# Patient Record
Sex: FEMALE | Race: WHITE | NOT HISPANIC OR LATINO | Employment: PART TIME | URBAN - METROPOLITAN AREA
[De-identification: names, ages, dates, MRNs, and addresses within clinical notes are randomized per-mention and may not be internally consistent; named-entity substitution may affect disease eponyms.]

---

## 2017-01-17 ENCOUNTER — OFFICE VISIT (OUTPATIENT)
Dept: URGENT CARE | Facility: CLINIC | Age: 57
End: 2017-01-17
Payer: COMMERCIAL

## 2017-01-17 LAB — GLUCOSE SERPL-MCNC: 302 MG/DL (ref 65–140)

## 2017-01-17 PROCEDURE — 99284 EMERGENCY DEPT VISIT MOD MDM: CPT

## 2017-01-17 PROCEDURE — 82948 REAGENT STRIP/BLOOD GLUCOSE: CPT

## 2017-01-17 PROCEDURE — 93005 ELECTROCARDIOGRAM TRACING: CPT

## 2017-01-17 PROCEDURE — G0383 LEV 4 HOSP TYPE B ED VISIT: HCPCS

## 2018-03-15 ENCOUNTER — OFFICE VISIT (OUTPATIENT)
Dept: FAMILY MEDICINE CLINIC | Facility: CLINIC | Age: 58
End: 2018-03-15
Payer: COMMERCIAL

## 2018-03-15 VITALS
WEIGHT: 254 LBS | TEMPERATURE: 97.1 F | SYSTOLIC BLOOD PRESSURE: 118 MMHG | RESPIRATION RATE: 18 BRPM | DIASTOLIC BLOOD PRESSURE: 78 MMHG | BODY MASS INDEX: 45 KG/M2 | HEART RATE: 82 BPM | HEIGHT: 63 IN | OXYGEN SATURATION: 98 %

## 2018-03-15 DIAGNOSIS — E11.9 DIABETES MELLITUS TYPE 2, INSULIN DEPENDENT (HCC): ICD-10-CM

## 2018-03-15 DIAGNOSIS — G89.29 OTHER CHRONIC PAIN: ICD-10-CM

## 2018-03-15 DIAGNOSIS — Z79.4 DIABETES MELLITUS TYPE 2, INSULIN DEPENDENT (HCC): ICD-10-CM

## 2018-03-15 DIAGNOSIS — K40.91 UNILATERAL RECURRENT INGUINAL HERNIA WITHOUT OBSTRUCTION OR GANGRENE: Primary | ICD-10-CM

## 2018-03-15 PROCEDURE — 99213 OFFICE O/P EST LOW 20 MIN: CPT | Performed by: FAMILY MEDICINE

## 2018-03-15 RX ORDER — LISINOPRIL 20 MG/1
20 TABLET ORAL DAILY
Refills: 0 | COMMUNITY
Start: 2018-01-23 | End: 2018-03-16 | Stop reason: SDUPTHER

## 2018-03-15 RX ORDER — ASPIRIN 81 MG/1
TABLET, CHEWABLE ORAL
Status: ON HOLD | COMMUNITY
End: 2018-09-04

## 2018-03-15 RX ORDER — ERGOCALCIFEROL 1.25 MG/1
CAPSULE ORAL
Refills: 0 | COMMUNITY
Start: 2017-12-30 | End: 2018-03-16 | Stop reason: SDUPTHER

## 2018-03-15 RX ORDER — ZOLPIDEM TARTRATE 10 MG/1
5 TABLET ORAL DAILY
COMMUNITY
End: 2018-09-04 | Stop reason: HOSPADM

## 2018-03-15 RX ORDER — PEN NEEDLE, DIABETIC 31 GX5/16"
NEEDLE, DISPOSABLE MISCELLANEOUS
Refills: 0 | COMMUNITY
Start: 2017-12-30 | End: 2018-10-08 | Stop reason: ALTCHOICE

## 2018-03-15 RX ORDER — SUCRALFATE 1 G/1
1 TABLET ORAL 4 TIMES DAILY
Refills: 0 | COMMUNITY
Start: 2018-01-23 | End: 2018-09-04 | Stop reason: HOSPADM

## 2018-03-15 RX ORDER — GABAPENTIN 300 MG/1
300 CAPSULE ORAL
COMMUNITY
Start: 2015-07-05 | End: 2018-03-16 | Stop reason: SDUPTHER

## 2018-03-15 RX ORDER — LORAZEPAM 0.5 MG/1
0.5 TABLET ORAL 3 TIMES DAILY PRN
Refills: 0 | COMMUNITY
Start: 2018-01-21 | End: 2018-03-16 | Stop reason: ALTCHOICE

## 2018-03-15 RX ORDER — FAMOTIDINE 20 MG/1
TABLET, FILM COATED ORAL
Refills: 0 | COMMUNITY
Start: 2017-12-30 | End: 2018-03-16 | Stop reason: SDUPTHER

## 2018-03-15 RX ORDER — INSULIN DETEMIR 100 [IU]/ML
INJECTION, SOLUTION SUBCUTANEOUS
Refills: 0 | COMMUNITY
Start: 2018-01-17 | End: 2018-03-16 | Stop reason: SDUPTHER

## 2018-03-15 RX ORDER — PANTOPRAZOLE SODIUM 40 MG/1
40 TABLET, DELAYED RELEASE ORAL DAILY
Refills: 0 | COMMUNITY
Start: 2018-01-23 | End: 2018-09-04 | Stop reason: HOSPADM

## 2018-03-15 RX ORDER — SIMVASTATIN 20 MG
TABLET ORAL
Refills: 0 | COMMUNITY
Start: 2017-12-30 | End: 2018-03-16 | Stop reason: SDUPTHER

## 2018-03-15 RX ORDER — MULTIVIT-MIN/FA/LYCOPEN/LUTEIN .4-300-25
TABLET ORAL
Refills: 0 | COMMUNITY
Start: 2017-12-30 | End: 2018-09-04 | Stop reason: HOSPADM

## 2018-03-15 RX ORDER — OXYCODONE HYDROCHLORIDE AND ACETAMINOPHEN 5; 325 MG/1; MG/1
TABLET ORAL
Refills: 0 | COMMUNITY
Start: 2018-03-02 | End: 2018-09-04 | Stop reason: HOSPADM

## 2018-03-15 RX ORDER — POTASSIUM CHLORIDE 600 MG/1
8 TABLET, FILM COATED, EXTENDED RELEASE ORAL
COMMUNITY
End: 2018-09-04 | Stop reason: HOSPADM

## 2018-03-15 RX ORDER — DULAGLUTIDE 1.5 MG/.5ML
INJECTION, SOLUTION SUBCUTANEOUS
Refills: 0 | COMMUNITY
Start: 2018-01-18 | End: 2018-03-16 | Stop reason: SDUPTHER

## 2018-03-15 RX ORDER — EZETIMIBE 10 MG/1
TABLET ORAL
Refills: 0 | COMMUNITY
Start: 2017-12-31 | End: 2018-03-16 | Stop reason: SDUPTHER

## 2018-03-15 NOTE — PROGRESS NOTES
OFFICE VISIT  Merlyn Winchester 59 y o  female MRN: [id number]      Assessment / Plan:  Diagnoses and all orders for this visit:    Unilateral recurrent inguinal hernia without obstruction or gangrene  -     Ambulatory referral to General Surgery; Future    Other chronic pain  -     Ambulatory referral to Pain Management; Future    Diabetes mellitus type 2, insulin dependent ([facility] )  -     Cancel: Microalbumin / creatinine urine ratio  -     TSH, 3rd generation with T4 reflex; Future  -     Lipid Panel with Direct LDL reflex; Future  -     Hemoglobin A1c; Future  -     Comprehensive metabolic panel; Future  -     Microalbumin / creatinine urine ratio      Refuses mammogram, refuses pap smear  Had colonoscopy by Dr Curtis Hopkins, 2017, obtain records  Pt made aware unable to prescribed continuous use of opiods and benzo  Pt to establish with pain management  Reason For Visit / Chief Complaint  Chief Complaint   Patient presents with   [place] Establish Care    Hernia     She states she has a hernia and it is stinging and uncomfortable  HPI:  Merlyn Winchester is a 59 y o  female presents today as a new patient  Pt was previously seen by [facility], recent insurance change, last appt   She reports her hernia is causing her pain, swelling  Surgery, 19 years by Dr Chanell Mcmillan  Historical Information   No past medical history on file    Past Surgical History:   Procedure Laterality Date    CERVICAL CONIZATION   W/ LASER      CHOLECYSTECTOMY      DILATION AND CURETTAGE OF UTERUS      ENDOMETRIAL ABLATION      ENDOMETRIAL BIOPSY      INDUCED       TOTAL THYROIDECTOMY       Social History   History   Alcohol Use No     History   Drug Use No     History   Smoking Status    Never Smoker   Smokeless Tobacco    Never Used     Family History   Problem Relation Age of Onset    Arthritis Mother     Osteoporosis Mother        Meds/Allergies   Allergies   Allergen Reactions    Albumen, Egg Other reaction(s): Nausea and/or vomiting    Bioflavonoids      Other reaction(s): Other (See Comments)  Skin "ayon"    Br[place] Number      Other reaction(s): GI upset  Abdominal pain    Metronidazole Itching    Morphine Hives and Itching    Prochlorperazine Wheezing     Other reaction(s):  Other (See Comments)  SOB    Sulfa Antibiotics      Other reaction(s): Unknown Reaction       Meds:    Current Outpatient Prescriptions:     aspirin 81 mg chewable tablet, Chew, Disp: , Rfl:     B-D ULTRAFINE III SHORT PEN 31G X 8 MM MISC, , Disp: , Rfl: 0    ergocalciferol (VITAMIN D2) 50,000 units, , Disp: , Rfl: 0    ezetimibe (ZETIA) 10 mg tablet, , Disp: , Rfl: 0    famotidine (PEPCID) 20 mg tablet, , Disp: , Rfl: 0    gabapentin (NEURONTIN) 300 mg capsule, Take 300 mg by mouth, Disp: , Rfl:     LEVEMIR FLEXTOUCH subcutaneous injection pen 100 units/mL, , Disp: , Rfl: 0    Liraglutide (VICTOZA) 18 MG/3ML SOPN, Inject under the skin, Disp: , Rfl:     lisinopril (ZESTRIL) 20 mg tablet, Take 20 mg by mouth daily, Disp: , Rfl: 0    LORazepam (ATIVAN) 0 5 mg tablet, Take 0 5 mg by mouth 3 (three) times a day as needed, Disp: , Rfl: 0    metFORMIN (GLUCOPHAGE) 1000 MG tablet, Take by mouth, Disp: , Rfl:     Multiple Vitamins-Minerals ([facility]) TABS, , Disp: , Rfl: 0    oxyCODONE-acetaminophen (PERCOCET) 5-325 mg per tablet, take 1 to 2 tablet by mouth every 6 hours if needed for pain, Disp: , Rfl: 0    pantoprazole (PROTONIX) 40 mg tablet, Take 40 mg by mouth daily, Disp: , Rfl: 0    potassium chloride (KLOR-CON) 8 MEQ tablet, Take 8 mEq by mouth, Disp: , Rfl:     simvastatin (ZOCOR) 20 mg tablet, , Disp: , Rfl: 0    sucralfate (CARAFATE) 1 g tablet, Take 1 g by mouth 4 (four) times a day, Disp: , Rfl: 0    TRULICITY 1 5 BU/7 8JK SOPN, , Disp: , Rfl: 0    VENTOLIN  (90 Base) MCG/ACT inhaler, Inhale 2 puffs every 4 (four) hours as needed, Disp: , Rfl: 0    zolpidem (AMBIEN) 10 mg tablet, Take 5 mg by mouth daily, Disp: , Rfl:       REVIEW OF SYSTEMS  A comprehensive review of systems was negative except for: Constitutional: positive for tired  Musculoskeletal: positive for  bone pain, joint stiffness and muscle pain  Behavioral/Psych: positive for Symptoms; Pyschiatric: anxiety and depression      Current Vitals:   Blood Pressure: 118/78 (03/15/18 1131)  Pulse: 82 (03/15/18 1131)  Temperature: (!) 97 1 °F (36 2 °C) (03/15/18 1131)  Respirations: 18 (03/15/18 1131)  Height: 5' 2 5" (158 8 cm) (03/15/18 1131)  Weight - Scale: 115 kg (254 lb) (03/15/18 1131)  SpO2: 98 % (03/15/18 1131)  [unfilled]    PHYSICAL EXAMS:  General appearance: alert and oriented, in no acute distress and moderately obese  Lungs: clear to auscultation bilaterally  Heart: regular rate and rhythm, S1, S2 normal, no murmur, click, rub or gallop  Abdomen: abnormal findings:  obese  Skin: Skin color, texture, turgor normal  No rashes or lesions  Neurologic: Grossly normal{YES/NO:20        Follow up at this office in 3 months    Counseling / Coordination of Care  Total floor / unit time spent today 20 minutes  Greater than 50% of total time was spent with the patient and / or family counseling and / or coordination of care

## 2018-03-16 DIAGNOSIS — F33.41 RECURRENT MAJOR DEPRESSIVE DISORDER, IN PARTIAL REMISSION (HCC): ICD-10-CM

## 2018-03-16 DIAGNOSIS — Z79.4 UNCONTROLLED TYPE 2 DIABETES MELLITUS WITH HYPERGLYCEMIA, WITH LONG-TERM CURRENT USE OF INSULIN (HCC): Primary | ICD-10-CM

## 2018-03-16 DIAGNOSIS — E11.65 UNCONTROLLED TYPE 2 DIABETES MELLITUS WITH HYPERGLYCEMIA, WITH LONG-TERM CURRENT USE OF INSULIN (HCC): Primary | ICD-10-CM

## 2018-03-16 DIAGNOSIS — J45.909 MODERATE ASTHMA WITHOUT COMPLICATION, UNSPECIFIED WHETHER PERSISTENT: ICD-10-CM

## 2018-03-16 RX ORDER — EZETIMIBE 10 MG/1
10 TABLET ORAL DAILY
Qty: 30 TABLET | Refills: 0 | Status: SHIPPED | OUTPATIENT
Start: 2018-03-16 | End: 2018-09-04 | Stop reason: HOSPADM

## 2018-03-16 RX ORDER — SIMVASTATIN 20 MG
20 TABLET ORAL
Qty: 30 TABLET | Refills: 0 | Status: ON HOLD | OUTPATIENT
Start: 2018-03-16 | End: 2018-09-04

## 2018-03-16 RX ORDER — LISINOPRIL 20 MG/1
20 TABLET ORAL DAILY
Qty: 30 TABLET | Refills: 0 | Status: SHIPPED | OUTPATIENT
Start: 2018-03-16 | End: 2018-04-20 | Stop reason: SDUPTHER

## 2018-03-16 RX ORDER — DULAGLUTIDE 1.5 MG/.5ML
1.5 INJECTION, SOLUTION SUBCUTANEOUS WEEKLY
Qty: 1 PEN | Refills: 0 | Status: SHIPPED | OUTPATIENT
Start: 2018-03-16 | End: 2018-09-04 | Stop reason: HOSPADM

## 2018-03-16 RX ORDER — FAMOTIDINE 20 MG/1
20 TABLET, FILM COATED ORAL DAILY
Qty: 30 TABLET | Refills: 0 | Status: SHIPPED | OUTPATIENT
Start: 2018-03-16 | End: 2018-09-04 | Stop reason: HOSPADM

## 2018-03-16 RX ORDER — INSULIN DETEMIR 100 [IU]/ML
50 INJECTION, SOLUTION SUBCUTANEOUS 2 TIMES DAILY
Qty: 5 PEN | Refills: 0 | Status: SHIPPED | OUTPATIENT
Start: 2018-03-16 | End: 2018-09-04 | Stop reason: HOSPADM

## 2018-03-16 RX ORDER — GABAPENTIN 300 MG/1
300 CAPSULE ORAL 4 TIMES DAILY
Qty: 120 CAPSULE | Refills: 0 | Status: ON HOLD | OUTPATIENT
Start: 2018-03-16 | End: 2018-09-04

## 2018-03-16 RX ORDER — CITALOPRAM 10 MG/1
10 TABLET ORAL DAILY
Qty: 30 TABLET | Refills: 0 | Status: ON HOLD | OUTPATIENT
Start: 2018-03-16 | End: 2018-09-04

## 2018-03-16 RX ORDER — ERGOCALCIFEROL 1.25 MG/1
50000 CAPSULE ORAL WEEKLY
Qty: 12 CAPSULE | Refills: 0 | Status: SHIPPED | OUTPATIENT
Start: 2018-03-16 | End: 2018-09-04 | Stop reason: HOSPADM

## 2018-04-06 RX ORDER — OXYCODONE AND ACETAMINOPHEN 10; 325 MG/1; MG/1
1 TABLET ORAL EVERY 6 HOURS PRN
Refills: 0 | COMMUNITY
Start: 2018-03-23 | End: 2019-04-08 | Stop reason: HOSPADM

## 2018-04-06 RX ORDER — TIZANIDINE 4 MG/1
4 TABLET ORAL 3 TIMES DAILY
Refills: 0 | Status: ON HOLD | COMMUNITY
Start: 2018-03-23 | End: 2018-09-04

## 2018-04-20 ENCOUNTER — OFFICE VISIT (OUTPATIENT)
Dept: FAMILY MEDICINE CLINIC | Facility: CLINIC | Age: 58
End: 2018-04-20
Payer: COMMERCIAL

## 2018-04-20 VITALS
HEART RATE: 85 BPM | OXYGEN SATURATION: 97 % | BODY MASS INDEX: 44.83 KG/M2 | WEIGHT: 253 LBS | DIASTOLIC BLOOD PRESSURE: 74 MMHG | HEIGHT: 63 IN | SYSTOLIC BLOOD PRESSURE: 141 MMHG | TEMPERATURE: 97.3 F

## 2018-04-20 DIAGNOSIS — J02.9 ACUTE PHARYNGITIS, UNSPECIFIED ETIOLOGY: Primary | ICD-10-CM

## 2018-04-20 DIAGNOSIS — J45.909 MODERATE ASTHMA WITHOUT COMPLICATION, UNSPECIFIED WHETHER PERSISTENT: ICD-10-CM

## 2018-04-20 DIAGNOSIS — R05.9 COUGH: ICD-10-CM

## 2018-04-20 DIAGNOSIS — B37.9 ANTIBIOTIC-INDUCED YEAST INFECTION: ICD-10-CM

## 2018-04-20 DIAGNOSIS — Z79.4 UNCONTROLLED TYPE 2 DIABETES MELLITUS WITH HYPERGLYCEMIA, WITH LONG-TERM CURRENT USE OF INSULIN (HCC): ICD-10-CM

## 2018-04-20 DIAGNOSIS — T36.95XA ANTIBIOTIC-INDUCED YEAST INFECTION: ICD-10-CM

## 2018-04-20 DIAGNOSIS — E11.65 UNCONTROLLED TYPE 2 DIABETES MELLITUS WITH HYPERGLYCEMIA, WITH LONG-TERM CURRENT USE OF INSULIN (HCC): ICD-10-CM

## 2018-04-20 PROBLEM — E55.9 VITAMIN D DEFICIENCY: Status: ACTIVE | Noted: 2017-01-17

## 2018-04-20 PROBLEM — D22.9 BENIGN MOLE: Status: ACTIVE | Noted: 2017-01-17

## 2018-04-20 PROBLEM — M19.90 ARTHRITIS: Status: ACTIVE | Noted: 2017-01-17

## 2018-04-20 PROBLEM — G47.00 INSOMNIA: Status: ACTIVE | Noted: 2017-01-17

## 2018-04-20 PROBLEM — E11.40 DIABETIC NEUROPATHY (HCC): Status: ACTIVE | Noted: 2017-01-23

## 2018-04-20 PROBLEM — E11.9 TYPE 2 DIABETES MELLITUS (HCC): Status: ACTIVE | Noted: 2017-01-17

## 2018-04-20 PROBLEM — E11.621 DIABETIC ULCER OF HEEL (HCC): Status: ACTIVE | Noted: 2017-01-17

## 2018-04-20 PROBLEM — K21.9 GERD (GASTROESOPHAGEAL REFLUX DISEASE): Status: ACTIVE | Noted: 2017-01-17

## 2018-04-20 PROBLEM — K21.9 ACID REFLUX: Status: ACTIVE | Noted: 2017-01-23

## 2018-04-20 PROBLEM — R26.9 GAIT DISTURBANCE: Status: ACTIVE | Noted: 2017-01-17

## 2018-04-20 PROBLEM — R55 NEAR SYNCOPE: Status: ACTIVE | Noted: 2017-01-17

## 2018-04-20 PROBLEM — I10 HYPERTENSION: Status: ACTIVE | Noted: 2017-01-17

## 2018-04-20 PROBLEM — L97.409 DIABETIC ULCER OF HEEL (HCC): Status: ACTIVE | Noted: 2017-01-17

## 2018-04-20 PROBLEM — F41.9 ANXIETY: Status: ACTIVE | Noted: 2017-01-17

## 2018-04-20 PROBLEM — N18.30 STAGE 3 CHRONIC KIDNEY DISEASE (HCC): Status: ACTIVE | Noted: 2017-01-23

## 2018-04-20 PROCEDURE — 99213 OFFICE O/P EST LOW 20 MIN: CPT | Performed by: FAMILY MEDICINE

## 2018-04-20 RX ORDER — LISINOPRIL 20 MG/1
20 TABLET ORAL DAILY
Qty: 30 TABLET | Refills: 0 | Status: SHIPPED | OUTPATIENT
Start: 2018-04-20 | End: 2018-09-04 | Stop reason: HOSPADM

## 2018-04-20 RX ORDER — AMOXICILLIN 875 MG/1
875 TABLET, COATED ORAL 2 TIMES DAILY
Qty: 20 TABLET | Refills: 0 | Status: SHIPPED | OUTPATIENT
Start: 2018-04-20 | End: 2018-04-20 | Stop reason: SDUPTHER

## 2018-04-20 RX ORDER — BENZONATATE 100 MG/1
100 CAPSULE ORAL 3 TIMES DAILY PRN
Qty: 20 CAPSULE | Refills: 0 | Status: SHIPPED | OUTPATIENT
Start: 2018-04-20 | End: 2018-09-04 | Stop reason: HOSPADM

## 2018-04-20 RX ORDER — BENZONATATE 100 MG/1
100 CAPSULE ORAL 3 TIMES DAILY PRN
Qty: 20 CAPSULE | Refills: 0 | Status: SHIPPED | OUTPATIENT
Start: 2018-04-20 | End: 2018-04-20 | Stop reason: SDUPTHER

## 2018-04-20 RX ORDER — LISINOPRIL 20 MG/1
20 TABLET ORAL DAILY
Qty: 30 TABLET | Refills: 0 | Status: SHIPPED | OUTPATIENT
Start: 2018-04-20 | End: 2018-04-20 | Stop reason: SDUPTHER

## 2018-04-20 RX ORDER — FLUCONAZOLE 150 MG/1
150 TABLET ORAL ONCE
Qty: 1 TABLET | Refills: 0 | Status: SHIPPED | OUTPATIENT
Start: 2018-04-20 | End: 2018-04-20 | Stop reason: SDUPTHER

## 2018-04-20 RX ORDER — AMOXICILLIN 875 MG/1
875 TABLET, COATED ORAL 2 TIMES DAILY
Qty: 20 TABLET | Refills: 0 | Status: SHIPPED | OUTPATIENT
Start: 2018-04-20 | End: 2018-04-30

## 2018-04-20 RX ORDER — FLUCONAZOLE 150 MG/1
150 TABLET ORAL ONCE
Qty: 1 TABLET | Refills: 0 | Status: SHIPPED | OUTPATIENT
Start: 2018-04-20 | End: 2018-04-20

## 2018-04-20 NOTE — PROGRESS NOTES
Assessment/Plan:    No problem-specific Assessment & Plan notes found for this encounter  Diagnoses and all orders for this visit:    Acute pharyngitis, unspecified etiology  -     amoxicillin (AMOXIL) 875 mg tablet; Take 1 tablet (875 mg total) by mouth 2 (two) times a day for 10 days    Uncontrolled type 2 diabetes mellitus with hyperglycemia, with long-term current use of insulin (HCC)  -     metFORMIN (GLUCOPHAGE) 1000 MG tablet; Take 1 tablet (1,000 mg total) by mouth 2 (two) times a day with meals  -     lisinopril (ZESTRIL) 20 mg tablet; Take 1 tablet (20 mg total) by mouth daily    Moderate asthma without complication, unspecified whether persistent  -     VENTOLIN  (90 Base) MCG/ACT inhaler; Inhale 2 puffs every 4 (four) hours as needed for shortness of breath    Antibiotic-induced yeast infection  -     fluconazole (DIFLUCAN) 150 mg tablet; Take 1 tablet (150 mg total) by mouth once for 1 dose    Cough  -     benzonatate (TESSALON PERLES) 100 mg capsule; Take 1 capsule (100 mg total) by mouth 3 (three) times a day as needed for cough      Discussion/Plan:  Acute pharyngitis/Cough - Amoxicillin, Tessalon perles, and Ventolin sent to pharmacy  Encouraged rest, fluids, supportive measures  Abx induced yeast infection - Diflucan sent to pharmacy  Diabetes - Merformin and Lisinopril renewed  F/U as scheduled or sooner if not better  Subjective:      Patient ID: Krishna Hoskins is a 59 y o  female  Chief Complaint   Patient presents with    Sore Throat    Cough    Earache    Headache    Conjunctivitis       Patient presents to the office today for acute sick visit  Patient has been sick for over 1 and 1/2 weeks  She states sx have been worsening over time  She reports sore throat, cough, earache, headache  She states this usually turns into pneumonia and is afraid of this happening  She has been using Nyquil and OTC cold/flu products without relief           The following portions of the patient's history were reviewed and updated as appropriate: allergies, current medications, past family history, past medical history, past social history, past surgical history and problem list   Patient Active Problem List   Diagnosis    Vitamin D deficiency    Type 2 diabetes mellitus ([place] [address] )    STD (female)    Encounter for routine gynecological examination    History of Salmonella infection    Obesity, morbid ([place] [address] )    Melanocytic nevus of trunk    Menopausal and perimenopausal disorder    Near syncope    Insomnia    Hypertension    Hyperlipidemia    History of pulmonary embolism    GERD (gastroesophageal reflux disease)    Gait disturbance    Diabetic ulcer of left heel ([place] [address] )    Diabetic ulcer of heel ([place] [address] )    Diabetic neuropathy ([place] [address] )    Diabetes mellitus type 2, uncontrolled ([place] [address] )    Stage 3 chronic kidney disease    Benign mole    Benign essential HTN    Arthritis    Anxiety    Anemia    JAYLEN (acute kidney injury) ([place] [address] )    Acid reflux    Achilles tendinitis of left lower extremity    Ulcer of toe of left foot ([place] [address] )       Review of Systems   Constitutional: Positive for fatigue  HENT: Positive for congestion, ear pain, rhinorrhea, sinus pain, sinus pressure, sneezing, sore throat, trouble swallowing and voice change  Respiratory: Positive for cough  Cardiovascular: Negative  Musculoskeletal: Positive for myalgias  Neurological: Positive for headaches  Objective:      /74 (BP Location: Left arm, Patient Position: Sitting, Cuff Size: Large)   Pulse 85   Temp (!) 97 3 °F (36 3 °C) (Tympanic)   Ht 5' 2 5" (1 588 m)   Wt 115 kg (253 lb)   SpO2 97%   BMI 45 54 kg/m²          Physical Exam   Constitutional: She is oriented to person, place, and time  She appears well-developed and well-nourished  obese   HENT:   Head: Normocephalic and atraumatic     Right Ear: Tympanic membrane, external ear and ear canal normal    Left Ear: External ear and ear canal normal  A middle ear effusion is present  Nose: Mucosal edema and rhinorrhea present  Mouth/Throat: Uvula is midline and mucous membranes are normal  Posterior oropharyngeal edema and posterior oropharyngeal erythema present  No oropharyngeal exudate or tonsillar abscesses  Eyes: Conjunctivae are normal  Pupils are equal, round, and reactive to light  Neck: Neck supple  Cardiovascular: Normal rate  Pulmonary/Chest: Effort normal  She has wheezes  Neurological: She is oriented to person, place, and time  Skin: Skin is warm and dry

## 2018-05-21 ENCOUNTER — TELEPHONE (OUTPATIENT)
Dept: FAMILY MEDICINE CLINIC | Facility: CLINIC | Age: 58
End: 2018-05-21

## 2018-06-11 ENCOUNTER — TELEPHONE (OUTPATIENT)
Dept: FAMILY MEDICINE CLINIC | Facility: CLINIC | Age: 58
End: 2018-06-11

## 2018-06-11 NOTE — TELEPHONE ENCOUNTER
Per office staff patient was here on Friday, 6/8 demanding for a letter written to the assistance office for free medications  She does have insurance  She was offered coupons/discounts in which she denied  She called office again today, requesting letter in which she does not meet income guidelines  She did not complete last labs ordered to evaluate diabetes

## 2018-09-02 ENCOUNTER — HOSPITAL ENCOUNTER (INPATIENT)
Facility: HOSPITAL | Age: 58
LOS: 2 days | Discharge: HOME | DRG: 624 | End: 2018-09-04
Attending: INTERNAL MEDICINE | Admitting: INTERNAL MEDICINE
Payer: COMMERCIAL

## 2018-09-02 ENCOUNTER — HOSPITAL ENCOUNTER (EMERGENCY)
Facility: HOSPITAL | Age: 58
End: 2018-09-02
Attending: EMERGENCY MEDICINE
Payer: COMMERCIAL

## 2018-09-02 ENCOUNTER — APPOINTMENT (EMERGENCY)
Dept: RADIOLOGY | Facility: HOSPITAL | Age: 58
End: 2018-09-02
Payer: COMMERCIAL

## 2018-09-02 VITALS
RESPIRATION RATE: 16 BRPM | OXYGEN SATURATION: 98 % | BODY MASS INDEX: 42.97 KG/M2 | TEMPERATURE: 98.6 F | HEART RATE: 89 BPM | SYSTOLIC BLOOD PRESSURE: 127 MMHG | WEIGHT: 242.51 LBS | HEIGHT: 63 IN | DIASTOLIC BLOOD PRESSURE: 75 MMHG

## 2018-09-02 DIAGNOSIS — L97.529 ULCER OF TOE OF LEFT FOOT, UNSPECIFIED ULCER STAGE (HCC): ICD-10-CM

## 2018-09-02 DIAGNOSIS — Z79.4 UNCONTROLLED TYPE 2 DIABETES MELLITUS WITH HYPERGLYCEMIA, WITH LONG-TERM CURRENT USE OF INSULIN (HCC): ICD-10-CM

## 2018-09-02 DIAGNOSIS — E11.65 TYPE 2 DIABETES MELLITUS WITH HYPERGLYCEMIA, WITH LONG-TERM CURRENT USE OF INSULIN (HCC): Primary | ICD-10-CM

## 2018-09-02 DIAGNOSIS — Z79.4 TYPE 2 DIABETES MELLITUS WITH HYPERGLYCEMIA, WITH LONG-TERM CURRENT USE OF INSULIN (HCC): Primary | ICD-10-CM

## 2018-09-02 DIAGNOSIS — E11.65 UNCONTROLLED TYPE 2 DIABETES MELLITUS WITH HYPERGLYCEMIA, WITH LONG-TERM CURRENT USE OF INSULIN (HCC): ICD-10-CM

## 2018-09-02 DIAGNOSIS — E11.9 DIABETES MELLITUS (HCC): Primary | ICD-10-CM

## 2018-09-02 DIAGNOSIS — E11.40 DIABETIC NEUROPATHY (HCC): ICD-10-CM

## 2018-09-02 DIAGNOSIS — R73.9 HYPERGLYCEMIA: ICD-10-CM

## 2018-09-02 DIAGNOSIS — I10 ESSENTIAL HYPERTENSION: ICD-10-CM

## 2018-09-02 DIAGNOSIS — F33.41 RECURRENT MAJOR DEPRESSIVE DISORDER, IN PARTIAL REMISSION (HCC): ICD-10-CM

## 2018-09-02 DIAGNOSIS — E11.65 TYPE 2 DIABETES MELLITUS WITH HYPERGLYCEMIA (HCC): ICD-10-CM

## 2018-09-02 PROBLEM — M62.838 MUSCLE SPASM: Status: ACTIVE | Noted: 2018-09-02

## 2018-09-02 LAB
ALBUMIN SERPL BCP-MCNC: 4 G/DL (ref 3.5–5.7)
ALP SERPL-CCNC: 103 U/L (ref 40–150)
ALT SERPL W P-5'-P-CCNC: 21 U/L (ref 7–52)
ANION GAP SERPL CALCULATED.3IONS-SCNC: 15 MMOL/L (ref 4–13)
AST SERPL W P-5'-P-CCNC: 23 U/L (ref 13–39)
BASOPHILS # BLD AUTO: 0 THOUSANDS/ΜL (ref 0–0.1)
BASOPHILS NFR BLD AUTO: 1 % (ref 0–1)
BILIRUB SERPL-MCNC: 0.6 MG/DL (ref 0.2–1)
BILIRUB UR QL STRIP: NEGATIVE
BUN SERPL-MCNC: 16 MG/DL (ref 7–25)
CALCIUM SERPL-MCNC: 9.5 MG/DL (ref 8.6–10.5)
CHLORIDE SERPL-SCNC: 97 MMOL/L (ref 98–107)
CLARITY UR: CLEAR
CO2 SERPL-SCNC: 23 MMOL/L (ref 21–31)
COLOR UR: YELLOW
CREAT SERPL-MCNC: 1.2 MG/DL (ref 0.6–1.2)
DEPRECATED D DIMER PPP: 270 NG/ML (FEU)
EOSINOPHIL # BLD AUTO: 0.3 THOUSAND/ΜL (ref 0–0.61)
EOSINOPHIL NFR BLD AUTO: 4 % (ref 0–6)
ERYTHROCYTE [DISTWIDTH] IN BLOOD BY AUTOMATED COUNT: 13.7 % (ref 11.6–15.1)
GFR SERPL CREATININE-BSD FRML MDRD: 50 ML/MIN/1.73SQ M
GLUCOSE SERPL-MCNC: 175 MG/DL (ref 65–140)
GLUCOSE SERPL-MCNC: 214 MG/DL (ref 65–140)
GLUCOSE SERPL-MCNC: 248 MG/DL (ref 65–140)
GLUCOSE SERPL-MCNC: 288 MG/DL (ref 65–140)
GLUCOSE SERPL-MCNC: 439 MG/DL (ref 65–140)
GLUCOSE SERPL-MCNC: 479 MG/DL (ref 65–140)
GLUCOSE SERPL-MCNC: 504 MG/DL (ref 65–140)
GLUCOSE UR STRIP-MCNC: ABNORMAL MG/DL
HCT VFR BLD AUTO: 43.4 % (ref 37–47)
HGB BLD-MCNC: 14.7 G/DL (ref 11.5–15.4)
HGB UR QL STRIP.AUTO: NEGATIVE
HOLD SPECIMEN: NORMAL
KETONES UR STRIP-MCNC: ABNORMAL MG/DL
LEUKOCYTE ESTERASE UR QL STRIP: NEGATIVE
LYMPHOCYTES # BLD AUTO: 2.2 THOUSANDS/ΜL (ref 0.6–4.47)
LYMPHOCYTES NFR BLD AUTO: 35 % (ref 14–44)
MCH RBC QN AUTO: 29 PG (ref 26.8–34.3)
MCHC RBC AUTO-ENTMCNC: 33.8 G/DL (ref 31.4–37.4)
MCV RBC AUTO: 86 FL (ref 82–98)
MONOCYTES # BLD AUTO: 0.4 THOUSAND/ΜL (ref 0.17–1.22)
MONOCYTES NFR BLD AUTO: 7 % (ref 4–12)
NEUTROPHILS # BLD AUTO: 3.3 THOUSANDS/ΜL (ref 1.85–7.62)
NEUTS SEG NFR BLD AUTO: 53 % (ref 43–75)
NITRITE UR QL STRIP: NEGATIVE
NRBC BLD AUTO-RTO: 0 /100 WBCS
PH UR STRIP.AUTO: 5.5 [PH] (ref 5–8)
PLATELET # BLD AUTO: 217 THOUSANDS/UL (ref 149–390)
PMV BLD AUTO: 8.4 FL (ref 8.9–12.7)
POTASSIUM SERPL-SCNC: 3.9 MMOL/L (ref 3.5–5.5)
PROT SERPL-MCNC: 6.2 G/DL (ref 6.4–8.9)
PROT UR STRIP-MCNC: NEGATIVE MG/DL
RBC # BLD AUTO: 5.05 MILLION/UL (ref 3.81–5.12)
SODIUM SERPL-SCNC: 135 MMOL/L (ref 134–143)
SP GR UR STRIP.AUTO: 1.01 (ref 1–1.03)
UROBILINOGEN UR QL STRIP.AUTO: 0.2 E.U./DL
WBC # BLD AUTO: 6.3 THOUSAND/UL (ref 4.31–10.16)

## 2018-09-02 PROCEDURE — 99285 EMERGENCY DEPT VISIT HI MDM: CPT

## 2018-09-02 PROCEDURE — 71046 X-RAY EXAM CHEST 2 VIEWS: CPT

## 2018-09-02 PROCEDURE — 96374 THER/PROPH/DIAG INJ IV PUSH: CPT

## 2018-09-02 PROCEDURE — 85025 COMPLETE CBC W/AUTO DIFF WBC: CPT | Performed by: EMERGENCY MEDICINE

## 2018-09-02 PROCEDURE — 82948 REAGENT STRIP/BLOOD GLUCOSE: CPT

## 2018-09-02 PROCEDURE — 80053 COMPREHEN METABOLIC PANEL: CPT | Performed by: EMERGENCY MEDICINE

## 2018-09-02 PROCEDURE — 93005 ELECTROCARDIOGRAM TRACING: CPT

## 2018-09-02 PROCEDURE — 99222 1ST HOSP IP/OBS MODERATE 55: CPT | Performed by: PHYSICIAN ASSISTANT

## 2018-09-02 PROCEDURE — 73630 X-RAY EXAM OF FOOT: CPT

## 2018-09-02 PROCEDURE — 36415 COLL VENOUS BLD VENIPUNCTURE: CPT | Performed by: EMERGENCY MEDICINE

## 2018-09-02 PROCEDURE — 85379 FIBRIN DEGRADATION QUANT: CPT | Performed by: EMERGENCY MEDICINE

## 2018-09-02 PROCEDURE — 81003 URINALYSIS AUTO W/O SCOPE: CPT | Performed by: EMERGENCY MEDICINE

## 2018-09-02 PROCEDURE — 96361 HYDRATE IV INFUSION ADD-ON: CPT

## 2018-09-02 RX ORDER — GABAPENTIN 300 MG/1
300 CAPSULE ORAL 4 TIMES DAILY
Status: DISCONTINUED | OUTPATIENT
Start: 2018-09-02 | End: 2018-09-04 | Stop reason: HOSPADM

## 2018-09-02 RX ORDER — TIZANIDINE 2 MG/1
4 TABLET ORAL EVERY 8 HOURS PRN
Status: DISCONTINUED | OUTPATIENT
Start: 2018-09-02 | End: 2018-09-04 | Stop reason: HOSPADM

## 2018-09-02 RX ORDER — ASPIRIN 81 MG/1
81 TABLET, CHEWABLE ORAL DAILY
Status: DISCONTINUED | OUTPATIENT
Start: 2018-09-03 | End: 2018-09-04 | Stop reason: HOSPADM

## 2018-09-02 RX ORDER — CITALOPRAM 20 MG/1
10 TABLET ORAL DAILY
Status: DISCONTINUED | OUTPATIENT
Start: 2018-09-03 | End: 2018-09-04 | Stop reason: HOSPADM

## 2018-09-02 RX ORDER — FAMOTIDINE 20 MG/1
20 TABLET, FILM COATED ORAL DAILY
Status: DISCONTINUED | OUTPATIENT
Start: 2018-09-03 | End: 2018-09-04 | Stop reason: HOSPADM

## 2018-09-02 RX ORDER — POTASSIUM CHLORIDE 600 MG/1
8 TABLET, FILM COATED, EXTENDED RELEASE ORAL DAILY
Status: DISCONTINUED | OUTPATIENT
Start: 2018-09-03 | End: 2018-09-03 | Stop reason: RX

## 2018-09-02 RX ORDER — ACETAMINOPHEN 325 MG/1
650 TABLET ORAL EVERY 6 HOURS PRN
Status: DISCONTINUED | OUTPATIENT
Start: 2018-09-02 | End: 2018-09-04 | Stop reason: HOSPADM

## 2018-09-02 RX ORDER — DIAZEPAM 5 MG/ML
5 INJECTION, SOLUTION INTRAMUSCULAR; INTRAVENOUS ONCE
Status: COMPLETED | OUTPATIENT
Start: 2018-09-02 | End: 2018-09-02

## 2018-09-02 RX ORDER — EZETIMIBE 10 MG/1
10 TABLET ORAL DAILY
Status: DISCONTINUED | OUTPATIENT
Start: 2018-09-03 | End: 2018-09-04 | Stop reason: HOSPADM

## 2018-09-02 RX ORDER — PANTOPRAZOLE SODIUM 40 MG/1
40 TABLET, DELAYED RELEASE ORAL
Status: DISCONTINUED | OUTPATIENT
Start: 2018-09-03 | End: 2018-09-04 | Stop reason: HOSPADM

## 2018-09-02 RX ORDER — ERGOCALCIFEROL 1.25 MG/1
50000 CAPSULE ORAL WEEKLY
Status: DISCONTINUED | OUTPATIENT
Start: 2018-09-03 | End: 2018-09-04 | Stop reason: HOSPADM

## 2018-09-02 RX ORDER — SODIUM CHLORIDE 9 MG/ML
100 INJECTION, SOLUTION INTRAVENOUS CONTINUOUS
Status: DISCONTINUED | OUTPATIENT
Start: 2018-09-02 | End: 2018-09-03

## 2018-09-02 RX ORDER — SODIUM CHLORIDE 9 MG/ML
125 INJECTION, SOLUTION INTRAVENOUS CONTINUOUS
Status: DISCONTINUED | OUTPATIENT
Start: 2018-09-02 | End: 2018-09-02 | Stop reason: HOSPADM

## 2018-09-02 RX ORDER — ONDANSETRON 2 MG/ML
4 INJECTION INTRAMUSCULAR; INTRAVENOUS EVERY 6 HOURS PRN
Status: DISCONTINUED | OUTPATIENT
Start: 2018-09-02 | End: 2018-09-04 | Stop reason: HOSPADM

## 2018-09-02 RX ORDER — PRAVASTATIN SODIUM 40 MG
40 TABLET ORAL
Status: DISCONTINUED | OUTPATIENT
Start: 2018-09-03 | End: 2018-09-04 | Stop reason: HOSPADM

## 2018-09-02 RX ORDER — LISINOPRIL 20 MG/1
20 TABLET ORAL DAILY
Status: DISCONTINUED | OUTPATIENT
Start: 2018-09-03 | End: 2018-09-03

## 2018-09-02 RX ADMIN — SODIUM CHLORIDE 125 ML/HR: 0.9 INJECTION, SOLUTION INTRAVENOUS at 15:28

## 2018-09-02 RX ADMIN — TIZANIDINE 4 MG: 2 TABLET ORAL at 23:33

## 2018-09-02 RX ADMIN — Medication 5 MG: at 17:58

## 2018-09-02 RX ADMIN — GABAPENTIN 300 MG: 300 CAPSULE ORAL at 23:33

## 2018-09-02 NOTE — ED PROVIDER NOTES
History  Chief Complaint   Patient presents with    Foot Pain     bilateral pain    Foot Swelling     worsening since this AM     Patient is a 49-year-old female with a history of insert requiring diabetes mellitus who says she has no insurance and therefore cannot afford her insulin medication  In addition to not taking her insulin she has noted increased swelling and pain in her left great toe  She does not have any fevers or chills or nausea vomiting  She does not check her fingersticks at home  She is here for further evaluation            Prior to Admission Medications   Prescriptions Last Dose Informant Patient Reported? Taking?    B-D ULTRAFINE III SHORT PEN 31G X 8 MM MISC More than a month at Unknown time  Yes No   LEVEMIR FLEXTOUCH subcutaneous injection pen 100 units/mL Past Month at Unknown time  No Yes   Sig: Inject 50 Units under the skin 2 (two) times a day   Liraglutide (VICTOZA) 18 MG/3ML SOPN More than a month at Unknown time  Yes No   Sig: Inject under the skin   Multiple Vitamins-Minerals (RA CENTRAL-ANGELA SENIOR) TABS More than a month at Unknown time  Yes No   TRULICITY 1 5 QJ/9 9SC SOPN More than a month at Unknown time  No No   Sig: Inject 0 5 mL (1 5 mg total) under the skin once a week   VENTOLIN  (90 Base) MCG/ACT inhaler More than a month at Unknown time  No No   Sig: Inhale 2 puffs every 4 (four) hours as needed for shortness of breath   aspirin 81 mg chewable tablet 9/2/2018 at Unknown time  Yes Yes   Sig: Chew   benzonatate (TESSALON PERLES) 100 mg capsule More than a month at Unknown time  No No   Sig: Take 1 capsule (100 mg total) by mouth 3 (three) times a day as needed for cough   citalopram (CeleXA) 10 mg tablet More than a month at Unknown time  No No   Sig: Take 1 tablet (10 mg total) by mouth daily   ergocalciferol (VITAMIN D2) 50,000 units More than a month at Unknown time  No No   Sig: Take 1 capsule (50,000 Units total) by mouth once a week   ezetimibe (ZETIA) 10 mg tablet More than a month at Unknown time  No No   Sig: Take 1 tablet (10 mg total) by mouth daily   famotidine (PEPCID) 20 mg tablet More than a month at Unknown time  No No   Sig: Take 1 tablet (20 mg total) by mouth daily   gabapentin (NEURONTIN) 300 mg capsule Past Month at Unknown time  No Yes   Sig: Take 1 capsule (300 mg total) by mouth 4 (four) times a day   lisinopril (ZESTRIL) 20 mg tablet More than a month at Unknown time  No No   Sig: Take 1 tablet (20 mg total) by mouth daily   metFORMIN (GLUCOPHAGE) 1000 MG tablet 9/2/2018 at Unknown time  No Yes   Sig: Take 1 tablet (1,000 mg total) by mouth 2 (two) times a day with meals   oxyCODONE-acetaminophen (PERCOCET)  mg per tablet More than a month at Unknown time  Yes No   Sig: Take 1 tablet by mouth every 6 (six) hours as needed   oxyCODONE-acetaminophen (PERCOCET) 5-325 mg per tablet More than a month at Unknown time  Yes No   Sig: take 1 to 2 tablet by mouth every 6 hours if needed for pain   pantoprazole (PROTONIX) 40 mg tablet More than a month at Unknown time  Yes No   Sig: Take 40 mg by mouth daily   potassium chloride (KLOR-CON) 8 MEQ tablet More than a month at Unknown time  Yes No   Sig: Take 8 mEq by mouth   simvastatin (ZOCOR) 20 mg tablet More than a month at Unknown time  No No   Sig: Take 1 tablet (20 mg total) by mouth daily at bedtime   sucralfate (CARAFATE) 1 g tablet More than a month at Unknown time  Yes No   Sig: Take 1 g by mouth 4 (four) times a day   tiZANidine (ZANAFLEX) 4 mg tablet More than a month at Unknown time  Yes No   Sig: Take 4 mg by mouth 3 (three) times a day   zolpidem (AMBIEN) 10 mg tablet More than a month at Unknown time  Yes No   Sig: Take 5 mg by mouth daily      Facility-Administered Medications: None       Past Medical History:   Diagnosis Date    Asthma     Chronic pain     Diabetes mellitus (HCC)     GERD (gastroesophageal reflux disease)     Hypertension     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  Neuropathy     PCOS (polycystic ovarian syndrome)        Past Surgical History:   Procedure Laterality Date    CERVICAL CONIZATION   W/ LASER      DILATION AND CURETTAGE OF UTERUS      ENDOMETRIAL ABLATION      ENDOMETRIAL BIOPSY      INDUCED       TOTAL THYROIDECTOMY         Family History   Problem Relation Age of Onset    Arthritis Mother     Osteoporosis Mother      I have reviewed and agree with the history as documented  Social History   Substance Use Topics    Smoking status: Never Smoker    Smokeless tobacco: Never Used    Alcohol use No        Review of Systems   Constitutional: Negative for chills, fatigue, fever and unexpected weight change  HENT: Negative for congestion and nosebleeds  Eyes: Negative for visual disturbance  Respiratory: Negative for chest tightness and shortness of breath  Cardiovascular: Negative for chest pain, palpitations and leg swelling  Gastrointestinal: Negative for abdominal pain, blood in stool, diarrhea, nausea and vomiting  Endocrine: Negative for cold intolerance and heat intolerance  Genitourinary: Negative for difficulty urinating  Musculoskeletal: Negative for arthralgias, back pain, gait problem, joint swelling and myalgias  Skin: Negative for rash  Neurological: Negative for dizziness, speech difficulty, weakness and headaches  Psychiatric/Behavioral: Negative for behavioral problems, confusion, self-injury and suicidal ideas  All other systems reviewed and are negative  Physical Exam  Physical Exam   Constitutional: She is oriented to person, place, and time  She appears well-developed and well-nourished  HENT:   Head: Normocephalic and atraumatic  Nose: Nose normal    Eyes: EOM are normal  Pupils are equal, round, and reactive to light  Neck: Normal range of motion  Neck supple  Cardiovascular: Normal rate, regular rhythm and normal heart sounds  Exam reveals no gallop and no friction rub      No murmur heard  Pulmonary/Chest: Effort normal and breath sounds normal  No respiratory distress  She has no wheezes  She has no rales  Abdominal: Soft  She exhibits no distension  There is no tenderness  There is no rebound and no guarding  Musculoskeletal: Normal range of motion  She exhibits tenderness (Patient's left great toe is tender warm through the MTP joint there is no skin opening )  She exhibits no edema  Neurological: She is alert and oriented to person, place, and time  Skin: Skin is warm and dry  Psychiatric: She has a normal mood and affect  Her behavior is normal  Judgment and thought content normal    Nursing note and vitals reviewed        Vital Signs  ED Triage Vitals [09/02/18 1345]   Temperature Pulse Respirations Blood Pressure SpO2   99 5 °F (37 5 °C) 84 16 133/75 97 %      Temp Source Heart Rate Source Patient Position - Orthostatic VS BP Location FiO2 (%)   Tympanic Monitor Sitting Left arm --      Pain Score       4           Vitals:    09/02/18 1345 09/02/18 1617 09/02/18 1737 09/02/18 1839   BP: 133/75 117/51 128/57 105/75   Pulse: 84 77 70 90   Patient Position - Orthostatic VS: Sitting Lying Lying        Visual Acuity      ED Medications  Medications   sodium chloride 0 9 % infusion (125 mL/hr Intravenous New Bag 9/2/18 1528)   diazepam (VALIUM) injection 5 mg (5 mg Intravenous Given 9/2/18 1758)       Diagnostic Studies  Results Reviewed     Procedure Component Value Units Date/Time    Fingerstick Glucose (POCT) [[id number]]  (Abnormal) Collected:  09/02/18 1829    Lab Status:  Final result Updated:  09/02/18 1833     POC Glucose 175 (H) mg/dl     Fingerstick Glucose (POCT) [[id number]]  (Abnormal) Collected:  09/02/18 1751    Lab Status:  Final result Updated:  09/02/18 1754     POC Glucose 248 (H) mg/dl     D-dimer, quantitative [[id number]] Collected:  09/02/18 1715    Lab Status:  No result Specimen:  Blood from Arm, Left     Fingerstick Glucose (POCT) [[id number]]  (Abnormal) Collected:  09/02/18 1549    Lab Status:  Final result Updated:  09/02/18 1622     POC Glucose 439 (H) mg/dl     Fingerstick Glucose (POCT) [[id number]]  (Abnormal) Collected:  09/02/18 1353    Lab Status:  Final result Updated:  09/02/18 1622     POC Glucose 479 (H) mg/dl     Comprehensive metabolic panel [[id number]]  (Abnormal) Collected:  09/02/18 1450    Lab Status:  Final result Specimen:  Blood from Line Updated:  09/02/18 1546     Sodium 135 mmol/L      Potassium 3 9 mmol/L      Chloride 97 (L) mmol/L      CO2 23 mmol/L      ANION GAP 15 (H) mmol/L      BUN 16 mg/dL      Creatinine 1 20 mg/dL      Glucose 504 (HH) mg/dL      Calcium 9 5 mg/dL      AST 23 U/L      ALT 21 U/L      Alkaline Phosphatase 103 U/L      Total Protein 6 2 (L) g/dL      Albumin 4 0 g/dL      Total Bilirubin 0 60 mg/dL      eGFR 50 ml/min/1 73sq m     Narrative:         National Kidney Disease Education Program recommendations are as follows:  GFR calculation is accurate only with a steady state creatinine  Chronic Kidney disease less than 60 ml/min/1 73 sq  meters  Kidney failure less than 15 ml/min/1 73 sq  meters      CBC and differential [[id number]]  (Abnormal) Collected:  09/02/18 1450    Lab Status:  Final result Specimen:  Blood from Line Updated:  09/02/18 1502     WBC 6 30 Thousand/uL      RBC 5 05 Million/uL      Hemoglobin 14 7 g/dL      Hematocrit 43 4 %      MCV 86 fL      MCH 29 0 pg      MCHC 33 8 g/dL      RDW 13 7 %      MPV 8 4 (L) fL      Platelets 653 Thousands/uL      nRBC 0 /100 WBCs      Neutrophils Relative 53 %      Lymphocytes Relative 35 %      Monocytes Relative 7 %      Eosinophils Relative 4 %      Basophils Relative 1 %      Neutrophils Absolute 3 30 Thousands/µL      Lymphocytes Absolute 2 20 Thousands/µL      Monocytes Absolute 0 40 Thousand/µL      Eosinophils Absolute 0 30 Thousand/µL      Basophils Absolute 0 00 Thousands/µL     UA w Reflex to Microscopic [[id number]]  (Abnormal) Collected:  09/02/18 1450 Lab Status:  Final result Specimen:  Urine from Urine, Clean Catch Updated:  09/02/18 1500     Color, UA Yellow     Clarity, UA Clear     Specific Gravity, UA 1 010     pH, UA 5 5     Leukocytes, UA Negative     Nitrite, UA Negative     Protein, UA Negative mg/dl      Glucose, UA 3+ (A) mg/dl      Ketones, UA Trace (A) mg/dl      Urobilinogen, UA 0 2 E U /dl      Bilirubin, UA Negative     Blood, UA Negative    [place] draw [[id number]] Collected:  09/02/18 1450    Lab Status:  Final result Specimen:  Blood Updated:  09/02/18 1456    Narrative: The following orders were created for panel order [place] draw  Procedure                               Abnormality         Status                     ---------                               -----------         ------                     Joel Avi Top on CMSS[[id number]]                            Final result               [id number]                       Final result                 Please view results for these tests on the individual orders  XR foot 3+ views LEFT   Final Result by Enrique Ochoa (09/02 1619)   Degenerative changes of the forefoot without fracture  Signed by Enrique Ochoa MD      XR chest 2 views   Final Result by Interface, Radiology Results In (09/02 1518)   Normal single image of the chest  The lungs are clear  The   chest is stable in the interval since November 2015        Signed by Kee Barrett MD                 Procedures  Procedures       Phone Contacts  ED Phone Contact    ED Course  ED Course as of Sep 02 1854   Sun Sep 02, 2018   1551 Does not appear to be actively infected WBC: 6 30   1749 Constipation for muscle spasm in her right thigh  Patient says that she has severe sciatica and is not uncommon                                  [facility]  CritCare Time    Disposition  Final diagnoses:   Diabetes mellitus ([facility] )   Hyperglycemia     Time reflects when diagnosis was documented in both MDM as applicable and the Disposition within this note     Time User Action Codes Description Comment    9/2/2018  3:52 PM Lambert Ranks Add [E11 9] Diabetes mellitus ([facility] [address] )     9/2/2018  3:52 PM Lambert Ranks Add [R73 9] Hyperglycemia       ED Disposition     ED Disposition Condition Comment    Transfer to Another [facility] should be transferred out to Flaquito Perry MD Documentation      Most Recent Value   Patient Condition  The patient has been stabilized such that within reasonable medical probability, no material deterioration of the patient condition or the condition of the unborn child(marli) is likely to result from the transfer   Reason for Transfer  Level of Care needed not available at this facility   Benefits of Transfer  Specialized equipment and/or services available at the receiving facility (Include comment)________________________   Accepting Physician  [facility] Name, [address] ([address])   Transported by (Company and Unit #)  Cameron Nora Stoddard MD  [facility]   Provider Certification  General risk, such as traffic hazards, adverse weather conditions, rough terrain or turbulence, possible failure of equipment (including vehicle or aircraft), or consequences of actions of persons outside the control of the transport personnel      RN Documentation      [facility] Name, [address] (CORRECTION)   Bed Assignment  416   Medications Reviewed with Next Provider of Service  Yes   Transport Mode  Ambulance   Transported by (Company and Unit #)  [facility]   Level of Care  Advanced life support   Transfer Date  09/02/18      Follow-up Information    None         Patient's Medications   Discharge Prescriptions    No medications on file     No discharge procedures on file      ED Provider  Electronically Signed by           Katie Jensen MD  09/02/18 72 Zofia Raza MD  09/02/18 1108

## 2018-09-02 NOTE — EMTALA/ACUTE CARE TRANSFER
12 [facility]   [address] [place] [id number] [phone]  Dept: [phone]      [facility] TRANSFER CONSENT    NAME Daisy LEON 1960                              MRN [id number]    I have been informed of my rights regarding examination, treatment, and transfer   by Dr Manisha Duarte MD    Benefits: Specialized equipment and/or services available at the receiving facility (Include comment)________________________    Risks:        Transfer Request   I acknowledge that my medical condition has been evaluated and explained to me by the emergency department physician or other qualified medical person and/or my attending physician who has recommended and offered to me further medical examination and treatment  I understand the Hospital's obligation with respect to the treatment and stabilization of my emergency medical condition  I nevertheless request to be transferred  I release the Hospital, the doctor, and any other persons caring for me from all responsibility or liability for any injury or ill effects that may result from my transfer and agree to accept all responsibility for the consequences of my choice to transfer, rather than receive stabilizing treatment at the Hospital  I understand that because the transfer is my request, my insurance may not provide reimbursement for the services  The Hospital will assist and direct me and my family in how to make arrangements for transfer, but the hospital is not liable for any fees charged by the transport service  In spite of this understanding, I refuse to consent to further medical examination and treatment which has been offered to me, and request transfer to    I authorize the performance of emergency medical procedures and treatments upon me in both transit and upon arrival at the receiving facility    Additionally, I authorize the release of any and all medical records to the receiving facility and request they be transported with me, if possible  I authorize the performance of emergency medical procedures and treatments upon me in both transit and upon arrival at the receiving facility  Additionally, I authorize the release of any and all medical records to the receiving facility and request they be transported with me, if possible  I understand that the safest mode of transportation during a medical emergency is an ambulance and that the Hospital advocates the use of this mode of transport  Risks of traveling to the receiving facility by car, including absence of medical control, life sustaining equipment, such as oxygen, and medical personnel has been explained to me and I fully understand them  (ALBERTO CORRECT BOX BELOW)  [ x ]  I consent to the stated transfer and to be transported by ambulance/helicopter  [  ]  I consent to the stated transfer, but refuse transportation by ambulance and accept full responsibility for my transportation by car  I understand the risks of non-ambulance transfers and I exonerate the Hospital and its staff from any deterioration in my condition that results from this refusal     X___________________________________________    DATE  18  TIME________  Signature of patient or legally responsible individual signing on patient behalf           RELATIONSHIP TO PATIENT_________________________          Provider Certification    NAME Shirin [facility]                                         1960                              MRN [id number]    A medical screening exam was performed on the above named patient  Based on the examination:    Condition Necessitating Transfer The primary encounter diagnosis was Diabetes mellitus ([facility] [address] )  A diagnosis of Hyperglycemia was also pertinent to this visit      Patient Condition: The patient has been stabilized such that within reasonable medical probability, no material deterioration of the patient condition or the condition of the unborn child(marli) is likely to result from the transfer    Reason for Transfer: Level of Care needed not available at this facility    Transfer Requirements: Facility     · Space available and qualified personnel available for treatment as acknowledged by    · Agreed to accept transfer and to provide appropriate medical treatment as acknowledged by          · Appropriate medical records of the examination and treatment of the patient are provided at the time of transfer   [facility], [address] _______  · Transfer will be performed by qualified personnel from    and appropriate transfer equipment as required, including the use of necessary and appropriate life support measures  Provider Certification: I have examined the patient and explained the following risks and benefits of being transferred/refusing transfer to the patient/family:  General risk, such as traffic hazards, adverse weather conditions, rough terrain or turbulence, possible failure of equipment (including vehicle or aircraft), or consequences of actions of persons outside the control of the transport personnel      Based on these reasonable risks and benefits to the patient and/or the unborn child(marli), and based upon the information available at the time of the patients examination, I certify that the medical benefits reasonably to be expected from the provision of appropriate medical treatments at another medical facility outweigh the increasing risks, if any, to the individuals medical condition, and in the case of labor to the unborn child, from effecting the transfer      X____________________________________________ DATE 09/02/18        TIME_______      ORIGINAL - SEND TO MEDICAL RECORDS   COPY - SEND WITH PATIENT DURING TRANSFER

## 2018-09-02 NOTE — ED NOTES
PATIENT YELLING IN EXCRUCIATING PAIN   STATING MUSCLES SPASMS STARTING AT HER ANKLES RADIATING UP TO HER MIDTHIGHS      Marylou Fernandes RN  09/02/18 [id number]

## 2018-09-03 PROBLEM — E11.65 TYPE 2 DIABETES MELLITUS WITH HYPERGLYCEMIA (HCC): Status: ACTIVE | Noted: 2017-01-17

## 2018-09-03 LAB
ANION GAP SERPL CALCULATED.3IONS-SCNC: 8 MMOL/L (ref 4–13)
ATRIAL RATE: 82 BPM
BASOPHILS # BLD AUTO: 0.04 THOUSANDS/ΜL (ref 0–0.1)
BASOPHILS NFR BLD AUTO: 1 % (ref 0–1)
BUN SERPL-MCNC: 18 MG/DL (ref 5–25)
CALCIUM SERPL-MCNC: 8.6 MG/DL (ref 8.3–10.1)
CHLORIDE SERPL-SCNC: 102 MMOL/L (ref 100–108)
CK MB SERPL-MCNC: 2.1 % (ref 0–2.5)
CK MB SERPL-MCNC: 2.8 NG/ML (ref 0–5)
CK SERPL-CCNC: 132 U/L (ref 26–192)
CO2 SERPL-SCNC: 26 MMOL/L (ref 21–32)
CREAT SERPL-MCNC: 0.87 MG/DL (ref 0.6–1.3)
EOSINOPHIL # BLD AUTO: 0.39 THOUSAND/ΜL (ref 0–0.61)
EOSINOPHIL NFR BLD AUTO: 6 % (ref 0–6)
ERYTHROCYTE [DISTWIDTH] IN BLOOD BY AUTOMATED COUNT: 13.4 % (ref 11.6–15.1)
EST. AVERAGE GLUCOSE BLD GHB EST-MCNC: 269 MG/DL
GFR SERPL CREATININE-BSD FRML MDRD: 73 ML/MIN/1.73SQ M
GLUCOSE SERPL-MCNC: 155 MG/DL (ref 65–140)
GLUCOSE SERPL-MCNC: 199 MG/DL (ref 65–140)
GLUCOSE SERPL-MCNC: 219 MG/DL (ref 65–140)
GLUCOSE SERPL-MCNC: 265 MG/DL (ref 65–140)
GLUCOSE SERPL-MCNC: 308 MG/DL (ref 65–140)
GLUCOSE SERPL-MCNC: 323 MG/DL (ref 65–140)
GLUCOSE SERPL-MCNC: 372 MG/DL (ref 65–140)
GLUCOSE SERPL-MCNC: 407 MG/DL (ref 65–140)
HBA1C MFR BLD: 11 % (ref 4.2–6.3)
HCT VFR BLD AUTO: 41.3 % (ref 34.8–46.1)
HGB BLD-MCNC: 13.7 G/DL (ref 11.5–15.4)
IMM GRANULOCYTES # BLD AUTO: 0.02 THOUSAND/UL (ref 0–0.2)
IMM GRANULOCYTES NFR BLD AUTO: 0 % (ref 0–2)
LYMPHOCYTES # BLD AUTO: 2.71 THOUSANDS/ΜL (ref 0.6–4.47)
LYMPHOCYTES NFR BLD AUTO: 43 % (ref 14–44)
MCH RBC QN AUTO: 28.5 PG (ref 26.8–34.3)
MCHC RBC AUTO-ENTMCNC: 33.2 G/DL (ref 31.4–37.4)
MCV RBC AUTO: 86 FL (ref 82–98)
MONOCYTES # BLD AUTO: 0.34 THOUSAND/ΜL (ref 0.17–1.22)
MONOCYTES NFR BLD AUTO: 5 % (ref 4–12)
NEUTROPHILS # BLD AUTO: 2.76 THOUSANDS/ΜL (ref 1.85–7.62)
NEUTS SEG NFR BLD AUTO: 45 % (ref 43–75)
NRBC BLD AUTO-RTO: 0 /100 WBCS
P AXIS: 47 DEGREES
PLATELET # BLD AUTO: 208 THOUSANDS/UL (ref 149–390)
PMV BLD AUTO: 9.9 FL (ref 8.9–12.7)
POTASSIUM SERPL-SCNC: 3.8 MMOL/L (ref 3.5–5.3)
PR INTERVAL: 154 MS
QRS AXIS: -50 DEGREES
QRSD INTERVAL: 108 MS
QT INTERVAL: 394 MS
QTC INTERVAL: 460 MS
RBC # BLD AUTO: 4.8 MILLION/UL (ref 3.81–5.12)
SODIUM SERPL-SCNC: 136 MMOL/L (ref 136–145)
T WAVE AXIS: 11 DEGREES
VENTRICULAR RATE: 82 BPM
WBC # BLD AUTO: 6.26 THOUSAND/UL (ref 4.31–10.16)

## 2018-09-03 PROCEDURE — 85025 COMPLETE CBC W/AUTO DIFF WBC: CPT | Performed by: PHYSICIAN ASSISTANT

## 2018-09-03 PROCEDURE — 99254 IP/OBS CNSLTJ NEW/EST MOD 60: CPT | Performed by: INTERNAL MEDICINE

## 2018-09-03 PROCEDURE — 82553 CREATINE MB FRACTION: CPT | Performed by: PHYSICIAN ASSISTANT

## 2018-09-03 PROCEDURE — 80048 BASIC METABOLIC PNL TOTAL CA: CPT | Performed by: PHYSICIAN ASSISTANT

## 2018-09-03 PROCEDURE — 82948 REAGENT STRIP/BLOOD GLUCOSE: CPT

## 2018-09-03 PROCEDURE — 0JBR0ZZ EXCISION OF LEFT FOOT SUBCUTANEOUS TISSUE AND FASCIA, OPEN APPROACH: ICD-10-PCS | Performed by: PODIATRIST

## 2018-09-03 PROCEDURE — 83036 HEMOGLOBIN GLYCOSYLATED A1C: CPT | Performed by: PHYSICIAN ASSISTANT

## 2018-09-03 PROCEDURE — 82550 ASSAY OF CK (CPK): CPT | Performed by: PHYSICIAN ASSISTANT

## 2018-09-03 PROCEDURE — 99232 SBSQ HOSP IP/OBS MODERATE 35: CPT | Performed by: INTERNAL MEDICINE

## 2018-09-03 RX ORDER — SODIUM CHLORIDE 9 MG/ML
125 INJECTION, SOLUTION INTRAVENOUS CONTINUOUS
Status: DISCONTINUED | OUTPATIENT
Start: 2018-09-03 | End: 2018-09-04

## 2018-09-03 RX ORDER — POTASSIUM CHLORIDE 750 MG/1
10 TABLET, EXTENDED RELEASE ORAL DAILY
Status: DISCONTINUED | OUTPATIENT
Start: 2018-09-03 | End: 2018-09-04 | Stop reason: HOSPADM

## 2018-09-03 RX ORDER — LISINOPRIL 5 MG/1
5 TABLET ORAL DAILY
Status: DISCONTINUED | OUTPATIENT
Start: 2018-09-04 | End: 2018-09-03

## 2018-09-03 RX ADMIN — INSULIN LISPRO 10 UNITS: 100 INJECTION, SOLUTION INTRAVENOUS; SUBCUTANEOUS at 12:24

## 2018-09-03 RX ADMIN — INSULIN LISPRO 2 UNITS: 100 INJECTION, SOLUTION INTRAVENOUS; SUBCUTANEOUS at 08:55

## 2018-09-03 RX ADMIN — INSULIN LISPRO 2 UNITS: 100 INJECTION, SOLUTION INTRAVENOUS; SUBCUTANEOUS at 16:03

## 2018-09-03 RX ADMIN — GABAPENTIN 300 MG: 300 CAPSULE ORAL at 08:54

## 2018-09-03 RX ADMIN — INSULIN LISPRO 10 UNITS: 100 INJECTION, SOLUTION INTRAVENOUS; SUBCUTANEOUS at 16:03

## 2018-09-03 RX ADMIN — SODIUM CHLORIDE 125 ML/HR: 0.9 INJECTION, SOLUTION INTRAVENOUS at 15:24

## 2018-09-03 RX ADMIN — POTASSIUM CHLORIDE 10 MEQ: 750 TABLET, EXTENDED RELEASE ORAL at 08:55

## 2018-09-03 RX ADMIN — PRAVASTATIN SODIUM 40 MG: 40 TABLET ORAL at 17:27

## 2018-09-03 RX ADMIN — EZETIMIBE 10 MG: 10 TABLET ORAL at 08:54

## 2018-09-03 RX ADMIN — CEFAZOLIN SODIUM 2000 MG: 2 SOLUTION INTRAVENOUS at 05:06

## 2018-09-03 RX ADMIN — CEFAZOLIN SODIUM 2000 MG: 2 SOLUTION INTRAVENOUS at 00:25

## 2018-09-03 RX ADMIN — INSULIN DETEMIR 50 UNITS: 100 INJECTION, SOLUTION SUBCUTANEOUS at 01:10

## 2018-09-03 RX ADMIN — FAMOTIDINE 20 MG: 20 TABLET ORAL at 08:55

## 2018-09-03 RX ADMIN — INSULIN LISPRO 6 UNITS: 100 INJECTION, SOLUTION INTRAVENOUS; SUBCUTANEOUS at 12:23

## 2018-09-03 RX ADMIN — PANTOPRAZOLE SODIUM 40 MG: 40 TABLET, DELAYED RELEASE ORAL at 06:30

## 2018-09-03 RX ADMIN — CEFAZOLIN SODIUM 2000 MG: 2 SOLUTION INTRAVENOUS at 13:40

## 2018-09-03 RX ADMIN — TIZANIDINE 4 MG: 2 TABLET ORAL at 23:56

## 2018-09-03 RX ADMIN — ERGOCALCIFEROL 50000 UNITS: 1.25 CAPSULE ORAL at 08:55

## 2018-09-03 RX ADMIN — GABAPENTIN 300 MG: 300 CAPSULE ORAL at 12:23

## 2018-09-03 RX ADMIN — INSULIN LISPRO 4 UNITS: 100 INJECTION, SOLUTION INTRAVENOUS; SUBCUTANEOUS at 21:53

## 2018-09-03 RX ADMIN — ACETAMINOPHEN 650 MG: 325 TABLET, FILM COATED ORAL at 08:52

## 2018-09-03 RX ADMIN — INSULIN LISPRO 12 UNITS: 100 INJECTION, SOLUTION INTRAVENOUS; SUBCUTANEOUS at 01:11

## 2018-09-03 RX ADMIN — LISINOPRIL 20 MG: 20 TABLET ORAL at 08:53

## 2018-09-03 RX ADMIN — CEFAZOLIN SODIUM 2000 MG: 2 SOLUTION INTRAVENOUS at 21:44

## 2018-09-03 RX ADMIN — SODIUM CHLORIDE 500 ML: 0.9 INJECTION, SOLUTION INTRAVENOUS at 12:24

## 2018-09-03 RX ADMIN — ENOXAPARIN SODIUM 40 MG: 40 INJECTION SUBCUTANEOUS at 08:52

## 2018-09-03 RX ADMIN — GABAPENTIN 300 MG: 300 CAPSULE ORAL at 17:27

## 2018-09-03 RX ADMIN — TIZANIDINE 4 MG: 2 TABLET ORAL at 08:52

## 2018-09-03 RX ADMIN — INSULIN DETEMIR 50 UNITS: 100 INJECTION, SOLUTION SUBCUTANEOUS at 08:52

## 2018-09-03 RX ADMIN — INSULIN LISPRO 10 UNITS: 100 INJECTION, SOLUTION INTRAVENOUS; SUBCUTANEOUS at 01:11

## 2018-09-03 RX ADMIN — SODIUM CHLORIDE 100 ML/HR: 0.9 INJECTION, SOLUTION INTRAVENOUS at 00:44

## 2018-09-03 RX ADMIN — INSULIN LISPRO 10 UNITS: 100 INJECTION, SOLUTION INTRAVENOUS; SUBCUTANEOUS at 08:52

## 2018-09-03 RX ADMIN — GABAPENTIN 300 MG: 300 CAPSULE ORAL at 21:44

## 2018-09-03 RX ADMIN — ASPIRIN 81 MG 81 MG: 81 TABLET ORAL at 08:54

## 2018-09-03 RX ADMIN — INSULIN HUMAN 50 UNITS: 100 INJECTION, SUSPENSION SUBCUTANEOUS at 16:04

## 2018-09-03 NOTE — H&P
H&P- Bearl Colla 1960, 59 y o  female MRN: [id number]    Unit/Bed#: -01 Encounter: [id number]    Primary Care Provider: ISABELLE Marti   Date and time admitted to hospital: 9/2/2018  9:06 PM    * Type 2 diabetes mellitus ([place] [id number] )   Assessment & Plan    · Seen at [facility] with serum BS of 504  · AG of 15, no VGB or serum acetone obtained at [facility]  · Started on insulin gtt at [facility], dc'ed before transfer  · BS in 200's at time of arrival to [facility]  · Suspect toe infection is contributing to resistant hyperglycemia in the setting of noncompliance with insulin   · Will check A1c  · Will re-start Levemir 50 BID as pt was previously maintained on   · SSI ac and hs   · Monitor electrolytes  · Consult endocrine  · Pt has discontinued multiple medications d/t lack of insurance coverage (insulin included)  · Consult placed to case management        Muscle spasm   Assessment & Plan    · Will check CK, pt is already receiving IVF        Ulcer of toe of left foot ([facility] )   Assessment & Plan    · XR left foot: "Dorsal forefoot soft tissue swelling noted"  · Otherwise remarkable for degenerative changes  · CBC without leukocytosis, afebrile  · Will start IV Ancef   · Podiatry consult        Hypertension   Assessment & Plan    · Controlled, continue/restart lisinopril        Hyperlipidemia   Assessment & Plan    · Continue/restart ezetimibe and simvastatin         Diabetic neuropathy ([place] [address] )   Assessment & Plan    · Continue gabapentin 4x daily  Stage 3 chronic kidney disease   Assessment & Plan    · GFR 50 and Cr 1 2 today, appears to be monitoring with PCP          VTE Prophylaxis: Enoxaparin (Lovenox)  / reason for no mechanical VTE prophylaxis : none   Code Status: FULL  POLST: POLST form is not discussed and not completed at this time  Discussion with family: none    Anticipated Length of Stay:  Patient will be admitted on an Inpatient basis with an anticipated length of stay of  > 2 midnights  Justification for Hospital Stay: per plan above    Total Time for Visit, including Counseling / Coordination of Care: 30 minutes  Greater than 50% of this total time spent on direct patient counseling and coordination of care  Chief Complaint:   Muscle spasm     History of Present Illness:    Gianfranco Lepe is a 59 y o  female with PMHx of HTN, HLD, DM who presents with muscle spasm  Patient states she ultimately presented to [facility] today due to intense LE muscle cramping  She says she was not surprised that her sugars were high although she says her only symptom indicating this was polyuria  She denies n/v, abdominal pain, denies visual disturbance, lightheaded or HA, denies polydipsia or polyphagia  She denies CP or SOB  She does c/o of a left big toe ulcer that she says is painful although her feet both have a degree of numbness from her neuropathy  She denies any fever or chills, denies cough or congestion  Pt states she changed insurances in January and lost coverage for almost all of her medications  She says she was able to get by for a while on samples given to her by her PCP; however three months ago he could not give her any more samples  She says since then she has borrowed insulin from her  and has received some "old pens" from friends sporadically  She does report episodes in the past three months of blurred vision, dehydration, nausea and even reports an instance of visual hallucination  She has not been able to check her BS's at all in the past three months  Pt states before January she had been doing very well on Levemir 50 U BID with Metformin 8,097 BID and Trulicity on Thursdays  She says this resulted in BS's in AM BS's in the 80's most days  She does not recall when she first noticed her left toe ulcer but says she has a history of "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"    She started seeing a new doctor who has tried several "different avenues" for getting her better coverage all of which have failed  Pt seeks help with insurance and says "she does not want to die"  Review of Systems:    Review of Systems   Constitutional: Negative  HENT: Negative  Eyes: Negative  Respiratory: Negative  Cardiovascular: Negative  Gastrointestinal: Negative  Endocrine: Positive for polyuria  Genitourinary: Negative  Musculoskeletal: Positive for myalgias  Skin: Positive for wound  Allergic/Immunologic: Negative  Neurological: Negative  Hematological: Negative  Psychiatric/Behavioral: Negative  Past Medical and Surgical History:     Past Medical History:   Diagnosis Date    Asthma     Chronic pain     Diabetes mellitus ([address] )     GERD (gastroesophageal reflux disease)     Hypertension     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  Authorizing Provider   aspirin 81 mg chewable tablet Chew    Historical Provider, MD PEÑA ULTRAFINE III SHORT PEN 31G X 8 MM MISC  17   Historical Provider, MD   benzonatate (TESSALON PERLES) 100 mg capsule Take 1 capsule (100 mg total) by mouth 3 (three) times a day as needed for cough 18   Regina Qiu PA-C   citalopram (CeleXA) 10 mg tablet Take 1 tablet (10 mg total) by mouth daily 3/16/18   ISABELLE Nunn   ergocalciferol (VITAMIN D2) 50,000 units Take 1 capsule (50,000 Units total) by mouth once a week 3/16/18   ISABELLE Nunn   ezetimibe (ZETIA) 10 mg tablet Take 1 tablet (10 mg total) by mouth daily 3/16/18   ISABELLE Nunn   famotidine (PEPCID) 20 mg tablet Take 1 tablet (20 mg total) by mouth daily 3/16/18   ISABELLE Nunn   gabapentin (NEURONTIN) 300 mg capsule Take 1 capsule (300 mg total) by mouth 4 (four) times a day 3/16/18   ISABELLE Nunn   LEVEMIR FLEXTOUCH subcutaneous injection pen 100 units/mL Inject 50 Units under the skin 2 (two) times a day 3/16/18   ISABELLE Nunn   Liraglutide (VICTOZA) 18 MG/3ML SOPN Inject under the skin    Historical Provider, MD   lisinopril (ZESTRIL) 20 mg tablet Take 1 tablet (20 mg total) by mouth daily 4/20/18   Laquita Butler PA-C   metFORMIN (GLUCOPHAGE) 1000 MG tablet Take 1 tablet (1,000 mg total) by mouth 2 (two) times a day with meals 4/20/18   Laquita Butler PA-C   Multiple Vitamins-Minerals (RA CENTRAL-ANGELA SENIOR) TABS  12/30/17   Historical Provider, MD   oxyCODONE-acetaminophen (PERCOCET)  mg per tablet Take 1 tablet by mouth every 6 (six) hours as needed 3/23/18   Historical Provider, MD   oxyCODONE-acetaminophen (PERCOCET) 5-325 mg per tablet take 1 to 2 tablet by mouth every 6 hours if needed for pain 3/2/18   Historical Provider, MD   pantoprazole (PROTONIX) 40 mg tablet Take 40 mg by mouth daily 1/23/18   Historical Provider, MD   potassium chloride (KLOR-CON) 8 MEQ tablet Take 8 mEq by mouth    Historical Provider, MD   simvastatin (ZOCOR) 20 mg tablet Take 1 tablet (20 mg total) by mouth daily at bedtime 3/16/18   ISABELLE Nunn   sucralfate (CARAFATE) 1 g tablet Take 1 g by mouth 4 (four) times a day 1/23/18   Historical Provider, MD   tiZANidine (ZANAFLEX) 4 mg tablet Take 4 mg by mouth 3 (three) times a day 3/23/18   Historical Provider, MD   TRULICITY 1 5 KN/2 7LN SOPN Inject 0 5 mL (1 5 mg total) under the skin once a week 3/16/18   ISABELLE Nunn   VENTOLIN  (90 Base) MCG/ACT inhaler Inhale 2 puffs every 4 (four) hours as needed for shortness of breath 4/20/18   Laquita Butler PA-C   zolpidem (AMBIEN) 10 mg tablet Take 5 mg by mouth daily    Historical Provider, MD     I have reviewed home medications with patient personally  Allergies: Allergies   Allergen Reactions    Albumen, Egg      Other reaction(s): Nausea and/or vomiting    Bioflavonoids      Other reaction(s): Other (See Comments)  Skin "burns"    Brassica Leopoldo Hymen      Other reaction(s): GI upset  Abdominal pain    Metronidazole Itching    Morphine Hives and Itching    Prochlorperazine Wheezing     Other reaction(s): Other (See Comments)  SOB    Sulfa Antibiotics      Other reaction(s): Unknown Reaction       Social History:     Marital Status: /Civil Union   Occupation: not discussed  Patient Pre-hospital Living Situation: home with family  Patient Pre-hospital Level of Mobility: not discussed  Patient Pre-hospital Diet Restrictions: none  Substance Use History:   History   Alcohol Use No     History   Smoking Status    Never Smoker   Smokeless Tobacco    Never Used     History   Drug Use No       Family History:    non-contributory    Physical Exam:     Vitals:   Blood Pressure: 107/51 (09/02/18 2209)  Pulse: 78 (09/02/18 2209)  Temperature: 97 6 °F (36 4 °C) (09/02/18 2209)  Temp Source: Oral (09/02/18 2209)  Respirations: 16 (09/02/18 2209)  SpO2: 95 % (09/02/18 2209)    Physical Exam   Constitutional: She appears well-developed and well-nourished  No distress  HENT:   Head: Normocephalic  Mouth/Throat: Oropharynx is clear and moist    Cardiovascular: Normal rate, regular rhythm, normal heart sounds and intact distal pulses  Exam reveals no gallop and no friction rub  No murmur heard  Pulmonary/Chest: Effort normal and breath sounds normal  No respiratory distress  She has no wheezes  She has no rales  She exhibits no tenderness  Abdominal: Soft  Bowel sounds are normal  She exhibits no distension and no mass  There is no tenderness  There is no rebound and no guarding  Musculoskeletal: She exhibits tenderness (diffuse muscle tenderness)  She exhibits no edema     Neurological: She is alert  Skin: Skin is warm  She is not diaphoretic  Left toe distal to nail with closed wound, unsure how deep this extends, surrounding erythema to the level of mid great toe, no extension past the toe/interdigitally  Poor effort with ROM of left toes 2/2 "muscle pain"  Decreased sensation of left plantar and dorsal surfaces of feet bilaterally  Psychiatric: She has a normal mood and affect  Her behavior is normal    Nursing note and vitals reviewed  Additional Data:     Lab Results: I have personally reviewed pertinent reports  Results from last 7 days  Lab Units 09/02/18  1450   WBC Thousand/uL 6 30   HEMOGLOBIN g/dL 14 7   HEMATOCRIT % 43 4   PLATELETS Thousands/uL 217   NEUTROS PCT % 53   LYMPHS PCT % 35   MONOS PCT % 7   EOS PCT % 4       Results from last 7 days  Lab Units 09/02/18  1450   SODIUM mmol/L 135   POTASSIUM mmol/L 3 9   CHLORIDE mmol/L 97*   CO2 mmol/L 23   BUN mg/dL 16   CREATININE mg/dL 1 20   CALCIUM mg/dL 9 5   ALK PHOS U/L 103   ALT U/L 21   AST U/L 23           Results from last 7 days  Lab Units 09/03/18  0052 09/02/18  2208 09/02/18  2010 09/02/18  1829 09/02/18  1751 09/02/18  1549 09/02/18  1353   POC GLUCOSE mg/dl 407* 288* 214* 175* 248* 439* 479*           Imaging: I have personally reviewed pertinent reports  No orders to display       Allscripts / Epic Records Reviewed: Yes     ** Please Note: This note has been constructed using a voice recognition system   **

## 2018-09-03 NOTE — ASSESSMENT & PLAN NOTE
· XR left foot: "Dorsal forefoot soft tissue swelling noted"  · Otherwise remarkable for degenerative changes  · CBC without leukocytosis, afebrile  · Will start IV Ancef   · Podiatry consult

## 2018-09-03 NOTE — CONSULTS
Consult - Podiatry   Daisy Pham 59 y o  female MRN: [id number]  Unit/Bed#: -01 Encounter: [id number]        ASSESSMENT:  1  Diabetic foot ulcer left great toe - Green 1  2  Type II diabetes with neuropathy      PLAN:  Excisional debridement was performed to excise non-viable skin and subQ tissues of left great toe wound using #15 scalpel (less than 20 sq cm)  Wound bed is granular after the debridement  No clinical signs of infection on left great toe  Dressing applied and dispensed surgical shoe  Continue to monitor the progress  History of Present Illness     Daisy Pham is a 59 y o  female who consulted for left foot wound  She noticed increased redess on left great toe for about a few days  No significant pain due to neuropathy  She usually sees a podiatrist, but has not followed for  several months  Her BS has been up because she lost her health insurance for several months  Consults  Review of Systems   Constitutional: Negative  HENT: Negative  Eyes: Negative  Respiratory: Negative  Cardiovascular: Negative  Gastrointestinal: Negative  Musculoskeletal: See HPI   Skin: See HPI   Neurological: Neuropathy  Psych: negative         Historical Information   Past Medical History:   Diagnosis Date    Asthma     Chronic pain     Diabetes mellitus (HCC)     GERD (gastroesophageal reflux disease)     Hypertension     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     Neuropathy     PCOS (polycystic ovarian syndrome)      Past Surgical History:   Procedure Laterality Date    CERVICAL CONIZATION   W/ LASER      DILATION AND CURETTAGE OF UTERUS      ENDOMETRIAL ABLATION      ENDOMETRIAL BIOPSY      INDUCED       TOTAL THYROIDECTOMY       Social History   History   Alcohol Use No     History   Drug Use No     History   Smoking Status    Never Smoker   Smokeless Tobacco    Never Used     Family History:   Family History   Problem Relation Age of Onset    Arthritis Mother     Osteoporosis Mother        Meds/Allergies   Prescriptions Prior to Admission   Medication    aspirin 81 mg chewable tablet    B-D ULTRAFINE III SHORT PEN 31G X 8 MM MISC    benzonatate (TESSALON PERLES) 100 mg capsule    citalopram (CeleXA) 10 mg tablet    ergocalciferol (VITAMIN D2) 50,000 units    ezetimibe (ZETIA) 10 mg tablet    famotidine (PEPCID) 20 mg tablet    gabapentin (NEURONTIN) 300 mg capsule    LEVEMIR FLEXTOUCH subcutaneous injection pen 100 units/mL    Liraglutide (VICTOZA) 18 MG/3ML SOPN    lisinopril (ZESTRIL) 20 mg tablet    metFORMIN (GLUCOPHAGE) 1000 MG tablet    Multiple Vitamins-Minerals (RA CENTRAL-ANGELA SENIOR) TABS    oxyCODONE-acetaminophen (PERCOCET)  mg per tablet    oxyCODONE-acetaminophen (PERCOCET) 5-325 mg per tablet    pantoprazole (PROTONIX) 40 mg tablet    potassium chloride (KLOR-CON) 8 MEQ tablet    simvastatin (ZOCOR) 20 mg tablet    sucralfate (CARAFATE) 1 g tablet    tiZANidine (ZANAFLEX) 4 mg tablet    TRULICITY 1 5 MB/3 7AG SOPN    VENTOLIN  (90 Base) MCG/ACT inhaler    zolpidem (AMBIEN) 10 mg tablet     Allergies   Allergen Reactions    Albumen, Egg      Other reaction(s): Nausea and/or vomiting    Bioflavonoids      Other reaction(s): Other (See Comments)  Skin "burns"    Brassica Emma Ochoa      Other reaction(s): GI upset  Abdominal pain    Metronidazole Itching    Morphine Hives and Itching    Prochlorperazine Wheezing     Other reaction(s):  Other (See Comments)  SOB    Sulfa Antibiotics      Other reaction(s): Unknown Reaction       Objective   First Vitals:   Blood Pressure: 140/67 (09/02/18 2005)  Pulse: 67 (09/02/18 2005)  Temperature: 98 4 °F (36 9 °C) (09/02/18 2005)  Temp Source: Oral (09/02/18 2005)  Respirations: 16 (09/02/18 2005)  SpO2: 96 % (09/02/18 2005)    Current Vitals:   Blood Pressure: 115/65 (09/03/18 0742)  Pulse: 57 (09/03/18 0742)  Temperature: 97 6 °F (36 4 °C) (09/03/18 1621)  Temp Source: Oral (09/03/18 0742)  Respirations: 18 (09/03/18 0742)  SpO2: 96 % (09/03/18 0742)        /65 (BP Location: Left arm)   Pulse 57   Temp 97 6 °F (36 4 °C) (Oral)   Resp 18   SpO2 96%      General Appearance:    Alert, cooperative, no distress   Head:    Normocephalic, without obvious abnormality, atraumatic           Neck:   Supple, symmetrical, trachea midline   Back:     Symmetric   Lungs:     Respirations unlabored   Heart:    Regular rate and rhythm   Abdomen:     Soft, non-tender   Extremities:   No cinosis or clubbing  No cellulitis presents BLE  Pulses:   Palpable pedal pulses  CRT is WNL   Skin:   There is a callus with soft tissue defect on the tip of left great toe  No pain on palpation  No redness or edema  Neurologic:   Gross sensation is intact  Protective sensation is decreased             Lab Results:   Admission on 09/02/2018   Component Date Value    Total CK 09/03/2018 132     POC Glucose 09/03/2018 372*    Sodium 09/03/2018 136     Potassium 09/03/2018 3 8     Chloride 09/03/2018 102     CO2 09/03/2018 26     ANION GAP 09/03/2018 8     BUN 09/03/2018 18     Creatinine 09/03/2018 0 87     Glucose 09/03/2018 308*    Calcium 09/03/2018 8 6     eGFR 09/03/2018 73     WBC 09/03/2018 6 26     RBC 09/03/2018 4 80     Hemoglobin 09/03/2018 13 7     Hematocrit 09/03/2018 41 3     MCV 09/03/2018 86     MCH 09/03/2018 28 5     MCHC 09/03/2018 33 2     RDW 09/03/2018 13 4     MPV 09/03/2018 9 9     Platelets 55/30/0942 208     nRBC 09/03/2018 0     Neutrophils Relative 09/03/2018 45     Immat GRANS % 09/03/2018 0     Lymphocytes Relative 09/03/2018 43     Monocytes Relative 09/03/2018 5     Eosinophils Relative 09/03/2018 6     Basophils Relative 09/03/2018 1     Neutrophils Absolute 09/03/2018 2 76     Immature Grans Absolute 09/03/2018 0 02     Lymphocytes Absolute 09/03/2018 2 71     Monocytes Absolute 09/03/2018 0 34     Eosinophils Absolute 09/03/2018 0 39     Basophils Absolute 09/03/2018 0 04     POC Glucose 09/03/2018 323*    CK-MB Index 09/03/2018 2 1     CK-MB FRACTION 09/03/2018 2 8     POC Glucose 09/03/2018 199*               Imaging: I have personally reviewed pertinent films in PACS  EKG, Pathology, labs, and Other Studies: I have personally reviewed pertinent reports        Code Status: Level 1 - Full Code

## 2018-09-03 NOTE — PROGRESS NOTES
Progress Note - Payal Booth 1960, 59 y o  female MRN: [id number]    Unit/Bed#: -01 Encounter: [id number]    Primary Care Provider: ISABELLE Encarnacion   Date and time admitted to hospital: 9/2/2018  9:06 PM    * Type 2 diabetes mellitus with hyperglycemia ([facility] [address] )   Assessment & Plan    · Secondary to noncompliance with medications which patient reports was due to being unable to afford her insulin regimen  · Overall BG control improved with re initiation of insulin regimen  The patient is unable to afford Levemir  I discussed with the endocrinologist Dr Adriana Pearson and decision was made to transition the patient to Novolin N or mixed insulin 70/30 at 50 units twice daily which she will be able to obtain at a reasonable price at [facility]  · Continue to monitor blood glucose, continue Humalog with meals and correctional insulin  · Diabetic education      Ulcer of toe of left foot ([facility] [address] )   Assessment & Plan    · XR left foot: "Dorsal forefoot soft tissue swelling noted"  · Was started on IV cephazolin at admission  She is status post podiatry evaluation today and left great toe ulcer cleaned and dressed  · Continue IV antibiotics as ordered with plans to transition to oral cephalexin at discharge      Stage 3 chronic kidney disease   Assessment & Plan    · Creatinine 0 87 today down from 1 2 at presentation with IV hydration  · Baseline creatinine is unknown  Continue to monitor  · Continue IV fluids secondary to hypotension earlier today      Essential hypertension   Assessment & Plan    · Hypotensive episode earlier today with systolic blood pressure in the 80s to 90s  · IV fluid bolus with normal saline given and continue at 125 cc/hour  · Lisinopril had just been resumed today at 20 mg    Will hold for now  · When blood pressure stabilizes, recommend resuming at lower dose, probably 5 mg and can titrate up if needed      Diabetic neuropathy (HCC)   Assessment & Plan    · Continue gabapentin Muscle spasm   Assessment & Plan    · CK levels within normal limits, potassium low normal, will check Mag levels      Hyperlipidemia   Assessment & Plan    · Continue Zetia, Statin        VTE Pharmacologic Prophylaxis:   Pharmacologic: Enoxaparin (Lovenox)  Mechanical VTE Prophylaxis in Place: Yes    Patient Centered Rounds: I have performed bedside rounds with nursing staff today  Discussions with Specialists or Other Care Team Provider:   Nursing/endocrinology    Education and Discussions with Family / Patient:   Patient    Current Length of Stay: 1 day(s)    Current Patient Status: Inpatient   Certification Statement: The patient will continue to require additional inpatient hospital stay due to Above diagnosis and care plan    Discharge Plan:   Anticipate discharge home tomorrow if stable on current insulin regimen    Code Status: Level 1 - Full Code      Subjective:   Seen and evaluated  No new complaints or acute overnight events  Episode of hypotension this morning during which time patient complained of not feeling right  She denies nausea vomiting, no fever or chills  Objective:     Vitals:   Temp (24hrs), Av 1 °F (36 7 °C), Min:97 6 °F (36 4 °C), Max:98 6 °F (37 °C)    HR:  [57-90] 60  Resp:  [16-18] 18  BP: ()/(46-75) 90/48  SpO2:  [95 %-98 %] 95 %  There is no height or weight on file to calculate BMI  Input and Output Summary (last 24 hours):        Intake/Output Summary (Last 24 hours) at 18 1433  Last data filed at 18 0900   Gross per 24 hour   Intake              240 ml   Output                0 ml   Net              240 ml       Physical Exam:  General Appearance:    Alert, cooperative, no distress, appropriately responsive    Head:    Normocephalic, without obvious abnormality, atraumatic, mucous membranes moist    Eyes:    Conjunctiva/corneas clear   Neck:   Supple   Lungs:     Clear to auscultation bilaterally, respirations unlabored, no crackles or wheeze Heart:    Regular rate and rhythm, S1 and S2    Abdomen:     Soft, obese, non-tender, nondistended   Extremities:   Left great toe dressing intact, no edema   Neurologic:  nonfocal         Additional Data:     Labs:      Results from last 7 days  Lab Units 09/03/18  0444   WBC Thousand/uL 6 26   HEMOGLOBIN g/dL 13 7   HEMATOCRIT % 41 3   PLATELETS Thousands/uL 208   NEUTROS PCT % 45   LYMPHS PCT % 43   MONOS PCT % 5   EOS PCT % 6       Results from last 7 days  Lab Units 09/03/18  0436 09/02/18  1450   SODIUM mmol/L 136 135   POTASSIUM mmol/L 3 8 3 9   CHLORIDE mmol/L 102 97*   CO2 mmol/L 26 23   BUN mg/dL 18 16   CREATININE mg/dL 0 87 1 20   CALCIUM mg/dL 8 6 9 5   ALK PHOS U/L  --  103   ALT U/L  --  21   AST U/L  --  23           * I Have Reviewed All Lab Data Listed Above  * Additional Pertinent Lab Tests Reviewed:  Marsha  Admission Reviewed    Cultures:   Blood Culture: No results found for: BLOODCX  Urine Culture: No results found for: URINECX  Sputum Culture: No components found for: SPUTUMCX  Wound Culture: No results found for: WOUNDCULT    Last 24 Hours Medication List:     Current Facility-Administered Medications:  acetaminophen 650 mg Oral Q6H PRN Eboni Savage PA-C    aspirin 81 mg Oral Daily Eboni Savage PA-C    cefazolin 2,000 mg Intravenous Q8H [address] Last Rate: 2,000 mg (09/03/18 1340)   citalopram 10 mg Oral Daily Gail Lopez PA-C    enoxaparin 40 mg Subcutaneous Daily Eboni Savage PA-C    ergocalciferol 50,000 Units Oral Weekly Gail Singh PA-C    ezetimibe 10 mg Oral Daily Gail Singh PA-C    famotidine 20 mg Oral Daily Gail Singh PA-C    gabapentin 300 mg Oral 4x Daily Gail Lopez PA-C    insulin lispro 10 Units Subcutaneous TID With Meals Cristino DO Jose    insulin lispro 2-12 Units Subcutaneous TID With Meals Eboni Savage PA-C    insulin lispro 2-12 Units Subcutaneous  Gail HEREDIA Vasquez Block, TIMUR    insulin NPH 50 Units Subcutaneous BID AC Nick Urrutia MD    ondansetron 4 mg Intravenous Q6H PRN [employer], PA-C    pantoprazole 40 mg Oral Early Morning [employer], PA-C    potassium chloride 10 mEq Oral Daily [employer], PA-C    pravastatin 40 mg Oral Daily With [employer], PA-C    sodium chloride 125 mL/hr Intravenous Continuous Nick Urrutia MD Last Rate: 125 mL/hr (09/03/18 1340)   tiZANidine 4 mg Oral Q8H PRN [employer], PA-C         Today, Patient Was Seen By: Nick Urrutia MD    ** Please Note: Dragon 360 Dictation voice to text software may have been used in the creation of this document   **

## 2018-09-03 NOTE — ASSESSMENT & PLAN NOTE
· Secondary to noncompliance with medications which patient reports was due to being unable to afford her insulin regimen  · Overall BG control improved with re initiation of insulin regimen  The patient is unable to afford Levemir   I discussed with the endocrinologist Dr Sudha Saunders and decision was made to transition the patient to Novolin N or mixed insulin 70/30 at 50 units twice daily which she will be able to obtain at a reasonable price at [facility]  · Continue to monitor blood glucose, continue Humalog with meals and correctional insulin  · Diabetic education

## 2018-09-03 NOTE — ASSESSMENT & PLAN NOTE
· Seen at [facility] with serum BS of 504  · AG of 15, no VGB or serum acetone obtained at [facility]  · Started on insulin gtt at [facility]  · BS in 200's at time of arrival to [facility]  · Suspect toe infection is contributing to resistant hyperglycemia in the setting of noncompliance with insulin   · Will check A1c  · Continue Accuchecks Q2, IVF  · Monitor electrolytes  · Consult endocrine  · Pt has discontinued multiple medications d/t lack of insurance coverage (insulin included)  · Consult placed to case management

## 2018-09-03 NOTE — ASSESSMENT & PLAN NOTE
· BP has been stable  Patient with PO fluid intake; IVF was d/c as patient euvolemic and no longer with symptomatic hypotension  · Hold Lisinopril for now as BP has been trending on the lower side  Upon restarting, recommend resuming at lower dose, probably 5 mg and can titrate up if needed

## 2018-09-03 NOTE — ASSESSMENT & PLAN NOTE
· Creatinine 0 87 today down from 1 2 at presentation with IV hydration  · Baseline creatinine is unknown    Continue to monitor  · Continue IV fluids secondary to hypotension earlier today

## 2018-09-03 NOTE — CASE MANAGEMENT
Initial Clinical Review    Admission: Date/Time/Statement: 9/2/18 @ 2106     Orders Placed This Encounter   Procedures    Inpatient Admission     Standing Status:   Standing     Number of Occurrences:   1     Order Specific Question:   Admitting Physician     Answer:   Adryan Judge     Order Specific Question:   Level of Care     Answer:   Med Surg [16]     Order Specific Question:   Estimated length of stay     Answer:   More than 2 Midnights     Order Specific Question:   Certification     Answer:   I certify that inpatient services are medically necessary for this patient for a duration of greater than two midnights  See H&P and MD Progress Notes for additional information about the patient's course of treatment  Chief Complaint: muscle spasm  Hyperglycemia  History of Illness: 59 y o  female with PMHx of HTN, HLD, DM who presents with muscle spasm  Patient states she ultimately presented to [facility] today due to intense LE muscle cramping  She says she was not surprised that her sugars were high although she says her only symptom indicating this was polyuria     She does c/o of a left big toe ulcer that she says is painful although her feet both have a degree of numbness from her neuropathy       Pt states she changed insurances in January and lost coverage for almost all of her medications  She says she was able to get by for a while on samples given to her by her PCP; however three months ago he could not give her any more samples  She says since then she has borrowed insulin from her  and has received some "old pens" from friends sporadically  She does report episodes in the past three months of blurred vision, dehydration, nausea and even reports an instance of visual hallucination  She has not been able to check her BS's at all in the past three months    Pt states before January she had been doing very well on Levemir 50 U BID with Metformin 4,985 BID and Trulicity on Thursdays  She says this resulted in BS's in AM BS's in the 80's most days  She does not recall when she first noticed her left toe ulcer but says she has a history of "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"  She started seeing a new doctor who has tried several "different avenues" for getting her better coverage all of which have failed    ED Vital Signs:   ED Triage Vitals [09/02/18 2005]   Temperature Pulse Respirations Blood Pressure SpO2   98 4 °F (36 9 °C) 67 16 140/67 96 %      Temp Source Heart Rate Source Patient Position - Orthostatic VS BP Location FiO2 (%)   Oral -- Lying Right arm --      Pain Score       8        Wt Readings from Last 1 Encounters:   09/02/18 110 kg (242 lb 8 1 oz)       Vital Signs (abnormal): none  Exam - left toe distal to nail with closed wound, surrounding erythema to the level of mid great toe  Diffuse muscle tenderness    Abnormal Labs/Diagnostic Test Results:   9/2/2018  1323 - glucose 479  UA 3 + glucose  Trace ketones  1450-  Cl 97  Anion gap 15  Glucose 504    1549- glucose 439  1751 glucose 248  1829 glucose 175  2010 glucose 214  2208 glucose 288    D dimer 270    Xray left foot - Degenerative changes of the forefoot without fracture    9/3/2018-  Glucose 323    Past Medical/Surgical History:   Past Medical History:   Diagnosis Date    Asthma     Chronic pain     Diabetes mellitus (HCC)     GERD (gastroesophageal reflux disease)     Hypertension     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 AG of 15, no VGB or serum acetone obtained at [facility]  ? Started on insulin gtt at [place], dc'ed before transfer  ?  BS in 200's at time of arrival to [facility]  · Suspect toe infection is contributing to resistant hyperglycemia in the setting of noncompliance with insulin   · Will check A1c  · Will re-start Levemir 50 BID as pt was previously maintained on   ? SSI ac and hs   · Monitor electrolytes  · Consult endocrine  · Pt has discontinued multiple medications d/t lack of insurance coverage (insulin included)  ? Consult placed to case management          Muscle spasm   Assessment & Plan     · Will check CK, pt is already receiving IVF          Ulcer of toe of left foot (HCC)   Assessment & Plan     · XR left foot: "Dorsal forefoot soft tissue swelling noted"  ?  Otherwise remarkable for degenerative changes  · CBC without leukocytosis, afebrile  · Will start IV Ancef   · Podiatry consult          Hypertension   Assessment & Plan     · Controlled, continue/restart lisinopril          Hyperlipidemia   Assessment & Plan     · Continue/restart ezetimibe and simvastatin           Diabetic neuropathy (HCC)   Assessment & Plan     · Continue gabapentin 4x daily           Stage 3 chronic kidney disease   Assessment & Plan     · GFR 50 and Cr 1 2 today, appears to be monitoring with PCP         Admission Orders:  9/2/2018  2319 INPATIENT   Scheduled Meds:   Current Facility-Administered Medications:  acetaminophen 650 mg Oral Q6H PRN    aspirin 81 mg Oral Daily    cefazolin 2,000 mg Intravenous Q8H [facility] Last Rate: 2,000 mg (09/03/18 0506)   citalopram 10 mg Oral Daily    enoxaparin 40 mg Subcutaneous Daily    ergocalciferol 50,000 Units Oral Weekly    ezetimibe 10 mg Oral Daily    famotidine 20 mg Oral Daily    gabapentin 300 mg Oral 4x Daily    insulin detemir 50 Units Subcutaneous Q12H [facility]    insulin lispro 10 Units Subcutaneous TID With Meals    insulin lispro 2-12 Units Subcutaneous TID With Meals    insulin lispro 2-12 Units Subcutaneous HS    lisinopril 20 mg Oral Daily    ondansetron 4 mg Intravenous Q6H PRN    pantoprazole 40 mg Oral Early Morning    potassium chloride 10 mEq Oral Daily    pravastatin 40 mg Oral Daily With Dinner    sodium chloride 100 mL/hr Intravenous Continuous Last Rate: 100 mL/hr (09/03/18 0044)   tiZANidine 4 mg Oral Q8H PRN      Continuous Infusions:   sodium chloride 100 mL/hr Last Rate: 100 mL/hr (09/03/18 0044)     PRN Meds:   Acetaminophen - used x 1      tiZANidine - used x 2  OTHER ORDERS: fingerstick glucose qid  Consult podiatry; endocrine    9/3 per endocrine - Type 2 diabetes with hyperglycemia:  Blood sugars have been trending down  Received 1st dose of Levemir 50 units at a m  this morning  Will at prandial Humalog 10 units a c   Continue scale for correction  Continue Levemir 50 b i d   Continue to hold metformin and Trulicity while inpatient  Continue to monitor make changes as necessary  Monitor for hypoglycemia and treat per protocol  Upon discharge, given financial concerns patient, I would suggest discontinuing Trulicity  Would continue metformin as long as kidney function looks appropriate  Would discharge on twice a day basal insulin  Upon discharge, if Lantus is not the preferred basal insulin for the patient's insurance company, we can use Tresiba, Basaglar, Levemir, Toujeo at the same dose instead  If the above-mentioned basal insulins are not affordable for the patient, [facility] brand Novolin N via vial and syringe would be the most affordable option      Thank you,  [address]  Utilization Review Department  Phone: [phone]; Fax [phone]  ATTENTION: Please call with any questions or concerns to [phone]  and carefully follow the prompts so that you are directed to the right person  Send all requests for admission clinical reviews, approved or denied determinations and any other requests to fax [phone]   All voicemails are confidential

## 2018-09-03 NOTE — ASSESSMENT & PLAN NOTE
· XR left foot: "Dorsal forefoot soft tissue swelling noted"  · Was started on IV cephazolin at admission    She is status post podiatry evaluation today and left great toe ulcer cleaned and dressed  · Continue IV antibiotics as ordered with plans to transition to oral cephalexin at discharge

## 2018-09-03 NOTE — ASSESSMENT & PLAN NOTE
· Hypotensive episode earlier today with systolic blood pressure in the 80s to 90s  · Will as IV fluids with normal saline given and continued at 125 cc/hour  · Lisinopril had just been resumed today at 20 mg    Will hold for now  · When blood pressure stabilizes, recommend resuming at lower dose, probably 5 mg and can titrate up if needed

## 2018-09-03 NOTE — PLAN OF CARE
DISCHARGE PLANNING     Discharge to home or other facility with appropriate resources Progressing        INFECTION - ADULT     Absence or prevention of progression during hospitalization Progressing        Knowledge Deficit     Patient/family/caregiver demonstrates understanding of disease process, treatment plan, medications, and discharge instructions Progressing        METABOLIC, FLUID AND ELECTROLYTES - ADULT     Glucose maintained within target range Progressing        PAIN - ADULT     Verbalizes/displays adequate comfort level or baseline comfort level Progressing        Prexisting or High Potential for Compromised Skin Integrity     Skin integrity is maintained or improved Progressing        SKIN/TISSUE INTEGRITY - ADULT     Incision(s), wounds(s) or drain site(s) healing without S/S of infection Progressing

## 2018-09-03 NOTE — CONSULTS
Consultation - Mandeep Carter 59 y o  female MRN: [id number]    Unit/Bed#: -01 Encounter: [id number]      Assessment/Plan     Assessment/Plan:  1  Type 2 diabetes with hyperglycemia:  Blood sugars have been trending down  Received 1st dose of Levemir 50 units at a m  this morning  Will at prandial Humalog 10 units a c   Continue scale for correction  Continue Levemir 50 b i d   Continue to hold metformin and Trulicity while inpatient  Continue to monitor make changes as necessary  Monitor for hypoglycemia and treat per protocol  Upon discharge, given financial concerns patient, I would suggest discontinuing Trulicity  Would continue metformin as long as kidney function looks appropriate  Would discharge on twice a day basal insulin  Upon discharge, if Lantus is not the preferred basal insulin for the patient's insurance company, we can use Tresiba, Basaglar, Levemir, Toujeo at the same dose instead  If the above-mentioned basal insulins are not affordable for the patient, [employer] brand Novolin N via vial and syringe would be the most affordable option  CC: Diabetes Consult    History of Present Illness     HPI: Mandeep Carter is a 59y o  year old female with type 2 diabetes for 13 years  She is on oral agents and insulin at home  She does note polyuria and polydipsia at home as well as leg pain and muscle cramping  She denies retinopathy but does admit to neuropathy and occasional kidney dysfunction  She denies any hypoglycemia  She reports a lot of stress in her life lately with regards to what sounds like custody over foster children and legal/financial issues related to this  She has been having to use friends old insulin at times due to money concerns  Consults    Review of Systems   Constitutional: Negative for appetite change  HENT: Negative for congestion and trouble swallowing  Eyes: Negative for visual disturbance     Respiratory: Negative for shortness of breath  Cardiovascular: Positive for leg swelling  Gastrointestinal: Negative for abdominal pain, nausea and vomiting  Endocrine: Negative for polydipsia and polyuria  Genitourinary: Negative for difficulty urinating and frequency  Musculoskeletal: Positive for myalgias (muscle cramping)  Negative for arthralgias  Skin: Negative for rash  Neurological: Negative for dizziness and weakness  Psychiatric/Behavioral: Negative for agitation and confusion  The patient is nervous/anxious (stress in life lately)          Historical Information   Past Medical History:   Diagnosis Date    Asthma     Chronic pain     Diabetes mellitus (HCC)     GERD (gastroesophageal reflux disease)     Hypertension     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     Neuropathy     PCOS (polycystic ovarian syndrome)      Past Surgical History:   Procedure Laterality Date    CERVICAL CONIZATION   W/ LASER      DILATION AND CURETTAGE OF UTERUS      ENDOMETRIAL ABLATION      ENDOMETRIAL BIOPSY      INDUCED       TOTAL THYROIDECTOMY       Social History   History   Alcohol Use No     History   Drug Use No     History   Smoking Status    Never Smoker   Smokeless Tobacco    Never Used     Family History:   Family History   Problem Relation Age of Onset    Arthritis Mother     Osteoporosis Mother        Meds/Allergies   Current Facility-Administered Medications   Medication Dose Route Frequency Provider Last Rate Last Dose    acetaminophen (TYLENOL) tablet 650 mg  650 mg Oral Q6H PRN Jazz Dobson PA-C        aspirin chewable tablet 81 mg  81 mg Oral Daily Gail Lopez PA-C        ceFAZolin (ANCEF) IVPB (premix) 2,000 mg  2,000 mg Intravenous Q8H [facility] Gail Lopez PA-C 100 mL/hr at 18 0506 2,000 mg at 18 0506    citalopram (CeleXA) tablet 10 mg  10 mg Oral Daily Gail Lopez PA-C        enoxaparin (LOVENOX) subcutaneous injection 40 mg  40 mg Subcutaneous Daily Gail HEREDIA Ramandeep Bravo PA-C        ergocalciferol (VITAMIN D2) capsule 50,000 Units  50,000 Units Oral Weekly Ricky Esaclona PA-C        ezetimibe (ZETIA) tablet 10 mg  10 mg Oral Daily Ricky Escalona PA-C        famotidine (PEPCID) tablet 20 mg  20 mg Oral Daily Ricky Escalona PA-C        gabapentin (NEURONTIN) capsule 300 mg  300 mg Oral 4x Daily Ricky Escalona PA-C   300 mg at 09/02/18 2333    insulin detemir (LEVEMIR) subcutaneous injection 50 Units  50 Units Subcutaneous Q12H [facility] Ricky Escalona PA-C   50 Units at 09/03/18 0110    insulin lispro (HumaLOG) 100 units/mL subcutaneous injection 2-12 Units  2-12 Units Subcutaneous TID With Meals Ricky Escalona PA-C        insulin lispro (HumaLOG) 100 units/mL subcutaneous injection 2-12 Units  2-12 Units Subcutaneous HS Ricky Escalona PA-C   12 Units at 09/03/18 0111    lisinopril (ZESTRIL) tablet 20 mg  20 mg Oral Daily Ricky Escalona PA-C        ondansetron (ZOFRAN) injection 4 mg  4 mg Intravenous Q6H PRN Ricky Escalona PA-C        pantoprazole (PROTONIX) EC tablet 40 mg  40 mg Oral Early Morning Ricky Escalona PA-C   40 mg at 09/03/18 0630    potassium chloride (K-DUR,KLOR-CON) CR tablet 10 mEq  10 mEq Oral Daily Ricky Escalona PA-C        pravastatin (PRAVACHOL) tablet 40 mg  40 mg Oral Daily [facility]        sodium chloride 0 9 % infusion  100 mL/hr Intravenous Continuous Ricky Escalona PA-C 100 mL/hr at 09/03/18 0044 100 mL/hr at 09/03/18 0044    tiZANidine (ZANAFLEX) tablet 4 mg  4 mg Oral Q8H PRN Ricky Escalona PA-C   4 mg at 09/02/18 2333     Allergies   Allergen Reactions    Albumen, Egg      Other reaction(s): Nausea and/or vomiting    Bioflavonoids      Other reaction(s):  Other (See Comments)  Skin "burns"    Brassica Azalee Loth      Other reaction(s): GI upset  Abdominal pain    Metronidazole Itching    Morphine Hives and Itching    Prochlorperazine Wheezing Other reaction(s): Other (See Comments)  SOB    Sulfa Antibiotics      Other reaction(s): Unknown Reaction       Objective   Vitals: Blood pressure 115/65, pulse 57, temperature 97 6 °F (36 4 °C), temperature source Oral, resp  rate 18, SpO2 96 %  No intake or output data in the 24 hours ending 09/03/18 0835  Invasive Devices          No matching active lines, drains, or airways          Physical Exam   Constitutional: She is oriented to person, place, and time  She appears well-developed and well-nourished  No distress  HENT:   Head: Normocephalic and atraumatic  Eyes: Conjunctivae are normal  Pupils are equal, round, and reactive to light  Neck: Normal range of motion  Neck supple  Cardiovascular: Normal rate and regular rhythm  Pulmonary/Chest: Effort normal and breath sounds normal  No respiratory distress  Abdominal: Soft  Bowel sounds are normal  She exhibits no distension  Musculoskeletal: She exhibits edema (LE b/l )  Neurological: She is alert and oriented to person, place, and time  Skin: Skin is warm and dry  No rash noted  She is not diaphoretic  Psychiatric: She has a normal mood and affect  Her behavior is normal    Vitals reviewed  The history was obtained from the review of the chart, patient  Lab Results:       Lab Results   Component Value Date    WBC 6 26 09/03/2018    HGB 13 7 09/03/2018    HCT 41 3 09/03/2018    MCV 86 09/03/2018     09/03/2018     Lab Results   Component Value Date/Time    BUN 18 09/03/2018 04:36 AM     09/03/2018 04:36 AM    K 3 8 09/03/2018 04:36 AM     09/03/2018 04:36 AM    CO2 26 09/03/2018 04:36 AM    CREATININE 0 87 09/03/2018 04:36 AM    AST 23 09/02/2018 02:50 PM    ALT 21 09/02/2018 02:50 PM    ALB 4 0 09/02/2018 02:50 PM     No results for input(s): CHOL, HDL, LDL, TRIG, VLDL in the last 72 hours    No results found for: Kali Red  POC Glucose (mg/dl)   Date Value   09/03/2018 199 (H)   09/03/2018 323 (H) 09/03/2018 372 (H)   09/03/2018 407 (H)   09/02/2018 288 (H)   09/02/2018 214 (H)   09/02/2018 175 (H)   09/02/2018 248 (H)   09/02/2018 439 (H)   09/02/2018 479 (H)       Imaging Studies: I have personally reviewed pertinent reports  XR foot 3+ views LEFT [[id number]] Collected: 09/02/18 1503   Order Status: Completed Updated: 09/02/18 1624   Narrative:     INDICATION:  Diabetic left foot ulcer  Generalized left foot pain and  swelling  ORDERING PROVIDER: Klarissa Patches  TECHNIQUE:  Three views of the left foot  COMPARISON:  None Available  FINDINGS:    There is no displaced fracture   The alignment is anatomic   There is mild  tarsometatarsal degenerative change present   Mild first MTP degenerative  change seen   Plantar calcaneal spur and Achilles enthesophytes seen  Type 2 accessory navicular noted    Dorsal forefoot soft tissue swelling  noted     Impression:     Degenerative changes of the forefoot without fracture

## 2018-09-04 VITALS
TEMPERATURE: 98.9 F | DIASTOLIC BLOOD PRESSURE: 58 MMHG | OXYGEN SATURATION: 95 % | HEART RATE: 65 BPM | RESPIRATION RATE: 18 BRPM | SYSTOLIC BLOOD PRESSURE: 116 MMHG

## 2018-09-04 PROBLEM — M62.838 MUSCLE SPASM: Status: RESOLVED | Noted: 2018-09-02 | Resolved: 2018-09-04

## 2018-09-04 LAB
GLUCOSE SERPL-MCNC: 124 MG/DL (ref 65–140)
GLUCOSE SERPL-MCNC: 198 MG/DL (ref 65–140)
GLUCOSE SERPL-MCNC: 207 MG/DL (ref 65–140)
MAGNESIUM SERPL-MCNC: 1.7 MG/DL (ref 1.6–2.6)

## 2018-09-04 PROCEDURE — 82948 REAGENT STRIP/BLOOD GLUCOSE: CPT

## 2018-09-04 PROCEDURE — 83735 ASSAY OF MAGNESIUM: CPT | Performed by: INTERNAL MEDICINE

## 2018-09-04 PROCEDURE — 99239 HOSP IP/OBS DSCHRG MGMT >30: CPT | Performed by: PHYSICIAN ASSISTANT

## 2018-09-04 RX ORDER — GABAPENTIN 300 MG/1
300 CAPSULE ORAL 4 TIMES DAILY
Qty: 120 CAPSULE | Refills: 0 | Status: SHIPPED | OUTPATIENT
Start: 2018-09-04 | End: 2018-10-03 | Stop reason: SDUPTHER

## 2018-09-04 RX ORDER — SIMVASTATIN 20 MG
20 TABLET ORAL
Qty: 30 TABLET | Refills: 0 | Status: SHIPPED | OUTPATIENT
Start: 2018-09-04 | End: 2018-10-03 | Stop reason: SDUPTHER

## 2018-09-04 RX ORDER — CITALOPRAM 10 MG/1
10 TABLET ORAL DAILY
Qty: 30 TABLET | Refills: 0 | Status: ON HOLD | OUTPATIENT
Start: 2018-09-04 | End: 2018-10-02

## 2018-09-04 RX ORDER — TIZANIDINE 4 MG/1
4 TABLET ORAL 3 TIMES DAILY
Qty: 30 TABLET | Refills: 0 | Status: SHIPPED | OUTPATIENT
Start: 2018-09-04 | End: 2018-10-03 | Stop reason: SDUPTHER

## 2018-09-04 RX ORDER — ASPIRIN 81 MG/1
81 TABLET, CHEWABLE ORAL DAILY
Qty: 30 TABLET | Refills: 0 | Status: SHIPPED | OUTPATIENT
Start: 2018-09-04 | End: 2021-03-19

## 2018-09-04 RX ADMIN — INSULIN LISPRO 6 UNITS: 100 INJECTION, SOLUTION INTRAVENOUS; SUBCUTANEOUS at 12:04

## 2018-09-04 RX ADMIN — PANTOPRAZOLE SODIUM 40 MG: 40 TABLET, DELAYED RELEASE ORAL at 06:09

## 2018-09-04 RX ADMIN — PRAVASTATIN SODIUM 40 MG: 40 TABLET ORAL at 16:24

## 2018-09-04 RX ADMIN — CEFAZOLIN SODIUM 2000 MG: 2 SOLUTION INTRAVENOUS at 06:00

## 2018-09-04 RX ADMIN — TIZANIDINE 4 MG: 2 TABLET ORAL at 08:39

## 2018-09-04 RX ADMIN — INSULIN LISPRO 10 UNITS: 100 INJECTION, SOLUTION INTRAVENOUS; SUBCUTANEOUS at 12:05

## 2018-09-04 RX ADMIN — INSULIN HUMAN 50 UNITS: 100 INJECTION, SUSPENSION SUBCUTANEOUS at 08:38

## 2018-09-04 RX ADMIN — ASPIRIN 81 MG 81 MG: 81 TABLET ORAL at 08:39

## 2018-09-04 RX ADMIN — INSULIN LISPRO 4 UNITS: 100 INJECTION, SOLUTION INTRAVENOUS; SUBCUTANEOUS at 08:40

## 2018-09-04 RX ADMIN — INSULIN LISPRO 10 UNITS: 100 INJECTION, SOLUTION INTRAVENOUS; SUBCUTANEOUS at 16:24

## 2018-09-04 RX ADMIN — EZETIMIBE 10 MG: 10 TABLET ORAL at 08:39

## 2018-09-04 RX ADMIN — POTASSIUM CHLORIDE 10 MEQ: 750 TABLET, EXTENDED RELEASE ORAL at 08:39

## 2018-09-04 RX ADMIN — TIZANIDINE 4 MG: 2 TABLET ORAL at 16:43

## 2018-09-04 RX ADMIN — FAMOTIDINE 20 MG: 20 TABLET ORAL at 08:39

## 2018-09-04 RX ADMIN — ACETAMINOPHEN 650 MG: 325 TABLET, FILM COATED ORAL at 08:39

## 2018-09-04 RX ADMIN — ENOXAPARIN SODIUM 40 MG: 40 INJECTION SUBCUTANEOUS at 08:39

## 2018-09-04 RX ADMIN — GABAPENTIN 300 MG: 300 CAPSULE ORAL at 11:10

## 2018-09-04 RX ADMIN — INSULIN LISPRO 10 UNITS: 100 INJECTION, SOLUTION INTRAVENOUS; SUBCUTANEOUS at 08:39

## 2018-09-04 RX ADMIN — INSULIN HUMAN 50 UNITS: 100 INJECTION, SUSPENSION SUBCUTANEOUS at 16:28

## 2018-09-04 RX ADMIN — GABAPENTIN 300 MG: 300 CAPSULE ORAL at 08:39

## 2018-09-04 NOTE — SOCIAL WORK
JEAN-PAUL spoke to Augusto Luther re: patient's insurance  [facility] will contact JEAN-PAUL with updated insurance information

## 2018-09-04 NOTE — PROGRESS NOTES
Reviewed blood sugars from past 24 hours  Patient to be discharged  Would discharge on Novolin 70/30 60 units twice a day (breakfast and dinner) from [place] via vial and syringe  Discontinue Trulicity upon discharge  Continue metformin      Recent Results (from the past 24 hour(s))   Fingerstick Glucose (POCT)    Collection Time: 09/03/18  3:58 PM   Result Value Ref Range    POC Glucose 155 (H) 65 - 140 mg/dl   Fingerstick Glucose (POCT)    Collection Time: 09/03/18  9:48 PM   Result Value Ref Range    POC Glucose 219 (H) 65 - 140 mg/dl   Fingerstick Glucose (POCT)    Collection Time: 09/04/18  2:28 AM   Result Value Ref Range    POC Glucose 198 (H) 65 - 140 mg/dl   Magnesium    Collection Time: 09/04/18  6:19 AM   Result Value Ref Range    Magnesium 1 7 1 6 - 2 6 mg/dL   Fingerstick Glucose (POCT)    Collection Time: 09/04/18  7:34 AM   Result Value Ref Range    POC Glucose 207 (H) 65 - 140 mg/dl

## 2018-09-04 NOTE — SOCIAL WORK
CM updated patient at bedside  [place] information provided to patient  Patient informed to contact her once she receives a hospital bill  Patient informed that CM contacted the [facility] and they are unable to provide assistance at this time due to patient application not completed  CM stressed the importance of patient following up with the [facility]  CM discussed with patient that prescriptions can be filled at [address] [place] [address] at a lower cost  CM contact information and infolink card provided at bedside  All questions and concerns answered at bedside

## 2018-09-04 NOTE — SOCIAL WORK
CM spoke with patient at bedside  Patient lives at home with her 76year old   They have 17 children, some still living at home  Patient had insurance but lost it due to a family tragedy  Patient currently has [employer] and states that she cannot afford her medications on this plan  Patient used to receive samples from her PCP in the past  Patient has been borrowing insulin from friends and has been giving herself half the dose prescribed  Patient would like assistance with medications and PCP  She stated she has 2 job prospects and needs some assistance to get her over the hum  Patient lives in [facility]  Patient uses [address]  [facility] number is   CM spoke with Justus Penta Beryle Dolores ran patients federal poverty  Patient qualifies for 100%  Patient is to call Beryle Dolores when she receives hospital bills  CM will f/u with patient and update with plan of care

## 2018-09-04 NOTE — SOCIAL WORK
CM spoke with a representative from the [employer]  Patient currently has an application in process  Patient needs to provide documents to the [facility] office  Patient is not eligible due to income at this time for medication assistance with the [facility]

## 2018-09-04 NOTE — PLAN OF CARE
DISCHARGE PLANNING     Discharge to home or other facility with appropriate resources Progressing        INFECTION - ADULT     Absence or prevention of progression during hospitalization Progressing        Knowledge Deficit     Patient/family/caregiver demonstrates understanding of disease process, treatment plan, medications, and discharge instructions Progressing        METABOLIC, FLUID AND ELECTROLYTES - ADULT     Glucose maintained within target range Progressing        PAIN - ADULT     Verbalizes/displays adequate comfort level or baseline comfort level Progressing        Potential for Falls     Patient will remain free of falls Progressing        Prexisting or High Potential for Compromised Skin Integrity     Skin integrity is maintained or improved Progressing        SKIN/TISSUE INTEGRITY - ADULT     Incision(s), wounds(s) or drain site(s) healing without S/S of infection Progressing

## 2018-09-04 NOTE — DISCHARGE SUMMARY
Discharge- Zoë Gilliam 1960, 59 y o  female MRN: [id number]    Unit/Bed#: -01 Encounter: [id number]    Primary Care Provider: ISABELLE Trejo   Date and time admitted to hospital: 9/2/2018  9:06 PM    * Type 2 diabetes mellitus with hyperglycemia ([address] )   Assessment & Plan    · Secondary to noncompliance with medications which patient reports was due to being unable to afford her insulin regimen  · Patient will need close outpatient follow-up with PCP, ophthalmology, and podiatry  Due to patient's underlying kidney disease, will also eventually need to follow up with nephrology  · BG control improved since restarting insulin regimen  · The patient is unable to afford Levemir  It was discussed with the endocrinologist Dr Douglas Azevedo and decision was made to transition the patient to 70/30 at 60 units twice daily which would be the most affordable option  This would also require the patient to eat regularly while taking this insulin to reduce risk of hypoglycemia  · Case management has worked extensively regarding options for patient, as she has [employer] and unable to afford the deductibles on this plan  · Completed med rec and discharged home with medications that were considered medically necessary at this time, particularly for diabetes control; however, patient may still be unable to afford the recommended medications  Educated patient that it is imperative that she takes these medications as prescribed  · Diabetic education  Stage 3 chronic kidney disease   Assessment & Plan    · Creatinine 0 87 yesterday, down from 1 2 at presentation with IV hydration  · IV fluids were discontinued as BP has been stable and patient is asymptomatic along with improvement of creatinine  · Baseline creatinine is unknown  Patient will eventually need outpatient nephrology follow-up            Ulcer of toe of left foot (HCC)   Assessment & Plan    · XR left foot: "Dorsal forefoot soft tissue swelling noted"  · Was started on IV cephazolin at admission  She is status post podiatry evaluation yesterday, and left great toe ulcer cleaned and dressed - does not appear to be infected as per podiatry  D/c antibiotics  · Will need regular outpatient podiatry follow-up  Diabetic neuropathy (HCC)   Assessment & Plan    · Continue gabapentin        Hyperlipidemia   Assessment & Plan    · Discontinue Zetia  · Continue Statin        Essential hypertension   Assessment & Plan    · BP has been stable  Patient with PO fluid intake; IVF was d/c as patient euvolemic and no longer with symptomatic hypotension  · Hold Lisinopril for now as BP has been trending on the lower side  Upon restarting, recommend resuming at lower dose, probably 5 mg and can titrate up if needed  Muscle spasm-resolved as of 9/4/2018   Assessment & Plan    · CK levels within normal limits, potassium normal, magnesium normal  · Continue zanaflex          Discharging Physician / Practitioner: Blanca Bain PA-C  PCP: Andi Quintana [address] [place] [address]  Admission Date:   Admission Orders     Ordered        09/02/18 2322  Inpatient Admission  Once             Discharge Date: 09/04/18    Resolved Problems  Date Reviewed: 9/4/2018          Resolved    Muscle spasm 9/4/2018     Resolved by  Cristi Pérez, [address] [place] [address] Stay:  · Endocrinology  · Podiatry  · Case management    Procedures Performed:     · Excisional debridement of left great toe per podiatry with dressing applied and surgical shoe  Significant Findings / Test Results:     · HbA1C: 11 0  · X-ray left foot: no fracture; degenerative changes  Dorsal forefoot soft tissue swelling  Incidental Findings:   · None     Test Results Pending at Discharge (will require follow up): · None     Outpatient Tests Requested:  · No laboratory testing ordered at this time    Patient will need close outpatient follow-up with PCP, ophthalmology, podiatry, and eventually nephrology  Complications:  No complications during hospital course  Reason for Admission: Type II Diabetes Mellitus with hyperglycemia    Hospital Course:     Sandi Nieto is a 59 y o  female patient who originally presented to the hospital on 9/2/2018 due to hyperglycemia and blood sugar of >500 transferred from [place]  Please see above list of diagnoses and related plan for additional information  Condition at Discharge: fair     Discharge Day Visit / Exam:     Subjective:  Patient is lying supine in bed during encounter  Patient becomes tearful when explaining her home life stressors, describing multiple barriers to care  Patient's main concern is the neuropathy in her legs, which she believes to be due to history of double knee replacements  Patient states she is worried about getting the healthcare she knows she needs in regards to following up with her diabetes, because she cannot afford the deductibles on her current insurance  Patient currently denies chest pain, shortness of breath, dizziness, abdominal pain, changes in vision, or diarrhea  Vitals: Blood Pressure: 116/58 (09/04/18 1419)  Pulse: 65 (09/04/18 1419)  Temperature: 98 9 °F (37 2 °C) (09/04/18 0736)  Temp Source: Oral (09/04/18 1419)  Respirations: 18 (09/04/18 1419)  SpO2: 95 % (09/04/18 1419)  Exam:   Physical Exam   Constitutional: She is oriented to person, place, and time  Vital signs are normal  She appears well-developed  HENT:   Head: Normocephalic  Eyes: Pupils are equal, round, and reactive to light  No scleral icterus  Neck: Normal range of motion  Cardiovascular: Normal rate, regular rhythm and normal heart sounds  No murmur heard  Pulmonary/Chest: Effort normal and breath sounds normal  No respiratory distress  Abdominal: Soft  Bowel sounds are normal  There is no tenderness  Musculoskeletal: She exhibits no edema     Neurological: She is alert and oriented to person, place, and time  Chronic neuropathy bilateral feet   Skin: Skin is warm and dry  Psychiatric: She has a normal mood and affect  Her speech is normal and behavior is normal  Cognition and memory are normal  She expresses no suicidal ideation  Becomes tearful during encounter when describing home life stressors  Discussion with Family: Discussed extensively at bedside with patient  Patient states that she had insurance up to January, as her and her  had adopted their 5 grandchildren  However, as per patient children have been awarded back to their mother, and therefore no longer qualified for the insurance plan as the children no longer live with them (however patient later states her and her  have 16 children, some still at home)  Patient reports her glucose had been well-controlled leading up to January before she lost insurance and was unable to afford doctors visits and insulin  Patient states she has not seen an eye doctor in "quite some time", and has not followed with a podiatrist the last couple months  Patient believes she has a glucometer at home but has not been using it, as she does not know where it is  She also states she has been using leftover insulin from friends and family in the last couple of months, but about at half the dose she had been prescribed  Patient verbalized understanding that it is imperative to get her blood sugars under control, however, "I don't know how I can afford them and keep a roof over my head"  There are several social aspects that are barriers to care; patient recently found employment part time for a [employer], however now has been hospitalized and is concerned they won't take her back  Patient states she is unable to work full 8-hour shifts secondary to multiple orthopedic surgeries  Answered all questions to the best of my ability      Discharge instructions/Information to patient and family:   See after visit summary for information provided to patient and family  Provisions for Follow-Up Care:  See after visit summary for information related to follow-up care and any pertinent home health orders  Disposition:     Home    For Discharges to [facility] SNF:   · Not Applicable to this Patient - Not Applicable to this Patient    Planned Readmission: None     Discharge Statement:  I spent 60 minutes discharging the patient  This time was spent on the day of discharge  I had direct contact with the patient on the day of discharge  Greater than 50% of the total time was spent examining patient, answering all patient questions, arranging and discussing plan of care with patient as well as directly providing post-discharge instructions  Additional time then spent on discharge activities  Discharge Medications:  See after visit summary for reconciled discharge medications provided to patient and family        ** Please Note: This note has been constructed using a voice recognition system **

## 2018-09-04 NOTE — ASSESSMENT & PLAN NOTE
· Creatinine 0 87 yesterday, down from 1 2 at presentation with IV hydration  · IV fluids were discontinued as BP has been stable and patient is asymptomatic along with improvement of creatinine  · Baseline creatinine is unknown  Patient will eventually need outpatient nephrology follow-up

## 2018-09-04 NOTE — ASSESSMENT & PLAN NOTE
· XR left foot: "Dorsal forefoot soft tissue swelling noted"  · Was started on IV cephazolin at admission  She is status post podiatry evaluation yesterday, and left great toe ulcer cleaned and dressed - does not appear to be infected as per podiatry  D/c antibiotics  · Will need regular outpatient podiatry follow-up

## 2018-09-04 NOTE — ASSESSMENT & PLAN NOTE
· Secondary to noncompliance with medications which patient reports was due to being unable to afford her insulin regimen  · Patient will need close outpatient follow-up with PCP, ophthalmology, and podiatry  Due to patient's underlying kidney disease, will also eventually need to follow up with nephrology  · BG control improved since restarting insulin regimen  · The patient is unable to afford Levemir  It was discussed with the endocrinologist Dr Parul Glover and decision was made to transition the patient to 70/30 at 60 units twice daily which would be the most affordable option  This would also require the patient to eat regularly while taking this insulin to reduce risk of hypoglycemia  · Case management has worked extensively regarding options for patient, as she has [employer] and unable to afford the deductibles on this plan  · Completed med rec and discharged home with medications that were considered medically necessary at this time, particularly for diabetes control; however, patient may still be unable to afford the recommended medications  Educated patient that it is imperative that she takes these medications as prescribed  · Diabetic education

## 2018-09-05 LAB — GLUCOSE SERPL-MCNC: 254 MG/DL (ref 65–140)

## 2018-09-07 ENCOUNTER — TELEPHONE (OUTPATIENT)
Dept: INTERNAL MEDICINE CLINIC | Facility: CLINIC | Age: 58
End: 2018-09-07

## 2018-09-07 NOTE — TELEPHONE ENCOUNTER
When I called to reschedule patient to bring her in sooner I told her that she must be compliant with Nessa Ebbing as far as taking medications and following instructions  Patient stated that she will do her best to be compliant because of money problems  She also stated that she has applied for [employer]

## 2018-09-19 ENCOUNTER — PATIENT OUTREACH (OUTPATIENT)
Dept: INTERNAL MEDICINE CLINIC | Facility: CLINIC | Age: 58
End: 2018-09-19

## 2018-09-19 ENCOUNTER — OFFICE VISIT (OUTPATIENT)
Dept: INTERNAL MEDICINE CLINIC | Facility: CLINIC | Age: 58
End: 2018-09-19
Payer: COMMERCIAL

## 2018-09-19 ENCOUNTER — TELEPHONE (OUTPATIENT)
Dept: INTERNAL MEDICINE CLINIC | Facility: CLINIC | Age: 58
End: 2018-09-19

## 2018-09-19 VITALS
HEIGHT: 63 IN | WEIGHT: 257.2 LBS | TEMPERATURE: 99.5 F | OXYGEN SATURATION: 96 % | BODY MASS INDEX: 45.57 KG/M2 | DIASTOLIC BLOOD PRESSURE: 78 MMHG | HEART RATE: 78 BPM | SYSTOLIC BLOOD PRESSURE: 142 MMHG

## 2018-09-19 DIAGNOSIS — M54.9 CHRONIC BACK PAIN, UNSPECIFIED BACK LOCATION, UNSPECIFIED BACK PAIN LATERALITY: Primary | ICD-10-CM

## 2018-09-19 DIAGNOSIS — Z12.4 SCREENING FOR CERVICAL CANCER: ICD-10-CM

## 2018-09-19 DIAGNOSIS — G89.29 CHRONIC BACK PAIN, UNSPECIFIED BACK LOCATION, UNSPECIFIED BACK PAIN LATERALITY: Primary | ICD-10-CM

## 2018-09-19 DIAGNOSIS — I10 ESSENTIAL HYPERTENSION: ICD-10-CM

## 2018-09-19 DIAGNOSIS — E55.9 VITAMIN D DEFICIENCY: ICD-10-CM

## 2018-09-19 DIAGNOSIS — Z79.4 TYPE 2 DIABETES MELLITUS WITH HYPERGLYCEMIA, WITH LONG-TERM CURRENT USE OF INSULIN (HCC): ICD-10-CM

## 2018-09-19 DIAGNOSIS — B37.9 YEAST INFECTION: Primary | ICD-10-CM

## 2018-09-19 DIAGNOSIS — E78.5 HYPERLIPIDEMIA, UNSPECIFIED HYPERLIPIDEMIA TYPE: ICD-10-CM

## 2018-09-19 DIAGNOSIS — N18.30 STAGE 3 CHRONIC KIDNEY DISEASE (HCC): ICD-10-CM

## 2018-09-19 DIAGNOSIS — Z01.00 EYE EXAM, ROUTINE: ICD-10-CM

## 2018-09-19 DIAGNOSIS — Z86.14 HISTORY OF MRSA INFECTION: ICD-10-CM

## 2018-09-19 DIAGNOSIS — E11.65 TYPE 2 DIABETES MELLITUS WITH HYPERGLYCEMIA, WITH LONG-TERM CURRENT USE OF INSULIN (HCC): ICD-10-CM

## 2018-09-19 DIAGNOSIS — Z12.11 SCREENING FOR COLON CANCER: Primary | ICD-10-CM

## 2018-09-19 DIAGNOSIS — L97.529 ULCER OF TOE OF LEFT FOOT, UNSPECIFIED ULCER STAGE (HCC): ICD-10-CM

## 2018-09-19 DIAGNOSIS — Z12.39 SCREENING FOR BREAST CANCER: ICD-10-CM

## 2018-09-19 PROCEDURE — 99215 OFFICE O/P EST HI 40 MIN: CPT | Performed by: NURSE PRACTITIONER

## 2018-09-19 PROCEDURE — 3066F NEPHROPATHY DOC TX: CPT | Performed by: NURSE PRACTITIONER

## 2018-09-19 PROCEDURE — 3008F BODY MASS INDEX DOCD: CPT | Performed by: NURSE PRACTITIONER

## 2018-09-19 RX ORDER — NYSTATIN 100000 U/G
CREAM TOPICAL 2 TIMES DAILY
Qty: 30 G | Refills: 3 | Status: ON HOLD | OUTPATIENT
Start: 2018-09-19 | End: 2018-10-02

## 2018-09-19 RX ORDER — SIMVASTATIN 20 MG
20 TABLET ORAL
Status: ON HOLD | COMMUNITY
End: 2018-10-02

## 2018-09-19 NOTE — PROGRESS NOTES
Assessment/Plan: Type 2 Diabetes with Hyperglycemia- Patient is taking currently 70/30 60 units BID  She also is taking Metformin 1000 mg by mouth BID  Her last A1C was 11 1  She has seen Podiatry in the hospital and was to follow up with them as an outpatient but states she can not afford to go right now  She was is applying for new insurance and I did contact [address] [place] [address]  for further input  Awaiting a call back  She was given script for labs to have done again in 3 months  She is deferring Endocrine or anyone else at this time  She is taking Gabapentin 300 mg QID for her neuropathy  Stage 3 CKD- Last Creatinine was 0 87 and was also advised for evaluation for CKD as outpatient but is refusing at this time  She is not longer taking Zestril as well for her HTN  Ulcer of toe of Left foot- XR left foot dorsal forefoot soft tissues swelling noted  She was discharged on antibiotics but has since finished this course  She is deferring podiatry at this time as well  Diabetic Neuropathy- taking Gabapentin and was recommended routine foot exams which she is refusing  Hyperlipidemia- currently taking Zocor 20 mg daily has not this checked in quite some time for baseline levels  HTN- BP initially 142/78 after recheck 136/72 she is deferring BP medications at this time  Patient is taking Motrin for chronic pain and is currently trying for disability and is working part time jobs at current  She is deferring all screenings at this time and will try to come up with a plan to help her out financially with [address] [place] [address] about Specialist visits and to afford her medications  Due to her chronic pain and CKD will try to evaluate what she can take for her pain and was also advised about Pain Management  Will bring her back in one month for re-evaluation  Patient would not go into detail about what is going on at home and states she does not want to talk any further about her issues    She is stating she is getting her Grandchildren back and there was a horrible mistake made  She states she right now did get two of the children back  No problem-specific Assessment & Plan notes found for this encounter  Problem List Items Addressed This Visit     Vitamin D deficiency    Type 2 diabetes mellitus with hyperglycemia ([facility] )    Essential hypertension    Hyperlipidemia    Stage 3 chronic kidney disease    Ulcer of toe of left foot ([facility] )      Other Visit Diagnoses     Screening for colon cancer    -  Primary    Screening for breast cancer                Subjective:      Patient ID: Sam Peterson is a 59 y o  female  Shaista Patience is here to establish care as a new patient  She was a previous patient of [address] [place] [address]  She does have a history of Type 2 Diabetes which is uncontrolled and she was recently hospitalized on the 2nd after having issues with finances and not being able to afford her insulin  She was discharged on the 10th  She was sent home on 70/30 insulin due to this being the most affordable 60 units BID  She was also found to have an ulcer to her left foot and toe  She does CKD stage 3, hyperlipidemia, HTN, and diabetic neuropathy  She did have an XR of the left foot showing dorsal forefoot tissue swelling noted  She was advised to follow up with Podiatry on a regular basis for foot exams  She right now is trying to apply for insurance and states she did adopt her 5 grandchildren and they were taken off her and she did loose her insurance in January  She is currently getting her Grandchildren back but is having a hard time making ends meet and is not having any money to pay for Specialists and is having a hard time paying for her medications  She states she has had two hip replacement and does have issue with her back with stenosis    She is refusing a mammogram and GYN exam right now and has not had her vision checked in several years and is having issues with seeing  She is still currently driving  She is taking Motrin for pain but states this is ripping her stomach up and nothing else seems to help her pain  She did take Tramadol in the past which did not help either  She is taking her sugars currently at home and states they are running in the 110s  She was using her friends insulin over the past several months just to get by  She denies any chest pain, SOB, or palpitations  She does have depression and anxiety due to everything going on over the past several months and is taking Celexa daily  She states she was taking blood pressure medications and states she stopped taking this due to her blood pressures dropping  She was taking Zestril and then was on Norvasc  She does have a history of a PE after having her hip done in 2007  She is currently not on any blood thinners  She states she does not want any other measures done right now until she gets her insurance figured out  She offers no other issues  The following portions of the patient's history were reviewed and updated as appropriate:   She  has a past medical history of Asthma; Chronic pain; Diabetes mellitus ([facility] ); GERD (gastroesophageal reflux disease); Hypertension; 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; Neuropathy; and PCOS (polycystic ovarian syndrome)    She   Patient Active Problem List    Diagnosis Date Noted    Elevated blood sugar 09/02/2018    Diabetic neuropathy ([facility] ) 01/23/2017    Stage 3 chronic kidney disease 01/23/2017    Acid reflux 01/23/2017    Vitamin D deficiency 01/17/2017    Type 2 diabetes mellitus with hyperglycemia ([facility] ) 01/17/2017    Near syncope 01/17/2017    Insomnia 01/17/2017    Essential hypertension 01/17/2017    GERD (gastroesophageal reflux disease) 01/17/2017    Gait disturbance 01/17/2017    Diabetic ulcer of heel ([facility] ) 01/17/2017    Benign mole 01/17/2017    Arthritis 01/17/2017    Anxiety 01/17/2017    History of Salmonella infection 2016    Hyperlipidemia 2016    History of pulmonary embolism 2016    Benign essential HTN 2016    Achilles tendinitis of left lower extremity 2016    Diabetic ulcer of left heel ([place] [address] ) 2016    Diabetes mellitus type 2, uncontrolled ([place] [address] ) 2016    Anemia 2016    JAYLEN (acute kidney injury) ([place] [address] ) 2016    Ulcer of toe of left foot ([place] [address] ) 2016    Melanocytic nevus of trunk 2015    STD (female) 07/15/2015    Encounter for routine gynecological examination 07/15/2015    Obesity, morbid ([place] [address] ) 07/15/2015    Menopausal and perimenopausal disorder 07/15/2015     She  has a past surgical history that includes Total thyroidectomy; Induced ; Endometrial ablation; Endometrial biopsy; Dilation and curettage of uterus; and Cervical conization w/ laser  Her family history includes Arthritis in her mother; Osteoporosis in her mother  She  reports that she has never smoked  She has never used smokeless tobacco  She reports that she does not drink alcohol or use drugs    Current Outpatient Prescriptions   Medication Sig Dispense Refill    aspirin 81 mg chewable tablet Chew 1 tablet (81 mg total) daily 30 tablet 0    B-D ULTRAFINE III SHORT PEN 31G X 8 MM MISC   0    citalopram (CeleXA) 10 mg tablet Take 1 tablet (10 mg total) by mouth daily 30 tablet 0    gabapentin (NEURONTIN) 300 mg capsule Take 1 capsule (300 mg total) by mouth 4 (four) times a day 120 capsule 0    insulin NPH-insulin regular (NovoLIN 70/30) 100 units/mL subcutaneous injection Inject 60 Units under the skin 2 (two) times a day before meals 10 mL 0    metFORMIN (GLUCOPHAGE) 1000 MG tablet Take 1 tablet (1,000 mg total) by mouth 2 (two) times a day with meals 60 tablet 0    simvastatin (ZOCOR) 20 mg tablet Take 1 tablet (20 mg total) by mouth daily at bedtime 30 tablet 0    tiZANidine (ZANAFLEX) 4 mg tablet Take 1 tablet (4 mg total) by mouth 3 (three) times a day 30 tablet 0 No current facility-administered medications for this visit  Current Outpatient Prescriptions on File Prior to Visit   Medication Sig    aspirin 81 mg chewable tablet Chew 1 tablet (81 mg total) daily    B-D ULTRAFINE III SHORT PEN 31G X 8 MM MISC     citalopram (CeleXA) 10 mg tablet Take 1 tablet (10 mg total) by mouth daily    gabapentin (NEURONTIN) 300 mg capsule Take 1 capsule (300 mg total) by mouth 4 (four) times a day    insulin NPH-insulin regular (NovoLIN 70/30) 100 units/mL subcutaneous injection Inject 60 Units under the skin 2 (two) times a day before meals    metFORMIN (GLUCOPHAGE) 1000 MG tablet Take 1 tablet (1,000 mg total) by mouth 2 (two) times a day with meals    simvastatin (ZOCOR) 20 mg tablet Take 1 tablet (20 mg total) by mouth daily at bedtime    tiZANidine (ZANAFLEX) 4 mg tablet Take 1 tablet (4 mg total) by mouth 3 (three) times a day     No current facility-administered medications on file prior to visit  She is allergic to albumen, egg; bioflavonoids; brassica oleracea italica; metronidazole; morphine; prochlorperazine; and sulfa antibiotics       Review of Systems   Constitutional: Negative  HENT: Negative  Eyes: Positive for visual disturbance  Respiratory: Negative  Cardiovascular: Negative  Gastrointestinal: Negative  Endocrine: Negative  Genitourinary: Negative  Musculoskeletal: Positive for arthralgias and myalgias  Skin: Positive for wound  Allergic/Immunologic: Negative  Neurological: Negative  Hematological: Negative  Psychiatric/Behavioral: Negative  Below is the patient's most recent value for Albumin, ALT, AST, BUN, Calcium, Chloride, Cholesterol, CO2, Creatinine, GFR, Glucose, HDL, Hematocrit, Hemoglobin, Hemoglobin A1C, LDL, Magnesium, Phosphorus, Platelets, Potassium, PSA, Sodium, Triglycerides, and WBC     Lab Results   Component Value Date    ALT 21 09/02/2018    AST 23 09/02/2018    BUN 18 09/03/2018 CALCIUM 8 6 09/03/2018     09/03/2018    CO2 26 09/03/2018    CREATININE 0 87 09/03/2018    HCT 41 3 09/03/2018    HGB 13 7 09/03/2018    HGBA1C 11 0 (H) 09/03/2018    MG 1 7 09/04/2018     09/03/2018    K 3 8 09/03/2018     09/03/2018    WBC 6 26 09/03/2018     Note: for a comprehensive list of the patient's lab results, access the Results Review activity  Objective: There were no vitals taken for this visit  Physical Exam   Constitutional: She is oriented to person, place, and time  She appears well-developed and well-nourished  HENT:   Head: Normocephalic and atraumatic  Right Ear: External ear normal    Left Ear: External ear normal    Nose: Nose normal    Eyes: Conjunctivae and EOM are normal  Pupils are equal, round, and reactive to light  Neck: Normal range of motion  Neck supple  Cardiovascular: Normal rate, regular rhythm and normal heart sounds  Pulmonary/Chest: Effort normal and breath sounds normal    Abdominal: Soft  Bowel sounds are normal    Musculoskeletal:   Patient ambulating with cane   Neurological: She is alert and oriented to person, place, and time  She has normal reflexes  Skin: Skin is warm and dry  Scabbed noted to left great toe no redness or drainage noted   Psychiatric: She has a normal mood and affect  Her behavior is normal  Judgment and thought content normal    Vitals reviewed

## 2018-09-19 NOTE — TELEPHONE ENCOUNTER
Patient called back and would like to know if you can send over a script for Diflucan, she forgot to ask  She feels like she is getting a yeast infection  She would like it to go to [facility] in [facility]      Thank you

## 2018-09-19 NOTE — PROGRESS NOTES
Pt called back  Assessed services needed  Pcp office provided pt with financial assistance from for [facility] and [facility] ( possibilty of diabetic supplies thru [facility] via [address]  [facility] assistance program )  Pt will complete both these applications and complete process started with [place] assistance office  Pt verbalizes this is tedious process and over whelming at times  Allowed pt to vent and redirected need to complete all applications  Pt agrees to meet with this CM for diabetic education at pcp  10/2/18 at 11:00 and set goal to have all applications completed by this date  Pt expresses need for free eye exam suggested possible [employer]   She reports she is taking her 70/30 insulin as directed    Agrees to followup call and has CM contact info

## 2018-09-19 NOTE — TELEPHONE ENCOUNTER
I can call into pharmacy can you let her know due to her CKD I can not give her anything more for her pain and she really should not be taking Motrin    I can refer her to Pain Management for further treatment

## 2018-09-24 ENCOUNTER — PATIENT OUTREACH (OUTPATIENT)
Dept: INTERNAL MEDICINE CLINIC | Facility: CLINIC | Age: 58
End: 2018-09-24

## 2018-09-24 NOTE — PROGRESS NOTES
Searching for resource for assistance  with eye exam  CM emailed president of [place] c[employer] via [employer] on [facility] site-no reply  called number listed for [employer]  spoke with female that answered  she said this number [phone]  for [employer] only   CM found another contact number via google search  No answer left message to return call( [phone]  [place]  June 2018 article stating finacial assitance applications available)

## 2018-09-26 ENCOUNTER — PATIENT OUTREACH (OUTPATIENT)
Dept: INTERNAL MEDICINE CLINIC | Facility: CLINIC | Age: 58
End: 2018-09-26

## 2018-09-26 NOTE — PROGRESS NOTES
called pt to let her know about [facility] pt assistance program for 70/30 insulin   pt would like application mailed to her home  updated pt on lack of progress in attempt to find free eye exam   will continue to research for resources

## 2018-10-01 ENCOUNTER — PATIENT OUTREACH (OUTPATIENT)
Dept: INTERNAL MEDICINE CLINIC | Facility: CLINIC | Age: 58
End: 2018-10-01

## 2018-10-02 ENCOUNTER — APPOINTMENT (EMERGENCY)
Dept: RADIOLOGY | Facility: HOSPITAL | Age: 58
End: 2018-10-02
Payer: COMMERCIAL

## 2018-10-02 ENCOUNTER — APPOINTMENT (EMERGENCY)
Dept: CT IMAGING | Facility: HOSPITAL | Age: 58
End: 2018-10-02
Payer: COMMERCIAL

## 2018-10-02 ENCOUNTER — HOSPITAL ENCOUNTER (OUTPATIENT)
Facility: HOSPITAL | Age: 58
Setting detail: OBSERVATION
LOS: 1 days | Discharge: HOME | End: 2018-10-03
Attending: HOSPITALIST | Admitting: INTERNAL MEDICINE
Payer: COMMERCIAL

## 2018-10-02 ENCOUNTER — TELEPHONE (OUTPATIENT)
Dept: INTERNAL MEDICINE CLINIC | Facility: CLINIC | Age: 58
End: 2018-10-02

## 2018-10-02 ENCOUNTER — PATIENT OUTREACH (OUTPATIENT)
Dept: INTERNAL MEDICINE CLINIC | Facility: CLINIC | Age: 58
End: 2018-10-02

## 2018-10-02 ENCOUNTER — HOSPITAL ENCOUNTER (EMERGENCY)
Facility: HOSPITAL | Age: 58
End: 2018-10-02
Attending: EMERGENCY MEDICINE
Payer: COMMERCIAL

## 2018-10-02 VITALS
HEART RATE: 79 BPM | OXYGEN SATURATION: 97 % | WEIGHT: 260 LBS | TEMPERATURE: 98.6 F | BODY MASS INDEX: 46.07 KG/M2 | HEIGHT: 63 IN | SYSTOLIC BLOOD PRESSURE: 116 MMHG | DIASTOLIC BLOOD PRESSURE: 53 MMHG | RESPIRATION RATE: 16 BRPM

## 2018-10-02 DIAGNOSIS — G89.4 CHRONIC PAIN SYNDROME: Primary | Chronic | ICD-10-CM

## 2018-10-02 DIAGNOSIS — R07.9 CHEST PAIN: Primary | ICD-10-CM

## 2018-10-02 PROBLEM — E11.40 TYPE 2 DIABETES MELLITUS WITH DIABETIC NEUROPATHY, WITH LONG-TERM CURRENT USE OF INSULIN (HCC): Status: ACTIVE | Noted: 2017-01-17

## 2018-10-02 PROBLEM — Z79.4 TYPE 2 DIABETES MELLITUS WITH DIABETIC NEUROPATHY, WITH LONG-TERM CURRENT USE OF INSULIN (HCC): Status: ACTIVE | Noted: 2017-01-17

## 2018-10-02 PROBLEM — E11.49 TYPE 2 DIABETES MELLITUS WITH NEUROLOGIC COMPLICATION (HCC): Status: ACTIVE | Noted: 2017-01-17

## 2018-10-02 PROBLEM — R55 NEAR SYNCOPE: Status: RESOLVED | Noted: 2017-01-17 | Resolved: 2018-10-02

## 2018-10-02 LAB
ALBUMIN SERPL BCP-MCNC: 3.7 G/DL (ref 3.5–5.7)
ALP SERPL-CCNC: 70 U/L (ref 40–150)
ALT SERPL W P-5'-P-CCNC: 27 U/L (ref 7–52)
ANION GAP SERPL CALCULATED.3IONS-SCNC: 12 MMOL/L (ref 4–13)
APTT PPP: 24 SECONDS (ref 24–36)
AST SERPL W P-5'-P-CCNC: 37 U/L (ref 13–39)
ATRIAL RATE: 85 BPM
BASOPHILS # BLD AUTO: 0.1 THOUSANDS/ΜL (ref 0–0.1)
BASOPHILS NFR BLD AUTO: 1 % (ref 0–1)
BILIRUB SERPL-MCNC: 0.5 MG/DL (ref 0.2–1)
BNP SERPL-MCNC: 30 PG/ML (ref 1–100)
BUN SERPL-MCNC: 18 MG/DL (ref 7–25)
CALCIUM SERPL-MCNC: 8.8 MG/DL (ref 8.6–10.5)
CHLORIDE SERPL-SCNC: 101 MMOL/L (ref 98–107)
CO2 SERPL-SCNC: 23 MMOL/L (ref 21–31)
CREAT SERPL-MCNC: 0.75 MG/DL (ref 0.6–1.2)
DEPRECATED D DIMER PPP: 246 NG/ML (FEU)
EOSINOPHIL # BLD AUTO: 0.3 THOUSAND/ΜL (ref 0–0.61)
EOSINOPHIL NFR BLD AUTO: 3 % (ref 0–6)
ERYTHROCYTE [DISTWIDTH] IN BLOOD BY AUTOMATED COUNT: 13.9 % (ref 11.6–15.1)
GFR SERPL CREATININE-BSD FRML MDRD: 88 ML/MIN/1.73SQ M
GLUCOSE SERPL-MCNC: 102 MG/DL (ref 65–140)
GLUCOSE SERPL-MCNC: 213 MG/DL (ref 65–140)
HCT VFR BLD AUTO: 40.6 % (ref 37–47)
HGB BLD-MCNC: 13.5 G/DL (ref 11.5–15.4)
INR PPP: 0.96 (ref 0.9–1.5)
LYMPHOCYTES # BLD AUTO: 2.5 THOUSANDS/ΜL (ref 0.6–4.47)
LYMPHOCYTES NFR BLD AUTO: 28 % (ref 14–44)
MCH RBC QN AUTO: 28.9 PG (ref 26.8–34.3)
MCHC RBC AUTO-ENTMCNC: 33.4 G/DL (ref 31.4–37.4)
MCV RBC AUTO: 87 FL (ref 82–98)
MONOCYTES # BLD AUTO: 0.5 THOUSAND/ΜL (ref 0.17–1.22)
MONOCYTES NFR BLD AUTO: 6 % (ref 4–12)
NEUTROPHILS # BLD AUTO: 5.7 THOUSANDS/ΜL (ref 1.85–7.62)
NEUTS SEG NFR BLD AUTO: 63 % (ref 43–75)
NRBC BLD AUTO-RTO: 0 /100 WBCS
P AXIS: 2 DEGREES
PLATELET # BLD AUTO: 215 THOUSANDS/UL (ref 149–390)
PMV BLD AUTO: 8.4 FL (ref 8.9–12.7)
POTASSIUM SERPL-SCNC: 4.3 MMOL/L (ref 3.5–5.5)
PR INTERVAL: 160 MS
PROT SERPL-MCNC: 6.2 G/DL (ref 6.4–8.9)
PROTHROMBIN TIME: 11 SECONDS (ref 10.1–12.9)
QRS AXIS: 103 DEGREES
QRSD INTERVAL: 92 MS
QT INTERVAL: 384 MS
QTC INTERVAL: 456 MS
RBC # BLD AUTO: 4.69 MILLION/UL (ref 3.81–5.12)
SODIUM SERPL-SCNC: 136 MMOL/L (ref 134–143)
T WAVE AXIS: 52 DEGREES
TROPONIN I SERPL-MCNC: <0.03 NG/ML
TROPONIN I SERPL-MCNC: <0.03 NG/ML
VENTRICULAR RATE: 85 BPM
WBC # BLD AUTO: 9.1 THOUSAND/UL (ref 4.31–10.16)

## 2018-10-02 PROCEDURE — 71046 X-RAY EXAM CHEST 2 VIEWS: CPT

## 2018-10-02 PROCEDURE — 85730 THROMBOPLASTIN TIME PARTIAL: CPT

## 2018-10-02 PROCEDURE — 96375 TX/PRO/DX INJ NEW DRUG ADDON: CPT

## 2018-10-02 PROCEDURE — 84484 ASSAY OF TROPONIN QUANT: CPT | Performed by: EMERGENCY MEDICINE

## 2018-10-02 PROCEDURE — 85610 PROTHROMBIN TIME: CPT

## 2018-10-02 PROCEDURE — 85379 FIBRIN DEGRADATION QUANT: CPT | Performed by: EMERGENCY MEDICINE

## 2018-10-02 PROCEDURE — 85025 COMPLETE CBC W/AUTO DIFF WBC: CPT

## 2018-10-02 PROCEDURE — 99220 PR INITIAL OBSERVATION CARE/DAY 70 MINUTES: CPT | Performed by: PHYSICIAN ASSISTANT

## 2018-10-02 PROCEDURE — 84484 ASSAY OF TROPONIN QUANT: CPT

## 2018-10-02 PROCEDURE — 80053 COMPREHEN METABOLIC PANEL: CPT

## 2018-10-02 PROCEDURE — 93005 ELECTROCARDIOGRAM TRACING: CPT

## 2018-10-02 PROCEDURE — 96374 THER/PROPH/DIAG INJ IV PUSH: CPT

## 2018-10-02 PROCEDURE — 83880 ASSAY OF NATRIURETIC PEPTIDE: CPT

## 2018-10-02 PROCEDURE — 99285 EMERGENCY DEPT VISIT HI MDM: CPT

## 2018-10-02 PROCEDURE — 82948 REAGENT STRIP/BLOOD GLUCOSE: CPT

## 2018-10-02 PROCEDURE — 36415 COLL VENOUS BLD VENIPUNCTURE: CPT

## 2018-10-02 PROCEDURE — 93010 ELECTROCARDIOGRAM REPORT: CPT | Performed by: INTERNAL MEDICINE

## 2018-10-02 RX ORDER — OXYCODONE HYDROCHLORIDE 5 MG/1
5 TABLET ORAL EVERY 4 HOURS PRN
Status: DISCONTINUED | OUTPATIENT
Start: 2018-10-02 | End: 2018-10-03 | Stop reason: HOSPADM

## 2018-10-02 RX ORDER — INSULIN ASPART 100 [IU]/ML
60 INJECTION, SUSPENSION SUBCUTANEOUS
Status: DISCONTINUED | OUTPATIENT
Start: 2018-10-03 | End: 2018-10-03 | Stop reason: HOSPADM

## 2018-10-02 RX ORDER — NITROGLYCERIN 0.4 MG/1
0.4 TABLET SUBLINGUAL ONCE
Status: COMPLETED | OUTPATIENT
Start: 2018-10-02 | End: 2018-10-02

## 2018-10-02 RX ORDER — ASPIRIN 81 MG/1
81 TABLET, CHEWABLE ORAL DAILY
Status: DISCONTINUED | OUTPATIENT
Start: 2018-10-03 | End: 2018-10-03 | Stop reason: HOSPADM

## 2018-10-02 RX ORDER — HYDROMORPHONE HCL/PF 1 MG/ML
1 SYRINGE (ML) INJECTION EVERY 4 HOURS PRN
Status: DISCONTINUED | OUTPATIENT
Start: 2018-10-02 | End: 2018-10-03 | Stop reason: HOSPADM

## 2018-10-02 RX ORDER — GABAPENTIN 300 MG/1
300 CAPSULE ORAL 4 TIMES DAILY
Status: DISCONTINUED | OUTPATIENT
Start: 2018-10-02 | End: 2018-10-03 | Stop reason: HOSPADM

## 2018-10-02 RX ORDER — ATORVASTATIN CALCIUM 20 MG/1
20 TABLET, FILM COATED ORAL
Status: DISCONTINUED | OUTPATIENT
Start: 2018-10-03 | End: 2018-10-03 | Stop reason: HOSPADM

## 2018-10-02 RX ORDER — INSULIN ASPART 100 [IU]/ML
60 INJECTION, SUSPENSION SUBCUTANEOUS
Status: DISCONTINUED | OUTPATIENT
Start: 2018-10-02 | End: 2018-10-02

## 2018-10-02 RX ORDER — ONDANSETRON 2 MG/ML
4 INJECTION INTRAMUSCULAR; INTRAVENOUS EVERY 6 HOURS PRN
Status: DISCONTINUED | OUTPATIENT
Start: 2018-10-02 | End: 2018-10-03 | Stop reason: HOSPADM

## 2018-10-02 RX ORDER — KETOROLAC TROMETHAMINE 30 MG/ML
30 INJECTION, SOLUTION INTRAMUSCULAR; INTRAVENOUS ONCE
Status: COMPLETED | OUTPATIENT
Start: 2018-10-02 | End: 2018-10-02

## 2018-10-02 RX ORDER — HYDROMORPHONE HCL/PF 1 MG/ML
1 SYRINGE (ML) INJECTION ONCE
Status: COMPLETED | OUTPATIENT
Start: 2018-10-02 | End: 2018-10-02

## 2018-10-02 RX ORDER — TIZANIDINE 4 MG/1
4 TABLET ORAL 3 TIMES DAILY
Status: DISCONTINUED | OUTPATIENT
Start: 2018-10-02 | End: 2018-10-03 | Stop reason: HOSPADM

## 2018-10-02 RX ORDER — ACETAMINOPHEN 325 MG/1
650 TABLET ORAL EVERY 4 HOURS PRN
Status: DISCONTINUED | OUTPATIENT
Start: 2018-10-02 | End: 2018-10-03 | Stop reason: HOSPADM

## 2018-10-02 RX ADMIN — HYDROMORPHONE HYDROCHLORIDE 1 MG: 1 INJECTION, SOLUTION INTRAMUSCULAR; INTRAVENOUS; SUBCUTANEOUS at 20:31

## 2018-10-02 RX ADMIN — KETOROLAC TROMETHAMINE 30 MG: 30 INJECTION, SOLUTION INTRAMUSCULAR at 17:23

## 2018-10-02 RX ADMIN — GABAPENTIN 300 MG: 300 CAPSULE ORAL at 22:42

## 2018-10-02 RX ADMIN — NITROGLYCERIN 0.4 MG: 0.4 TABLET SUBLINGUAL at 16:29

## 2018-10-02 RX ADMIN — TIZANIDINE 4 MG: 4 TABLET ORAL at 22:43

## 2018-10-02 NOTE — EMTALA/ACUTE CARE TRANSFER
[address]  [address] [place] [id number] [phone]  Dept: [phone]      UVK[facility] TRANSFER CONSENT    NAME Messi LEON 1960                              MRN [id number]    I have been informed of my rights regarding examination, treatment, and transfer   by Dr Gabe Marin MD    Benefits: Specialized equipment and/or services available at the receiving facility (Include comment)________________________    Risks: Potential for delay in receiving treatment      Transfer Request   I acknowledge that my medical condition has been evaluated and explained to me by the emergency department physician or other qualified medical person and/or my attending physician who has recommended and offered to me further medical examination and treatment  I understand the Hospital's obligation with respect to the treatment and stabilization of my emergency medical condition  I nevertheless request to be transferred  I release the Hospital, the doctor, and any other persons caring for me from all responsibility or liability for any injury or ill effects that may result from my transfer and agree to accept all responsibility for the consequences of my choice to transfer, rather than receive stabilizing treatment at the Hospital  I understand that because the transfer is my request, my insurance may not provide reimbursement for the services  The Hospital will assist and direct me and my family in how to make arrangements for transfer, but the hospital is not liable for any fees charged by the transport service  In spite of this understanding, I refuse to consent to further medical examination and treatment which has been offered to me, and request transfer to  [address] : [facility]   I authorize the performance of emergency medical procedures and treatments upon me in both transit and upon arrival at the receiving facility  Additionally, I authorize the release of any and all medical records to the receiving facility and request they be transported with me, if possible  I authorize the performance of emergency medical procedures and treatments upon me in both transit and upon arrival at the receiving facility  Additionally, I authorize the release of any and all medical records to the receiving facility and request they be transported with me, if possible  I understand that the safest mode of transportation during a medical emergency is an ambulance and that the Hospital advocates the use of this mode of transport  Risks of traveling to the receiving facility by car, including absence of medical control, life sustaining equipment, such as oxygen, and medical personnel has been explained to me and I fully understand them  (ALBERTO CORRECT BOX BELOW)  [  ]  I consent to the stated transfer and to be transported by ambulance/helicopter  [  ]  I consent to the stated transfer, but refuse transportation by ambulance and accept full responsibility for my transportation by car  I understand the risks of non-ambulance transfers and I exonerate the Hospital and its staff from any deterioration in my condition that results from this refusal     X___________________________________________    DATE  10/02/18  TIME________  Signature of patient or legally responsible individual signing on patient behalf           RELATIONSHIP TO PATIENT_________________________          Provider Certification    NAME Shirin Lerma                                         1960                              MRN [id number]    A medical screening exam was performed on the above named patient  Based on the examination:    Condition Necessitating Transfer The encounter diagnosis was Chest pain      Patient Condition: The patient has been stabilized such that within reasonable medical probability, no material deterioration of the patient condition or the condition of the unborn child(marli) is likely to result from the transfer    Reason for Transfer: Level of Care needed not available at this facility    Transfer Requirements: Facility [facility]   · Space available and qualified personnel available for treatment as acknowledged by    · Agreed to accept transfer and to provide appropriate medical treatment as acknowledged by       Axel Caldwell  · Appropriate medical records of the examination and treatment of the patient are provided at the time of transfer   [address], [address] _______  · Transfer will be performed by qualified personnel from    and appropriate transfer equipment as required, including the use of necessary and appropriate life support measures  Provider Certification: I have examined the patient and explained the following risks and benefits of being transferred/refusing transfer to the patient/family:  General risk, such as traffic hazards, adverse weather conditions, rough terrain or turbulence, possible failure of equipment (including vehicle or aircraft), or consequences of actions of persons outside the control of the transport personnel      Based on these reasonable risks and benefits to the patient and/or the unborn child(marli), and based upon the information available at the time of the patients examination, I certify that the medical benefits reasonably to be expected from the provision of appropriate medical treatments at another medical facility outweigh the increasing risks, if any, to the individuals medical condition, and in the case of labor to the unborn child, from effecting the transfer      X____________________________________________ DATE 10/02/18        TIME_______      ORIGINAL - SEND TO MEDICAL RECORDS   COPY - SEND WITH PATIENT DURING TRANSFER

## 2018-10-02 NOTE — ED PROVIDER NOTES
History  Chief Complaint   Patient presents with    Chest Pain     Patient is a 61-year-old female who has extensive medical history including uncontrolled diabetes hypertension and pulmonary embolism in the past who said that she developed substernal chest pain last night  She says it is sharp and it radiates to her left scapula  It is worse when she walks around  She says she was diaphoretic but not dyspneic  She has chronic swelling in her legs but there is no been no change in addition patient says that she feels like her toes may becoming infected  She has not have fever chills  She has had no nausea or vomiting  Her only new anticoagulant is aspirin  Patient says she has never had chest pain like this in the past            Prior to Admission Medications   Prescriptions Last Dose Informant Patient Reported? Taking?    B-D ULTRAFINE III SHORT PEN 31G X 8 MM MISC   Yes No   aspirin 81 mg chewable tablet   No No   Sig: Chew 1 tablet (81 mg total) daily   gabapentin (NEURONTIN) 300 mg capsule   No No   Sig: Take 1 capsule (300 mg total) by mouth 4 (four) times a day   insulin NPH-insulin regular (NovoLIN 70/30) 100 units/mL subcutaneous injection   No No   Sig: Inject 60 Units under the skin 2 (two) times a day before meals   Patient taking differently: Inject 60 Units under the skin 2 (two) times a day before meals     metFORMIN (GLUCOPHAGE) 1000 MG tablet   No No   Sig: Take 1 tablet (1,000 mg total) by mouth 2 (two) times a day with meals   simvastatin (ZOCOR) 20 mg tablet   No No   Sig: Take 1 tablet (20 mg total) by mouth daily at bedtime   tiZANidine (ZANAFLEX) 4 mg tablet   No No   Sig: Take 1 tablet (4 mg total) by mouth 3 (three) times a day      Facility-Administered Medications: None       Past Medical History:   Diagnosis Date    Asthma     Chronic pain     Diabetes mellitus (HCC)     GERD (gastroesophageal reflux disease)     Hypertension     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     Neuropathy     PCOS (polycystic ovarian syndrome)        Past Surgical History:   Procedure Laterality Date    CERVICAL CONIZATION   W/ LASER      DILATION AND CURETTAGE OF UTERUS      ENDOMETRIAL ABLATION      ENDOMETRIAL BIOPSY      INDUCED       JOINT REPLACEMENT      TONSILLECTOMY         Family History   Problem Relation Age of Onset    Polycystic ovary syndrome Mother     Mental illness Father     Diabetes Father      I have reviewed and agree with the history as documented  Social History   Substance Use Topics    Smoking status: Never Smoker    Smokeless tobacco: Never Used    Alcohol use No        Review of Systems   Constitutional: Negative for chills, fatigue, fever and unexpected weight change  HENT: Negative for congestion and nosebleeds  Eyes: Negative for visual disturbance  Respiratory: Negative for chest tightness and shortness of breath  Cardiovascular: Negative for chest pain, palpitations and leg swelling  Gastrointestinal: Negative for abdominal pain, blood in stool, diarrhea, nausea and vomiting  Endocrine: Negative for cold intolerance and heat intolerance  Genitourinary: Negative for difficulty urinating  Musculoskeletal: Negative for arthralgias, back pain, gait problem, joint swelling and myalgias  Skin: Negative for rash  Neurological: Negative for dizziness, speech difficulty, weakness and headaches  Psychiatric/Behavioral: Negative for behavioral problems, confusion, self-injury and suicidal ideas  All other systems reviewed and are negative  Physical Exam  Physical Exam   Constitutional: She is oriented to person, place, and time  She appears well-developed and well-nourished  HENT:   Head: Normocephalic and atraumatic  Nose: Nose normal    Eyes: Pupils are equal, round, and reactive to light  EOM are normal    Neck: Normal range of motion  Neck supple  Cardiovascular: Normal rate, regular rhythm and normal heart sounds  Exam reveals no gallop and no friction rub  No murmur heard  Pulmonary/Chest: Effort normal and breath sounds normal  No respiratory distress  She has no wheezes  She has no rales  Abdominal: Soft  She exhibits no distension  There is no tenderness  There is no rebound and no guarding  Musculoskeletal: Normal range of motion  She exhibits edema (2+)  Discolored toes without any open skin   Neurological: She is alert and oriented to person, place, and time  Skin: Skin is warm and dry  Psychiatric: She has a normal mood and affect  Her behavior is normal  Judgment and thought content normal    Nursing note and vitals reviewed        Vital Signs  ED Triage Vitals [10/02/18 1447]   Temperature Pulse Respirations Blood Pressure SpO2   98 6 °F (37 °C) 81 18 136/87 98 %      Temp Source Heart Rate Source Patient Position - Orthostatic VS BP Location FiO2 (%)   Tympanic Monitor Lying Left arm --      Pain Score       8           Vitals:    10/02/18 1637 10/02/18 1640 10/02/18 1856 10/02/18 2047   BP: 96/54 (!) 100/44 114/89 116/53   Pulse: 78 78 88 79   Patient Position - Orthostatic VS: Lying Lying  Lying       Visual Acuity      ED Medications  Medications   nitroglycerin (NITROSTAT) SL tablet 0 4 mg (0 4 mg Sublingual Given 10/2/18 1629)   ketorolac (TORADOL) injection 30 mg (30 mg Intravenous Given 10/2/18 1723)   HYDROmorphone (DILAUDID) injection 1 mg (1 mg Intravenous Given 10/2/18 2031)       Diagnostic Studies  Results Reviewed     Procedure Component Value Units Date/Time    Troponin I [[id number]]  (Normal) Collected:  10/02/18 1802    Lab Status:  Final result Specimen:  Blood from Arm, Right Updated:  10/02/18 1850     Troponin I <0 03 ng/mL     Fingerstick Glucose (POCT) [[id number]]  (Normal) Collected:  10/02/18 1845    Lab Status:  Final result Updated:  10/02/18 1848     POC Glucose 102 mg/dl     D-dimer, quantitative [[id number]]  (Abnormal) Collected:  10/02/18 1530    Lab Status:  Final result Specimen:  Blood from Arm, Left Updated:  10/02/18 1614     D-Dimer, Quant 246 (H) ng/ml (FEU)     B-Type Natriuretic Peptide [facility] ONLY) [[id number]]  (Normal) Collected:  10/02/18 1509    Lab Status:  Final result Specimen:  Blood from Arm, Right Updated:  10/02/18 1606     BNP 30 pg/mL     Protime-INR [[id number]]  (Normal) Collected:  10/02/18 1509    Lab Status:  Final result Specimen:  Blood from Arm, Right Updated:  10/02/18 1558     Protime 11 0 seconds      INR 0 96    APTT [[id number]]  (Normal) Collected:  10/02/18 1509    Lab Status:  Final result Specimen:  Blood from Arm, Right Updated:  10/02/18 1558     PTT 24 seconds     Troponin I [[id number]]  (Normal) Collected:  10/02/18 1509    Lab Status:  Final result Specimen:  Blood from Arm, Right Updated:  10/02/18 1547     Troponin I <0 03 ng/mL     Comprehensive metabolic panel [[id number]]  (Abnormal) Collected:  10/02/18 1509    Lab Status:  Final result Specimen:  Blood from Arm, Right Updated:  10/02/18 1546     Sodium 136 mmol/L      Potassium 4 3 mmol/L      Chloride 101 mmol/L      CO2 23 mmol/L      ANION GAP 12 mmol/L      BUN 18 mg/dL      Creatinine 0 75 mg/dL      Glucose 213 (H) mg/dL      Calcium 8 8 mg/dL      AST 37 U/L      ALT 27 U/L      Alkaline Phosphatase 70 U/L      Total Protein 6 2 (L) g/dL      Albumin 3 7 g/dL      Total Bilirubin 0 50 mg/dL      eGFR 88 ml/min/1 73sq m     Narrative:         National Kidney Disease Education Program recommendations are as follows:  GFR calculation is accurate only with a steady state creatinine  Chronic Kidney disease less than 60 ml/min/1 73 sq  meters  Kidney failure less than 15 ml/min/1 73 sq  meters      CBC and differential [[id number]]  (Abnormal) Collected:  10/02/18 1509    Lab Status:  Final result Specimen:  Blood from Arm, Right Updated:  10/02/18 1522     WBC 9 10 Thousand/uL      RBC 4 69 Million/uL      Hemoglobin 13 5 g/dL      Hematocrit 40 6 %      MCV 87 fL      MCH 28 9 pg      MCHC 33 4 g/dL      RDW 13 9 %      MPV 8 4 (L) fL      Platelets 547 Thousands/uL      nRBC 0 /100 WBCs      Neutrophils Relative 63 %      Lymphocytes Relative 28 %      Monocytes Relative 6 %      Eosinophils Relative 3 %      Basophils Relative 1 %      Neutrophils Absolute 5 70 Thousands/µL      Lymphocytes Absolute 2 50 Thousands/µL      Monocytes Absolute 0 50 Thousand/µL      Eosinophils Absolute 0 30 Thousand/µL      Basophils Absolute 0 10 Thousands/µL                  X-ray chest 2 views   Final Result by Emmanuel Ortiz MD (10/02 1600)   Mild hypoventilatory change  Otherwise no acute findings             Signed by Emmanuel Ortiz MD                 Procedures  Procedures       Phone Contacts  ED Phone Contact    ED Course  ED Course as of Oct 03 0155   Tue Oct 02, 2018   1522 Patient is unable to get the contrast study because of her IV dye allergy          HEART Risk Score      Most Recent Value   History  1 Filed at: 10/02/2018 1631   ECG  1 Filed at: 10/02/2018 1631   Age  1 Filed at: 10/02/2018 1631   Risk Factors  2 Filed at: 10/02/2018 1631   Troponin  0 Filed at: 10/02/2018 1631   Heart Score Risk Calculator   History  1 Filed at: 10/02/2018 1631   ECG  1 Filed at: 10/02/2018 1631   Age  1 Filed at: 10/02/2018 1631   Risk Factors  2 Filed at: 10/02/2018 1631   Troponin  0 Filed at: 10/02/2018 1631   HEART Score  5 Filed at: 10/02/2018 1631   HEART Score  5 Filed at: 10/02/2018 1631                            MDM  CritCare Time    Disposition  Final diagnoses:   Chest pain     Time reflects when diagnosis was documented in both MDM as applicable and the Disposition within this note     Time User Action Codes Description Comment    10/2/2018  4:56 PM German Masters [R07 9] Chest pain       ED Disposition     ED Disposition Condition Comment    Transfer to Another [place] [id number] should be transferred out to [facility]      MD Documentation      Most Recent Value   Patient Condition  The patient has been stabilized such that within reasonable medical probability, no material deterioration of the patient condition or the condition of the unborn child(marli) is likely to result from the transfer   Reason for Transfer  Level of Care needed not available at this facility   Benefits of Transfer  Specialized equipment and/or services available at the receiving facility (Include comment)________________________   Risks of Transfer  Potential for delay in receiving treatment   Accepting Physician  Casandra [address] Name, [address]   Sending MD  [facility]   Provider Certification  General risk, such as traffic hazards, adverse weather conditions, rough terrain or turbulence, possible failure of equipment (including vehicle or aircraft), or consequences of actions of persons outside the control of the transport personnel      RN Documentation      [address] Name, [address]   Level of Care  Advanced life support      Follow-up Information    None         Discharge Medication List as of 10/2/2018  8:50 PM      CONTINUE these medications which have NOT CHANGED    Details   aspirin 81 mg chewable tablet Chew 1 tablet (81 mg total) daily, Starting Tue 9/4/2018, Print      gabapentin (NEURONTIN) 300 mg capsule Take 1 capsule (300 mg total) by mouth 4 (four) times a day, Starting Tue 9/4/2018, Print      insulin NPH-insulin regular (NovoLIN 70/30) 100 units/mL subcutaneous injection Inject 60 Units under the skin 2 (two) times a day before meals, Starting Tue 9/4/2018, Print      metFORMIN (GLUCOPHAGE) 1000 MG tablet Take 1 tablet (1,000 mg total) by mouth 2 (two) times a day with meals, Starting Tue 9/4/2018, Print      !! simvastatin (ZOCOR) 20 mg tablet Take 1 tablet (20 mg total) by mouth daily at bedtime, Starting Tue 9/4/2018, Print      tiZANidine (ZANAFLEX) 4 mg tablet Take 1 tablet (4 mg total) by mouth 3 (three) times a day, Starting Tue 9/4/2018, Print      B-D ULTRAFINE III SHORT PEN 31G X 8 MM MISC Starting Sat 12/30/2017, Historical Med      citalopram (CeleXA) 10 mg tablet Take 1 tablet (10 mg total) by mouth daily, Starting Tue 9/4/2018, Print      nystatin (MYCOSTATIN) cream Apply topically 2 (two) times a day, Starting Wed 9/19/2018, Normal      !! simvastatin (ZOCOR) 20 mg tablet Take 20 mg by mouth daily at bedtime, Historical Med       !! - Potential duplicate medications found  Please discuss with provider  No discharge procedures on file      ED Provider  Electronically Signed by           Jack Duncan MD  10/02/18 Claudette Ayon MD  10/03/18 3598

## 2018-10-02 NOTE — PROGRESS NOTES
Called pt to reschedule   diabetic ed appt today 10/2/18 phone not in service   CM heard back from [employer]  They have application for financial assistance to pay for part of glasses if qualify  They do dont pay for exam

## 2018-10-02 NOTE — TELEPHONE ENCOUNTER
Dexter Mello came into the office today to and she was here to discuss diabetic supplies  Let her know that sofía was at a meeting today that she couldn't reschedule and that she would be able to meet when she comes into the office for the next visit  She then was asking about her foot, and I let her know that she was given a referral to podiatry at the visit and that she should be talking to them about the financial assistance information  Dexter Mello also stated that sometimes since this weekend, she is getting chest pain that will radiate into her neck/shoulder area  Denied pain at the present time when she was in the office  Did let her know that if she is getting that pain, she needs to go to the ER for them to take a look at her, that in the office, we would have them go  Dexter Mello verbalized understanding  Dexter Mello then stated that she is having pain after working, and wondering if she can have a script for a muscle relaxer  Pt has gone to the ER since being in the office  Called and spoke with daughter Jil Correa and she stated that after they left Dexter Mello was having chest pains, and feeling light headed so they went to [facility] ED  She did state that [facility] called about the financial assistance information  Provided her the number to podiatry that she was referred to

## 2018-10-02 NOTE — ED NOTES
Introduced myself to pt, pt eating a salad brought in by her daughter       Jason Ivan, CARL  10/02/18 1946

## 2018-10-02 NOTE — ED NOTES
ED physician aware of blood sugar and does not want insulin administered        Deni Chan RN  10/02/18 1138

## 2018-10-03 ENCOUNTER — APPOINTMENT (OUTPATIENT)
Dept: NUCLEAR MEDICINE | Facility: HOSPITAL | Age: 58
End: 2018-10-03
Payer: COMMERCIAL

## 2018-10-03 VITALS
DIASTOLIC BLOOD PRESSURE: 70 MMHG | RESPIRATION RATE: 16 BRPM | TEMPERATURE: 98.3 F | SYSTOLIC BLOOD PRESSURE: 122 MMHG | HEIGHT: 63 IN | OXYGEN SATURATION: 93 % | HEART RATE: 68 BPM | BODY MASS INDEX: 46.07 KG/M2 | WEIGHT: 260 LBS

## 2018-10-03 DIAGNOSIS — F33.41 RECURRENT MAJOR DEPRESSIVE DISORDER, IN PARTIAL REMISSION (HCC): ICD-10-CM

## 2018-10-03 DIAGNOSIS — E11.65 TYPE 2 DIABETES MELLITUS WITH HYPERGLYCEMIA, UNSPECIFIED WHETHER LONG TERM INSULIN USE (HCC): ICD-10-CM

## 2018-10-03 DIAGNOSIS — M62.838 MUSCLE SPASM: Primary | ICD-10-CM

## 2018-10-03 DIAGNOSIS — Z79.4 UNCONTROLLED TYPE 2 DIABETES MELLITUS WITH HYPERGLYCEMIA, WITH LONG-TERM CURRENT USE OF INSULIN (HCC): ICD-10-CM

## 2018-10-03 DIAGNOSIS — E11.65 UNCONTROLLED TYPE 2 DIABETES MELLITUS WITH HYPERGLYCEMIA, WITH LONG-TERM CURRENT USE OF INSULIN (HCC): ICD-10-CM

## 2018-10-03 LAB
25(OH)D3 SERPL-MCNC: 20.1 NG/ML (ref 30–100)
ANION GAP SERPL CALCULATED.3IONS-SCNC: 5 MMOL/L (ref 4–13)
BUN SERPL-MCNC: 22 MG/DL (ref 7–25)
CALCIUM SERPL-MCNC: 8.5 MG/DL (ref 8.6–10.5)
CHLORIDE SERPL-SCNC: 105 MMOL/L (ref 98–107)
CO2 SERPL-SCNC: 28 MMOL/L (ref 21–31)
CREAT SERPL-MCNC: 0.87 MG/DL (ref 0.6–1.2)
ERYTHROCYTE [DISTWIDTH] IN BLOOD BY AUTOMATED COUNT: 13.8 % (ref 11.5–14.5)
GFR SERPL CREATININE-BSD FRML MDRD: 73 ML/MIN/1.73SQ M
GLUCOSE SERPL-MCNC: 174 MG/DL (ref 65–140)
GLUCOSE SERPL-MCNC: 210 MG/DL (ref 65–140)
GLUCOSE SERPL-MCNC: 214 MG/DL (ref 65–99)
GLUCOSE SERPL-MCNC: 259 MG/DL (ref 65–140)
HCT VFR BLD AUTO: 37.3 % (ref 34.8–46.1)
HGB BLD-MCNC: 12.5 G/DL (ref 12–16)
MCH RBC QN AUTO: 29.3 PG (ref 26–34)
MCHC RBC AUTO-ENTMCNC: 33.4 G/DL (ref 31–37)
MCV RBC AUTO: 88 FL (ref 81–99)
PLATELET # BLD AUTO: 211 THOUSANDS/UL (ref 149–390)
PMV BLD AUTO: 8.4 FL (ref 8.6–11.7)
POTASSIUM SERPL-SCNC: 4 MMOL/L (ref 3.5–5.5)
RBC # BLD AUTO: 4.26 MILLION/UL (ref 3.9–5.2)
SODIUM SERPL-SCNC: 138 MMOL/L (ref 134–143)
TROPONIN I SERPL-MCNC: <0.03 NG/ML
TSH SERPL DL<=0.05 MIU/L-ACNC: 6.93 UIU/ML (ref 0.45–5.33)
WBC # BLD AUTO: 5.9 THOUSAND/UL (ref 4.8–10.8)

## 2018-10-03 PROCEDURE — 80048 BASIC METABOLIC PNL TOTAL CA: CPT | Performed by: PHYSICIAN ASSISTANT

## 2018-10-03 PROCEDURE — 99217 PR OBSERVATION CARE DISCHARGE MANAGEMENT: CPT | Performed by: HOSPITALIST

## 2018-10-03 PROCEDURE — 78582 LUNG VENTILAT&PERFUS IMAGING: CPT

## 2018-10-03 PROCEDURE — 82306 VITAMIN D 25 HYDROXY: CPT | Performed by: PHYSICIAN ASSISTANT

## 2018-10-03 PROCEDURE — 84443 ASSAY THYROID STIM HORMONE: CPT | Performed by: PHYSICIAN ASSISTANT

## 2018-10-03 PROCEDURE — 85027 COMPLETE CBC AUTOMATED: CPT | Performed by: PHYSICIAN ASSISTANT

## 2018-10-03 PROCEDURE — 82948 REAGENT STRIP/BLOOD GLUCOSE: CPT

## 2018-10-03 PROCEDURE — 84484 ASSAY OF TROPONIN QUANT: CPT | Performed by: PHYSICIAN ASSISTANT

## 2018-10-03 PROCEDURE — A9539 TC99M PENTETATE: HCPCS

## 2018-10-03 PROCEDURE — A9540 TC99M MAA: HCPCS

## 2018-10-03 PROCEDURE — 87081 CULTURE SCREEN ONLY: CPT | Performed by: HOSPITALIST

## 2018-10-03 RX ORDER — OXYCODONE HYDROCHLORIDE 5 MG/1
5 TABLET ORAL EVERY 4 HOURS PRN
Qty: 30 TABLET | Refills: 0 | Status: SHIPPED | OUTPATIENT
Start: 2018-10-03 | End: 2018-10-13

## 2018-10-03 RX ADMIN — OXYCODONE HYDROCHLORIDE 5 MG: 5 TABLET ORAL at 15:53

## 2018-10-03 RX ADMIN — GABAPENTIN 300 MG: 300 CAPSULE ORAL at 11:56

## 2018-10-03 RX ADMIN — ENOXAPARIN SODIUM 40 MG: 100 INJECTION SUBCUTANEOUS at 08:32

## 2018-10-03 RX ADMIN — INSULIN ASPART 60 UNITS: 100 INJECTION, SUSPENSION SUBCUTANEOUS at 16:54

## 2018-10-03 RX ADMIN — GABAPENTIN 300 MG: 300 CAPSULE ORAL at 08:32

## 2018-10-03 RX ADMIN — ATORVASTATIN CALCIUM 20 MG: 20 TABLET, FILM COATED ORAL at 16:53

## 2018-10-03 RX ADMIN — INSULIN LISPRO 8 UNITS: 100 INJECTION, SOLUTION INTRAVENOUS; SUBCUTANEOUS at 11:56

## 2018-10-03 RX ADMIN — ASPIRIN 81 MG 81 MG: 81 TABLET ORAL at 08:32

## 2018-10-03 RX ADMIN — INSULIN ASPART 60 UNITS: 100 INJECTION, SUSPENSION SUBCUTANEOUS at 08:32

## 2018-10-03 RX ADMIN — HYDROMORPHONE HYDROCHLORIDE 1 MG: 1 INJECTION, SOLUTION INTRAMUSCULAR; INTRAVENOUS; SUBCUTANEOUS at 13:04

## 2018-10-03 RX ADMIN — TIZANIDINE 4 MG: 4 TABLET ORAL at 08:32

## 2018-10-03 RX ADMIN — HYDROMORPHONE HYDROCHLORIDE 1 MG: 1 INJECTION, SOLUTION INTRAMUSCULAR; INTRAVENOUS; SUBCUTANEOUS at 00:47

## 2018-10-03 RX ADMIN — TIZANIDINE 4 MG: 4 TABLET ORAL at 16:53

## 2018-10-03 RX ADMIN — OXYCODONE HYDROCHLORIDE 5 MG: 5 TABLET ORAL at 08:49

## 2018-10-03 NOTE — SOCIAL WORK
Evaluated the pt at the bedside  Pt states she lives with her spouse and children in a 2 story home  Pt states she  one step in the back of the home  Pt bedroom is on the first floor  Pt indicated she is independent and works part time  Pt states she had switched her insurance and now her medications at [facility] are more affordable  Pt indicated she will have no needs and her family will transport her home upon discharge  CM reviewed d/c planning process including the following: identifying help at home, patient preference for d/c planning needs, availability of treatment team to discuss questions or concerns patient and/or family may have regarding understanding medications and recognizing signs and symptoms once discharged  CM also encouraged patient to follow up with all recommended appointments after discharge  Patient advised of importance for patient and family to participate in managing patients medical well being

## 2018-10-03 NOTE — CASE MANAGEMENT
Initial Clinical Review    Admission: Date/Time/Statement: 10/2/18 @ 2130  Patient presented at [facility] ED on 10/2/18 with chest pain    Transferred by EMS to [facility] for [facility]ation admission  Orders Placed This Encounter   Procedures    Place in Observation     Standing Status:   Standing     Number of Occurrences:   1     Order Specific Question:   Admitting Physician     Answer:   Ziggy Azul [[id number]]     Order Specific Question:   Level of Care     Answer:   Med Surg [[id number]]       Chief Complaint: chest pain    History of Illness:       Jamar Cleveland is a 59 y o  female who presents with left side chest pain  Developed pain last night that radiates left shoulder, chest to shoulder blade  Described as sharp, 8/10, had some relief with dilaudid in ER  Felt very weak, light headed, daughter had to help her lift her legs yesterday, family was concerned and stayed with her  She reports that she could not climb steps because she was so weak  She spoke with per pcp and they told her to come to ER if pain persisted  Patient reports she just does not feel right and is concerned about the pain on left side that is different that all her chronic pain  Patient has history of PE and is only on aspirin  She has report of IV contrast dye allergy, will check VQ scan and Echo  Vital Signs:   Temperature Pulse Respirations Blood Pressure SpO2   10/02/18 2351 10/02/18 2351 10/02/18 2351 10/02/18 2351 10/02/18 2351   98 1 °F (36 7 °C) 70 16 129/68 92 %      Temp Source Heart Rate Source Patient Position - Orthostatic VS BP Location FiO2 (%)   10/02/18 2351 10/02/18 2351 10/02/18 2351 10/02/18 2351 --   Tympanic Monitor Lying Left arm       Pain Score       10/02/18 2246       2        Wt Readings from Last 1 Encounters:   10/03/18 118 kg (260 lb)       Vital Signs (abnormal): O2 sat  91-92% on room air       Abnormal Labs/Diagnostic Test Results:     Troponin = <0 03, <0 03, <0 03    EKG: Normal sinus rhythm, rate 85  Possible Left atrial enlargement  Rightward axis  Septal infarct (cited on or before 02-SEP-2018)  Abnormal ECG  When compared with ECG of 02-SEP-2018 14:04,  Left anterior fascicular block is no longer Present    D-Dimer = 246, Glucose = 213, TSH = 6 930    Chest x-ray: mild hypoventilatory change  Past Medical/Surgical History: Active Ambulatory Problems     Diagnosis Date Noted    Vitamin D deficiency 01/17/2017    Type 2 diabetes mellitus with diabetic neuropathy, with long-term current use of insulin ([facility] ) 01/17/2017    History of Salmonella infection 03/20/2016    Obesity, morbid ([facility] ) 07/15/2015    Melanocytic nevus of trunk 11/19/2015    Menopausal and perimenopausal disorder 07/15/2015    Insomnia 01/17/2017    Essential hypertension 01/17/2017    Hyperlipidemia 03/20/2016    History of pulmonary embolism 03/20/2016    GERD (gastroesophageal reflux disease) 01/17/2017    Gait disturbance 01/17/2017    Diabetic ulcer of left heel ([facility] ) 03/18/2016    Stage 3 chronic kidney disease ([facility] ) 01/23/2017    Benign mole 01/17/2017    Arthritis 01/17/2017    Anxiety 01/17/2017    Anemia 03/18/2016    Achilles tendinitis of left lower extremity 03/20/2016    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     Neuropathy     PCOS (polycystic ovarian syndrome)        Admitting Diagnosis: Chest pain [R07 9]    Age/Sex: 59 y o  female    Assessment/Plan:     Chest pain   Assessment & Plan     · Left sided pain, radiates to shoulder, different from chronic pain    Felt very weak, dizzy     · Monitor troponin and telemetry  · Will check echo, report of swelling in legs  · Hx PE, mildly abnormal D-dimer, will check VQ with report of contrast allergy       Chronic pain syndrome   Assessment & Plan     · chronic pain, joint pain  · Hx of human growth hormone as child      Ulcer of toe of left foot ([facility] )   Assessment & Plan     · Healing ulcer, follow up podiatry as OP for nail care      Arthritis   Assessment & Plan     · Chronic pain      Stage 3 chronic kidney disease ([facility] )   Assessment & Plan     · Creat stable      Hyperlipidemia   Assessment & Plan     · simvastin autosub      Obesity, morbid (HCC)   Assessment & Plan     · BMI>40      Type 2 diabetes mellitus with diabetic neuropathy, with long-term current use of insulin (HCC)   Assessment & Plan             Lab Results   Component Value Date     HGBA1C 11 0 (H) 09/03/2018         Recent Labs      10/02/18   1845   POCGLU  102         Blood Sugar Average: Last 72 hrs:  · metformin 1000 TID  · Novulin 70/30 60 units bid, reports using regularly      Vitamin D deficiency   Assessment & Plan     · Check level         VTE Prophylaxis: Enoxaparin (Lovenox)  Code Status: full code  POLST: POLST is not applicable to this patient     Anticipated Length of Stay:  Patient will be admitted on an Observation basis with an anticipated length of stay of  < 2 midnights  Justification for Hospital Stay: chest pain, monitor telemetry      Admission Orders:  VQ lung scan, continuous cardiac and pulse oximetry monitoring, OOB       Scheduled Meds:   Current Facility-Administered Medications:  acetaminophen 650 mg Oral Q4H PRN   aspirin 81 mg Oral Daily   atorvastatin 20 mg Oral Daily With Dinner   enoxaparin 40 mg Subcutaneous Daily   gabapentin 300 mg Oral 4x Daily   HYDROmorphone 1 mg Intravenous Q4H PRN   insulin aspart protamine-insulin aspart 60 Units Subcutaneous BID AC   insulin lispro 1-6 Units Subcutaneous HS   insulin lispro 4-20 Units Subcutaneous TID AC   ondansetron 4 mg Intravenous Q6H PRN   oxyCODONE 5 mg Oral Q4H PRN   tiZANidine 4 mg Oral TID     ===========================================================  Continued Stay Review    Date: 10/3/18 @ 1617    Vital Signs: /70 (BP Location: Left arm)   Pulse 68   Temp 98 3 °F (36 8 °C) (Tympanic)   Resp 16   Ht 5' 2 5" (1 588 m)   Wt 118 kg (260 lb)   SpO2 93%   BMI 46 80 kg/m²     Medications:   Scheduled Meds:   Current Facility-Administered Medications:  acetaminophen 650 mg Oral Q4H PRN   aspirin 81 mg Oral Daily   atorvastatin 20 mg Oral Daily With Dinner   enoxaparin 40 mg Subcutaneous Daily   gabapentin 300 mg Oral 4x Daily   HYDROmorphone 1 mg Intravenous Q4H PRN   insulin aspart protamine-insulin aspart 60 Units Subcutaneous BID AC   insulin lispro 1-6 Units Subcutaneous HS   insulin lispro 4-20 Units Subcutaneous TID AC   ondansetron 4 mg Intravenous Q6H PRN   oxyCODONE 5 mg Oral Q4H PRN   tiZANidine 4 mg Oral TID     Abnormal Labs/Diagnostic Results:     VQ lung scan: normal study    Age/Sex: 59 y o  female     Assessment/Plan:     * Chest pain   Assessment & Plan     · Completely resolved  · ACS has been ruled out  · EKG is nondiagnostic  · Most likely musculoskeletal etiology since the patient has a significant reproducibility component to chest pain  · Her underlying chronic pain syndrome is also a contributing factor  · Okay for discharge  · Outpatient follow-up with her PCP      Chronic pain syndrome   Assessment & Plan     · chronic pain, joint pain  · Hx of human growth hormone as child  · Recommended that the patient establish care with a pain management physician upon release from the hospital      Type 2 diabetes mellitus with diabetic neuropathy, with long-term current use of insulin ([facility])   Assessment & Plan     · Blood sugar stable  · DC home on pre admit meds at the preadmission dosages      Vitamin D deficiency   Assessment & Plan     · Continue supplementation      Obesity, morbid ([facility] )   Assessment & Plan     · BMI>40  · Weight loss counseling provided      Hyperlipidemia   Assessment & Plan     · DC home on pre admit meds at preadmission dosages      Stage 3 chronic kidney disease ([facility] )   Assessment & Plan     · Creat stable  · Follow up outpatient with her primary care physician      Arthritis   Assessment & Plan     · Stable         Discharge Plan: Home    Ordered      10/03/18 1233  Discharge patient Once     Discharge Disposition: Home/Self Care    Expected Discharge Time: Afternoon    Expected Discharge Date: 10/03/18        10/03/18 1234               Thank you,  [address]  Utilization Review Department  Phone: [phone]; Fax [phone]  ATTENTION: Please call with any questions or concerns to [phone]  and carefully follow the prompts so that you are directed to the right person  Send all requests for admission clinical reviews, approved or denied determinations and any other requests to fax [phone]   All voicemails are confidential

## 2018-10-03 NOTE — ASSESSMENT & PLAN NOTE
Lab Results   Component Value Date    HGBA1C 11 0 (H) 09/03/2018       Recent Labs      10/02/18   1845   POCGLU  102       Blood Sugar Average: Last 72 hrs:  (P) 102   · Uncontrolled DM with A1c 11  · Hx of noncompliance  · Continue neurontin for neuropathy

## 2018-10-03 NOTE — H&P
H&P- Pepe Farr 1960, 59 y o  female MRN: [id number]    Unit/Bed#: -01 Encounter: [id number]    Primary Care Provider: ISABELLE Perdomo   Date and time admitted to hospital: 10/2/2018  9:30 PM        * Chest pain   Assessment & Plan    · Left sided pain, radiates to shoulder, different from chronic pain  Felt very weak, dizzy  · Monitor troponin and telemetry  · Will check echo, report of swelling in legs  · Hx PE, mildly abnormal D-dimer, will check VQ with report of contrast allergy  Chronic pain syndrome   Assessment & Plan    · chronic pain, joint pain  · Hx of human growth hormone as child     Ulcer of toe of left foot ([place] [address] )   Assessment & Plan    · Healing ulcer, follow up podiatry as OP for nail care     Arthritis   Assessment & Plan    · Chronic pain     Stage 3 chronic kidney disease ([place] [address] )   Assessment & Plan    · Creat stable     Hyperlipidemia   Assessment & Plan    · simvastin autosub     Obesity, morbid ([place] [address] )   Assessment & Plan    · BMI>40     Type 2 diabetes mellitus with diabetic neuropathy, with long-term current use of insulin ([facility])   Assessment & Plan    Lab Results   Component Value Date    HGBA1C 11 0 (H) 09/03/2018       Recent Labs      10/02/18   1845   POCGLU  102       Blood Sugar Average: Last 72 hrs:  · metformin 1000 TID  · Novulin 70/30 60 units bid, reports using regularly     Vitamin D deficiency   Assessment & Plan    · Check level       VTE Prophylaxis: Enoxaparin (Lovenox)  Code Status: full code  POLST: POLST is not applicable to this patient    Anticipated Length of Stay:  Patient will be admitted on an Observation basis with an anticipated length of stay of  < 2 midnights  Justification for Hospital Stay: chest pain, monitor telemetry    Chief Complaint:   Left side chest pain    History of Present Illness:    Pepe Farr is a 59 y o  female who presents with left side chest pain   Developed pain last night that radiates left shoulder, chest to shoulder blade  Described as sharp, /10, had some relief with dilaudid in ER  Felt very weak, light headed, daughter had to help her lift her legs yesterday, family was concerned and stayed with her  She reports that she could not climb steps because she was so weak  She spoke with per pcp and they told her to come to ER if pain persisted  Patient reports she just does not feel right and is concerned about the pain on left side that is different that all her chronic pain  Patient has history of PE and is only on aspirin  She has report of IV contrast dye allergy, will check VQ scan and Echo  Review of Systems:  Review of Systems   Respiratory: Negative for shortness of breath  Cardiovascular: Positive for chest pain  Gastrointestinal: Negative for diarrhea and vomiting  Musculoskeletal: Positive for back pain and neck pain  Neurological: Positive for weakness  All other systems reviewed and are negative  Past Medical and Surgical History:   Past Medical History:   Diagnosis Date    Asthma     Chronic pain     Diabetes mellitus [facility]     GERD (gastroesophageal reflux disease)     Hypertension     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  Authorizing Provider   aspirin 81 mg chewable tablet Chew 1 tablet (81 mg total) daily 18   Afshan Otero PA-C   B-D ULTRAFINE III SHORT PEN 31G X 8 MM MISC  17   Historical Provider, MD   citalopram (CeleXA) 10 mg tablet Take 1 tablet (10 mg total) by mouth daily 18   Afshan Otero PA-C   gabapentin (NEURONTIN) 300 mg capsule Take 1 capsule (300 mg total) by mouth 4 (four) times a day 9/4/18   Cole Gannon PA-C   insulin NPH-insulin regular (NovoLIN 70/30) 100 units/mL subcutaneous injection Inject 60 Units under the skin 2 (two) times a day before meals  Patient taking differently: Inject 60 Units under the skin 2 (two) times a day before meals   9/4/18   Cole Gannon PA-C   metFORMIN (GLUCOPHAGE) 1000 MG tablet Take 1 tablet (1,000 mg total) by mouth 2 (two) times a day with meals 9/4/18   Cole Gannon PA-C   nystatin (MYCOSTATIN) cream Apply topically 2 (two) times a day 9/19/18   ISABELLE Myrick   simvastatin (ZOCOR) 20 mg tablet Take 1 tablet (20 mg total) by mouth daily at bedtime 9/4/18   Cole Gannon PA-C   simvastatin (ZOCOR) 20 mg tablet Take 20 mg by mouth daily at bedtime    Historical Provider, MD   tiZANidine (ZANAFLEX) 4 mg tablet Take 1 tablet (4 mg total) by mouth 3 (three) times a day 9/4/18   Cole Gannon PA-C     I have reviewed home medications with patient personally  Allergies: Allergies   Allergen Reactions    Albumen, Egg      Other reaction(s): Nausea and/or vomiting    Bioflavonoids      Other reaction(s): Other (See Comments)  Skin "burns"    Brassica Halle Jaydon      Other reaction(s): GI upset  Abdominal pain    Metronidazole Itching    Morphine Hives and Itching    Prochlorperazine Wheezing     Other reaction(s):  Other (See Comments)  SOB    Sulfa Antibiotics      Other reaction(s): Unknown Reaction       Social History:  Marital Status: /Civil Union   Occupation: security at events   Patient Pre-hospital Living Situation: home  Patient Pre-hospital Level of Mobility: mobile  Patient Pre-hospital Diet Restrictions: diabetic  Substance Use History:   History   Alcohol Use No     History   Smoking Status    Never Smoker   Smokeless Tobacco    Never Used     History   Drug Use No       Family History:  I have reviewed the patients family history    Physical Exam:   Vitals:     temp 98 6  HR 81  RR 18  /87  98% SpO2    Physical Exam   Constitutional: She is oriented to person, place, and time  She appears well-developed and well-nourished  HENT:   Head: Normocephalic and atraumatic  Eyes: Conjunctivae and EOM are normal  Right eye exhibits no discharge  Left eye exhibits no discharge  Glasses in place   Neck: Normal range of motion  No tracheal deviation present  Cardiovascular: Normal rate, regular rhythm and normal heart sounds  Exam reveals no gallop and no friction rub  No murmur heard  Pulmonary/Chest: Effort normal and breath sounds normal  No respiratory distress  She has no wheezes  She has no rales  She exhibits tenderness (mild with palpation)  Abdominal: Soft  Bowel sounds are normal  She exhibits no distension  There is no tenderness  There is no rebound  obese   Musculoskeletal: Normal range of motion  She exhibits edema (trace)  She exhibits no tenderness or deformity  Neurological: She is alert and oriented to person, place, and time  Skin: Skin is warm and dry  No rash noted  No erythema  No pallor  Healing ulcer left great toe, right toe nail missing, right 3rd toe with black nail   Psychiatric: Her behavior is normal  Judgment normal    Nursing note and vitals reviewed  Additional Data:   Lab Results: I have personally reviewed pertinent reports        Results from last 7 days  Lab Units 10/02/18  1509   WBC Thousand/uL 9 10   HEMOGLOBIN g/dL 13 5   HEMATOCRIT % 40 6   PLATELETS Thousands/uL 215   NEUTROS PCT % 63   LYMPHS PCT % 28   MONOS PCT % 6   EOS PCT % 3       Results from last 7 days  Lab Units 10/02/18  1509   SODIUM mmol/L 136   POTASSIUM mmol/L 4 3   CHLORIDE mmol/L 101   CO2 mmol/L 23   BUN mg/dL 18   CREATININE mg/dL 0 75   CALCIUM mg/dL 8 8   ALK PHOS U/L 70   ALT U/L 27   AST U/L 37       Results from last 7 days  Lab Units 10/02/18  1509   INR  0 96       Results from last 7 days  Lab Units 10/02/18  1845   POC GLUCOSE mg/dl 102           Imaging: I have personally reviewed pertinent reports  NM inpatient order    (Results Pending)   cxr: no acute findings    EKG, Pathology, and Other Studies Reviewed on Admission:   · EKG: not available to review    [facility] Records Reviewed: Yes     ** Please Note: This note has been constructed using a voice recognition system   **

## 2018-10-03 NOTE — ASSESSMENT & PLAN NOTE
· chronic pain, joint pain  · Hx of human growth hormone as child  · Recommended that the patient establish care with a pain management physician upon release from the hospital

## 2018-10-03 NOTE — ASSESSMENT & PLAN NOTE
· Completely resolved  · ACS has been ruled out  · EKG is nondiagnostic  · Most likely musculoskeletal etiology since the patient has a significant reproducibility component to chest pain  · Her underlying chronic pain syndrome is also a contributing factor  · Okay for discharge  · Outpatient follow-up with her PCP

## 2018-10-03 NOTE — PLAN OF CARE
Problem: DISCHARGE PLANNING - CARE MANAGEMENT  Goal: Discharge to post-acute care or home with appropriate resources  INTERVENTIONS:  - Conduct assessment to determine patient/family and health care team treatment goals, and need for post-acute services based on payer coverage, community resources, and patient preferences, and barriers to discharge  - Address psychosocial, clinical, and financial barriers to discharge as identified in assessment in conjunction with the patient/family and health care team  - Arrange appropriate level of post-acute services according to patient's   needs and preference and payer coverage in collaboration with the physician and health care team  - Communicate with and update the patient/family, physician, and health care team regarding progress on the discharge plan  - Arrange appropriate transportation to post-acute venues  Discharge home with family without any services    Outcome: Progressing

## 2018-10-03 NOTE — DISCHARGE SUMMARY
Discharge- Nancy Hernandez 1960, 59 y o  female MRN: [id number]    Unit/Bed#: -01 Encounter: [id number]    Primary Care Provider: ISABELLE De Leon   Date and time admitted to hospital: 10/2/2018  9:30 PM        * Chest pain   Assessment & Plan    · Completely resolved  · ACS has been ruled out  · EKG is nondiagnostic  · Most likely musculoskeletal etiology since the patient has a significant reproducibility component to chest pain  · Her underlying chronic pain syndrome is also a contributing factor  · Okay for discharge  · Outpatient follow-up with her PCP     Chronic pain syndrome   Assessment & Plan    · chronic pain, joint pain  · Hx of human growth hormone as child  · Recommended that the patient establish care with a pain management physician upon release from the hospital     Type 2 diabetes mellitus with diabetic neuropathy, with long-term current use of insulin ([facility] [address] )   Assessment & Plan    · Blood sugar stable  · DC home on pre admit meds at the preadmission dosages     Vitamin D deficiency   Assessment & Plan    · Continue supplementation     Obesity, morbid ([facility] [address] )   Assessment & Plan    · BMI>40  · Weight loss counseling provided     Hyperlipidemia   Assessment & Plan    · DC home on pre admit meds at preadmission dosages     Stage 3 chronic kidney disease ([facility] [address] )   Assessment & Plan    · Creat stable  · Follow up outpatient with her primary care physician     Arthritis   Assessment & Plan    · Stable           Discharging Physician / Practitioner: Ronak Stock MD  PCP: Chang De Leon  Admission Date:   Admission Orders     Ordered        10/02/18 2217  Place in Observation  Once             Discharge Date: 10/03/18    Resolved Problems  Date Reviewed: 10/2/2018    None          Consultations During Hospital Stay:  · None    Procedures Performed:   · None    Significant Findings / Test Results:   · X-ray chest - mild hypoventilatory changes, otherwise no acute pathology  · Ventilation perfusion scan- normal study    Incidental Findings:   · None     Test Results Pending at Discharge (will require follow up): · None     Outpatient Tests Requested:  · None    Complications:  None    Reason for Admission:   Chest pain    Hospital Course:     Messi Gayle is a 59 y o  female patient who originally presented to the hospital on 10/2/2018 due to chest pain  [facility]  She ruled out for the possibility of an acute coronary event with 3 troponins that were normal    She has a history of chronic pain syndrome  She had a significant reproducible component to her chest pain  She also had minimally elevated D-dimers  V/Q scan was performed in view of her having an allergy to CT dye  That was normal    Patient was deemed stable for discharge on the afternoon of 10/03/2018  She will follow up with her primary care physician within 7 days  Recommendations were additionally made for her to establish care with pain management as an outpatient in view of her chronic pain syndrome and also with Gastroenterology for evaluation with an EGD  No changes were made to any of her pre-admission medications at the preadmission dosages  Please see above list of diagnoses and related plan for additional information  Condition at Discharge: good     Discharge Day Visit / Exam:     Subjective:  No complaints, no distress  Vitals: Blood Pressure: 122/70 (10/03/18 1141)  Pulse: 68 (10/03/18 1141)  Temperature: 98 3 °F (36 8 °C) (10/03/18 1141)  Temp Source: Tympanic (10/03/18 1141)  Respirations: 16 (10/03/18 1141)  Height: 5' 2 5" (158 8 cm) (10/03/18 0700)  Weight - Scale: 118 kg (260 lb) (10/03/18 0700)  SpO2: 93 % (10/03/18 1141)  Exam:   Physical Exam   Constitutional: She is oriented to person, place, and time  She appears well-developed and well-nourished  HENT:   Head: Normocephalic and atraumatic     Nose: Nose normal    Mouth/Throat: Oropharynx is clear and moist    Eyes: Pupils are equal, round, and reactive to light  Conjunctivae and EOM are normal    Neck: Normal range of motion  Neck supple  No JVD present  No thyromegaly present  Cardiovascular: Normal rate, regular rhythm and intact distal pulses  Exam reveals no gallop and no friction rub  No murmur heard  Pulmonary/Chest: Effort normal and breath sounds normal  No respiratory distress  Abdominal: Soft  Bowel sounds are normal  She exhibits no distension and no mass  There is no tenderness  There is no guarding  Musculoskeletal: Normal range of motion  She exhibits no edema  Lymphadenopathy:     She has no cervical adenopathy  Neurological: She is alert and oriented to person, place, and time  No cranial nerve deficit  Skin: Skin is warm  No rash noted  No erythema  Psychiatric: She has a normal mood and affect  Her behavior is normal    Vitals reviewed  Discussion with Family:   No family was present at the time of my discharge evaluation    Discharge instructions/Information to patient and family:   See after visit summary for information provided to patient and family  Provisions for Follow-Up Care:  See after visit summary for information related to follow-up care and any pertinent home health orders  Disposition:     Home    For Discharges to [facility] SNF:   · Not Applicable to this Patient - Not Applicable to this Patient    Planned Readmission:   None     Discharge Statement:  I spent 35 minutes discharging the patient  This time was spent on the day of discharge  I had direct contact with the patient on the day of discharge  Greater than 50% of the total time was spent examining patient, answering all patient questions, arranging and discussing plan of care with patient as well as directly providing post-discharge instructions  Additional time then spent on discharge activities      Discharge Medications:  See after visit summary for reconciled discharge medications provided to patient and family        ** Please Note: This note has been constructed using a voice recognition system **

## 2018-10-03 NOTE — ASSESSMENT & PLAN NOTE
· Left sided pain, radiates to shoulder, different from chronic pain  Felt very weak, dizzy  · Monitor troponin and telemetry  · Will check echo, report of swelling in legs  · Hx PE, mildly abnormal D-dimer, will check VQ with report of contrast allergy

## 2018-10-03 NOTE — NURSING NOTE
Patient discharged home, all belongings sent with the patient including her prescription for oxycodone  Patient escorted to the lobby  Will continue to monitor

## 2018-10-03 NOTE — PLAN OF CARE
DISCHARGE PLANNING     Discharge to home or other facility with appropriate resources Progressing        INFECTION - ADULT     Absence or prevention of progression during hospitalization Progressing     Absence of fever/infection during neutropenic period Progressing        Knowledge Deficit     Patient/family/caregiver demonstrates understanding of disease process, treatment plan, medications, and discharge instructions Progressing        MUSCULOSKELETAL - ADULT     Maintain or return mobility to safest level of function Progressing     Maintain proper alignment of affected body part Progressing        PAIN - ADULT     Verbalizes/displays adequate comfort level or baseline comfort level Progressing        Potential for Falls     Patient will remain free of falls Progressing        Prexisting or High Potential for Compromised Skin Integrity     Skin integrity is maintained or improved Progressing        SAFETY ADULT     Maintain or return to baseline ADL function Progressing     Maintain or return mobility status to optimal level Progressing     Patient will remain free of falls Progressing

## 2018-10-04 LAB — MRSA NOSE QL CULT: NORMAL

## 2018-10-04 RX ORDER — SIMVASTATIN 20 MG
20 TABLET ORAL
Qty: 30 TABLET | Refills: 0 | Status: SHIPPED | OUTPATIENT
Start: 2018-10-04 | End: 2019-02-27 | Stop reason: ALTCHOICE

## 2018-10-04 RX ORDER — TIZANIDINE 4 MG/1
4 TABLET ORAL EVERY 8 HOURS PRN
Qty: 30 TABLET | Refills: 0 | Status: SHIPPED | OUTPATIENT
Start: 2018-10-04 | End: 2018-10-19 | Stop reason: SDUPTHER

## 2018-10-04 RX ORDER — GABAPENTIN 300 MG/1
300 CAPSULE ORAL 4 TIMES DAILY
Qty: 120 CAPSULE | Refills: 0 | Status: SHIPPED | OUTPATIENT
Start: 2018-10-04 | End: 2018-12-03

## 2018-10-05 ENCOUNTER — TRANSITIONAL CARE MANAGEMENT (OUTPATIENT)
Dept: INTERNAL MEDICINE CLINIC | Facility: CLINIC | Age: 58
End: 2018-10-05

## 2018-10-08 ENCOUNTER — TELEPHONE (OUTPATIENT)
Dept: INTERNAL MEDICINE CLINIC | Facility: CLINIC | Age: 58
End: 2018-10-08

## 2018-10-08 ENCOUNTER — TRANSITIONAL CARE MANAGEMENT (OUTPATIENT)
Dept: INTERNAL MEDICINE CLINIC | Facility: CLINIC | Age: 58
End: 2018-10-08

## 2018-10-08 ENCOUNTER — OFFICE VISIT (OUTPATIENT)
Dept: INTERNAL MEDICINE CLINIC | Facility: CLINIC | Age: 58
End: 2018-10-08
Payer: COMMERCIAL

## 2018-10-08 VITALS
BODY MASS INDEX: 45.89 KG/M2 | HEART RATE: 80 BPM | WEIGHT: 259 LBS | DIASTOLIC BLOOD PRESSURE: 70 MMHG | HEIGHT: 63 IN | TEMPERATURE: 98.4 F | OXYGEN SATURATION: 96 % | SYSTOLIC BLOOD PRESSURE: 120 MMHG

## 2018-10-08 DIAGNOSIS — R07.89 OTHER CHEST PAIN: Primary | ICD-10-CM

## 2018-10-08 DIAGNOSIS — Z79.4 TYPE 2 DIABETES MELLITUS WITH DIABETIC NEUROPATHY, WITH LONG-TERM CURRENT USE OF INSULIN (HCC): ICD-10-CM

## 2018-10-08 DIAGNOSIS — E11.40 TYPE 2 DIABETES MELLITUS WITH DIABETIC NEUROPATHY, WITH LONG-TERM CURRENT USE OF INSULIN (HCC): ICD-10-CM

## 2018-10-08 DIAGNOSIS — N18.30 STAGE 3 CHRONIC KIDNEY DISEASE (HCC): ICD-10-CM

## 2018-10-08 DIAGNOSIS — I10 ESSENTIAL HYPERTENSION: ICD-10-CM

## 2018-10-08 DIAGNOSIS — M47.9 OSTEOARTHRITIS OF SPINE, UNSPECIFIED SPINAL OSTEOARTHRITIS COMPLICATION STATUS, UNSPECIFIED SPINAL REGION: ICD-10-CM

## 2018-10-08 PROCEDURE — 1111F DSCHRG MED/CURRENT MED MERGE: CPT | Performed by: NURSE PRACTITIONER

## 2018-10-08 PROCEDURE — 99496 TRANSJ CARE MGMT HIGH F2F 7D: CPT | Performed by: NURSE PRACTITIONER

## 2018-10-08 NOTE — TELEPHONE ENCOUNTER
Pharmacy called asking if okay to substitute Humlin 70/30 for Novolin 70/30 because this is what insurance covers? Ok to substitute per Berny Kenny  Pharmacy informed

## 2018-10-08 NOTE — PROGRESS NOTES
Assessment/Plan:  Patient is following up with Podiatry on Thursday and will make an appointment for Pain Management regarding her chronic pain  She was instructed if any continued chest pain, SOB, or palpitations she was advised to go to ER  She is continuing to check her sugars and was advised the importance of following up with Nephrology as well for her CKD stage 3  She states she wants to do one thing at at time right now  Will follow up with her again in 3 months  No problem-specific Assessment & Plan notes found for this encounter  Problem List Items Addressed This Visit     Type 2 diabetes mellitus with diabetic neuropathy, with long-term current use of insulin (HCC)    Essential hypertension    Stage 3 chronic kidney disease (HCC)    Chest pain - Primary    Osteoarthritis of spine           Subjective:     Patient ID: Ival Krabbe is a 59 y o  female  Mari Oliva is here today for a JOY follow up visit from 10/2-10/3 after presenting to the ER with chest pain  She did have a cardiac work up done which was negative  She was recommended for follow up with Nephrology for her CKD stage 3  She states she is feeling better but is still having some back and chest pain but feels it is muscle related  She is taking Zanaflex and Oxycodone as needed  She states she did get insurance right now and she would like to see Pain Management for her chronic pain  She is seeing Podiatry on Thursday  She states her sugars are also much better and she is checking them several times a day  She denies any chest pain, SOB, or palpitations today  She offers no other complaints today  Review of Systems   Constitutional: Negative  HENT: Negative  Eyes: Negative  Respiratory: Negative  Cardiovascular: Negative  Gastrointestinal: Negative  Endocrine: Negative  Genitourinary: Negative  Musculoskeletal: Positive for arthralgias and myalgias  Skin: Negative  Allergic/Immunologic: Negative  Neurological: Negative  Hematological: Negative  Psychiatric/Behavioral: Negative  Below is the patient's most recent value for Albumin, ALT, AST, BUN, Calcium, Chloride, Cholesterol, CO2, Creatinine, GFR, Glucose, HDL, Hematocrit, Hemoglobin, Hemoglobin A1C, LDL, Magnesium, Phosphorus, Platelets, Potassium, PSA, Sodium, Triglycerides, and WBC  Lab Results   Component Value Date    ALT 27 10/02/2018    AST 37 10/02/2018    BUN 22 10/03/2018    CALCIUM 8 5 (L) 10/03/2018     10/03/2018    CO2 28 10/03/2018    CREATININE 0 87 10/03/2018    HCT 37 3 10/03/2018    HGB 12 5 10/03/2018    HGBA1C 11 0 (H) 09/03/2018    MG 1 7 09/04/2018     10/03/2018    K 4 0 10/03/2018     10/03/2018    WBC 5 90 10/03/2018     Note: for a comprehensive list of the patient's lab results, access the Results Review activity  Objective:     Physical Exam   Constitutional: She is oriented to person, place, and time  She appears well-developed and well-nourished  HENT:   Head: Normocephalic and atraumatic  Right Ear: External ear normal    Left Ear: External ear normal    Nose: Nose normal    Mouth/Throat: Oropharynx is clear and moist    Eyes: Pupils are equal, round, and reactive to light  Conjunctivae and EOM are normal    Neck: Normal range of motion  Neck supple  Cardiovascular: Normal rate, regular rhythm, normal heart sounds and intact distal pulses  Pulmonary/Chest: Effort normal and breath sounds normal    Abdominal: Soft  Bowel sounds are normal    Musculoskeletal: Normal range of motion  Neurological: She is alert and oriented to person, place, and time  She has normal reflexes  Skin: Skin is warm and dry  Psychiatric: She has a normal mood and affect  Her behavior is normal  Judgment and thought content normal    Vitals reviewed          Vitals:    10/08/18 1502   BP: 120/70   BP Location: Left arm   Patient Position: Sitting   Cuff Size: Standard Pulse: 80   Temp: 98 4 °F (36 9 °C)   TempSrc: Temporal   SpO2: 96%   Weight: 117 kg (259 lb)   Height: 5' 2 5" (1 588 m)       Transitional Care Management Review:  Breonna Staton is a 59 y o  female here for TCM follow up       During the TCM phone call patient stated:    Date and time hospital follow up call was made:  10/5/2018 10:38 AM  Hospital care reviewed:  Discussed with Inpatient Physician  Patient was hopsitalized at:  [address] [place] [address]  Date of admission:  10/2/18  Date of discharge:  10/3/18  Disposition:  Home  Were the patients medicaitons reviewed and updated:  Yes  Current symptoms:  Back pain - left side  Back pain, left side, severity:  Moderate  Back pain, left side, onset:  Ongoing  Post hospital issues:  None  Should patient be enrolled in anticoag monitoring?:  Yes  Scheduled for follow up?:  Yes  Did you obtain your prescribed medications:  Yes  Do you need help managing your perscriptions or medications:  No  Is transportation to your appointments needed:  No  Living Arrangements:  Spouse or Significiant other, Family members  Support System:  Family  The type of support provided:  Emotional, Financial  Do you have social support:  Yes, some  Are you recieving outpatient services:  No  Are you recieving home care services:  No  Are you using any community resources:  No  Current waiver service:  No  Have you fallen in the last 12 months:  No  Interperter language line required?:  No  Counseling:  Patient  Counseling topics:  Diagnostic results, Prognosis  Comments:  [address] [facility]

## 2018-10-11 ENCOUNTER — TELEPHONE (OUTPATIENT)
Dept: INTERNAL MEDICINE CLINIC | Facility: CLINIC | Age: 58
End: 2018-10-11

## 2018-10-11 NOTE — TELEPHONE ENCOUNTER
Attempted to call Mari Oliva and the phone went to , but the mail box isn't set up  Called emergency contact to see if she could contact Mari Oliva  Received a call back from Mari Oliva and she stated that she has an appt today with Dr Ki Duque in [place] at 2:30  Let her know that she needs to discuss diabetic shoes with them, and that they would need to get things started with a form  Also asked that Mari Oliva provide them our fax to send us their notes from the visit  Pt then did state that she doesn't want us to have her daughter on as a contact for her chart  Removed the number from the chart

## 2018-10-12 ENCOUNTER — TELEPHONE (OUTPATIENT)
Dept: INTERNAL MEDICINE CLINIC | Facility: CLINIC | Age: 58
End: 2018-10-12

## 2018-10-12 NOTE — TELEPHONE ENCOUNTER
Patient called about insulin prescription saying that it still wasn't filled from pharmacy because it needed prior authorization  I told her that we had a call on Monday from pharmacy asking if they could substitute with another insulin that is covered by her insurance and that we told them, "yes, that would be fine"  Pharmacy never filled the script  Called [facility] in [place] to find out what was happening with the script  He said he didn't know but put the substitute insulin in and it went through just fine, so they will now fill it

## 2018-10-18 ENCOUNTER — OFFICE VISIT (OUTPATIENT)
Dept: INTERNAL MEDICINE CLINIC | Facility: CLINIC | Age: 58
End: 2018-10-18
Payer: COMMERCIAL

## 2018-10-18 ENCOUNTER — TELEPHONE (OUTPATIENT)
Dept: INTERNAL MEDICINE CLINIC | Facility: CLINIC | Age: 58
End: 2018-10-18

## 2018-10-18 VITALS
DIASTOLIC BLOOD PRESSURE: 78 MMHG | WEIGHT: 275.9 LBS | HEIGHT: 63 IN | HEART RATE: 79 BPM | TEMPERATURE: 99.1 F | BODY MASS INDEX: 48.89 KG/M2 | OXYGEN SATURATION: 96 % | SYSTOLIC BLOOD PRESSURE: 140 MMHG

## 2018-10-18 DIAGNOSIS — M79.89 LEG SWELLING: Primary | ICD-10-CM

## 2018-10-18 DIAGNOSIS — N18.30 STAGE 3 CHRONIC KIDNEY DISEASE (HCC): ICD-10-CM

## 2018-10-18 PROCEDURE — 99214 OFFICE O/P EST MOD 30 MIN: CPT | Performed by: NURSE PRACTITIONER

## 2018-10-18 RX ORDER — MELOXICAM 7.5 MG/1
7.5 TABLET ORAL DAILY
COMMUNITY
End: 2018-11-26 | Stop reason: ALTCHOICE

## 2018-10-18 RX ORDER — INFLUENZA A VIRUS A/SINGAPORE/GP1908/2015 IVR-180A (H1N1) ANTIGEN (PROPIOLACTONE INACTIVATED), INFLUENZA A VIRUS A/HONG KONG/4801/2014 X-263B (H3N2) ANTIGEN (PROPIOLACTONE INACTIVATED), INFLUENZA B VIRUS B/BRISBANE/46/2015 ANTIGEN (PROPIOLACTONE INACTIVATED), AND INFLUENZA B VIRUS B/PHUKET/3073/2013 BVR-1B ANTIGEN (PROPIOLACTONE INACTIVATED) 15; 15; 15; 15 UG/.5ML; UG/.5ML; UG/.5ML; UG/.5ML
INJECTION, SUSPENSION INTRAMUSCULAR
Refills: 0 | Status: ON HOLD | COMMUNITY
Start: 2018-08-27 | End: 2019-05-16

## 2018-10-18 RX ORDER — FUROSEMIDE 20 MG/1
TABLET ORAL
Qty: 3 TABLET | Refills: 0 | Status: SHIPPED | OUTPATIENT
Start: 2018-10-18 | End: 2019-07-09 | Stop reason: HOSPADM

## 2018-10-18 NOTE — TELEPHONE ENCOUNTER
Called [facility]  She was started on mobic and has chronic kidney disease  Pt was made aware in office this may cause the swelling due to the kidney issues  The doctors are out of the office for the rest of the day  Left message with answering service  They were made aware that pt has chronic kidney disease and was placed on mobic and was told to stop taking it  Rebekah let us know that she will send the message to the dr on call and they may call the office back

## 2018-10-18 NOTE — TELEPHONE ENCOUNTER
Spoke with Dr John Paul Richardson  He stated to stop taking mobic  She can take tylenol for her pain  Also watch amount of tylenol if taking Percocet  Can't have more than 3g/daily

## 2018-10-18 NOTE — TELEPHONE ENCOUNTER
I attempted to call patient to have her d/c her mobic, however it goes to voicemail and her voicemail is not set up yet

## 2018-10-18 NOTE — PROGRESS NOTES
Assessment/Plan: Will give her a very short course of a diuretic Lasix 20 mg x 3 days  I did refer her to Nephrology for her history of CKD stage 3  She recently was started on Mobic by her Podiatrist and did inform her about her CKD she should not be taking this  She was advised to have repeat lab work done on Monday  I did tell her to stop the Mobic at this time with her CKD and will let Podiatry know  No problem-specific Assessment & Plan notes found for this encounter  Problem List Items Addressed This Visit     Stage 3 chronic kidney disease (HCC)    Relevant Medications    furosemide (LASIX) 20 mg tablet    Other Relevant Orders    Comprehensive metabolic panel    Ambulatory referral to Nephrology    Leg swelling - Primary    Relevant Medications    furosemide (LASIX) 20 mg tablet    Other Relevant Orders    Comprehensive metabolic panel            Subjective:      Patient ID: Shirin Lerma is a 59 y o  female  Alicia Chip is here today for an acute visit  She has been having swelling of hands and feet for the past several days  She states this does happen on and off and she did gain weight from her last visit here  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 and would like something to help get rid of the excess swelling  She is voiding with no issues and denies any SOB  She states she did see Podiatry and they did start her on Mobic for her pain  She offers no other complaints today  The following portions of the patient's history were reviewed and updated as appropriate:   She  has a past medical history of Asthma; Chronic pain; Diabetes mellitus ([address] ); GERD (gastroesophageal reflux disease); Hypertension; 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 ) 2017    Vitamin D deficiency 2017    Type 2 diabetes mellitus with diabetic neuropathy, with long-term current use of insulin ([place] [address] ) 2017    Insomnia 2017    Essential hypertension 2017    GERD (gastroesophageal reflux disease) 2017    Gait disturbance 2017    Benign mole 2017    Arthritis 2017    Anxiety 2017    History of Salmonella infection 2016    Hyperlipidemia 2016    History of pulmonary embolism 2016    Achilles tendinitis of left lower extremity 2016    Diabetic ulcer of left heel ([place] [address] ) 2016    Anemia 2016    Ulcer of toe of left foot ([place] [address] ) 2016    Melanocytic nevus of trunk 2015    Obesity, morbid ([place] [address] ) 07/15/2015    Menopausal and perimenopausal disorder 07/15/2015     She  has a past surgical history that includes Induced ; Endometrial ablation; Endometrial biopsy; Dilation and curettage of uterus; Cervical conization w/ laser; Tonsillectomy; and Joint replacement  Her family history includes Diabetes in her father; Mental illness in her father; Polycystic ovary syndrome in her mother  She  reports that she has never smoked  She has never used smokeless tobacco  She reports that she does not drink alcohol or use drugs    Current Outpatient Prescriptions   Medication Sig Dispense Refill    AFLURIA QUADRIVALENT 0 5 ML MANDY inject 0 5 milliliter intramuscularly  0    aspirin 81 mg chewable tablet Chew 1 tablet (81 mg total) daily 30 tablet 0    gabapentin (NEURONTIN) 300 mg capsule Take 1 capsule (300 mg total) by mouth 4 (four) times a day 120 capsule 0    insulin NPH-insulin regular (NovoLIN 70/30) 100 units/mL subcutaneous injection Inject 60 Units under the skin 2 (two) times a day before meals 10 mL 0    meloxicam (MOBIC) 7 5 mg tablet Take 7 5 mg by mouth daily      metFORMIN (GLUCOPHAGE) 1000 MG tablet Take 1 tablet (1,000 mg total) by mouth 2 (two) times a day with meals 60 tablet 0    simvastatin (ZOCOR) 20 mg tablet Take 1 tablet (20 mg total) by mouth daily at bedtime 30 tablet 0    tiZANidine (ZANAFLEX) 4 mg tablet Take 1 tablet (4 mg total) by mouth every 8 (eight) hours as needed for muscle spasms 30 tablet 0    furosemide (LASIX) 20 mg tablet Take one tablet by mouth daily x 3 days 3 tablet 0     No current facility-administered medications for this visit  Current Outpatient Prescriptions on File Prior to Visit   Medication Sig    aspirin 81 mg chewable tablet Chew 1 tablet (81 mg total) daily    gabapentin (NEURONTIN) 300 mg capsule Take 1 capsule (300 mg total) by mouth 4 (four) times a day    insulin NPH-insulin regular (NovoLIN 70/30) 100 units/mL subcutaneous injection Inject 60 Units under the skin 2 (two) times a day before meals    metFORMIN (GLUCOPHAGE) 1000 MG tablet Take 1 tablet (1,000 mg total) by mouth 2 (two) times a day with meals    simvastatin (ZOCOR) 20 mg tablet Take 1 tablet (20 mg total) by mouth daily at bedtime    tiZANidine (ZANAFLEX) 4 mg tablet Take 1 tablet (4 mg total) by mouth every 8 (eight) hours as needed for muscle spasms     No current facility-administered medications on file prior to visit  She is allergic to albumen, egg; bioflavonoids; brassica oleracea italica; metronidazole; morphine; prochlorperazine; and sulfa antibiotics       Review of Systems   Constitutional: Negative  HENT: Negative  Eyes: Negative  Respiratory: Negative  Cardiovascular: Positive for leg swelling  Gastrointestinal: Negative  Endocrine: Negative  Genitourinary: Negative  Musculoskeletal: Positive for arthralgias and myalgias     Skin: Negative  Allergic/Immunologic: Negative  Neurological: Negative  Hematological: Negative  Psychiatric/Behavioral: Negative  Objective:      /78 (BP Location: Right arm, Patient Position: Sitting, Cuff Size: Large)   Pulse 79   Temp 99 1 °F (37 3 °C) (Temporal)   Ht 5' 2 5" (1 588 m)   Wt 125 kg (275 lb 14 4 oz)   SpO2 96%   BMI 49 66 kg/m²          Physical Exam   Constitutional: She is oriented to person, place, and time  She appears well-developed and well-nourished  HENT:   Head: Normocephalic and atraumatic  Right Ear: External ear normal    Left Ear: External ear normal    Nose: Nose normal    Mouth/Throat: Oropharynx is clear and moist    Eyes: Pupils are equal, round, and reactive to light  Conjunctivae and EOM are normal    Neck: Normal range of motion  Neck supple  Cardiovascular: Normal rate, regular rhythm, normal heart sounds and intact distal pulses  Pulmonary/Chest: Effort normal and breath sounds normal    Abdominal: Soft  Bowel sounds are normal    Musculoskeletal: Normal range of motion  + 1 edema to B/L hands and lower legs   Neurological: She is alert and oriented to person, place, and time  She has normal reflexes  Skin: Skin is warm and dry  Psychiatric: She has a normal mood and affect  Her behavior is normal  Judgment and thought content normal    Vitals reviewed

## 2018-10-19 DIAGNOSIS — M62.838 MUSCLE SPASM: ICD-10-CM

## 2018-10-19 NOTE — TELEPHONE ENCOUNTER
Patient left a message on our machine requesting a refill on a medication  She left the same phone number that we have been trying all along  It is also the only number we have for her  She recently requested that we take her daughter out of our system as an emergency contact

## 2018-10-20 RX ORDER — TIZANIDINE 4 MG/1
4 TABLET ORAL EVERY 8 HOURS PRN
Qty: 90 TABLET | Refills: 0 | Status: SHIPPED | OUTPATIENT
Start: 2018-10-20 | End: 2019-05-06

## 2018-10-20 NOTE — TELEPHONE ENCOUNTER
Called Maryan's cell phone and went to a vm box that has not yet been set up  Attempted to call to advise Cynthia to stop taking the medication mobic, that she was prescribed by [facility]

## 2018-10-22 ENCOUNTER — TELEPHONE (OUTPATIENT)
Dept: OBGYN CLINIC | Facility: HOSPITAL | Age: 58
End: 2018-10-22

## 2018-10-22 ENCOUNTER — TELEPHONE (OUTPATIENT)
Dept: INTERNAL MEDICINE CLINIC | Facility: CLINIC | Age: 58
End: 2018-10-22

## 2018-10-22 NOTE — TELEPHONE ENCOUNTER
Patient stopped in office to fill out health release form   Faxed form to [facility]

## 2018-10-22 NOTE — TELEPHONE ENCOUNTER
Cynthia called because she hasn't heard anything from the referral that was sent to us  I did the intake  Patient has medicade through the state & eff 11/1/18 will have [facility]  Patient has seen:  Who :Dr Shad Thurman [facility]  When: 2015 through the beginning of this year    Patient is calling them to have her records faxed to [phone]  I am mailing the new patient packet to the patient today

## 2018-10-22 NOTE — TELEPHONE ENCOUNTER
Patient called asking for Dr Jayy Omer phone number  I let her know that you have been calling her to tell her that the podiatrist said to stop the Mobic because that may be causing the swelling and to take Tylenol instead  Patient stated that the podiatrist must give her something else then because she's "past taking Tylenol"    Patient stated that she is going to call the podiatrist

## 2018-10-23 ENCOUNTER — LAB (OUTPATIENT)
Dept: LAB | Facility: CLINIC | Age: 58
End: 2018-10-23
Payer: COMMERCIAL

## 2018-10-23 DIAGNOSIS — N18.30 STAGE 3 CHRONIC KIDNEY DISEASE (HCC): ICD-10-CM

## 2018-10-23 DIAGNOSIS — M79.89 LEG SWELLING: ICD-10-CM

## 2018-10-23 LAB
ALBUMIN SERPL BCP-MCNC: 3.6 G/DL (ref 3.5–5)
ALP SERPL-CCNC: 83 U/L (ref 46–116)
ALT SERPL W P-5'-P-CCNC: 43 U/L (ref 12–78)
ANION GAP SERPL CALCULATED.3IONS-SCNC: 7 MMOL/L (ref 4–13)
AST SERPL W P-5'-P-CCNC: 39 U/L (ref 5–45)
BILIRUB SERPL-MCNC: 0.48 MG/DL (ref 0.2–1)
BUN SERPL-MCNC: 20 MG/DL (ref 5–25)
CALCIUM SERPL-MCNC: 9.3 MG/DL (ref 8.3–10.1)
CHLORIDE SERPL-SCNC: 105 MMOL/L (ref 100–108)
CO2 SERPL-SCNC: 25 MMOL/L (ref 21–32)
CREAT SERPL-MCNC: 0.92 MG/DL (ref 0.6–1.3)
GFR SERPL CREATININE-BSD FRML MDRD: 68 ML/MIN/1.73SQ M
GLUCOSE P FAST SERPL-MCNC: 237 MG/DL (ref 65–99)
POTASSIUM SERPL-SCNC: 4.6 MMOL/L (ref 3.5–5.3)
PROT SERPL-MCNC: 7.5 G/DL (ref 6.4–8.2)
SODIUM SERPL-SCNC: 137 MMOL/L (ref 136–145)

## 2018-10-23 PROCEDURE — 36415 COLL VENOUS BLD VENIPUNCTURE: CPT

## 2018-10-23 PROCEDURE — 80053 COMPREHEN METABOLIC PANEL: CPT

## 2018-10-29 DIAGNOSIS — E11.65 TYPE 2 DIABETES MELLITUS WITH HYPERGLYCEMIA, UNSPECIFIED WHETHER LONG TERM INSULIN USE (HCC): ICD-10-CM

## 2018-10-29 NOTE — TELEPHONE ENCOUNTER
Received a call from the pharmacy inquiring if this is a vial or pens  Let him know that we would attempt to contact Alicia Saunders for this  That we are sure they are vials, but wanted to make sure 100% with her  Called Alicia Saunders and the phone rang, but went to  which is not set up

## 2018-10-29 NOTE — TELEPHONE ENCOUNTER
Received a call from [facility] requesting a refill on novolin 70/30  Injects 60 units SQ bid  Verified sig and pharmacy  Aware of 48 hr fill policy  Please advise

## 2018-11-12 ENCOUNTER — PROCEDURE VISIT (OUTPATIENT)
Dept: SURGERY | Facility: CLINIC | Age: 58
End: 2018-11-12
Payer: COMMERCIAL

## 2018-11-12 DIAGNOSIS — L84 SKIN CALLUS: Primary | ICD-10-CM

## 2018-11-12 DIAGNOSIS — D17.1 LIPOMA OF BACK: ICD-10-CM

## 2018-11-12 DIAGNOSIS — T88.3XXA MALIGNANT HYPERTHERMIA DUE TO ANESTHESIA, INITIAL ENCOUNTER: ICD-10-CM

## 2018-11-12 PROCEDURE — 99243 OFF/OP CNSLTJ NEW/EST LOW 30: CPT | Performed by: SURGERY

## 2018-11-12 RX ORDER — SODIUM CHLORIDE, SODIUM LACTATE, POTASSIUM CHLORIDE, CALCIUM CHLORIDE 600; 310; 30; 20 MG/100ML; MG/100ML; MG/100ML; MG/100ML
125 INJECTION, SOLUTION INTRAVENOUS CONTINUOUS
Status: CANCELLED | OUTPATIENT
Start: 2018-11-12

## 2018-11-12 RX ORDER — OXYCODONE HYDROCHLORIDE 5 MG/1
5 TABLET ORAL 2 TIMES DAILY PRN
Refills: 0 | COMMUNITY
Start: 2018-11-01 | End: 2019-01-14 | Stop reason: SDUPTHER

## 2018-11-12 RX ORDER — CEFAZOLIN SODIUM 1 G/50ML
1000 SOLUTION INTRAVENOUS ONCE
Status: CANCELLED | OUTPATIENT
Start: 2018-12-04 | End: 2018-11-12

## 2018-11-12 RX ORDER — DIPHENOXYLATE HYDROCHLORIDE AND ATROPINE SULFATE 2.5; .025 MG/1; MG/1
1 TABLET ORAL DAILY
COMMUNITY
End: 2019-02-27 | Stop reason: SDUPTHER

## 2018-11-12 RX ORDER — CELECOXIB 200 MG/1
200 CAPSULE ORAL DAILY PRN
Refills: 0 | COMMUNITY
Start: 2018-11-01 | End: 2018-12-03 | Stop reason: SDUPTHER

## 2018-11-12 RX ORDER — OMEGA-3S/DHA/EPA/FISH OIL/D3 300MG-1000
400 CAPSULE ORAL DAILY
COMMUNITY
End: 2022-07-06

## 2018-11-12 NOTE — ASSESSMENT & PLAN NOTE
The patient has a history for malignant hyperthermia in the early 2000s  I have instructed her to address this with the anesthesiologist prior to the day of operation

## 2018-11-12 NOTE — LETTER
November 12, 2018     Madolyn Landau, DO  [address] [address]    Patient: Jamar Cleveland   YOB: 1960   Date of Visit: 11/12/2018       Dear Dr Hilario Espinosa: Thank you for referring Chandra Haque to me for evaluation  Below are my notes for this consultation  If you have questions, please do not hesitate to call me  I look forward to following your patient along with you  Sincerely,        Yan King MD        CC: No Recipients  Yan King MD  11/12/2018  3:05 PM  Sign at close encounter  Assessment/Plan:    Skin callus  The patient has a skin callus of the distal phalanx of the right middle finger which is symptomatic to the patient and for which she desires definitive treatment  The technical details of the procedure as well as the options benefits risks and alternatives were thoroughly discussed with the patient including the option for 2nd surgical opinion by hand specialist   All questions answered to her satisfaction and informed consent obtained to proceed with excisional biopsy at the time of her lipoma excision under anesthesia in the operating room  Lipoma of back  The patient has a recurrent low mid back lipoma which is symptomatic to her for which she desires definitive treatment  The options benefits risks and alternatives to excision were explained  She understands the benign nature of the condition  All questions answered to her satisfaction and informed consent obtained to proceed  Malignant hyperthermia due to anesthesia  The patient has a history for malignant hyperthermia in the early 2000s  I have instructed her to address this with the anesthesiologist prior to the day of operation  Diagnoses and all orders for this visit:    Skin callus    Lipoma of back    Malignant hyperthermia due to anesthesia, initial encounter    Other orders  -     celecoxib (CeleBREX) 200 mg capsule;  Take 200 mg by mouth daily as needed  -     oxyCODONE (ROXICODONE) 5 mg immediate release tablet; Take 5 mg by mouth 2 (two) times a day as needed  -     Omega-3 Fatty Acids (FISH OIL ADULT GUMMIES PO); Take by mouth  -     multivitamin (THERAGRAN) TABS; Take 1 tablet by mouth daily  -     cholecalciferol (VITAMIN D3) 400 units tablet; Take 400 Units by mouth daily  -     Calcium Carb-Cholecalciferol (CALCIUM 600+D3 PO); Take by mouth          Subjective:      Patient ID: Berenice Sam is a 59 y o  female  HPI    The following portions of the patient's history were reviewed and updated as appropriate: allergies, current medications, past family history, past medical history, past social history, past surgical history and problem list     Review of Systems   Constitutional: Negative  HENT: Negative  Eyes: Negative  Respiratory: Negative  Cardiovascular: Negative  Gastrointestinal: Negative  Endocrine: Negative  Genitourinary: Negative  Musculoskeletal: Negative  Skin: Negative  Allergic/Immunologic: Negative  Neurological: Negative  Hematological: Negative  Psychiatric/Behavioral: Negative  Objective: There were no vitals taken for this visit  Physical Exam   Constitutional: She is oriented to person, place, and time  She appears well-developed and well-nourished  HENT:   Head: Normocephalic and atraumatic  Eyes: Pupils are equal, round, and reactive to light  Conjunctivae are normal    Neck: Normal range of motion  Neck supple  Cardiovascular: Normal rate and regular rhythm  Pulmonary/Chest: Effort normal and breath sounds normal    Abdominal: Soft  Bowel sounds are normal    Musculoskeletal: Normal range of motion  Neurological: She is alert and oriented to person, place, and time  Skin: Skin is warm and dry  The patient has a skin callus involving the distal phalanx of the right middle finger adjacent to the nail bed    Maximum diameter approximately 7 mm     The patient has a low mid back recurrent lipoma adjacent to the scar from her previous excisional biopsy approximately 17 years ago       Psychiatric: Her behavior is normal  Judgment and thought content normal

## 2018-11-12 NOTE — PROGRESS NOTES
Assessment/Plan:    Skin callus  The patient has a skin callus of the distal phalanx of the right middle finger which is symptomatic to the patient and for which she desires definitive treatment  The technical details of the procedure as well as the options benefits risks and alternatives were thoroughly discussed with the patient including the option for 2nd surgical opinion by hand specialist   All questions answered to her satisfaction and informed consent obtained to proceed with excisional biopsy at the time of her lipoma excision under anesthesia in the operating room  Lipoma of back  The patient has a recurrent low mid back lipoma which is symptomatic to her for which she desires definitive treatment  The options benefits risks and alternatives to excision were explained  She understands the benign nature of the condition  All questions answered to her satisfaction and informed consent obtained to proceed  Malignant hyperthermia due to anesthesia  The patient has a history for malignant hyperthermia in the early 2000s  I have instructed her to address this with the anesthesiologist prior to the day of operation  Diagnoses and all orders for this visit:    Skin callus    Lipoma of back    Malignant hyperthermia due to anesthesia, initial encounter    Other orders  -     celecoxib (CeleBREX) 200 mg capsule; Take 200 mg by mouth daily as needed  -     oxyCODONE (ROXICODONE) 5 mg immediate release tablet; Take 5 mg by mouth 2 (two) times a day as needed  -     Omega-3 Fatty Acids (FISH OIL ADULT GUMMIES PO); Take by mouth  -     multivitamin (THERAGRAN) TABS; Take 1 tablet by mouth daily  -     cholecalciferol (VITAMIN D3) 400 units tablet; Take 400 Units by mouth daily  -     Calcium Carb-Cholecalciferol (CALCIUM 600+D3 PO); Take by mouth          Subjective:      Patient ID: Harvey Stern is a 59 y o  female      HPI    The following portions of the patient's history were reviewed and updated as appropriate: allergies, current medications, past family history, past medical history, past social history, past surgical history and problem list     Review of Systems   Constitutional: Negative  HENT: Negative  Eyes: Negative  Respiratory: Negative  Cardiovascular: Negative  Gastrointestinal: Negative  Endocrine: Negative  Genitourinary: Negative  Musculoskeletal: Negative  Skin: Negative  Allergic/Immunologic: Negative  Neurological: Negative  Hematological: Negative  Psychiatric/Behavioral: Negative  Objective: There were no vitals taken for this visit  Physical Exam   Constitutional: She is oriented to person, place, and time  She appears well-developed and well-nourished  HENT:   Head: Normocephalic and atraumatic  Eyes: Pupils are equal, round, and reactive to light  Conjunctivae are normal    Neck: Normal range of motion  Neck supple  Cardiovascular: Normal rate and regular rhythm  Pulmonary/Chest: Effort normal and breath sounds normal    Abdominal: Soft  Bowel sounds are normal    Musculoskeletal: Normal range of motion  Neurological: She is alert and oriented to person, place, and time  Skin: Skin is warm and dry  The patient has a skin callus involving the distal phalanx of the right middle finger adjacent to the nail bed  Maximum diameter approximately 7 mm  The patient has a low mid back recurrent lipoma adjacent to the scar from her previous excisional biopsy approximately 17 years ago       Psychiatric: Her behavior is normal  Judgment and thought content normal

## 2018-11-12 NOTE — ASSESSMENT & PLAN NOTE
The patient has a recurrent low mid back lipoma which is symptomatic to her for which she desires definitive treatment  The options benefits risks and alternatives to excision were explained  She understands the benign nature of the condition  All questions answered to her satisfaction and informed consent obtained to proceed

## 2018-11-12 NOTE — ASSESSMENT & PLAN NOTE
The patient has a skin callus of the distal phalanx of the right middle finger which is symptomatic to the patient and for which she desires definitive treatment  The technical details of the procedure as well as the options benefits risks and alternatives were thoroughly discussed with the patient including the option for 2nd surgical opinion by hand specialist   All questions answered to her satisfaction and informed consent obtained to proceed with excisional biopsy at the time of her lipoma excision under anesthesia in the operating room

## 2018-11-12 NOTE — H&P
Assessment/Plan:    Skin callus  The patient has a skin callus of the distal phalanx of the right middle finger which is symptomatic to the patient and for which she desires definitive treatment  The technical details of the procedure as well as the options benefits risks and alternatives were thoroughly discussed with the patient including the option for 2nd surgical opinion by hand specialist   All questions answered to her satisfaction and informed consent obtained to proceed with excisional biopsy at the time of her lipoma excision under anesthesia in the operating room  Lipoma of back  The patient has a recurrent low mid back lipoma which is symptomatic to her for which she desires definitive treatment  The options benefits risks and alternatives to excision were explained  She understands the benign nature of the condition  All questions answered to her satisfaction and informed consent obtained to proceed  Malignant hyperthermia due to anesthesia  The patient has a history for malignant hyperthermia in the early 2000s  I have instructed her to address this with the anesthesiologist prior to the day of operation  Diagnoses and all orders for this visit:    Skin callus    Lipoma of back    Malignant hyperthermia due to anesthesia, initial encounter    Other orders  -     celecoxib (CeleBREX) 200 mg capsule; Take 200 mg by mouth daily as needed  -     oxyCODONE (ROXICODONE) 5 mg immediate release tablet; Take 5 mg by mouth 2 (two) times a day as needed  -     Omega-3 Fatty Acids (FISH OIL ADULT GUMMIES PO); Take by mouth  -     multivitamin (THERAGRAN) TABS; Take 1 tablet by mouth daily  -     cholecalciferol (VITAMIN D3) 400 units tablet; Take 400 Units by mouth daily  -     Calcium Carb-Cholecalciferol (CALCIUM 600+D3 PO); Take by mouth          Subjective:      Patient ID: Darshana Monsalve is a 59 y o  female      HPI    The following portions of the patient's history were reviewed and updated as appropriate: allergies, current medications, past family history, past medical history, past social history, past surgical history and problem list     Review of Systems   Constitutional: Negative  HENT: Negative  Eyes: Negative  Respiratory: Negative  Cardiovascular: Negative  Gastrointestinal: Negative  Endocrine: Negative  Genitourinary: Negative  Musculoskeletal: Negative  Skin: Negative  Allergic/Immunologic: Negative  Neurological: Negative  Hematological: Negative  Psychiatric/Behavioral: Negative  Objective: There were no vitals taken for this visit  Physical Exam   Constitutional: She is oriented to person, place, and time  She appears well-developed and well-nourished  HENT:   Head: Normocephalic and atraumatic  Eyes: Pupils are equal, round, and reactive to light  Conjunctivae are normal    Neck: Normal range of motion  Neck supple  Cardiovascular: Normal rate and regular rhythm  Pulmonary/Chest: Effort normal and breath sounds normal    Abdominal: Soft  Bowel sounds are normal    Musculoskeletal: Normal range of motion  Neurological: She is alert and oriented to person, place, and time  Skin: Skin is warm and dry  The patient has a skin callus involving the distal phalanx of the right middle finger adjacent to the nail bed  Maximum diameter approximately 7 mm  The patient has a low mid back recurrent lipoma adjacent to the scar from her previous excisional biopsy approximately 17 years ago       Psychiatric: Her behavior is normal  Judgment and thought content normal

## 2018-11-13 ENCOUNTER — TELEPHONE (OUTPATIENT)
Dept: PAIN MEDICINE | Facility: CLINIC | Age: 58
End: 2018-11-13

## 2018-11-15 ENCOUNTER — TRANSCRIBE ORDERS (OUTPATIENT)
Dept: SURGERY | Facility: CLINIC | Age: 58
End: 2018-11-15

## 2018-11-15 DIAGNOSIS — L84 CORNS AND CALLOSITIES: Primary | ICD-10-CM

## 2018-11-16 NOTE — TELEPHONE ENCOUNTER
Tried to call pt to advised per RM okay to schedule for NON OPIOID THERAPY ONLY     Unable to leave message as voicemail is not set up

## 2018-11-16 NOTE — TELEPHONE ENCOUNTER
Patient is calling back wondering how the treatment of non opoid therapy would help her  Patient cannot take NSAIDs and they do not help

## 2018-11-19 ENCOUNTER — APPOINTMENT (OUTPATIENT)
Dept: LAB | Facility: HOSPITAL | Age: 58
End: 2018-11-19
Attending: INTERNAL MEDICINE
Payer: COMMERCIAL

## 2018-11-19 ENCOUNTER — TRANSCRIBE ORDERS (OUTPATIENT)
Dept: ADMINISTRATIVE | Facility: HOSPITAL | Age: 58
End: 2018-11-19

## 2018-11-19 ENCOUNTER — HOSPITAL ENCOUNTER (OUTPATIENT)
Dept: RADIOLOGY | Facility: HOSPITAL | Age: 58
Discharge: HOME | End: 2018-11-19
Attending: SURGERY
Payer: COMMERCIAL

## 2018-11-19 ENCOUNTER — HOSPITAL ENCOUNTER (OUTPATIENT)
Dept: NON INVASIVE DIAGNOSTICS | Facility: HOSPITAL | Age: 58
Discharge: HOME | End: 2018-11-19
Attending: SURGERY
Payer: COMMERCIAL

## 2018-11-19 DIAGNOSIS — E11.9 TYPE 2 DIABETES MELLITUS WITHOUT COMPLICATION, UNSPECIFIED WHETHER LONG TERM INSULIN USE (HCC): Primary | ICD-10-CM

## 2018-11-19 DIAGNOSIS — D17.1 LIPOMA OF BACK: ICD-10-CM

## 2018-11-19 DIAGNOSIS — E11.9 TYPE 2 DIABETES MELLITUS WITHOUT COMPLICATION, UNSPECIFIED WHETHER LONG TERM INSULIN USE (HCC): ICD-10-CM

## 2018-11-19 PROBLEM — T88.3XXA: Status: ACTIVE | Noted: 2018-11-19

## 2018-11-19 LAB
ANION GAP SERPL CALCULATED.3IONS-SCNC: 8 MMOL/L (ref 4–13)
ATRIAL RATE: 57 BPM
BUN SERPL-MCNC: 17 MG/DL (ref 7–25)
CALCIUM SERPL-MCNC: 9.6 MG/DL (ref 8.6–10.5)
CHLORIDE SERPL-SCNC: 104 MMOL/L (ref 98–107)
CO2 SERPL-SCNC: 28 MMOL/L (ref 21–31)
CREAT SERPL-MCNC: 0.86 MG/DL (ref 0.6–1.2)
CREAT UR-MCNC: 87.1 MG/DL
GFR SERPL CREATININE-BSD FRML MDRD: 74 ML/MIN/1.73SQ M
GLUCOSE P FAST SERPL-MCNC: 152 MG/DL (ref 65–99)
MICROALBUMIN UR-MCNC: 22.2 MG/L (ref 0–20)
MICROALBUMIN/CREAT 24H UR: 25 MG/G CREATININE (ref 0–30)
P AXIS: 55 DEGREES
POTASSIUM SERPL-SCNC: 3.7 MMOL/L (ref 3.5–5.5)
PR INTERVAL: 160 MS
QRS AXIS: -35 DEGREES
QRSD INTERVAL: 94 MS
QT INTERVAL: 426 MS
QTC INTERVAL: 414 MS
SODIUM SERPL-SCNC: 140 MMOL/L (ref 134–143)
T WAVE AXIS: 3 DEGREES
VENTRICULAR RATE: 57 BPM

## 2018-11-19 PROCEDURE — 93005 ELECTROCARDIOGRAM TRACING: CPT

## 2018-11-19 PROCEDURE — 93010 ELECTROCARDIOGRAM REPORT: CPT | Performed by: INTERNAL MEDICINE

## 2018-11-19 PROCEDURE — 36415 COLL VENOUS BLD VENIPUNCTURE: CPT

## 2018-11-19 PROCEDURE — 80048 BASIC METABOLIC PNL TOTAL CA: CPT

## 2018-11-19 PROCEDURE — 82043 UR ALBUMIN QUANTITATIVE: CPT

## 2018-11-19 PROCEDURE — 71046 X-RAY EXAM CHEST 2 VIEWS: CPT

## 2018-11-19 PROCEDURE — 82570 ASSAY OF URINE CREATININE: CPT

## 2018-11-19 PROCEDURE — 3061F NEG MICROALBUMINURIA REV: CPT | Performed by: NURSE PRACTITIONER

## 2018-11-20 ENCOUNTER — APPOINTMENT (OUTPATIENT)
Dept: LAB | Facility: HOSPITAL | Age: 58
End: 2018-11-20
Attending: SURGERY
Payer: COMMERCIAL

## 2018-11-20 ENCOUNTER — HOSPITAL ENCOUNTER (OUTPATIENT)
Dept: NON INVASIVE DIAGNOSTICS | Facility: HOSPITAL | Age: 58
Discharge: HOME | End: 2018-11-20
Attending: SURGERY
Payer: COMMERCIAL

## 2018-11-20 DIAGNOSIS — D17.1 LIPOMA OF BACK: ICD-10-CM

## 2018-11-20 DIAGNOSIS — E11.9 TYPE 2 DIABETES MELLITUS WITHOUT COMPLICATION, UNSPECIFIED WHETHER LONG TERM INSULIN USE (HCC): ICD-10-CM

## 2018-11-26 ENCOUNTER — OFFICE VISIT (OUTPATIENT)
Dept: URGENT CARE | Facility: CLINIC | Age: 58
End: 2018-11-26
Payer: COMMERCIAL

## 2018-11-26 VITALS
OXYGEN SATURATION: 98 % | BODY MASS INDEX: 50.61 KG/M2 | HEART RATE: 70 BPM | SYSTOLIC BLOOD PRESSURE: 132 MMHG | RESPIRATION RATE: 20 BRPM | TEMPERATURE: 97.8 F | WEIGHT: 275 LBS | DIASTOLIC BLOOD PRESSURE: 70 MMHG | HEIGHT: 62 IN

## 2018-11-26 DIAGNOSIS — J01.90 ACUTE SINUSITIS, RECURRENCE NOT SPECIFIED, UNSPECIFIED LOCATION: Primary | ICD-10-CM

## 2018-11-26 PROCEDURE — 99283 EMERGENCY DEPT VISIT LOW MDM: CPT | Performed by: PHYSICIAN ASSISTANT

## 2018-11-26 PROCEDURE — G0382 LEV 3 HOSP TYPE B ED VISIT: HCPCS | Performed by: PHYSICIAN ASSISTANT

## 2018-11-26 RX ORDER — AMOXICILLIN 500 MG/1
500 CAPSULE ORAL EVERY 12 HOURS SCHEDULED
Qty: 20 CAPSULE | Refills: 0 | Status: SHIPPED | OUTPATIENT
Start: 2018-11-26 | End: 2018-11-27 | Stop reason: CLARIF

## 2018-11-26 NOTE — PROGRESS NOTES
[facility]        NAME: Ival Krabbe is a 59 y o  female  : 1960    MRN: [id number]  DATE: 2018  TIME: 9:22 AM    Assessment and Plan   Acute sinusitis, recurrence not specified, unspecified location [J01 90]  1  Acute sinusitis, recurrence not specified, unspecified location  amoxicillin (AMOXIL) 500 mg capsule    DISCONTINUED: amoxicillin (AMOXIL) 500 mg capsule         Patient Instructions       Follow up with PCP in 3-5 days  Proceed to  ER if symptoms worsen  Chief Complaint     Chief Complaint   Patient presents with    Earache     ear and sinus pain for 3 days         History of Present Illness       59 y o  Female presents with post nasal drip and b/l ear pain x 3 days  She states the ear pain is sharp in both ears  C/o sinus congestion  Denies fever, chills, SOB  Review of Systems   Review of Systems   Constitutional: Negative for chills, fatigue and fever  HENT: Positive for congestion, ear pain and sinus pain  Negative for ear discharge, sore throat and trouble swallowing  Eyes: Negative for pain, discharge and redness  Respiratory: Negative for cough, chest tightness, shortness of breath and wheezing  Cardiovascular: Negative for chest pain, palpitations and leg swelling  Gastrointestinal: Negative for abdominal pain, diarrhea, nausea and vomiting  Musculoskeletal: Negative for arthralgias, joint swelling and myalgias  Skin: Negative for rash  Neurological: Negative for dizziness, weakness, numbness and headaches           Current Medications       Current Outpatient Prescriptions:     aspirin 81 mg chewable tablet, Chew 1 tablet (81 mg total) daily, Disp: 30 tablet, Rfl: 0    Calcium Carb-Cholecalciferol (CALCIUM 600+D3 PO), Take by mouth, Disp: , Rfl:     celecoxib (CeleBREX) 200 mg capsule, Take 200 mg by mouth daily as needed, Disp: , Rfl: 0    cholecalciferol (VITAMIN D3) 400 units tablet, Take 400 Units by mouth daily, Disp: , Rfl:     furosemide (LASIX) 20 mg tablet, Take one tablet by mouth daily x 3 days, Disp: 3 tablet, Rfl: 0    gabapentin (NEURONTIN) 300 mg capsule, Take 1 capsule (300 mg total) by mouth 4 (four) times a day, Disp: 120 capsule, Rfl: 0    insulin NPH-insulin regular (NovoLIN 70/30) 100 units/mL subcutaneous injection, Inject 60 Units under the skin 2 (two) times a day before meals for 30 days, Disp: 36 mL, Rfl: 5    metFORMIN (GLUCOPHAGE) 1000 MG tablet, Take 1 tablet (1,000 mg total) by mouth 2 (two) times a day with meals, Disp: 60 tablet, Rfl: 0    multivitamin (THERAGRAN) TABS, Take 1 tablet by mouth daily, Disp: , Rfl:     Omega-3 Fatty Acids (FISH OIL ADULT GUMMIES PO), Take by mouth, Disp: , Rfl:     oxyCODONE (ROXICODONE) 5 mg immediate release tablet, Take 5 mg by mouth 2 (two) times a day as needed, Disp: , Rfl: 0    simvastatin (ZOCOR) 20 mg tablet, Take 1 tablet (20 mg total) by mouth daily at bedtime, Disp: 30 tablet, Rfl: 0    tiZANidine (ZANAFLEX) 4 mg tablet, Take 1 tablet (4 mg total) by mouth every 8 (eight) hours as needed for muscle spasms, Disp: 90 tablet, Rfl: 0    AFLURIA QUADRIVALENT 0 5 ML MANDY, inject 0 5 milliliter intramuscularly, Disp: , Rfl: 0    amoxicillin (AMOXIL) 500 mg capsule, Take 1 capsule (500 mg total) by mouth every 12 (twelve) hours for 10 days, Disp: 20 capsule, Rfl: 0    Current Allergies     Allergies as of 11/26/2018 - Reviewed 11/26/2018   Allergen Reaction Noted    Albumen, egg  07/15/2015    Bioflavonoids  03/18/2016    Brassica oleracea italica  42/89/9584    Latex  11/19/2018    Metronidazole Itching 03/17/2016    Mobic [meloxicam]  11/12/2018    Morphine Hives and Itching 01/17/2017    Prochlorperazine Wheezing 07/15/2015    Sulfa antibiotics  06/18/2015            The following portions of the patient's history were reviewed and updated as appropriate: allergies, current medications, past family history, past medical history, past social history, past surgical history and problem list      Past Medical History:   Diagnosis Date    Asthma     Chronic pain     Diabetes mellitus ([address] )     GERD (gastroesophageal reflux disease)     Hypertension     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     Neuropathy     PCOS (polycystic ovarian syndrome)        Past Surgical History:   Procedure Laterality Date    CERVICAL CONIZATION   W/ LASER      DILATION AND CURETTAGE OF UTERUS      ENDOMETRIAL ABLATION      ENDOMETRIAL BIOPSY      INDUCED       JOINT REPLACEMENT      TONSILLECTOMY         Family History   Problem Relation Age of Onset    Polycystic ovary syndrome Mother     Mental illness Father     Diabetes Father          Medications have been verified  Objective   /70   Pulse 70   Temp 97 8 °F (36 6 °C) (Tympanic)   Resp 20   Ht 5' 2" (1 575 m)   Wt 125 kg (275 lb)   SpO2 98%   BMI 50 30 kg/m²        Physical Exam     Physical Exam   Constitutional: She is oriented to person, place, and time  She appears well-developed and well-nourished  No distress  HENT:   Head: Normocephalic  Right Ear: External ear normal  Tympanic membrane is not injected and not bulging  No middle ear effusion  Left Ear: External ear normal  Tympanic membrane is injected  Tympanic membrane is not bulging  No middle ear effusion  Nose: Nose normal    Mouth/Throat: Uvula is midline and mucous membranes are normal  Posterior oropharyngeal erythema (post nasal drip in posterior pharynx ) present  Eyes: Pupils are equal, round, and reactive to light  Conjunctivae and EOM are normal    Neck: Normal range of motion  Neck supple  Cardiovascular: Normal rate, regular rhythm and normal heart sounds  No murmur heard  Pulmonary/Chest: Effort normal and breath sounds normal  No respiratory distress  She has no wheezes  Abdominal: Soft  Bowel sounds are normal  There is no tenderness  Musculoskeletal: Normal range of motion  Lymphadenopathy:     She has no cervical adenopathy  Neurological: She is alert and oriented to person, place, and time  She has normal reflexes  Skin: Skin is warm and dry  Psychiatric: She has a normal mood and affect  Nursing note and vitals reviewed

## 2018-11-27 RX ORDER — AMOXICILLIN 500 MG/1
500 CAPSULE ORAL EVERY 12 HOURS SCHEDULED
Qty: 20 CAPSULE | Refills: 0 | Status: SHIPPED | OUTPATIENT
Start: 2018-11-27 | End: 2018-12-03

## 2018-11-30 ENCOUNTER — ANESTHESIA EVENT (OUTPATIENT)
Dept: PERIOP | Facility: HOSPITAL | Age: 58
End: 2018-11-30
Payer: COMMERCIAL

## 2018-12-03 ENCOUNTER — OFFICE VISIT (OUTPATIENT)
Dept: PAIN MEDICINE | Facility: CLINIC | Age: 58
End: 2018-12-03
Payer: COMMERCIAL

## 2018-12-03 VITALS — HEIGHT: 62 IN | TEMPERATURE: 99.5 F | WEIGHT: 260 LBS | BODY MASS INDEX: 47.84 KG/M2

## 2018-12-03 DIAGNOSIS — E13.42 DIABETIC POLYNEUROPATHY ASSOCIATED WITH OTHER SPECIFIED DIABETES MELLITUS (HCC): ICD-10-CM

## 2018-12-03 DIAGNOSIS — G89.29 CHRONIC BILATERAL LOW BACK PAIN WITH BILATERAL SCIATICA: Primary | ICD-10-CM

## 2018-12-03 DIAGNOSIS — M62.838 MUSCLE SPASM: ICD-10-CM

## 2018-12-03 DIAGNOSIS — M54.41 CHRONIC BILATERAL LOW BACK PAIN WITH BILATERAL SCIATICA: Primary | ICD-10-CM

## 2018-12-03 DIAGNOSIS — M54.16 LUMBAR RADICULOPATHY: ICD-10-CM

## 2018-12-03 DIAGNOSIS — M54.9 CHRONIC BACK PAIN, UNSPECIFIED BACK LOCATION, UNSPECIFIED BACK PAIN LATERALITY: ICD-10-CM

## 2018-12-03 DIAGNOSIS — G89.29 CHRONIC BACK PAIN, UNSPECIFIED BACK LOCATION, UNSPECIFIED BACK PAIN LATERALITY: ICD-10-CM

## 2018-12-03 DIAGNOSIS — M54.42 CHRONIC BILATERAL LOW BACK PAIN WITH BILATERAL SCIATICA: Primary | ICD-10-CM

## 2018-12-03 PROBLEM — E11.40 DIABETIC NEUROPATHY (HCC): Status: ACTIVE | Noted: 2018-12-03

## 2018-12-03 PROCEDURE — 99204 OFFICE O/P NEW MOD 45 MIN: CPT | Performed by: ANESTHESIOLOGY

## 2018-12-03 RX ORDER — CELECOXIB 200 MG/1
200 CAPSULE ORAL DAILY PRN
Qty: 30 CAPSULE | Refills: 1 | Status: SHIPPED | OUTPATIENT
Start: 2018-12-03 | End: 2019-01-14 | Stop reason: SDUPTHER

## 2018-12-03 RX ORDER — GABAPENTIN 600 MG/1
600 TABLET ORAL 3 TIMES DAILY
Qty: 90 TABLET | Refills: 1 | Status: SHIPPED | OUTPATIENT
Start: 2018-12-03 | End: 2019-01-14 | Stop reason: SDUPTHER

## 2018-12-03 RX ORDER — GABAPENTIN 600 MG/1
600 TABLET ORAL 3 TIMES DAILY
Refills: 0 | COMMUNITY
Start: 2018-11-29 | End: 2018-12-03 | Stop reason: SDUPTHER

## 2018-12-03 NOTE — PROGRESS NOTES
Pt c/o back, leg,foot and hand pain    Assessment:  1  Chronic bilateral low back pain with bilateral sciatica    2  Chronic back pain, unspecified back location, unspecified back pain laterality    3  Lumbar radiculopathy    4  Diabetic polyneuropathy associated with other specified diabetes mellitus ([facility] [address] )        Plan:  Patient is a 43-year-old female with complaints of low back pain and bilateral leg pain complicated by history of diabetic neuropathy presents to office for initial consultation  At this time there is no diagnostic imaging of patient's low back  X-ray lumbar spine from 11/19/2017 shows extensive multilevel degenerative changes throughout the thoracolumbar spine with noted disc space narrowing and osteophyte formation  1   We will obtain MRI of the lumbar spine once patient has completed 4-6 weeks of physical therapy  2   We will start physical therapy for lumbar core strengthening and Sergio based therapy for radicular symptoms  3  We will continue gabapentin 600 milligrams p o  t i d  for lumbar radicular symptoms  4  We will continue Celecoxib 200mg po QD for arthritic pain  5  We will obtain a urine drug screen at this office visit based on those results we will start patient on low-dose opioid pain regimen of oxycodone 5 mg p o  B i d  [place] Prescription Drug Monitoring Program report was reviewed and was appropriate         My impressions and treatment recommendations were discussed in detail with the patient who verbalized understanding and had no further questions  Discharge instructions were provided  I personally saw and examined the patient and I agree with the above discussed plan of care  No orders of the defined types were placed in this encounter  No orders of the defined types were placed in this encounter        History of Present Illness:  Darshana Monsalve is a 59 y o  female with complaints of low back pain and bilateral leg pain complicated by history of diabetic neuropathy presents to office for initial consultation  The patient notes his problems been existence for several years  He denies any traumatic event which led to his pain  His pain as 8/10, nearly constant, without any particular pain pattern  Patient describes his pain as burning, cramping, shooting, numbness, dull/aching bilateral hands bilateral feet low back and bilateral lower extremities with notable weakness and left leg  Patient reports pain is increased by standing, bending, sitting, walking, exercise  Pain is decreased by lying down, relaxation  Pain does not change with kneeling, coughing/sneezing, bowel movements  Patient requires a cane for gait assistance  Patient reported having surgery which consist of both bilateral hip replacement with excellent relief  Patient notes moderately from heat/ice therapy  Patient denies any relief from physical therapy exercise or TENS unit  Patient is tried Lyrica in the past with no alleviation of symptoms  Patient is currently taking gabapentin 600 milligrams p o  t i d , Celexoxib 200 milligrams p o  daily, tizanidine 4 milligrams p o  b i d , oxycodone 5 milligrams p o  b i d  which provided with relieve some of the time  I have personally reviewed and/or updated the patient's past medical history, past surgical history, family history, social history, current medications, allergies, and vital signs today  Review of Systems   Constitutional: Positive for unexpected weight change  Negative for fever  HENT: Negative for trouble swallowing  Eyes: Negative for visual disturbance  Respiratory: Negative for shortness of breath and wheezing  Cardiovascular: Negative for chest pain and palpitations  Gastrointestinal: Negative for constipation, diarrhea, nausea and vomiting  Endocrine: Negative for cold intolerance, heat intolerance and polydipsia  Genitourinary: Negative for difficulty urinating and frequency  Musculoskeletal: Positive for joint swelling  Negative for arthralgias, gait problem and myalgias  Joint stiffness  Decreased ROM   Skin: Negative for rash  Neurological: Negative for dizziness, seizures, syncope, weakness and headaches  Hematological: Does not bruise/bleed easily  Psychiatric/Behavioral: Negative for dysphoric mood  All other systems reviewed and are negative        Patient Active Problem List   Diagnosis    Vitamin D deficiency    Type 2 diabetes mellitus with diabetic neuropathy, with long-term current use of insulin ([address] )    History of Salmonella infection    Obesity, morbid ([address] )    Melanocytic nevus of trunk    Menopausal and perimenopausal disorder    Insomnia    Essential hypertension    Hyperlipidemia    History of pulmonary embolism    GERD (gastroesophageal reflux disease)    Gait disturbance    Diabetic ulcer of left heel (HCC)    Stage 3 chronic kidney disease (HCC)    Benign mole    Arthritis    Anxiety    Anemia    Achilles tendinitis of left lower extremity    Ulcer of toe of left foot ([address] )    History of MRSA infection    Screening for colon cancer    Screening for breast cancer    Screening for cervical cancer    Eye exam, routine    Chronic pain syndrome    Chest pain    Osteoarthritis of spine    Leg swelling    Malignant hyperthermia due to anesthesia    Skin callus    Lipoma of back    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     Neuropathy     PCOS (polycystic ovarian syndrome)        Past Surgical History:   Procedure Laterality Date    CERVICAL CONIZATION   W/ LASER      DILATION AND CURETTAGE OF UTERUS      ENDOMETRIAL ABLATION      ENDOMETRIAL BIOPSY      INDUCED       JOINT REPLACEMENT      KNEE ARTHROPLASTY Right     ROTATOR CUFF REPAIR Right     TONSILLECTOMY         Family History   Problem Relation Age of Onset    Polycystic ovary syndrome Mother     Mental illness Father     Diabetes Father        Social History     Occupational History    Not on file       Social History Main Topics    Smoking status: Never Smoker    Smokeless tobacco: Never Used    Alcohol use No    Drug use: No    Sexual activity: No       Current Outpatient Prescriptions on File Prior to Visit   Medication Sig    AFLURIA QUADRIVALENT 0 5 ML MANDY inject 0 5 milliliter intramuscularly    amoxicillin (AMOXIL) 500 mg capsule Take 1 capsule (500 mg total) by mouth every 12 (twelve) hours for 10 days    aspirin 81 mg chewable tablet Chew 1 tablet (81 mg total) daily    Calcium Carb-Cholecalciferol (CALCIUM 600+D3 PO) Take by mouth daily      celecoxib (CeleBREX) 200 mg capsule Take 200 mg by mouth daily as needed    cholecalciferol (VITAMIN D3) 400 units tablet Take 400 Units by mouth daily    furosemide (LASIX) 20 mg tablet Take one tablet by mouth daily x 3 days (Patient taking differently: Take 20 mg by mouth as needed Take one tablet by mouth daily x 3 days )    gabapentin (NEURONTIN) 300 mg capsule Take 1 capsule (300 mg total) by mouth 4 (four) times a day    insulin NPH-insulin regular (NovoLIN 70/30) 100 units/mL subcutaneous injection Inject 60 Units under the skin 2 (two) times a day before meals for 30 days    metFORMIN (GLUCOPHAGE) 1000 MG tablet Take 1 tablet (1,000 mg total) by mouth 2 (two) times a day with meals    multivitamin (THERAGRAN) TABS Take 1 tablet by mouth daily    Omega-3 Fatty Acids (FISH OIL ADULT GUMMIES PO) Take by mouth daily      oxyCODONE (ROXICODONE) 5 mg immediate release tablet Take 5 mg by mouth 2 (two) times a day as needed    simvastatin (ZOCOR) 20 mg tablet Take 1 tablet (20 mg total) by mouth daily at bedtime    tiZANidine (ZANAFLEX) 4 mg tablet Take 1 tablet (4 mg total) by mouth every 8 (eight) hours as needed for muscle spasms (Patient taking differently: Take 4 mg by mouth 2 (two) times a day  )     No current facility-administered medications on file prior to visit  Allergies   Allergen Reactions    Cauliflower [Brassica Oleracea Italica] Other (See Comments)     Other reaction(s): GI upset  Abdominal pain    Citrus Bioflavonoid Other (See Comments)     Skin burns    Latex Other (See Comments)     burning    Mobic [Meloxicam] Swelling    Morphine Hives and Itching    Prochlorperazine Wheezing     Other reaction(s): Other (See Comments)  SOB    Albumen, Egg Vomiting     Other reaction(s): Nausea and/or vomiting    Metronidazole Itching    Sulfa Antibiotics Other (See Comments)     Other reaction(s): Unknown Reaction       Physical Exam:    There were no vitals taken for this visit  Constitutional:normal, well developed, well nourished, alert, in no distress and non-toxic and no overt pain behavior  and obese  Eyes:anicteric  HEENT:grossly intact  Neck:supple, symmetric, trachea midline and no masses   Pulmonary:even and unlabored  Cardiovascular:No edema or pitting edema present  Skin:Normal without rashes or lesions and well hydrated  Psychiatric:Mood and affect appropriate  Neurologic:Cranial Nerves II-XII grossly intact  Musculoskeletal:antalgic and ambulates with cane     Lumbar/Sacral Spine examination demonstrates  Decreased range of motion lumbar spine with pain upon: flexion, lateral rotation to the left/right, and bending to the left/right  Bilateral lumbar paraspinals tender to palpation  Muscle spasms noted in the lumbar area bilaterally  3/5 lower extremity strength in all muscle groups left leg   Positive seated straight leg raise for left lower extremity  Sensitivity to light touch decreased in bilateral feet  2+ reflexes in the patella and Achilles    No ankle clonus     Imaging

## 2018-12-04 ENCOUNTER — ANESTHESIA (OUTPATIENT)
Dept: PERIOP | Facility: HOSPITAL | Age: 58
End: 2018-12-04
Payer: COMMERCIAL

## 2018-12-04 ENCOUNTER — HOSPITAL ENCOUNTER (OUTPATIENT)
Facility: HOSPITAL | Age: 58
Setting detail: OUTPATIENT SURGERY
Discharge: HOME | End: 2018-12-04
Attending: SURGERY | Admitting: SURGERY
Payer: COMMERCIAL

## 2018-12-04 VITALS
RESPIRATION RATE: 16 BRPM | WEIGHT: 260 LBS | SYSTOLIC BLOOD PRESSURE: 127 MMHG | DIASTOLIC BLOOD PRESSURE: 60 MMHG | TEMPERATURE: 96.5 F | HEART RATE: 71 BPM | BODY MASS INDEX: 47.84 KG/M2 | HEIGHT: 62 IN | OXYGEN SATURATION: 98 %

## 2018-12-04 DIAGNOSIS — D17.1 LIPOMA OF BACK: ICD-10-CM

## 2018-12-04 DIAGNOSIS — T88.3XXA MALIGNANT HYPERTHERMIA DUE TO ANESTHESIA, INITIAL ENCOUNTER: ICD-10-CM

## 2018-12-04 DIAGNOSIS — L84 SKIN CALLUS: ICD-10-CM

## 2018-12-04 LAB
GLUCOSE SERPL-MCNC: 137 MG/DL (ref 65–140)
GLUCOSE SERPL-MCNC: 154 MG/DL (ref 65–140)

## 2018-12-04 PROCEDURE — 26160 REMOVE TENDON SHEATH LESION: CPT | Performed by: PHYSICIAN ASSISTANT

## 2018-12-04 PROCEDURE — 88304 TISSUE EXAM BY PATHOLOGIST: CPT | Performed by: PATHOLOGY

## 2018-12-04 PROCEDURE — 26160 REMOVE TENDON SHEATH LESION: CPT | Performed by: SURGERY

## 2018-12-04 PROCEDURE — 21931 EXC BACK LES SC 3 CM/>: CPT | Performed by: PHYSICIAN ASSISTANT

## 2018-12-04 PROCEDURE — 88307 TISSUE EXAM BY PATHOLOGIST: CPT | Performed by: PATHOLOGY

## 2018-12-04 PROCEDURE — 82948 REAGENT STRIP/BLOOD GLUCOSE: CPT

## 2018-12-04 PROCEDURE — 21931 EXC BACK LES SC 3 CM/>: CPT | Performed by: SURGERY

## 2018-12-04 RX ORDER — ONDANSETRON 2 MG/ML
4 INJECTION INTRAMUSCULAR; INTRAVENOUS ONCE AS NEEDED
Status: DISCONTINUED | OUTPATIENT
Start: 2018-12-04 | End: 2018-12-04 | Stop reason: HOSPADM

## 2018-12-04 RX ORDER — FENTANYL CITRATE/PF 50 MCG/ML
25 SYRINGE (ML) INJECTION
Status: DISCONTINUED | OUTPATIENT
Start: 2018-12-04 | End: 2018-12-04 | Stop reason: HOSPADM

## 2018-12-04 RX ORDER — SODIUM CHLORIDE, SODIUM LACTATE, POTASSIUM CHLORIDE, CALCIUM CHLORIDE 600; 310; 30; 20 MG/100ML; MG/100ML; MG/100ML; MG/100ML
125 INJECTION, SOLUTION INTRAVENOUS CONTINUOUS
Status: DISCONTINUED | OUTPATIENT
Start: 2018-12-04 | End: 2018-12-04

## 2018-12-04 RX ORDER — ONDANSETRON 2 MG/ML
4 INJECTION INTRAMUSCULAR; INTRAVENOUS EVERY 6 HOURS PRN
Status: DISCONTINUED | OUTPATIENT
Start: 2018-12-04 | End: 2018-12-04 | Stop reason: HOSPADM

## 2018-12-04 RX ORDER — MIDAZOLAM HYDROCHLORIDE 1 MG/ML
INJECTION INTRAMUSCULAR; INTRAVENOUS AS NEEDED
Status: DISCONTINUED | OUTPATIENT
Start: 2018-12-04 | End: 2018-12-04 | Stop reason: SURG

## 2018-12-04 RX ORDER — BACITRACIN, NEOMYCIN, POLYMYXIN B 400; 3.5; 5 [USP'U]/G; MG/G; [USP'U]/G
OINTMENT TOPICAL AS NEEDED
Status: DISCONTINUED | OUTPATIENT
Start: 2018-12-04 | End: 2018-12-04 | Stop reason: HOSPADM

## 2018-12-04 RX ORDER — MAGNESIUM HYDROXIDE 1200 MG/15ML
LIQUID ORAL AS NEEDED
Status: DISCONTINUED | OUTPATIENT
Start: 2018-12-04 | End: 2018-12-04 | Stop reason: HOSPADM

## 2018-12-04 RX ORDER — ONDANSETRON 2 MG/ML
INJECTION INTRAMUSCULAR; INTRAVENOUS AS NEEDED
Status: DISCONTINUED | OUTPATIENT
Start: 2018-12-04 | End: 2018-12-04 | Stop reason: SURG

## 2018-12-04 RX ORDER — CEFAZOLIN SODIUM 1 G/50ML
1000 SOLUTION INTRAVENOUS ONCE
Status: COMPLETED | OUTPATIENT
Start: 2018-12-04 | End: 2018-12-04

## 2018-12-04 RX ORDER — ALBUTEROL SULFATE 2.5 MG/3ML
2.5 SOLUTION RESPIRATORY (INHALATION) EVERY 6 HOURS PRN
Status: DISCONTINUED | OUTPATIENT
Start: 2018-12-04 | End: 2018-12-04 | Stop reason: HOSPADM

## 2018-12-04 RX ORDER — KETAMINE HYDROCHLORIDE 50 MG/ML
INJECTION, SOLUTION, CONCENTRATE INTRAMUSCULAR; INTRAVENOUS AS NEEDED
Status: DISCONTINUED | OUTPATIENT
Start: 2018-12-04 | End: 2018-12-04 | Stop reason: SURG

## 2018-12-04 RX ORDER — LIDOCAINE HYDROCHLORIDE 10 MG/ML
INJECTION, SOLUTION INFILTRATION; PERINEURAL AS NEEDED
Status: DISCONTINUED | OUTPATIENT
Start: 2018-12-04 | End: 2018-12-04 | Stop reason: HOSPADM

## 2018-12-04 RX ORDER — SODIUM CHLORIDE, SODIUM LACTATE, POTASSIUM CHLORIDE, CALCIUM CHLORIDE 600; 310; 30; 20 MG/100ML; MG/100ML; MG/100ML; MG/100ML
125 INJECTION, SOLUTION INTRAVENOUS CONTINUOUS
Status: ACTIVE | OUTPATIENT
Start: 2018-12-04 | End: 2018-12-04

## 2018-12-04 RX ORDER — LIDOCAINE HYDROCHLORIDE AND EPINEPHRINE 10; 10 MG/ML; UG/ML
INJECTION, SOLUTION INFILTRATION; PERINEURAL AS NEEDED
Status: DISCONTINUED | OUTPATIENT
Start: 2018-12-04 | End: 2018-12-04 | Stop reason: HOSPADM

## 2018-12-04 RX ORDER — ACETAMINOPHEN 325 MG/1
650 TABLET ORAL EVERY 6 HOURS PRN
Status: DISCONTINUED | OUTPATIENT
Start: 2018-12-04 | End: 2018-12-04 | Stop reason: HOSPADM

## 2018-12-04 RX ORDER — FENTANYL CITRATE 50 UG/ML
INJECTION, SOLUTION INTRAMUSCULAR; INTRAVENOUS AS NEEDED
Status: DISCONTINUED | OUTPATIENT
Start: 2018-12-04 | End: 2018-12-04 | Stop reason: SURG

## 2018-12-04 RX ADMIN — SODIUM CHLORIDE, POTASSIUM CHLORIDE, SODIUM LACTATE AND CALCIUM CHLORIDE 125 ML/HR: 600; 310; 30; 20 INJECTION, SOLUTION INTRAVENOUS at 07:09

## 2018-12-04 RX ADMIN — KETAMINE HYDROCHLORIDE 20 MG: 50 INJECTION, SOLUTION INTRAMUSCULAR; INTRAVENOUS at 08:14

## 2018-12-04 RX ADMIN — KETAMINE HYDROCHLORIDE 30 MG: 50 INJECTION, SOLUTION INTRAMUSCULAR; INTRAVENOUS at 08:32

## 2018-12-04 RX ADMIN — ONDANSETRON HYDROCHLORIDE 4 MG: 2 INJECTION, SOLUTION INTRAVENOUS at 09:09

## 2018-12-04 RX ADMIN — MIDAZOLAM HYDROCHLORIDE 2 MG: 1 INJECTION, SOLUTION INTRAMUSCULAR; INTRAVENOUS at 08:21

## 2018-12-04 RX ADMIN — FENTANYL CITRATE 50 MCG: 50 INJECTION INTRAMUSCULAR; INTRAVENOUS at 08:45

## 2018-12-04 RX ADMIN — FENTANYL CITRATE 50 MCG: 50 INJECTION INTRAMUSCULAR; INTRAVENOUS at 08:14

## 2018-12-04 RX ADMIN — CEFAZOLIN SODIUM 2000 MG: 1 SOLUTION INTRAVENOUS at 08:00

## 2018-12-04 RX ADMIN — KETAMINE HYDROCHLORIDE 20 MG: 50 INJECTION, SOLUTION INTRAMUSCULAR; INTRAVENOUS at 08:25

## 2018-12-04 RX ADMIN — MIDAZOLAM HYDROCHLORIDE 2 MG: 1 INJECTION, SOLUTION INTRAMUSCULAR; INTRAVENOUS at 08:45

## 2018-12-04 RX ADMIN — MIDAZOLAM HYDROCHLORIDE 2 MG: 1 INJECTION, SOLUTION INTRAMUSCULAR; INTRAVENOUS at 08:07

## 2018-12-04 RX ADMIN — FENTANYL CITRATE 50 MCG: 50 INJECTION INTRAMUSCULAR; INTRAVENOUS at 08:10

## 2018-12-04 RX ADMIN — KETAMINE HYDROCHLORIDE 30 MG: 50 INJECTION, SOLUTION INTRAMUSCULAR; INTRAVENOUS at 08:19

## 2018-12-04 RX ADMIN — FENTANYL CITRATE 50 MCG: 50 INJECTION INTRAMUSCULAR; INTRAVENOUS at 08:51

## 2018-12-04 NOTE — H&P (VIEW-ONLY)
Assessment/Plan:    Skin callus  The patient has a skin callus of the distal phalanx of the right middle finger which is symptomatic to the patient and for which she desires definitive treatment  The technical details of the procedure as well as the options benefits risks and alternatives were thoroughly discussed with the patient including the option for 2nd surgical opinion by hand specialist   All questions answered to her satisfaction and informed consent obtained to proceed with excisional biopsy at the time of her lipoma excision under anesthesia in the operating room  Lipoma of back  The patient has a recurrent low mid back lipoma which is symptomatic to her for which she desires definitive treatment  The options benefits risks and alternatives to excision were explained  She understands the benign nature of the condition  All questions answered to her satisfaction and informed consent obtained to proceed  Malignant hyperthermia due to anesthesia  The patient has a history for malignant hyperthermia in the early 2000s  I have instructed her to address this with the anesthesiologist prior to the day of operation  Diagnoses and all orders for this visit:    Skin callus    Lipoma of back    Malignant hyperthermia due to anesthesia, initial encounter    Other orders  -     celecoxib (CeleBREX) 200 mg capsule; Take 200 mg by mouth daily as needed  -     oxyCODONE (ROXICODONE) 5 mg immediate release tablet; Take 5 mg by mouth 2 (two) times a day as needed  -     Omega-3 Fatty Acids (FISH OIL ADULT GUMMIES PO); Take by mouth  -     multivitamin (THERAGRAN) TABS; Take 1 tablet by mouth daily  -     cholecalciferol (VITAMIN D3) 400 units tablet; Take 400 Units by mouth daily  -     Calcium Carb-Cholecalciferol (CALCIUM 600+D3 PO); Take by mouth          Subjective:      Patient ID: Jeanmarie Yoder is a 59 y o  female      HPI    The following portions of the patient's history were reviewed and updated as appropriate: allergies, current medications, past family history, past medical history, past social history, past surgical history and problem list     Review of Systems   Constitutional: Negative  HENT: Negative  Eyes: Negative  Respiratory: Negative  Cardiovascular: Negative  Gastrointestinal: Negative  Endocrine: Negative  Genitourinary: Negative  Musculoskeletal: Negative  Skin: Negative  Allergic/Immunologic: Negative  Neurological: Negative  Hematological: Negative  Psychiatric/Behavioral: Negative  Objective: There were no vitals taken for this visit  Physical Exam   Constitutional: She is oriented to person, place, and time  She appears well-developed and well-nourished  HENT:   Head: Normocephalic and atraumatic  Eyes: Pupils are equal, round, and reactive to light  Conjunctivae are normal    Neck: Normal range of motion  Neck supple  Cardiovascular: Normal rate and regular rhythm  Pulmonary/Chest: Effort normal and breath sounds normal    Abdominal: Soft  Bowel sounds are normal    Musculoskeletal: Normal range of motion  Neurological: She is alert and oriented to person, place, and time  Skin: Skin is warm and dry  The patient has a skin callus involving the distal phalanx of the right middle finger adjacent to the nail bed  Maximum diameter approximately 7 mm  The patient has a low mid back recurrent lipoma adjacent to the scar from her previous excisional biopsy approximately 17 years ago       Psychiatric: Her behavior is normal  Judgment and thought content normal

## 2018-12-04 NOTE — DISCHARGE INSTRUCTIONS
Lipoma   WHAT YOU SHOULD KNOW:   A lipoma is a lump made up of fat cells  It is most often found just under your skin on your shoulders, back, or neck, but can be found in other areas of your body  Multiple lipomas found in different areas of your body is called lipomatosis  Lipomas normally grow very slowly and rarely turn into cancer  INSTRUCTIONS:   Follow up with your primary healthcare provider as directed: If you had your lipoma removed, you may need to return to have your wound checked or stitches removed  Write down your questions so you remember to ask them during your visits  Contact your primary healthcare provider if:   · You have blood in your bowel movement  · Your lipoma increases in size  · Your lipoma is painful or you have pain in the area of your lipoma  · You have questions or concerns about your condition or care  Return to the emergency department if:   · You feel a lump in your throat or have trouble swallowing  · You suddenly have trouble breathing  © 2014 [address] is for End User's use only and may not be sold, redistributed or otherwise used for commercial purposes  All illustrations and images included in CareNotes® are the copyrighted property of [employer]  or Ryan Garvey  The above information is an  only  It is not intended as medical advice for individual conditions or treatments  Talk to your doctor, nurse or pharmacist before following any medical regimen to see if it is safe and effective for you

## 2018-12-04 NOTE — ANESTHESIA PREPROCEDURE EVALUATION
Review of Systems/Medical History  Patient summary reviewed  Chart reviewed  History of anesthetic complications malignant hyperthermia    Cardiovascular  Hyperlipidemia, Hypertension ,    Pulmonary  Asthma ,        GI/Hepatic    GERD ,             Endo/Other  Diabetes ,      GYN       Hematology  Anemia ,     Musculoskeletal    Arthritis     Neurology   Psychology   Anxiety,              Physical Exam    Airway    Mallampati score: II  TM Distance: >3 FB  Neck ROM: full     Dental       Cardiovascular      Pulmonary  Breath sounds clear to auscultation,     Other Findings        Anesthesia Plan  ASA Score- 3     Anesthesia Type- IV sedation with anesthesia with ASA Monitors  Additional Monitors:   Airway Plan:         Plan Factors-  Patient did not smoke on day of surgery  Induction- intravenous  Postoperative Plan- Plan for postoperative opioid use  Informed Consent- Anesthetic plan and risks discussed with patient  I personally reviewed this patient with the CRNA  Discussed and agreed on the Anesthesia Plan with the CRNA  Trell Pineda

## 2018-12-04 NOTE — DISCHARGE INSTR - AVS FIRST PAGE
Discharge Instructions  Light activity for 2 weeks  Remove dressing in 3-5 days  Surgical glue will fall off with time  You may shower over dressing, replace if soiled  Take discharge medications as prescribed  Notify our office for nausea, vomiting, fever, diarrhea, chest pain, trouble breathing  Follow up in our office in 2 weeks or sooner if needed  Call with additional questions or concerns [phone]

## 2018-12-04 NOTE — OP NOTE
OPERATIVE REPORT  PATIENT NAME: Luis Burton    :  1960  MRN: [id number]  Pt Location: [facility] OR ROOM 02    SURGERY DATE: 2018    Surgeon(s) and Role:     Waldemar Sandoval MD - Primary     * Ryan Arreola PA-C - Assisting  The PA was necessary to provide expert assistance; i e  in the form of providing optimal exposure with retraction, suturing, and assistance with dissection in order to perform the most efficient operation and in order to optimize patient safety in the abscence of a qualified surgical resident  Preop Diagnosis:  Lipoma of back [D17 1]  Skin callus [L84]  Malignant hyperthermia due to anesthesia, initial encounter [T88  3XXA]    Post-Op Diagnosis Codes:     * Lipoma of back [D17 1]     * Malignant hyperthermia due to anesthesia, initial encounter [T88  3XXA]     * Disorder of tendon of finger [M67 949]    Procedure(s) (LRB):  BACK LIPOMA EXCISION (N/A)  DEBRIDEMENT HAND/FINGER ([address] OUT) (Right)    Specimen(s):  ID Type Source Tests Collected by Time Destination   1 : back lipoma Tissue Back TISSUE EXAM Josiane Salomon MD 2018 [id number]    2 : middle finger connective tissue right hand Tissue Finger, Right TISSUE Syed Hargrove MD 2018 [id number]        Estimated Blood Loss:   Minimal    Drains:       Anesthesia Type:   IV Sedation with Anesthesia    Operative Indications:  Lipoma of back [D17 1]  Skin callus [L84]  Malignant hyperthermia due to anesthesia, initial encounter [T88  3XXA]  The patient is a pleasant 68-year-old female with a past medical history significant for morbid obesity presenting with a recurrent low by a lipoma which is symptomatic to the patient for which she desires definitive treatment  Additionally the patient has a bothersome lesion on the ulnar are aspect on the edge of the nail bed of the right middle finger of uncertain etiology  It appears most likely to be a callus    The patient finds the lesion bothersome and desires definitive treatment at the time of the same anesthetic  Operative Findings:  The patient had a 2 cm mid low back lipoma  This is a recurrent lipoma centered over the site of a previous excision  It was superficial in location and fixed to the overlying dermis  The use of a 15  Scalpel the lipoma was carved off of the overlying dermis excised sharply  Dissection carried down to deep fascia  There is no violation of the fascia or intramuscular component identified  The lesion excised in its entirety and sent for permanent pathologic evaluation  The wound irrigated and closed in layers  The patient tolerated that procedure well  The patient then rolled back supine  And attention and turned to the right middle finger callus  The 1st step of the dissection involve resection of the 3 mm piece of the nail bed on the ulnar side of the right middle finger  This allowed us to the exposed and unroof the callus  Directly beneath it was exposed extensor tendon  This was the nidus and etiology of the bothersome lump  The exposed extensor tendon was shaved back  Good soft tissue coverage was able to be achieved with reapproximation of dermis to the medial aspect of the right middle finger nail bed and this procedure completed uneventfully  The patient tolerated both procedures well  Complications:   None    Procedure and Technique:  The patient was taken to the operating room where she was properly identified as Jennifer Wynne  She was properly identified monitored and anesthetized  She received antibiotics perioperatively  Denies used for DVT prophylaxis  The patient properly identified monitored rolled into the left lateral decubitus position and sedated  Skin of the back prepped and draped under sterile conditions using aseptic technique time-out performed  Skin infiltrated with 1% lidocaine local anesthesia with epinephrine  Skin incised    The subcutaneous lipoma readily identified dissected out circumferentially with the use of the 15  Scalpel and dissected out of the subcutaneous space  Specimen sent for permanent pathologic evaluation  Hemostasis ensured with electrocautery  The wound irrigated and closed in layers with interrupted 3 0 Vicryl on the deep subcu tissue and running subcuticular 4 Monocryl suture on skin  The wound dressed  The patient rolled back supine  Our attention then turned to the right middle finger lesion where again the skin prepped draped  The right middle finger callus and nail bed inspected  The decision made to resect the medial 3 mm of the right middle finger nail bed  After this was done we unroof the callus exposing the unroofed extensor tendon  This extensor tendon was shaved back and specimen sent for permanent pathologic evaluation  Hemostasis ensured with electrocautery after which soft tissue reapproximation satisfactorily achieved with multiple interrupted sutures of 4 0 nylon  The wound dressed  The patient taken to recovery in stable condition  She tolerated the procedure well     I was present for the entire procedure    Patient Disposition:  PACU     SIGNATURE: Cameron Braun MD  DATE: December 4, 2018  TIME: 9:18 AM

## 2018-12-06 ENCOUNTER — TELEPHONE (OUTPATIENT)
Dept: PAIN MEDICINE | Facility: CLINIC | Age: 58
End: 2018-12-06

## 2018-12-06 NOTE — TELEPHONE ENCOUNTER
Patient is calling because she was in the office on Monday  She is letting the doctor know that she did have a script filled for Hydrocodone-acetaminaphin 5-325mg  She just had surgery and this was what she was prescribed

## 2019-01-04 ENCOUNTER — TELEPHONE (OUTPATIENT)
Dept: PAIN MEDICINE | Facility: MEDICAL CENTER | Age: 59
End: 2019-01-04

## 2019-01-04 NOTE — TELEPHONE ENCOUNTER
S/w pt called to confirm her appt 11/14/19, would like office to be aware that she did not start PT, she was hospitalized for the flu and is still not feeling well

## 2019-01-14 ENCOUNTER — OFFICE VISIT (OUTPATIENT)
Dept: PAIN MEDICINE | Facility: CLINIC | Age: 59
End: 2019-01-14
Payer: COMMERCIAL

## 2019-01-14 VITALS — SYSTOLIC BLOOD PRESSURE: 116 MMHG | DIASTOLIC BLOOD PRESSURE: 70 MMHG | WEIGHT: 261.8 LBS | BODY MASS INDEX: 47.88 KG/M2

## 2019-01-14 DIAGNOSIS — G89.4 CHRONIC PAIN SYNDROME: ICD-10-CM

## 2019-01-14 DIAGNOSIS — F11.20 UNCOMPLICATED OPIOID DEPENDENCE (HCC): ICD-10-CM

## 2019-01-14 DIAGNOSIS — E66.01 OBESITY, MORBID (HCC): ICD-10-CM

## 2019-01-14 DIAGNOSIS — M54.41 CHRONIC BILATERAL LOW BACK PAIN WITH BILATERAL SCIATICA: Primary | ICD-10-CM

## 2019-01-14 DIAGNOSIS — M54.9 CHRONIC BACK PAIN, UNSPECIFIED BACK LOCATION, UNSPECIFIED BACK PAIN LATERALITY: ICD-10-CM

## 2019-01-14 DIAGNOSIS — M54.42 CHRONIC BILATERAL LOW BACK PAIN WITH BILATERAL SCIATICA: Primary | ICD-10-CM

## 2019-01-14 DIAGNOSIS — G89.29 CHRONIC BACK PAIN, UNSPECIFIED BACK LOCATION, UNSPECIFIED BACK PAIN LATERALITY: ICD-10-CM

## 2019-01-14 DIAGNOSIS — E13.42 DIABETIC POLYNEUROPATHY ASSOCIATED WITH OTHER SPECIFIED DIABETES MELLITUS (HCC): ICD-10-CM

## 2019-01-14 DIAGNOSIS — G89.29 CHRONIC BILATERAL LOW BACK PAIN WITH BILATERAL SCIATICA: Primary | ICD-10-CM

## 2019-01-14 DIAGNOSIS — M54.16 LUMBAR RADICULOPATHY: ICD-10-CM

## 2019-01-14 PROCEDURE — 99214 OFFICE O/P EST MOD 30 MIN: CPT | Performed by: ANESTHESIOLOGY

## 2019-01-14 RX ORDER — GABAPENTIN 600 MG/1
600 TABLET ORAL 3 TIMES DAILY
Qty: 90 TABLET | Refills: 0 | Status: SHIPPED | OUTPATIENT
Start: 2019-01-14 | End: 2019-02-11 | Stop reason: SDUPTHER

## 2019-01-14 RX ORDER — HYDROCODONE BITARTRATE AND ACETAMINOPHEN 5; 325 MG/1; MG/1
1 TABLET ORAL 2 TIMES DAILY
COMMUNITY
End: 2019-01-14

## 2019-01-14 RX ORDER — CELECOXIB 200 MG/1
200 CAPSULE ORAL DAILY PRN
Qty: 30 CAPSULE | Refills: 0 | Status: SHIPPED | OUTPATIENT
Start: 2019-01-14 | End: 2019-02-11 | Stop reason: SDUPTHER

## 2019-01-14 RX ORDER — OXYCODONE HYDROCHLORIDE 5 MG/1
5 TABLET ORAL 2 TIMES DAILY PRN
Qty: 60 TABLET | Refills: 0 | Status: SHIPPED | OUTPATIENT
Start: 2019-01-14 | End: 2019-04-08

## 2019-01-14 NOTE — PROGRESS NOTES
Assessment:  1  Chronic bilateral low back pain with bilateral sciatica    2  Lumbar radiculopathy    3  Diabetic polyneuropathy associated with other specified diabetes mellitus ([address] )    4  Chronic pain syndrome    5  Obesity, morbid ([address] )    6  Uncomplicated opioid dependence ([facility] )        Plan:  Patient is a 76-year-old female with complaints of low back pain and bilateral leg pain complicated by history of diabetic neuropathy presents to office for f/u visit  At this time there is still no diagnostic imaging of patient's low back even though it was ordered at [address] [place] [address]  1   We will obtain MRI of the lumbar spine once patient has completed 4-6 weeks of physical therapy  2   We will start physical therapy for lumbar core strengthening and Sergio based therapy for radicular symptoms  3  We will continue gabapentin 600 milligrams p o  t i d  for lumbar radicular symptoms  4  We will continue Celecoxib 200mg po QD for arthritic pain  5  UDS was reviewed and found to be within normal limits  We will start patient on a low-dose opioid pain regimen of oxycodone 5 mg p o  B i d         Complete risks and benefits including bleeding, infection, tissue reaction, nerve injury and allergic reaction were discussed  The approach was demonstrated using models and literature was provided  Verbal and written consent was obtained  There are risks associated with opioid medications, including dependence, addiction and tolerance  The patient understands and agrees to use these medications only as prescribed  Potential side effects of the medications include, but are not limited to, constipation, drowsiness, addiction, impaired judgment and risk of fatal overdose if not taken as prescribed  The patient was warned against driving while taking sedation medications  Sharing medications is a felony  At this point in time, the patient is showing no signs of addiction, abuse, diversion or suicidal ideation          [place] Prescription Drug Monitoring Program report was reviewed and was appropriate       History of Present Illness: The patient is a 59 y o  female who presents for a follow up office visit in regards to Back Pain; Hand Pain; Leg Pain; and Foot Pain  The patients current symptoms include 8/10 constant dull/aching, cramping, pins and needles in low back and bilateral feet which worse in the evening at nighttime  Current pain medications includes:  None    The patient reports that this regimen is providing 0% pain relief  The patient is reporting no side effects from this pain medication regimen  I have personally reviewed and/or updated the patient's past medical history, past surgical history, family history, social history, current medications, allergies, and vital signs today  Review of Systems  Review of Systems   Musculoskeletal: Positive for arthralgias, gait problem and joint swelling  Decreased Range of Motion  Joint stiffness  Pain in Extremity - legs, feet, hands   All other systems reviewed and are negative  Past Medical History:   Diagnosis Date    Anesthesia related hyperthermia     pt states she had high fevers after her lipoma surgery in  at our hospital and was shipped to [facility] where they said t was from an anesthesia med    Asthma     Chronic pain     Diabetes mellitus ([address] )     GERD (gastroesophageal reflux disease)     Hypertension     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     Neuropathy     PCOS (polycystic ovarian syndrome)        Past Surgical History:   Procedure Laterality Date    CERVICAL CONIZATION   W/ LASER      DILATION AND CURETTAGE OF UTERUS      ENDOMETRIAL ABLATION      ENDOMETRIAL BIOPSY      INDUCED       JOINT REPLACEMENT      KNEE ARTHROPLASTY Right     IA DEBRIDEMENT OPEN WOUND 20 SQ CM< Right 2018    Procedure: DEBRIDEMENT HAND/FINGER [facility] OUT);   Surgeon: Rachel Castellon MD;  Location: [address] [place] [address] OR;  Service: General    MO EXCISION TUMOR SOFT TISSUE BACK/FLANK SUBQ 3+CM N/A 12/4/2018    Procedure: BACK LIPOMA EXCISION;  Surgeon: Shelley Cobos MD;  Location: [address] [place] [address] MAIN OR;  Service: General    ROTATOR CUFF REPAIR Right     TONSILLECTOMY         Family History   Problem Relation Age of Onset    Polycystic ovary syndrome Mother     Mental illness Father     Diabetes Father        Social History     Occupational History    Not on file       Social History Main Topics    Smoking status: Never Smoker    Smokeless tobacco: Never Used    Alcohol use No    Drug use: No    Sexual activity: No         Current Outpatient Prescriptions:     AFLURIA QUADRIVALENT 0 5 ML MANDY, inject 0 5 milliliter intramuscularly, Disp: , Rfl: 0    aspirin 81 mg chewable tablet, Chew 1 tablet (81 mg total) daily, Disp: 30 tablet, Rfl: 0    Calcium Carb-Cholecalciferol (CALCIUM 600+D3 PO), Take by mouth daily  , Disp: , Rfl:     celecoxib (CeleBREX) 200 mg capsule, Take 1 capsule (200 mg total) by mouth daily as needed for mild pain for up to 30 days, Disp: 30 capsule, Rfl: 1    cholecalciferol (VITAMIN D3) 400 units tablet, Take 400 Units by mouth daily, Disp: , Rfl:     furosemide (LASIX) 20 mg tablet, Take one tablet by mouth daily x 3 days (Patient taking differently: Take 20 mg by mouth as needed Take one tablet by mouth daily x 3 days ), Disp: 3 tablet, Rfl: 0    gabapentin (NEURONTIN) 600 MG tablet, Take 1 tablet (600 mg total) by mouth 3 (three) times a day for 30 days, Disp: 90 tablet, Rfl: 1    HYDROcodone-acetaminophen (NORCO) 5-325 mg per tablet, Take 1 tablet by mouth 2 (two) times a day, Disp: , Rfl:     insulin NPH-insulin regular (NovoLIN 70/30) 100 units/mL subcutaneous injection, Inject 60 Units under the skin 2 (two) times a day before meals for 30 days, Disp: 36 mL, Rfl: 5    metFORMIN (GLUCOPHAGE) 1000 MG tablet, Take 1 tablet (1,000 mg total) by mouth 2 (two) times a day with meals, Disp: 60 tablet, Rfl: 0    multivitamin (THERAGRAN) TABS, Take 1 tablet by mouth daily, Disp: , Rfl:     Omega-3 Fatty Acids (FISH OIL ADULT GUMMIES PO), Take by mouth daily  , Disp: , Rfl:     tiZANidine (ZANAFLEX) 4 mg tablet, Take 1 tablet (4 mg total) by mouth every 8 (eight) hours as needed for muscle spasms (Patient taking differently: Take 4 mg by mouth 2 (two) times a day  ), Disp: 90 tablet, Rfl: 0    oxyCODONE (ROXICODONE) 5 mg immediate release tablet, Take 5 mg by mouth 2 (two) times a day as needed, Disp: , Rfl: 0    simvastatin (ZOCOR) 20 mg tablet, Take 1 tablet (20 mg total) by mouth daily at bedtime (Patient not taking: Reported on 1/14/2019 ), Disp: 30 tablet, Rfl: 0    Allergies   Allergen Reactions    Cauliflower [Brassica Oleracea Italica] Other (See Comments)     Other reaction(s): GI upset  Abdominal pain    Citrus Bioflavonoid Other (See Comments)     Skin burns    Latex Other (See Comments)     burning    Mobic [Meloxicam] Swelling    Morphine Hives and Itching    Prochlorperazine Wheezing     Other reaction(s): Other (See Comments)  SOB    Albumen, Egg Vomiting     Other reaction(s): Nausea and/or vomiting    Metronidazole Itching    Sulfa Antibiotics Other (See Comments)     Other reaction(s): Unknown Reaction       Physical Exam:    /70   Wt 119 kg (261 lb 12 8 oz)   BMI 47 88 kg/m²     Constitutional:normal, well developed, well nourished, alert, in no distress and non-toxic and no overt pain behavior  and obese  Eyes:anicteric  HEENT:grossly intact  Neck:supple, symmetric, trachea midline and no masses   Pulmonary:even and unlabored  Cardiovascular:No edema or pitting edema present  Skin:Normal without rashes or lesions and well hydrated  Psychiatric:Mood and affect appropriate  Neurologic:Cranial Nerves II-XII grossly intact  Musculoskeletal:antalgic ambulates with cane      Lumbar/Sacral Spine examination demonstrates    Decreased range of motion lumbar spine with pain upon: flexion, lateral rotation to the left/right, and bending to the left/right  Bilateral lumbar paraspinals tender to palpation  Muscle spasms noted in the lumbar area bilaterally  3/5 lower extremity strength in all muscle groups left leg  Positive seated straight leg raise for left lower extremity  Sensitivity to light touch decreased in bilateral feet  2+ reflexes in the patella and Achilles  No ankle clonus        Imaging  No orders to display       No orders of the defined types were placed in this encounter

## 2019-01-16 ENCOUNTER — DOCUMENTATION (OUTPATIENT)
Dept: PAIN MEDICINE | Facility: CLINIC | Age: 59
End: 2019-01-16

## 2019-01-16 NOTE — PROGRESS NOTES
Patient brought in imaging reports from hospital admission on 12/16/2018    CT cervical spine 12/16/2018:  C2-C3 facet degeneration with disc space narrowing  C3-C4 grade 1 anterior listhesis with facet arthropathy, left neural foraminal narrowing  C4-C5 partial vertebral body fusion, specific fusion on the right with advanced degenerative narrowing on the left  C5-C6 disc space narrowing with facet arthropathy with bilateral neural foraminal narrowing  C7-T1 disc space narrowing with facet arthropathy, right neural foraminal narrowing  T1-T2 facet arthropathy    CT thoracic spine 12/16/2018:  T1-T2 facet arthropathy, multilevel endplate degenerative changes, T2-T3 severe facet arthropathy with canal stenosis  T3-T4 canal and bilateral foraminal stenosis  T4-T5 moderate canal stenosis  T8-T9 moderate canal stenosis  T9-T10 moderate canal stenosis  T10-T11 mild canal stenosis  T11-T12 moderate canal stenosis  T12-L1 moderate canal stenosis, facet arthropathy, advanced bilateral foraminal narrowing    CT lumbar spine 12/16/2018:  L1-L2 broad-based disc bulging with advanced canal stenosis and advanced bilateral foraminal narrowing  L3-L4 severe canal stenosis with bilateral foraminal narrowing  L4-L5 severe canal stenosis with facet arthropathy and severe bilateral foraminal narrowing  L5-S1 broad-based disc bulging with facet arthropathy  Degenerative narrowing of bilateral sacroiliac joints

## 2019-02-03 ENCOUNTER — OFFICE VISIT (OUTPATIENT)
Dept: URGENT CARE | Facility: CLINIC | Age: 59
End: 2019-02-03
Payer: COMMERCIAL

## 2019-02-03 VITALS
TEMPERATURE: 97.9 F | DIASTOLIC BLOOD PRESSURE: 56 MMHG | HEART RATE: 69 BPM | SYSTOLIC BLOOD PRESSURE: 133 MMHG | RESPIRATION RATE: 16 BRPM | OXYGEN SATURATION: 96 %

## 2019-02-03 DIAGNOSIS — M54.50 ACUTE RIGHT-SIDED LOW BACK PAIN WITHOUT SCIATICA: Primary | ICD-10-CM

## 2019-02-03 LAB
GLUCOSE SERPL-MCNC: 324 MG/DL (ref 65–140)
SL AMB  POCT GLUCOSE, UA: NORMAL
SL AMB LEUKOCYTE ESTERASE,UA: NORMAL
SL AMB POCT BILIRUBIN,UA: NORMAL
SL AMB POCT BLOOD,UA: NORMAL
SL AMB POCT CLARITY,UA: CLEAR
SL AMB POCT COLOR,UA: YELLOW
SL AMB POCT GLUCOSE BLD: 324
SL AMB POCT KETONES,UA: NORMAL
SL AMB POCT NITRITE,UA: NORMAL
SL AMB POCT PH,UA: 6
SL AMB POCT SPECIFIC GRAVITY,UA: 1.01
SL AMB POCT URINE PROTEIN: NORMAL
SL AMB POCT UROBILINOGEN: 0.2

## 2019-02-03 PROCEDURE — 99203 OFFICE O/P NEW LOW 30 MIN: CPT | Performed by: FAMILY MEDICINE

## 2019-02-03 PROCEDURE — 99283 EMERGENCY DEPT VISIT LOW MDM: CPT | Performed by: FAMILY MEDICINE

## 2019-02-03 PROCEDURE — G0382 LEV 3 HOSP TYPE B ED VISIT: HCPCS | Performed by: FAMILY MEDICINE

## 2019-02-03 PROCEDURE — 82948 REAGENT STRIP/BLOOD GLUCOSE: CPT | Performed by: FAMILY MEDICINE

## 2019-02-03 PROCEDURE — 81002 URINALYSIS NONAUTO W/O SCOPE: CPT | Performed by: FAMILY MEDICINE

## 2019-02-03 PROCEDURE — 87086 URINE CULTURE/COLONY COUNT: CPT | Performed by: FAMILY MEDICINE

## 2019-02-03 RX ORDER — BACLOFEN 10 MG/1
10 TABLET ORAL 3 TIMES DAILY
Qty: 30 TABLET | Refills: 0 | Status: SHIPPED | OUTPATIENT
Start: 2019-02-03 | End: 2019-04-08 | Stop reason: SDUPTHER

## 2019-02-03 NOTE — PROGRESS NOTES
[facility]        NAME: Katelin Lopez is a 59 y o  female  : 1960    MRN: [id number]  DATE: February 3, 2019  TIME: 12:09 PM    Assessment and Plan   Acute right-sided low back pain without sciatica [M54 5]  1  Acute right-sided low back pain without sciatica  baclofen 10 mg tablet         Patient Instructions     Heating pad for 20-30 minutes, 3 to 4 times a day  Follow up with PCP in 3-5 days  Proceed to  ER if symptoms worsen  Chief Complaint     Chief Complaint   Patient presents with    Possible UTI     Pt c/o right side back pain since   History of Present Illness       Right-sided low back pain without radiation for the past week  Patient thought it might be a pulled muscle, but has not gotten any better  Patient does take oxycodone on a regular basis for arthritis, she said it has not helped with her pain  She has had a kidney infection in the past that presented in a similar way, so she want to make sure it was in her kidney  Review of Systems   Review of Systems   Constitutional: Negative  Respiratory: Negative  Cardiovascular: Negative  Musculoskeletal: Positive for back pain           Current Medications       Current Outpatient Prescriptions:     AFLURIA QUADRIVALENT 0 5 ML MANDY, inject 0 5 milliliter intramuscularly, Disp: , Rfl: 0    aspirin 81 mg chewable tablet, Chew 1 tablet (81 mg total) daily, Disp: 30 tablet, Rfl: 0    baclofen 10 mg tablet, Take 1 tablet (10 mg total) by mouth 3 (three) times a day for 10 days, Disp: 30 tablet, Rfl: 0    Calcium Carb-Cholecalciferol (CALCIUM 600+D3 PO), Take by mouth daily  , Disp: , Rfl:     celecoxib (CeleBREX) 200 mg capsule, Take 1 capsule (200 mg total) by mouth daily as needed for mild pain for up to 30 days, Disp: 30 capsule, Rfl: 0    cholecalciferol (VITAMIN D3) 400 units tablet, Take 400 Units by mouth daily, Disp: , Rfl:     furosemide (LASIX) 20 mg tablet, Take one tablet by mouth daily x 3 days (Patient taking differently: Take 20 mg by mouth as needed Take one tablet by mouth daily x 3 days ), Disp: 3 tablet, Rfl: 0    gabapentin (NEURONTIN) 600 MG tablet, Take 1 tablet (600 mg total) by mouth 3 (three) times a day for 30 days, Disp: 90 tablet, Rfl: 0    insulin NPH-insulin regular (NovoLIN 70/30) 100 units/mL subcutaneous injection, Inject 60 Units under the skin 2 (two) times a day before meals for 30 days, Disp: 36 mL, Rfl: 5    metFORMIN (GLUCOPHAGE) 1000 MG tablet, Take 1 tablet (1,000 mg total) by mouth 2 (two) times a day with meals, Disp: 60 tablet, Rfl: 0    multivitamin (THERAGRAN) TABS, Take 1 tablet by mouth daily, Disp: , Rfl:     Omega-3 Fatty Acids (FISH OIL ADULT GUMMIES PO), Take by mouth daily  , Disp: , Rfl:     oxyCODONE (ROXICODONE) 5 mg immediate release tablet, Take 1 tablet (5 mg total) by mouth 2 (two) times a day as needed for severe pain for up to 30 days Max Daily Amount: 10 mg, Disp: 60 tablet, Rfl: 0    simvastatin (ZOCOR) 20 mg tablet, Take 1 tablet (20 mg total) by mouth daily at bedtime (Patient not taking: Reported on 1/14/2019 ), Disp: 30 tablet, Rfl: 0    tiZANidine (ZANAFLEX) 4 mg tablet, Take 1 tablet (4 mg total) by mouth every 8 (eight) hours as needed for muscle spasms (Patient taking differently: Take 4 mg by mouth 2 (two) times a day  ), Disp: 90 tablet, Rfl: 0    Current Allergies     Allergies as of 02/03/2019 - Reviewed 02/03/2019   Allergen Reaction Noted    Cauliflower [place] oleracea italica] Other (See Comments) 03/18/2016    Citrus bioflavonoid Other (See Comments) 11/29/2018    Latex Other (See Comments) 11/19/2018    Mobic [meloxicam] Swelling 11/12/2018    Morphine Hives and Itching 01/17/2017    Prochlorperazine Wheezing 07/15/2015    Albumen, egg Vomiting 07/15/2015    Metronidazole Itching 03/17/2016    Sulfa antibiotics Other (See Comments) 06/18/2015            The following portions of the patient's history were reviewed and updated as appropriate: allergies, current medications, past family history, past medical history, past social history, past surgical history and problem list      Past Medical History:   Diagnosis Date    Anesthesia related hyperthermia     pt states she had high fevers after her lipoma surgery in  at our hospital and was shipped to [facility] where they said t was from an anesthesia med    Asthma     Chronic pain     Diabetes mellitus ([address] )     GERD (gastroesophageal reflux disease)     Hypertension     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     Neuropathy     PCOS (polycystic ovarian syndrome)        Past Surgical History:   Procedure Laterality Date    CERVICAL CONIZATION   W/ LASER      DILATION AND CURETTAGE OF UTERUS      ENDOMETRIAL ABLATION      ENDOMETRIAL BIOPSY      INDUCED       JOINT REPLACEMENT      KNEE ARTHROPLASTY Right     MD DEBRIDEMENT OPEN WOUND 20 SQ CM< Right 2018    Procedure: DEBRIDEMENT HAND/FINGER [facility] OUT); Surgeon: Sushma Murcia MD;  Location: [facility] MAIN OR;  Service: General    MD EXCISION TUMOR SOFT TISSUE BACK/FLANK SUBQ 3+CM N/A 2018    Procedure: BACK LIPOMA EXCISION;  Surgeon: Sushma Murcia MD;  Location: [facility] MAIN OR;  Service: General    ROTATOR CUFF REPAIR Right     TONSILLECTOMY         Family History   Problem Relation Age of Onset    Polycystic ovary syndrome Mother     Mental illness Father     Diabetes Father          Medications have been verified  Objective   /56   Pulse 69   Temp 97 9 °F (36 6 °C)   Resp 16   SpO2 96%        Physical Exam     Physical Exam   Constitutional: She appears well-developed and well-nourished  Cardiovascular: Normal rate and regular rhythm  Pulmonary/Chest: Effort normal and breath sounds normal    Musculoskeletal:        Lumbar back: She exhibits decreased range of motion, tenderness and spasm          Back:

## 2019-02-03 NOTE — PATIENT INSTRUCTIONS
Heating pad for 20-30 minutes, 3 to 4 times a day  Follow up with PCP in 3-5 days  Proceed to  ER if symptoms worsen  Acute Low Back Pain   WHAT YOU NEED TO KNOW:   Acute low back pain is sudden discomfort in your lower back area that lasts for up to 6 weeks  The discomfort makes it difficult to tolerate activity  DISCHARGE INSTRUCTIONS:   Return to the emergency department if:   · You have severe pain  · You have sudden stiffness and heaviness on both buttocks down to both legs  · You have numbness or weakness in one leg, or pain in both legs  · You have numbness in your genital area or across your lower back  · You cannot control your urine or bowel movements  Contact your healthcare provider if:   · You have a fever  · You have pain at night or when you rest     · Your pain does not get better with treatment  · You have pain that worsens when you cough or sneeze  · You suddenly feel something pop or snap in your back  · You have questions or concerns about your condition or care  Medicines: The following medicines may be ordered by your healthcare provider:  · Acetaminophen  decreases pain  It is available without a doctor's order  Ask how much to take and how often to take it  Follow directions  Acetaminophen can cause liver damage if not taken correctly  · NSAIDs  help decrease swelling and pain  This medicine is available with or without a doctor's order  NSAIDs can cause stomach bleeding or kidney problems in certain people  If you take blood thinner medicine, always ask your healthcare provider if NSAIDs are safe for you  Always read the medicine label and follow directions  · Prescription pain medicine  may be given  Ask your healthcare provider how to take this medicine safely  · Muscle relaxers  decrease pain by relaxing the muscles in your lower spine  · Take your medicine as directed    Contact your healthcare provider if you think your medicine is not helping or if you have side effects  Tell him of her if you are allergic to any medicine  Keep a list of the medicines, vitamins, and herbs you take  Include the amounts, and when and why you take them  Bring the list or the pill bottles to follow-up visits  Carry your medicine list with you in case of an emergency  Self-care:   · Stay active  as much as you can without causing more pain  Bed rest could make your back pain worse  Start with some light exercises such as walking  Avoid heavy lifting until your pain is gone  Ask for more information about the activities or exercises that are right for you  · Ice  helps decrease swelling, pain, and muscle spams  Put crushed ice in a plastic bag  Cover it with a towel  Place it on your lower back for 20 to 30 minutes every 2 hours  Do this for about 2 to 3 days after your pain starts, or as directed  · Heat  helps decrease pain and muscle spasms  Start to use heat after treatment with ice has stopped  Use a small towel dampened with warm water or a heating pad, or sit in a warm bath  Apply heat on the area for 20 to 30 minutes every 2 hours for as many days as directed  Alternate heat and ice  Prevent acute low back pain:   · Use proper body mechanics  ¨ Bend at the hips and knees when you  objects  Do not bend from the waist  Use your leg muscles as you lift the load  Do not use your back  Keep the object close to your chest as you lift it  Try not to twist or lift anything above your waist     ¨ Change your position often when you stand for long periods of time  Rest one foot on a small box or footrest, and then switch to the other foot often  ¨ Try not to sit for long periods of time  When you do, sit in a straight-backed chair with your feet flat on the floor  Never reach, pull, or push while you are sitting  · Do exercises that strengthen your back muscles  Warm up before you exercise   Ask your healthcare provider the best exercises for you     · Maintain a healthy weight  Ask your healthcare provider how much you should weigh  Ask him to help you create a weight loss plan if you are overweight  Follow up with your healthcare provider as directed:  Return for a follow-up visit if you still have pain after 1 to 3 weeks of treatment  You may need to visit an orthopedist if your back pain lasts more than 12 weeks  Write down your questions so you remember to ask them during your visits  © 2017 [employer] Information is for End User's use only and may not be sold, redistributed or otherwise used for commercial purposes  All illustrations and images included in CareNotes® are the copyrighted property of [employer]  or [employer]  The above information is an  only  It is not intended as medical advice for individual conditions or treatments  Talk to your doctor, nurse or pharmacist before following any medical regimen to see if it is safe and effective for you

## 2019-02-04 LAB — BACTERIA UR CULT: NORMAL

## 2019-02-08 ENCOUNTER — APPOINTMENT (OUTPATIENT)
Dept: LAB | Facility: CLINIC | Age: 59
End: 2019-02-08
Payer: COMMERCIAL

## 2019-02-08 ENCOUNTER — APPOINTMENT (OUTPATIENT)
Dept: RADIOLOGY | Facility: CLINIC | Age: 59
End: 2019-02-08
Payer: COMMERCIAL

## 2019-02-08 ENCOUNTER — TRANSCRIBE ORDERS (OUTPATIENT)
Dept: LAB | Facility: CLINIC | Age: 59
End: 2019-02-08

## 2019-02-08 DIAGNOSIS — Z09 HOSPITAL DISCHARGE FOLLOW-UP: ICD-10-CM

## 2019-02-08 DIAGNOSIS — F11.20 OPIOID TYPE DEPENDENCE, CONTINUOUS (HCC): ICD-10-CM

## 2019-02-08 DIAGNOSIS — G89.4 CHRONIC PAIN DISORDER: ICD-10-CM

## 2019-02-08 DIAGNOSIS — M25.50 MULTIPLE JOINT PAIN: ICD-10-CM

## 2019-02-08 DIAGNOSIS — Z48.02 ENCOUNTER FOR REMOVAL OF SUTURES: ICD-10-CM

## 2019-02-08 DIAGNOSIS — E11.9 DIABETES MELLITUS WITHOUT COMPLICATION (HCC): ICD-10-CM

## 2019-02-08 DIAGNOSIS — M25.50 MULTIPLE JOINT PAIN: Primary | ICD-10-CM

## 2019-02-08 DIAGNOSIS — M54.16 LUMBAR RADICULOPATHY: ICD-10-CM

## 2019-02-08 DIAGNOSIS — E11.40 TYPE 2 DIABETES MELLITUS WITH DIABETIC NEUROPATHY, UNSPECIFIED WHETHER LONG TERM INSULIN USE (HCC): ICD-10-CM

## 2019-02-08 LAB
ALBUMIN SERPL BCP-MCNC: 3.7 G/DL (ref 3.5–5)
ALP SERPL-CCNC: 80 U/L (ref 46–116)
ALT SERPL W P-5'-P-CCNC: 31 U/L (ref 12–78)
ANION GAP SERPL CALCULATED.3IONS-SCNC: 5 MMOL/L (ref 4–13)
AST SERPL W P-5'-P-CCNC: 24 U/L (ref 5–45)
BASOPHILS # BLD AUTO: 0.03 THOUSANDS/ΜL (ref 0–0.1)
BASOPHILS NFR BLD AUTO: 0 % (ref 0–1)
BILIRUB SERPL-MCNC: 0.55 MG/DL (ref 0.2–1)
BUN SERPL-MCNC: 14 MG/DL (ref 5–25)
CALCIUM SERPL-MCNC: 8.9 MG/DL (ref 8.3–10.1)
CHLORIDE SERPL-SCNC: 109 MMOL/L (ref 100–108)
CHOLEST SERPL-MCNC: 236 MG/DL (ref 50–200)
CO2 SERPL-SCNC: 28 MMOL/L (ref 21–32)
CREAT SERPL-MCNC: 0.93 MG/DL (ref 0.6–1.3)
EOSINOPHIL # BLD AUTO: 0.24 THOUSAND/ΜL (ref 0–0.61)
EOSINOPHIL NFR BLD AUTO: 3 % (ref 0–6)
ERYTHROCYTE [DISTWIDTH] IN BLOOD BY AUTOMATED COUNT: 13.9 % (ref 11.6–15.1)
EST. AVERAGE GLUCOSE BLD GHB EST-MCNC: 189 MG/DL
GFR SERPL CREATININE-BSD FRML MDRD: 67 ML/MIN/1.73SQ M
GLUCOSE P FAST SERPL-MCNC: 120 MG/DL (ref 65–99)
HBA1C MFR BLD: 8.2 % (ref 4.2–6.3)
HCT VFR BLD AUTO: 44.3 % (ref 34.8–46.1)
HDLC SERPL-MCNC: 55 MG/DL (ref 40–60)
HGB BLD-MCNC: 14.3 G/DL (ref 11.5–15.4)
IMM GRANULOCYTES # BLD AUTO: 0.02 THOUSAND/UL (ref 0–0.2)
IMM GRANULOCYTES NFR BLD AUTO: 0 % (ref 0–2)
LDLC SERPL CALC-MCNC: 117 MG/DL (ref 0–100)
LYMPHOCYTES # BLD AUTO: 2.53 THOUSANDS/ΜL (ref 0.6–4.47)
LYMPHOCYTES NFR BLD AUTO: 35 % (ref 14–44)
MCH RBC QN AUTO: 28.7 PG (ref 26.8–34.3)
MCHC RBC AUTO-ENTMCNC: 32.3 G/DL (ref 31.4–37.4)
MCV RBC AUTO: 89 FL (ref 82–98)
MONOCYTES # BLD AUTO: 0.32 THOUSAND/ΜL (ref 0.17–1.22)
MONOCYTES NFR BLD AUTO: 4 % (ref 4–12)
NEUTROPHILS # BLD AUTO: 4.17 THOUSANDS/ΜL (ref 1.85–7.62)
NEUTS SEG NFR BLD AUTO: 58 % (ref 43–75)
NONHDLC SERPL-MCNC: 181 MG/DL
NRBC BLD AUTO-RTO: 0 /100 WBCS
PLATELET # BLD AUTO: 252 THOUSANDS/UL (ref 149–390)
PMV BLD AUTO: 10.7 FL (ref 8.9–12.7)
POTASSIUM SERPL-SCNC: 4.3 MMOL/L (ref 3.5–5.3)
PROT SERPL-MCNC: 7.4 G/DL (ref 6.4–8.2)
RBC # BLD AUTO: 4.99 MILLION/UL (ref 3.81–5.12)
SODIUM SERPL-SCNC: 142 MMOL/L (ref 136–145)
TRIGL SERPL-MCNC: 319 MG/DL
WBC # BLD AUTO: 7.31 THOUSAND/UL (ref 4.31–10.16)

## 2019-02-08 PROCEDURE — 83036 HEMOGLOBIN GLYCOSYLATED A1C: CPT

## 2019-02-08 PROCEDURE — 80061 LIPID PANEL: CPT

## 2019-02-08 PROCEDURE — 85025 COMPLETE CBC W/AUTO DIFF WBC: CPT

## 2019-02-08 PROCEDURE — 72110 X-RAY EXAM L-2 SPINE 4/>VWS: CPT

## 2019-02-08 PROCEDURE — 80053 COMPREHEN METABOLIC PANEL: CPT

## 2019-02-08 PROCEDURE — 36415 COLL VENOUS BLD VENIPUNCTURE: CPT

## 2019-02-11 ENCOUNTER — OFFICE VISIT (OUTPATIENT)
Dept: PAIN MEDICINE | Facility: CLINIC | Age: 59
End: 2019-02-11
Payer: COMMERCIAL

## 2019-02-11 VITALS — WEIGHT: 265.6 LBS | SYSTOLIC BLOOD PRESSURE: 121 MMHG | DIASTOLIC BLOOD PRESSURE: 70 MMHG | BODY MASS INDEX: 48.58 KG/M2

## 2019-02-11 DIAGNOSIS — G89.4 CHRONIC PAIN SYNDROME: ICD-10-CM

## 2019-02-11 DIAGNOSIS — G89.29 CHRONIC BACK PAIN, UNSPECIFIED BACK LOCATION, UNSPECIFIED BACK PAIN LATERALITY: Primary | ICD-10-CM

## 2019-02-11 DIAGNOSIS — M54.16 LUMBAR RADICULOPATHY: ICD-10-CM

## 2019-02-11 DIAGNOSIS — M48.062 SPINAL STENOSIS OF LUMBAR REGION WITH NEUROGENIC CLAUDICATION: ICD-10-CM

## 2019-02-11 DIAGNOSIS — M47.816 LUMBAR SPONDYLOSIS: ICD-10-CM

## 2019-02-11 DIAGNOSIS — M47.812 SPONDYLOSIS OF CERVICAL REGION WITHOUT MYELOPATHY OR RADICULOPATHY: ICD-10-CM

## 2019-02-11 DIAGNOSIS — M54.9 CHRONIC BACK PAIN, UNSPECIFIED BACK LOCATION, UNSPECIFIED BACK PAIN LATERALITY: Primary | ICD-10-CM

## 2019-02-11 DIAGNOSIS — M79.18 MYOFASCIAL PAIN SYNDROME: ICD-10-CM

## 2019-02-11 DIAGNOSIS — M48.04 THORACIC SPINAL STENOSIS: ICD-10-CM

## 2019-02-11 PROCEDURE — 99214 OFFICE O/P EST MOD 30 MIN: CPT | Performed by: ANESTHESIOLOGY

## 2019-02-11 RX ORDER — GABAPENTIN 600 MG/1
600 TABLET ORAL 3 TIMES DAILY
Qty: 90 TABLET | Refills: 0 | Status: SHIPPED | OUTPATIENT
Start: 2019-02-11 | End: 2019-04-08 | Stop reason: SDUPTHER

## 2019-02-11 RX ORDER — CELECOXIB 200 MG/1
200 CAPSULE ORAL DAILY PRN
Qty: 30 CAPSULE | Refills: 0 | Status: SHIPPED | OUTPATIENT
Start: 2019-02-11 | End: 2019-04-08 | Stop reason: SDUPTHER

## 2019-02-11 RX ORDER — LORAZEPAM 0.5 MG/1
TABLET ORAL EVERY 8 HOURS PRN
Status: ON HOLD | COMMUNITY
End: 2019-11-09 | Stop reason: CLARIF

## 2019-02-11 RX ORDER — OXYCODONE AND ACETAMINOPHEN 7.5; 325 MG/1; MG/1
1 TABLET ORAL EVERY 6 HOURS PRN
Qty: 120 TABLET | Refills: 0 | Status: SHIPPED | OUTPATIENT
Start: 2019-02-11 | End: 2019-04-08 | Stop reason: SDUPTHER

## 2019-02-11 NOTE — H&P (VIEW-ONLY)
Assessment:  1  Chronic back pain, unspecified back location, unspecified back pain laterality    2  Spondylosis of cervical region without myelopathy or radiculopathy    3  Thoracic spinal stenosis    4  Spinal stenosis of lumbar region with neurogenic claudication        Plan:  Patient is a 55-year-old female with complaints of low back pain and bilateral pain complicated by history of diabetic neuropathy presents for follow-up visit  At this point patient has so many levels of significant stenosis patient will require surgical evaluation and maybe possible spinal cord stimulator trial for her multiple level layers of radiculopathy  At this point patient has not had any surgical intervention for her neck, midback, low back  Patient expresses of all the radicular pains that she has in her upper and lower extremities the most significant is in the L5 nerve root distribution bilaterally  1  We will titrate up patient's Percocet 7 5/325 mg p o  Q i d  2  We will schedule patient for a bilateral L5-S1 TFESI  3  We will provide patient with a surgical referral for lumbar spinal cord stenosis  4  We will continue gabapentin 600 mg p o  T i d  for lumbar radiculopathy  5  We will continue celebrex 200mg po BID  6  Will provide patient referral for surgical spine evaluation  7  Follow-up in 1 month        Complete risks and benefits including bleeding, infection, tissue reaction, nerve injury and allergic reaction were discussed  The approach was demonstrated using models and literature was provided  Verbal and written consent was obtained  There are risks associated with opioid medications, including dependence, addiction and tolerance  The patient understands and agrees to use these medications only as prescribed  Potential side effects of the medications include, but are not limited to, constipation, drowsiness, addiction, impaired judgment and risk of fatal overdose if not taken as prescribed   The patient was warned against driving while taking sedation medications  Sharing medications is a felony  At this point in time, the patient is showing no signs of addiction, abuse, diversion or suicidal ideation  [place] Prescription Drug Monitoring Program report was reviewed and was appropriate       History of Present Illness: The patient is a 59 y o  female who presents for a follow up office visit in regards to Back Pain; Hand Pain; Leg Pain; and Foot Pain  The patients current symptoms include 8/10 constant burning, dull/aching, shooting, numbness, pins and needles in bilateral hands, bilateral lower extremities the knee is worse at night time  Current pain medications includes:    The patient reports that this regimen is providing 10% pain relief  The patient is reporting no side effects from this pain medication regimen  I have personally reviewed and/or updated the patient's past medical history, past surgical history, family history, social history, current medications, allergies, and vital signs today  Review of Systems  Review of Systems   Musculoskeletal: Positive for gait problem  Decreased range of motion  Joint stiffness  Pain in Extremity - feet, hip   All other systems reviewed and are negative          Past Medical History:   Diagnosis Date    Anesthesia related hyperthermia     pt states she had high fevers after her lipoma surgery in  at our hospital and was shipped to [facility] where they said t was from an anesthesia med    Asthma     Chronic pain     Diabetes mellitus ([facility] [address] )     GERD (gastroesophageal reflux disease)     Hypertension     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     Neuropathy     PCOS (polycystic ovarian syndrome)        Past Surgical History:   Procedure Laterality Date    CERVICAL CONIZATION   W/ LASER      DILATION AND CURETTAGE OF UTERUS      ENDOMETRIAL ABLATION      ENDOMETRIAL BIOPSY      INDUCED       JOINT REPLACEMENT      KNEE ARTHROPLASTY Right     [place] DEBRIDEMENT OPEN WOUND 20 SQ CM< Right 12/4/2018    Procedure: DEBRIDEMENT HAND/FINGER [facility] OUT);   Surgeon: Berenice Lee MD;  Location: [facility] MAIN OR;  Service: General    [place] EXCISION TUMOR SOFT TISSUE BACK/FLANK SUBQ 3+CM N/A 12/4/2018    Procedure: BACK LIPOMA EXCISION;  Surgeon: Berenice Lee MD;  Location: [facility] MAIN OR;  Service: General    ROTATOR CUFF REPAIR Right     TONSILLECTOMY         Family History   Problem Relation Age of Onset    Polycystic ovary syndrome Mother     Mental illness Father     Diabetes Father        Social History     Occupational History    Not on file   Tobacco Use    Smoking status: Never Smoker    Smokeless tobacco: Never Used   Substance and Sexual Activity    Alcohol use: No    Drug use: No    Sexual activity: Never         Current Outpatient Medications:     AFLURIA QUADRIVALENT 0 5 ML MANDY, inject 0 5 milliliter intramuscularly, Disp: , Rfl: 0    aspirin 81 mg chewable tablet, Chew 1 tablet (81 mg total) daily, Disp: 30 tablet, Rfl: 0    baclofen 10 mg tablet, Take 1 tablet (10 mg total) by mouth 3 (three) times a day for 10 days, Disp: 30 tablet, Rfl: 0    celecoxib (CeleBREX) 200 mg capsule, Take 1 capsule (200 mg total) by mouth daily as needed for mild pain for up to 30 days, Disp: 30 capsule, Rfl: 0    furosemide (LASIX) 20 mg tablet, Take one tablet by mouth daily x 3 days (Patient taking differently: Take 20 mg by mouth as needed Take one tablet by mouth daily x 3 days ), Disp: 3 tablet, Rfl: 0    gabapentin (NEURONTIN) 600 MG tablet, Take 1 tablet (600 mg total) by mouth 3 (three) times a day for 30 days, Disp: 90 tablet, Rfl: 0    insulin NPH-insulin regular (NovoLIN 70/30) 100 units/mL subcutaneous injection, Inject 60 Units under the skin 2 (two) times a day before meals for 30 days, Disp: 36 mL, Rfl: 5    LORazepam (ATIVAN) 0 5 mg tablet, Take by mouth every 8 (eight) hours as needed for anxiety, Disp: , Rfl:     metFORMIN (GLUCOPHAGE) 1000 MG tablet, Take 1 tablet (1,000 mg total) by mouth 2 (two) times a day with meals, Disp: 60 tablet, Rfl: 0    oxyCODONE (ROXICODONE) 5 mg immediate release tablet, Take 1 tablet (5 mg total) by mouth 2 (two) times a day as needed for severe pain for up to 30 days Max Daily Amount: 10 mg, Disp: 60 tablet, Rfl: 0    tiZANidine (ZANAFLEX) 4 mg tablet, Take 1 tablet (4 mg total) by mouth every 8 (eight) hours as needed for muscle spasms (Patient taking differently: Take 4 mg by mouth 2 (two) times a day  ), Disp: 90 tablet, Rfl: 0    Calcium Carb-Cholecalciferol (CALCIUM 600+D3 PO), Take by mouth daily  , Disp: , Rfl:     cholecalciferol (VITAMIN D3) 400 units tablet, Take 400 Units by mouth daily, Disp: , Rfl:     multivitamin (THERAGRAN) TABS, Take 1 tablet by mouth daily, Disp: , Rfl:     Omega-3 Fatty Acids (FISH OIL ADULT GUMMIES PO), Take by mouth daily  , Disp: , Rfl:     simvastatin (ZOCOR) 20 mg tablet, Take 1 tablet (20 mg total) by mouth daily at bedtime (Patient not taking: Reported on 1/14/2019 ), Disp: 30 tablet, Rfl: 0    Allergies   Allergen Reactions    Cauliflower [Brassica Oleracea Italica] Other (See Comments)     Other reaction(s): GI upset  Abdominal pain    Citrus Bioflavonoid Other (See Comments)     Skin burns    Latex Other (See Comments)     burning    Mobic [Meloxicam] Swelling    Morphine Hives and Itching    Prochlorperazine Wheezing     Other reaction(s): Other (See Comments)  SOB    Albumen, Egg Vomiting     Other reaction(s): Nausea and/or vomiting    Metronidazole Itching    Sulfa Antibiotics Other (See Comments)     Other reaction(s): Unknown Reaction       Physical Exam:    /70   Wt 120 kg (265 lb 9 6 oz)   BMI 48 58 kg/m²     Constitutional:normal, well developed, well nourished, alert, in no distress and non-toxic and no overt pain behavior   and obese  Eyes:anicteric  HEENT:grossly intact  Neck:supple, symmetric, trachea midline and no masses   Pulmonary:even and unlabored  Cardiovascular:No edema or pitting edema present  Skin:Normal without rashes or lesions and well hydrated  Psychiatric:Mood and affect appropriate  Neurologic:Cranial Nerves II-XII grossly intact  Musculoskeletal:antalgic and ambulates with cane    Imaging  CT cervical spine 12/16/2018:  C2-C3 facet degeneration with disc space narrowing  C3-C4 grade 1 anterior listhesis with facet arthropathy, left neural foraminal narrowing  C4-C5 partial vertebral body fusion, specific fusion on the right with advanced degenerative narrowing on the left  C5-C6 disc space narrowing with facet arthropathy with bilateral neural foraminal narrowing  C7-T1 disc space narrowing with facet arthropathy, right neural foraminal narrowing  T1-T2 facet arthropathy     CT thoracic spine 12/16/2018:  T1-T2 facet arthropathy, multilevel endplate degenerative changes, T2-T3 severe facet arthropathy with canal stenosis  T3-T4 canal and bilateral foraminal stenosis  T4-T5 moderate canal stenosis  T8-T9 moderate canal stenosis  T9-T10 moderate canal stenosis  T10-T11 mild canal stenosis  T11-T12 moderate canal stenosis  T12-L1 moderate canal stenosis, facet arthropathy, advanced bilateral foraminal narrowing     CT lumbar spine 12/16/2018:  L1-L2 broad-based disc bulging with advanced canal stenosis and advanced bilateral foraminal narrowing  L3-L4 severe canal stenosis with bilateral foraminal narrowing  L4-L5 severe canal stenosis with facet arthropathy and severe bilateral foraminal narrowing  L5-S1 broad-based disc bulging with facet arthropathy  Degenerative narrowing of bilateral sacroiliac joints        No orders of the defined types were placed in this encounter

## 2019-02-11 NOTE — PROGRESS NOTES
Assessment:  1  Chronic back pain, unspecified back location, unspecified back pain laterality    2  Spondylosis of cervical region without myelopathy or radiculopathy    3  Thoracic spinal stenosis    4  Spinal stenosis of lumbar region with neurogenic claudication        Plan:  Patient is a 78-year-old female with complaints of low back pain and bilateral pain complicated by history of diabetic neuropathy presents for follow-up visit  At this point patient has so many levels of significant stenosis patient will require surgical evaluation and maybe possible spinal cord stimulator trial for her multiple level layers of radiculopathy  At this point patient has not had any surgical intervention for her neck, midback, low back  Patient expresses of all the radicular pains that she has in her upper and lower extremities the most significant is in the L5 nerve root distribution bilaterally  1  We will titrate up patient's Percocet 7 5/325 mg p o  Q i d  2  We will schedule patient for a bilateral L5-S1 TFESI  3  We will provide patient with a surgical referral for lumbar spinal cord stenosis  4  We will continue gabapentin 600 mg p o  T i d  for lumbar radiculopathy  5  We will continue celebrex 200mg po BID  6  Will provide patient referral for surgical spine evaluation  7  Follow-up in 1 month        Complete risks and benefits including bleeding, infection, tissue reaction, nerve injury and allergic reaction were discussed  The approach was demonstrated using models and literature was provided  Verbal and written consent was obtained  There are risks associated with opioid medications, including dependence, addiction and tolerance  The patient understands and agrees to use these medications only as prescribed  Potential side effects of the medications include, but are not limited to, constipation, drowsiness, addiction, impaired judgment and risk of fatal overdose if not taken as prescribed   The patient was warned against driving while taking sedation medications  Sharing medications is a felony  At this point in time, the patient is showing no signs of addiction, abuse, diversion or suicidal ideation  [place] Prescription Drug Monitoring Program report was reviewed and was appropriate       History of Present Illness: The patient is a 59 y o  female who presents for a follow up office visit in regards to Back Pain; Hand Pain; Leg Pain; and Foot Pain  The patients current symptoms include 8/10 constant burning, dull/aching, shooting, numbness, pins and needles in bilateral hands, bilateral lower extremities the knee is worse at night time  Current pain medications includes:    The patient reports that this regimen is providing 10% pain relief  The patient is reporting no side effects from this pain medication regimen  I have personally reviewed and/or updated the patient's past medical history, past surgical history, family history, social history, current medications, allergies, and vital signs today  Review of Systems  Review of Systems   Musculoskeletal: Positive for gait problem  Decreased range of motion  Joint stiffness  Pain in Extremity - feet, hip   All other systems reviewed and are negative          Past Medical History:   Diagnosis Date    Anesthesia related hyperthermia     pt states she had high fevers after her lipoma surgery in  at our hospital and was shipped to [facility] where they said t was from an anesthesia med    Asthma     Chronic pain     Diabetes mellitus ([facility] [address] )     GERD (gastroesophageal reflux disease)     Hypertension     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     Neuropathy     PCOS (polycystic ovarian syndrome)        Past Surgical History:   Procedure Laterality Date    CERVICAL CONIZATION   W/ LASER      DILATION AND CURETTAGE OF UTERUS      ENDOMETRIAL ABLATION      ENDOMETRIAL BIOPSY      INDUCED       JOINT REPLACEMENT      KNEE ARTHROPLASTY Right     [place] DEBRIDEMENT OPEN WOUND 20 SQ CM< Right 12/4/2018    Procedure: DEBRIDEMENT HAND/FINGER [facility] OUT);   Surgeon: Shad Rogers MD;  Location: [facility] MAIN OR;  Service: General    [place] EXCISION TUMOR SOFT TISSUE BACK/FLANK SUBQ 3+CM N/A 12/4/2018    Procedure: BACK LIPOMA EXCISION;  Surgeon: Shad Rogers MD;  Location: [facility] MAIN OR;  Service: General    ROTATOR CUFF REPAIR Right     TONSILLECTOMY         Family History   Problem Relation Age of Onset    Polycystic ovary syndrome Mother     Mental illness Father     Diabetes Father        Social History     Occupational History    Not on file   Tobacco Use    Smoking status: Never Smoker    Smokeless tobacco: Never Used   Substance and Sexual Activity    Alcohol use: No    Drug use: No    Sexual activity: Never         Current Outpatient Medications:     AFLURIA QUADRIVALENT 0 5 ML MANDY, inject 0 5 milliliter intramuscularly, Disp: , Rfl: 0    aspirin 81 mg chewable tablet, Chew 1 tablet (81 mg total) daily, Disp: 30 tablet, Rfl: 0    baclofen 10 mg tablet, Take 1 tablet (10 mg total) by mouth 3 (three) times a day for 10 days, Disp: 30 tablet, Rfl: 0    celecoxib (CeleBREX) 200 mg capsule, Take 1 capsule (200 mg total) by mouth daily as needed for mild pain for up to 30 days, Disp: 30 capsule, Rfl: 0    furosemide (LASIX) 20 mg tablet, Take one tablet by mouth daily x 3 days (Patient taking differently: Take 20 mg by mouth as needed Take one tablet by mouth daily x 3 days ), Disp: 3 tablet, Rfl: 0    gabapentin (NEURONTIN) 600 MG tablet, Take 1 tablet (600 mg total) by mouth 3 (three) times a day for 30 days, Disp: 90 tablet, Rfl: 0    insulin NPH-insulin regular (NovoLIN 70/30) 100 units/mL subcutaneous injection, Inject 60 Units under the skin 2 (two) times a day before meals for 30 days, Disp: 36 mL, Rfl: 5    LORazepam (ATIVAN) 0 5 mg tablet, Take by mouth every 8 (eight) hours as needed for anxiety, Disp: , Rfl:     metFORMIN (GLUCOPHAGE) 1000 MG tablet, Take 1 tablet (1,000 mg total) by mouth 2 (two) times a day with meals, Disp: 60 tablet, Rfl: 0    oxyCODONE (ROXICODONE) 5 mg immediate release tablet, Take 1 tablet (5 mg total) by mouth 2 (two) times a day as needed for severe pain for up to 30 days Max Daily Amount: 10 mg, Disp: 60 tablet, Rfl: 0    tiZANidine (ZANAFLEX) 4 mg tablet, Take 1 tablet (4 mg total) by mouth every 8 (eight) hours as needed for muscle spasms (Patient taking differently: Take 4 mg by mouth 2 (two) times a day  ), Disp: 90 tablet, Rfl: 0    Calcium Carb-Cholecalciferol (CALCIUM 600+D3 PO), Take by mouth daily  , Disp: , Rfl:     cholecalciferol (VITAMIN D3) 400 units tablet, Take 400 Units by mouth daily, Disp: , Rfl:     multivitamin (THERAGRAN) TABS, Take 1 tablet by mouth daily, Disp: , Rfl:     Omega-3 Fatty Acids (FISH OIL ADULT GUMMIES PO), Take by mouth daily  , Disp: , Rfl:     simvastatin (ZOCOR) 20 mg tablet, Take 1 tablet (20 mg total) by mouth daily at bedtime (Patient not taking: Reported on 1/14/2019 ), Disp: 30 tablet, Rfl: 0    Allergies   Allergen Reactions    Cauliflower [Brassica Oleracea Italica] Other (See Comments)     Other reaction(s): GI upset  Abdominal pain    Citrus Bioflavonoid Other (See Comments)     Skin burns    Latex Other (See Comments)     burning    Mobic [Meloxicam] Swelling    Morphine Hives and Itching    Prochlorperazine Wheezing     Other reaction(s): Other (See Comments)  SOB    Albumen, Egg Vomiting     Other reaction(s): Nausea and/or vomiting    Metronidazole Itching    Sulfa Antibiotics Other (See Comments)     Other reaction(s): Unknown Reaction       Physical Exam:    /70   Wt 120 kg (265 lb 9 6 oz)   BMI 48 58 kg/m²     Constitutional:normal, well developed, well nourished, alert, in no distress and non-toxic and no overt pain behavior   and obese  Eyes:anicteric  HEENT:grossly intact  Neck:supple, symmetric, trachea midline and no masses   Pulmonary:even and unlabored  Cardiovascular:No edema or pitting edema present  Skin:Normal without rashes or lesions and well hydrated  Psychiatric:Mood and affect appropriate  Neurologic:Cranial Nerves II-XII grossly intact  Musculoskeletal:antalgic and ambulates with cane    Imaging  CT cervical spine 12/16/2018:  C2-C3 facet degeneration with disc space narrowing  C3-C4 grade 1 anterior listhesis with facet arthropathy, left neural foraminal narrowing  C4-C5 partial vertebral body fusion, specific fusion on the right with advanced degenerative narrowing on the left  C5-C6 disc space narrowing with facet arthropathy with bilateral neural foraminal narrowing  C7-T1 disc space narrowing with facet arthropathy, right neural foraminal narrowing  T1-T2 facet arthropathy     CT thoracic spine 12/16/2018:  T1-T2 facet arthropathy, multilevel endplate degenerative changes, T2-T3 severe facet arthropathy with canal stenosis  T3-T4 canal and bilateral foraminal stenosis  T4-T5 moderate canal stenosis  T8-T9 moderate canal stenosis  T9-T10 moderate canal stenosis  T10-T11 mild canal stenosis  T11-T12 moderate canal stenosis  T12-L1 moderate canal stenosis, facet arthropathy, advanced bilateral foraminal narrowing     CT lumbar spine 12/16/2018:  L1-L2 broad-based disc bulging with advanced canal stenosis and advanced bilateral foraminal narrowing  L3-L4 severe canal stenosis with bilateral foraminal narrowing  L4-L5 severe canal stenosis with facet arthropathy and severe bilateral foraminal narrowing  L5-S1 broad-based disc bulging with facet arthropathy  Degenerative narrowing of bilateral sacroiliac joints        No orders of the defined types were placed in this encounter

## 2019-02-14 ENCOUNTER — TELEPHONE (OUTPATIENT)
Dept: PAIN MEDICINE | Facility: CLINIC | Age: 59
End: 2019-02-14

## 2019-02-18 NOTE — TELEPHONE ENCOUNTER
Attempted to reach pt  Phone rings and states call can not be completed as dialed  If pt calls back, please advise records were mailed, as stated below

## 2019-02-18 NOTE — TELEPHONE ENCOUNTER
Patient called call center  She stated she is going to pass by to get her medical records at the office  Please advise, thanks

## 2019-02-19 ENCOUNTER — TELEPHONE (OUTPATIENT)
Dept: PAIN MEDICINE | Facility: CLINIC | Age: 59
End: 2019-02-19

## 2019-02-19 NOTE — TELEPHONE ENCOUNTER
S/w patient, states oxyCODONE-acetaminophen (PERCOCET) 7 5-325 MG per tablet requires a prior auth  This should have been done on 2/11/19 at her last ov   Is requesting to  this medication today at    [address], [address] [place] [address]

## 2019-02-19 NOTE — TELEPHONE ENCOUNTER
Attempted to call patient, phone rings, then voice prompt comes on saying call cannot be completed as dialed   Records were mailed to patient 5 days ago, she should have received them by now via mail

## 2019-02-20 NOTE — TELEPHONE ENCOUNTER
Received PA from [facility]  for Oxycodon-acetaminophen 7 5-325  [facility] and advised of PA  Called pt and advised of PA  Pt was appreciative

## 2019-02-22 ENCOUNTER — HOSPITAL ENCOUNTER (OUTPATIENT)
Facility: HOSPITAL | Age: 59
Setting detail: OUTPATIENT SURGERY
Discharge: HOME | End: 2019-02-22
Attending: ANESTHESIOLOGY | Admitting: ANESTHESIOLOGY
Payer: COMMERCIAL

## 2019-02-22 ENCOUNTER — APPOINTMENT (OUTPATIENT)
Dept: RADIOLOGY | Facility: HOSPITAL | Age: 59
End: 2019-02-22
Payer: COMMERCIAL

## 2019-02-22 VITALS
OXYGEN SATURATION: 93 % | DIASTOLIC BLOOD PRESSURE: 66 MMHG | RESPIRATION RATE: 18 BRPM | HEART RATE: 86 BPM | TEMPERATURE: 97.7 F | SYSTOLIC BLOOD PRESSURE: 128 MMHG

## 2019-02-22 DIAGNOSIS — M54.50 LOW BACK PAIN: ICD-10-CM

## 2019-02-22 PROCEDURE — 64483 NJX AA&/STRD TFRM EPI L/S 1: CPT | Performed by: ANESTHESIOLOGY

## 2019-02-22 PROCEDURE — 77002 NEEDLE LOCALIZATION BY XRAY: CPT

## 2019-02-22 RX ORDER — DEXAMETHASONE SODIUM PHOSPHATE 10 MG/ML
INJECTION, SOLUTION INTRAMUSCULAR; INTRAVENOUS AS NEEDED
Status: DISCONTINUED | OUTPATIENT
Start: 2019-02-22 | End: 2019-02-22 | Stop reason: HOSPADM

## 2019-02-22 RX ORDER — LIDOCAINE HYDROCHLORIDE 10 MG/ML
INJECTION, SOLUTION INFILTRATION; PERINEURAL AS NEEDED
Status: DISCONTINUED | OUTPATIENT
Start: 2019-02-22 | End: 2019-02-22 | Stop reason: HOSPADM

## 2019-02-22 NOTE — OP NOTE
OPERATIVE REPORT  PATIENT NAME: Harvey Stern    :  1960  MRN: [id number]  Pt Location: [facility] OR ROOM 01    SURGERY DATE: 2019    Surgeon(s) and Role:     * Larry Chicas MD - Primary    Preop Diagnosis:  Lumbar radiculopathy [M54 16]    Post-Op Diagnosis Codes:     * Lumbar radiculopathy [M54 16]    Procedure(s) (LRB):  L5-S1 transforaminal epidural steroid injection (Bilateral)    Specimen(s):  * No specimens in log *    Estimated Blood Loss:   Minimal    Drains:  * No LDAs found *    Anesthesia Type:   Local    Operative Indications:  Lumbar radiculopathy [M54 16]      Operative Findings:  same    Complications:   None    Procedure and Technique:    Indication:  Low back and leg pain  Preoperative diagnosis:  Lumbar radiculitis  Postoperative diagnosis:  Lumbar radiculitis    Procedure: Fluoroscopically-guided bilateral L5-S1 transforaminal epidural steroid injection under fluoroscopy      After discussing the risks, benefits, and alternatives to the procedure, the patient expressed understanding and wished to proceed  The patient was brought to the fluoroscopy suite and placed in the prone position  A procedural pause was conducted to verify:  correct patient identity, procedure to be performed and as applicable, correct side and site, correct patient position, and availability of implants, special equipment and special requirements  After identifying the right L5 pedicle fluoroscopically with an oblique view, the skin was sterilely prepped and draped in the usual fashion using Chloraprep skin prep  The skin and subcutaneous tissue were anesthetized with 0 5% lidocaine  A 3 5 inch 22 gauge spinal needle was then advanced under fluoroscopic guidance to the posterior aspect of the right L5-S1 neural foramen    Appropriate foraminal depth was determined with a lateral fluoroscopic view, and AP visualization confirmed needle positioning at approximately the 6 oclock position relative to the pedicle  After negative aspiration, 1 mL Omnipaque 300 contrast was injected using live fluoroscopy/digital subtraction angiography, confirming appropriate transforaminal spread without evidence of intravascular or intrathecal uptake  Next, a local anesthetic test dose consisting of 1 mL of 2% lidocaine was injected through the needle  After an appropriate period of observation, a directed neurological exam was performed which revealed no new neurologic deficits  Next, a 1 5 ml solution consisting of 7 5 mg of dexamethasone in sterile saline was injected slowly and incrementally into the epidural space  Following the injection the needle was withdrawn slightly and flushed with lidocaine as it was fully extracted  The same procedure was performed on the opposite side using the same technique and achieving the same results  The patient tolerated the procedure well and there were no apparent complications  The patient did not develop any new neurologic deficits  After appropriate observation, the patient was dismissed from the clinic in good condition under their own power  COMMENTS  The patient received a total steroid dose of 15 mg of dexamethasone     I was present for the entire procedure    Patient Disposition:  hemodynamically stable    SIGNATURE: Shandra Lozano MD  DATE: February 22, 2019  TIME: 8:30 AM

## 2019-02-22 NOTE — DISCHARGE INSTRUCTIONS
Epidural Steroid Injection   WHAT YOU NEED TO KNOW:   An epidural steroid injection (LOLA) is a procedure to inject steroid medicine into the epidural space  The epidural space is between your spinal cord and vertebrae  Steroids reduce inflammation and fluid buildup in your spine that may be causing pain  You may be given pain medicine along with the steroids  ACTIVITY  · Do not drive or operate machinery today  · No strenuous activity today - bending, lifting, etc   · You may resume normal activites starting tomorrow - start slowly and as tolerated  · You may shower today, but no tub baths or hot tubs  · You may have numbness for several hours from the local anesthetic  Please use caution and common sense, especially with weight-bearing activities  CARE OF THE INJECTION SITE  · If you have soreness or pain, apply ice to the area today (20 minutes on/20 minutes off)  · Starting tomorrow, you may use warm, moist heat or ice if needed  · You may have an increase or change in your discomfort for 36-48 hours after your treatment  · Apply ice and continue with any pain medication you have been prescribed  · Notify [facility] if you have any of the following: redness, drainage, swelling, headache, stiff neck or fever above 100°F     SPECIAL INSTRUCTIONS  · Our office will contact you in approximately 7 days for a progress report  MEDICATIONS  · Continue to take all routine medications  · Our office may have instructed you to hold some medications  If you have a problem specifically related to your procedure, please call our office at [phone]  Problems not related to your procedure should be directed to your primary care physician

## 2019-02-27 ENCOUNTER — OFFICE VISIT (OUTPATIENT)
Dept: URGENT CARE | Facility: CLINIC | Age: 59
End: 2019-02-27
Payer: COMMERCIAL

## 2019-02-27 VITALS
OXYGEN SATURATION: 97 % | RESPIRATION RATE: 16 BRPM | HEART RATE: 87 BPM | TEMPERATURE: 97.9 F | DIASTOLIC BLOOD PRESSURE: 67 MMHG | BODY MASS INDEX: 48.47 KG/M2 | SYSTOLIC BLOOD PRESSURE: 155 MMHG | WEIGHT: 265 LBS

## 2019-02-27 DIAGNOSIS — K59.00 CONSTIPATION, UNSPECIFIED CONSTIPATION TYPE: ICD-10-CM

## 2019-02-27 DIAGNOSIS — J06.9 ACUTE URI: ICD-10-CM

## 2019-02-27 DIAGNOSIS — B37.3 VULVOVAGINITIS DUE TO YEAST: Primary | ICD-10-CM

## 2019-02-27 PROCEDURE — 99214 OFFICE O/P EST MOD 30 MIN: CPT | Performed by: PHYSICIAN ASSISTANT

## 2019-02-27 PROCEDURE — 99284 EMERGENCY DEPT VISIT MOD MDM: CPT | Performed by: PHYSICIAN ASSISTANT

## 2019-02-27 PROCEDURE — G0383 LEV 4 HOSP TYPE B ED VISIT: HCPCS | Performed by: PHYSICIAN ASSISTANT

## 2019-02-27 RX ORDER — DIPHENOXYLATE HYDROCHLORIDE AND ATROPINE SULFATE 2.5; .025 MG/1; MG/1
1 TABLET ORAL
Status: ON HOLD | COMMUNITY
End: 2019-07-09 | Stop reason: SDUPTHER

## 2019-02-27 RX ORDER — OXYCODONE HYDROCHLORIDE AND ACETAMINOPHEN 5; 325 MG/1; MG/1
2 TABLET ORAL EVERY 8 HOURS PRN
COMMUNITY
End: 2019-02-27 | Stop reason: SDUPTHER

## 2019-02-27 RX ORDER — MULTIVIT,TX WITH IRON,MINERALS
500 TABLET, EXTENDED RELEASE ORAL
Status: ON HOLD | COMMUNITY
End: 2019-05-16

## 2019-02-27 RX ORDER — GABAPENTIN 300 MG/1
300 CAPSULE ORAL
COMMUNITY
Start: 2015-07-05 | End: 2019-02-27 | Stop reason: SDUPTHER

## 2019-02-27 RX ORDER — FAMOTIDINE 20 MG/1
20 TABLET, FILM COATED ORAL
Status: ON HOLD | COMMUNITY
End: 2019-05-16

## 2019-02-27 RX ORDER — POTASSIUM CHLORIDE 600 MG/1
8 TABLET, FILM COATED, EXTENDED RELEASE ORAL
Status: ON HOLD | COMMUNITY
End: 2019-05-16

## 2019-02-27 RX ORDER — SUCRALFATE 1 G/1
1 TABLET ORAL
Status: ON HOLD | COMMUNITY
End: 2019-07-09 | Stop reason: SDUPTHER

## 2019-02-27 RX ORDER — MECLIZINE HYDROCHLORIDE 25 MG/1
25 TABLET ORAL 3 TIMES DAILY PRN
COMMUNITY

## 2019-02-27 RX ORDER — PREDNISONE 20 MG/1
20 TABLET ORAL
Status: ON HOLD | COMMUNITY
Start: 2017-01-18 | End: 2019-05-16

## 2019-02-27 RX ORDER — FLUCONAZOLE 150 MG/1
TABLET ORAL
Qty: 2 TABLET | Refills: 0 | Status: SHIPPED | OUTPATIENT
Start: 2019-02-27 | End: 2019-03-06

## 2019-02-27 RX ORDER — ASPIRIN 81 MG/1
81 TABLET, CHEWABLE ORAL
COMMUNITY
End: 2019-02-27 | Stop reason: SDUPTHER

## 2019-02-27 RX ORDER — ZOLPIDEM TARTRATE 10 MG/1
5 TABLET ORAL DAILY PRN
COMMUNITY
End: 2019-07-01

## 2019-02-27 RX ORDER — CITALOPRAM 20 MG/1
20 TABLET ORAL
Status: ON HOLD | COMMUNITY
End: 2019-07-09 | Stop reason: SDUPTHER

## 2019-02-27 RX ORDER — LISINOPRIL 10 MG/1
10 TABLET ORAL
Status: ON HOLD | COMMUNITY
Start: 2016-03-25 | End: 2019-05-16

## 2019-02-27 NOTE — PROGRESS NOTES
[facility]    NAME: Katelin Lopez is a 59 y o  female  : 1960    MRN: [id number]  DATE: 2019  TIME: 5:29 PM    Assessment and Plan   Vulvovaginitis due to yeast [B37 3]  1  Vulvovaginitis due to yeast  fluconazole (DIFLUCAN) 150 mg tablet   2  Acute URI     3  Constipation, unspecified constipation type         Patient Instructions     Patient Instructions   Take difucan as prescribed  If not improving, follow up with GYN  Infection appears viral   Recommend symptomatic treatment  Can take ibuprofen or tylenol as needed for pain or fever  Over the counter cough and cold medications to help with symptoms  Such as symptoms  Use salt water gargles for sore throat and throat lozenges  Cough drops as needed  Wash hands frequently to prevent the spread of infection  If not improving over the next 7-10 days, follow up with PCP  Symptoms may persist for 10-14 days  Recommend over the counter stool softener  Increase fiber in diet and drink plenty of water  Chief Complaint     Chief Complaint   Patient presents with    Constipation    Sinusitis     x 1 day    Vaginitis       History of Present Illness   59-year-old female here with complaint of vaginal yeast infection  States that it started just after having her back procedure done  States that her vagina is has white cottage cheese discharge and is very itchy and irritated  Complaining about constipation  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 and does not always eat well  Also takes Percocet for pain  Complaining about inability to move her bowels  Has not tried any over-the-counter medications for this  No nausea or vomiting  No abdominal pain  Has had nasal congestion since this morning  No fever or chills  No cough  Has a slight headache  No sore throat        Review of Systems   Review of Systems   Constitutional: Negative for activity change, appetite change, chills, diaphoresis, fatigue, fever and unexpected weight change  HENT: Positive for congestion  Negative for dental problem, hearing loss, sinus pressure, sneezing, sore throat, tinnitus, trouble swallowing and voice change  Eyes: Negative for photophobia, redness and visual disturbance  Respiratory: Negative for apnea, cough, chest tightness, shortness of breath, wheezing and stridor  Cardiovascular: Negative for chest pain, palpitations and leg swelling  Gastrointestinal: Positive for constipation  Negative for abdominal distention, abdominal pain, blood in stool, diarrhea, nausea and vomiting  Endocrine: Negative for cold intolerance, heat intolerance, polydipsia, polyphagia and polyuria  Genitourinary: Positive for vaginal discharge and vaginal pain  Negative for difficulty urinating, dysuria, flank pain, frequency, hematuria and urgency  Musculoskeletal: Negative for arthralgias, back pain, gait problem, joint swelling, myalgias, neck pain and neck stiffness  Skin: Negative for pallor, rash and wound  Neurological: Negative for dizziness, tremors, seizures, speech difficulty, weakness and headaches  Hematological: Negative for adenopathy  Does not bruise/bleed easily  Psychiatric/Behavioral: Negative for agitation, confusion, dysphoric mood and sleep disturbance  The patient is not nervous/anxious  All other systems reviewed and are negative        Current Medications     Current Outpatient Medications:     aspirin 81 mg chewable tablet, Chew 1 tablet (81 mg total) daily, Disp: 30 tablet, Rfl: 0    Calcium Carb-Cholecalciferol (CALCIUM 600+D3 PO), Take by mouth daily  , Disp: , Rfl:     celecoxib (CeleBREX) 200 mg capsule, Take 1 capsule (200 mg total) by mouth daily as needed for mild pain for up to 30 days, Disp: 30 capsule, Rfl: 0    cholecalciferol (VITAMIN D3) 400 units tablet, Take 400 Units by mouth daily, Disp: , Rfl:     citalopram (CeleXA) 20 mg tablet, Take 20 mg by mouth, Disp: , Rfl:   Ergocalciferol 2000 units CAPS, Take 50,000 Units by mouth, Disp: , Rfl:     famotidine (PEPCID) 20 mg tablet, Take 20 mg by mouth, Disp: , Rfl:     furosemide (LASIX) 20 mg tablet, Take one tablet by mouth daily x 3 days (Patient taking differently: Take 20 mg by mouth as needed Take one tablet by mouth daily x 3 days ), Disp: 3 tablet, Rfl: 0    gabapentin (NEURONTIN) 600 MG tablet, Take 1 tablet (600 mg total) by mouth 3 (three) times a day for 30 days, Disp: 90 tablet, Rfl: 0    Icosapent Ethyl 0 5 g CAPS, Take 2 capsules by mouth, Disp: , Rfl:     insulin detemir (LEVEMIR FLEXTOUCH) 100 Units/mL injection pen, Inject 40 Units under the skin, Disp: , Rfl:     liraglutide (VICTOZA) injection, Inject 1 8 mg under the skin, Disp: , Rfl:     lisinopril (ZESTRIL) 10 mg tablet, Take 10 mg by mouth, Disp: , Rfl:     LORazepam (ATIVAN) 0 5 mg tablet, Take by mouth every 8 (eight) hours as needed for anxiety, Disp: , Rfl:     Magnesium Gluconate 250 MG TABS, Take 500 mg by mouth, Disp: , Rfl:     meclizine (ANTIVERT) 25 mg tablet, Take 25 mg by mouth Three times daily as needed, Disp: , Rfl:     metFORMIN (GLUCOPHAGE) 1000 MG tablet, Take 1 tablet (1,000 mg total) by mouth 2 (two) times a day with meals, Disp: 60 tablet, Rfl: 0    multivitamin (THERAGRAN) TABS, Take 1 tablet by mouth, Disp: , Rfl:     Omega-3 Fatty Acids (FISH OIL ADULT GUMMIES PO), Take by mouth daily  , Disp: , Rfl:     oxyCODONE-acetaminophen (PERCOCET) 7 5-325 MG per tablet, Take 1 tablet by mouth every 6 (six) hours as needed for moderate pain for up to 30 daysMax Daily Amount: 4 tablets, Disp: 120 tablet, Rfl: 0    potassium chloride (KLOR-CON) 8 MEQ tablet, Take 8 mEq by mouth, Disp: , Rfl:     predniSONE 20 mg tablet, Take 20 mg by mouth, Disp: , Rfl:     sucralfate (CARAFATE) 1 g tablet, Take 1 g by mouth, Disp: , Rfl:     tiZANidine (ZANAFLEX) 4 mg tablet, Take 1 tablet (4 mg total) by mouth every 8 (eight) hours as needed for muscle spasms (Patient taking differently: Take 4 mg by mouth 2 (two) times a day  ), Disp: 90 tablet, Rfl: 0    zolpidem (AMBIEN) 10 mg tablet, Take 5 mg by mouth daily as needed, Disp: , Rfl:     AFLURIA QUADRIVALENT 0 5 ML MANDY, inject 0 5 milliliter intramuscularly, Disp: , Rfl: 0    baclofen 10 mg tablet, Take 1 tablet (10 mg total) by mouth 3 (three) times a day for 10 days, Disp: 30 tablet, Rfl: 0    fluconazole (DIFLUCAN) 150 mg tablet, Take 1 tablet today then 1 tablet in 1 week, Disp: 2 tablet, Rfl: 0    insulin NPH-insulin regular (NovoLIN 70/30) 100 units/mL subcutaneous injection, Inject 60 Units under the skin 2 (two) times a day before meals for 30 days, Disp: 36 mL, Rfl: 5    Current Allergies     Allergies as of 02/27/2019 - Reviewed 02/27/2019   Allergen Reaction Noted    Cauliflower Pasha Goodness oleracea italica] Other (See Comments) 03/18/2016    Citrus bioflavonoid Other (See Comments) 11/29/2018    Latex Other (See Comments) 11/19/2018    Mobic [meloxicam] Swelling 11/12/2018    Morphine Hives and Itching 01/17/2017    Prochlorperazine Wheezing 07/15/2015    Albumen, egg Vomiting 07/15/2015    Metronidazole Itching 03/17/2016    Sulfa antibiotics Other (See Comments) 06/18/2015          The following portions of the patient's history were reviewed and updated as appropriate: allergies, current medications, past family history, past medical history, past social history, past surgical history and problem list    Past Medical History:   Diagnosis Date    Anesthesia related hyperthermia     pt states she had high fevers after her lipoma surgery in 2001 at our hospital and was shipped to [facility] where they said t was from an anesthesia med    Anxiety     Asthma     Chronic pain     Depression     Diabetes mellitus ([address] )     GERD (gastroesophageal reflux disease)     Hyperlipidemia     Hypertension     Malignant hyperthermia due to anesthesia     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     Neuropathy     Obesity     PCOS (polycystic ovarian syndrome)      Past Surgical History:   Procedure Laterality Date    CERVICAL CONIZATION   W/ LASER       SECTION      DILATION AND CURETTAGE OF UTERUS      ENDOMETRIAL ABLATION      ENDOMETRIAL BIOPSY      EPIDURAL BLOCK INJECTION Bilateral 2019    Procedure: L5-S1 transforaminal epidural steroid injection;  Surgeon: Malick Ramires MD;  Location: [facility] MAIN OR;  Service: Pain Management     FL GUIDED NEEDLE PLAC BX/ASP/INJ  2019    FOOT SURGERY Left     HERNIA REPAIR      INDUCED       JOINT REPLACEMENT      KNEE ARTHROPLASTY Right     [place] DEBRIDEMENT OPEN WOUND 20 SQ CM< Right 2018    Procedure: DEBRIDEMENT HAND/FINGER [facility] OUT);   Surgeon: Shelley Cobos MD;  [facility] MAIN OR;  Service: General    [place] EXCISION TUMOR SOFT TISSUE BACK/FLANK SUBQ 3+CM N/A 2018    Procedure: BACK LIPOMA EXCISION;  Surgeon: Shelley Cobos MD;  Location: [address] [place] [address] MAIN OR;  Service: General    ROTATOR CUFF REPAIR Right     TONSILLECTOMY       Family History   Problem Relation Age of Onset    Polycystic ovary syndrome Mother     Mental illness Father     Diabetes Father      Social History     Socioeconomic History    Marital status: /Civil Union     Spouse name: Not on file    Number of children: Not on file    Years of education: Not on file    Highest education level: Not on file   Occupational History    Not on file   Social Needs    Financial resource strain: Not on file    Food insecurity:     Worry: Not on file     Inability: Not on file    Transportation needs:     Medical: Not on file     Non-medical: Not on file   Tobacco Use    Smoking status: Never Smoker    Smokeless tobacco: Never Used   Substance and Sexual Activity    Alcohol use: Yes     Comment: OCCASIONALLY    Drug use: No    Sexual activity: Never   Lifestyle    Physical activity:     Days per week: Not on file     Minutes per session: Not on file    Stress: Not on file   Relationships    Social connections:     Talks on phone: Not on file     Gets together: Not on file     Attends Sikh service: Not on file     Active member of club or organization: Not on file     Attends meetings of clubs or organizations: Not on file     Relationship status: Not on file    Intimate partner violence:     Fear of current or ex partner: Not on file     Emotionally abused: Not on file     Physically abused: Not on file     Forced sexual activity: Not on file   Other Topics Concern    Not on file   Social History Narrative    Not on file     Medications have been verified  Objective   /67   Pulse 87   Temp 97 9 °F (36 6 °C)   Resp 16   Wt 120 kg (265 lb)   SpO2 97%   BMI 48 47 kg/m²      Physical Exam   Physical Exam   Constitutional: She appears well-developed and well-nourished  No distress  HENT:   Head: Normocephalic  Right Ear: External ear normal    Left Ear: External ear normal    Nose: Mucosal edema present  Mouth/Throat: Oropharynx is clear and moist  No oropharyngeal exudate  Neck: Normal range of motion  Neck supple  Cardiovascular: Normal rate, regular rhythm and normal heart sounds  No murmur heard  Pulmonary/Chest: Effort normal and breath sounds normal  No respiratory distress  She has no wheezes  She has no rales  Abdominal: Soft  Bowel sounds are normal  There is no tenderness  Genitourinary:   Genitourinary Comments: Not examined   Musculoskeletal: Normal range of motion  Lymphadenopathy:     She has no cervical adenopathy  Skin: Skin is warm  No rash noted  Nursing note and vitals reviewed

## 2019-02-27 NOTE — PATIENT INSTRUCTIONS
Take difucan as prescribed  If not improving, follow up with GYN  Infection appears viral   Recommend symptomatic treatment  Can take ibuprofen or tylenol as needed for pain or fever  Over the counter cough and cold medications to help with symptoms  Such as symptoms  Use salt water gargles for sore throat and throat lozenges  Cough drops as needed  Wash hands frequently to prevent the spread of infection  If not improving over the next 7-10 days, follow up with PCP  Symptoms may persist for 10-14 days  Recommend over the counter stool softener  Increase fiber in diet and drink plenty of water

## 2019-03-01 ENCOUNTER — TELEPHONE (OUTPATIENT)
Dept: PAIN MEDICINE | Facility: CLINIC | Age: 59
End: 2019-03-01

## 2019-04-08 ENCOUNTER — OFFICE VISIT (OUTPATIENT)
Dept: PAIN MEDICINE | Facility: CLINIC | Age: 59
End: 2019-04-08
Payer: COMMERCIAL

## 2019-04-08 VITALS — BODY MASS INDEX: 49.27 KG/M2 | WEIGHT: 269.4 LBS | SYSTOLIC BLOOD PRESSURE: 139 MMHG | DIASTOLIC BLOOD PRESSURE: 69 MMHG

## 2019-04-08 DIAGNOSIS — M54.9 CHRONIC BACK PAIN, UNSPECIFIED BACK LOCATION, UNSPECIFIED BACK PAIN LATERALITY: ICD-10-CM

## 2019-04-08 DIAGNOSIS — M48.062 SPINAL STENOSIS OF LUMBAR REGION WITH NEUROGENIC CLAUDICATION: ICD-10-CM

## 2019-04-08 DIAGNOSIS — M54.42 CHRONIC BILATERAL LOW BACK PAIN WITH BILATERAL SCIATICA: Primary | ICD-10-CM

## 2019-04-08 DIAGNOSIS — G89.29 CHRONIC BACK PAIN, UNSPECIFIED BACK LOCATION, UNSPECIFIED BACK PAIN LATERALITY: ICD-10-CM

## 2019-04-08 DIAGNOSIS — M48.04 THORACIC SPINAL STENOSIS: ICD-10-CM

## 2019-04-08 DIAGNOSIS — M54.50 ACUTE RIGHT-SIDED LOW BACK PAIN WITHOUT SCIATICA: ICD-10-CM

## 2019-04-08 DIAGNOSIS — K21.9 GASTROESOPHAGEAL REFLUX DISEASE WITHOUT ESOPHAGITIS: ICD-10-CM

## 2019-04-08 DIAGNOSIS — M54.41 CHRONIC BILATERAL LOW BACK PAIN WITH BILATERAL SCIATICA: Primary | ICD-10-CM

## 2019-04-08 DIAGNOSIS — G89.29 CHRONIC BILATERAL LOW BACK PAIN WITH BILATERAL SCIATICA: Primary | ICD-10-CM

## 2019-04-08 DIAGNOSIS — M79.18 MYOFASCIAL PAIN SYNDROME: ICD-10-CM

## 2019-04-08 DIAGNOSIS — M54.16 LUMBAR RADICULOPATHY: ICD-10-CM

## 2019-04-08 DIAGNOSIS — M47.816 LUMBAR SPONDYLOSIS: ICD-10-CM

## 2019-04-08 DIAGNOSIS — M47.812 SPONDYLOSIS OF CERVICAL REGION WITHOUT MYELOPATHY OR RADICULOPATHY: ICD-10-CM

## 2019-04-08 DIAGNOSIS — F11.20 UNCOMPLICATED OPIOID DEPENDENCE (HCC): ICD-10-CM

## 2019-04-08 PROCEDURE — 99214 OFFICE O/P EST MOD 30 MIN: CPT | Performed by: ANESTHESIOLOGY

## 2019-04-08 RX ORDER — GABAPENTIN 600 MG/1
600 TABLET ORAL 3 TIMES DAILY
Qty: 90 TABLET | Refills: 1 | Status: SHIPPED | OUTPATIENT
Start: 2019-04-08 | End: 2019-05-06 | Stop reason: SDUPTHER

## 2019-04-08 RX ORDER — BACLOFEN 10 MG/1
10 TABLET ORAL 3 TIMES DAILY
Qty: 30 TABLET | Refills: 1 | Status: SHIPPED | OUTPATIENT
Start: 2019-04-08 | End: 2019-05-06

## 2019-04-08 RX ORDER — OXYCODONE AND ACETAMINOPHEN 7.5; 325 MG/1; MG/1
1 TABLET ORAL EVERY 6 HOURS PRN
Qty: 120 TABLET | Refills: 0 | Status: SHIPPED | OUTPATIENT
Start: 2019-04-08 | End: 2019-04-08 | Stop reason: SDUPTHER

## 2019-04-08 RX ORDER — RANITIDINE 150 MG/1
150 CAPSULE ORAL 2 TIMES DAILY
Qty: 60 CAPSULE | Refills: 1 | Status: SHIPPED | OUTPATIENT
Start: 2019-04-08 | End: 2019-10-18

## 2019-04-08 RX ORDER — CELECOXIB 200 MG/1
200 CAPSULE ORAL DAILY PRN
Qty: 30 CAPSULE | Refills: 1 | Status: SHIPPED | OUTPATIENT
Start: 2019-04-08 | End: 2019-05-06 | Stop reason: SDUPTHER

## 2019-04-08 RX ORDER — OXYCODONE AND ACETAMINOPHEN 7.5; 325 MG/1; MG/1
1 TABLET ORAL EVERY 6 HOURS PRN
Qty: 120 TABLET | Refills: 0 | Status: SHIPPED | OUTPATIENT
Start: 2019-05-08 | End: 2019-05-06 | Stop reason: SDUPTHER

## 2019-04-09 ENCOUNTER — TELEPHONE (OUTPATIENT)
Dept: INTERNAL MEDICINE CLINIC | Facility: CLINIC | Age: 59
End: 2019-04-09

## 2019-05-06 ENCOUNTER — OFFICE VISIT (OUTPATIENT)
Dept: PAIN MEDICINE | Facility: CLINIC | Age: 59
End: 2019-05-06
Payer: COMMERCIAL

## 2019-05-06 VITALS — BODY MASS INDEX: 49.75 KG/M2 | DIASTOLIC BLOOD PRESSURE: 70 MMHG | WEIGHT: 272 LBS | SYSTOLIC BLOOD PRESSURE: 138 MMHG

## 2019-05-06 DIAGNOSIS — M48.062 SPINAL STENOSIS OF LUMBAR REGION WITH NEUROGENIC CLAUDICATION: ICD-10-CM

## 2019-05-06 DIAGNOSIS — M47.816 LUMBAR SPONDYLOSIS: ICD-10-CM

## 2019-05-06 DIAGNOSIS — M79.18 MYOFASCIAL PAIN SYNDROME: Primary | ICD-10-CM

## 2019-05-06 DIAGNOSIS — M54.9 CHRONIC BACK PAIN, UNSPECIFIED BACK LOCATION, UNSPECIFIED BACK PAIN LATERALITY: ICD-10-CM

## 2019-05-06 DIAGNOSIS — G89.29 CHRONIC BACK PAIN, UNSPECIFIED BACK LOCATION, UNSPECIFIED BACK PAIN LATERALITY: ICD-10-CM

## 2019-05-06 DIAGNOSIS — M47.812 SPONDYLOSIS OF CERVICAL REGION WITHOUT MYELOPATHY OR RADICULOPATHY: ICD-10-CM

## 2019-05-06 DIAGNOSIS — M54.16 LUMBAR RADICULOPATHY: ICD-10-CM

## 2019-05-06 PROCEDURE — 99214 OFFICE O/P EST MOD 30 MIN: CPT | Performed by: ANESTHESIOLOGY

## 2019-05-06 RX ORDER — CYCLOBENZAPRINE HCL 10 MG
10 TABLET ORAL 3 TIMES DAILY PRN
Qty: 90 TABLET | Refills: 1 | Status: ON HOLD | OUTPATIENT
Start: 2019-05-06 | End: 2019-05-16

## 2019-05-06 RX ORDER — CELECOXIB 200 MG/1
200 CAPSULE ORAL DAILY PRN
Qty: 30 CAPSULE | Refills: 1 | Status: SHIPPED | OUTPATIENT
Start: 2019-05-06 | End: 2019-07-01

## 2019-05-06 RX ORDER — OXYCODONE AND ACETAMINOPHEN 7.5; 325 MG/1; MG/1
1 TABLET ORAL EVERY 6 HOURS PRN
Qty: 120 TABLET | Refills: 0 | Status: SHIPPED | OUTPATIENT
Start: 2019-06-07 | End: 2019-07-01 | Stop reason: SDUPTHER

## 2019-05-06 RX ORDER — OXYCODONE AND ACETAMINOPHEN 7.5; 325 MG/1; MG/1
1 TABLET ORAL EVERY 6 HOURS PRN
Qty: 120 TABLET | Refills: 0 | Status: SHIPPED | OUTPATIENT
Start: 2019-05-08 | End: 2019-05-06

## 2019-05-06 RX ORDER — GABAPENTIN 600 MG/1
600 TABLET ORAL 3 TIMES DAILY
Qty: 90 TABLET | Refills: 1 | Status: SHIPPED | OUTPATIENT
Start: 2019-05-06 | End: 2019-07-01 | Stop reason: SDUPTHER

## 2019-05-16 ENCOUNTER — HOSPITAL ENCOUNTER (OUTPATIENT)
Facility: HOSPITAL | Age: 59
Setting detail: OUTPATIENT SURGERY
Discharge: HOME | End: 2019-05-16
Attending: ANESTHESIOLOGY | Admitting: ANESTHESIOLOGY
Payer: COMMERCIAL

## 2019-05-16 ENCOUNTER — APPOINTMENT (OUTPATIENT)
Dept: RADIOLOGY | Facility: HOSPITAL | Age: 59
End: 2019-05-16
Payer: COMMERCIAL

## 2019-05-16 VITALS
DIASTOLIC BLOOD PRESSURE: 71 MMHG | TEMPERATURE: 98 F | RESPIRATION RATE: 18 BRPM | OXYGEN SATURATION: 96 % | HEART RATE: 77 BPM | SYSTOLIC BLOOD PRESSURE: 153 MMHG

## 2019-05-16 PROCEDURE — 64483 NJX AA&/STRD TFRM EPI L/S 1: CPT | Performed by: ANESTHESIOLOGY

## 2019-05-16 RX ORDER — LIDOCAINE HYDROCHLORIDE 10 MG/ML
INJECTION, SOLUTION EPIDURAL; INFILTRATION; INTRACAUDAL; PERINEURAL AS NEEDED
Status: DISCONTINUED | OUTPATIENT
Start: 2019-05-16 | End: 2019-05-16 | Stop reason: HOSPADM

## 2019-05-16 RX ORDER — DEXAMETHASONE SODIUM PHOSPHATE 10 MG/ML
INJECTION, SOLUTION INTRAMUSCULAR; INTRAVENOUS AS NEEDED
Status: DISCONTINUED | OUTPATIENT
Start: 2019-05-16 | End: 2019-05-16 | Stop reason: HOSPADM

## 2019-05-23 ENCOUNTER — TELEPHONE (OUTPATIENT)
Dept: PAIN MEDICINE | Facility: CLINIC | Age: 59
End: 2019-05-23

## 2019-06-06 ENCOUNTER — TELEPHONE (OUTPATIENT)
Dept: PAIN MEDICINE | Facility: CLINIC | Age: 59
End: 2019-06-06

## 2019-06-07 ENCOUNTER — OFFICE VISIT (OUTPATIENT)
Dept: URGENT CARE | Facility: CLINIC | Age: 59
End: 2019-06-07
Payer: COMMERCIAL

## 2019-06-07 VITALS
BODY MASS INDEX: 51.92 KG/M2 | WEIGHT: 275 LBS | HEIGHT: 61 IN | TEMPERATURE: 97.4 F | SYSTOLIC BLOOD PRESSURE: 158 MMHG | RESPIRATION RATE: 16 BRPM | HEART RATE: 94 BPM | DIASTOLIC BLOOD PRESSURE: 70 MMHG | OXYGEN SATURATION: 98 %

## 2019-06-07 DIAGNOSIS — M79.18 MYOFASCIAL PAIN SYNDROME: Primary | ICD-10-CM

## 2019-06-07 DIAGNOSIS — B37.2 YEAST DERMATITIS: Primary | ICD-10-CM

## 2019-06-07 PROCEDURE — 99213 OFFICE O/P EST LOW 20 MIN: CPT | Performed by: PHYSICIAN ASSISTANT

## 2019-06-07 PROCEDURE — G0382 LEV 3 HOSP TYPE B ED VISIT: HCPCS | Performed by: PHYSICIAN ASSISTANT

## 2019-06-07 PROCEDURE — 99283 EMERGENCY DEPT VISIT LOW MDM: CPT | Performed by: PHYSICIAN ASSISTANT

## 2019-06-07 RX ORDER — CLOTRIMAZOLE AND BETAMETHASONE DIPROPIONATE 10; .64 MG/G; MG/G
CREAM TOPICAL 2 TIMES DAILY
Qty: 90 G | Refills: 1 | Status: SHIPPED | OUTPATIENT
Start: 2019-06-07 | End: 2021-03-19

## 2019-06-07 RX ORDER — AMOXICILLIN 500 MG/1
CAPSULE ORAL
Refills: 0 | COMMUNITY
Start: 2019-05-28 | End: 2019-06-07 | Stop reason: ALTCHOICE

## 2019-06-07 RX ORDER — AMMONIUM LACTATE 12 G/100G
LOTION TOPICAL
Refills: 0 | COMMUNITY
Start: 2019-05-28

## 2019-06-07 RX ORDER — PEN NEEDLE, DIABETIC 29 G X1/2"
NEEDLE, DISPOSABLE MISCELLANEOUS
Refills: 0 | COMMUNITY
Start: 2019-05-27

## 2019-06-07 RX ORDER — BACLOFEN 20 MG/1
10 TABLET ORAL 2 TIMES DAILY PRN
Qty: 60 TABLET | Refills: 2 | Status: SHIPPED | OUTPATIENT
Start: 2019-06-07 | End: 2019-07-09 | Stop reason: HOSPADM

## 2019-06-07 RX ORDER — SIMVASTATIN 10 MG
TABLET ORAL
COMMUNITY
Start: 2015-05-22 | End: 2019-07-09 | Stop reason: HOSPADM

## 2019-07-01 ENCOUNTER — OFFICE VISIT (OUTPATIENT)
Dept: PAIN MEDICINE | Facility: CLINIC | Age: 59
End: 2019-07-01
Payer: COMMERCIAL

## 2019-07-01 VITALS
HEIGHT: 61 IN | BODY MASS INDEX: 50.41 KG/M2 | HEART RATE: 71 BPM | RESPIRATION RATE: 18 BRPM | DIASTOLIC BLOOD PRESSURE: 78 MMHG | WEIGHT: 267 LBS | SYSTOLIC BLOOD PRESSURE: 128 MMHG

## 2019-07-01 DIAGNOSIS — M25.561 CHRONIC PAIN OF RIGHT KNEE: ICD-10-CM

## 2019-07-01 DIAGNOSIS — Z79.891 LONG-TERM CURRENT USE OF OPIATE ANALGESIC: ICD-10-CM

## 2019-07-01 DIAGNOSIS — G89.29 CHRONIC BACK PAIN, UNSPECIFIED BACK LOCATION, UNSPECIFIED BACK PAIN LATERALITY: ICD-10-CM

## 2019-07-01 DIAGNOSIS — M79.18 MYOFASCIAL PAIN SYNDROME: ICD-10-CM

## 2019-07-01 DIAGNOSIS — M47.816 LUMBAR SPONDYLOSIS: Primary | ICD-10-CM

## 2019-07-01 DIAGNOSIS — G89.4 CHRONIC PAIN SYNDROME: Chronic | ICD-10-CM

## 2019-07-01 DIAGNOSIS — M47.812 SPONDYLOSIS OF CERVICAL REGION WITHOUT MYELOPATHY OR RADICULOPATHY: ICD-10-CM

## 2019-07-01 DIAGNOSIS — M25.551 PAIN OF BOTH HIP JOINTS: ICD-10-CM

## 2019-07-01 DIAGNOSIS — M54.42 CHRONIC BILATERAL LOW BACK PAIN WITH BILATERAL SCIATICA: ICD-10-CM

## 2019-07-01 DIAGNOSIS — M54.41 CHRONIC BILATERAL LOW BACK PAIN WITH BILATERAL SCIATICA: ICD-10-CM

## 2019-07-01 DIAGNOSIS — M25.552 PAIN OF BOTH HIP JOINTS: ICD-10-CM

## 2019-07-01 DIAGNOSIS — M48.062 SPINAL STENOSIS OF LUMBAR REGION WITH NEUROGENIC CLAUDICATION: ICD-10-CM

## 2019-07-01 DIAGNOSIS — M54.16 LUMBAR RADICULOPATHY: ICD-10-CM

## 2019-07-01 DIAGNOSIS — G89.29 CHRONIC BILATERAL LOW BACK PAIN WITH BILATERAL SCIATICA: ICD-10-CM

## 2019-07-01 DIAGNOSIS — G89.29 CHRONIC PAIN OF RIGHT KNEE: ICD-10-CM

## 2019-07-01 DIAGNOSIS — Z79.4 TYPE 2 DIABETES MELLITUS WITH DIABETIC NEUROPATHY, WITH LONG-TERM CURRENT USE OF INSULIN (HCC): ICD-10-CM

## 2019-07-01 DIAGNOSIS — F11.20 UNCOMPLICATED OPIOID DEPENDENCE (HCC): ICD-10-CM

## 2019-07-01 DIAGNOSIS — E11.40 TYPE 2 DIABETES MELLITUS WITH DIABETIC NEUROPATHY, WITH LONG-TERM CURRENT USE OF INSULIN (HCC): ICD-10-CM

## 2019-07-01 DIAGNOSIS — M54.9 CHRONIC BACK PAIN, UNSPECIFIED BACK LOCATION, UNSPECIFIED BACK PAIN LATERALITY: ICD-10-CM

## 2019-07-01 PROCEDURE — 99214 OFFICE O/P EST MOD 30 MIN: CPT | Performed by: NURSE PRACTITIONER

## 2019-07-01 PROCEDURE — 80305 DRUG TEST PRSMV DIR OPT OBS: CPT | Performed by: NURSE PRACTITIONER

## 2019-07-01 RX ORDER — OXYCODONE AND ACETAMINOPHEN 7.5; 325 MG/1; MG/1
1 TABLET ORAL EVERY 6 HOURS PRN
Qty: 120 TABLET | Refills: 0 | Status: ON HOLD | OUTPATIENT
Start: 2019-08-06 | End: 2019-07-09 | Stop reason: SDUPTHER

## 2019-07-01 RX ORDER — CELECOXIB 200 MG/1
200 CAPSULE ORAL DAILY PRN
Qty: 30 CAPSULE | Refills: 0 | Status: SHIPPED | OUTPATIENT
Start: 2019-07-01 | End: 2019-07-09 | Stop reason: HOSPADM

## 2019-07-01 RX ORDER — OXYCODONE AND ACETAMINOPHEN 7.5; 325 MG/1; MG/1
1 TABLET ORAL EVERY 6 HOURS PRN
Qty: 120 TABLET | Refills: 0 | Status: SHIPPED | OUTPATIENT
Start: 2019-07-08 | End: 2019-07-01 | Stop reason: SDUPTHER

## 2019-07-01 RX ORDER — OXYCODONE AND ACETAMINOPHEN 7.5; 325 MG/1; MG/1
1 TABLET ORAL EVERY 6 HOURS PRN
Qty: 120 TABLET | Refills: 0 | Status: SHIPPED | OUTPATIENT
Start: 2019-07-08 | End: 2019-07-01

## 2019-07-01 RX ORDER — GABAPENTIN 600 MG/1
600 TABLET ORAL 3 TIMES DAILY
Qty: 90 TABLET | Refills: 1 | Status: SHIPPED | OUTPATIENT
Start: 2019-07-01 | End: 2019-08-26 | Stop reason: SDUPTHER

## 2019-07-01 NOTE — PATIENT INSTRUCTIONS
Opioid Pain Management   WHAT YOU NEED TO KNOW:   An opioid is a type of medicine used to treat pain  Examples of opioids are oxycodone, morphine, fentanyl, or codeine  DISCHARGE INSTRUCTIONS:   Call 911 or have someone call 911 for any of the following:   · You are breathing slower than normal, or you have trouble breathing  · You cannot be woken  · You have a seizure  Seek care immediately if:   · Your heart is beating slower than usual     · Your heart feels like it is jumping or fluttering  · You are so sleepy that you cannot stay awake  · You have severe muscle pain or weakness  · You see or hear things that are not real   Contact your healthcare provider if:   · You are too dizzy to stand up  · Your pain gets worse or you have new pain  · You cannot do your usual activities because of side effects from the opioid  · You are constipated or have abdominal pain  · You have questions or concerns about your condition or care  Take opioid medicines as directed, for the condition it is prescribed:  Common problems that may occur when you do not take opioid medicines as directed include the following:  · Health problems  may occur  You may have trouble breathing  You may also develop liver or kidney damage, or stomach bleeding  Any of these health problems can become life-threatening  · Opioid dependence  means your body needs the opioid medicine to keep it from going through withdrawal      · Opioid tolerance  means the opioid does not control pain as well as it used to  You need higher doses of the opioid to get pain relief  · Opioid addiction  means you are not able to control the use of the opioid medicine  You use it when you do not have pain  You crave the opioid medicine  Opioid safety measures:   · Take your medicine as directed  Ask if you need more information on how to take your medicine correctly  Follow up with your healthcare provider regularly   You may need to have your dose adjusted  Do not use opioid medicine if you are pregnant or breastfeeding  · Give your healthcare provider a list of all your medicines  Include any over-the-counter medicines, vitamins, and herbs  It can be dangerous to take opioids with certain other medicines, such as antihistamines  · Keep opioid medicine in a safe place  Store your opioid medicine in a locked cabinet to keep it away from children and others  · Do not drink alcohol while you use opioids  Alcohol use with an opioid medicine can make you sleepy and slow your breathing rate  You may stop breathing completely  · Do not drive or operate heavy machinery after you take opioid medicine  Opioid medicine can make you drowsy and make it hard to concentrate  You may injure yourself or others if you drive or operate heavy machinery while taking your medicine  · Drink liquids and eat high-fiber foods  liquids and fiber will help prevent constipation  Ask your healthcare provider what liquids are right for you and how much you should drink  Also ask for a list of foods that contain fiber  Follow up with your healthcare provider as directed: You may need to have your dose adjusted  You may be referred to a pain specialist  Write down your questions so you remember to ask them during your visits  © 2017 [address] [employer] Information is for End User's use only and may not be sold, redistributed or otherwise used for commercial purposes  All illustrations and images included in CareNotes® are the copyrighted property of [employer]  or [employer] Gar[employer]  The above information is an  only  It is not intended as medical advice for individual conditions or treatments  Talk to your doctor, nurse or pharmacist before following any medical regimen to see if it is safe and effective for you

## 2019-07-01 NOTE — PROGRESS NOTES
Pt c/o neck pain that radiates to the shoulder, back pain that radiates to the hips and legs, and bilateral knee pain    Assessment:  1  Lumbar spondylosis    2  Myofascial pain syndrome    3  Chronic pain syndrome    4  Chronic back pain, unspecified back location, unspecified back pain laterality    5  Lumbar radiculopathy    6  Chronic bilateral low back pain with bilateral sciatica    7  Spinal stenosis of lumbar region with neurogenic claudication    8  Type 2 diabetes mellitus with diabetic neuropathy, with long-term current use of insulin ([address] )    9  Pain of both hip joints    10  Chronic pain of right knee    11  Spondylosis of cervical region without myelopathy or radiculopathy    12  Long-term current use of opiate analgesic    13  Uncomplicated opioid dependence ([address] )        Plan:  Keesha El is a 59 y o  female with a history of chronic back pain, cervical spondylosis, lumbar stenosis, lumbar spondylosis, lumbar radiculopathy  The patient continues with ongoing low back which is unchanged since last office visit  She recently underwent a bilateral L5-S1 transforaminal epidural steroid injection on 5/16/2019 which provided her 60% pain relief for 2 weeks  I discussed with the patient that I would not recommend proceeding with any additional steroid injections at this time due to the patient's last hemoglobin A1c was 8 2  Therefore at this time I will order an updated hemoglobin A1c for further evaluation  I discussed with the patient that if her hemoglobin A1c was below 8 that we may possibly proceed with a lumbar epidural steroid injection  Verbalized understanding  I discussed with the patient that she was noted to have multilevel spinal stenosis and if her back worsens in the future that she may need back surgery in the future  However, due to her hemoglobin A1c being elevated she is not a surgical candidate at this time    I educated her on red flag signs of worsening low back pain which include saddle paresthesias, bilateral leg weakness, incontinence of bowel or bladder, or increased low back pain  She was instructed that she would have to report to the emergency room with any of these red flag signs  She verbalized understanding  The patient reports that her pain is well managed with the use of oxycodone/acetaminophen 7 5/325 mg 1 tablet 4 times daily  She reports that this medication provides her 70% pain relief, without side effects  Therefore the patient will continue on this medication as prescribed  Two prescriptions for this medication was sent electronically to the patient's pharmacy on file with 1 prescription for this month with do not fill date of 7/8/2019 and a prescription for the following month with do not fill date of 8/06/2019  The patient was instructed while in the office today that she must bring her opioid prescription bottle with her to each office visit per office policy for possible random pill count as required for future refills  She verbalized understanding  She reports that she continues with ongoing bilateral hip and right knee pain and had seen Dr Mcgraw in the past her bilateral hips and right knee  Therefore,I have given her a referral to Dr Mcgraw at this time  The patient will also continue on Celebrex 200 mg daily  Although I did have a conversation with her today that due to her ongoing diabetes and history of chronic stage 3 kidney disease remaining on NSAID medications can lead to further kidney damage, risk for GI irritation/bleed, increased risk for high blood pressure as well as increased risk for cardiovascular events  She verbalized understanding  She reports that she will take this medication very sparingly and only as needed  The patient will continue on baclofen as prescribed however she does not need refills of this medication at this time      The patient will also continue on gabapentin as prescribed and refills of this medication was sent electronically to the patient's pharmacy on file  The patient has been complaining of bilateral leg swelling which has been present for couple months  The patient reports that she has had difficulties seeing her family doctor  She reports that she has an appointment with family doctor tomorrow  I encouraged her to discuss her bilateral leg swelling with her family doctor tomorrow  She verbalized understanding  There are risks associated with opioid medications, including dependence, addiction and tolerance  The patient understands and agrees to use these medications only as prescribed  Potential side effects of the medications include, but are not limited to, constipation, drowsiness, addiction, impaired judgment and risk of fatal overdose if not taken as prescribed  The patient was warned against driving while taking sedation medications  Sharing medications is a felony  At this point in time, the patient is showing no signs of addiction, abuse, diversion or suicidal ideation  A urine drug screen was collected at today's office visit as part of our medication management protocol  The point of care testing results were appropriate for what was being prescribed  The specimen will be sent for confirmatory testing  The drug screen is medically necessary because the patient is either dependent on opioid medication or is being considered for opioid medication therapy and the results could impact ongoing or future treatment  The drug screen is to evaluate for the presences or absence of prescribed, non-prescribed, and/or illicit drugs/substances  [place] Prescription Drug Monitoring Program report was reviewed and was appropriate     The patient will follow up in 8 weeks or sooner with the worsening of symptoms  My impressions and treatment recommendations were discussed in detail with the patient who verbalized understanding and had no further questions    Discharge instructions were provided  I personally saw and examined the patient and I agree with the above discussed plan of care  Orders Placed This Encounter   Procedures    Hemoglobin A1C     Standing Status:   Future     Standing Expiration Date:   7/1/2020    Ambulatory referral to Orthopedic Surgery     Standing Status:   Future     Standing Expiration Date:   1/1/2020     Referral Priority:   Routine     Referral Type:   Consult - AMB     Referral Reason:   Specialty Services Required     Referred to Provider:   Marivel David DO     Requested Specialty:   Orthopedic Surgery     Number of Visits Requested:   1     Expiration Date:   7/1/2020     New Medications Ordered This Visit   Medications    celecoxib (CeleBREX) 200 mg capsule     Sig: Take 1 capsule (200 mg total) by mouth daily as needed for mild pain for up to 30 days     Dispense:  30 capsule     Refill:  0    gabapentin (NEURONTIN) 600 MG tablet     Sig: Take 1 tablet (600 mg total) by mouth 3 (three) times a day for 30 days     Dispense:  90 tablet     Refill:  1    oxyCODONE-acetaminophen (PERCOCET) 7 5-325 MG per tablet     Sig: Take 1 tablet by mouth every 6 (six) hours as needed for moderate pain for up to 30 daysMax Daily Amount: 4 tablets     Dispense:  120 tablet     Refill:  0     Do Not Fill Until: 08/06/2019       History of Present Illness:  Annie Rodrigez is a 59 y o  female with a history of chronic back pain, cervical spondylosis, lumbar stenosis, lumbar spondylosis, lumbar radiculopathy  The patient was last seen office on 5/16/2019 where she underwent a bilateral L5-S1 transforaminal steroid injection  She reports that this injection provided her 60% pain relief for 2 weeks  She presents for a follow up office visit in regards to Neck Pain (radiates to the right shoulder); Shoulder Pain; Back Pain (radiates to the hips and legs); Hip Pain; Leg Pain; and Knee Pain (bilateral )     The patients current symptoms include neck pain that radiates into her right shoulder  She also complains of low back pain that radiates into the anterior and posterior aspects of her bilateral calves  She denies bilateral leg weakness, or bowel or bladder issues  She describes her pain as dull aching, cramping, pressure-like, numbness pain that is constant nature with symptoms worsening during the evening nighttime hours  The patient reports that her pain is symptoms have worsened since last office visit  She currently rates her pain a 9/10 numeric pain scale  Current pain medications includes:  Oxycodone/acetaminophen 7 5/325 mg 1 tablet 4 times daily  The patient reports that this regimen is providing 70% pain relief  The patient is reporting no side effects from this pain medication regimen  Pain Contract Signed: 1/14/2019, Last Urine Drug Screen: 12/03/2018    I have personally reviewed and/or updated the patient's past medical history, past surgical history, family history, social history, current medications, allergies, and vital signs today  Review of Systems   Respiratory: Negative for shortness of breath  Cardiovascular: Negative for chest pain  Gastrointestinal: Positive for nausea and vomiting  Negative for constipation and diarrhea  Musculoskeletal: Positive for gait problem and joint swelling  Negative for arthralgias and myalgias  Decreased ROM  Joint stiffness   Skin: Negative for rash  Neurological: Negative for dizziness, seizures and weakness  All other systems reviewed and are negative        Patient Active Problem List   Diagnosis    Vitamin D deficiency    Type 2 diabetes mellitus with diabetic neuropathy, with long-term current use of insulin ([facility] )    History of Salmonella infection    Obesity, morbid ([facility] )    Melanocytic nevus of trunk    Menopausal and perimenopausal disorder    Insomnia    Essential hypertension    Hyperlipidemia    History of pulmonary embolism    GERD (gastroesophageal reflux disease)    Gait disturbance    Diabetic ulcer of left heel (HCC)    Stage 3 chronic kidney disease (HCC)    Benign mole    Arthritis    Anxiety    Anemia    Achilles tendinitis of left lower extremity    Ulcer of toe of left foot (HCC)    History of MRSA infection    Screening for colon cancer    Screening for breast cancer    Screening for cervical cancer    Eye exam, routine    Chronic pain syndrome    Chest pain    Osteoarthritis of spine    Leg swelling    Malignant hyperthermia due to anesthesia    Skin callus    Lipoma of back    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    Chronic bilateral low back pain with bilateral sciatica    Lumbar radiculopathy    Diabetic neuropathy (HCC)    Myofascial pain syndrome    Lumbar spondylosis    Spinal stenosis of lumbar region with neurogenic claudication    Thoracic spinal stenosis    Chronic back pain       Past Medical History:   Diagnosis Date    Anesthesia related hyperthermia     pt states she had high fevers after her lipoma surgery in  at our hospital and was shipped to [facility] where they said t was from an anesthesia med    Anxiety     Arthritis     Asthma     Chronic pain     Depression     Diabetes mellitus ([address] )     GERD (gastroesophageal reflux disease)     Hyperlipidemia     Malignant hyperthermia due to anesthesia     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     Neuropathy     Obesity     PCOS (polycystic ovarian syndrome)        Past Surgical History:   Procedure Laterality Date    CERVICAL CONIZATION   W/ LASER       SECTION      DILATION AND CURETTAGE OF UTERUS      ENDOMETRIAL ABLATION      ENDOMETRIAL BIOPSY      EPIDURAL BLOCK INJECTION Bilateral 2019    Procedure: L5-S1 transforaminal epidural steroid injection;  Surgeon: Rickey Aponte MD;  Location: [facility];  Service: Pain Management     EPIDURAL BLOCK INJECTION Bilateral 2019    Procedure: L5-S1 transforaminal epidural steroid injection;  Surgeon: Adwoa Huerta MD;  Location: [facility];  Service: Pain Management     FL GUIDED NEEDLE PLAC BX/ASP/INJ  2019    FL GUIDED NEEDLE PLAC BX/ASP/INJ  2019    FOOT SURGERY Left     HERNIA REPAIR      INDUCED       JOINT REPLACEMENT      KNEE ARTHROPLASTY Right     LA DEBRIDEMENT OPEN WOUND 20 SQ CM< Right 2018    Procedure: DEBRIDEMENT HAND/FINGER [facility] OUT);   Surgeon: Jay Correia MD;  Location: [facility] MAIN OR;  Service: General    LA EXCISION TUMOR SOFT TISSUE BACK/FLANK SUBQ 3+CM N/A 2018    Procedure: BACK LIPOMA EXCISION;  Surgeon: Jay Correia MD;  Location: [facility] MAIN OR;  Service: General    ROTATOR CUFF REPAIR Right     TONSILLECTOMY         Family History   Problem Relation Age of Onset    Polycystic ovary syndrome Mother     Mental illness Father     Diabetes Father        Social History     Occupational History    Not on file   Tobacco Use    Smoking status: Never Smoker    Smokeless tobacco: Never Used   Substance and Sexual Activity    Alcohol use: Yes     Comment: OCCASIONALLY    Drug use: No    Sexual activity: Never       Current Outpatient Medications on File Prior to Visit   Medication Sig    ammonium lactate (LAC-HYDRIN) 12 % lotion     aspirin 81 mg chewable tablet Chew 1 tablet (81 mg total) daily    baclofen 20 mg tablet Take 0 5 tablets (10 mg total) by mouth 2 (two) times a day as needed for muscle spasms for up to 30 days    BD INSULIN SYRINGE U/F 31G X " 1 ML MISC     Calcium Carb-Cholecalciferol (CALCIUM 600+D3 PO) Take by mouth daily      cholecalciferol (VITAMIN D3) 400 units tablet Take 400 Units by mouth daily    citalopram (CeleXA) 20 mg tablet Take 20 mg by mouth    clotrimazole-betamethasone (LOTRISONE) 1-0 05 % cream Apply topically 2 (two) times a day    furosemide (LASIX) 20 mg tablet Take one tablet by mouth daily x 3 days (Patient taking differently: Take 20 mg by mouth as needed Take one tablet by mouth daily x 3 days )    Icosapent Ethyl 0 5 g CAPS Take 2 capsules by mouth    insulin detemir (LEVEMIR FLEXTOUCH) 100 Units/mL injection pen Inject 40 Units under the skin    liraglutide (VICTOZA) injection Inject 1 8 mg under the skin    LORazepam (ATIVAN) 0 5 mg tablet Take by mouth every 8 (eight) hours as needed for anxiety    meclizine (ANTIVERT) 25 mg tablet Take 25 mg by mouth Three times daily as needed    metFORMIN (GLUCOPHAGE) 1000 MG tablet Take 1 tablet (1,000 mg total) by mouth 2 (two) times a day with meals    multivitamin (THERAGRAN) TABS Take 1 tablet by mouth    nystatin (MYCOSTATIN) 500,000 units/5 mL suspension     Omega-3 Fatty Acids (FISH OIL ADULT GUMMIES PO) Take by mouth daily      simvastatin (ZOCOR) 10 mg tablet     sucralfate (CARAFATE) 1 g tablet Take 1 g by mouth    [DISCONTINUED] oxyCODONE-acetaminophen (PERCOCET) 7 5-325 MG per tablet Take 1 tablet by mouth every 6 (six) hours as needed for moderate pain for up to 30 daysMax Daily Amount: 4 tablets    [DISCONTINUED] zolpidem (AMBIEN) 10 mg tablet Take 5 mg by mouth daily as needed    insulin NPH-insulin regular (NovoLIN 70/30) 100 units/mL subcutaneous injection Inject 60 Units under the skin 2 (two) times a day before meals for 30 days    ranitidine (ZANTAC) 150 MG capsule Take 1 capsule (150 mg total) by mouth 2 (two) times a day for 30 days    [DISCONTINUED] celecoxib (CeleBREX) 200 mg capsule Take 1 capsule (200 mg total) by mouth daily as needed for mild pain for up to 30 days    [DISCONTINUED] gabapentin (NEURONTIN) 600 MG tablet Take 1 tablet (600 mg total) by mouth 3 (three) times a day for 30 days     No current facility-administered medications on file prior to visit          Allergies   Allergen Reactions    Cauliflower [Brassica Oleracea Italica] Other (See Comments)     Other reaction(s): GI upset  Abdominal pain    Citrus Bioflavonoid Other (See Comments) Skin burns    Compro [Prochlorperazine] Wheezing     Other reaction(s): Other (See Comments)  SOB    Latex Other (See Comments)     burning    Mobic [Meloxicam] Swelling    Morphine Hives and Itching    Albumen, Egg Vomiting     Other reaction(s): Nausea and/or vomiting    Metronidazole Itching    Sulfa Antibiotics Other (See Comments)     Other reaction(s): Unknown Reaction       Physical Exam:    /78 (BP Location: Left arm, Patient Position: Sitting, Cuff Size: Standard)   Pulse 71   Resp 18   Ht 5' 1" (1 549 m)   Wt 121 kg (267 lb)   BMI 50 45 kg/m²     Constitutional:normal, well developed, well nourished, alert, in no distress and non-toxic and no overt pain behavior  and obese  Eyes:anicteric  HEENT:grossly intact  Neck:supple, symmetric, trachea midline and no masses   Pulmonary:even and unlabored  Cardiovascular:No edema or pitting edema present  Skin:Normal without rashes or lesions and well hydrated  Psychiatric:Mood and affect appropriate  Neurologic:Cranial Nerves II-XII grossly intact  Musculoskeletal:antalgic and ambulates with cane     Lumbar Spine Exam    Appearance:  Normal lordosis  Palpation/Tenderness:  left lumbar paraspinal tenderness  right lumbar paraspinal tenderness  Sensory:  no sensory deficits noted  Range of Motion:  Flexion:  Minimally limited  with pain  Extension:  Minimally limited  with pain  Lateral Flexion - Left:  Minimally limited  with pain  Lateral Flexion - Right:  Minimally limited  with pain  Rotation - Left:  Minimally limited  with pain  Rotation - Right:  Minimally limited  with pain  Motor Strength:  Left hip flexion:  5/5  Right hip flexion:  5/5  Left knee extension:  5/5  Right knee extension:  5/5  Left foot dorsiflexion:  5/5  Left foot plantar flexion:  5/5  Right foot dorsiflexion:  5/5  Right foot plantar flexion:  5/5    Imaging  Patient brought in imaging reports from hospital admission on 12/16/2018    CT cervical spine 12/16/2018:  C2-C3 facet degeneration with disc space narrowing  C3-C4 grade 1 anterior listhesis with facet arthropathy, left neural foraminal narrowing  C4-C5 partial vertebral body fusion, specific fusion on the right with advanced degenerative narrowing on the left  C5-C6 disc space narrowing with facet arthropathy with bilateral neural foraminal narrowing  C7-T1 disc space narrowing with facet arthropathy, right neural foraminal narrowing  T1-T2 facet arthropathy     CT thoracic spine 12/16/2018:  T1-T2 facet arthropathy, multilevel endplate degenerative changes, T2-T3 severe facet arthropathy with canal stenosis  T3-T4 canal and bilateral foraminal stenosis  T4-T5 moderate canal stenosis  T8-T9 moderate canal stenosis  T9-T10 moderate canal stenosis  T10-T11 mild canal stenosis  T11-T12 moderate canal stenosis  T12-L1 moderate canal stenosis, facet arthropathy, advanced bilateral foraminal narrowing     CT lumbar spine 12/16/2018:  L1-L2 broad-based disc bulging with advanced canal stenosis and advanced bilateral foraminal narrowing  L3-L4 severe canal stenosis with bilateral foraminal narrowing  L4-L5 severe canal stenosis with facet arthropathy and severe bilateral foraminal narrowing  L5-S1 broad-based disc bulging with facet arthropathy  Degenerative narrowing of bilateral sacroiliac joints

## 2019-07-02 ENCOUNTER — TELEPHONE (OUTPATIENT)
Dept: PAIN MEDICINE | Facility: MEDICAL CENTER | Age: 59
End: 2019-07-02

## 2019-07-02 NOTE — TELEPHONE ENCOUNTER
S/w pt  Pt was unaware that percocet script was escribed  Advised July and August scripts are at the pharmacy  Pt appreciative

## 2019-07-02 NOTE — TELEPHONE ENCOUNTER
Pt called asking about her script for   oxyCODONE-acetaminophen (PERCOCET) 7 5-325 MG per tablet    I did not see  This as ordered for July     Pt would like to  the script     Pt can be reached at [phone]

## 2019-07-02 NOTE — TELEPHONE ENCOUNTER
Unable to leave message  "call can not be completed as dialed"  If pt calls back today, please put through to [facility] triage

## 2019-07-02 NOTE — TELEPHONE ENCOUNTER
Message received from answering service at 6:48 pm  MSG: Pt is requesting a call from office in regards  To her referral and medication  Patient called scheduled a Consult 11:00 am on 9/6/17 with Dr Ngo for 492.8 (ICD-9-CM) - J43.9 (ICD-10-CM) - Pulmonary emphysema, unspecified emphysema type (CMS/HCC).Dr Martinez referred Patient.   Patient needs a order for a x-ray and will go a hour before her appt.

## 2019-07-02 NOTE — TELEPHONE ENCOUNTER
Attempted to reach pt  Disconnected x2  Please put through to [facility] triage if pt calls back today

## 2019-07-07 ENCOUNTER — APPOINTMENT (EMERGENCY)
Dept: CT IMAGING | Facility: HOSPITAL | Age: 59
End: 2019-07-07
Payer: COMMERCIAL

## 2019-07-07 ENCOUNTER — APPOINTMENT (EMERGENCY)
Dept: RADIOLOGY | Facility: HOSPITAL | Age: 59
End: 2019-07-07
Payer: COMMERCIAL

## 2019-07-07 ENCOUNTER — HOSPITAL ENCOUNTER (OUTPATIENT)
Facility: HOSPITAL | Age: 59
Setting detail: OBSERVATION
Discharge: HOME | End: 2019-07-09
Attending: EMERGENCY MEDICINE | Admitting: FAMILY MEDICINE
Payer: COMMERCIAL

## 2019-07-07 DIAGNOSIS — M54.16 LUMBAR RADICULOPATHY: ICD-10-CM

## 2019-07-07 DIAGNOSIS — E11.65 TYPE 2 DIABETES MELLITUS WITH HYPERGLYCEMIA, WITH LONG-TERM CURRENT USE OF INSULIN (HCC): ICD-10-CM

## 2019-07-07 DIAGNOSIS — R26.9 GAIT DISTURBANCE: ICD-10-CM

## 2019-07-07 DIAGNOSIS — M25.551 RIGHT HIP PAIN: Primary | ICD-10-CM

## 2019-07-07 DIAGNOSIS — G89.29 CHRONIC BACK PAIN, UNSPECIFIED BACK LOCATION, UNSPECIFIED BACK PAIN LATERALITY: ICD-10-CM

## 2019-07-07 DIAGNOSIS — M47.812 SPONDYLOSIS OF CERVICAL REGION WITHOUT MYELOPATHY OR RADICULOPATHY: ICD-10-CM

## 2019-07-07 DIAGNOSIS — R00.1 BRADYCARDIA: ICD-10-CM

## 2019-07-07 DIAGNOSIS — M48.062 SPINAL STENOSIS OF LUMBAR REGION WITH NEUROGENIC CLAUDICATION: ICD-10-CM

## 2019-07-07 DIAGNOSIS — E66.01 MORBID OBESITY WITH BMI OF 45.0-49.9, ADULT (HCC): ICD-10-CM

## 2019-07-07 DIAGNOSIS — N30.00 ACUTE CYSTITIS WITHOUT HEMATURIA: ICD-10-CM

## 2019-07-07 DIAGNOSIS — M47.816 LUMBAR SPONDYLOSIS: ICD-10-CM

## 2019-07-07 DIAGNOSIS — M54.9 CHRONIC BACK PAIN, UNSPECIFIED BACK LOCATION, UNSPECIFIED BACK PAIN LATERALITY: ICD-10-CM

## 2019-07-07 DIAGNOSIS — Z79.4 TYPE 2 DIABETES MELLITUS WITH HYPERGLYCEMIA, WITH LONG-TERM CURRENT USE OF INSULIN (HCC): ICD-10-CM

## 2019-07-07 LAB
ALBUMIN SERPL BCP-MCNC: 3.5 G/DL (ref 3.5–5)
ALP SERPL-CCNC: 92 U/L (ref 46–116)
ALT SERPL W P-5'-P-CCNC: 26 U/L (ref 12–78)
ANION GAP SERPL CALCULATED.3IONS-SCNC: 6 MMOL/L (ref 4–13)
AST SERPL W P-5'-P-CCNC: 22 U/L (ref 5–45)
BACTERIA UR QL AUTO: ABNORMAL /HPF
BASOPHILS # BLD AUTO: 0.04 THOUSANDS/ΜL (ref 0–0.1)
BASOPHILS NFR BLD AUTO: 1 % (ref 0–1)
BILIRUB SERPL-MCNC: 0.4 MG/DL (ref 0.2–1)
BILIRUB UR QL STRIP: NEGATIVE
BUN SERPL-MCNC: 26 MG/DL (ref 5–25)
CALCIUM SERPL-MCNC: 9.6 MG/DL (ref 8.3–10.1)
CHLORIDE SERPL-SCNC: 107 MMOL/L (ref 100–108)
CLARITY UR: ABNORMAL
CO2 SERPL-SCNC: 29 MMOL/L (ref 21–32)
COLOR UR: YELLOW
CREAT SERPL-MCNC: 0.9 MG/DL (ref 0.6–1.3)
EOSINOPHIL # BLD AUTO: 0.68 THOUSAND/ΜL (ref 0–0.61)
EOSINOPHIL NFR BLD AUTO: 8 % (ref 0–6)
ERYTHROCYTE [DISTWIDTH] IN BLOOD BY AUTOMATED COUNT: 13.3 % (ref 11.6–15.1)
GFR SERPL CREATININE-BSD FRML MDRD: 70 ML/MIN/1.73SQ M
GLUCOSE SERPL-MCNC: 78 MG/DL (ref 65–140)
GLUCOSE UR STRIP-MCNC: NEGATIVE MG/DL
HCT VFR BLD AUTO: 44.2 % (ref 34.8–46.1)
HGB BLD-MCNC: 14.4 G/DL (ref 11.5–15.4)
HGB UR QL STRIP.AUTO: NEGATIVE
IMM GRANULOCYTES # BLD AUTO: 0.03 THOUSAND/UL (ref 0–0.2)
IMM GRANULOCYTES NFR BLD AUTO: 0 % (ref 0–2)
KETONES UR STRIP-MCNC: NEGATIVE MG/DL
LEUKOCYTE ESTERASE UR QL STRIP: ABNORMAL
LYMPHOCYTES # BLD AUTO: 2.32 THOUSANDS/ΜL (ref 0.6–4.47)
LYMPHOCYTES NFR BLD AUTO: 28 % (ref 14–44)
MCH RBC QN AUTO: 28.7 PG (ref 26.8–34.3)
MCHC RBC AUTO-ENTMCNC: 32.6 G/DL (ref 31.4–37.4)
MCV RBC AUTO: 88 FL (ref 82–98)
MONOCYTES # BLD AUTO: 0.52 THOUSAND/ΜL (ref 0.17–1.22)
MONOCYTES NFR BLD AUTO: 6 % (ref 4–12)
NEUTROPHILS # BLD AUTO: 4.85 THOUSANDS/ΜL (ref 1.85–7.62)
NEUTS SEG NFR BLD AUTO: 57 % (ref 43–75)
NITRITE UR QL STRIP: NEGATIVE
NON-SQ EPI CELLS URNS QL MICRO: ABNORMAL /HPF
NRBC BLD AUTO-RTO: 0 /100 WBCS
PH UR STRIP.AUTO: 5.5 [PH]
PLATELET # BLD AUTO: 253 THOUSANDS/UL (ref 149–390)
PMV BLD AUTO: 9.7 FL (ref 8.9–12.7)
POTASSIUM SERPL-SCNC: 3.7 MMOL/L (ref 3.5–5.3)
PROT SERPL-MCNC: 7.2 G/DL (ref 6.4–8.2)
PROT UR STRIP-MCNC: NEGATIVE MG/DL
RBC # BLD AUTO: 5.01 MILLION/UL (ref 3.81–5.12)
RBC #/AREA URNS AUTO: ABNORMAL /HPF
SODIUM SERPL-SCNC: 142 MMOL/L (ref 136–145)
SP GR UR STRIP.AUTO: >=1.03 (ref 1–1.03)
UROBILINOGEN UR QL STRIP.AUTO: 0.2 E.U./DL
WBC # BLD AUTO: 8.44 THOUSAND/UL (ref 4.31–10.16)
WBC #/AREA URNS AUTO: ABNORMAL /HPF

## 2019-07-07 PROCEDURE — 81001 URINALYSIS AUTO W/SCOPE: CPT | Performed by: EMERGENCY MEDICINE

## 2019-07-07 PROCEDURE — 36415 COLL VENOUS BLD VENIPUNCTURE: CPT | Performed by: EMERGENCY MEDICINE

## 2019-07-07 PROCEDURE — 80053 COMPREHEN METABOLIC PANEL: CPT | Performed by: EMERGENCY MEDICINE

## 2019-07-07 PROCEDURE — 73502 X-RAY EXAM HIP UNI 2-3 VIEWS: CPT

## 2019-07-07 PROCEDURE — 85025 COMPLETE CBC W/AUTO DIFF WBC: CPT | Performed by: EMERGENCY MEDICINE

## 2019-07-07 PROCEDURE — 99284 EMERGENCY DEPT VISIT MOD MDM: CPT | Performed by: EMERGENCY MEDICINE

## 2019-07-07 PROCEDURE — 74176 CT ABD & PELVIS W/O CONTRAST: CPT

## 2019-07-07 PROCEDURE — 86618 LYME DISEASE ANTIBODY: CPT | Performed by: EMERGENCY MEDICINE

## 2019-07-07 PROCEDURE — 96374 THER/PROPH/DIAG INJ IV PUSH: CPT

## 2019-07-07 PROCEDURE — 96375 TX/PRO/DX INJ NEW DRUG ADDON: CPT

## 2019-07-07 PROCEDURE — 99285 EMERGENCY DEPT VISIT HI MDM: CPT

## 2019-07-07 RX ORDER — ONDANSETRON 2 MG/ML
4 INJECTION INTRAMUSCULAR; INTRAVENOUS ONCE
Status: COMPLETED | OUTPATIENT
Start: 2019-07-07 | End: 2019-07-07

## 2019-07-07 RX ORDER — KETAMINE HCL IN NACL, ISO-OSM 100MG/10ML
10 SYRINGE (ML) INJECTION ONCE
Status: COMPLETED | OUTPATIENT
Start: 2019-07-07 | End: 2019-07-07

## 2019-07-07 RX ORDER — LORAZEPAM 2 MG/ML
1 INJECTION INTRAMUSCULAR ONCE
Status: COMPLETED | OUTPATIENT
Start: 2019-07-07 | End: 2019-07-07

## 2019-07-07 RX ADMIN — LORAZEPAM 1 MG: 2 INJECTION INTRAMUSCULAR; INTRAVENOUS at 21:41

## 2019-07-07 RX ADMIN — ONDANSETRON 4 MG: 2 INJECTION INTRAMUSCULAR; INTRAVENOUS at 21:30

## 2019-07-07 RX ADMIN — Medication 10 MG: at 21:32

## 2019-07-08 ENCOUNTER — APPOINTMENT (OUTPATIENT)
Dept: NON INVASIVE DIAGNOSTICS | Facility: HOSPITAL | Age: 59
End: 2019-07-08
Payer: COMMERCIAL

## 2019-07-08 PROBLEM — M25.551 RIGHT HIP PAIN: Status: ACTIVE | Noted: 2019-07-08

## 2019-07-08 PROBLEM — R82.81 PYURIA: Status: ACTIVE | Noted: 2019-07-08

## 2019-07-08 PROBLEM — E66.01 MORBID OBESITY WITH BMI OF 50.0-59.9, ADULT (HCC): Status: ACTIVE | Noted: 2019-07-08

## 2019-07-08 LAB
ANION GAP SERPL CALCULATED.3IONS-SCNC: 5 MMOL/L (ref 4–13)
BUN SERPL-MCNC: 22 MG/DL (ref 5–25)
CALCIUM SERPL-MCNC: 9 MG/DL (ref 8.3–10.1)
CHLORIDE SERPL-SCNC: 108 MMOL/L (ref 100–108)
CO2 SERPL-SCNC: 27 MMOL/L (ref 21–32)
CREAT SERPL-MCNC: 0.82 MG/DL (ref 0.6–1.3)
ERYTHROCYTE [DISTWIDTH] IN BLOOD BY AUTOMATED COUNT: 13.3 % (ref 11.6–15.1)
GFR SERPL CREATININE-BSD FRML MDRD: 78 ML/MIN/1.73SQ M
GLUCOSE P FAST SERPL-MCNC: 104 MG/DL (ref 65–99)
GLUCOSE SERPL-MCNC: 104 MG/DL (ref 65–140)
GLUCOSE SERPL-MCNC: 177 MG/DL (ref 65–140)
GLUCOSE SERPL-MCNC: 357 MG/DL (ref 65–140)
HCT VFR BLD AUTO: 40.4 % (ref 34.8–46.1)
HGB BLD-MCNC: 13.1 G/DL (ref 11.5–15.4)
MAGNESIUM SERPL-MCNC: 1.6 MG/DL (ref 1.6–2.6)
MCH RBC QN AUTO: 28.9 PG (ref 26.8–34.3)
MCHC RBC AUTO-ENTMCNC: 32.4 G/DL (ref 31.4–37.4)
MCV RBC AUTO: 89 FL (ref 82–98)
PHOSPHATE SERPL-MCNC: 4.3 MG/DL (ref 2.3–4.1)
PLATELET # BLD AUTO: 237 THOUSANDS/UL (ref 149–390)
PMV BLD AUTO: 9.5 FL (ref 8.9–12.7)
POTASSIUM SERPL-SCNC: 3.9 MMOL/L (ref 3.5–5.3)
RBC # BLD AUTO: 4.54 MILLION/UL (ref 3.81–5.12)
SODIUM SERPL-SCNC: 140 MMOL/L (ref 136–145)
WBC # BLD AUTO: 6.93 THOUSAND/UL (ref 4.31–10.16)

## 2019-07-08 PROCEDURE — 93970 EXTREMITY STUDY: CPT | Performed by: SURGERY

## 2019-07-08 PROCEDURE — 83735 ASSAY OF MAGNESIUM: CPT | Performed by: NURSE PRACTITIONER

## 2019-07-08 PROCEDURE — 99219 PR INITIAL OBSERVATION CARE/DAY 50 MINUTES: CPT | Performed by: INTERNAL MEDICINE

## 2019-07-08 PROCEDURE — 93970 EXTREMITY STUDY: CPT

## 2019-07-08 PROCEDURE — 97530 THERAPEUTIC ACTIVITIES: CPT | Performed by: PHYSICAL THERAPIST

## 2019-07-08 PROCEDURE — 80048 BASIC METABOLIC PNL TOTAL CA: CPT | Performed by: NURSE PRACTITIONER

## 2019-07-08 PROCEDURE — 85027 COMPLETE CBC AUTOMATED: CPT | Performed by: NURSE PRACTITIONER

## 2019-07-08 PROCEDURE — G8987 SELF CARE CURRENT STATUS: HCPCS

## 2019-07-08 PROCEDURE — G8978 MOBILITY CURRENT STATUS: HCPCS | Performed by: PHYSICAL THERAPIST

## 2019-07-08 PROCEDURE — 87077 CULTURE AEROBIC IDENTIFY: CPT | Performed by: INTERNAL MEDICINE

## 2019-07-08 PROCEDURE — 87086 URINE CULTURE/COLONY COUNT: CPT | Performed by: INTERNAL MEDICINE

## 2019-07-08 PROCEDURE — 97162 PT EVAL MOD COMPLEX 30 MIN: CPT | Performed by: PHYSICAL THERAPIST

## 2019-07-08 PROCEDURE — G8979 MOBILITY GOAL STATUS: HCPCS | Performed by: PHYSICAL THERAPIST

## 2019-07-08 PROCEDURE — 36415 COLL VENOUS BLD VENIPUNCTURE: CPT | Performed by: NURSE PRACTITIONER

## 2019-07-08 PROCEDURE — G8988 SELF CARE GOAL STATUS: HCPCS

## 2019-07-08 PROCEDURE — 84100 ASSAY OF PHOSPHORUS: CPT | Performed by: NURSE PRACTITIONER

## 2019-07-08 PROCEDURE — 97166 OT EVAL MOD COMPLEX 45 MIN: CPT

## 2019-07-08 PROCEDURE — 87186 SC STD MICRODIL/AGAR DIL: CPT | Performed by: INTERNAL MEDICINE

## 2019-07-08 PROCEDURE — 99242 OFF/OP CONSLTJ NEW/EST SF 20: CPT | Performed by: ORTHOPAEDIC SURGERY

## 2019-07-08 PROCEDURE — 82948 REAGENT STRIP/BLOOD GLUCOSE: CPT

## 2019-07-08 PROCEDURE — 97535 SELF CARE MNGMENT TRAINING: CPT

## 2019-07-08 RX ORDER — OXYCODONE HYDROCHLORIDE 10 MG/1
10 TABLET ORAL EVERY 4 HOURS PRN
Status: DISCONTINUED | OUTPATIENT
Start: 2019-07-08 | End: 2019-07-09 | Stop reason: HOSPADM

## 2019-07-08 RX ORDER — BACLOFEN 10 MG/1
10 TABLET ORAL 2 TIMES DAILY PRN
Status: DISCONTINUED | OUTPATIENT
Start: 2019-07-08 | End: 2019-07-08

## 2019-07-08 RX ORDER — PRAVASTATIN SODIUM 20 MG
20 TABLET ORAL
Status: DISCONTINUED | OUTPATIENT
Start: 2019-07-08 | End: 2019-07-08

## 2019-07-08 RX ORDER — SUCRALFATE 1 G/1
1 TABLET ORAL
Status: DISCONTINUED | OUTPATIENT
Start: 2019-07-08 | End: 2019-07-09 | Stop reason: HOSPADM

## 2019-07-08 RX ORDER — LISINOPRIL 5 MG/1
5 TABLET ORAL DAILY
Status: DISCONTINUED | OUTPATIENT
Start: 2019-07-08 | End: 2019-07-09 | Stop reason: HOSPADM

## 2019-07-08 RX ORDER — METHOCARBAMOL 500 MG/1
750 TABLET, FILM COATED ORAL EVERY 8 HOURS SCHEDULED
Status: DISCONTINUED | OUTPATIENT
Start: 2019-07-08 | End: 2019-07-09 | Stop reason: HOSPADM

## 2019-07-08 RX ORDER — OXYCODONE HYDROCHLORIDE 5 MG/1
5 TABLET ORAL EVERY 4 HOURS PRN
Status: DISCONTINUED | OUTPATIENT
Start: 2019-07-08 | End: 2019-07-09 | Stop reason: HOSPADM

## 2019-07-08 RX ORDER — CITALOPRAM 20 MG/1
20 TABLET ORAL DAILY
Status: DISCONTINUED | OUTPATIENT
Start: 2019-07-08 | End: 2019-07-09 | Stop reason: HOSPADM

## 2019-07-08 RX ORDER — DEXTROSE MONOHYDRATE 25 G/50ML
25 INJECTION, SOLUTION INTRAVENOUS AS NEEDED
Status: DISCONTINUED | OUTPATIENT
Start: 2019-07-08 | End: 2019-07-09 | Stop reason: HOSPADM

## 2019-07-08 RX ORDER — OXYCODONE HYDROCHLORIDE AND ACETAMINOPHEN 5; 325 MG/1; MG/1
1.5 TABLET ORAL EVERY 6 HOURS PRN
Status: DISCONTINUED | OUTPATIENT
Start: 2019-07-08 | End: 2019-07-08

## 2019-07-08 RX ORDER — CELECOXIB 100 MG/1
200 CAPSULE ORAL DAILY PRN
Status: DISCONTINUED | OUTPATIENT
Start: 2019-07-08 | End: 2019-07-08

## 2019-07-08 RX ORDER — ACETAMINOPHEN 325 MG/1
650 TABLET ORAL EVERY 6 HOURS PRN
Status: DISCONTINUED | OUTPATIENT
Start: 2019-07-08 | End: 2019-07-09 | Stop reason: HOSPADM

## 2019-07-08 RX ORDER — ONDANSETRON 2 MG/ML
4 INJECTION INTRAMUSCULAR; INTRAVENOUS EVERY 4 HOURS PRN
Status: DISCONTINUED | OUTPATIENT
Start: 2019-07-08 | End: 2019-07-09 | Stop reason: HOSPADM

## 2019-07-08 RX ORDER — GABAPENTIN 300 MG/1
600 CAPSULE ORAL 3 TIMES DAILY
Status: DISCONTINUED | OUTPATIENT
Start: 2019-07-08 | End: 2019-07-09 | Stop reason: HOSPADM

## 2019-07-08 RX ORDER — INSULIN GLARGINE 100 [IU]/ML
40 INJECTION, SOLUTION SUBCUTANEOUS
Status: DISCONTINUED | OUTPATIENT
Start: 2019-07-08 | End: 2019-07-09 | Stop reason: HOSPADM

## 2019-07-08 RX ORDER — MAGNESIUM SULFATE HEPTAHYDRATE 40 MG/ML
2 INJECTION, SOLUTION INTRAVENOUS ONCE
Status: COMPLETED | OUTPATIENT
Start: 2019-07-08 | End: 2019-07-08

## 2019-07-08 RX ORDER — METHYLPREDNISOLONE SODIUM SUCCINATE 40 MG/ML
40 INJECTION, POWDER, LYOPHILIZED, FOR SOLUTION INTRAMUSCULAR; INTRAVENOUS EVERY 12 HOURS SCHEDULED
Status: DISCONTINUED | OUTPATIENT
Start: 2019-07-08 | End: 2019-07-09 | Stop reason: HOSPADM

## 2019-07-08 RX ORDER — ASPIRIN 81 MG/1
81 TABLET, CHEWABLE ORAL DAILY
Status: DISCONTINUED | OUTPATIENT
Start: 2019-07-08 | End: 2019-07-09 | Stop reason: HOSPADM

## 2019-07-08 RX ORDER — ATORVASTATIN CALCIUM 40 MG/1
40 TABLET, FILM COATED ORAL
Status: DISCONTINUED | OUTPATIENT
Start: 2019-07-08 | End: 2019-07-09 | Stop reason: HOSPADM

## 2019-07-08 RX ADMIN — INSULIN LISPRO 2 UNITS: 100 INJECTION, SOLUTION INTRAVENOUS; SUBCUTANEOUS at 16:43

## 2019-07-08 RX ADMIN — METHOCARBAMOL TABLETS 750 MG: 500 TABLET, COATED ORAL at 09:11

## 2019-07-08 RX ADMIN — CITALOPRAM HYDROBROMIDE 20 MG: 20 TABLET ORAL at 08:52

## 2019-07-08 RX ADMIN — METHOCARBAMOL TABLETS 750 MG: 500 TABLET, COATED ORAL at 21:51

## 2019-07-08 RX ADMIN — OXYCODONE HYDROCHLORIDE 10 MG: 10 TABLET ORAL at 22:01

## 2019-07-08 RX ADMIN — ENOXAPARIN SODIUM 40 MG: 40 INJECTION SUBCUTANEOUS at 21:54

## 2019-07-08 RX ADMIN — ENOXAPARIN SODIUM 40 MG: 40 INJECTION SUBCUTANEOUS at 08:54

## 2019-07-08 RX ADMIN — INSULIN GLARGINE 40 UNITS: 100 INJECTION, SOLUTION SUBCUTANEOUS at 21:52

## 2019-07-08 RX ADMIN — CELECOXIB 200 MG: 100 CAPSULE ORAL at 11:02

## 2019-07-08 RX ADMIN — GABAPENTIN 600 MG: 300 CAPSULE ORAL at 08:52

## 2019-07-08 RX ADMIN — INSULIN GLARGINE 40 UNITS: 100 INJECTION, SOLUTION SUBCUTANEOUS at 01:49

## 2019-07-08 RX ADMIN — METHYLPREDNISOLONE SODIUM SUCCINATE 40 MG: 40 INJECTION, POWDER, FOR SOLUTION INTRAMUSCULAR; INTRAVENOUS at 16:43

## 2019-07-08 RX ADMIN — GABAPENTIN 600 MG: 300 CAPSULE ORAL at 16:38

## 2019-07-08 RX ADMIN — ASPIRIN 81 MG 81 MG: 81 TABLET ORAL at 08:52

## 2019-07-08 RX ADMIN — METHOCARBAMOL TABLETS 750 MG: 500 TABLET, COATED ORAL at 14:28

## 2019-07-08 RX ADMIN — INSULIN LISPRO 6 UNITS: 100 INJECTION, SOLUTION INTRAVENOUS; SUBCUTANEOUS at 21:53

## 2019-07-08 RX ADMIN — GABAPENTIN 600 MG: 300 CAPSULE ORAL at 21:51

## 2019-07-08 RX ADMIN — MAGNESIUM SULFATE HEPTAHYDRATE 2 G: 40 INJECTION, SOLUTION INTRAVENOUS at 09:14

## 2019-07-08 RX ADMIN — OXYCODONE HYDROCHLORIDE 10 MG: 10 TABLET ORAL at 16:39

## 2019-07-08 RX ADMIN — OXYCODONE HYDROCHLORIDE AND ACETAMINOPHEN 1.5 TABLET: 5; 325 TABLET ORAL at 06:32

## 2019-07-08 NOTE — ASSESSMENT & PLAN NOTE
New right hip pain for approximately 2 weeks now  Right hip region has different phases of healing of ecchymosis present-the patient denies any trauma to area  Follows Dr Gutierrez on outpatient basis  Continue prior to admission Percocet 7 5 mg q 6 hours p r n    Ortho consulted  PT/OT consulted  Provide supportive care  Ambulate with assist at all times

## 2019-07-08 NOTE — PHYSICAL THERAPY NOTE
Physical Therapy Evaluation and Treatment    Patient Name: Payal Booth    [id number] Date: 7/8/2019     Problem List  Patient Active Problem List   Diagnosis    Vitamin D deficiency    Type 2 diabetes mellitus with diabetic neuropathy, with long-term current use of insulin ([facility] [address]    History of Salmonella infection    Obesity, morbid ([address] )    Melanocytic nevus of trunk    Menopausal and perimenopausal disorder    Insomnia    Essential hypertension    Hyperlipidemia    History of pulmonary embolism    GERD (gastroesophageal reflux disease)    Gait disturbance    Diabetic ulcer of left heel (HCC)    Stage 3 chronic kidney disease (HCC)    Benign mole    Arthritis    Anxiety    Anemia    Achilles tendinitis of left lower extremity    Ulcer of toe of left foot ([address] )    History of MRSA infection    Screening for colon cancer    Screening for breast cancer    Screening for cervical cancer    Eye exam, routine    Chronic pain syndrome    Chest pain    Osteoarthritis of spine    Leg swelling    Malignant hyperthermia due to anesthesia    Skin callus    Lipoma of back    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    Chronic bilateral low back pain with bilateral sciatica    Lumbar radiculopathy    Diabetic neuropathy ([address] )    Myofascial pain syndrome    Lumbar spondylosis    Spinal stenosis of lumbar region with neurogenic claudication    Thoracic spinal stenosis    Chronic back pain    Right hip pain    Pyuria        Past Medical History  Past Medical History:   Diagnosis Date    Anesthesia related hyperthermia     pt states she had high fevers after her lipoma surgery in 2001 at our hospital and was shipped to [facility] where they said t was from an anesthesia med    Anxiety     Arthritis     Asthma     Chronic pain     Depression     Diabetes mellitus ([address] )     GERD (gastroesophageal reflux disease)     Hyperlipidemia     Malignant hyperthermia due to anesthesia     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     Neuropathy     Obesity     PCOS (polycystic ovarian syndrome)         Past Surgical History  Past Surgical History:   Procedure Laterality Date    CERVICAL CONIZATION   W/ LASER       SECTION      DILATION AND CURETTAGE OF UTERUS      ENDOMETRIAL ABLATION      ENDOMETRIAL BIOPSY      EPIDURAL BLOCK INJECTION Bilateral 2019    Procedure: L5-S1 transforaminal epidural steroid injection;  Surgeon: Nicole Conde MD;  [facility]: [facility];  Service: Pain Management     EPIDURAL BLOCK INJECTION Bilateral 2019    Procedure: L5-S1 transforaminal epidural steroid injection;  Surgeon: Nicole Conde MD;  [facility]: [facility];  Service: Pain Management     FL GUIDED NEEDLE PLAC BX/ASP/INJ  2019    FL GUIDED NEEDLE PLAC BX/ASP/INJ  2019    FOOT SURGERY Left     HERNIA REPAIR      INDUCED       JOINT REPLACEMENT      KNEE ARTHROPLASTY Right     AR DEBRIDEMENT OPEN WOUND 20 SQ CM< Right 2018    Procedure: DEBRIDEMENT HAND/FINGER [facility] OUT);   Surgeon: Misty Cox MD;  Location: [facility] MAIN OR;  Service: General    AR EXCISION TUMOR SOFT TISSUE BACK/FLANK SUBQ 3+CM N/A 2018    Procedure: BACK LIPOMA EXCISION;  Surgeon: Misty Cox MD;  Location: [facility] MAIN OR;  Service: General    ROTATOR CUFF REPAIR Right     TONSILLECTOMY             19 0943   Note Type   Note type Eval/Treat   Pain Assessment   Pain Score 7   Pain Location Hip   Pain Orientation Right   Home Living   Type of Home House   Home Layout Multi-level;Bed/bath upstairs;Stairs to enter without rails  (1-2 ERIKA no HR, 12 steps no HR to 2nd floor)   Bathroom Shower/Tub Tub/shower unit   Bathroom Toilet Standard   Bathroom Equipment Shower chair    [address]   Additional Comments bed on 1st floor, bathroom 1st floor but has to go to 2nd to shower   Prior Function   Level of [place] Independent with ADLs and functional mobility  ((I) ambulation no A D  up until 1-2 weeks ago)   Lives With [facility]] Help From Family   ADL Assistance Independent   IADLs Independent   Comments (I) with driving   Restrictions/Precautions   Weight Bearing Precautions Per Order No   Other Precautions Pain; Fall Risk;Telemetry   General   Family/Caregiver Present Yes   Cognition   Arousal/Participation Alert   Orientation Level Oriented X4   Following Commands Follows all commands and directions without difficulty   RLE Assessment   RLE Assessment X  (hip flex limited 2/5, knee and ankle WFL 4+/5)   LLE Assessment   LLE Assessment X  (hip flex limited 2/5, knee ext 3+/5 flex 4/5 ankle 4+/5)   Coordination   Sensation X   Light Touch   RLE Light Touch Impaired   RLE Light Touch Comments absent plantar foot, decreased to light touch below knee to dorsum of foot   LLE Light Touch Impaired   LLE Light Touch Comments absent plantar surface of foot, impaired to light touch below knee to dorsum of foot   Bed Mobility   Supine to Sit 5  Supervision   Additional items Bedrails;Verbal cues; Increased time required  (increased time due to pain)   Sit to Supine 4  Minimal assistance   Additional items Assist x 1;LE management; Increased time required   Additional Comments min(A) to lift LE's onto bed, no change in 7/10 pain at lateral hip groin and buttock with bed mobility but increased with weightbearing and ambulation   Transfers   Sit to Stand 5  Supervision   Additional items Bedrails  (with RW)   Stand to Sit 5  Supervision   Additional items Verbal cues; Bedrails; Increased time required  (with RW)   Stand pivot 5  Supervision   Additional items Verbal cues; Increased time required  (with RW)   Toilet transfer 5  Supervision   Additional items Standard toilet  (grab bar)   Additional Comments pt able to perform all transfers from bed and toilet (S) however requiring RW for ambulation  Pt able to perform all self care mary-care and handwashing independently  Ambulation/Elevation   Gait pattern Forward Flexion; Antalgic  (decreased balbir)   Gait Assistance 5  Supervision   Assistive Device Rolling walker   Distance 100ft with RW (S) with forward flexed posture with inability to ambulate with fully upright posture due to pain  Ambulation limited by pain  Offered RW for home use however pt declined stating "It would not be useful to me at home because my family does not cleaning up and I may trip "   Balance   Static Sitting Good   Dynamic Sitting Good   Static Standing Fair +  (with RW)   Dynamic Standing Fair  (with RW)   Ambulatory Fair  (with RW)   Endurance Deficit   Endurance Deficit Yes   Endurance Deficit Description limited activity tolerance due to R posterior lateral hip and groin pain   Activity Tolerance   Activity Tolerance Patient limited by pain   Assessment   Prognosis Good   Problem List Decreased strength;Decreased range of motion;Decreased endurance; Impaired balance;Decreased mobility; Impaired sensation;Pain   Assessment Pt is a 59year old female with PMH including DM neuropathy GERD back lipoma excision L5-S1 injections, B/L THR, and R TKR presenting to [facility] with progressively increasing R lateral hip and groin pain limiting mobility  Pt seen for moderate complexity PT evaluation presenting with increased R hip pain decreased bilateral hip ROM, decreased strength balance endurance and mobility  Pt performing all bed mobility transfers and ambulation at a (S) level however requiring RW for ambulation which baseline is no A D  Pt with mild increase in pain with ambulation limiting ambulation tolerance  pt deferred RW for home use and states she will use cane  Pt will be safe to return home but would benefit from Outpatient PT on discharge   Pt would benefit from continued PT if not discharged home to decrease pain improve ROM strength balance endurance posture mobility transfers ambulation and step negotiation with return to (I) LOF   Goals   Patient Goals To decrease pain   LTG Expiration Date 07/22/19   Long Term Goal #1 Decrease hip pain to 2/10 to improve bilateral hip ROM to [facility] and to improve bilateral LE strength by 1/2 muscle grade  Pt will perform all bed mobility and transfers with least restrictive A D  to (I)   Long Term Goal #2 Improve posterior to fully upright during ambulation with improvement in standing and ambulatory balance to good with least restrictive A D  to improve ambulation to 400ft modified (I) no LOB and to improve step negotiation to 11 steps with HR (S) no LOB   Treatment Day 1   Plan   Treatment/Interventions Functional transfer training;LE strengthening/ROM; Elevations; Therapeutic exercise; Endurance training;Patient/family training;Bed mobility;Gait training   PT Frequency   (3-5x/wk)   Recommendation   Recommendation Outpatient PT   PT - OK to Discharge Yes   PHYSICAL THERAPY TREATMENT NOTE    Time In: 9:32    Time Out: 9:42  Total Time: 10 min  MRN: [id number]    S: Pt states she tried everything at home and nothing relieved the pain  O: Therapeutic Activities:   Supine > sit with supervision ; sit > supine with min A of 1 to lift LE's onto bed  Sit > stand with supervision with RW; stand > sit with supervision with RW  Ambulation with RW with supervision 100ft; ambulates with antalgic gait and forward flexed posture  A: Instructed pt in lateral trunk rotations hamstring and piriformis stretching to decrease R lateral hip pain  Pt verbalized understanding  Instructed pt in benefits of Outpatient PT and conservative treatment options for hip and low back pain  Pt able to perform all transfers toileting and ambulation at a (S) level with RW however pt denied RW for home use as she states it will not be useful to her due to her home living set up   Pt will be safe to return home but would benefit from continued activity in PT to improve all impairments and functional deficits  P: See initial evaluation for full PT POC  Patient opted to return to bed; patient positioned with all needs, including call bell, within reach      Katherine Oviedo, PT,MPT

## 2019-07-08 NOTE — ASSESSMENT & PLAN NOTE
· Secondary to right hip pain  · PT/OT  · The patient may require short-term rehabilitation placement upon discharge

## 2019-07-08 NOTE — H&P
H&P- Teola Mcburney 1960, 59 y o  female MRN: [id number]    Unit/Bed#: RM11 Encounter: [id number]    Primary Care Provider: Shabbir Turner DO   Date and time admitted to hospital: 7/7/2019  7:12 PM        Right hip pain  Assessment & Plan  New right hip pain for approximately 2 weeks now  Right hip region has different phases of healing of ecchymosis present-the patient denies any trauma to area  Follows Dr Gutierrez on outpatient basis  Continue prior to admission Percocet 7 5 mg q 6 hours p r n  Ortho consulted  PT/OT consulted  Provide supportive care  Ambulate with assist at all times    Chronic back pain  Assessment & Plan  Follows Dr Gutierrez on outpatient basis  Continue prior to admission Percocet 7 5 mg q 6 hours p r n  Provide supportive care    Gait disturbance  Assessment & Plan  Please see treatment plan for right hip pain  Ambulate with assist at all times  PT and OT consulted  Ortho consulted    Essential hypertension  Assessment & Plan  Blood pressure is currently stable  Admit to med surge  Monitor per protocol  Continue prior to admission medications    Type 2 diabetes mellitus with diabetic neuropathy, with long-term current use of insulin (HCC)  Assessment & Plan  Lab Results   Component Value Date    HGBA1C 8 2 (H) 02/08/2019       No results for input(s): POCGLU in the last 72 hours  Blood Sugar Average: Last 72 hrs:     Accu-Cheks a c  HS  Insulin sliding scale  Continue prior to admission long-acting insulin q h s  VTE Prophylaxis: Ambulate early  / reason for no mechanical VTE prophylaxis ambulate early   Code Status:  Full  POLST: POLST is not applicable to this patient    Anticipated Length of Stay:  Patient will be admitted on an Observation basis with an anticipated length of stay of  less than 2 midnights  Justification for Hospital Stay:  Right hip pain acute-gait instability    Total Time for Visit, including Counseling / Coordination of Care: 45 minutes  Greater than 50% of this total time spent on direct patient counseling and coordination of care  Chief Complaint:   right hip pain for the past two weeks that is getting progressivly worse  patient denies any injury or trauma  History of Present Illness:    Zoë Gilliam is a 59 y o  female who presented to the emergency room for evaluation of right hip pain that has been ongoing for and progressively worse for the past 2 weeks  Patient has a past medical history including obesity, neuropathy, hyperlipidemia, GERD, diabetes mellitus, depression, chronic pain, asthma, arthritis, anxiety patient follows Dr Gutierrez on outpatient basis for pain management  Patient admits to right hip pain abruptly started approximately 2 weeks ago she did low density fall or trauma  States that the pain is getting increasingly worse and having difficulty walking  Patient denies lightheadedness dizziness nausea vomiting diarrhea denies any falls denies dysuria urgency or frequency denies melena hematochezia denies abdominal pain denies chest pain shortness breath  Images and labs were collected emergency room-see below  Patient was admitted by emergency room doctor for observation ortho will be consulted and PTOT as well  Review of Systems:    Review of Systems   Musculoskeletal: Positive for back pain (Chronic) and gait problem ( acute on chronic)  Skin:        Different phases of ecchymosis going on right hip region   Neurological: Positive for weakness ( increased)  All other systems reviewed and are negative        Past Medical and Surgical History:     Past Medical History:   Diagnosis Date    Anesthesia related hyperthermia     pt states she had high fevers after her lipoma surgery in 2001 at our hospital and was shipped to [facility] where they said t was from an anesthesia med    Anxiety     Arthritis     Asthma     Chronic pain     Depression     Diabetes mellitus ([facility] [address] )     GERD (gastroesophageal reflux disease)     Hyperlipidemia     Malignant hyperthermia due to anesthesia     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     Neuropathy     Obesity     PCOS (polycystic ovarian syndrome)        Past Surgical History:   Procedure Laterality Date    CERVICAL CONIZATION   W/ LASER       SECTION      DILATION AND CURETTAGE OF UTERUS      ENDOMETRIAL ABLATION      ENDOMETRIAL BIOPSY      EPIDURAL BLOCK INJECTION Bilateral 2019    Procedure: L5-S1 transforaminal epidural steroid injection;  Surgeon: Shaji Holt MD;  [facility]: [facility];  Service: Pain Management     EPIDURAL BLOCK INJECTION Bilateral 2019    Procedure: L5-S1 transforaminal epidural steroid injection;  Surgeon: Shaji Holt MD;  [facility]: [facility];  Service: Pain Management     FL GUIDED NEEDLE PLAC BX/ASP/INJ  2019    FL GUIDED NEEDLE PLAC BX/ASP/INJ  2019    FOOT SURGERY Left     HERNIA REPAIR      INDUCED       JOINT REPLACEMENT      KNEE ARTHROPLASTY Right     [place] DEBRIDEMENT OPEN WOUND 20 SQ CM< Right 2018    Procedure: DEBRIDEMENT HAND/FINGER [facility] OUT); Surgeon: Shellie Brian MD;  Location: [facility] MAIN OR;  Service: General    [place] EXCISION TUMOR SOFT TISSUE BACK/FLANK SUBQ 3+CM N/A 2018    Procedure: BACK LIPOMA EXCISION;  Surgeon: Shellie Brian MD;  Location: [facility] MAIN OR;  Service: General    ROTATOR CUFF REPAIR Right     TONSILLECTOMY         Meds/Allergies:    Prior to Admission medications    Medication Sig Start Date End Date Taking?  Authorizing Provider   ammonium lactate (LAC-HYDRIN) 12 % lotion  19  Yes Historical Provider, MD   aspirin 81 mg chewable tablet Chew 1 tablet (81 mg total) daily 18  Yes Katt Smith PA-C   baclofen 20 mg tablet Take 0 5 tablets (10 mg total) by mouth 2 (two) times a day as needed for muscle spasms for up to 30 days 19 Yes Shaji Holt MD   BD INSULIN SYRINGE U/F 31G X 5/16" 1 ML MISC  5/27/19  Yes Historical Provider, MD   celecoxib (CeleBREX) 200 mg capsule Take 1 capsule (200 mg total) by mouth daily as needed for mild pain for up to 30 days 7/1/19 7/31/19 Yes ISABELLE Sparrow   cholecalciferol (VITAMIN D3) 400 units tablet Take 400 Units by mouth daily   Yes Historical Provider, MD   clotrimazole-betamethasone (Dee Khadijah) 1-0 05 % cream Apply topically 2 (two) times a day 6/7/19  Yes Kaiser Cazares PA-C   furosemide (LASIX) 20 mg tablet Take one tablet by mouth daily x 3 days  Patient taking differently: Take 20 mg by mouth as needed Take one tablet by mouth daily x 3 days  10/18/18  Yes ISABELLE Taylor   gabapentin (NEURONTIN) 600 MG tablet Take 1 tablet (600 mg total) by mouth 3 (three) times a day for 30 days 7/1/19 7/31/19 Yes ISABELLE Sparrow   insulin NPH-insulin regular (NovoLIN 70/30) 100 units/mL subcutaneous injection Inject 60 Units under the skin 2 (two) times a day before meals for 30 days 10/29/18 7/7/19 Yes ISABELLE Taylor   metFORMIN (GLUCOPHAGE) 1000 MG tablet Take 1 tablet (1,000 mg total) by mouth 2 (two) times a day with meals 10/4/18  Yes ISABELLE Taylor   Omega-3 Fatty Acids (FISH OIL ADULT GUMMIES PO) Take by mouth daily     Yes Historical Provider, MD   oxyCODONE-acetaminophen (PERCOCET) 7 5-325 MG per tablet Take 1 tablet by mouth every 6 (six) hours as needed for moderate pain for up to 30 daysMax Daily Amount: 4 tablets 8/6/19 9/5/19 Yes ISABELLE Sparrow   Calcium Carb-Cholecalciferol (CALCIUM 600+D3 PO) Take by mouth daily      Historical Provider, MD   citalopram (CeleXA) 20 mg tablet Take 20 mg by mouth    Historical Provider, MD   Icosapent Ethyl 0 5 g CAPS Take 2 capsules by mouth    Historical Provider, MD   insulin detemir (LEVEMIR FLEXTOUCH) 100 Units/mL injection pen Inject 40 Units under the skin    Historical Provider, MD   liraglutide (VICTOZA) injection Inject 1 8 mg under the skin Historical Provider, MD   LORazepam (ATIVAN) 0 5 mg tablet Take by mouth every 8 (eight) hours as needed for anxiety    Historical Provider, MD   meclizine (ANTIVERT) 25 mg tablet Take 25 mg by mouth Three times daily as needed    Historical Provider, MD   multivitamin (THERAGRAN) TABS Take 1 tablet by mouth    Historical Provider, MD   nystatin (MYCOSTATIN) 500,000 units/5 mL suspension  5/28/19   Historical Provider, MD   ranitidine (ZANTAC) 150 MG capsule Take 1 capsule (150 mg total) by mouth 2 (two) times a day for 30 days  Patient not taking: Reported on 7/7/2019 4/8/19 7/7/19  Matt Luther MD   simvastatin (ZOCOR) 10 mg tablet  5/22/15   Historical Provider, MD   sucralfate (CARAFATE) 1 g tablet Take 1 g by mouth    Historical Provider, MD     I have reviewed home medications using allscripts  Allergies: Allergies   Allergen Reactions    Cauliflower [Brassica Oleracea Italica] Other (See Comments)     Other reaction(s): GI upset  Abdominal pain    Citrus Bioflavonoid Other (See Comments)     Skin burns    Compro [Prochlorperazine] Wheezing     Other reaction(s):  Other (See Comments)  SOB    Latex Other (See Comments)     burning    Mobic [Meloxicam] Swelling    Morphine Hives and Itching    Albumen, Egg Vomiting     Other reaction(s): Nausea and/or vomiting    Metronidazole Itching    Sulfa Antibiotics Other (See Comments)     Other reaction(s): Unknown Reaction       Social History:     Marital Status: /Civil Union   Occupation:  Noncontributory  Patient Pre-hospital Living Situation:  Independent  Patient Pre-hospital Level of Mobility:  Limited  Patient Pre-hospital Diet Restrictions:  Denies  Substance Use History:   Social History     Substance and Sexual Activity   Alcohol Use Yes    Comment: OCCASIONALLY     Social History     Tobacco Use   Smoking Status Never Smoker   Smokeless Tobacco Never Used     Social History     Substance and Sexual Activity   Drug Use No Family History:    Family History   Problem Relation Age of Onset    Polycystic ovary syndrome Mother     Mental illness Father     Diabetes Father        Physical Exam:     Vitals:   Blood Pressure: 146/68 (07/08/19 0000)  Pulse: 75 (07/08/19 0000)  Temperature: 99 5 °F (37 5 °C) (07/07/19 1917)  Temp Source: Temporal (07/07/19 1917)  Respirations: 18 (07/08/19 0000)  Height: 5' 1" (154 9 cm) (07/07/19 1917)  Weight - Scale: 121 kg (266 lb 12 1 oz) (07/07/19 1917)  SpO2: 92 % (07/08/19 0000)    Physical Exam   Constitutional: She is oriented to person, place, and time  She appears well-developed and well-nourished  HENT:   Head: Normocephalic and atraumatic  Eyes: Pupils are equal, round, and reactive to light  EOM are normal  Right eye exhibits no discharge  Left eye exhibits no discharge  Neck: Normal range of motion  Neck supple  No tracheal deviation present  No thyromegaly present  Cardiovascular: Normal rate, regular rhythm, normal heart sounds and intact distal pulses  Exam reveals no gallop and no friction rub  No murmur heard  Pulmonary/Chest: Effort normal and breath sounds normal  No stridor  No respiratory distress  She has no wheezes  Abdominal: Soft  Bowel sounds are normal    Musculoskeletal: She exhibits no edema, tenderness or deformity  Neurological: She is alert and oriented to person, place, and time  No cranial nerve deficit  Skin: Skin is warm and dry  Capillary refill takes 2 to 3 seconds  No rash noted  No erythema  No pallor  Psychiatric: She has a normal mood and affect  Her behavior is normal  Judgment and thought content normal        Additional Data:     Lab Results: I have personally reviewed pertinent reports        Results from last 7 days   Lab Units 07/07/19  2207   WBC Thousand/uL 8 44   HEMOGLOBIN g/dL 14 4   HEMATOCRIT % 44 2   PLATELETS Thousands/uL 253   NEUTROS PCT % 57   LYMPHS PCT % 28   MONOS PCT % 6   EOS PCT % 8*     Results from last 7 days Lab Units 07/07/19  2207   POTASSIUM mmol/L 3 7   CHLORIDE mmol/L 107   CO2 mmol/L 29   BUN mg/dL 26*   CREATININE mg/dL 0 90   CALCIUM mg/dL 9 6   ALK PHOS U/L 92   ALT U/L 26   AST U/L 22           Imaging: I have personally reviewed pertinent reports  No results found  [facility] Yes     ** Please Note: This note has been constructed using a voice recognition system   **

## 2019-07-08 NOTE — PROGRESS NOTES
Progress Note - Yin Jo 1960, 59 y o  female MRN: [id number]    Unit/Bed#: [facility] Encounter: [id number]    Primary Care Provider: Armando Clements DO   Date and time admitted to hospital: 7/7/2019  7:12 PM        * Right hip pain  Assessment & Plan  · Consult Orthopedic Surgery  · History of bilateral total hip arthroplasties  · The patient has an allergy to toradol  · Trial of methylprednisolone 40 mg IV every 12 hours  · Initiate robaxin 750 mg PO every 8 hours  · Check a venous duplex of the bilateral lower extremities  · PT/OT  · The patient may require short-term rehabilitation upon discharge    Gait disturbance  Assessment & Plan  · Secondary to right hip pain  · PT/OT  · The patient may require short-term rehabilitation placement upon discharge    Morbid obesity with BMI of 50 0-59 9, adult (HCC)  Assessment & Plan  · Lifestyle modifications are recommended including weight loss, improving her diet, and increasing her amount of activity  Pyuria  Assessment & Plan  · Check a urine culture    Chronic back pain  Assessment & Plan  · Outpatient follow-up with Pain Management    Essential hypertension  Assessment & Plan  · Initiate lisinopril 5 mg PO Qdaily for renal protection, which should be continued upon discharge  · Follow the blood pressure trend  Outpatient follow-up with PCP in regards to this matter      Type 2 diabetes mellitus with hyperglycemia, with long-term current use of insulin ([facility])  Assessment & Plan  Lab Results   Component Value Date    HGBA1C 8 2 (H) 02/08/2019       No results for input(s): POCGLU in the last 72 hours      Blood Sugar Average: Last 72 hrs:     · Continue the current insulin regimen  · Continue aspirin 81 mg PO Qdaily  · Change her statin therapy to high-intensity statin therapy with atorvastatin 40 mg PO Qdaily with dinner, which should be continued upon discharge  · Initiate lisinopril 5 mg PO Qdaily for renal protection, which should be continued upon discharge  · Hypoglycemia protocol  · Follow the blood glucose trend       VTE Pharmacologic Prophylaxis:   Pharmacologic: Enoxaparin (Lovenox)  Mechanical VTE Prophylaxis in Place: Yes    Patient Centered Rounds: I have performed bedside rounds with nursing staff today  Time Spent for Care: 20 minutes  More than 50% of total time spent on counseling and coordination of care as described above  Current Length of Stay: 0 day(s)    Current Patient Status: Observation   Certification Statement: The patient will continue to require additional inpatient hospital stay due to the need for IV methylprednisolone, for an Orthopedic Surgery consultation, and for possible short-term rehabilitation placement upon discharge  Code Status: Level 1 - Full Code      Subjective: The patient was seen and examined  The patient complains of severe right hip pain  No chest pain  No shortness of breath  No abdominal pain  No nausea or vomiting  Objective:     Vitals:   Temp (24hrs), Av 6 °F (37 °C), Min:97 6 °F (36 4 °C), Max:99 5 °F (37 5 °C)    Temp:  [97 6 °F (36 4 °C)-99 5 °F (37 5 °C)] 97 6 °F (36 4 °C)  HR:  [65-83] 65  Resp:  [16-20] 16  BP: (124-160)/(62-75) 131/62  SpO2:  [91 %-97 %] 97 %  Body mass index is 50 4 kg/m²  Input and Output Summary (last 24 hours):        Intake/Output Summary (Last 24 hours) at 2019 1459  Last data filed at 2019 1058  Gross per 24 hour   Intake 50 ml   Output 500 ml   Net -450 ml       Physical Exam:     Physical Exam  General:  NAD, awake, alert, follows commands  HEENT:  NC/AT, mucous membranes moist  Neck:  Supple, No JVP elevation  CV:  + S1, + S2, RRR  Pulm:  Lung fields are CTA bilaterally  Abd:  Soft, Non-tender, Non-distended  Ext:  No clubbing/cyanosis/edema  Skin:  + Ecchymosis of the lateral aspect of the right hip      Additional Data:    Labs:    Results from last 7 days   Lab Units 19  0626 19  2207   WBC Thousand/uL 6 93 8 44 HEMOGLOBIN g/dL 13 1 14 4   HEMATOCRIT % 40 4 44 2   PLATELETS Thousands/uL 237 253   NEUTROS PCT %  --  57   LYMPHS PCT %  --  28   MONOS PCT %  --  6   EOS PCT %  --  8*     Results from last 7 days   Lab Units 07/08/19  0626 07/07/19  2207   SODIUM mmol/L 140 142   POTASSIUM mmol/L 3 9 3 7   CHLORIDE mmol/L 108 107   CO2 mmol/L 27 29   BUN mg/dL 22 26*   CREATININE mg/dL 0 82 0 90   ANION GAP mmol/L 5 6   CALCIUM mg/dL 9 0 9 6   ALBUMIN g/dL  --  3 5   TOTAL BILIRUBIN mg/dL  --  0 40   ALK PHOS U/L  --  92   ALT U/L  --  26   AST U/L  --  22   GLUCOSE RANDOM mg/dL 104 78                           * I Have Reviewed All Lab Data Listed Above  * Additional Pertinent Lab Tests Reviewed:  Marsha Willson Admission Reviewed      Recent Cultures (last 7 days):           Last 24 Hours Medication List:     Current Facility-Administered Medications:  acetaminophen 650 mg Oral Q6H PRN Desire [place], DO   aspirin 81 mg Oral Daily Maria G Y Swank, DICKNP   atorvastatin 40 mg Oral Daily With Peter Gallo, DO   citalopram 20 mg Oral Daily Maria G Y Swank, ISABELLE   dextrose 25 mL Intravenous PRN Desire [place], DO   enoxaparin 40 mg Subcutaneous Q12H [address] Desirezen Nagel, DO   gabapentin 600 mg Oral TID Maria G Y [place], ISABELLE   insulin glargine 40 Units Subcutaneous HS Maria G Y [place], ISABELLE   insulin lispro 1-6 Units Subcutaneous HS Desire [place], DO   insulin lispro 2-12 Units Subcutaneous TID AC Desirezen Nagel, DO   lisinopril 5 mg Oral Daily Desirezen Nagel, DO   methocarbamol 750 mg Oral Q8H [address] Dandre Ham, DO   methylPREDNISolone sodium succinate 40 mg Intravenous Q12H [address] Dandre Ham, DO   ondansetron 4 mg Intravenous Q4H PRN Desire [place], DO   oxyCODONE 10 mg Oral Q4H PRN Desire [place], DO   oxyCODONE 5 mg Oral Q4H PRN Desirezen Nagel, DO   sucralfate 1 g Oral BID AC ISABELLE Bagley        Today, Patient Was Seen By: Desire Nagel DO    ** Please Note: Dictation voice to text software may have been used in the creation of this document   **

## 2019-07-08 NOTE — ASSESSMENT & PLAN NOTE
Lab Results   Component Value Date    HGBA1C 8 2 (H) 02/08/2019       No results for input(s): POCGLU in the last 72 hours      Blood Sugar Average: Last 72 hrs:     · Continue the current insulin regimen  · Continue aspirin 81 mg PO Qdaily  · Change her statin therapy to high-intensity statin therapy with atorvastatin 40 mg PO Qdaily with dinner, which should be continued upon discharge  · Initiate lisinopril 5 mg PO Qdaily for renal protection, which should be continued upon discharge  · Hypoglycemia protocol  · Follow the blood glucose trend

## 2019-07-08 NOTE — PLAN OF CARE
Problem: PHYSICAL THERAPY ADULT  Goal: Performs mobility at highest level of function for planned discharge setting  See evaluation for individualized goals  Outcome: Progressing  Note:   Prognosis: Good  Problem List: Decreased strength, Decreased range of motion, Decreased endurance, Impaired balance, Decreased mobility, Impaired sensation, Pain  Assessment: Pt is a 59year old female with PMH including DM neuropathy GERD back lipoma excision L5-S1 injections, B/L THR, and R TKR presenting to [facility] with progressively increasing R lateral hip and groin pain limiting mobility  Pt seen for moderate complexity PT evaluation presenting with increased R hip pain decreased bilateral hip ROM, decreased strength balance endurance and mobility  Pt performing all bed mobility transfers and ambulation at a (S) level however requiring RW for ambulation which baseline is no A D  Pt with mild increase in pain with ambulation limiting ambulation tolerance  pt deferred RW for home use and states she will use cane  Pt will be safe to return home but would benefit from Outpatient PT on discharge  Pt would benefit from continued PT if not discharged home to decrease pain improve ROM strength balance endurance posture mobility transfers ambulation and step negotiation with return to (I) LOF        Recommendation: Outpatient PT     PT - OK to Discharge: Yes    See flowsheet documentation for full assessment

## 2019-07-08 NOTE — CONSULTS
Consultation - Orthopedics   Emilie Inman 59 y o  female MRN: [id number]  Unit/Bed#: RM11 Encounter: [id number]        History of Present Illness   Physician Requesting Consult: Peggie Gosselin, MD  Reason for Consult / Principal Problem:  Right low back and hip pain 10 days  HPI: Emilie Inman is a 59y o  year old female who presents with right low back and hip pain 10 days  Patient had bilateral hip replacements done by my colleague Dr Kenny Flannery  Patient developed pain in the right low back and right hip region 10 days ago  Gradually getting worse  Aggravated by walking  Decreased with rest   Patient presents as she is unable to cope at home  No sphincter disturbances    No history of weakness in either lower extremity    Consults    Review of Systems    Historical Information   Past Medical History:   Diagnosis Date    Anesthesia related hyperthermia     pt states she had high fevers after her lipoma surgery in  at our hospital and was shipped to [facility] where they said t was from an anesthesia med    Anxiety     Arthritis     Asthma     Chronic pain     Depression     Diabetes mellitus ([facility] [address] )     GERD (gastroesophageal reflux disease)     Hyperlipidemia     Malignant hyperthermia due to anesthesia     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     Neuropathy     Obesity     PCOS (polycystic ovarian syndrome)      Past Surgical History:   Procedure Laterality Date    CERVICAL CONIZATION   W/ LASER       SECTION      DILATION AND CURETTAGE OF UTERUS      ENDOMETRIAL ABLATION      ENDOMETRIAL BIOPSY      EPIDURAL BLOCK INJECTION Bilateral 2019    Procedure: L5-S1 transforaminal epidural steroid injection;  Surgeon: Tyrone Blair MD;  Location: [facility] MAIN OR;  Service: Pain Management     EPIDURAL BLOCK INJECTION Bilateral 2019    Procedure: L5-S1 transforaminal epidural steroid injection;  Surgeon: Tyrone Blair MD;  Location: [facility]; Service: Pain Management     FL GUIDED NEEDLE PLAC BX/ASP/INJ  2019    FL GUIDED NEEDLE PLAC BX/ASP/INJ  2019    FOOT SURGERY Left     HERNIA REPAIR      INDUCED       JOINT REPLACEMENT      KNEE ARTHROPLASTY Right     SD DEBRIDEMENT OPEN WOUND 20 SQ CM< Right 2018    Procedure: DEBRIDEMENT HAND/FINGER [facility] OUT); Surgeon: Anisha Leon MD;  Location: [facility] MAIN OR;  Service: General    SD EXCISION TUMOR SOFT TISSUE BACK/FLANK SUBQ 3+CM N/A 2018    Procedure: BACK LIPOMA EXCISION;  Surgeon: Anisha Leon MD;  Location: [facility] MAIN OR;  Service: General    ROTATOR CUFF REPAIR Right     TONSILLECTOMY       Social History   Social History     Substance and Sexual Activity   Alcohol Use Yes    Comment: OCCASIONALLY     Social History     Substance and Sexual Activity   Drug Use No     Social History     Tobacco Use   Smoking Status Never Smoker   Smokeless Tobacco Never Used     Family History: non-contributory    Meds/Allergies   all current active meds have been reviewed  Allergies   Allergen Reactions    Cauliflower [Brassica Oleracea Italica] Other (See Comments)     Other reaction(s): GI upset  Abdominal pain    Citrus Bioflavonoid Other (See Comments)     Skin burns    Compro [Prochlorperazine] Wheezing     Other reaction(s): Other (See Comments)  SOB    Latex Other (See Comments)     burning    Mobic [Meloxicam] Swelling    Morphine Hives and Itching    Albumen, Egg Vomiting     Other reaction(s): Nausea and/or vomiting    Metronidazole Itching    Sulfa Antibiotics Other (See Comments)     Other reaction(s): Unknown Reaction       Objective   Vitals: Blood pressure 131/62, pulse 65, temperature 97 6 °F (36 4 °C), temperature source Temporal, resp  rate 16, height 5' 1" (1 549 m), weight 121 kg (266 lb 12 1 oz), SpO2 97 %, not currently breastfeeding  ,Body mass index is 50 4 kg/m²      No intake or output data in the 24 hours ending 19 0922  No intake/output data recorded  Invasive Devices     Peripheral Intravenous Line            Peripheral IV 07/07/19 Left Hand less than 1 day                Physical Exam     Patient awake alert oriented GCS 15  Ortho Exam     Mild spasm present of the lumbosacral spine with some tenderness in the lumbosacral spine  SLR is a 50° bilaterally with a negative stress test grade 5 motor power in all muscle groups intact sensation  Radiographs show intact bilateral hip replacements with severe DJD of the lumbosacral spine    Lab Results: I have personally reviewed pertinent lab results  Imaging Studies: I have personally reviewed pertinent reports  EKG, Pathology, and Other Studies: I have personally reviewed pertinent reports  VTE Prophylaxis: Enoxaparin (Lovenox)    Code Status: Level 1 - Full Code  Advance Directive and Living Will:      Power of :    POLST:      Assessment/Plan     Assessment:  Back and hip pain secondary to severe DJD of the lumbosacral spine with previous hip replacement  Plan:  Discussed treatment with the patient  Patient will be evaluated by pain and spine after acute management for the pain episode at this visit  Patient will also follow-up with a previous hip surgeon Dr Erma Rubin, copy of the radiographs have been given to the patient      Counseling / Coordination of Care  Total floor / unit time spent today 30 minutes  Greater than 50% of total time was spent with the patient and / or family counseling and / or coordination of care   A description of the counseling / coordination of care:  Patient

## 2019-07-08 NOTE — PLAN OF CARE
Problem: OCCUPATIONAL THERAPY ADULT  Goal: Performs self-care activities at highest level of function for planned discharge setting  See evaluation for individualized goals  Description  Treatment Interventions: ADL retraining, Functional transfer training, UE strengthening/ROM, Endurance training, Patient/family training, Activityengagement, Equipment evaluation/education          See flowsheet documentation for full assessment, interventions and recommendations  Note:   Limitation: Decreased ADL status, Decreased UE strength, Decreased endurance, Decreased Safe judgement during ADL, Decreased self-care trans, Decreased high-level ADLs     Assessment: Pt is a 59 y o  female seen for OT evaluation s/p admit to [facility] on 7/7/2019 w/ <principal problem not specified>  Comorbidities affecting pt's functional performance at time of assessment include: DM, HTN, obesity and neuropathy,  GERD, PCOS, asthma, arthritis  Personal factors affecting pt at time of IE include:steps to enter environment, difficulty performing ADLS, difficulty performing IADLS , limited insight into deficits, decreased initiation and engagement  and health management   Prior to admission, pt was (I) with ADL and IADL performance with use of SPC at times during functional mobility  Upon evaluation: Pt requires (S)-mod (A) with use of RW during functional mobility 2* the following deficits impacting occupational performance: weakness, decreased strength, decreased balance, decreased tolerance, impaired initiation, decreased safety awareness, increased pain and impaired interpersonal skills  Pt to benefit from continued skilled OT tx while in the hospital to address deficits as defined above and maximize level of functional independence w ADL's and functional mobility  Occupational Performance areas to address include: grooming, bathing/shower, toilet hygiene, dressing, functional mobility, community mobility and clothing management   From OT standpoint, recommendation at time of d/c would be home with home health services and family (A) PRN         OT Discharge Recommendation: [address]

## 2019-07-08 NOTE — OCCUPATIONAL THERAPY NOTE
Occupational Therapy Evaluation     Patient Name: Ubaldo LABOYX Date: 7/8/2019  Problem List  Patient Active Problem List   Diagnosis    Vitamin D deficiency    Type 2 diabetes mellitus with diabetic neuropathy, with long-term current use of insulin ([address] )    History of Salmonella infection    Obesity, morbid ([address] )    Melanocytic nevus of trunk    Menopausal and perimenopausal disorder    Insomnia    Essential hypertension    Hyperlipidemia    History of pulmonary embolism    GERD (gastroesophageal reflux disease)    Gait disturbance    Diabetic ulcer of left heel (HCC)    Stage 3 chronic kidney disease (HCC)    Benign mole    Arthritis    Anxiety    Anemia    Achilles tendinitis of left lower extremity    Ulcer of toe of left foot ([address] )    History of MRSA infection    Screening for colon cancer    Screening for breast cancer    Screening for cervical cancer    Eye exam, routine    Chronic pain syndrome    Chest pain    Osteoarthritis of spine    Leg swelling    Malignant hyperthermia due to anesthesia    Skin callus    Lipoma of back    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    Chronic bilateral low back pain with bilateral sciatica    Lumbar radiculopathy    Diabetic neuropathy ([address] )    Myofascial pain syndrome    Lumbar spondylosis    Spinal stenosis of lumbar region with neurogenic claudication    Thoracic spinal stenosis    Chronic back pain    Right hip pain    Pyuria     Past Medical History  Past Medical History:   Diagnosis Date    Anesthesia related hyperthermia     pt states she had high fevers after her lipoma surgery in 2001 at our hospital and was shipped to [facility] where they said t was from an anesthesia med    Anxiety     Arthritis     Asthma     Chronic pain     Depression     Diabetes mellitus ([address] )     GERD (gastroesophageal reflux disease)     Hyperlipidemia     Malignant hyperthermia due to anesthesia     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     Neuropathy     Obesity     PCOS (polycystic ovarian syndrome)      Past Surgical History  Past Surgical History:   Procedure Laterality Date    CERVICAL CONIZATION   W/ LASER       SECTION      DILATION AND CURETTAGE OF UTERUS      ENDOMETRIAL ABLATION      ENDOMETRIAL BIOPSY      EPIDURAL BLOCK INJECTION Bilateral 2019    Procedure: L5-S1 transforaminal epidural steroid injection;  Surgeon: Farzaneh Knight MD;  Location: [facility];  Service: Pain Management     EPIDURAL BLOCK INJECTION Bilateral 2019    Procedure: L5-S1 transforaminal epidural steroid injection;  Surgeon: Farzaneh Knight MD;  Location: [facility];  Service: Pain Management     FL GUIDED NEEDLE PLAC BX/ASP/INJ  2019    FL GUIDED NEEDLE PLAC BX/ASP/INJ  2019    FOOT SURGERY Left     HERNIA REPAIR      INDUCED       JOINT REPLACEMENT      KNEE ARTHROPLASTY Right     RI DEBRIDEMENT OPEN WOUND 20 SQ CM< Right 2018    Procedure: DEBRIDEMENT HAND/FINGER [facility] OUT); Surgeon: Carmen Dowell MD;  Location: [address] [place] [address] MAIN OR;  Service: General    RI EXCISION TUMOR SOFT TISSUE BACK/FLANK SUBQ 3+CM N/A 2018    Procedure: BACK LIPOMA EXCISION;  Surgeon: Carmen Dowell MD;  Location: [address] [place] [address] MAIN OR;  Service: General    ROTATOR CUFF REPAIR Right     TONSILLECTOMY               19 0911   Note Type   Note type Eval/Treat   Restrictions/Precautions   Weight Bearing Precautions Per Order No   Other Precautions Multiple lines;Telemetry; Fall Risk;Pain   Pain Assessment   Pain Assessment 0-10   Pain Score 7   Pain Location Hip   Pain Orientation Right   Home Living   Type of Home House   Home Layout Multi-level;Bed/bath upstairs; Performs ADLs on one level; Able to live on main level with bedroom/bathroom; Other (Comment)  (reports 1st floor setup; FF to 2nd c HR)   Bathroom Shower/Tub Tub/shower unit   Bathroom Toilet Standard   Bathroom Equipment Shower chair   Bathroom Accessibility Accessible   Home Equipment Cane   Additional Comments sleeps in dining room on 1st; utilizes cane at times during functional mobility   Prior Function   Level of [place] Independent with ADLs and functional mobility   Lives With Family;Significant other   ADL Assistance Independent   IADLs Independent   Falls in the last 6 months 0   Vocational Part time employment   Comments reports (I) with driving   Psychosocial   Psychosocial (WDL) X   Patient Behaviors/Mood Flat affect;Irritable   Subjective   Subjective "I can't do this because of how my hip is"   ADL   Where Assessed Edge of bed   LB Dressing Assistance 3  Moderate Assistance   LB Dressing Deficit Don/doff R shoe;Don/doff L shoe   Toileting Assistance  5  Supervision/Setup   Toileting Deficit Increased time to complete   Additional Comments increased (A) due to hip pain   Bed Mobility   Supine to Sit 5  Supervision   Additional items Bedrails; Increased time required   Sit to Supine 4  Minimal assistance   Additional items Assist x 1; Increased time required;Verbal cues;LE management   Additional Comments (A) for LEs into bed; pt on RA during session SpO2 was 96%; no complaints of SOB during session   Transfers   Sit to Stand 5  Supervision   Additional items Verbal cues  (RW)   Stand to Sit 5  Supervision   Additional items Verbal cues  (RW)   Stand pivot 5  Supervision   Additional items Increased time required  (RW)   Toilet transfer 5  Supervision   Additional items Verbal cues;Standard toilet  (RW)   Additional Comments no LOB or instability; complaints of worsening hip pain   Functional Mobility   Functional Mobility 5  Supervision   Additional Comments pt performs functional mobility to and from bathroom with use of RW; reports worsening hip pain; increased time required due to the same; limited by R hip pain   Additional items Rolling walker   Balance   Static Sitting Good   Dynamic Sitting Good   Static Standing Fair +   Dynamic Standing [address] -   Activity Tolerance   Activity Tolerance Patient limited by fatigue;Patient limited by pain   RUE Assessment   RUE Assessment X  (4-/5 grossly; WFL AROM)   LUE Assessment   LUE Assessment WFL   Hand Function   Gross Motor Coordination Functional   Fine Motor Coordination Functional   Sensation   Light Touch No apparent deficits   Sharp/Dull No apparent deficits   Cognition   Overall Cognitive Status WFL   Arousal/Participation Alert   Attention Within functional limits   Orientation Level Oriented X4   Memory Within functional limits   Following Commands Follows all commands and directions without difficulty   Assessment   Limitation Decreased ADL status; Decreased UE strength;Decreased endurance;Decreased Safe judgement during ADL;Decreased self-care trans;Decreased high-level ADLs   Assessment Pt is a 59 y o  female seen for OT evaluation s/p admit to [facility] on 7/7/2019 w/ <principal problem not specified>  Comorbidities affecting pt's functional performance at time of assessment include: DM, HTN, obesity and neuropathy,  GERD, PCOS, asthma, arthritis  Personal factors affecting pt at time of IE include:steps to enter environment, difficulty performing ADLS, difficulty performing IADLS , limited insight into deficits, decreased initiation and engagement  and health management   Prior to admission, pt was (I) with ADL and IADL performance with use of SPC at times during functional mobility  Upon evaluation: Pt requires (S)-mod (A) with use of RW during functional mobility 2* the following deficits impacting occupational performance: weakness, decreased strength, decreased balance, decreased tolerance, impaired initiation, decreased safety awareness, increased pain and impaired interpersonal skills   Pt to benefit from continued skilled OT tx while in the hospital to address deficits as defined above and maximize level of functional independence w ADL's and functional mobility  Occupational Performance areas to address include: grooming, bathing/shower, toilet hygiene, dressing, functional mobility, community mobility and clothing management  From OT standpoint, recommendation at time of d/c would be home with home health services and family (A) PRN  Goals   Patient Goals to go home and have less pain   Short Term Goal  pt will be educated in LB dressing equipment to utilize to promote independence   Long Term Goal #1 pt will complete LB dressing with mod (I) with use or no use of AE   Long Term Goal #2 pt will demonstrate UB/LB bathing and grooming tasks seated in chair at min (A) level    Long Term Goal pt will demonstrate increased functional mobility and transfers with RW to mod (I) level    Plan   Treatment Interventions ADL retraining;Functional transfer training;UE strengthening/ROM; Endurance training;Patient/family training; Activityengagement;Equipment evaluation/education   Goal Expiration Date 07/22/19   OT Frequency 3-5x/wk   Recommendation   OT Discharge Recommendation Home Health Agency   Barthel Index   Feeding 10   Bathing 0   Grooming Score 0   Dressing Score 5   Bladder Score 10   Bowels Score 10   Toilet Use Score 5   Transfers (Bed/Chair) Score 10   Mobility (Level Surface) Score 10   Stairs Score 0   Barthel Index Score 60     Pt will benefit from continued OT services in order to maximize (I) c ADL performance, FM c RW, and improve overall endurance/strength required to complete functional tasks in preparation for d/c  Pt left supine in bed at end of session; all needs within reach; all lines intact

## 2019-07-08 NOTE — ASSESSMENT & PLAN NOTE
Follows Dr Gutierrez on outpatient basis  Continue prior to admission Percocet 7 5 mg q 6 hours p r n    Provide supportive care

## 2019-07-08 NOTE — ASSESSMENT & PLAN NOTE
Please see treatment plan for right hip pain  Ambulate with assist at all times  PT and OT consulted  Ortho consulted

## 2019-07-08 NOTE — ASSESSMENT & PLAN NOTE
· Initiate lisinopril 5 mg PO Qdaily for renal protection, which should be continued upon discharge  · Follow the blood pressure trend  Outpatient follow-up with PCP in regards to this matter

## 2019-07-08 NOTE — ASSESSMENT & PLAN NOTE
Blood pressure is currently stable  Admit to med surge  Monitor per protocol  Continue prior to admission medications

## 2019-07-08 NOTE — ASSESSMENT & PLAN NOTE
· Lifestyle modifications are recommended including weight loss, improving her diet, and increasing her amount of activity

## 2019-07-08 NOTE — ASSESSMENT & PLAN NOTE
Lab Results   Component Value Date    HGBA1C 8 2 (H) 02/08/2019       No results for input(s): POCGLU in the last 72 hours  Blood Sugar Average: Last 72 hrs:     Accu-Cheks a c  HS  Insulin sliding scale  Continue prior to admission long-acting insulin q h s

## 2019-07-08 NOTE — ASSESSMENT & PLAN NOTE
· Consult Orthopedic Surgery  · History of bilateral total hip arthroplasties  · The patient has an allergy to toradol  · Trial of methylprednisolone 40 mg IV every 12 hours  · Initiate robaxin 750 mg PO every 8 hours  · Check a venous duplex of the bilateral lower extremities  · PT/OT  · The patient may require short-term rehabilitation upon discharge

## 2019-07-08 NOTE — ED PROVIDER NOTES
History  Chief Complaint   Patient presents with    Hip Pain     right hip pain for the past two weeks that is getting progressivly worse  patient denies any injury or trauma  59-year-old female presents with complaint of worsening right hip pain over the last several days  It is to the point today where she had difficulty bearing weight  She is denying any falls or trauma  She does have chronic pain syndrome and follows with Dr Pj Ryan  She is maintained on oxycodone 7 5/325 baclofen occasional Celebrex and gabapentin  Patient states this is worse or different than her usual pain she is complaining of a sharp pain from the greater trochanter to the inguinal crease of the right hip  She denies falls or trauma she has she states that she does have chronic lower extremity edema has improved  Bearing weight makes it worse  She uses a cane to ambulate  She denies fever chills or rash she has had no abdominal pain she has chronic back pain which is slightly worse  She denies any vomiting or diarrhea she has occasional nausea which is chronic there is no history of bowel or bladder incontinence  She was supposed to follow up with Dr Mignon Burgess orthopedic surgeon but the office is not called her back for an appointment  Patient has been icing the area and does not seem to help she denies chest pain shortness of breath no lightheadedness no pleuritic pain  Prior to Admission Medications   Prescriptions Last Dose Informant Patient Reported? Taking?    BD INSULIN SYRINGE U/F 31G X 5/16" 1 ML MISC   Yes Yes   Calcium Carb-Cholecalciferol (CALCIUM 600+D3 PO) Not Taking at Unknown time Self Yes No   Sig: Take by mouth daily     Icosapent Ethyl 0 5 g CAPS Not Taking at Unknown time  Yes No   Sig: Take 2 capsules by mouth   LORazepam (ATIVAN) 0 5 mg tablet Not Taking at Unknown time  Yes No   Sig: Take by mouth every 8 (eight) hours as needed for anxiety   Omega-3 Fatty Acids (FISH OIL ADULT GUMMIES PO) Self Yes Yes   Sig: Take by mouth daily     ammonium lactate (LAC-HYDRIN) 12 % lotion   Yes Yes   aspirin 81 mg chewable tablet  Self No Yes   Sig: Chew 1 tablet (81 mg total) daily   baclofen 20 mg tablet   No Yes   Sig: Take 0 5 tablets (10 mg total) by mouth 2 (two) times a day as needed for muscle spasms for up to 30 days   celecoxib (CeleBREX) 200 mg capsule   No Yes   Sig: Take 1 capsule (200 mg total) by mouth daily as needed for mild pain for up to 30 days   cholecalciferol (VITAMIN D3) 400 units tablet   Yes Yes   Sig: Take 400 Units by mouth daily   citalopram (CeleXA) 20 mg tablet Not Taking at Unknown time  Yes No   Sig: Take 20 mg by mouth   clotrimazole-betamethasone (LOTRISONE) 1-0 05 % cream   No Yes   Sig: Apply topically 2 (two) times a day   furosemide (LASIX) 20 mg tablet  Self No Yes   Sig: Take one tablet by mouth daily x 3 days   Patient taking differently: Take 20 mg by mouth as needed Take one tablet by mouth daily x 3 days    gabapentin (NEURONTIN) 600 MG tablet   No Yes   Sig: Take 1 tablet (600 mg total) by mouth 3 (three) times a day for 30 days   insulin NPH-insulin regular (NovoLIN 70/30) 100 units/mL subcutaneous injection 7/7/2019 at Unknown time Self No Yes   Sig: Inject 60 Units under the skin 2 (two) times a day before meals for 30 days   insulin detemir (LEVEMIR FLEXTOUCH) 100 Units/mL injection pen Not Taking at Unknown time  Yes No   Sig: Inject 40 Units under the skin   liraglutide (VICTOZA) injection Not Taking at Unknown time  Yes No   Sig: Inject 1 8 mg under the skin   meclizine (ANTIVERT) 25 mg tablet Not Taking at Unknown time  Yes No   Sig: Take 25 mg by mouth Three times daily as needed   metFORMIN (GLUCOPHAGE) 1000 MG tablet  Self No Yes   Sig: Take 1 tablet (1,000 mg total) by mouth 2 (two) times a day with meals   multivitamin (THERAGRAN) TABS Not Taking at Unknown time  Yes No   Sig: Take 1 tablet by mouth   nystatin (MYCOSTATIN) 500,000 units/5 mL suspension Not Taking at Unknown time  Yes No   oxyCODONE-acetaminophen (PERCOCET) 7 5-325 MG per tablet   No Yes   Sig: Take 1 tablet by mouth every 6 (six) hours as needed for moderate pain for up to 30 daysMax Daily Amount: 4 tablets   ranitidine (ZANTAC) 150 MG capsule Not Taking at Unknown time  No No   Sig: Take 1 capsule (150 mg total) by mouth 2 (two) times a day for 30 days   Patient not taking: Reported on 2019   simvastatin (ZOCOR) 10 mg tablet Not Taking at Unknown time  Yes No   sucralfate (CARAFATE) 1 g tablet Not Taking at Unknown time  Yes No   Sig: Take 1 g by mouth      Facility-Administered Medications: None       Past Medical History:   Diagnosis Date    Anesthesia related hyperthermia     pt states she had high fevers after her lipoma surgery in  at our hospital and was shipped to [facility] where they said t was from an anesthesia med    Anxiety     Arthritis     Asthma     Chronic pain     Depression     Diabetes mellitus ([facility] [address] )     GERD (gastroesophageal reflux disease)     Hyperlipidemia     Malignant hyperthermia due to anesthesia     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     Neuropathy     Obesity     PCOS (polycystic ovarian syndrome)        Past Surgical History:   Procedure Laterality Date    CERVICAL CONIZATION   W/ LASER       SECTION      DILATION AND CURETTAGE OF UTERUS      ENDOMETRIAL ABLATION      ENDOMETRIAL BIOPSY      EPIDURAL BLOCK INJECTION Bilateral 2019    Procedure: L5-S1 transforaminal epidural steroid injection;  Surgeon: Herber Byrnes MD;  Location: [facility];  Service: Pain Management     EPIDURAL BLOCK INJECTION Bilateral 2019    Procedure: L5-S1 transforaminal epidural steroid injection;  Surgeon: Herber Byrnes MD;  Location: [facility];  Service: Pain Management     FL GUIDED NEEDLE PLAC BX/ASP/INJ  2019    FL GUIDED NEEDLE PLAC BX/ASP/INJ  2019    FOOT SURGERY Left     HERNIA REPAIR      INDUCED       JOINT REPLACEMENT      KNEE ARTHROPLASTY Right     LA DEBRIDEMENT OPEN WOUND 20 SQ CM< Right 2018    Procedure: DEBRIDEMENT HAND/FINGER [facility] OUT); Surgeon: Jordana Cesar MD;  Location: [facility] MAIN OR;  Service: General    LA EXCISION TUMOR SOFT TISSUE BACK/FLANK SUBQ 3+CM N/A 2018    Procedure: BACK LIPOMA EXCISION;  Surgeon: Jordana Cesar MD;  Location: [facility] MAIN OR;  Service: General    ROTATOR CUFF REPAIR Right     TONSILLECTOMY         Family History   Problem Relation Age of Onset    Polycystic ovary syndrome Mother     Mental illness Father     Diabetes Father      I have reviewed and agree with the history as documented  Social History     Tobacco Use    Smoking status: Never Smoker    Smokeless tobacco: Never Used   Substance Use Topics    Alcohol use: Yes     Comment: OCCASIONALLY    Drug use: No        Review of Systems   Constitutional: Positive for activity change  Negative for appetite change, chills and fever  HENT: Negative for congestion, ear pain, rhinorrhea, sneezing and sore throat  Eyes: Negative for discharge  Respiratory: Negative for cough and shortness of breath  Cardiovascular: Negative for chest pain and leg swelling  Gastrointestinal: Positive for nausea (chronic)  Negative for abdominal pain, blood in stool, diarrhea and vomiting  Endocrine: Negative for polyuria  Genitourinary: Negative for dysuria, frequency and urgency  Musculoskeletal: Positive for arthralgias (rt hip pain) and back pain (chronic)  Negative for myalgias, neck pain and neck stiffness  Skin: Negative for rash  Neurological: Negative for dizziness, weakness and numbness  Hematological: Negative for adenopathy  Psychiatric/Behavioral: Negative for confusion  All other systems reviewed and are negative  Physical Exam  Physical Exam   Constitutional: She is oriented to person, place, and time  She appears well-developed and well-nourished  She appears distressed  HENT:   Head: Normocephalic and atraumatic  Right Ear: External ear normal    Left Ear: External ear normal    Nose: Nose normal    Mouth/Throat: Oropharynx is clear and moist  No oropharyngeal exudate  TMS pale   Eyes: Pupils are equal, round, and reactive to light  Conjunctivae and EOM are normal  Right eye exhibits no discharge  Left eye exhibits no discharge  Neck: Normal range of motion  Neck supple  Cardiovascular: Normal rate, regular rhythm, normal heart sounds and intact distal pulses  Pulmonary/Chest: Effort normal and breath sounds normal  No stridor  No respiratory distress  She has no wheezes  She has no rales  Abdominal: Soft  Bowel sounds are normal  She exhibits no distension and no mass  There is no tenderness  There is no rebound and no guarding  Genitourinary:   Genitourinary Comments: Refuses rectal exam   Musculoskeletal: She exhibits edema  Tenderness: Trace symmetric  Right hip: She exhibits decreased range of motion, decreased strength, tenderness and bony tenderness  She exhibits no swelling, no crepitus, no deformity and no laceration  Left hip: Normal         Right knee: Normal         Legs:  2+ AT pulses Decreased range of motion of the right lower extremity  The patient is able to flex the hip with the knee extended  She is able to internally and externally rotate the hip without added discomfort  She has reproducible tenderness with palpation of the greater trochanter  Lymphadenopathy:     She has no cervical adenopathy  Neurological: She is alert and oriented to person, place, and time  She displays normal reflexes  No cranial nerve deficit or sensory deficit  She exhibits normal muscle tone  Coordination normal    Patient has intact light touch the bilateral lower extremities  Motor strength is 5/5 to plantar and dorsiflexion as well as great toe dorsiflexion  Skin: Skin is warm and dry   Capillary refill takes less than 2 seconds  No rash noted  Psychiatric: She has a normal mood and affect  Vitals reviewed        Vital Signs  ED Triage Vitals   Temperature Pulse Respirations Blood Pressure SpO2   07/07/19 1917 07/07/19 1917 07/07/19 1917 07/07/19 1920 07/07/19 1917   99 5 °F (37 5 °C) 83 20 147/69 95 %      Temp Source Heart Rate Source Patient Position - Orthostatic VS BP Location FiO2 (%)   07/07/19 1917 07/07/19 1917 07/07/19 1917 07/07/19 1917 --   Temporal Monitor Sitting Right arm       Pain Score       07/07/19 1917       9           Vitals:    07/07/19 1920 07/07/19 2145 07/07/19 2330 07/08/19 0000   BP: 147/69 160/75 133/63 146/68   Pulse:  72 73 75   Patient Position - Orthostatic VS: Sitting Lying           Visual Acuity      ED Medications  Medications   ondansetron (ZOFRAN) injection 4 mg (4 mg Intravenous Given 7/7/19 2130)   Ketamine HCl 10 mg (10 mg Intravenous Given 7/7/19 2132)   LORazepam (ATIVAN) 2 mg/mL injection 1 mg (1 mg Intravenous Given 7/7/19 2141)       Diagnostic Studies  Results Reviewed     Procedure Component Value Units Date/Time    Urine Microscopic [[id number]]  (Abnormal) Collected:  07/07/19 2236    Lab Status:  Final result Specimen:  Urine, Clean Catch Updated:  07/07/19 2320     RBC, UA None Seen /hpf      WBC, UA 4-10 /hpf      Epithelial Cells Moderate /hpf      Bacteria, UA Moderate /hpf     UA w Reflex to Microscopic w Reflex to Culture [[id number]]  (Abnormal) Collected:  07/07/19 2236    Lab Status:  Final result Specimen:  Urine, Clean Catch Updated:  07/07/19 2246     Color, UA Yellow     Clarity, UA Slightly Cloudy     Specific Gravity, UA >=1 030     pH, UA 5 5     Leukocytes, UA Trace     Nitrite, UA Negative     Protein, UA Negative mg/dl      Glucose, UA Negative mg/dl      Ketones, UA Negative mg/dl      Urobilinogen, UA 0 2 E U /dl      Bilirubin, UA Negative     Blood, UA Negative    Comprehensive metabolic panel [[id number]]  (Abnormal) Collected:  07/07/19 2207    Lab Status:  Final result Specimen:  Blood from Arm, Right Updated:  07/07/19 2244     Sodium 142 mmol/L      Potassium 3 7 mmol/L      Chloride 107 mmol/L      CO2 29 mmol/L      ANION GAP 6 mmol/L      BUN 26 mg/dL      Creatinine 0 90 mg/dL      Glucose 78 mg/dL      Calcium 9 6 mg/dL      AST 22 U/L      ALT 26 U/L      Alkaline Phosphatase 92 U/L      Total Protein 7 2 g/dL      Albumin 3 5 g/dL      Total Bilirubin 0 40 mg/dL      eGFR 70 ml/min/1 73sq m     Narrative:       National Kid[employer] Disease Foundation guidelines for Chronic Kidney Disease (CKD):     Stage 1 with normal or high GFR (GFR > 90 mL/min/1 73 square meters)    Stage 2 Mild CKD (GFR = 60-89 mL/min/1 73 square meters)    Stage 3A Moderate CKD (GFR = 45-59 mL/min/1 73 square meters)    Stage 3B Moderate CKD (GFR = 30-44 mL/min/1 73 square meters)    Stage 4 Severe CKD (GFR = 15-29 mL/min/1 73 square meters)    Stage 5 End Stage CKD (GFR <15 mL/min/1 73 square meters)  Note: GFR calculation is accurate only with a steady state creatinine    CBC and differential [[id number]]  (Abnormal) Collected:  07/07/19 2207    Lab Status:  Final result Specimen:  Blood from Arm, Right Updated:  07/07/19 2215     WBC 8 44 Thousand/uL      RBC 5 01 Million/uL      Hemoglobin 14 4 g/dL      Hematocrit 44 2 %      MCV 88 fL      MCH 28 7 pg      MCHC 32 6 g/dL      RDW 13 3 %      MPV 9 7 fL      Platelets 280 Thousands/uL      nRBC 0 /100 WBCs      Neutrophils Relative 57 %      Immat GRANS % 0 %      Lymphocytes Relative 28 %      Monocytes Relative 6 %      Eosinophils Relative 8 %      Basophils Relative 1 %      Neutrophils Absolute 4 85 Thousands/µL      Immature Grans Absolute 0 03 Thousand/uL      Lymphocytes Absolute 2 32 Thousands/µL      Monocytes Absolute 0 52 Thousand/µL      Eosinophils Absolute 0 68 Thousand/µL      Basophils Absolute 0 04 Thousands/µL     Lyme Antibody Profile with reflex to [facility] [[id number]] Collected:  07/07/19 2208    Lab Status: In process Specimen:  Blood from Arm, Right Updated:  07/07/19 2212                 CT abdomen pelvis wo contrast   ED Interpretation by Lin Goins MD (07/07 6496)   [facility] Dr Mayi Quezada - impression is advanced thoracolumbar degenerative changes with probable multilevel central canal stenosis liver has hepatic st steatosis gallbladder and bile ducts no acute abnormality pancreas spleen normal adrenals no mass kidneys and ureters normal no hydronephrosis diverticulosis but no diverticulitis no evidence of appendicitis no free air no significant fluid collection no AAA no adenopathy bladder is unremarkable no acute abnormality in the reproductive region no CT evidence of osteomyelitis (cortical erosion mary osteitis or lytic areas) if symptoms persist consider MRI or scintigraphic he for increased sensitivity counting for patient's age severe degree of multifocal degenerative changes not noted along the visualized thoracolumbar spine no acute fracture dislocation soft tissues are unremarkable      XR hip/pelv 2-3 vws right   ED Interpretation by Lin Goins MD (07/07 4109)   Read by me; Radiologist to provide formal interpretation  No acute fx or disclocation                 Procedures  Procedures       ED Course  ED Course as of Jul 08 0029   Mon Jul 08, 2019   0025 Case reviewed with Rosita El NP recommends obs       3460 PAPMP aware - 6/10/19 oxycodone 7 5/325 #120 30d                                  MDM  Number of Diagnoses or Management Options  Diagnosis management comments: Mdm:  Does not appear to be referred abdominal pain  L1,L2  Radiculopathy less likely because unilateral  Greater trochanter bursitis;   Will check plain films of the hip to evaluate for pelvic (rami) fracture treat with ketamine as patient has multiple drug allergies and re-evaluate;     Felt dysphoric with ketamine - resolved with dose of lorazepam    With re-exam considerable pain with ROM rt greater trochanter has are area of resolving ecchymosis - yellowish with induration but no cellulitis  No buttock tenderness      Disposition  Final diagnoses:   Right hip pain   Gait disturbance     Time reflects when diagnosis was documented in both MDM as applicable and the Disposition within this note     Time User Action Codes Description Comment    7/8/2019 12:24 AM Rafi Stafford [[id number]] Right hip pain     7/8/2019 12:24 AM Rafi Stafford [[id number]] Gait disturbance       ED Disposition     ED Disposition Condition Date/Time Comment    Admit Stable Mon Jul 8, 2019 12:25 AM Case was discussed with Dr Kay Vera  and the patient's admission status was agreed to be Admission Status: observation status to the service of Dr Kay Vera   Follow-up Information    None         Patient's Medications   Discharge Prescriptions    No medications on file     No discharge procedures on file      ED Provider  Electronically Signed by           Sara Cabrera MD  07/08/19 8136       Sara Cabrera MD  07/08/19 9152

## 2019-07-09 VITALS
BODY MASS INDEX: 48.64 KG/M2 | DIASTOLIC BLOOD PRESSURE: 57 MMHG | OXYGEN SATURATION: 90 % | RESPIRATION RATE: 16 BRPM | HEIGHT: 62 IN | TEMPERATURE: 96.5 F | WEIGHT: 264.33 LBS | HEART RATE: 56 BPM | SYSTOLIC BLOOD PRESSURE: 117 MMHG

## 2019-07-09 PROBLEM — R00.1 BRADYCARDIA: Status: ACTIVE | Noted: 2019-07-09

## 2019-07-09 LAB
ALBUMIN SERPL BCP-MCNC: 3.5 G/DL (ref 3.5–5)
ALP SERPL-CCNC: 90 U/L (ref 46–116)
ALT SERPL W P-5'-P-CCNC: 25 U/L (ref 12–78)
ANION GAP SERPL CALCULATED.3IONS-SCNC: 8 MMOL/L (ref 4–13)
AST SERPL W P-5'-P-CCNC: 19 U/L (ref 5–45)
B BURGDOR IGG SER IA-ACNC: 0.7
B BURGDOR IGM SER IA-ACNC: 0.26
BASOPHILS # BLD AUTO: 0.01 THOUSANDS/ΜL (ref 0–0.1)
BASOPHILS NFR BLD AUTO: 0 % (ref 0–1)
BILIRUB SERPL-MCNC: 0.4 MG/DL (ref 0.2–1)
BUN SERPL-MCNC: 24 MG/DL (ref 5–25)
CALCIUM SERPL-MCNC: 9 MG/DL (ref 8.3–10.1)
CHLORIDE SERPL-SCNC: 100 MMOL/L (ref 100–108)
CK SERPL-CCNC: 85 U/L (ref 26–192)
CO2 SERPL-SCNC: 27 MMOL/L (ref 21–32)
CREAT SERPL-MCNC: 0.93 MG/DL (ref 0.6–1.3)
EOSINOPHIL # BLD AUTO: 0.01 THOUSAND/ΜL (ref 0–0.61)
EOSINOPHIL NFR BLD AUTO: 0 % (ref 0–6)
ERYTHROCYTE [DISTWIDTH] IN BLOOD BY AUTOMATED COUNT: 12.9 % (ref 11.6–15.1)
EST. AVERAGE GLUCOSE BLD GHB EST-MCNC: 171 MG/DL
GFR SERPL CREATININE-BSD FRML MDRD: 67 ML/MIN/1.73SQ M
GLUCOSE SERPL-MCNC: 253 MG/DL (ref 65–140)
GLUCOSE SERPL-MCNC: 263 MG/DL (ref 65–140)
HBA1C MFR BLD: 7.6 % (ref 4.2–6.3)
HCT VFR BLD AUTO: 46 % (ref 34.8–46.1)
HGB BLD-MCNC: 15 G/DL (ref 11.5–15.4)
IMM GRANULOCYTES # BLD AUTO: 0.06 THOUSAND/UL (ref 0–0.2)
IMM GRANULOCYTES NFR BLD AUTO: 1 % (ref 0–2)
LYMPHOCYTES # BLD AUTO: 1.34 THOUSANDS/ΜL (ref 0.6–4.47)
LYMPHOCYTES NFR BLD AUTO: 25 % (ref 14–44)
MAGNESIUM SERPL-MCNC: 2.1 MG/DL (ref 1.6–2.6)
MCH RBC QN AUTO: 28.5 PG (ref 26.8–34.3)
MCHC RBC AUTO-ENTMCNC: 32.6 G/DL (ref 31.4–37.4)
MCV RBC AUTO: 88 FL (ref 82–98)
MONOCYTES # BLD AUTO: 0.15 THOUSAND/ΜL (ref 0.17–1.22)
MONOCYTES NFR BLD AUTO: 3 % (ref 4–12)
NEUTROPHILS # BLD AUTO: 3.81 THOUSANDS/ΜL (ref 1.85–7.62)
NEUTS SEG NFR BLD AUTO: 71 % (ref 43–75)
NRBC BLD AUTO-RTO: 0 /100 WBCS
PHOSPHATE SERPL-MCNC: 3.8 MG/DL (ref 2.3–4.1)
PLATELET # BLD AUTO: 278 THOUSANDS/UL (ref 149–390)
PMV BLD AUTO: 9.7 FL (ref 8.9–12.7)
POTASSIUM SERPL-SCNC: 4.9 MMOL/L (ref 3.5–5.3)
PROCALCITONIN SERPL-MCNC: <0.05 NG/ML
PROT SERPL-MCNC: 7.5 G/DL (ref 6.4–8.2)
RBC # BLD AUTO: 5.26 MILLION/UL (ref 3.81–5.12)
SODIUM SERPL-SCNC: 135 MMOL/L (ref 136–145)
WBC # BLD AUTO: 5.38 THOUSAND/UL (ref 4.31–10.16)

## 2019-07-09 PROCEDURE — 80053 COMPREHEN METABOLIC PANEL: CPT | Performed by: INTERNAL MEDICINE

## 2019-07-09 PROCEDURE — 84145 PROCALCITONIN (PCT): CPT | Performed by: INTERNAL MEDICINE

## 2019-07-09 PROCEDURE — 99217 PR OBSERVATION CARE DISCHARGE MANAGEMENT: CPT | Performed by: INTERNAL MEDICINE

## 2019-07-09 PROCEDURE — 82550 ASSAY OF CK (CPK): CPT | Performed by: INTERNAL MEDICINE

## 2019-07-09 PROCEDURE — 82948 REAGENT STRIP/BLOOD GLUCOSE: CPT

## 2019-07-09 PROCEDURE — 84100 ASSAY OF PHOSPHORUS: CPT | Performed by: INTERNAL MEDICINE

## 2019-07-09 PROCEDURE — 85025 COMPLETE CBC W/AUTO DIFF WBC: CPT | Performed by: INTERNAL MEDICINE

## 2019-07-09 PROCEDURE — 83036 HEMOGLOBIN GLYCOSYLATED A1C: CPT | Performed by: INTERNAL MEDICINE

## 2019-07-09 PROCEDURE — 83735 ASSAY OF MAGNESIUM: CPT | Performed by: INTERNAL MEDICINE

## 2019-07-09 RX ORDER — METHOCARBAMOL 750 MG/1
750 TABLET, FILM COATED ORAL EVERY 8 HOURS PRN
Qty: 15 TABLET | Refills: 0 | Status: SHIPPED | OUTPATIENT
Start: 2019-07-09 | End: 2020-02-10

## 2019-07-09 RX ORDER — LISINOPRIL 5 MG/1
5 TABLET ORAL DAILY
Qty: 30 TABLET | Refills: 0 | Status: ON HOLD | OUTPATIENT
Start: 2019-07-09 | End: 2021-12-24 | Stop reason: SDUPTHER

## 2019-07-09 RX ORDER — OXYCODONE AND ACETAMINOPHEN 7.5; 325 MG/1; MG/1
1 TABLET ORAL EVERY 6 HOURS PRN
Qty: 120 TABLET | Refills: 0
Start: 2019-08-06 | End: 2019-08-26 | Stop reason: SDUPTHER

## 2019-07-09 RX ORDER — PREDNISONE 10 MG/1
TABLET ORAL
Qty: 11 TABLET | Refills: 0 | Status: SHIPPED | OUTPATIENT
Start: 2019-07-09 | End: 2019-10-18

## 2019-07-09 RX ORDER — CITALOPRAM 20 MG/1
20 TABLET ORAL DAILY
Refills: 0 | Status: ON HOLD
Start: 2019-07-09 | End: 2019-11-09 | Stop reason: CLARIF

## 2019-07-09 RX ORDER — DIPHENOXYLATE HYDROCHLORIDE AND ATROPINE SULFATE 2.5; .025 MG/1; MG/1
1 TABLET ORAL DAILY
Refills: 0
Start: 2019-07-09 | End: 2021-10-22

## 2019-07-09 RX ORDER — ATORVASTATIN CALCIUM 40 MG/1
40 TABLET, FILM COATED ORAL
Qty: 30 TABLET | Refills: 0 | Status: SHIPPED | OUTPATIENT
Start: 2019-07-09 | End: 2022-07-06

## 2019-07-09 RX ORDER — ATORVASTATIN CALCIUM 40 MG/1
40 TABLET, FILM COATED ORAL
Refills: 0
Start: 2019-07-09 | End: 2019-07-09

## 2019-07-09 RX ORDER — SUCRALFATE 1 G/1
1 TABLET ORAL 4 TIMES DAILY PRN
Refills: 0 | Status: ON HOLD
Start: 2019-07-09 | End: 2019-11-09 | Stop reason: CLARIF

## 2019-07-09 RX ADMIN — ASPIRIN 81 MG 81 MG: 81 TABLET ORAL at 09:11

## 2019-07-09 RX ADMIN — METHYLPREDNISOLONE SODIUM SUCCINATE 40 MG: 40 INJECTION, POWDER, FOR SOLUTION INTRAMUSCULAR; INTRAVENOUS at 09:11

## 2019-07-09 RX ADMIN — ENOXAPARIN SODIUM 40 MG: 40 INJECTION SUBCUTANEOUS at 09:10

## 2019-07-09 RX ADMIN — METHOCARBAMOL TABLETS 750 MG: 500 TABLET, COATED ORAL at 06:26

## 2019-07-09 RX ADMIN — INSULIN LISPRO 6 UNITS: 100 INJECTION, SOLUTION INTRAVENOUS; SUBCUTANEOUS at 07:40

## 2019-07-09 RX ADMIN — OXYCODONE HYDROCHLORIDE 5 MG: 5 TABLET ORAL at 02:27

## 2019-07-09 RX ADMIN — GABAPENTIN 600 MG: 300 CAPSULE ORAL at 09:11

## 2019-07-09 NOTE — DISCHARGE SUMMARY
Discharge- Lori Dutton 1960, 59 y o  female MRN: [id number]    Unit/Bed#: 391-26 Encounter: [id number]    Primary Care Provider: Minnie June DO   Date and time admitted to hospital: 7/7/2019  7:12 PM        * Right hip pain  Assessment & Plan  · The patient was seen in consultation by Orthopedic surgery  · History of bilateral total hip arthroplasties  · The patient has an allergy to toradol  · The patient was treated with methylprednisolone 40 mg IV every 12 hours during the hospitalization  · The patient was transitioned to a PO prednisone tapered course upon discharge  · She was treated with robaxin 750 mg PO every 8 hours during the hospitalization, and she will be discharged home on robaxin 750 mg PO every 8 hours PRN  · Her baclofen has been discontinued  · The patient was evaluated by physical therapy and occupational therapy during the hospitalization  · The patient's symptoms greatly improved by the time of discharge    VAS lower limb venous duplex study, complete bilateral   Status: Final result   PACS Images      Show images for VAS lower limb venous duplex study, complete bilateral   Study Result        [facility] REPORT  CLINICAL:  Indications: Unexplained right leg pain for about 2 weeks  Personal history of  PE without known history of DVT  She is not on an anticoagulant therapy  Risk Factors  The patient has no history of DVT  FINDINGS:     Segment  Right            Left                Impression       Impression         CFV      Normal (Patent)  Normal (Patent)             CONCLUSION:  Impression:  RIGHT LOWER LIMB:  No evidence of acute or chronic deep vein thrombosis  No evidence of superficial thrombophlebitis noted  Doppler evaluation shows a normal response to augmentation maneuvers  Popliteal, posterior tibial and anterior tibial arterial Doppler waveforms are  triphasic  LEFT LOWER LIMB:  No evidence of acute or chronic deep vein thrombosis    No evidence of superficial thrombophlebitis noted  Doppler evaluation shows a normal response to augmentation maneuvers  Popliteal, posterior tibial and anterior tibial arterial Doppler waveforms are  triphasic  SIGNATURE:  Electronically Signed by: Hui Pillai MD, RPVI on 2019-07-08 10:31:10 PM         Gait disturbance  Assessment & Plan  · Secondary to right hip pain  · The patient was evaluated by physical therapy and occupational therapy during the hospitalization  · Her ambulation greatly improved by the time of discharge    Morbid obesity with BMI of 45 0-49 9, adult ([facility] [address] )  Assessment & Plan  · Lifestyle modifications are recommended including weight loss, improving her diet, and increasing her amount of activity        Pyuria  Assessment & Plan  · A urine culture was checked with results pending at the time of discharge  · She will need to follow-up with her PCP in regards to the urine culture results    Chronic back pain  Assessment & Plan  · Outpatient follow-up with Pain Management    Essential hypertension  Assessment & Plan  · The patient was initiated on lisinopril 5 mg PO Qdaily for renal protection, which will be continued upon discharge  Outpatient follow-up with PCP in regards to this matter      Type 2 diabetes mellitus with hyperglycemia, with long-term current use of insulin [facility])  Assessment & Plan  Lab Results   Component Value Date    HGBA1C 8 2 (H) 02/08/2019       Recent Labs     07/08/19  1623 07/08/19  2145 07/09/19  0737   POCGLU 177* 357* 253*       Blood Sugar Average: Last 72 hrs:  (P) 838 [id number]   · Continue the current insulin regimen  · Continue aspirin 81 mg PO Qdaily  · Change her statin therapy to high-intensity statin therapy with atorvastatin 40 mg PO Qdaily with dinner, which should be continued upon discharge  · The patient was initiated on lisinopril 5 mg PO Qdaily for renal protection, which will be continued upon discharge  · Outpatient evaluation by Endocrinology  · Outpatient follow-up with PCP          Discharging Physician / Practitioner: Dandre Ford DO  PCP: Natalia Mendieta DO  Admission Date:   Admission Orders (From admission, onward)    Ordered        07/08/19 0015  Place in Observation  Once             Discharge Date: 07/09/19      Consultations During Hospital Stay:  · Orthopedic Surgery    Procedures Performed:   · None    Significant Findings / Test Results:   Ct Abdomen Pelvis Wo Contrast    Result Date: 7/8/2019  Impression: No acute intra-abdominal or intrapelvic process insofar as can be detected on a noncontrast CT  Colonic diverticulosis  Limited evaluation of the distal ureters, UVJ, and posterior bladder due to streak and beam hardening artifact emanating from bilateral hip prosthesis  Otherwise, no radiopaque urinary tract calculi  No obstructive uropathy  Severe multilevel thoracolumbar degenerative changes with multilevel moderate to severe spinal canal stenosis  Findings are consistent with the preliminary report from [facility] which was provided shortly after completion of the exam  Workstation performed: [id number]     Xr Hip/pelv 2-3 Vws Right    Result Date: 7/8/2019  Impression: Bilateral hip prostheses  No acute osseous abnormality Severe degenerative changes of the visualized lumbar spine Workstation performed: [id number]     Microbiology Results (last 21 days)     Procedure Component Value - Date/Time   Urine culture [[id number]] Collected: 07/08/19 2341   Lab Status:  In process Specimen: Urine, Clean Catch Updated: 07/09/19 0058       Incidental Findings:   · None     Test Results Pending at Discharge (will require follow up):   · Urine culture-will need follow-up with PCP     Complications:  None    Reason for Admission:  Right hip pain and gait disturbance    Hospital Course:     Rosalie Brandt is a 59 y o  female patient who originally presented to the hospital on 7/7/2019 due to right hip pain     Please see above list of diagnoses and related plan for additional information  Condition at Discharge: good     Discharge Day Visit / Exam:     Subjective: The patient was seen and examined  The patient is doing better  The right hip pain is greatly improved  No chest pain  No shortness of breath  No abdominal pain  No nausea or vomiting  Vitals: Blood Pressure: 117/57 (07/09/19 0719)  Pulse: 56 (07/09/19 0719)  Temperature: (!) 96 5 °F (35 8 °C) (07/09/19 0719)  Temp Source: Temporal (07/09/19 0719)  Respirations: 16 (07/09/19 0719)  Height: 5' 2" (157 5 cm) (07/08/19 1536)  Weight - Scale: 120 kg (264 lb 5 3 oz) (07/09/19 0600)  SpO2: 90 % (07/09/19 0719)  Exam:   Physical Exam  General:  NAD, awake, alert, follows commands  HEENT:  NC/AT, mucous membranes moist  Neck:  Supple, No JVP elevation  CV:  + S1, + S2, Bradycardic, Regular rhythm  Pulm:  Lung fields are CTA bilaterally  Abd:  Soft, Non-tender, Non-distended  Ext:  No clubbing/cyanosis/edema  Skin:  No rashes    Discharge instructions/Information to patient and family:   See after visit summary for information provided to patient and family  Provisions for Follow-Up Care:  See after visit summary for information related to follow-up care and any pertinent home health orders  Disposition:     Home     Planned Readmission:  No     Discharge Statement:  I spent 25 minutes discharging the patient  This time was spent on the day of discharge  I had direct contact with the patient on the day of discharge  Greater than 50% of the total time was spent examining patient, answering all patient questions, arranging and discussing plan of care with patient as well as directly providing post-discharge instructions  Additional time then spent on discharge activities  Discharge Medications:  See after visit summary for reconciled discharge medications provided to patient and family        ** Please Note: This note has been constructed using a voice recognition system **

## 2019-07-09 NOTE — ASSESSMENT & PLAN NOTE
Lab Results   Component Value Date    HGBA1C 8 2 (H) 02/08/2019       Recent Labs     07/08/19  1623 07/08/19  2145 07/09/19  0737   POCGLU 177* 357* 253*       Blood Sugar Average: Last 72 hrs:  (P) 703 [id number]   · Continue the current insulin regimen  · Continue aspirin 81 mg PO Qdaily  · Change her statin therapy to high-intensity statin therapy with atorvastatin 40 mg PO Qdaily with dinner, which should be continued upon discharge  · The patient was initiated on lisinopril 5 mg PO Qdaily for renal protection, which will be continued upon discharge  · Outpatient evaluation by Endocrinology  · Outpatient follow-up with PCP

## 2019-07-09 NOTE — UTILIZATION REVIEW
Initial Clinical Review    Admission: Date/Time/Statement: Observation    Orders Placed This Encounter   Procedures    Place in Observation     Standing Status:   Standing     Number of Occurrences:   1     Order Specific Question:   Admitting Physician     Answer:   Deshawn Huff     Order Specific Question:   Level of Care     Answer:   Med Surg [16]     ED Arrival Information     Expected Arrival Acuity Means of Arrival Escorted By Service Admission Type    - 7/7/2019 19:07 Less Urgent Wheelchair Family Member Hospitalist Urgent    Arrival Complaint    -        Chief Complaint   Patient presents with    Hip Pain     right hip pain for the past two weeks that is getting progressivly worse  patient denies any injury or trauma  Assessment/Plan:   57y Female to ED with right hip pain which has been getting progressively worse  Pt with history back and hip pain secondary to severe DJD of the lumbosacral spine with previous hip replacement  Admit under Observation level of care for Right hip pain and Gait disturbance   Continue current pain regimen with Percocet  Orthopedic consult  Ambulate with assist at all times  Trial Iv steroids and start scheduled robaxin  7/8 Orthopedic consult, no surgical intervention   Outpatient f/u      ED Triage Vitals   Temperature Pulse Respirations Blood Pressure SpO2   07/07/19 1917 07/07/19 1917 07/07/19 1917 07/07/19 1920 07/07/19 1917   99 5 °F (37 5 °C) 83 20 147/69 95 %      Temp Source Heart Rate Source Patient Position - Orthostatic VS BP Location FiO2 (%)   07/07/19 1917 07/07/19 1917 07/07/19 1917 07/07/19 1917 --   Temporal Monitor Sitting Right arm       Pain Score       07/07/19 1917       9        Wt Readings from Last 1 Encounters:   07/09/19 120 kg (264 lb 5 3 oz)     Additional Vital Signs:     Pertinent Labs/Diagnostic Test Results:   Results from last 7 days   Lab Units 07/09/19  0537 07/08/19  0626 07/07/19  2207   WBC Thousand/uL 5 38 6 93 8 44 HEMOGLOBIN g/dL 15 0 13 1 14 4   HEMATOCRIT % 46 0 40 4 44 2   PLATELETS Thousands/uL 278 237 253   NEUTROS ABS Thousands/µL 3 81  --  4 85         Results from last 7 days   Lab Units 07/09/19  0537 07/08/19  0626 07/07/19  2207   SODIUM mmol/L 135* 140 142   POTASSIUM mmol/L 4 9 3 9 3 7   CHLORIDE mmol/L 100 108 107   CO2 mmol/L 27 27 29   ANION GAP mmol/L 8 5 6   BUN mg/dL 24 22 26*   CREATININE mg/dL 0 93 0 82 0 90   EGFR ml/min/1 73sq m 67 78 70   CALCIUM mg/dL 9 0 9 0 9 6   MAGNESIUM mg/dL 2 1 1 6  --    PHOSPHORUS mg/dL 3 8 4 3*  --      Results from last 7 days   Lab Units 07/09/19  0537 07/07/19  2207   AST U/L 19 22   ALT U/L 25 26   ALK PHOS U/L 90 92   TOTAL PROTEIN g/dL 7 5 7 2   ALBUMIN g/dL 3 5 3 5   TOTAL BILIRUBIN mg/dL 0 40 0 40     Results from last 7 days   Lab Units 07/09/19  0737 07/08/19  2145 07/08/19  1623   POC GLUCOSE mg/dl 253* 357* 177*     Results from last 7 days   Lab Units 07/09/19  0537 07/08/19  0626 07/07/19  2207   GLUCOSE RANDOM mg/dL 263* 104 78     Results from last 7 days   Lab Units 07/09/19  0537   CK TOTAL U/L 85     Results from last 7 days   Lab Units 07/07/19  2236   CLARITY UA  Slightly Cloudy   COLOR UA  Yellow   SPEC GRAV UA  >=1 030   PH UA  5 5   GLUCOSE UA mg/dl Negative   KETONES UA mg/dl Negative   BLOOD UA  Negative   PROTEIN UA mg/dl Negative   NITRITE UA  Negative   BILIRUBIN UA  Negative   UROBILINOGEN UA E U /dl 0 2   LEUKOCYTES UA  Trace*   WBC UA /hpf 4-10*   RBC UA /hpf None Seen   BACTERIA UA /hpf Moderate*   EPITHELIAL CELLS WET PREP /hpf Moderate*     Results from last 7 days   Lab Units 07/07/19  2208   LYME AB IGG  0 70   LYME AB IGM  0 26     ED Treatment:   Medication Administration from 07/07/2019 1906 to 07/08/2019 1507       Date/Time Order Dose Route Action Action by Comments     07/07/2019 2130 ondansetron (ZOFRAN) injection 4 mg 4 mg Intravenous Given Doc Poe RN      07/07/2019 2132 Ketamine HCl 10 mg 10 mg Intravenous Given Elvira Araujo, RN      07/07/2019 2141 LORazepam (ATIVAN) 2 mg/mL injection 1 mg 1 mg Intravenous Given Elvira Araujo RN      07/08/2019 8152 aspirin chewable tablet 81 mg 81 mg Oral Given [place] , RN      07/08/2019 1102 celecoxib (CeleBREX) capsule 200 mg 200 mg Oral Given [place] , RN      07/08/2019 3710 citalopram (CeleXA) tablet 20 mg 20 mg Oral Given [facility], RN      07/08/2019 2050 gabapentin (NEURONTIN) capsule 600 mg 600 mg Oral Given [place] , RN      07/08/2019 1150 oxyCODONE-acetaminophen (PERCOCET) 5-325 mg per tablet 1 5 tablet 1 5 tablet Oral Given Rl Soto RN      07/08/2019 0149 insulin glargine (LANTUS) subcutaneous injection 40 Units 0 4 mL 40 Units Subcutaneous Given Rl Soto RN      07/08/2019 0854 enoxaparin (LOVENOX) subcutaneous injection 40 mg 40 mg Subcutaneous Given [place] , RN      07/08/2019 1355 magnesium sulfate 2 g/50 mL IVPB (premix) 2 g 2 [facility] [address]      07/08/2019 1428 methocarbamol (ROBAXIN) tablet 750 mg 750 mg Oral Given [facility], RN      07/08/2019 0911 methocarbamol (ROBAXIN) tablet 750 mg 750 mg Oral Given [facility], CARL         Past Medical History:   Diagnosis Date    Anesthesia related hyperthermia     pt states she had high fevers after her lipoma surgery in 2001 at our hospital and was shipped to [facility] where they said t was from an anesthesia med    Anxiety     Arthritis     Asthma     Chronic pain     Depression     Diabetes mellitus ([facility] [address] )     GERD (gastroesophageal reflux disease)     Hyperlipidemia     Malignant hyperthermia due to anesthesia     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     Neuropathy     Obesity     PCOS (polycystic ovarian syndrome)      Present on Admission:   Chronic back pain   Essential hypertension   Gait disturbance    Admitting Diagnosis: Gait disturbance [R26 9]  Hip pain [M25 559]  Right hip pain [M25 551] Age/Sex: 59 y o  female     Admission Orders:  IP CONSULT TO ORTHOPEDIC SURGERY  Current Facility-Administered Medications:  acetaminophen 650 mg Oral Q6H PRN   aspirin 81 mg Oral Daily   atorvastatin 40 mg Oral Daily With Dinner   citalopram 20 mg Oral Daily   dextrose 25 mL Intravenous PRN   enoxaparin 40 mg Subcutaneous Q12H FORTINO   gabapentin 600 mg Oral TID   insulin glargine 40 Units Subcutaneous HS   insulin lispro 1-6 Units Subcutaneous HS   insulin lispro 2-12 Units Subcutaneous TID AC   lisinopril 5 mg Oral Daily   methocarbamol 750 mg Oral Q8H [facility]   methylPREDNISolone sodium succinate 40 mg Intravenous Q12H FORTINO   ondansetron 4 mg Intravenous Q4H PRN   oxyCODONE 10 mg Oral Q4H PRN   oxyCODONE 5 mg Oral Q4H PRN   sucralfate 1 g Oral BID AC     Network Utilization Review Department  Phone: [phone]; Fax [phone]  [email or web address]  ATTENTION: Please call with any questions or concerns to [phone]  and carefully listen to the prompts so that you are directed to the right person  Send all requests for admission clinical reviews, approved or denied determinations and any other requests to fax [phone]   All voicemails are confidential

## 2019-07-09 NOTE — ASSESSMENT & PLAN NOTE
· The patient was seen in consultation by Orthopedic surgery  · History of bilateral total hip arthroplasties  · The patient has an allergy to toradol  · The patient was treated with methylprednisolone 40 mg IV every 12 hours during the hospitalization  · The patient was transitioned to a PO prednisone tapered course upon discharge  · She was treated with robaxin 750 mg PO every 8 hours during the hospitalization, and she will be discharged home on robaxin 750 mg PO every 8 hours PRN  · Her baclofen has been discontinued  · The patient was evaluated by physical therapy and occupational therapy during the hospitalization  · The patient's symptoms greatly improved by the time of discharge    VAS lower limb venous duplex study, complete bilateral   Status: Final result   PACS Images      Show images for VAS lower limb venous duplex study, complete bilateral   Study Result        [facility] REPORT  CLINICAL:  Indications: Unexplained right leg pain for about 2 weeks  Personal history of  PE without known history of DVT  She is not on an anticoagulant therapy  Risk Factors  The patient has no history of DVT  FINDINGS:     Segment  Right            Left                Impression       Impression         CFV      Normal (Patent)  Normal (Patent)             CONCLUSION:  Impression:  RIGHT LOWER LIMB:  No evidence of acute or chronic deep vein thrombosis  No evidence of superficial thrombophlebitis noted  Doppler evaluation shows a normal response to augmentation maneuvers  Popliteal, posterior tibial and anterior tibial arterial Doppler waveforms are  triphasic  LEFT LOWER LIMB:  No evidence of acute or chronic deep vein thrombosis  No evidence of superficial thrombophlebitis noted  Doppler evaluation shows a normal response to augmentation maneuvers  Popliteal, posterior tibial and anterior tibial arterial Doppler waveforms are  triphasic       SIGNATURE:  Electronically Signed by: Laurie Forrest MD, Narciso Guerrier on 2019-07-08 10:31:10 PM

## 2019-07-09 NOTE — ASSESSMENT & PLAN NOTE
· A urine culture was checked with results pending at the time of discharge  · She will need to follow-up with her PCP in regards to the urine culture results

## 2019-07-09 NOTE — ASSESSMENT & PLAN NOTE
· The patient was initiated on lisinopril 5 mg PO Qdaily for renal protection, which will be continued upon discharge  Outpatient follow-up with PCP in regards to this matter

## 2019-07-09 NOTE — ASSESSMENT & PLAN NOTE
· Secondary to right hip pain  · The patient was evaluated by physical therapy and occupational therapy during the hospitalization  · Her ambulation greatly improved by the time of discharge

## 2019-07-09 NOTE — SOCIAL WORK
Pt is being discharged today  Pt's family will give her a ride home  Pt is agreeable to doing outpatient PT  Reviewed with patient where outpatient PT facilities are  Follow up appointments reviewed with a good understanding  Case Management reviewed discharge planning process including the following: identifying help that is needed at home, pt's preference for discharge needs and Meds at [facility]  Reviewed with Pt that any member of the healthcare team can answer questions regarding : medications, jmportance of recognizing  Signs and symptoms of any  medical problems  Case Management also encouraged pt to follow up with all recommended appointments after discharge

## 2019-07-09 NOTE — SOCIAL WORK
Met with pt to discuss role as  in helping pt to develop discharge plan and to help pt carry out their plan  Pt lives in a house with her family  Pt has 2 ERIKA  Pt has 12 steps on the inside  Pt has a shower chair and cane at home  Pt uses the Cane to ambulate when she feels weak  Pt is independent with ADL's  Pt shares with the cooking and cleaning   Pt works part time  Pt drives   Pt has never had home care or any services in the home  Anna Damon is patients PCP  Pt uses the [facility] on [address]  Reviewed OBS paperwork with patient and she is agreeable to same

## 2019-07-09 NOTE — SOCIAL WORK
Met with pt to discuss role as  in helping pt to develop discharge plan and to help pt carry out their plan  Pt lives in a house with her family  Pt has 1 ERIKA  Pt has 12 steps on the inside  Pt has her bedroom and bathroom on the 1rst floor but has to go to 2nd floor to get a shower  Pt has a cane and shower chair at home  Pt uses the cane when she feels weak  Pt works part time  Pt is independent with driving  Pt's PCP is Dr Berta Lombardo  Pt uses the [facility] in [facility] pass        Pt with no Case Management needs will continue to follow

## 2019-07-09 NOTE — DISCHARGE INSTRUCTIONS
PLEASE DISPOSE OF YOUR INSULIN NEEDLES, SYRINGES, TEST STRIPS, AND LANCETS USED TO CHECK YOUR BLOOD SUGAR IN A PROPER NEEDLE/SHARPS CONTAINER  ALTERNATIVELY, YOU CAN USE AN EMPTY LAUNDRY DETERGENT BOTTLE  WHEN THE BOTTLE IS FULL, SEAL IT WITH THE LID, AND WRAP IN COMPLETELY IN DUCT TAPE PRIOR TO THROWING IT IN THE GARBAGE  Type 2 Diabetes in Adults   WHAT YOU NEED TO KNOW:   Type 2 diabetes is a disease that affects how your body uses glucose (sugar)  Normally, when the blood sugar level increases, the pancreas makes more insulin  Insulin helps move sugar out of the blood so it can be used for energy  Type 2 diabetes develops because either the body cannot make enough insulin, or it cannot use the insulin correctly  After many years, your pancreas may stop making insulin  DISCHARGE INSTRUCTIONS:   Call 911 for any of the following:   · You have any of the following signs of a stroke:      ¨ Numbness or drooping on one side of your face     ¨ Weakness in an arm or leg    ¨ Confusion or difficulty speaking    ¨ Dizziness, a severe headache, or vision loss    · You have any of the following signs of a heart attack:      ¨ Squeezing, pressure, or pain in your chest that lasts longer than 5 minutes or returns    ¨ Discomfort or pain in your back, neck, jaw, stomach, or arm     ¨ Trouble breathing    ¨ Nausea or vomiting    ¨ Lightheadedness or a sudden cold sweat, especially with chest pain or trouble breathing  Seek care immediately if:   · You have severe abdominal pain, or the pain spreads to your back  You may also be vomiting  · You have trouble staying awake or focusing  · You are shaking or sweating  · You have blurred or double vision  · Your breath has a fruity, sweet smell  · Your breathing is deep and labored, or rapid and shallow  · Your heartbeat is fast and weak  Contact your healthcare provider if:   · You are vomiting or have diarrhea       · You have an upset stomach and cannot eat the foods on your meal plan  · You feel weak or more tired than usual      · You feel dizzy, have headaches, or are easily irritated  · Your skin is red, warm, dry, or swollen  · You have a wound that does not heal      · You have numbness in your arms or legs  · You have trouble coping with your illness, or you feel anxious or depressed  · You have questions or concerns about your condition or care  Medicines: You may  need any of the following:  · Hypoglycemic medicines or insulin  may be given to decrease the amount of sugar in your blood  · Blood pressure medicine  may be given to lower your blood pressure  Your blood pressure should be less than 140/90  · Cholesterol lowering medicine  may be given to prevent heart disease  · Antiplatelets , such as aspirin, help prevent blood clots  Take your antiplatelet medicine exactly as directed  These medicines make it more likely for you to bleed or bruise  If you are told to take aspirin, do not take acetaminophen or ibuprofen instead  · Take your medicine as directed  Contact your healthcare provider if you think your medicine is not helping or if you have side effects  Tell him or her if you are allergic to any medicine  Keep a list of the medicines, vitamins, and herbs you take  Include the amounts, and when and why you take them  Bring the list or the pill bottles to follow-up visits  Carry your medicine list with you in case of an emergency  Check your blood sugar level as directed: You will be taught how to use a glucose monitor  Ask your healthcare provider when and how often to check during the day  You will need to check your blood sugar level at least 3 times each day if you are on insulin  If you check your blood sugar level before a meal , it should be between 80 and 130 mg/dL  If you check your blood sugar level 1 to 2 hours after a meal , it should be less than 180 mg/dL   Ask your healthcare provider if these are good goals for you  Write down your results, and show them to your healthcare provider  He may use the results to make changes to your medicine, food, or exercise schedules  If your blood sugar level is too low: Your blood sugar level is too low if it goes below 70 mg/dL  If the level is too low, eat or drink 15 grams of fast-acting carbohydrate  These are found naturally in fruits  Fast-acting carbohydrates will raise your blood sugar level quickly  Examples of 15 grams of fast-acting carbohydrate are 4 ounces (½ cup) of fruit juice or 4 ounces of regular soda  Other examples are 2 tablespoons of raisins or 3 to 4 glucose tablets  Check your blood sugar level 15 minutes later  If the level is still low (less than 100 mg/dL), eat another 15 grams of carbohydrate  When the level returns to 100 mg/dL, eat a snack or meal that contains carbohydrates  This will help prevent another drop in blood sugar  Always carefully follow your healthcare provider's instructions on how to treat low blood sugar levels  Wear medical alert identification:  Wear medical alert jewelry or carry a card that says you have diabetes  Ask your healthcare provider where to get these items  Check your feet each day for sores:  Wear shoes and socks that fit correctly  Do not trim your toenails  Ask your healthcare provider for more information about foot care  Maintain a healthy weight:  Ask your healthcare provider how much you should weigh  A healthy weight can help you control your diabetes  Ask your provider to help you create a weight loss plan if you are overweight  Together you can set manageable weight loss goals  Follow your meal plan:  A dietitian will help you make a meal plan to keep your blood sugar level steady  Do not skip meals  Your blood sugar level may drop too low if you have taken diabetes medicine and do not eat  · Keep track of carbohydrates (sugar and starchy foods)    Your blood sugar level can get too high if you eat too many carbohydrates  Your dietitian will help you plan meals and snacks that have the right amount of carbohydrates  · Eat low-fat foods , such as skinless chicken and low-fat milk  · Eat less sodium (salt)  Limit high-sodium foods, such as soy sauce, potato chips, and soup  Do not add salt to food you cook  Limit your use of table salt  You should have less than 2,300 mg of sodium per day  · Eat high-fiber foods , such as vegetables, whole grain breads, and beans  · Limit alcohol  Alcohol affects your blood sugar level and can make it harder to manage your diabetes  Limit alcohol to 1 drink a day if you are a woman  Limit alcohol to 2 drinks a day if you are a man  A drink of alcohol is 12 ounces of beer, 5 ounces of wine, or 1½ ounces of liquor  Exercise as directed:  Exercise can help keep your blood sugar level steady, decrease your risk of heart disease, and help you lose weight  Stretch before and after you exercise  Exercise for at least 150 minutes every week  Spread this amount of exercise over at least 3 days a week  Do not skip exercise more than 2 days in a row  Include muscle strengthening activities 2 to 3 days each week  Older adults should include balance training 2 to 3 times each week  Activities that help increase balance include yoga and devi chi  Work with your healthcare provider to create an exercise plan  · Check your blood sugar level before and after exercise  Healthcare providers may tell you to change the amount of insulin you take or food you eat  If your blood sugar level is high, check your blood or urine for ketones before you exercise  Do not exercise if your blood sugar level is high and you have ketones  · If your blood sugar level is less than 100 mg/dL, have a carbohydrate snack before you exercise  Examples are 4 to 6 crackers, ½ banana, 8 ounces (1 cup) of milk, or 4 ounces (½ cup) of juice   Drink water or liquids that do not contain sugar before, during, and after exercise  Ask your dietitian or healthcare provider which liquids you should drink when you exercise  · Do not sit for longer than 30 minutes  If you cannot walk around, at least stand up  This will help you stay active and keep your blood circulating  Do not smoke:  Nicotine and other chemicals in cigarettes and cigars can cause lung damage and make it more difficult to manage your diabetes  Ask your healthcare provider for information if you currently smoke and need help to quit  Do not use e-cigarettes or smokeless tobacco in place of cigarettes or to help you quit  They still contain nicotine  Check your blood pressure as directed:  Ask your healthcare provider what your blood pressure should be  Most adults with diabetes and high blood pressure should have a systolic blood pressure (first number) less than 140  Your diastolic blood pressure (second number) should be less than 90  Ask about vaccines: You have a higher risk for serious illness if you get the flu, pneumonia, or hepatitis  Ask your healthcare provider if you should get a flu, pneumonia, or hepatitis B vaccine, and when to get the vaccine  Follow up with your healthcare provider as directed: You may need to return to have your A1c checked every 3 months  You will need to return at least once each year to have your feet checked  You will need an eye exam once a year to check for retinopathy  You will also need urine tests every year to check for kidney problems  You may need tests to monitor for heart disease such as an EKG, stress test, blood pressure monitoring, and blood tests  Write down your questions so you remember to ask them during your visits  © 2017 [address] Information is for End User's use only and may not be sold, redistributed or otherwise used for commercial purposes   All illustrations and images included in CareNotes® are the copyrighted property of [employer]  or Ryan Garvey  The above information is an  only  It is not intended as medical advice for individual conditions or treatments  Talk to your doctor, nurse or pharmacist before following any medical regimen to see if it is safe and effective for you  Type 2 Diabetes in Adults   WHAT YOU NEED TO KNOW:   What is type 2 diabetes? Type 2 diabetes is a disease that affects how your body uses glucose (sugar)  Normally, when the blood sugar level increases, the pancreas makes more insulin  Insulin helps move sugar out of the blood so it can be used for energy  Type 2 diabetes develops because either the body cannot make enough insulin, or it cannot use the insulin correctly  After many years, your pancreas may stop making insulin  What increases my risk for type 2 diabetes? · Obesity    · Physical inactivity    · Older age    · High blood pressure or high cholesterol    · A history of heart disease, gestational diabetes, or polycystic ovary syndrome     · A family member with diabetes    · Being [place], , , [place], or [facility]  What are the signs and symptoms of type 2 diabetes? You may have high blood sugar levels for a long time before symptoms appear  You may have any of the following:  · More hunger or thirst than usual     · Frequent urination     · Weight loss without trying     · Blurred vision  How is type 2 diabetes diagnosed? You may need tests to check for type 2 diabetes starting at age 39  You may need any of the following:  · An A1c test  shows the average amount of sugar in your blood over the past 2 to 3 months  Your healthcare provider will tell you the A1c level that is right for you  The goal for your A1c is usually below 7%  Your provider can help you make changes if a check shows the A1c is too high      · A fasting plasma glucose test  is when your blood sugar level is tested after you have not eaten for 8 hours  · A 2-hour plasma glucose test  starts with a blood sugar level check after you have not eaten for 8 hours  You are then given a glucose drink  Your blood sugar level is checked after 2 hours  · A random glucose test  may be done any time of day, no matter how long ago you ate  How is type 2 diabetes treated? Type 2 diabetes can be controlled to prevent damage to your heart, blood vessels, and other organs  The goal is to keep your blood sugar at a normal level  You must eat the right foods, and exercise regularly  You may need 1 or more hypoglycemic medicines or insulin if you cannot control your blood sugar level with nutrition and exercise  You may also need medicine to lower your risk for heart disease  An example includes medicine to lower or control your cholesterol  How do I check my blood sugar level? You will be taught how to check a small drop of blood in a glucose monitor  You will need to check your blood sugar level at least 3 times each day if you are on insulin  Ask your healthcare provider when and how often to check during the day  If you check your blood sugar level before a meal , it should be between 80 and 130 mg/dL  If you check your blood sugar level 1 to 2 hours after a meal , it should be less than 180 mg/dL  Ask your healthcare provider if these are good goals for you  Write down your results, and show them to your healthcare provider  Your provider may use the results to make changes to your medicine, food, and exercise schedules  What should I do if my blood sugar level is too low? Your blood sugar level is too low if it goes below 70 mg/dL  If the level is too low, eat or drink 15 grams of fast-acting carbohydrate  These are found naturally in fruits  Fast-acting carbohydrates will raise your blood sugar level quickly  Examples of 15 grams of fast-acting carbohydrate are 4 ounces (½ cup) of fruit juice or 4 ounces of regular soda   Other examples are 2 tablespoons of raisins or 3 to 4 glucose tablets  Check your blood sugar level 15 minutes later  If the level is still low (less than 100 mg/dL), eat another 15 grams of carbohydrate  When the level returns to 100 mg/dL, eat a snack or meal that contains carbohydrates  This will help prevent another drop in blood sugar  Always carefully follow your healthcare provider's instructions on how to treat low blood sugar levels  What do I need to know about nutrition? A dietitian will help you make a meal plan to keep your blood sugar level steady  Do not skip meals  Your blood sugar level may drop too low if you have taken diabetes medicine and do not eat  · Keep track of carbohydrates (sugar and starchy foods)  Your blood sugar level can get too high if you eat too many carbohydrates  Eat fruits, legumes, vegetables, and whole grains  Your dietitian will help you plan meals and snacks that have the right amount of carbohydrates  · Eat low-fat foods , such as skinless chicken and low-fat milk  · Eat less sodium (salt)  Limit high-sodium foods, such as soy sauce, potato chips, and soup  Do not add salt to food you cook  Limit your use of table salt  You should have less than 2,300 mg of sodium per day  · Eat high-fiber foods , such as vegetables, whole-grain breads, and beans  · Limit alcohol  Alcohol affects your blood sugar level and can make it harder to manage your diabetes  Limit alcohol to 1 drink a day if you are a woman  Limit alcohol to 2 drinks a day if you are a man  A drink of alcohol is 12 ounces of beer, 5 ounces of wine, or 1½ ounces of liquor  How much exercise do I need? Exercise can help keep your blood sugar level steady, decrease your risk of heart disease, and help you lose weight  Stretch before and after you exercise  Exercise for at least 150 minutes every week  Spread this amount of exercise over at least 3 days a week   Do not skip exercise more than 2 days in a row  Include muscle strengthening activities 2 to 3 days each week  Older adults should include balance training 2 to 3 times each week  Activities that help increase balance include yoga and devi chi  Work with your healthcare provider to create an exercise plan  · Check your blood sugar level before and after exercise  Healthcare providers may tell you to change the amount of insulin you take or food you eat  If your blood sugar level is high, check your blood or urine for ketones before you exercise  Do not exercise if your blood sugar level is high and you have ketones  · If your blood sugar level is less than 100 mg/dL, have a carbohydrate snack before you exercise  Examples are 4 to 6 crackers, ½ banana, 8 ounces (1 cup) of milk, or 4 ounces (½ cup) of juice  Drink water or liquids that do not contain sugar before, during, and after exercise  Ask your dietitian or healthcare provider which liquids you should drink when you exercise  · Do not sit for longer than 30 minutes  If you cannot walk around, at least stand up  This will help you stay active and keep your blood circulating  What else can I do to manage type 2 diabetes? · Check your feet each day for sores  Wear shoes and socks that fit correctly  Do not trim your toenails  Ask your healthcare provider for more information about foot care  · Maintain a healthy weight  Ask your healthcare provider how much you should weigh  A healthy weight can help you control your diabetes and prevent heart disease  Ask your provider to help you create a weight loss plan if you are overweight  Together you can set manageable weight loss goals  · Do not smoke  Nicotine and other chemicals in cigarettes and cigars can cause lung damage and make it more difficult to manage your diabetes  Ask your healthcare provider for information if you currently smoke and need help to quit   Do not use e-cigarettes or smokeless tobacco in place of cigarettes or to help you quit  They still contain nicotine  · Check your blood pressure as directed  Ask your healthcare provider what your blood pressure should be  Most adults with diabetes and high blood pressure should have a systolic blood pressure (first number) less than 140  Your diastolic blood pressure (second number) should be less than 90  · Wear medical alert identification  Wear medical alert jewelry or carry a card that says you have diabetes  Ask your healthcare provider where to get these items  · Ask about vaccines  You have a higher risk for serious illness if you get the flu, pneumonia, or hepatitis  Ask your healthcare provider if you should get a flu, pneumonia, or hepatitis B vaccine, and when to get the vaccine  What are the risks of type 2 diabetes? Uncontrolled diabetes can damage your nerves, veins, and arteries  High blood sugar levels may damage other body tissue and organs over time  Damage to arteries may increase your risk for heart attack and stroke  Nerve damage may also lead to other heart, stomach, and nerve problems  Diabetes is life-threatening if it is not controlled  Control your blood glucose levels to prevent health problems  Call 911 for any of the following:   · You have any of the following signs of a stroke:      ¨ Numbness or drooping on one side of your face     ¨ Weakness in an arm or leg    ¨ Confusion or difficulty speaking    ¨ Dizziness, a severe headache, or vision loss    · You have any of the following signs of a heart attack:      ¨ Squeezing, pressure, or pain in your chest that lasts longer than 5 minutes or returns    ¨ Discomfort or pain in your back, neck, jaw, stomach, or arm     ¨ Trouble breathing    ¨ Nausea or vomiting    ¨ Lightheadedness or a sudden cold sweat, especially with chest pain or trouble breathing  When should I seek immediate care? · You have severe abdominal pain, or the pain spreads to your back   You may also be vomiting  · You have trouble staying awake or focusing  · You are shaking or sweating  · You have blurred or double vision  · Your breath has a fruity, sweet smell  · Your breathing is deep and labored, or rapid and shallow  · Your heartbeat is fast and weak  When should I contact my healthcare provider? · You are vomiting or have diarrhea  · You have an upset stomach and cannot eat the foods on your meal plan  · You feel weak or more tired than usual      · You feel dizzy, have headaches, or are easily irritated  · Your skin is red, warm, dry, or swollen  · You have a wound that does not heal      · You have numbness in your arms or legs  · You have trouble coping with your illness, or you feel anxious or depressed  · You have questions or concerns about your condition or care  CARE AGREEMENT:   You have the right to help plan your care  Learn about your health condition and how it may be treated  Discuss treatment options with your caregivers to decide what care you want to receive  You always have the right to refuse treatment  The above information is an  only  It is not intended as medical advice for individual conditions or treatments  Talk to your doctor, nurse or pharmacist before following any medical regimen to see if it is safe and effective for you  © 2017 [address] Information is for End User's use only and may not be sold, redistributed or otherwise used for commercial purposes  All illustrations and images included in CareNotes® are the copyrighted property of [employer]  or Ryan Garvey

## 2019-07-09 NOTE — ASSESSMENT & PLAN NOTE
· Lifestyle modifications are recommended including weight loss, improving her diet, and increasing her amount of activity    · The patient needs an outpatient sleep study as soon as possible

## 2019-07-11 PROBLEM — N30.00 ACUTE CYSTITIS WITHOUT HEMATURIA: Status: ACTIVE | Noted: 2019-07-11

## 2019-07-11 LAB — BACTERIA UR CULT: ABNORMAL

## 2019-07-11 RX ORDER — CEPHALEXIN 500 MG/1
500 CAPSULE ORAL
Qty: 9 CAPSULE | Refills: 0 | Status: SHIPPED | OUTPATIENT
Start: 2019-07-11 | End: 2019-07-14

## 2019-07-11 RX ORDER — FLUCONAZOLE 150 MG/1
150 TABLET ORAL ONCE
Qty: 1 TABLET | Refills: 0 | Status: SHIPPED | OUTPATIENT
Start: 2019-07-11 | End: 2019-07-11

## 2019-07-11 NOTE — QUICK NOTE
· After the patient was discharge, the patient was found to have acute cystitis with the following urine culture results:  Microbiology Results (last 21 days)     Procedure Component Value - Date/Time   Urine culture [[id number]] (Abnormal)  Collected: 07/08/19 2341   Lab Status: Final result Specimen: Urine, Clean Catch Updated: 07/11/19 1595    Urine Culture >100,000 cfu/ml Klebsiella pneumoniaeAbnormal    Susceptibility     Klebsiella pneumoniae (1)     Antibiotic Interpretation Microscan Method Status   ZID Performed  Yes  ELVIA Final   Amoxicillin + Clavulanate Susceptible <=4/2 ug/ml ELVIA Final   Ampicillin ($$) Resistant >16 00 ug/ml ELVIA Final   Ampicillin + Sulbactam ($) Susceptible 8/4 ug/ml ELVIA Final   Aztreonam ($$$)  Susceptible <=4 ug/ml ELVIA Final   Cefazolin ($) Susceptible 4 00 ug/ml ELVIA Final   Ciprofloxacin ($)  Susceptible <=1 00 ug/ml ELVIA Final   Gentamicin ($$) Susceptible <=1 ug/ml ELVIA Final   Levofloxacin ($) Susceptible <=0 25 ug/ml ELVIA Final   Nitrofurantoin Intermediate 64 ug/ml ELVIA Final   Tetracycline Susceptible <=4 ug/ml ELVIA Final   Tobramycin ($) Susceptible <=1 ug/ml ELVIA Final   Trimethoprim + Sulfamethoxazole ($$$) Susceptible <=2/38 ug/ml ELVIA Final           · Initiate cephalexin 500 mg PO TID for a 3-day antibiotic course  · The patient will also be prescribed fluconazole 150 mg PO x 1 dose, because she usually develops vaginal candidiasis while on antibiotics

## 2019-08-19 ENCOUNTER — APPOINTMENT (OUTPATIENT)
Dept: LAB | Facility: CLINIC | Age: 59
End: 2019-08-19
Payer: COMMERCIAL

## 2019-08-19 ENCOUNTER — TRANSCRIBE ORDERS (OUTPATIENT)
Dept: LAB | Facility: CLINIC | Age: 59
End: 2019-08-19

## 2019-08-19 DIAGNOSIS — E88.9 METABOLIC DISORDER, UNSPECIFIED: ICD-10-CM

## 2019-08-19 DIAGNOSIS — R73.01 IMPAIRED FASTING GLUCOSE: ICD-10-CM

## 2019-08-19 DIAGNOSIS — D64.9 ANEMIA, UNSPECIFIED TYPE: ICD-10-CM

## 2019-08-19 DIAGNOSIS — Z13.21 ENCOUNTER FOR SCREENING FOR NUTRITIONAL DISORDER: ICD-10-CM

## 2019-08-19 DIAGNOSIS — E11.65 UNCONTROLLED TYPE 2 DIABETES MELLITUS WITH HYPERGLYCEMIA (HCC): ICD-10-CM

## 2019-08-19 DIAGNOSIS — R63.5 ABNORMAL WEIGHT GAIN: ICD-10-CM

## 2019-08-19 DIAGNOSIS — R74.8 ABNORMAL LEVELS OF OTHER SERUM ENZYMES: ICD-10-CM

## 2019-08-19 DIAGNOSIS — Z13.29 ENCOUNTER FOR SCREENING FOR OTHER SUSPECTED ENDOCRINE DISORDER: ICD-10-CM

## 2019-08-19 DIAGNOSIS — Z79.4 TYPE 2 DIABETES MELLITUS WITH DIABETIC NEUROPATHY, WITH LONG-TERM CURRENT USE OF INSULIN (HCC): ICD-10-CM

## 2019-08-19 DIAGNOSIS — IMO0002 TYPE II DIABETES MELLITUS WITH COMPLICATION, UNCONTROLLED: ICD-10-CM

## 2019-08-19 DIAGNOSIS — E66.9 OBESITY, UNSPECIFIED CLASSIFICATION, UNSPECIFIED OBESITY TYPE, UNSPECIFIED WHETHER SERIOUS COMORBIDITY PRESENT: ICD-10-CM

## 2019-08-19 DIAGNOSIS — E55.9 VITAMIN D DEFICIENCY, UNSPECIFIED: ICD-10-CM

## 2019-08-19 DIAGNOSIS — E78.5 HYPERLIPIDEMIA, UNSPECIFIED HYPERLIPIDEMIA TYPE: ICD-10-CM

## 2019-08-19 DIAGNOSIS — E34.9 ENDOCRINE DISORDER, UNSPECIFIED: ICD-10-CM

## 2019-08-19 DIAGNOSIS — R94.7 ABNORMAL RESULTS OF OTHER ENDOCRINE FUNCTION STUDIES: ICD-10-CM

## 2019-08-19 DIAGNOSIS — R60.9 EDEMA, UNSPECIFIED TYPE: Primary | ICD-10-CM

## 2019-08-19 DIAGNOSIS — R79.82 ELEVATED C-REACTIVE PROTEIN (CRP): ICD-10-CM

## 2019-08-19 DIAGNOSIS — I10 ESSENTIAL HYPERTENSION: ICD-10-CM

## 2019-08-19 DIAGNOSIS — E11.40 TYPE 2 DIABETES MELLITUS WITH DIABETIC NEUROPATHY, WITH LONG-TERM CURRENT USE OF INSULIN (HCC): ICD-10-CM

## 2019-08-19 DIAGNOSIS — R60.9 EDEMA, UNSPECIFIED TYPE: ICD-10-CM

## 2019-08-19 LAB
25(OH)D3 SERPL-MCNC: 22.1 NG/ML (ref 30–100)
ALBUMIN SERPL BCP-MCNC: 3.8 G/DL (ref 3.5–5)
ALP SERPL-CCNC: 93 U/L (ref 46–116)
ALT SERPL W P-5'-P-CCNC: 31 U/L (ref 12–78)
AMYLASE SERPL-CCNC: 49 IU/L (ref 25–115)
ANION GAP SERPL CALCULATED.3IONS-SCNC: 5 MMOL/L (ref 4–13)
AST SERPL W P-5'-P-CCNC: 24 U/L (ref 5–45)
BASOPHILS # BLD AUTO: 0.05 THOUSANDS/ΜL (ref 0–0.1)
BASOPHILS NFR BLD AUTO: 1 % (ref 0–1)
BILIRUB SERPL-MCNC: 0.56 MG/DL (ref 0.2–1)
BUN SERPL-MCNC: 20 MG/DL (ref 5–25)
CALCIUM SERPL-MCNC: 9.3 MG/DL (ref 8.3–10.1)
CHLORIDE SERPL-SCNC: 108 MMOL/L (ref 100–108)
CHOLEST SERPL-MCNC: 170 MG/DL (ref 50–200)
CO2 SERPL-SCNC: 29 MMOL/L (ref 21–32)
CREAT SERPL-MCNC: 0.99 MG/DL (ref 0.6–1.3)
CRP SERPL HS-MCNC: 5.16 MG/L
EOSINOPHIL # BLD AUTO: 0.23 THOUSAND/ΜL (ref 0–0.61)
EOSINOPHIL NFR BLD AUTO: 4 % (ref 0–6)
ERYTHROCYTE [DISTWIDTH] IN BLOOD BY AUTOMATED COUNT: 13.7 % (ref 11.6–15.1)
ERYTHROCYTE [SEDIMENTATION RATE] IN BLOOD: 17 MM/HOUR (ref 0–20)
EST. AVERAGE GLUCOSE BLD GHB EST-MCNC: 183 MG/DL
GFR SERPL CREATININE-BSD FRML MDRD: 62 ML/MIN/1.73SQ M
GLUCOSE P FAST SERPL-MCNC: 204 MG/DL (ref 65–99)
HBA1C MFR BLD: 8 % (ref 4.2–6.3)
HCT VFR BLD AUTO: 40.2 % (ref 34.8–46.1)
HDLC SERPL-MCNC: 45 MG/DL (ref 40–60)
HGB BLD-MCNC: 12.9 G/DL (ref 11.5–15.4)
IMM GRANULOCYTES # BLD AUTO: 0.02 THOUSAND/UL (ref 0–0.2)
IMM GRANULOCYTES NFR BLD AUTO: 0 % (ref 0–2)
INSULIN SERPL-ACNC: 157.4 MU/L (ref 3–25)
LDLC SERPL CALC-MCNC: 82 MG/DL (ref 0–100)
LDLC SERPL DIRECT ASSAY-MCNC: 86 MG/DL (ref 0–100)
LIPASE SERPL-CCNC: 459 U/L (ref 73–393)
LYMPHOCYTES # BLD AUTO: 1.84 THOUSANDS/ΜL (ref 0.6–4.47)
LYMPHOCYTES NFR BLD AUTO: 30 % (ref 14–44)
MCH RBC QN AUTO: 28.6 PG (ref 26.8–34.3)
MCHC RBC AUTO-ENTMCNC: 32.1 G/DL (ref 31.4–37.4)
MCV RBC AUTO: 89 FL (ref 82–98)
MONOCYTES # BLD AUTO: 0.37 THOUSAND/ΜL (ref 0.17–1.22)
MONOCYTES NFR BLD AUTO: 6 % (ref 4–12)
NEUTROPHILS # BLD AUTO: 3.66 THOUSANDS/ΜL (ref 1.85–7.62)
NEUTS SEG NFR BLD AUTO: 59 % (ref 43–75)
NONHDLC SERPL-MCNC: 125 MG/DL
NRBC BLD AUTO-RTO: 0 /100 WBCS
PLATELET # BLD AUTO: 225 THOUSANDS/UL (ref 149–390)
PMV BLD AUTO: 11.4 FL (ref 8.9–12.7)
POTASSIUM SERPL-SCNC: 4.4 MMOL/L (ref 3.5–5.3)
PROT SERPL-MCNC: 6.9 G/DL (ref 6.4–8.2)
RBC # BLD AUTO: 4.51 MILLION/UL (ref 3.81–5.12)
SODIUM SERPL-SCNC: 142 MMOL/L (ref 136–145)
T3 SERPL-MCNC: 1.2 NG/ML (ref 0.6–1.8)
T3FREE SERPL-MCNC: 2.66 PG/ML (ref 2.3–4.2)
T4 FREE SERPL-MCNC: 1 NG/DL (ref 0.76–1.46)
T4 SERPL-MCNC: 9 UG/DL (ref 4.7–13.3)
TRIGL SERPL-MCNC: 215 MG/DL
TSH SERPL DL<=0.05 MIU/L-ACNC: 1.55 UIU/ML (ref 0.36–3.74)
VIT B12 SERPL-MCNC: 386 PG/ML (ref 100–900)
WBC # BLD AUTO: 6.17 THOUSAND/UL (ref 4.31–10.16)

## 2019-08-19 PROCEDURE — 36415 COLL VENOUS BLD VENIPUNCTURE: CPT

## 2019-08-19 PROCEDURE — 82150 ASSAY OF AMYLASE: CPT

## 2019-08-19 PROCEDURE — 83525 ASSAY OF INSULIN: CPT

## 2019-08-19 PROCEDURE — 83721 ASSAY OF BLOOD LIPOPROTEIN: CPT

## 2019-08-19 PROCEDURE — 84480 ASSAY TRIIODOTHYRONINE (T3): CPT

## 2019-08-19 PROCEDURE — 83690 ASSAY OF LIPASE: CPT

## 2019-08-19 PROCEDURE — 80061 LIPID PANEL: CPT

## 2019-08-19 PROCEDURE — 86141 C-REACTIVE PROTEIN HS: CPT

## 2019-08-19 PROCEDURE — 84436 ASSAY OF TOTAL THYROXINE: CPT

## 2019-08-19 PROCEDURE — 85652 RBC SED RATE AUTOMATED: CPT

## 2019-08-19 PROCEDURE — 82607 VITAMIN B-12: CPT

## 2019-08-19 PROCEDURE — 84481 FREE ASSAY (FT-3): CPT

## 2019-08-19 PROCEDURE — 84443 ASSAY THYROID STIM HORMONE: CPT

## 2019-08-19 PROCEDURE — 85025 COMPLETE CBC W/AUTO DIFF WBC: CPT

## 2019-08-19 PROCEDURE — 80053 COMPREHEN METABOLIC PANEL: CPT

## 2019-08-19 PROCEDURE — 84439 ASSAY OF FREE THYROXINE: CPT

## 2019-08-19 PROCEDURE — 83036 HEMOGLOBIN GLYCOSYLATED A1C: CPT

## 2019-08-19 PROCEDURE — 82306 VITAMIN D 25 HYDROXY: CPT

## 2019-08-19 PROCEDURE — 84681 ASSAY OF C-PEPTIDE: CPT

## 2019-08-19 PROCEDURE — 84479 ASSAY OF THYROID (T3 OR T4): CPT

## 2019-08-20 ENCOUNTER — TELEPHONE (OUTPATIENT)
Dept: PAIN MEDICINE | Facility: CLINIC | Age: 59
End: 2019-08-20

## 2019-08-20 LAB
C PEPTIDE SERPL-MCNC: 5.6 NG/ML (ref 1.1–4.4)
T3RU NFR SERPL: 27 % (ref 24–39)

## 2019-08-20 NOTE — TELEPHONE ENCOUNTER
Please call and inform the patient that her hemoglobin A1c was 8 0  We can possibly move forward with an additional injection  However, I see that was recently hospitalized hip  We can discuss proceeding with an additional injection when I see her in the office on 08/26/2019

## 2019-08-26 ENCOUNTER — OFFICE VISIT (OUTPATIENT)
Dept: PAIN MEDICINE | Facility: CLINIC | Age: 59
End: 2019-08-26
Payer: COMMERCIAL

## 2019-08-26 VITALS
SYSTOLIC BLOOD PRESSURE: 120 MMHG | HEIGHT: 62 IN | DIASTOLIC BLOOD PRESSURE: 82 MMHG | RESPIRATION RATE: 18 BRPM | BODY MASS INDEX: 48.58 KG/M2 | WEIGHT: 264 LBS

## 2019-08-26 DIAGNOSIS — M54.12 CERVICAL RADICULOPATHY: ICD-10-CM

## 2019-08-26 DIAGNOSIS — M54.2 NECK PAIN: Primary | ICD-10-CM

## 2019-08-26 DIAGNOSIS — G89.29 CHRONIC BACK PAIN, UNSPECIFIED BACK LOCATION, UNSPECIFIED BACK PAIN LATERALITY: ICD-10-CM

## 2019-08-26 DIAGNOSIS — M54.9 CHRONIC BACK PAIN, UNSPECIFIED BACK LOCATION, UNSPECIFIED BACK PAIN LATERALITY: ICD-10-CM

## 2019-08-26 DIAGNOSIS — G89.4 CHRONIC PAIN SYNDROME: Chronic | ICD-10-CM

## 2019-08-26 DIAGNOSIS — M47.816 LUMBAR SPONDYLOSIS: ICD-10-CM

## 2019-08-26 DIAGNOSIS — M79.18 MYOFASCIAL PAIN SYNDROME: ICD-10-CM

## 2019-08-26 DIAGNOSIS — M25.511 ACUTE PAIN OF RIGHT SHOULDER: ICD-10-CM

## 2019-08-26 DIAGNOSIS — M47.812 SPONDYLOSIS OF CERVICAL REGION WITHOUT MYELOPATHY OR RADICULOPATHY: ICD-10-CM

## 2019-08-26 DIAGNOSIS — M48.062 SPINAL STENOSIS OF LUMBAR REGION WITH NEUROGENIC CLAUDICATION: ICD-10-CM

## 2019-08-26 DIAGNOSIS — M48.04 THORACIC SPINAL STENOSIS: ICD-10-CM

## 2019-08-26 DIAGNOSIS — M54.16 LUMBAR RADICULOPATHY: ICD-10-CM

## 2019-08-26 PROCEDURE — 99214 OFFICE O/P EST MOD 30 MIN: CPT | Performed by: NURSE PRACTITIONER

## 2019-08-26 RX ORDER — OXYCODONE AND ACETAMINOPHEN 7.5; 325 MG/1; MG/1
1 TABLET ORAL EVERY 6 HOURS PRN
Qty: 120 TABLET | Refills: 0 | Status: SHIPPED | OUTPATIENT
Start: 2019-09-05 | End: 2019-08-26

## 2019-08-26 RX ORDER — OXYCODONE AND ACETAMINOPHEN 7.5; 325 MG/1; MG/1
1 TABLET ORAL EVERY 6 HOURS PRN
Qty: 120 TABLET | Refills: 0 | Status: SHIPPED | OUTPATIENT
Start: 2019-10-03 | End: 2019-10-14 | Stop reason: SDUPTHER

## 2019-08-26 RX ORDER — GABAPENTIN 600 MG/1
600 TABLET ORAL 3 TIMES DAILY
Qty: 90 TABLET | Refills: 1 | Status: SHIPPED | OUTPATIENT
Start: 2019-08-26 | End: 2020-02-10 | Stop reason: SDUPTHER

## 2019-08-26 NOTE — PROGRESS NOTES
Pt c/o lower back pain that radiates to the hips and legs, and neck pain that radiates to the shoulders and arms    Assessment:  1  Neck pain    2  Cervical radiculopathy    3  Acute pain of right shoulder    4  Lumbar radiculopathy    5  Lumbar spondylosis    6  Myofascial pain syndrome    7  Chronic pain syndrome    8  Thoracic spinal stenosis    9  Chronic back pain, unspecified back location, unspecified back pain laterality    10  Spondylosis of cervical region without myelopathy or radiculopathy    11  Spinal stenosis of lumbar region with neurogenic claudication        Plan:  Raymond Liu is a 59 y o  female with a history of chronic pain syndrome secondary to lumbar spondylosis, lumbar radiculopathy, lumbar stenosis, chronic right knee pain, bilateral hip pain and myofascial pain syndrome  The patient presents today with ongoing low back and neck pain  The patient reports that her neck pain and right shoulder pain are currently the most severe pain she is experiencing  She reports that her symptoms have worsened in the last month and she is now reporting right arm weakness  She was noted to have 4/5 weakness with right arm flexion on examination  Therefore at this time I ordered the patient an x-ray of her cervical spine, right shoulder as well as the MRI of her cervical spine for further evaluation  She was instructed that our office will call with results of these studies once they are completed  She verbalized an understanding  The patient has been taking oxycodone/acetaminophen 7 5/325 milligrams 1 tablet 4 times daily  She reports that this medication provides her 70 percent pain relief, without side effects  She reports that this medication allows her to function on a daily basis with decreased pain    Therefore the patient will continue on this medication as prescribed with 2 prescriptions of this medication sent to the patient's pharmacy on file with 1 prescription with do not fill date of 9/5/2019 a prescription for the following month with do not fill date of 10/3/2019  Patient brought in her prescription pill bottle for oxycodone/acetaminophen 7 5/325 milligrams 1 tablet 4 times daily  The prescription was filled on 8/6/2019 for 120 tablets  The patient had of 51 remaining tablets, which was a slight excess in tablets  The patient reports that on some days when her pain is not as severe she takes 3 tablets a day  Therefore her do not fill date was extended to account for these excess tablets  The patient will continue on gabapentin as prescribed and refills for this medication was sent to the patient's pharmacy on file  There are risks associated with opioid medications, including dependence, addiction and tolerance  The patient understands and agrees to use these medications only as prescribed  Potential side effects of the medications include, but are not limited to, constipation, drowsiness, addiction, impaired judgment and risk of fatal overdose if not taken as prescribed  The patient was warned against driving while taking sedation medications  Sharing medications is a felony  At this point in time, the patient is showing no signs of addiction, abuse, diversion or suicidal ideation  [place] Prescription Drug Monitoring Program report was reviewed and was appropriate     The patient will follow up in 8 weeks or sooner with the worsening of symptoms  My impressions and treatment recommendations were discussed in detail with the patient who verbalized understanding and had no further questions  Discharge instructions were provided  I personally saw and examined the patient and I agree with the above discussed plan of care      Orders Placed This Encounter   Procedures    X-ray cervical spine complete 4 or 5 vw     Standing Status:   Future     Standing Expiration Date:   8/26/2023     Scheduling Instructions:      Bring along any outside films relating to this procedure  Order Specific Question:   Is the patient pregnant? Answer:   No    MRI cervical spine wo contrast     Standing Status:   Future     Standing Expiration Date:   8/26/2023     Scheduling Instructions: There is no preparation for this test  Please leave your jewelry and valuables at home, wedding rings are the exception  Please bring your insurance cards, a form of photo ID and a list of your medications with you  Arrive 15 minutes prior to your appointment time in order to register  Please bring any prior CT or MRI studies of this area that were not performed at a [facility]  To schedule this appointment, please contact Central Scheduling at [phone]  Order Specific Question:   Is the patient pregnant? Answer:   No     Order Specific Question:   What is the patient's sedation requirement? Answer:   No Sedation    XR shoulder 2+ vw right     Standing Status:   Future     Standing Expiration Date:   8/26/2023     Scheduling Instructions:      Bring along any outside films relating to this procedure  Order Specific Question:   Is the patient pregnant? Answer:   No     New Medications Ordered This Visit   Medications    gabapentin (NEURONTIN) 600 MG tablet     Sig: Take 1 tablet (600 mg total) by mouth 3 (three) times a day for 30 days     Dispense:  90 tablet     Refill:  1    oxyCODONE-acetaminophen (PERCOCET) 7 5-325 MG per tablet     Sig: Take 1 tablet by mouth every 6 (six) hours as needed for moderate pain for up to 30 daysMax Daily Amount: 4 tablets     Dispense:  120 tablet     Refill:  0     Do Not Fill Until: 10/03/2019 Do not exceed a total of 3 grams of tylenol/acetaminophen in a 24-hour period         History of Present Illness:  Sandi Nieto is a 59 y o  female with a history of chronic pain syndrome secondary to lumbar spondylosis, lumbar radiculopathy, lumbar stenosis, chronic right knee pain, bilateral hip pain and myofascial pain syndrome  The patient was last seen office on 7/1/2019 where she was continued oxycodone/acetaminophen 7 5/325 milligrams 1 tablet 4 times daily  She presents for a follow up office visit in regards to Back Pain (radiates to the hips and legs); Hip Pain; Leg Pain; Neck Pain (radiates to he shoulders and arms); Shoulder Pain; and Arm Pain  The patients current symptoms include neck pain and low back pain  She reports increased neck pain and right shoulder pain that started 1 month ago  She is reporting right arm weakness  She denies any recent injury trauma  She denies bilateral leg weakness or bowel or bladder issues  She describes her pain as burning, dull aching, cramping, pressure-like, shooting, numbness pain that is constant nature with symptoms occurring throughout the day  The patient reports that her pain is symptoms have worsened since last office visit  She currently rates her pain 8 out 10 numeric pain scale  Current pain medications includes:  Oxycodone/acetaminophen 7 5/325 milligrams 1 tablet 4 times daily  The patient reports that this regimen is providing 70% pain relief  The patient is reporting no side effects from this pain medication regimen  Pain Contract Signed: 01/14/2019, Last Urine Drug Screen: 07/01/2019    I have personally reviewed and/or updated the patient's past medical history, past surgical history, family history, social history, current medications, allergies, and vital signs today  Review of Systems   Respiratory: Negative for shortness of breath  Cardiovascular: Negative for chest pain  Gastrointestinal: Positive for nausea and vomiting  Negative for constipation and diarrhea  Musculoskeletal: Positive for gait problem and joint swelling  Negative for arthralgias and myalgias  Decreased ROM  Joint stiffness   Skin: Negative for rash  Neurological: Positive for dizziness and weakness  Negative for seizures     All other systems reviewed and are negative        Patient Active Problem List   Diagnosis    Vitamin D deficiency    Type 2 diabetes mellitus with hyperglycemia, with long-term current use of insulin ([address] )    History of Salmonella infection    Obesity, morbid ([address] )    Melanocytic nevus of trunk    Menopausal and perimenopausal disorder    Insomnia    Essential hypertension    Hyperlipidemia    History of pulmonary embolism    GERD (gastroesophageal reflux disease)    Gait disturbance    Diabetic ulcer of left heel (HCC)    Stage 3 chronic kidney disease (HCC)    Benign mole    Arthritis    Anxiety    Anemia    Achilles tendinitis of left lower extremity    Ulcer of toe of left foot ([address] )    History of MRSA infection    Screening for colon cancer    Screening for breast cancer    Screening for cervical cancer    Eye exam, routine    Chronic pain syndrome    Chest pain    Osteoarthritis of spine    Leg swelling    Malignant hyperthermia due to anesthesia    Skin callus    Lipoma of back    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    Chronic bilateral low back pain with bilateral sciatica    Lumbar radiculopathy    Diabetic neuropathy (HCC)    Myofascial pain syndrome    Lumbar spondylosis    Spinal stenosis of lumbar region with neurogenic claudication    Thoracic spinal stenosis    Chronic back pain    Right hip pain    Pyuria    Morbid obesity with BMI of 45 0-49 9, adult ([facility])    Bradycardia    Acute cystitis without hematuria       Past Medical History:   Diagnosis Date    Anesthesia related hyperthermia     pt states she had high fevers after her lipoma surgery in 2001 at our hospital and was shipped to [facility] where they said t was from an anesthesia med    Anxiety     Arthritis     Asthma     Chronic pain     Depression     Diabetes mellitus ([address] )     GERD (gastroesophageal reflux disease)     Hyperlipidemia     Malignant hyperthermia due to anesthesia     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     Neuropathy     Obesity     PCOS (polycystic ovarian syndrome)        Past Surgical History:   Procedure Laterality Date    CERVICAL CONIZATION   W/ LASER       SECTION      DILATION AND CURETTAGE OF UTERUS      ENDOMETRIAL ABLATION      ENDOMETRIAL BIOPSY      EPIDURAL BLOCK INJECTION Bilateral 2019    Procedure: L5-S1 transforaminal epidural steroid injection;  Surgeon: Clarisse Lacey MD;  Location: [facility] MA[facility];  Service: Pain Management     EPIDURAL BLOCK INJECTION Bilateral 2019    Procedure: L5-S1 transforaminal epidural steroid injection;  Surgeon: Clarisse Lacey MD;  Location: [facility];  Service: Pain Management     FL GUIDED NEEDLE PLAC BX/ASP/INJ  2019    FL GUIDED NEEDLE PLAC BX/ASP/INJ  2019    FOOT SURGERY Left     HERNIA REPAIR      INDUCED       JOINT REPLACEMENT      KNEE ARTHROPLASTY Right     AK DEBRIDEMENT OPEN WOUND 20 SQ CM< Right 2018    Procedure: DEBRIDEMENT HAND/FINGER [facility] OUT);   Surgeon: Mendel Oven, MD;  Location: [address] [place] [address] MAIN OR;  Service: General    AK EXCISION TUMOR SOFT TISSUE BACK/FLANK SUBQ 3+CM N/A 2018    Procedure: BACK LIPOMA EXCISION;  Surgeon: Mendel Oven, MD;  Location: [address] [place] [address] MAIN OR;  Service: General    ROTATOR CUFF REPAIR Right     TONSILLECTOMY         Family History   Problem Relation Age of Onset    Polycystic ovary syndrome Mother     Mental illness Father     Diabetes Father        Social History     Occupational History    Not on file   Tobacco Use    Smoking status: Never Smoker    Smokeless tobacco: Never Used   Substance and Sexual Activity    Alcohol use: Yes     Comment: OCCASIONALLY    Drug use: No    Sexual activity: Never       Current Outpatient Medications on File Prior to Visit   Medication Sig    ammonium lactate (LAC-HYDRIN) 12 % lotion     aspirin 81 mg chewable tablet Chew 1 tablet (81 mg total) daily  atorvastatin (LIPITOR) 40 mg tablet Take 1 tablet (40 mg total) by mouth daily with dinner    BD INSULIN SYRINGE U/F 31G X 5/16" 1 ML MISC     Calcium Carb-Cholecalciferol (CALCIUM 600+D3 PO) Take by mouth daily      cholecalciferol (VITAMIN D3) 400 units tablet Take 400 Units by mouth daily    citalopram (CeleXA) 20 mg tablet Take 1 tablet (20 mg total) by mouth daily    clotrimazole-betamethasone (LOTRISONE) 1-0 05 % cream Apply topically 2 (two) times a day    glucagon (GLUCAGON EMERGENCY) 1 MG injection Inject 1 mg under the skin once as needed (PRN blood glucose less than 70 if unconscious or uncorrectable by oral means) for up to 1 dose    Icosapent Ethyl 0 5 g CAPS Take 2 capsules by mouth    lisinopril (ZESTRIL) 5 mg tablet Take 1 tablet (5 mg total) by mouth daily    LORazepam (ATIVAN) 0 5 mg tablet Take by mouth every 8 (eight) hours as needed for anxiety    meclizine (ANTIVERT) 25 mg tablet Take 25 mg by mouth Three times daily as needed    metFORMIN (GLUCOPHAGE) 1000 MG tablet Take 1 tablet (1,000 mg total) by mouth 2 (two) times a day with meals    methocarbamol (ROBAXIN) 750 mg tablet Take 1 tablet (750 mg total) by mouth every 8 (eight) hours as needed for muscle spasms    multivitamin (THERAGRAN) TABS Take 1 tablet by mouth daily    Omega-3 Fatty Acids (FISH OIL ADULT GUMMIES PO) Take by mouth daily      predniSONE 10 mg tablet 40 mg daily for 1 day then 30 mg daily for 1 day then 20 mg daily for 1 day then 10 mg daily for 1 day then 5 mg daily for 2 days then STOP    sucralfate (CARAFATE) 1 g tablet Take 1 tablet (1 g total) by mouth 4 (four) times a day as needed (stomach pain, acid reflux)    insulin NPH-insulin regular (NovoLIN 70/30) 100 units/mL subcutaneous injection Inject 60 Units under the skin 2 (two) times a day before meals for 30 days    ranitidine (ZANTAC) 150 MG capsule Take 1 capsule (150 mg total) by mouth 2 (two) times a day for 30 days (Patient not taking: Reported on 7/7/2019)    [DISCONTINUED] gabapentin (NEURONTIN) 600 MG tablet Take 1 tablet (600 mg total) by mouth 3 (three) times a day for 30 days    [DISCONTINUED] oxyCODONE-acetaminophen (PERCOCET) 7 5-325 MG per tablet Take 1 tablet by mouth every 6 (six) hours as needed for moderate pain for up to 10 daysMax Daily Amount: 4 tablets     No current facility-administered medications on file prior to visit  Allergies   Allergen Reactions    Cauliflower [Brassica Oleracea Italica] Other (See Comments)     Other reaction(s): GI upset  Abdominal pain    Citrus Bioflavonoid Other (See Comments)     Skin burns    Compro [Prochlorperazine] Wheezing     Other reaction(s): Other (See Comments)  SOB    Latex Other (See Comments)     burning    Mobic [Meloxicam] Swelling    Morphine Hives and Itching    Albumen, Egg Vomiting     Other reaction(s): Nausea and/or vomiting    Metronidazole Itching    Sulfa Antibiotics Other (See Comments)     Other reaction(s): Unknown Reaction       Physical Exam:    /82 (BP Location: Right arm, Patient Position: Sitting, Cuff Size: Standard)   Resp 18   Ht 5' 2" (1 575 m)   Wt 120 kg (264 lb)   BMI 48 29 kg/m²     Constitutional:normal, well developed, well nourished, alert, in no distress and non-toxic and no overt pain behavior   and obese  Eyes:anicteric  HEENT:grossly intact  Neck:supple, symmetric, trachea midline and no masses   Pulmonary:even and unlabored  Cardiovascular:No edema or pitting edema present  Skin:Normal without rashes or lesions and well hydrated  Psychiatric:Mood and affect appropriate  Neurologic:Cranial Nerves II-XII grossly intact  Musculoskeletal:antalgic and ambulates with cane     Cervical Spine Exam    Appearance:  Normal lordosis  Palpation/Tenderness:  left cervical paraspinal tenderness  right cervical paraspinal tenderness  left trapezium tenderness  right trapezium tenderness  Sensory:  no sensory deficits noted  Range of Motion:  Flexion:  Minimally limited  with pain  Extension:  Minimally limited  with pain  Lateral Flexion - Left:  Minimally limited  with pain  Lateral Flexion - Right:  Minimally limited  with pain  Rotation - Left:  Minimally limited  with pain  Rotation - Right:  Minimally limited  with pain  Motor Strength:  Left Arm Flexion  4/5  Left Arm Extension  5/5  Right Arm Flexion  5/5  Right Arm Extension  5/5  Left Wrist Flexion  5/5  Left Wrist Extension  5/5  Left Finger Abduction  5/5  Right Finger Abduction  5/5  Left    5/5  Right   5/5  Special Tests:  Left Spurlings:  negative  Right Spurlings  negative       Imaging

## 2019-09-05 ENCOUNTER — TELEPHONE (OUTPATIENT)
Dept: PAIN MEDICINE | Facility: CLINIC | Age: 59
End: 2019-09-05

## 2019-09-05 ENCOUNTER — APPOINTMENT (OUTPATIENT)
Dept: RADIOLOGY | Facility: CLINIC | Age: 59
End: 2019-09-05
Payer: COMMERCIAL

## 2019-09-05 DIAGNOSIS — M54.2 NECK PAIN: ICD-10-CM

## 2019-09-05 DIAGNOSIS — M25.511 ACUTE PAIN OF RIGHT SHOULDER: ICD-10-CM

## 2019-09-05 DIAGNOSIS — M54.12 CERVICAL RADICULOPATHY: ICD-10-CM

## 2019-09-05 PROCEDURE — 72050 X-RAY EXAM NECK SPINE 4/5VWS: CPT

## 2019-09-05 PROCEDURE — 73030 X-RAY EXAM OF SHOULDER: CPT

## 2019-09-05 NOTE — TELEPHONE ENCOUNTER
Patient  [phone]  Vicente Starrucca    Patient is calling in stating that she needs a prior auth for oxyCODONE-acetaminophen (PERCOCET) 7 5-325 MG  She does know how many pills she has left  She is going away tomorrow so she needs to get it soon  Also she want's to know if she was suppose to get any paperwork for her X-rays?

## 2019-09-16 ENCOUNTER — TELEPHONE (OUTPATIENT)
Dept: PAIN MEDICINE | Facility: CLINIC | Age: 59
End: 2019-09-16

## 2019-09-16 NOTE — TELEPHONE ENCOUNTER
S/w pt, reviewed x-ray results with pt  Pt said she would like to proceed with a R shoulder injection  Pt said she wants to get her back done first though, she was waiting for her A1C to be lower and now that it is she wants to do her back first  Please confirm which back injection the pt is talking about?     **May need ASA hold depending on which injection it is, pt states her PCP prescribes it    **Martha pt

## 2019-09-16 NOTE — TELEPHONE ENCOUNTER
I called the patient back she reports that her last lumbar spine injection provided her 40% pain relief for 1-2 weeks  She reported that the first injection that she had in 02/2019 only provided her 1 week relief of symptoms  I discussed with the patient about proceeding with a surgical consultation for her low back  However the patient reports that she cannot miss work to have surgery and therefore she is not interested in low back surgery at this time  I did discuss with the patient about proceeding with a right shoulder x-ray as discussed with the patient previously with the RN  However the patient reports that she would like to wait for the results of her cervical MRI prior to proceeding with any injections at this time

## 2019-09-16 NOTE — TELEPHONE ENCOUNTER
Attempted to reach pt, phone kept ringing, eventually said "all circuits are busy right now, please try again later"       **Try again on 9/17

## 2019-09-16 NOTE — TELEPHONE ENCOUNTER
Please call the patient and let her know that her right shoulder x-ray showed moderate to severe glenohumeral arthritis as well as mild acromioclavicular arthritis  Also let her know that her cervical x-ray showed severe degenerative changes at C4-C5 and C5-C6 with total loss of intervertebral disc space as well as bone spurs  She was also noted to have severe right-sided neural foraminal narrowing at C5-C6  However her cervical MRI on 09/25/2019, will provide further information  Both her neck as well as her right shoulder are most likely contributing to her pain and symptoms  We can proceed with a right shoulder x-ray until the results of her cervical MRI is completed  If she is interested I can schedule her for right intra-articular shoulder injection  Please let me know if she is interested and I will place an order in Epic

## 2019-09-24 NOTE — TELEPHONE ENCOUNTER
Patient called requesting to speak to a nurse in reference to her MRI authorization  Her MRI appointment is scheduled for tomorrow 11 am  She also states she just found out her MRI requires prior authorization today  She mentioned that Raghavendra Moreiar called her stating that her insurance requires peer to peer with ordering physician or Chang Burnett advise, thx    Call back# [phone]

## 2019-09-24 NOTE — TELEPHONE ENCOUNTER
A peer to peer was completed with Dada Golden with the patient's insurance company  This MRI was approved and will be valid until 11/24/2019  The approval number is # [id number]

## 2019-09-25 ENCOUNTER — HOSPITAL ENCOUNTER (OUTPATIENT)
Dept: MRI IMAGING | Facility: HOSPITAL | Age: 59
Discharge: HOME | End: 2019-09-25
Payer: COMMERCIAL

## 2019-09-25 DIAGNOSIS — M54.2 NECK PAIN: ICD-10-CM

## 2019-09-25 DIAGNOSIS — M54.12 CERVICAL RADICULOPATHY: ICD-10-CM

## 2019-09-25 PROCEDURE — 72141 MRI NECK SPINE W/O DYE: CPT

## 2019-10-01 ENCOUNTER — TELEPHONE (OUTPATIENT)
Dept: PAIN MEDICINE | Facility: CLINIC | Age: 59
End: 2019-10-01

## 2019-10-01 NOTE — TELEPHONE ENCOUNTER
Pt returned call and was in tears and did not hear some of what was said  Transferred to the nurses line

## 2019-10-01 NOTE — TELEPHONE ENCOUNTER
I called the patient with the results of this study, which showed multilevel spondylosis and osteoarthritis with significant canal stenosis and minor cord compression at C5-C6  She was also noted to have multilevel cervical foraminal stenosis which was most severe at C3-C4 and C5-C6 and to the right at C6-C7, which correlates for left C4 bilateral C6 and right C7 radiculitis  I discussed with the patient that I would like for her to see Dr Tamia Kinsey a neurosurgeon  I have place and order in Epic  I also instructed her that I would send  A message to Rayo Ashby alerting him of her case  Can you please mail this referral to her home and provide her the number to his office  The patient was adamant on the phone that she could not proceed with surgery  I discussed with her the patient that surgery may be necessary due to the minor cord compression and multilevel foraminal stenosis  I encouraged her to at least meet with the neurosurgeon  She was upset and began to cry  She reports that her job requires her to work at least one day a week and she has diabetes and she is unable to lose her health insurance  I told the patient that I understand  However, I recommend that she meet with a neurosurgeon  I also educated her on the red flag signs of worsening neck conditions which include bilateral arm weakness, loss of bowel or bladder control, bilateral leg weakness, saddle paresthesias  She was instructed that she would have to report to the emergency room emergently with any of these red flag findings  The patient's phone continue to disconnect when I was reviewing these red flag signs to the patient  Can you please re-educate her on these red flag signs when you call her back

## 2019-10-02 DIAGNOSIS — M48.02 CERVICAL SPINAL STENOSIS: Primary | ICD-10-CM

## 2019-10-02 DIAGNOSIS — M48.02 FORAMINAL STENOSIS OF CERVICAL REGION: ICD-10-CM

## 2019-10-02 NOTE — TELEPHONE ENCOUNTER
S/W pt who states HA spoke to her again last pm and they are awaiting call from Dr Amanuel Lujan office to schedule appt  Noted that referral from Dr Tony Nesbitt was not in [facility] [address]  Can you please place order

## 2019-10-03 ENCOUNTER — TELEPHONE (OUTPATIENT)
Dept: PAIN MEDICINE | Facility: MEDICAL CENTER | Age: 59
End: 2019-10-03

## 2019-10-03 NOTE — TELEPHONE ENCOUNTER
Pt is calling stating that Dr Tony Nesbitt office is requesting a doctor to doctor referral to be faxed to [phone]

## 2019-10-04 NOTE — TELEPHONE ENCOUNTER
The number above is the patients cell phone and the referral keeps being sent to the patients cell phone  Please fax it to Dr Bree Ferguson

## 2019-10-14 ENCOUNTER — OFFICE VISIT (OUTPATIENT)
Dept: PAIN MEDICINE | Facility: CLINIC | Age: 59
End: 2019-10-14
Payer: COMMERCIAL

## 2019-10-14 VITALS
HEIGHT: 62 IN | DIASTOLIC BLOOD PRESSURE: 82 MMHG | RESPIRATION RATE: 18 BRPM | SYSTOLIC BLOOD PRESSURE: 136 MMHG | WEIGHT: 264 LBS | BODY MASS INDEX: 48.58 KG/M2

## 2019-10-14 DIAGNOSIS — M47.812 SPONDYLOSIS OF CERVICAL REGION WITHOUT MYELOPATHY OR RADICULOPATHY: ICD-10-CM

## 2019-10-14 DIAGNOSIS — M54.9 CHRONIC BACK PAIN, UNSPECIFIED BACK LOCATION, UNSPECIFIED BACK PAIN LATERALITY: ICD-10-CM

## 2019-10-14 DIAGNOSIS — M48.062 SPINAL STENOSIS OF LUMBAR REGION WITH NEUROGENIC CLAUDICATION: ICD-10-CM

## 2019-10-14 DIAGNOSIS — G89.29 CHRONIC BACK PAIN, UNSPECIFIED BACK LOCATION, UNSPECIFIED BACK PAIN LATERALITY: ICD-10-CM

## 2019-10-14 DIAGNOSIS — M54.16 LUMBAR RADICULOPATHY: ICD-10-CM

## 2019-10-14 DIAGNOSIS — M47.816 LUMBAR SPONDYLOSIS: ICD-10-CM

## 2019-10-14 PROCEDURE — 99214 OFFICE O/P EST MOD 30 MIN: CPT | Performed by: NURSE PRACTITIONER

## 2019-10-14 RX ORDER — OXYCODONE AND ACETAMINOPHEN 7.5; 325 MG/1; MG/1
1 TABLET ORAL EVERY 6 HOURS PRN
Qty: 120 TABLET | Refills: 0 | Status: SHIPPED | OUTPATIENT
Start: 2019-11-02 | End: 2019-10-14

## 2019-10-14 RX ORDER — OXYCODONE AND ACETAMINOPHEN 7.5; 325 MG/1; MG/1
1 TABLET ORAL EVERY 6 HOURS PRN
Qty: 120 TABLET | Refills: 0 | Status: SHIPPED | OUTPATIENT
Start: 2019-11-30 | End: 2019-12-09 | Stop reason: SDUPTHER

## 2019-10-14 RX ORDER — GLIPIZIDE 5 MG/1
5 TABLET ORAL
COMMUNITY
End: 2019-11-18

## 2019-10-14 NOTE — PROGRESS NOTES
Pt c/o lower back pain that radiates to the hips and legs and neck pain that radiates to the shoulders and arms    Assessment:  1  Chronic back pain, unspecified back location, unspecified back pain laterality    2  Spondylosis of cervical region without myelopathy or radiculopathy    3  Spinal stenosis of lumbar region with neurogenic claudication    4  Lumbar spondylosis    5  Lumbar radiculopathy        Plan:  Lanora Eisenmenger is a 62 y o  female chronic pain syndrome secondary to cervical radiculopathy, cervical spondylosis, lumbar stenosis, lumbar spondylosis, lumbar radiculopathy, thoracic spinal stenosis and myofascial pain syndrome  The patient continues with ongoing neck and low back pain, which is unchanged since last office visit  She recently had an MRI of her cervical spine completed which showed foraminal stenosis is most marked to the left at C3-C4 and bilaterally at C5-C6  She was noted to have slight retrolisthesis of C5 advanced bilateral facet arthrosis with spinal canal stenosis measuring 5-6 mm  I called the patient with the results of this study and referred to her to [facility] for surgical consultation  I did discuss with the patient that she would have get her hemoglobin A1c better under control, due to she would not be a surgical candidate if her hemoglobin A1c was greater than 7 5  She verbalized understanding  She reports that her family doctor recently started her on an additional medication to better manage her blood sugar  The patient is requesting to proceed with a lumbar epidural steroid injection at this time  However I discussed with the patient that I would not recommend proceeding with an epidural for her lumbar spine at this time due to the risk of increasing her blood sugars, which would prolong her ability to undergo surgery  She also reported onset of a new right great toe blister, which she will see podiatry for       She reports that her pain is symptoms are well managed with the use of oxycodone/acetaminophen 7 5/325 mg 1 tablet 4 times daily  She reports this medication provides her 70% pain relief, without side effects  Therefore the patient will continue on this medication as prescribed in 2 prescriptions for this medication was sent electronically patient's pharmacy on file with 1 prescription with do not fill date of 11/2/2019 a prescription for the following month with do not fill date of 11/30/2019  The patient did not bring her prescription pill bottle to this office visit  She was instructed that she must bring her prescription pill bottle to each office visit per office policy for possible random pill count and as required for future refills  She verbalized an understanding  There are risks associated with opioid medications, including dependence, addiction and tolerance  The patient understands and agrees to use these medications only as prescribed  Potential side effects of the medications include, but are not limited to, constipation, drowsiness, addiction, impaired judgment and risk of fatal overdose if not taken as prescribed  The patient was warned against driving while taking sedation medications  Sharing medications is a felony  At this point in time, the patient is showing no signs of addiction, abuse, diversion or suicidal ideation  [facility] Prescription Drug Monitoring Program report was reviewed and was appropriate     The patient will follow up in 8 weeks or sooner with the worsening of symptoms  My impressions and treatment recommendations were discussed in detail with the patient who verbalized understanding and had no further questions  Discharge instructions were provided  I personally saw and examined the patient and I agree with the above discussed plan of care  No orders of the defined types were placed in this encounter      New Medications Ordered This Visit   Medications    glipiZIDE (GLUCOTROL) 5 mg tablet     Sig: Take 5 mg by mouth 2 (two) times a day before meals    oxyCODONE-acetaminophen (PERCOCET) 7 5-325 MG per tablet     Sig: Take 1 tablet by mouth every 6 (six) hours as needed for moderate painMax Daily Amount: 4 tablets     Dispense:  120 tablet     Refill:  0     Do Not Fill Until: 11/30/2019  Do not exceed a total of 3 grams of tylenol/acetaminophen in a 24-hour period  History of Present Illness:  Margarita Riley is a 62 y o  female chronic pain syndrome secondary to cervical radiculopathy, cervical spondylosis, lumbar stenosis, lumbar spondylosis, lumbar radiculopathy, thoracic spinal stenosis and myofascial pain syndrome  The patient was last seen office on 8/26/2019 where she was continued on oxycodone/acetaminophen 7 5/325 mg 1 tablet 4 times daily  She was also ordered an MRI of her cervical spine as well as x-rays of her cervical spine and right shoulder  She presents for a follow up office visit in regards to Back Pain (radiates to the hips and legs); Hip Pain; Leg Pain; Neck Pain (radiates to the shoulders and arms); Shoulder Pain; and Arm Pain  The patients current symptoms include right-sided neck pain that radiates into her upper back and down her right arm causing numbness and tingling hand  She reports right arm weakness, which is unchanged  She denies bowel or bladder issues  She describes her pain as burning, cramping, pressure-like, numbness, pins and needles pain that is constant nature with symptoms occurring throughout the day  The patient reports that her pain and symptoms are unchanged since last office visit  Current pain medications includes:  Oxycodone/acetaminophen 7 5/325 mg 1 tablet 4 times daily  The patient reports that this regimen is providing 70% pain relief  The patient is reporting no side effects from this pain medication regimen      Pain Contract Signed: 01/14/2019, Last Urine Drug Screen: 07/01/2019    I have personally reviewed and/or updated the patient's past medical history, past surgical history, family history, social history, current medications, allergies, and vital signs today  Review of Systems   Respiratory: Negative for shortness of breath  Cardiovascular: Negative for chest pain  Gastrointestinal: Positive for nausea  Negative for constipation, diarrhea and vomiting  Musculoskeletal: Positive for gait problem and joint swelling  Negative for arthralgias and myalgias  Decreased ROM  Joint stiffness   Skin: Negative for rash  Neurological: Positive for weakness  Negative for dizziness and seizures  All other systems reviewed and are negative        Patient Active Problem List   Diagnosis    Vitamin D deficiency    Type 2 diabetes mellitus with hyperglycemia, with long-term current use of insulin ([facility] [address] )    History of Salmonella infection    Obesity, morbid ([facility] [address] )    Melanocytic nevus of trunk    Menopausal and perimenopausal disorder    Insomnia    Essential hypertension    Hyperlipidemia    History of pulmonary embolism    GERD (gastroesophageal reflux disease)    Gait disturbance    Diabetic ulcer of left heel (HCC)    Stage 3 chronic kidney disease (HCC)    Benign mole    Arthritis    Anxiety    Anemia    Achilles tendinitis of left lower extremity    Ulcer of toe of left foot ([facility] [address] )    History of MRSA infection    Screening for colon cancer    Screening for breast cancer    Screening for cervical cancer    Eye exam, routine    Chronic pain syndrome    Chest pain    Osteoarthritis of spine    Leg swelling    Malignant hyperthermia due to anesthesia    Skin callus    Lipoma of back    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    Chronic bilateral low back pain with bilateral sciatica    Lumbar radiculopathy    Diabetic neuropathy (HCC)    Myofascial pain syndrome    Lumbar spondylosis    Spinal stenosis of lumbar region with neurogenic claudication    Thoracic spinal stenosis    Chronic back pain    Right hip pain    Pyuria    Morbid obesity with BMI of 45 0-49 9, adult (HCC)    Bradycardia    Acute cystitis without hematuria       Past Medical History:   Diagnosis Date    Anesthesia related hyperthermia     pt states she had high fevers after her lipoma surgery in  at our hospital and was shipped to 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     Neuropathy     Obesity     PCOS (polycystic ovarian syndrome)        Past Surgical History:   Procedure Laterality Date    CERVICAL CONIZATION   W/ LASER       SECTION      DILATION AND CURETTAGE OF UTERUS      ENDOMETRIAL ABLATION      ENDOMETRIAL BIOPSY      EPIDURAL BLOCK INJECTION Bilateral 2019    Procedure: L5-S1 transforaminal epidural steroid injection;  Surgeon: Venkat Emerson MD;  Location: [facility];  Service: Pain Management     EPIDURAL BLOCK INJECTION Bilateral 2019    Procedure: L5-S1 transforaminal epidural steroid injection;  Surgeon: Venkat Emerson MD;  Location: [facility];  Service: Pain Management     FL GUIDED NEEDLE PLAC BX/ASP/INJ  2019    FL GUIDED NEEDLE PLAC BX/ASP/INJ  2019    FOOT SURGERY Left     HERNIA REPAIR      INDUCED       JOINT REPLACEMENT      KNEE ARTHROPLASTY Right     DE DEBRIDEMENT OPEN WOUND 20 SQ CM< Right 2018    Procedure: DEBRIDEMENT HAND/FINGER [facility] OUT);   Surgeon: Lenka Price MD;  Location: [facility] MAIN OR;  Service: General    DE EXCISION TUMOR SOFT TISSUE BACK/FLANK SUBQ 3+CM N/A 2018    Procedure: BACK LIPOMA EXCISION;  Surgeon: Lenka Price MD;  Location: [facility] MAIN OR;  Service: General    ROTATOR CUFF REPAIR Right     TONSILLECTOMY Family History   Problem Relation Age of Onset    Polycystic ovary syndrome Mother     Mental illness Father     Diabetes Father        Social History     Occupational History    Not on file   Tobacco Use    Smoking status: Never Smoker    Smokeless tobacco: Never Used   Substance and Sexual Activity    Alcohol use: Yes     Comment: OCCASIONALLY    Drug use: No    Sexual activity: Never       Current Outpatient Medications on File Prior to Visit   Medication Sig    ammonium lactate (LAC-HYDRIN) 12 % lotion     aspirin 81 mg chewable tablet Chew 1 tablet (81 mg total) daily    atorvastatin (LIPITOR) 40 mg tablet Take 1 tablet (40 mg total) by mouth daily with dinner    BD INSULIN SYRINGE U/F 31G X 5/16" 1 ML MISC     Calcium Carb-Cholecalciferol (CALCIUM 600+D3 PO) Take by mouth daily      cholecalciferol (VITAMIN D3) 400 units tablet Take 400 Units by mouth daily    citalopram (CeleXA) 20 mg tablet Take 1 tablet (20 mg total) by mouth daily    clotrimazole-betamethasone (LOTRISONE) 1-0 05 % cream Apply topically 2 (two) times a day    glipiZIDE (GLUCOTROL) 5 mg tablet Take 5 mg by mouth 2 (two) times a day before meals    glucagon (GLUCAGON EMERGENCY) 1 MG injection Inject 1 mg under the skin once as needed (PRN blood glucose less than 70 if unconscious or uncorrectable by oral means) for up to 1 dose    Icosapent Ethyl 0 5 g CAPS Take 2 capsules by mouth    lisinopril (ZESTRIL) 5 mg tablet Take 1 tablet (5 mg total) by mouth daily    LORazepam (ATIVAN) 0 5 mg tablet Take by mouth every 8 (eight) hours as needed for anxiety    meclizine (ANTIVERT) 25 mg tablet Take 25 mg by mouth Three times daily as needed    metFORMIN (GLUCOPHAGE) 1000 MG tablet Take 1 tablet (1,000 mg total) by mouth 2 (two) times a day with meals    methocarbamol (ROBAXIN) 750 mg tablet Take 1 tablet (750 mg total) by mouth every 8 (eight) hours as needed for muscle spasms    multivitamin (THERAGRAN) TABS Take 1 tablet by mouth daily    Omega-3 Fatty Acids (FISH OIL ADULT GUMMIES PO) Take by mouth daily      predniSONE 10 mg tablet 40 mg daily for 1 day then 30 mg daily for 1 day then 20 mg daily for 1 day then 10 mg daily for 1 day then 5 mg daily for 2 days then STOP    sucralfate (CARAFATE) 1 g tablet Take 1 tablet (1 g total) by mouth 4 (four) times a day as needed (stomach pain, acid reflux)    [DISCONTINUED] oxyCODONE-acetaminophen (PERCOCET) 7 5-325 MG per tablet Take 1 tablet by mouth every 6 (six) hours as needed for moderate pain for up to 30 daysMax Daily Amount: 4 tablets    gabapentin (NEURONTIN) 600 MG tablet Take 1 tablet (600 mg total) by mouth 3 (three) times a day for 30 days    insulin NPH-insulin regular (NovoLIN 70/30) 100 units/mL subcutaneous injection Inject 60 Units under the skin 2 (two) times a day before meals for 30 days    ranitidine (ZANTAC) 150 MG capsule Take 1 capsule (150 mg total) by mouth 2 (two) times a day for 30 days (Patient not taking: Reported on 7/7/2019)     No current facility-administered medications on file prior to visit  Allergies   Allergen Reactions    Cauliflower [Brassica Oleracea Italica] Other (See Comments)     Other reaction(s): GI upset  Abdominal pain    Citrus Bioflavonoid Other (See Comments)     Skin burns    Compro [Prochlorperazine] Wheezing     Other reaction(s):  Other (See Comments)  SOB    Latex Other (See Comments)     burning    Mobic [Meloxicam] Swelling    Morphine Hives and Itching    Albumen, Egg Vomiting     Other reaction(s): Nausea and/or vomiting    Metronidazole Itching    Sulfa Antibiotics Other (See Comments)     Other reaction(s): Unknown Reaction       Physical Exam:    /82 (BP Location: Right arm, Patient Position: Sitting, Cuff Size: Standard)   Resp 18   Ht 5' 2" (1 575 m)   Wt 120 kg (264 lb)   BMI 48 29 kg/m²     Constitutional:normal, well developed, well nourished, alert, in no distress and non-toxic and no overt pain behavior  and obese  Eyes:anicteric  HEENT:grossly intact  Neck:supple, symmetric, trachea midline and no masses   Pulmonary:even and unlabored  Cardiovascular:No edema or pitting edema present  Skin:Normal without rashes or lesions and well hydrated  Psychiatric:Mood and affect appropriate  Neurologic:Cranial Nerves II-XII grossly intact  Musculoskeletal:antalgic and ambulates with cane     Cervical Spine Exam    Appearance:  Normal lordosis  Palpation/Tenderness:  left cervical paraspinal tenderness  right cervical paraspinal tenderness  left trapezium tenderness  right trapezium tenderness  Sensory:  no sensory deficits noted  Range of Motion:  Flexion:  Minimally limited  with pain  Extension:  Minimally limited  with pain  Lateral Flexion - Left:  Minimally limited  with pain  Lateral Flexion - Right:  Minimally limited  with pain  Rotation - Left:  Minimally limited  with pain  Rotation - Right:  Minimally limited  with pain  Motor Strength:  Left Arm Flexion  4/5  Left Arm Extension  4/5  Right Arm Flexion  5/5  Right Arm Extension  5/5  Left Wrist Flexion  5/5  Left Wrist Extension  5/5  Left Finger Abduction  5/5  Right Finger Abduction  5/5  Left    5/5  Right   5/5         Imaging    MRI CERVICAL SPINE WITHOUT CONTRAST     INDICATION: M54 2: Cervicalgia  M54 12: Radiculopathy, cervical region  Neck and right arm pain     COMPARISON:  X-ray 9/20/2019     TECHNIQUE:  Sagittal T1, sagittal T2, sagittal inversion recovery, axial T2, axial  2D merge     IMAGE QUALITY:  Diagnostic     FINDINGS:     ALIGNMENT:  Loss of disc height C4-5, minor anterolisthesis of C3 on C4 and retrolisthesis of C4-5 on C6    Slight degenerative anterolisthesis C7 on T1      MARROW SIGNAL:  Reactive degenerative marrow edema at the C5-C6 disc space level      CERVICAL AND VISUALIZED THORACIC CORD:  Normal signal within the visualized cord      PREVERTEBRAL AND PARASPINAL SOFT TISSUES: Normal      VISUALIZED POSTERIOR FOSSA:  The visualized posterior fossa demonstrates no abnormal signal      CERVICAL DISC SPACES:     C2-C3:  Bilateral facet arthrosis, tiny central noncompressive protrusion      C3-C4:  Circumferential bulging of the disc, advanced facet arthrosis  AP dimension of the sac reduced to 6 to 7 mm  Minor flattening of the normal signal cord  Asymmetric left facet and uncinate arthrosis with severe foraminal stenosis, correlate for   left C4 radiculitis      C4-C5:  Loss of disc height, circumferential bulge with marginal osteophytes  AP dimension of the sac estimated at 7-8 mm  Bilateral facet and uncinate arthrosis moderate bilateral foraminal stenosis      C5-C6:  Slight retrolisthesis of C5, advanced bilateral facet and uncinate arthrosis  Canal is narrowest at this level, 5-6 mm  Cord is slightly compressed, no definite edema  Correlate for myelopathy  Bilateral foraminal stenosis  Correlate for   bilateral C6 radiculitis      C6-C7:  Circumferential bulge  Disc and osteophyte extend asymmetrically to the right where uncinate arthrosis is marked  Significant foraminal stenosis, correlate for right C7 radiculitis      C7-T1:  Moderate bilateral facet arthrosis      UPPER THORACIC DISC SPACES:  Bulging discs T1-T2, T2-3, and T3-T4      IMPRESSION:     Multilevel spondylosis and osteoarthritis with significant canal stenosis and minor cord compression at the C5-C6 level  Correlate for myelopathy      Foraminal stenosis is most marked to the left at C3-C4, bilaterally at C5-C6, and to the right at C6-C7    Correlate for left C4, bilateral C6 and right C7 radiculitis respectively

## 2019-10-14 NOTE — PATIENT INSTRUCTIONS
Opioid Pain Management   AMBULATORY CARE:   An opioid  is a type of medicine used to treat pain  Examples of opioids are oxycodone, morphine, fentanyl, or codeine  Take opioid medicines as directed, for the condition it is prescribed:  Common problems that may occur when you do not take opioid medicines as directed include the following:  · Health problems  may occur  You may have trouble breathing  You may also develop liver or kidney damage, or stomach bleeding  Any of these health problems can become life-threatening  · Opioid dependence  means your body needs the opioid medicine to keep it from going through withdrawal      · Opioid tolerance  means the opioid does not control pain as well as it used to  You need higher doses of the opioid to get pain relief  · Opioid addiction  means you are not able to control the use of the opioid medicine  You use it when you do not have pain  You crave the opioid medicine  Call 911 or have someone call 911 for any of the following:   · You are breathing slower than normal, or you have trouble breathing  · You cannot be awakened  · You have a seizure  Seek care immediately if:   · Your heart is beating slower than usual     · Your heart feels like it is jumping or fluttering  · You are so sleepy that you cannot stay awake  · You have severe muscle pain or weakness  · You see or hear things that are not real   Contact your healthcare provider if:   · You are too dizzy to stand up  · Your pain gets worse or you have new pain  · You cannot do your usual activities because of side effects from the opioid  · You are constipated or have abdominal pain  · You have questions or concerns about your condition or care  Opioid safety measures:   · Take your medicine as directed  Ask if you need more information on how to take your medicine correctly  Follow up with your healthcare provider regularly  You may need to have your dose adjusted   Do not use opioid medicine if you are pregnant or breastfeeding  · Give your healthcare provider a list of all your medicines  Include any over-the-counter medicines, vitamins, and herbs  It can be dangerous to take opioids with certain other medicines, such as antihistamines  · Keep opioid medicine in a safe place  Store your opioid medicine in a locked cabinet to keep it away from children and others  · Do not drink alcohol while you use opioids  Alcohol use with an opioid medicine can make you sleepy and slow your breathing rate  You may stop breathing completely  · Do not drive or operate heavy machinery after you take opioid medicine  Opioid medicine can make you drowsy and make it hard to concentrate  You may injure yourself or others if you drive or operate heavy machinery while taking your medicine  · Drink fluids and eat high-fiber foods  Fluids and fiber will help prevent constipation  Ask your healthcare provider what fluids are right for you and how much you should drink  Also ask for a list of foods that contain fiber  Follow up with your healthcare provider as directed: You may need to have your dose adjusted  You may be referred to a pain specialist  Write down your questions so you remember to ask them during your visits  © 2017 [address] [employer] Information is for End User's use only and may not be sold, redistributed or otherwise used for commercial purposes  All illustrations and images included in CareNotes® are the copyrighted property of [employer]  or [employer]  The above information is an  only  It is not intended as medical advice for individual conditions or treatments  Talk to your doctor, nurse or pharmacist before following any medical regimen to see if it is safe and effective for you

## 2019-10-18 ENCOUNTER — HOSPITAL ENCOUNTER (EMERGENCY)
Facility: HOSPITAL | Age: 59
Discharge: HOME | End: 2019-10-18
Attending: EMERGENCY MEDICINE
Payer: COMMERCIAL

## 2019-10-18 ENCOUNTER — TELEPHONE (OUTPATIENT)
Dept: PAIN MEDICINE | Facility: CLINIC | Age: 59
End: 2019-10-18

## 2019-10-18 VITALS
OXYGEN SATURATION: 94 % | TEMPERATURE: 98.2 F | HEIGHT: 62 IN | BODY MASS INDEX: 50.18 KG/M2 | HEART RATE: 64 BPM | SYSTOLIC BLOOD PRESSURE: 105 MMHG | RESPIRATION RATE: 20 BRPM | DIASTOLIC BLOOD PRESSURE: 61 MMHG | WEIGHT: 272.71 LBS

## 2019-10-18 DIAGNOSIS — G89.29 CHRONIC PAIN: Primary | ICD-10-CM

## 2019-10-18 LAB
ANION GAP SERPL CALCULATED.3IONS-SCNC: 8 MMOL/L (ref 4–13)
BASOPHILS # BLD AUTO: 0.04 THOUSANDS/ΜL (ref 0–0.1)
BASOPHILS NFR BLD AUTO: 0 % (ref 0–1)
BUN SERPL-MCNC: 19 MG/DL (ref 5–25)
CALCIUM SERPL-MCNC: 9.4 MG/DL (ref 8.3–10.1)
CHLORIDE SERPL-SCNC: 103 MMOL/L (ref 100–108)
CO2 SERPL-SCNC: 26 MMOL/L (ref 21–32)
CREAT SERPL-MCNC: 0.86 MG/DL (ref 0.6–1.3)
EOSINOPHIL # BLD AUTO: 0.26 THOUSAND/ΜL (ref 0–0.61)
EOSINOPHIL NFR BLD AUTO: 3 % (ref 0–6)
ERYTHROCYTE [DISTWIDTH] IN BLOOD BY AUTOMATED COUNT: 13.5 % (ref 11.6–15.1)
GFR SERPL CREATININE-BSD FRML MDRD: 73 ML/MIN/1.73SQ M
GLUCOSE SERPL-MCNC: 126 MG/DL (ref 65–140)
GLUCOSE SERPL-MCNC: 84 MG/DL (ref 65–140)
HCT VFR BLD AUTO: 44.9 % (ref 34.8–46.1)
HGB BLD-MCNC: 14.8 G/DL (ref 11.5–15.4)
IMM GRANULOCYTES # BLD AUTO: 0.03 THOUSAND/UL (ref 0–0.2)
IMM GRANULOCYTES NFR BLD AUTO: 0 % (ref 0–2)
LYMPHOCYTES # BLD AUTO: 2.75 THOUSANDS/ΜL (ref 0.6–4.47)
LYMPHOCYTES NFR BLD AUTO: 30 % (ref 14–44)
MAGNESIUM SERPL-MCNC: 1.7 MG/DL (ref 1.6–2.6)
MCH RBC QN AUTO: 29 PG (ref 26.8–34.3)
MCHC RBC AUTO-ENTMCNC: 33 G/DL (ref 31.4–37.4)
MCV RBC AUTO: 88 FL (ref 82–98)
MONOCYTES # BLD AUTO: 0.51 THOUSAND/ΜL (ref 0.17–1.22)
MONOCYTES NFR BLD AUTO: 6 % (ref 4–12)
NEUTROPHILS # BLD AUTO: 5.5 THOUSANDS/ΜL (ref 1.85–7.62)
NEUTS SEG NFR BLD AUTO: 61 % (ref 43–75)
NRBC BLD AUTO-RTO: 0 /100 WBCS
PHOSPHATE SERPL-MCNC: 4.2 MG/DL (ref 2.3–4.1)
PLATELET # BLD AUTO: 302 THOUSANDS/UL (ref 149–390)
PMV BLD AUTO: 9.7 FL (ref 8.9–12.7)
POTASSIUM SERPL-SCNC: 4.1 MMOL/L (ref 3.5–5.3)
RBC # BLD AUTO: 5.11 MILLION/UL (ref 3.81–5.12)
SODIUM SERPL-SCNC: 137 MMOL/L (ref 136–145)
WBC # BLD AUTO: 9.09 THOUSAND/UL (ref 4.31–10.16)

## 2019-10-18 PROCEDURE — 96375 TX/PRO/DX INJ NEW DRUG ADDON: CPT

## 2019-10-18 PROCEDURE — 36415 COLL VENOUS BLD VENIPUNCTURE: CPT | Performed by: EMERGENCY MEDICINE

## 2019-10-18 PROCEDURE — 99284 EMERGENCY DEPT VISIT MOD MDM: CPT

## 2019-10-18 PROCEDURE — 84100 ASSAY OF PHOSPHORUS: CPT | Performed by: EMERGENCY MEDICINE

## 2019-10-18 PROCEDURE — 96376 TX/PRO/DX INJ SAME DRUG ADON: CPT

## 2019-10-18 PROCEDURE — 85025 COMPLETE CBC W/AUTO DIFF WBC: CPT | Performed by: EMERGENCY MEDICINE

## 2019-10-18 PROCEDURE — 96374 THER/PROPH/DIAG INJ IV PUSH: CPT

## 2019-10-18 PROCEDURE — 80048 BASIC METABOLIC PNL TOTAL CA: CPT | Performed by: EMERGENCY MEDICINE

## 2019-10-18 PROCEDURE — 83735 ASSAY OF MAGNESIUM: CPT | Performed by: EMERGENCY MEDICINE

## 2019-10-18 PROCEDURE — 82948 REAGENT STRIP/BLOOD GLUCOSE: CPT

## 2019-10-18 PROCEDURE — 99285 EMERGENCY DEPT VISIT HI MDM: CPT | Performed by: EMERGENCY MEDICINE

## 2019-10-18 RX ORDER — ONDANSETRON 4 MG/1
4 TABLET, FILM COATED ORAL EVERY 8 HOURS PRN
COMMUNITY
End: 2019-11-18 | Stop reason: SDUPTHER

## 2019-10-18 RX ORDER — LIDOCAINE 50 MG/G
1 PATCH TOPICAL ONCE
Status: DISCONTINUED | OUTPATIENT
Start: 2019-10-18 | End: 2019-10-18 | Stop reason: HOSPADM

## 2019-10-18 RX ORDER — ONDANSETRON 4 MG/1
4 TABLET, ORALLY DISINTEGRATING ORAL ONCE
Status: COMPLETED | OUTPATIENT
Start: 2019-10-18 | End: 2019-10-18

## 2019-10-18 RX ORDER — METHYLPREDNISOLONE 4 MG/1
TABLET ORAL
Qty: 21 TABLET | Refills: 0 | Status: ON HOLD | OUTPATIENT
Start: 2019-10-18 | End: 2019-11-09 | Stop reason: CLARIF

## 2019-10-18 RX ORDER — PREDNISONE 20 MG/1
60 TABLET ORAL ONCE
Status: COMPLETED | OUTPATIENT
Start: 2019-10-18 | End: 2019-10-18

## 2019-10-18 RX ORDER — FENTANYL CITRATE 50 UG/ML
50 INJECTION, SOLUTION INTRAMUSCULAR; INTRAVENOUS ONCE
Status: COMPLETED | OUTPATIENT
Start: 2019-10-18 | End: 2019-10-18

## 2019-10-18 RX ORDER — LIDOCAINE 40 MG/G
CREAM TOPICAL
Qty: 30 G | Refills: 0 | Status: SHIPPED | OUTPATIENT
Start: 2019-10-18 | End: 2021-03-19

## 2019-10-18 RX ORDER — DIPHENHYDRAMINE HYDROCHLORIDE 50 MG/ML
25 INJECTION INTRAMUSCULAR; INTRAVENOUS ONCE
Status: COMPLETED | OUTPATIENT
Start: 2019-10-18 | End: 2019-10-18

## 2019-10-18 RX ORDER — BACLOFEN 20 MG/1
10 TABLET ORAL AS NEEDED
COMMUNITY
End: 2020-02-10

## 2019-10-18 RX ORDER — FUROSEMIDE 20 MG/1
20 TABLET ORAL DAILY PRN
COMMUNITY

## 2019-10-18 RX ORDER — HYDROMORPHONE HCL/PF 1 MG/ML
0.5 SYRINGE (ML) INJECTION ONCE
Status: COMPLETED | OUTPATIENT
Start: 2019-10-18 | End: 2019-10-18

## 2019-10-18 RX ORDER — OXYCODONE HYDROCHLORIDE AND ACETAMINOPHEN 5; 325 MG/1; MG/1
1 TABLET ORAL ONCE
Status: COMPLETED | OUTPATIENT
Start: 2019-10-18 | End: 2019-10-18

## 2019-10-18 RX ADMIN — HYDROMORPHONE HYDROCHLORIDE 0.5 MG: 1 INJECTION, SOLUTION INTRAMUSCULAR; INTRAVENOUS; SUBCUTANEOUS at 14:55

## 2019-10-18 RX ADMIN — ONDANSETRON 4 MG: 4 TABLET, ORALLY DISINTEGRATING ORAL at 10:56

## 2019-10-18 RX ADMIN — OXYCODONE HYDROCHLORIDE AND ACETAMINOPHEN 1 TABLET: 5; 325 TABLET ORAL at 10:57

## 2019-10-18 RX ADMIN — DIPHENHYDRAMINE HYDROCHLORIDE 25 MG: 50 INJECTION INTRAMUSCULAR; INTRAVENOUS at 14:54

## 2019-10-18 RX ADMIN — DIPHENHYDRAMINE HYDROCHLORIDE 25 MG: 50 INJECTION INTRAMUSCULAR; INTRAVENOUS at 12:40

## 2019-10-18 RX ADMIN — FENTANYL CITRATE 50 MCG: 0.05 INJECTION, SOLUTION INTRAMUSCULAR; INTRAVENOUS at 12:41

## 2019-10-18 RX ADMIN — LIDOCAINE 1 PATCH: 50 PATCH TOPICAL at 10:59

## 2019-10-18 RX ADMIN — PREDNISONE 60 MG: 20 TABLET ORAL at 14:58

## 2019-10-18 NOTE — ED PROVIDER NOTES
History  Chief Complaint   Patient presents with    Neck Pain     pt c/o increased neck pressure/pain radiates to right shoulder down arm and heaviness in both legs upon awakening this morning  hx arthritis     Patient: Nathalia Cifuentes  28 y o /female  YOB: 1960  MRN: [id number]  PCP: Denis Joiner DO  Date of evaluation: 10/18/2019    (N B   [address] may have been used in the preparation of this document  Occasional wrong word or "sound-alike" substitutions may have occurred due to the inherent limitations of voice recognition software  Interpretation should be guided by context )    Pt c/o worsening of her chronic hand and arm numbness; it is "creeping up" to her right elbow  Her chronic foot numbness now has spread up to her knee on the right side and to the calf on the left leg  Her legs feel that they are not there, right greater than left  She has worsening of her neck pain and radiation of pain down the arm  She c/o chronic perianal numbness - she can't feel the water when she rinses after BM  She feels that it is chronically hard for her to have a BM  She did not take any pain medicine today  OFFICE NOTES FROM RECORDS:    Nathalia Cifuentes is a 62 y o  female chronic pain syndrome secondary to cervical radiculopathy, cervical spondylosis, lumbar stenosis, lumbar spondylosis, lumbar radiculopathy, thoracic spinal stenosis and myofascial pain syndrome  The patient continues with ongoing neck and low back pain, which is unchanged since last office visit  She recently had an MRI of her cervical spine completed which showed foraminal stenosis is most marked to the left at C3-C4 and bilaterally at C5-C6  She was noted to have slight retrolisthesis of C5 advanced bilateral facet arthrosis with spinal canal stenosis measuring 5-6 mm   I called the patient with the results of this study and referred to her to [facility] for surgical consultation   :  Lee Peterson Carlos Tucker is a 62 y o  female chronic pain syndrome secondary to cervical radiculopathy, cervical spondylosis, lumbar stenosis, lumbar spondylosis, lumbar radiculopathy, thoracic spinal stenosis and myofascial pain syndrome  She was also ordered an MRI of her cervical spine as well as x-rays of her cervical spine and right shoulder  She presents for a follow up office visit in regards to Back Pain (radiates to the hips and legs); Hip Pain; Leg Pain; Neck Pain (radiates to the shoulders and arms); Shoulder Pain; and Arm Pain  The patients current symptoms include right-sided neck pain that radiates into her upper back and down her right arm causing numbness and tingling hand  She reports right arm weakness, which is unchanged  She denies bowel or bladder issues  She describes her pain as burning, cramping, pressure-like, numbness, pins and needles pain that is constant nature with symptoms occurring throughout the day  The patient reports that her pain and symptoms are unchanged since last office visit  History provided by:  Patient and significant other  Neck Pain   Pain location:  Occipital region  Quality:  Burning (PRESSURE)  Pain radiates to:  R shoulder  Onset quality:  Gradual  Timing:  Constant  Progression:  Worsening  Chronicity:  Chronic  Context: not fall and not recent injury    Relieved by: did not take pain medicine today  Associated symptoms: headaches and tingling (chronic)    Associated symptoms: no bladder incontinence, no bowel incontinence, no chest pain, no fever, no leg pain, no syncope, no visual change and no weakness  Numbness: chronic  Prior to Admission Medications   Prescriptions Last Dose Informant Patient Reported? Taking?    BD INSULIN SYRINGE U/F 31G X 5/16" 1 ML MISC   Yes Yes   Calcium Carb-Cholecalciferol (CALCIUM 600+D3 PO)  Self Yes Yes   Sig: Take by mouth daily     LORazepam (ATIVAN) 0 5 mg tablet Not Taking at Unknown time  Yes No   Sig: Take by mouth every 8 (eight) hours as needed for anxiety   Omega-3 Fatty Acids ([facility], [address]) 10/18/2019 at Unknown time Self Yes Yes   Sig: Take by mouth daily     ammonium lactate (LAC-HYDRIN) 12 % lotion   Yes Yes   aspirin 81 mg chewable tablet 10/18/2019 at Unknown time Self No Yes   Sig: Chew 1 tablet (81 mg total) daily   atorvastatin (LIPITOR) 40 mg tablet 10/17/2019 at Unknown time  No Yes   Sig: Take 1 tablet (40 mg total) by mouth daily with dinner   baclofen 20 mg tablet  Self Yes Yes   Sig: Take 10 mg by mouth as needed for muscle spasms   cholecalciferol (VITAMIN D3) 400 units tablet 10/18/2019 at Unknown time  Yes Yes   Sig: Take 400 Units by mouth daily   citalopram (CeleXA) 20 mg tablet 10/18/2019 at Unknown time  No Yes   Sig: Take 1 tablet (20 mg total) by mouth daily   clotrimazole-betamethasone (LOTRISONE) 1-0 05 % cream   No Yes   Sig: Apply topically 2 (two) times a day   furosemide (LASIX) 20 mg tablet 10/18/2019 at Unknown time Self Yes Yes   Sig: Take 20 mg by mouth daily   gabapentin (NEURONTIN) 600 MG tablet 10/18/2019 at Unknown time  No Yes   Sig: Take 1 tablet (600 mg total) by mouth 3 (three) times a day for 30 days   glipiZIDE (GLUCOTROL) 5 mg tablet 10/18/2019 at Unknown time  Yes Yes   Sig: Take 5 mg by mouth 2 (two) times a day before meals   glucagon (GLUCAGON EMERGENCY) 1 MG injection   No Yes   Sig: Inject 1 mg under the skin once as needed (PRN blood glucose less than 70 if unconscious or uncorrectable by oral means) for up to 1 dose   insulin NPH-insulin regular (NovoLIN 70/30) 100 units/mL subcutaneous injection 10/18/2019 at Unknown time Self No Yes   Sig: Inject 60 Units under the skin 2 (two) times a day before meals for 30 days   lisinopril (ZESTRIL) 5 mg tablet 10/18/2019 at Unknown time  No Yes   Sig: Take 1 tablet (5 mg total) by mouth daily   meclizine (ANTIVERT) 25 mg tablet Not Taking at Unknown time  Yes No   Sig: Take 25 mg by mouth Three times daily as needed metFORMIN (GLUCOPHAGE) 1000 MG tablet 10/18/2019 at Unknown time Self No Yes   Sig: Take 1 tablet (1,000 mg total) by mouth 2 (two) times a day with meals   methocarbamol (ROBAXIN) 750 mg tablet Not Taking at Unknown time  No No   Sig: Take 1 tablet (750 mg total) by mouth every 8 (eight) hours as needed for muscle spasms   Patient not taking: Reported on 10/18/2019   multivitamin (THERAGRAN) TABS 10/18/2019 at Unknown time  No Yes   Sig: Take 1 tablet by mouth daily   ondansetron (ZOFRAN) 4 mg tablet  Self Yes Yes   Sig: Take 4 mg by mouth every 8 (eight) hours as needed for nausea or vomiting   oxyCODONE-acetaminophen (PERCOCET) 7 5-325 MG per tablet   No Yes   Sig: Take 1 tablet by mouth every 6 (six) hours as needed for moderate painMax Daily Amount: 4 tablets   sucralfate (CARAFATE) 1 g tablet Not Taking at Unknown time  No No   Sig: Take 1 tablet (1 g total) by mouth 4 (four) times a day as needed (stomach pain, acid reflux)   Patient not taking: Reported on 10/18/2019      Facility-Administered Medications: None       Past Medical History:   Diagnosis Date    Anesthesia related hyperthermia     pt states she had high fevers after her lipoma surgery in  at our hospital and was shipped to [facility] where they said t was from an anesthesia med    Anxiety     Arthritis     Asthma     Chronic pain     Depression     Diabetes mellitus ([address] )     GERD (gastroesophageal reflux disease)     Hyperlipidemia     Malignant hyperthermia due to anesthesia     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     Neuropathy     Obesity     PCOS (polycystic ovarian syndrome)        Past Surgical History:   Procedure Laterality Date    CERVICAL CONIZATION   W/ LASER       SECTION      DILATION AND CURETTAGE OF UTERUS      ENDOMETRIAL ABLATION      ENDOMETRIAL BIOPSY      EPIDURAL BLOCK INJECTION Bilateral 2019    Procedure: L5-S1 transforaminal epidural steroid injection;  Surgeon: Elina Myles MD;  Location: [facility] MA[facility];  Service: Pain Management     EPIDURAL BLOCK INJECTION Bilateral 2019    Procedure: L5-S1 transforaminal epidural steroid injection;  Surgeon: Elina Myles MD;  Location: [facility];  Service: Pain Management     FL GUIDED NEEDLE PLAC BX/ASP/INJ  2019    FL GUIDED NEEDLE PLAC BX/ASP/INJ  2019    FOOT SURGERY Left     HERNIA REPAIR      INDUCED       JOINT REPLACEMENT      KNEE ARTHROPLASTY Right     ID DEBRIDEMENT OPEN WOUND 20 SQ CM< Right 2018    Procedure: DEBRIDEMENT HAND/FINGER [facility] OUT); Surgeon: Elgin Sosa MD;  Location: [address] [place] [address] MAIN OR;  Service: General    ID EXCISION TUMOR SOFT TISSUE BACK/FLANK SUBQ 3+CM N/A 2018    Procedure: BACK LIPOMA EXCISION;  Surgeon: Elgin Sosa MD;  Location: [address] [place] [address] MAIN OR;  Service: General    ROTATOR CUFF REPAIR Right     TONSILLECTOMY         Family History   Problem Relation Age of Onset    Polycystic ovary syndrome Mother     Mental illness Father     Diabetes Father      I have reviewed and agree with the history as documented  Social History     Tobacco Use    Smoking status: Never Smoker    Smokeless tobacco: Never Used   Substance Use Topics    Alcohol use: Yes     Comment: OCCASIONALLY    Drug use: No        Review of Systems   Constitutional: Negative for chills and fever  HENT: Negative for hearing loss, trouble swallowing and voice change  Eyes: Negative for pain, redness and visual disturbance  Respiratory: Negative for cough and shortness of breath  Cardiovascular: Negative for chest pain, palpitations and syncope  Gastrointestinal: Negative for abdominal pain, bowel incontinence, constipation, diarrhea, nausea and vomiting  Genitourinary: Negative for bladder incontinence, decreased urine volume, difficulty urinating, dysuria, hematuria, vaginal bleeding and vaginal discharge  Musculoskeletal: Positive for neck pain   Negative for back pain and gait problem  Skin: Negative for color change and rash  Neurological: Positive for tingling (chronic) and headaches  Negative for weakness and light-headedness  Numbness: chronic  Psychiatric/Behavioral: Negative for confusion and decreased concentration  The patient is not nervous/anxious  All other systems reviewed and are negative  Physical Exam  Physical Exam   Constitutional: She is oriented to person, place, and time  She appears well-developed and well-nourished  No distress  HENT:   Mouth/Throat: Oropharynx is clear and moist and mucous membranes are normal    Voice normal   Eyes: Pupils are equal, round, and reactive to light  EOM are normal    Cardiovascular: Normal rate and regular rhythm  Pulmonary/Chest: Effort normal    Abdominal: Soft  Bowel sounds are normal    Neurological: She is alert and oriented to person, place, and time  No cranial nerve deficit  She exhibits normal muscle tone  GCS eye subscore is 4  GCS verbal subscore is 5  GCS motor subscore is 6  Skin: Skin is warm and dry  She is not diaphoretic  Psychiatric: She has a normal mood and affect  Her speech is normal and behavior is normal    Nursing note and vitals reviewed        Vital Signs  ED Triage Vitals [10/18/19 0926]   Temperature Pulse Respirations Blood Pressure SpO2   98 2 °F (36 8 °C) 80 20 156/75 97 %      Temp Source Heart Rate Source Patient Position - Orthostatic VS BP Location FiO2 (%)   Temporal Monitor Lying Left arm --      Pain Score       9           Vitals:    10/18/19 1300 10/18/19 1400 10/18/19 1430 10/18/19 1500   BP: 103/59 95/59 103/57 105/61   Pulse: 68 67 63 64   Patient Position - Orthostatic VS: Lying Lying Lying Lying         Visual Acuity  Visual Acuity      Most Recent Value   L Pupil Size (mm)  3   R Pupil Size (mm)  3          ED Medications  Medications   diphenhydrAMINE (BENADRYL) injection 25 mg (25 mg Intravenous Given 10/18/19 1240)   fentanyl citrate (PF) 100 MCG/2ML 50 mcg (50 mcg Intravenous Given 10/18/19 1241)   oxyCODONE-acetaminophen (PERCOCET) 5-325 mg per tablet 1 tablet (1 tablet Oral Given 10/18/19 1057)   ondansetron (ZOFRAN-ODT) dispersible tablet 4 mg (4 mg Oral Given 10/18/19 1056)   predniSONE tablet 60 mg (60 mg Oral Given 10/18/19 1458)   diphenhydrAMINE (BENADRYL) injection 25 mg (25 mg Intravenous Given 10/18/19 1454)   HYDROmorphone (DILAUDID) injection 0 5 mg (0 5 mg Intravenous Given 10/18/19 1455)       Diagnostic Studies  Results Reviewed     Procedure Component Value Units Date/Time    Basic metabolic panel [[id number]] Collected:  10/18/19 1127    Lab Status:  Final result Specimen:  Blood from Arm, Right Updated:  10/18/19 1147     Sodium 137 mmol/L      Potassium 4 1 mmol/L      Chloride 103 mmol/L      CO2 26 mmol/L      ANION GAP 8 mmol/L      BUN 19 mg/dL      Creatinine 0 86 mg/dL      Glucose 84 mg/dL      Calcium 9 4 mg/dL      eGFR 73 ml/min/1 73sq m     Narrative:       [place] guidelines for Chronic Kidney Disease (CKD):     Stage 1 with normal or high GFR (GFR > 90 mL/min/1 73 square meters)    Stage 2 Mild CKD (GFR = 60-89 mL/min/1 73 square meters)    Stage 3A Moderate CKD (GFR = 45-59 mL/min/1 73 square meters)    Stage 3B Moderate CKD (GFR = 30-44 mL/min/1 73 square meters)    Stage 4 Severe CKD (GFR = 15-29 mL/min/1 73 square meters)    Stage 5 End Stage CKD (GFR <15 mL/min/1 73 square meters)  Note: GFR calculation is accurate only with a steady state creatinine    Magnesium [[id number]]  (Normal) Collected:  10/18/19 1127    Lab Status:  Final result Specimen:  Blood from Arm, Right Updated:  10/18/19 1147     Magnesium 1 7 mg/dL     Phosphorus [[id number]]  (Abnormal) Collected:  10/18/19 1127    Lab Status:  Final result Specimen:  Blood from Arm, Right Updated:  10/18/19 1147     Phosphorus 4 2 mg/dL     CBC and differential [[id number]] Collected:  10/18/19 1127    Lab Status:  Final result Specimen:  Blood from Arm, Right Updated:  10/18/19 1138     WBC 9 09 Thousand/uL      RBC 5 11 Million/uL      Hemoglobin 14 8 g/dL      Hematocrit 44 9 %      MCV 88 fL      MCH 29 0 pg      MCHC 33 0 g/dL      RDW 13 5 %      MPV 9 7 fL      Platelets 812 Thousands/uL      nRBC 0 /100 WBCs      Neutrophils Relative 61 %      Immat GRANS % 0 %      Lymphocytes Relative 30 %      Monocytes Relative 6 %      Eosinophils Relative 3 %      Basophils Relative 0 %      Neutrophils Absolute 5 50 Thousands/µL      Immature Grans Absolute 0 03 Thousand/uL      Lymphocytes Absolute 2 75 Thousands/µL      Monocytes Absolute 0 51 Thousand/µL      Eosinophils Absolute 0 26 Thousand/µL      Basophils Absolute 0 04 Thousands/µL     Fingerstick Glucose (POCT) [[id number]]  (Normal) Collected:  10/18/19 1003    Lab Status:  Final result Updated:  10/18/19 1004     POC Glucose 126 mg/dl                  No orders to display              Procedures  Procedures       ED Course  ED Course as of Oct 25 0942   Fri Oct 18, 2019   1245 Sodium: 137   1245 Potassium: 4 1   1245 Chloride: 103   1245 CO2: 26   1245 Anion Gap: 8   1245 eGFR: 73   1246 Phosphorus(!): 4 2   1246 Magnesium: 1 7   1246 WBC: 9 09   1246 Hemoglobin: 14 8   1246 Platelet Count: 556   1246 Platelet Count: 980   1400 Awaiting callback from pt's spine and pain service  1402 Called again  46 Discussed with Dr Sammie Serna who sees patient spine and pain  He knows her he requests that prescribe a Medrol dose because she has responded steroids in the he agrees with her continuing medication as well      [phone] Plan discussed with patient                                    MDM    Disposition  Final diagnoses:   Chronic pain     Time reflects when diagnosis was documented in both MDM as applicable and the Disposition within this note     Time User Action Codes Description Comment    10/18/2019  2:32 PM Lyla GARCIA Add [G89 29] Chronic pain       ED Disposition ED Disposition Condition Date/Time Comment    Discharge Stable Fri Oct 18, 2019  2:32 PM Maribell Montana discharge to home/self care              Follow-up Information     Follow up With Specialties Details Why Contact Info Additional Information    [facility] Call in 3 days Tell them you are following up from a visit to the  [facility],[address] [id number]  [address] [facility], [address] [place] [address], [facility], [address] [place] [address]  [phone]             Discharge Medication List as of 10/18/2019  2:36 PM      START taking these medications    Details   lidocaine (LMX) 4 % cream LMX, [employer], etc  - look for * LIDOCAINE * on the label, Print      methylPREDNISolone 4 MG tablet therapy pack Use as directed on package, Normal         CONTINUE these medications which have NOT CHANGED    Details   ammonium lactate (LAC-HYDRIN) 12 % lotion Starting Tue 5/28/2019, Historical Med      aspirin 81 mg chewable tablet Chew 1 tablet (81 mg total) daily, Starting Tue 9/4/2018, Print      atorvastatin (LIPITOR) 40 mg tablet Take 1 tablet (40 mg total) by mouth daily with dinner, Starting Tue 7/9/2019, Normal      baclofen 20 mg tablet Take 10 mg by mouth as needed for muscle spasms, Historical Med      BD INSULIN SYRINGE U/F 31G X 5/16" 1 ML MISC Starting Mon 5/27/2019, Historical Med      Calcium Carb-Cholecalciferol (CALCIUM 600+D3 PO) Take by mouth daily  , Historical Med      cholecalciferol (VITAMIN D3) 400 units tablet Take 400 Units by mouth daily, Historical Med      citalopram (CeleXA) 20 mg tablet Take 1 tablet (20 mg total) by mouth daily, Starting Tue 7/9/2019, No Print      clotrimazole-betamethasone (LOTRISONE) 1-0 05 % cream Apply topically 2 (two) times a day, Starting Fri 6/7/2019, Normal      furosemide (LASIX) 20 mg tablet Take 20 mg by mouth daily, Historical Med      gabapentin (NEURONTIN) 600 MG tablet Take 1 tablet (600 mg total) by mouth 3 (three) times a day for 30 days, Starting Mon 8/26/2019, Until Fri 10/18/2019, Normal      glipiZIDE (GLUCOTROL) 5 mg tablet Take 5 mg by mouth 2 (two) times a day before meals, Historical Med      glucagon (GLUCAGON EMERGENCY) 1 MG injection Inject 1 mg under the skin once as needed (PRN blood glucose less than 70 if unconscious or uncorrectable by oral means) for up to 1 dose, Starting Tue 7/9/2019, Normal      insulin NPH-insulin regular (NovoLIN 70/30) 100 units/mL subcutaneous injection Inject 60 Units under the skin 2 (two) times a day before meals for 30 days, Starting Mon 10/29/2018, Until Fri 10/18/2019, Normal      lisinopril (ZESTRIL) 5 mg tablet Take 1 tablet (5 mg total) by mouth daily, Starting Tue 7/9/2019, Normal      metFORMIN (GLUCOPHAGE) 1000 MG tablet Take 1 tablet (1,000 mg total) by mouth 2 (two) times a day with meals, Starting Thu 10/4/2018, Print      multivitamin (THERAGRAN) TABS Take 1 tablet by mouth daily, Starting Tue 7/9/2019, No Print      Omega-3 Fatty Acids (FISH OIL ADULT GUMMIES PO) Take by mouth daily  , Historical Med      ondansetron (ZOFRAN) 4 mg tablet Take 4 mg by mouth every 8 (eight) hours as needed for nausea or vomiting, Historical Med      oxyCODONE-acetaminophen (PERCOCET) 7 5-325 MG per tablet Take 1 tablet by mouth every 6 (six) hours as needed for moderate painMax Daily Amount: 4 tablets, Starting Sat 11/30/2019, Until Mon 12/30/2019, Normal      LORazepam (ATIVAN) 0 5 mg tablet Take by mouth every 8 (eight) hours as needed for anxiety, Historical Med      meclizine (ANTIVERT) 25 mg tablet Take 25 mg by mouth Three times daily as needed, Historical Med      methocarbamol (ROBAXIN) 750 mg tablet Take 1 tablet (750 mg total) by mouth every 8 (eight) hours as needed for muscle spasms, Starting Tue 7/9/2019, Normal      sucralfate (CARAFATE) 1 g tablet Take 1 tablet (1 g total) by mouth 4 (four) times a day as needed (stomach pain, acid reflux), Starting Tue 7/9/2019, No Print           No discharge procedures on file      ED Provider  Electronically Signed by           Bernard Wray MD  10/25/19 5301

## 2019-10-18 NOTE — CASE MANAGEMENT
I self referred the patient to discuss resources that might be available to her  She denied needing any help at this time  The patient lives with her , friend, son, and grandchildren in a two story home  There is one flight of stairs in the home  She uses a cane  She does not receive meals on wheels or home health services at this time  She needs help with her ADL's  She does not drive her friend drives her to where she needs to go  She uses [employer] in [place]  She has no trouble getting or paying for her medications  She has [employer] insurance  The patient stated she did not call her PCP prior to coming to the ED today

## 2019-10-18 NOTE — ED NOTES
IV insertion attempt and by co-worker Nancy Cadena X 2 without success  Dr Scot Pallas aware        Kamille Arevalo, RN  10/18/19 4077

## 2019-10-18 NOTE — TELEPHONE ENCOUNTER
Dr Danielle Cross called stating she hasn't received a call from [facility]  Advised that she is not in the office today that I can route this to [facility] to speak to her regarding this pt  Bhanuyann Maddison states that this pt came in complaining of increasing pain from neck down to her arms and elbows  Stating that the pain is become worst        Dr Danielle Cross would  Like to know what kind of work up did Ynes want done while the pt is in the ED          Dr Eden Nascimento can be reached at [phone]

## 2019-10-18 NOTE — TELEPHONE ENCOUNTER
Dr Jordan Mancuso from the ED called stating pt is currently there and would like to speak directly to [facility]   Dr Jordan Mancuso can be reached at [phone]

## 2019-10-22 NOTE — PROGRESS NOTES
Called  to introduce self and discuss care management / assess services needed and support services pt has in place  Pt says she is [place] [employer] and will call CM back in a few minutes  CM gave pt phone number to call back  Ivermectin Counseling:  Patient instructed to take medication on an empty stomach with a full glass of water.  Patient informed of potential adverse effects including but not limited to nausea, diarrhea, dizziness, itching, and swelling of the extremities or lymph nodes.  The patient verbalized understanding of the proper use and possible adverse effects of ivermectin.  All of the patient's questions and concerns were addressed.

## 2019-10-30 ENCOUNTER — TRANSCRIBE ORDERS (OUTPATIENT)
Dept: LAB | Facility: CLINIC | Age: 59
End: 2019-10-30

## 2019-10-30 ENCOUNTER — APPOINTMENT (OUTPATIENT)
Dept: LAB | Facility: CLINIC | Age: 59
End: 2019-10-30
Payer: COMMERCIAL

## 2019-10-30 DIAGNOSIS — E66.9 OBESITY, UNSPECIFIED CLASSIFICATION, UNSPECIFIED OBESITY TYPE, UNSPECIFIED WHETHER SERIOUS COMORBIDITY PRESENT: ICD-10-CM

## 2019-10-30 DIAGNOSIS — Z13.228 ENCOUNTER FOR SCREENING FOR OTHER METABOLIC DISORDERS: ICD-10-CM

## 2019-10-30 DIAGNOSIS — Z13.21 ENCOUNTER FOR SCREENING FOR NUTRITIONAL DISORDER: ICD-10-CM

## 2019-10-30 DIAGNOSIS — I10 ESSENTIAL HYPERTENSION: ICD-10-CM

## 2019-10-30 DIAGNOSIS — E88.9 METABOLIC DISORDER, UNSPECIFIED: ICD-10-CM

## 2019-10-30 DIAGNOSIS — R53.83 OTHER FATIGUE: ICD-10-CM

## 2019-10-30 DIAGNOSIS — R74.8 ABNORMAL LEVELS OF OTHER SERUM ENZYMES: ICD-10-CM

## 2019-10-30 DIAGNOSIS — R63.5 ABNORMAL WEIGHT GAIN: ICD-10-CM

## 2019-10-30 DIAGNOSIS — R74.9 ABNORMAL SERUM ENZYME LEVEL: ICD-10-CM

## 2019-10-30 DIAGNOSIS — E11.65 TYPE 2 DIABETES MELLITUS WITH HYPERGLYCEMIA, UNSPECIFIED WHETHER LONG TERM INSULIN USE (HCC): ICD-10-CM

## 2019-10-30 DIAGNOSIS — R94.7 ABNORMAL RESULTS OF OTHER ENDOCRINE FUNCTION STUDIES: ICD-10-CM

## 2019-10-30 DIAGNOSIS — E34.9 ENDOCRINE DISORDER, UNSPECIFIED: ICD-10-CM

## 2019-10-30 DIAGNOSIS — E55.9 VITAMIN D DEFICIENCY, UNSPECIFIED: ICD-10-CM

## 2019-10-30 DIAGNOSIS — Z13.29 ENCOUNTER FOR SCREENING FOR OTHER SUSPECTED ENDOCRINE DISORDER: ICD-10-CM

## 2019-10-30 DIAGNOSIS — R60.9 EDEMA, UNSPECIFIED TYPE: ICD-10-CM

## 2019-10-30 DIAGNOSIS — R53.81 OTHER MALAISE: Primary | ICD-10-CM

## 2019-10-30 DIAGNOSIS — E78.5 HYPERLIPIDEMIA, UNSPECIFIED HYPERLIPIDEMIA TYPE: ICD-10-CM

## 2019-10-30 DIAGNOSIS — R53.81 OTHER MALAISE: ICD-10-CM

## 2019-10-30 LAB
25(OH)D3 SERPL-MCNC: 23 NG/ML (ref 30–100)
ALBUMIN SERPL BCP-MCNC: 3.6 G/DL (ref 3.5–5)
ALP SERPL-CCNC: 80 U/L (ref 46–116)
ALT SERPL W P-5'-P-CCNC: 33 U/L (ref 12–78)
AMYLASE SERPL-CCNC: 65 IU/L (ref 25–115)
ANION GAP SERPL CALCULATED.3IONS-SCNC: 5 MMOL/L (ref 4–13)
AST SERPL W P-5'-P-CCNC: 30 U/L (ref 5–45)
BILIRUB SERPL-MCNC: 0.47 MG/DL (ref 0.2–1)
BUN SERPL-MCNC: 18 MG/DL (ref 5–25)
CALCIUM SERPL-MCNC: 9.1 MG/DL (ref 8.3–10.1)
CHLORIDE SERPL-SCNC: 107 MMOL/L (ref 100–108)
CHOLEST SERPL-MCNC: 234 MG/DL (ref 50–200)
CO2 SERPL-SCNC: 30 MMOL/L (ref 21–32)
CREAT SERPL-MCNC: 0.85 MG/DL (ref 0.6–1.3)
EST. AVERAGE GLUCOSE BLD GHB EST-MCNC: 194 MG/DL
GFR SERPL CREATININE-BSD FRML MDRD: 74 ML/MIN/1.73SQ M
GLUCOSE P FAST SERPL-MCNC: 90 MG/DL (ref 65–99)
HBA1C MFR BLD: 8.4 % (ref 4.2–6.3)
HDLC SERPL-MCNC: 44 MG/DL
INSULIN SERPL-ACNC: 177.1 MU/L (ref 3–25)
LDLC SERPL CALC-MCNC: 125 MG/DL (ref 0–100)
LIPASE SERPL-CCNC: 373 U/L (ref 73–393)
NONHDLC SERPL-MCNC: 190 MG/DL
POTASSIUM SERPL-SCNC: 4.4 MMOL/L (ref 3.5–5.3)
PROT SERPL-MCNC: 7 G/DL (ref 6.4–8.2)
SODIUM SERPL-SCNC: 142 MMOL/L (ref 136–145)
TRIGL SERPL-MCNC: 324 MG/DL

## 2019-10-30 PROCEDURE — 80053 COMPREHEN METABOLIC PANEL: CPT

## 2019-10-30 PROCEDURE — 83036 HEMOGLOBIN GLYCOSYLATED A1C: CPT

## 2019-10-30 PROCEDURE — 83690 ASSAY OF LIPASE: CPT

## 2019-10-30 PROCEDURE — 36415 COLL VENOUS BLD VENIPUNCTURE: CPT

## 2019-10-30 PROCEDURE — 82150 ASSAY OF AMYLASE: CPT

## 2019-10-30 PROCEDURE — 84681 ASSAY OF C-PEPTIDE: CPT

## 2019-10-30 PROCEDURE — 80061 LIPID PANEL: CPT

## 2019-10-30 PROCEDURE — 82306 VITAMIN D 25 HYDROXY: CPT

## 2019-10-30 PROCEDURE — 83525 ASSAY OF INSULIN: CPT

## 2019-10-31 ENCOUNTER — CONSULT (OUTPATIENT)
Dept: NEUROSURGERY | Facility: CLINIC | Age: 59
End: 2019-10-31
Payer: COMMERCIAL

## 2019-10-31 VITALS
RESPIRATION RATE: 16 BRPM | HEART RATE: 68 BPM | WEIGHT: 282 LBS | HEIGHT: 62 IN | TEMPERATURE: 97.5 F | SYSTOLIC BLOOD PRESSURE: 132 MMHG | DIASTOLIC BLOOD PRESSURE: 73 MMHG | BODY MASS INDEX: 51.89 KG/M2

## 2019-10-31 DIAGNOSIS — E08.42 DIABETIC POLYNEUROPATHY ASSOCIATED WITH DIABETES MELLITUS DUE TO UNDERLYING CONDITION (HCC): Primary | ICD-10-CM

## 2019-10-31 DIAGNOSIS — M48.02 FORAMINAL STENOSIS OF CERVICAL REGION: ICD-10-CM

## 2019-10-31 DIAGNOSIS — M48.02 CERVICAL SPINAL STENOSIS: ICD-10-CM

## 2019-10-31 LAB — C PEPTIDE SERPL-MCNC: 2.4 NG/ML (ref 1.1–4.4)

## 2019-10-31 PROCEDURE — 99203 OFFICE O/P NEW LOW 30 MIN: CPT | Performed by: NEUROLOGICAL SURGERY

## 2019-10-31 RX ORDER — OMEGA-3S/DHA/EPA/FISH OIL/D3 300MG-1000
400 CAPSULE ORAL DAILY
Status: ON HOLD | COMMUNITY
End: 2019-11-09 | Stop reason: CLARIF

## 2019-10-31 NOTE — PROGRESS NOTES
Assessment/Plan:    No problem-specific Assessment & Plan notes found for this encounter  Problem List Items Addressed This Visit        Endocrine    Diabetic neuropathy ([address] ) - Primary      Other Visit Diagnoses     Cervical spinal stenosis        Foraminal stenosis of cervical region                Subjective:      Patient ID: Jimbo Davila is a 62 y o  female  HPI    The following portions of the patient's history were reviewed and updated as appropriate:   She  has a past medical history of Anesthesia related hyperthermia, Anxiety, Arthritis, Asthma, Chronic pain, Depression, Diabetes mellitus ([address] ), GERD (gastroesophageal reflux disease), Hyperlipidemia, Malignant hyperthermia due to anesthesia, 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, Neuropathy, Obesity, and PCOS (polycystic ovarian syndrome)    She   Patient Active Problem List    Diagnosis Date Noted    Acute cystitis without hematuria 07/11/2019    Bradycardia 07/09/2019    Right hip pain 07/08/2019    Pyuria 07/08/2019    Morbid obesity with BMI of 45 0-49 9, adult ([address] ) 07/08/2019    Myofascial pain syndrome 02/11/2019    Lumbar spondylosis 02/11/2019    Spinal stenosis of lumbar region with neurogenic claudication 02/11/2019    Thoracic spinal stenosis 02/11/2019    Chronic back pain 02/11/2019    Chronic bilateral low back pain with bilateral sciatica 12/03/2018    Lumbar radiculopathy 12/03/2018    Diabetic neuropathy ([address] ) 12/03/2018    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 11/19/2018    Malignant hyperthermia due to anesthesia 11/12/2018    Skin callus 11/12/2018    Lipoma of back 11/12/2018    Leg swelling 10/18/2018    Osteoarthritis of spine 10/08/2018    Chronic pain syndrome 10/02/2018    Chest pain 10/02/2018    History of MRSA infection 09/19/2018    Screening for colon cancer 09/19/2018    Screening for breast cancer 09/19/2018    Screening for cervical cancer 09/19/2018    Eye exam, routine 2018    Stage 3 chronic kidney disease ([facility] ) 2017    Vitamin D deficiency 2017    Type 2 diabetes mellitus with hyperglycemia, with long-term current use of insulin ([place] [address] ) 2017    Insomnia 2017    Essential hypertension 2017    GERD (gastroesophageal reflux disease) 2017    Gait disturbance 2017    Benign mole 2017    Arthritis 2017    Anxiety 2017    History of Salmonella infection 2016    Hyperlipidemia 2016    History of pulmonary embolism 2016    Achilles tendinitis of left lower extremity 2016    Diabetic ulcer of left heel ([place] [address] ) 2016    Anemia 2016    Ulcer of toe of left foot ([place] [address] ) 2016    Melanocytic nevus of trunk 2015    Obesity, morbid ([place] [address] ) 07/15/2015    Menopausal and perimenopausal disorder 07/15/2015     She  has a past surgical history that includes Induced ; Endometrial ablation; Endometrial biopsy; Dilation and curettage of uterus; Cervical conization w/ laser; Tonsillectomy; Rotator cuff repair (Right); Knee Arthroplasty (Right); pr excision tumor soft tissue back/flank subq 3+cm (N/A, 2018); pr debridement open wound 20 sq cm< (Right, 2018); Hernia repair;  section; Foot surgery (Left); Joint replacement; Epidural block injection (Bilateral, 2019); FL guided needle plac bx/asp/inj (2019); Epidural block injection (Bilateral, 2019); and FL guided needle plac bx/asp/inj (2019)  Her family history includes Diabetes in her father; Mental illness in her father; Polycystic ovary syndrome in her mother  She  reports that she has never smoked  She has never used smokeless tobacco  She reports that she drinks alcohol  She reports that she does not use drugs    Current Outpatient Medications   Medication Sig Dispense Refill    ammonium lactate (LAC-HYDRIN) 12 % lotion   0    aspirin 81 mg chewable tablet Chew 1 tablet (81 mg total) daily 30 tablet 0    atorvastatin (LIPITOR) 40 mg tablet Take 1 tablet (40 mg total) by mouth daily with dinner 30 tablet 0    baclofen 20 mg tablet Take 10 mg by mouth as needed for muscle spasms      BD INSULIN SYRINGE U/F 31G X 5/16" 1 ML MISC   0    Calcium Carb-Cholecalciferol (CALCIUM 600+D3 PO) Take by mouth daily        cholecalciferol (VITAMIN D3) 400 units tablet Take 400 Units by mouth daily      cholecalciferol (VITAMIN D3) 400 units tablet Take 400 Units by mouth daily 1 time every Wednesday prn      citalopram (CeleXA) 20 mg tablet Take 1 tablet (20 mg total) by mouth daily  0    clotrimazole-betamethasone (LOTRISONE) 1-0 05 % cream Apply topically 2 (two) times a day 90 g 1    furosemide (LASIX) 20 mg tablet Take 20 mg by mouth daily      glipiZIDE (GLUCOTROL) 5 mg tablet Take 5 mg by mouth 2 (two) times a day before meals      glucagon (GLUCAGON EMERGENCY) 1 MG injection Inject 1 mg under the skin once as needed (PRN blood glucose less than 70 if unconscious or uncorrectable by oral means) for up to 1 dose 1 kit 0    lidocaine (LMX) 4 % cream LMX, Gold Bond, etc  - look for * LIDOCAINE * on the label 30 g 0    lisinopril (ZESTRIL) 5 mg tablet Take 1 tablet (5 mg total) by mouth daily 30 tablet 0    metFORMIN (GLUCOPHAGE) 1000 MG tablet Take 1 tablet (1,000 mg total) by mouth 2 (two) times a day with meals 60 tablet 0    methylPREDNISolone 4 MG tablet therapy pack Use as directed on package 21 tablet 0    multivitamin (THERAGRAN) TABS Take 1 tablet by mouth daily  0    Omega-3 Fatty Acids (FISH OIL ADULT GUMMIES PO) Take by mouth daily        ondansetron (ZOFRAN) 4 mg tablet Take 4 mg by mouth every 8 (eight) hours as needed for nausea or vomiting      [START ON 11/30/2019] oxyCODONE-acetaminophen (PERCOCET) 7 5-325 MG per tablet Take 1 tablet by mouth every 6 (six) hours as needed for moderate painMax Daily Amount: 4 tablets 120 tablet 0    gabapentin (NEURONTIN) 600 MG tablet Take 1 tablet (600 mg total) by mouth 3 (three) times a day for 30 days 90 tablet 1    insulin NPH-insulin regular (NovoLIN 70/30) 100 units/mL subcutaneous injection Inject 60 Units under the skin 2 (two) times a day before meals for 30 days 36 mL 5    LORazepam (ATIVAN) 0 5 mg tablet Take by mouth every 8 (eight) hours as needed for anxiety      meclizine (ANTIVERT) 25 mg tablet Take 25 mg by mouth Three times daily as needed      methocarbamol (ROBAXIN) 750 mg tablet Take 1 tablet (750 mg total) by mouth every 8 (eight) hours as needed for muscle spasms (Patient not taking: Reported on 10/31/2019) 15 tablet 0    sucralfate (CARAFATE) 1 g tablet Take 1 tablet (1 g total) by mouth 4 (four) times a day as needed (stomach pain, acid reflux) (Patient not taking: Reported on 10/18/2019)  0     No current facility-administered medications for this visit        Current Outpatient Medications on File Prior to Visit   Medication Sig    ammonium lactate (LAC-HYDRIN) 12 % lotion     aspirin 81 mg chewable tablet Chew 1 tablet (81 mg total) daily    atorvastatin (LIPITOR) 40 mg tablet Take 1 tablet (40 mg total) by mouth daily with dinner    baclofen 20 mg tablet Take 10 mg by mouth as needed for muscle spasms    BD INSULIN SYRINGE U/F 31G X 5/16" 1 ML MISC     Calcium Carb-Cholecalciferol (CALCIUM 600+D3 PO) Take by mouth daily      cholecalciferol (VITAMIN D3) 400 units tablet Take 400 Units by mouth daily    cholecalciferol (VITAMIN D3) 400 units tablet Take 400 Units by mouth daily 1 time every Wednesday prn    citalopram (CeleXA) 20 mg tablet Take 1 tablet (20 mg total) by mouth daily    clotrimazole-betamethasone (LOTRISONE) 1-0 05 % cream Apply topically 2 (two) times a day    furosemide (LASIX) 20 mg tablet Take 20 mg by mouth daily    glipiZIDE (GLUCOTROL) 5 mg tablet Take 5 mg by mouth 2 (two) times a day before meals    glucagon (GLUCAGON EMERGENCY) 1 MG injection Inject 1 mg under the skin once as needed (PRN blood glucose less than 70 if unconscious or uncorrectable by oral means) for up to 1 dose    lidocaine (LMX) 4 % cream LMX, Gold Bond, etc  - look for * LIDOCAINE * on the label    lisinopril (ZESTRIL) 5 mg tablet Take 1 tablet (5 mg total) by mouth daily    metFORMIN (GLUCOPHAGE) 1000 MG tablet Take 1 tablet (1,000 mg total) by mouth 2 (two) times a day with meals    methylPREDNISolone 4 MG tablet therapy pack Use as directed on package    multivitamin (THERAGRAN) TABS Take 1 tablet by mouth daily    Omega-3 Fatty Acids (FISH OIL ADULT GUMMIES PO) Take by mouth daily      ondansetron (ZOFRAN) 4 mg tablet Take 4 mg by mouth every 8 (eight) hours as needed for nausea or vomiting    [START ON 11/30/2019] oxyCODONE-acetaminophen (PERCOCET) 7 5-325 MG per tablet Take 1 tablet by mouth every 6 (six) hours as needed for moderate painMax Daily Amount: 4 tablets    gabapentin (NEURONTIN) 600 MG tablet Take 1 tablet (600 mg total) by mouth 3 (three) times a day for 30 days    insulin NPH-insulin regular (NovoLIN 70/30) 100 units/mL subcutaneous injection Inject 60 Units under the skin 2 (two) times a day before meals for 30 days    LORazepam (ATIVAN) 0 5 mg tablet Take by mouth every 8 (eight) hours as needed for anxiety    meclizine (ANTIVERT) 25 mg tablet Take 25 mg by mouth Three times daily as needed    methocarbamol (ROBAXIN) 750 mg tablet Take 1 tablet (750 mg total) by mouth every 8 (eight) hours as needed for muscle spasms (Patient not taking: Reported on 10/31/2019)    sucralfate (CARAFATE) 1 g tablet Take 1 tablet (1 g total) by mouth 4 (four) times a day as needed (stomach pain, acid reflux) (Patient not taking: Reported on 10/18/2019)     No current facility-administered medications on file prior to visit        She is allergic to cauliflower [brassica oleracea italica]; citrus bioflavonoid; compro [prochlorperazine]; latex; mobic [meloxicam]; morphine; albumen, egg; metronidazole; and sulfa antibiotics       Review of Systems   Constitutional: Positive for fatigue  Negative for chills and fever  HENT: Positive for tinnitus  Eyes:        Cataracts     Respiratory: Negative for shortness of breath  Cardiovascular: Negative for chest pain  Gastrointestinal: Negative  Endocrine: Negative  Genitourinary: Negative  Musculoskeletal: Positive for arthralgias, gait problem (uese cane), myalgias and neck pain (R>L arms)  Allergic/Immunologic: Negative  Neurological: Positive for weakness (R>L arms), numbness (R>L arms) and headaches  Negative for dizziness  Hematological:        Aspirin     Psychiatric/Behavioral: Positive for sleep disturbance  Objective:      /73 (BP Location: Left arm, Patient Position: Sitting, Cuff Size: Standard)   Pulse 68   Temp 97 5 °F (36 4 °C) (Tympanic)   Resp 16   Ht 5' 2" (1 575 m)   Wt 128 kg (282 lb)   BMI 51 58 kg/m²           I have personally obtain history and examined patient  I have personally reviewed case including all pertinent investigations/studies  Time spent 60 minutes  More than 50% of total time spent on counseling and coordination of care as described above including patient education, discussion of risks and rationale of conservative vs surgical treatment options  HPI    3-4 month hx of neck dominant pain with episodic right arm involvement  Hx of diabetic neuropathy, stage 3 KD, chronic back pain on opioid analgesics  HA1C 8      Exam    Moderate restriction in cervical ROM  Negative spurling's  Severe paravertebral spasm  Full strength bilateral UE  Symmetrically depressed DTR  Intact fine motor and coordination  Grossly steady gait  Walks with cane due to bilateral LE orthopedic implants  Reduced sensation bilateral distal UE in nonradicular distribution                  Neck:   Supple, symmetrical, trachea midline, no adenopathy;        thyroid:  No enlargement/tenderness/nodules; no carotid    bruit or JVD   Back:     Symmetric, no curvature, ROM normal, no CVA tenderness       Chest wall:    No tenderness or deformity                   Extremities:   Extremities normal, atraumatic, no cyanosis or edema   Pulses:   2+ and symmetric all extremities   Skin:   Skin color, texture, turgor normal, no rashes or lesions     Radiology    Advance cervical spondylotic degeneration with autofusion C4/5 and complete  Disc space collapse at C5/6 with associated central stenosis, cord flattening without zahra compression or signal change, advanced C6/7 degeneration with R>L lateral recess/foraminal involvement    Summary and plan    Ms Lakisha Burton has acute neck dominant pain in the setting of advanced pan-cervical spinal spondylosis with stenosis  Today we reviewed the conservative medical options including PT, LOLA and medications  She will return to Dr Denver Dach form PM to discuss further management options  Especially in the setting of her comorbidities, surgery would offer her no guarantee of improvement with elevated risk of complications  We reviewed the signs and symptoms of cervical myelopathy including weakness, loss of fine motor control, gait and coordination issues  I asked her to avoid chiropractic manipulation, axial loading, vigorous head and neck mov'ts  I will see her on a PRN basis

## 2019-11-06 ENCOUNTER — LAB REQUISITION (OUTPATIENT)
Dept: LAB | Facility: HOSPITAL | Age: 59
End: 2019-11-06
Payer: COMMERCIAL

## 2019-11-06 DIAGNOSIS — E10.621 TYPE 1 DIABETES MELLITUS WITH FOOT ULCER (CODE) (HCC): ICD-10-CM

## 2019-11-06 DIAGNOSIS — L03.116 CELLULITIS OF LEFT LOWER LIMB: ICD-10-CM

## 2019-11-06 PROCEDURE — 87077 CULTURE AEROBIC IDENTIFY: CPT | Performed by: PODIATRIST

## 2019-11-06 PROCEDURE — 87070 CULTURE OTHR SPECIMN AEROBIC: CPT | Performed by: PODIATRIST

## 2019-11-06 PROCEDURE — 87147 CULTURE TYPE IMMUNOLOGIC: CPT | Performed by: PODIATRIST

## 2019-11-06 PROCEDURE — 87186 SC STD MICRODIL/AGAR DIL: CPT | Performed by: PODIATRIST

## 2019-11-06 PROCEDURE — 87205 SMEAR GRAM STAIN: CPT | Performed by: PODIATRIST

## 2019-11-09 ENCOUNTER — APPOINTMENT (EMERGENCY)
Dept: RADIOLOGY | Facility: HOSPITAL | Age: 59
DRG: 380 | End: 2019-11-09
Payer: COMMERCIAL

## 2019-11-09 ENCOUNTER — HOSPITAL ENCOUNTER (INPATIENT)
Facility: HOSPITAL | Age: 59
LOS: 4 days | Discharge: HOME | DRG: 380 | End: 2019-11-13
Attending: FAMILY MEDICINE | Admitting: INTERNAL MEDICINE
Payer: COMMERCIAL

## 2019-11-09 DIAGNOSIS — Z45.2 NEEDS PERIPHERALLY INSERTED CENTRAL CATHETER (PICC): ICD-10-CM

## 2019-11-09 DIAGNOSIS — E11.621 DIABETIC ULCER OF LEFT MIDFOOT ASSOCIATED WITH TYPE 2 DIABETES MELLITUS, LIMITED TO BREAKDOWN OF SKIN (HCC): ICD-10-CM

## 2019-11-09 DIAGNOSIS — L03.90 CELLULITIS: ICD-10-CM

## 2019-11-09 DIAGNOSIS — E11.621 DIABETIC ULCER OF LEFT HEEL ASSOCIATED WITH TYPE 2 DIABETES MELLITUS, LIMITED TO BREAKDOWN OF SKIN (HCC): ICD-10-CM

## 2019-11-09 DIAGNOSIS — E11.621 DIABETIC FOOT ULCER (HCC): Primary | ICD-10-CM

## 2019-11-09 DIAGNOSIS — L97.421 DIABETIC ULCER OF LEFT MIDFOOT ASSOCIATED WITH TYPE 2 DIABETES MELLITUS, LIMITED TO BREAKDOWN OF SKIN (HCC): ICD-10-CM

## 2019-11-09 DIAGNOSIS — L97.509 DIABETIC FOOT ULCER (HCC): Primary | ICD-10-CM

## 2019-11-09 DIAGNOSIS — Z86.14 HISTORY OF MRSA INFECTION: Chronic | ICD-10-CM

## 2019-11-09 DIAGNOSIS — R60.9 EDEMA: ICD-10-CM

## 2019-11-09 DIAGNOSIS — L97.421 DIABETIC ULCER OF LEFT HEEL ASSOCIATED WITH TYPE 2 DIABETES MELLITUS, LIMITED TO BREAKDOWN OF SKIN (HCC): ICD-10-CM

## 2019-11-09 PROBLEM — T88.3XXA MALIGNANT HYPERTHERMIA DUE TO ANESTHESIA: Status: RESOLVED | Noted: 2018-11-12 | Resolved: 2019-11-09

## 2019-11-09 PROBLEM — R07.9 CHEST PAIN: Status: RESOLVED | Noted: 2018-10-02 | Resolved: 2019-11-09

## 2019-11-09 PROBLEM — L97.422 DIABETIC ULCER OF LEFT HEEL ASSOCIATED WITH TYPE 2 DIABETES MELLITUS, WITH FAT LAYER EXPOSED (HCC): Status: ACTIVE | Noted: 2019-11-09

## 2019-11-09 PROBLEM — E66.01 MORBID OBESITY WITH BMI OF 50.0-59.9, ADULT (HCC): Chronic | Status: ACTIVE | Noted: 2019-07-08

## 2019-11-09 PROBLEM — I10 ESSENTIAL HYPERTENSION: Chronic | Status: ACTIVE | Noted: 2017-01-17

## 2019-11-09 PROBLEM — M19.90 ARTHRITIS: Status: RESOLVED | Noted: 2017-01-17 | Resolved: 2019-11-09

## 2019-11-09 PROBLEM — T88.3XXA: Status: RESOLVED | Noted: 2018-11-19 | Resolved: 2019-11-09

## 2019-11-09 PROBLEM — R00.1 BRADYCARDIA: Status: RESOLVED | Noted: 2019-07-09 | Resolved: 2019-11-09

## 2019-11-09 PROBLEM — L97.522 DIABETIC ULCER OF TOE OF LEFT FOOT ASSOCIATED WITH TYPE 2 DIABETES MELLITUS, WITH FAT LAYER EXPOSED (HCC): Status: ACTIVE | Noted: 2019-11-09

## 2019-11-09 PROBLEM — R82.81 PYURIA: Status: RESOLVED | Noted: 2019-07-08 | Resolved: 2019-11-09

## 2019-11-09 PROBLEM — F41.8 ANXIETY WITH DEPRESSION: Chronic | Status: ACTIVE | Noted: 2017-01-17

## 2019-11-09 PROBLEM — E55.9 VITAMIN D DEFICIENCY: Chronic | Status: ACTIVE | Noted: 2017-01-17

## 2019-11-09 PROBLEM — N30.00 ACUTE CYSTITIS WITHOUT HEMATURIA: Status: RESOLVED | Noted: 2019-07-11 | Resolved: 2019-11-09

## 2019-11-09 PROBLEM — N18.30 STAGE 3 CHRONIC KIDNEY DISEASE (HCC): Chronic | Status: ACTIVE | Noted: 2017-01-23

## 2019-11-09 PROBLEM — F41.9 ANXIETY: Chronic | Status: ACTIVE | Noted: 2017-01-17

## 2019-11-09 LAB
ALBUMIN SERPL BCP-MCNC: 3.9 G/DL (ref 3.5–5.7)
ALP SERPL-CCNC: 65 U/L (ref 55–165)
ALT SERPL W P-5'-P-CCNC: 16 U/L (ref 7–52)
ANION GAP SERPL CALCULATED.3IONS-SCNC: 8 MMOL/L (ref 4–13)
APTT PPP: 35 SECONDS (ref 23–37)
AST SERPL W P-5'-P-CCNC: 17 U/L (ref 13–39)
BACTERIA UR QL AUTO: ABNORMAL /HPF
BACTERIA WND AEROBE CULT: ABNORMAL
BASOPHILS # BLD AUTO: 0 THOUSANDS/ΜL (ref 0–0.1)
BASOPHILS NFR BLD AUTO: 1 % (ref 0–2)
BILIRUB SERPL-MCNC: 0.3 MG/DL (ref 0.2–1)
BILIRUB UR QL STRIP: NEGATIVE
BUN SERPL-MCNC: 21 MG/DL (ref 7–25)
CALCIUM SERPL-MCNC: 9.6 MG/DL (ref 8.6–10.5)
CHLORIDE SERPL-SCNC: 102 MMOL/L (ref 98–107)
CLARITY UR: ABNORMAL
CO2 SERPL-SCNC: 28 MMOL/L (ref 21–31)
COLOR UR: YELLOW
CREAT SERPL-MCNC: 0.9 MG/DL (ref 0.6–1.2)
EOSINOPHIL # BLD AUTO: 0.4 THOUSAND/ΜL (ref 0–0.61)
EOSINOPHIL NFR BLD AUTO: 7 % (ref 0–5)
ERYTHROCYTE [DISTWIDTH] IN BLOOD BY AUTOMATED COUNT: 14.4 % (ref 11.5–14.5)
GFR SERPL CREATININE-BSD FRML MDRD: 69 ML/MIN/1.73SQ M
GLUCOSE SERPL-MCNC: 158 MG/DL (ref 65–99)
GLUCOSE SERPL-MCNC: 177 MG/DL (ref 65–140)
GLUCOSE SERPL-MCNC: 224 MG/DL (ref 65–140)
GLUCOSE UR STRIP-MCNC: NEGATIVE MG/DL
GRAM STN SPEC: ABNORMAL
GRAM STN SPEC: ABNORMAL
HCT VFR BLD AUTO: 39.9 % (ref 42–47)
HGB BLD-MCNC: 13.1 G/DL (ref 12–16)
HGB UR QL STRIP.AUTO: NEGATIVE
INR PPP: 1.01 (ref 0.9–1.5)
KETONES UR STRIP-MCNC: NEGATIVE MG/DL
LACTATE SERPL-SCNC: 2 MMOL/L (ref 0.5–2)
LACTATE SERPL-SCNC: 2 MMOL/L (ref 0.5–2)
LEUKOCYTE ESTERASE UR QL STRIP: NEGATIVE
LYMPHOCYTES # BLD AUTO: 2.1 THOUSANDS/ΜL (ref 0.6–4.47)
LYMPHOCYTES NFR BLD AUTO: 38 % (ref 21–51)
MCH RBC QN AUTO: 28.7 PG (ref 26–34)
MCHC RBC AUTO-ENTMCNC: 32.9 G/DL (ref 31–37)
MCV RBC AUTO: 87 FL (ref 81–99)
MONOCYTES # BLD AUTO: 0.4 THOUSAND/ΜL (ref 0.17–1.22)
MONOCYTES NFR BLD AUTO: 7 % (ref 2–12)
NEUTROPHILS # BLD AUTO: 2.6 THOUSANDS/ΜL (ref 1.4–6.5)
NEUTS SEG NFR BLD AUTO: 48 % (ref 42–75)
NITRITE UR QL STRIP: NEGATIVE
NON-SQ EPI CELLS URNS QL MICRO: ABNORMAL /HPF
PH UR STRIP.AUTO: 5.5 [PH]
PLATELET # BLD AUTO: 336 THOUSANDS/UL (ref 149–390)
PMV BLD AUTO: 7.7 FL (ref 8.6–11.7)
POTASSIUM SERPL-SCNC: 3.8 MMOL/L (ref 3.5–5.5)
PROT SERPL-MCNC: 7 G/DL (ref 6.4–8.9)
PROT UR STRIP-MCNC: ABNORMAL MG/DL
PROTHROMBIN TIME: 11.7 SECONDS (ref 10.2–13)
RBC # BLD AUTO: 4.57 MILLION/UL (ref 3.9–5.2)
RBC #/AREA URNS AUTO: ABNORMAL /HPF
SODIUM SERPL-SCNC: 138 MMOL/L (ref 134–143)
SP GR UR STRIP.AUTO: >=1.03 (ref 1–1.03)
UROBILINOGEN UR QL STRIP.AUTO: 0.2 E.U./DL
WBC # BLD AUTO: 5.5 THOUSAND/UL (ref 4.8–10.8)
WBC #/AREA URNS AUTO: ABNORMAL /HPF

## 2019-11-09 PROCEDURE — 85025 COMPLETE CBC W/AUTO DIFF WBC: CPT | Performed by: PHYSICIAN ASSISTANT

## 2019-11-09 PROCEDURE — 99223 1ST HOSP IP/OBS HIGH 75: CPT | Performed by: PHYSICIAN ASSISTANT

## 2019-11-09 PROCEDURE — 84145 PROCALCITONIN (PCT): CPT | Performed by: PHYSICIAN ASSISTANT

## 2019-11-09 PROCEDURE — 99284 EMERGENCY DEPT VISIT MOD MDM: CPT

## 2019-11-09 PROCEDURE — 81001 URINALYSIS AUTO W/SCOPE: CPT | Performed by: PHYSICIAN ASSISTANT

## 2019-11-09 PROCEDURE — 83605 ASSAY OF LACTIC ACID: CPT | Performed by: PHYSICIAN ASSISTANT

## 2019-11-09 PROCEDURE — 96361 HYDRATE IV INFUSION ADD-ON: CPT

## 2019-11-09 PROCEDURE — 80053 COMPREHEN METABOLIC PANEL: CPT | Performed by: PHYSICIAN ASSISTANT

## 2019-11-09 PROCEDURE — 73630 X-RAY EXAM OF FOOT: CPT

## 2019-11-09 PROCEDURE — 99285 EMERGENCY DEPT VISIT HI MDM: CPT | Performed by: PHYSICIAN ASSISTANT

## 2019-11-09 PROCEDURE — 36415 COLL VENOUS BLD VENIPUNCTURE: CPT | Performed by: PHYSICIAN ASSISTANT

## 2019-11-09 PROCEDURE — 85730 THROMBOPLASTIN TIME PARTIAL: CPT | Performed by: PHYSICIAN ASSISTANT

## 2019-11-09 PROCEDURE — 87040 BLOOD CULTURE FOR BACTERIA: CPT | Performed by: PHYSICIAN ASSISTANT

## 2019-11-09 PROCEDURE — 96365 THER/PROPH/DIAG IV INF INIT: CPT

## 2019-11-09 PROCEDURE — 96375 TX/PRO/DX INJ NEW DRUG ADDON: CPT

## 2019-11-09 PROCEDURE — 82948 REAGENT STRIP/BLOOD GLUCOSE: CPT

## 2019-11-09 PROCEDURE — 85610 PROTHROMBIN TIME: CPT | Performed by: PHYSICIAN ASSISTANT

## 2019-11-09 RX ORDER — ERGOCALCIFEROL (VITAMIN D2) 1250 MCG
50000 CAPSULE ORAL WEEKLY
COMMUNITY
End: 2019-11-18 | Stop reason: SDUPTHER

## 2019-11-09 RX ORDER — ERGOCALCIFEROL 1.25 MG/1
50000 CAPSULE ORAL WEEKLY
Status: DISCONTINUED | OUTPATIENT
Start: 2019-11-13 | End: 2019-11-13 | Stop reason: HOSPADM

## 2019-11-09 RX ORDER — SODIUM CHLORIDE 9 MG/ML
3 INJECTION INTRAVENOUS AS NEEDED
Status: DISCONTINUED | OUTPATIENT
Start: 2019-11-09 | End: 2019-11-13 | Stop reason: HOSPADM

## 2019-11-09 RX ORDER — GABAPENTIN 300 MG/1
600 CAPSULE ORAL 3 TIMES DAILY
Status: DISCONTINUED | OUTPATIENT
Start: 2019-11-09 | End: 2019-11-13 | Stop reason: HOSPADM

## 2019-11-09 RX ORDER — ATORVASTATIN CALCIUM 40 MG/1
40 TABLET, FILM COATED ORAL
Status: DISCONTINUED | OUTPATIENT
Start: 2019-11-09 | End: 2019-11-13 | Stop reason: HOSPADM

## 2019-11-09 RX ORDER — CEPHALEXIN 500 MG/1
500 CAPSULE ORAL EVERY 6 HOURS SCHEDULED
COMMUNITY
End: 2019-11-13 | Stop reason: HOSPADM

## 2019-11-09 RX ORDER — LISINOPRIL 5 MG/1
5 TABLET ORAL DAILY
Status: DISCONTINUED | OUTPATIENT
Start: 2019-11-09 | End: 2019-11-13 | Stop reason: HOSPADM

## 2019-11-09 RX ORDER — ASPIRIN 81 MG/1
81 TABLET, CHEWABLE ORAL DAILY
Status: DISCONTINUED | OUTPATIENT
Start: 2019-11-09 | End: 2019-11-13 | Stop reason: HOSPADM

## 2019-11-09 RX ORDER — INSULIN ASPART 100 [IU]/ML
60 INJECTION, SUSPENSION SUBCUTANEOUS
Status: DISCONTINUED | OUTPATIENT
Start: 2019-11-09 | End: 2019-11-13 | Stop reason: HOSPADM

## 2019-11-09 RX ORDER — ONDANSETRON 2 MG/ML
4 INJECTION INTRAMUSCULAR; INTRAVENOUS EVERY 6 HOURS PRN
Status: DISCONTINUED | OUTPATIENT
Start: 2019-11-09 | End: 2019-11-13 | Stop reason: HOSPADM

## 2019-11-09 RX ORDER — FUROSEMIDE 10 MG/ML
40 INJECTION INTRAMUSCULAR; INTRAVENOUS ONCE
Status: COMPLETED | OUTPATIENT
Start: 2019-11-09 | End: 2019-11-09

## 2019-11-09 RX ORDER — BACLOFEN 10 MG/1
5 TABLET ORAL 2 TIMES DAILY PRN
Status: DISCONTINUED | OUTPATIENT
Start: 2019-11-09 | End: 2019-11-13 | Stop reason: HOSPADM

## 2019-11-09 RX ORDER — ACETAMINOPHEN 325 MG/1
650 TABLET ORAL EVERY 4 HOURS PRN
Status: DISCONTINUED | OUTPATIENT
Start: 2019-11-09 | End: 2019-11-13 | Stop reason: HOSPADM

## 2019-11-09 RX ORDER — MECLIZINE HCL 12.5 MG/1
25 TABLET ORAL EVERY 8 HOURS PRN
Status: DISCONTINUED | OUTPATIENT
Start: 2019-11-09 | End: 2019-11-13 | Stop reason: HOSPADM

## 2019-11-09 RX ORDER — FUROSEMIDE 20 MG/1
20 TABLET ORAL
Status: DISCONTINUED | OUTPATIENT
Start: 2019-11-09 | End: 2019-11-13 | Stop reason: HOSPADM

## 2019-11-09 RX ORDER — METHOCARBAMOL 500 MG/1
750 TABLET, FILM COATED ORAL EVERY 8 HOURS PRN
Status: DISCONTINUED | OUTPATIENT
Start: 2019-11-09 | End: 2019-11-13 | Stop reason: HOSPADM

## 2019-11-09 RX ORDER — OXYCODONE HYDROCHLORIDE AND ACETAMINOPHEN 5; 325 MG/1; MG/1
1.5 TABLET ORAL EVERY 6 HOURS PRN
Status: DISCONTINUED | OUTPATIENT
Start: 2019-11-09 | End: 2019-11-13 | Stop reason: HOSPADM

## 2019-11-09 RX ORDER — B-COMPLEX WITH VITAMIN C
1 TABLET ORAL
Status: DISCONTINUED | OUTPATIENT
Start: 2019-11-10 | End: 2019-11-13 | Stop reason: HOSPADM

## 2019-11-09 RX ORDER — OMEGA-3S/DHA/EPA/FISH OIL/D3 300MG-1000
400 CAPSULE ORAL DAILY
Status: DISCONTINUED | OUTPATIENT
Start: 2019-11-09 | End: 2019-11-13 | Stop reason: HOSPADM

## 2019-11-09 RX ORDER — CHLORAL HYDRATE 500 MG
1000 CAPSULE ORAL DAILY
Status: DISCONTINUED | OUTPATIENT
Start: 2019-11-09 | End: 2019-11-13 | Stop reason: HOSPADM

## 2019-11-09 RX ADMIN — INSULIN LISPRO 2 UNITS: 100 INJECTION, SOLUTION INTRAVENOUS; SUBCUTANEOUS at 21:35

## 2019-11-09 RX ADMIN — SODIUM CHLORIDE 250 ML: 0.9 INJECTION, SOLUTION INTRAVENOUS at 11:45

## 2019-11-09 RX ADMIN — OXYCODONE HYDROCHLORIDE AND ACETAMINOPHEN 1.5 TABLET: 5; 325 TABLET ORAL at 21:36

## 2019-11-09 RX ADMIN — GABAPENTIN 600 MG: 300 CAPSULE ORAL at 20:35

## 2019-11-09 RX ADMIN — ENOXAPARIN SODIUM 40 MG: 40 INJECTION SUBCUTANEOUS at 14:48

## 2019-11-09 RX ADMIN — OXYCODONE HYDROCHLORIDE AND ACETAMINOPHEN 1 TABLET: 5; 325 TABLET ORAL at 14:50

## 2019-11-09 RX ADMIN — GABAPENTIN 600 MG: 300 CAPSULE ORAL at 15:01

## 2019-11-09 RX ADMIN — ATORVASTATIN CALCIUM 40 MG: 40 TABLET, FILM COATED ORAL at 16:13

## 2019-11-09 RX ADMIN — INSULIN ASPART 60 UNITS: 100 INJECTION, SUSPENSION SUBCUTANEOUS at 16:47

## 2019-11-09 RX ADMIN — INSULIN LISPRO 2 UNITS: 100 INJECTION, SOLUTION INTRAVENOUS; SUBCUTANEOUS at 16:47

## 2019-11-09 RX ADMIN — VANCOMYCIN HYDROCHLORIDE 1250 MG: 1 INJECTION, POWDER, LYOPHILIZED, FOR SOLUTION INTRAVENOUS at 13:00

## 2019-11-09 RX ADMIN — FUROSEMIDE 40 MG: 10 INJECTION, SOLUTION INTRAMUSCULAR; INTRAVENOUS at 11:45

## 2019-11-09 NOTE — ASSESSMENT & PLAN NOTE
· BMI 47  · Weight loss recommended  · Follow-up with PCP with dietitian referral versus bariatric management referral

## 2019-11-09 NOTE — ASSESSMENT & PLAN NOTE
· BMI 50  · Weight loss recommended  · Follow-up with PCP with dietitian referral versus bariatric management referral

## 2019-11-09 NOTE — ED PROVIDER NOTES
History  Chief Complaint   Patient presents with    Wound Infection     pt with open wound (2 cm) bottom of lt foot  treated by pmd, new redness and swelling to entire foot and lower leg  pt is taking keflex 500 mg qid     Patient is a 77-year-old female who presents to the emergency department with a history diabetes and MRSA complaining of an ulcer on her left foot  She states that she has been hospitalized for infections in the past   She states she was recently seen by podiatry for this ulcer given Keflex  Today she woke up and noticed she has severe swelling and redness that has expanded significantly beyond the borders of the ulcer  The redness is now up to her ankle  The ulcer is on the plantar surface of her foot near her great toe  She takes Lasix and stated that she has also noticed increased swelling of her lower extremities bilaterally  Multiple allergies reviewed  Prior to Admission Medications   Prescriptions Last Dose Informant Patient Reported? Taking?    BD INSULIN SYRINGE U/F 31G X 5/16" 1 ML MISC   Yes No   Calcium Carb-Cholecalciferol (CALCIUM 600+D3 PO)  Self Yes No   Sig: Take by mouth daily     LORazepam (ATIVAN) 0 5 mg tablet   Yes No   Sig: Take by mouth every 8 (eight) hours as needed for anxiety   Omega-3 Fatty Acids (FISH OIL ADULT GUMMIES PO)  Self Yes Yes   Sig: Take by mouth daily     ammonium lactate (LAC-HYDRIN) 12 % lotion   Yes Yes   aspirin 81 mg chewable tablet  Self No Yes   Sig: Chew 1 tablet (81 mg total) daily   atorvastatin (LIPITOR) 40 mg tablet   No No   Sig: Take 1 tablet (40 mg total) by mouth daily with dinner   baclofen 20 mg tablet  Self Yes Yes   Sig: Take 10 mg by mouth as needed for muscle spasms   cephalexin (KEFLEX) 500 mg capsule   Yes Yes   Sig: Take 500 mg by mouth every 6 (six) hours   cholecalciferol (VITAMIN D3) 400 units tablet   Yes No   Sig: Take 400 Units by mouth daily   cholecalciferol (VITAMIN D3) 400 units tablet   Yes Yes Sig: Take 400 Units by mouth daily 1 time every Wednesday prn   citalopram (CeleXA) 20 mg tablet   No No   Sig: Take 1 tablet (20 mg total) by mouth daily   clotrimazole-betamethasone (LOTRISONE) 1-0 05 % cream   No No   Sig: Apply topically 2 (two) times a day   furosemide (LASIX) 20 mg tablet  Self Yes Yes   Sig: Take 20 mg by mouth daily   gabapentin (NEURONTIN) 600 MG tablet   No Yes   Sig: Take 1 tablet (600 mg total) by mouth 3 (three) times a day for 30 days   glipiZIDE (GLUCOTROL) 5 mg tablet   Yes Yes   Sig: Take 5 mg by mouth 2 (two) times a day before meals   glucagon (GLUCAGON EMERGENCY) 1 MG injection   No No   Sig: Inject 1 mg under the skin once as needed (PRN blood glucose less than 70 if unconscious or uncorrectable by oral means) for up to 1 dose   insulin NPH-insulin regular (NovoLIN 70/30) 100 units/mL subcutaneous injection  Self No Yes   Sig: Inject 60 Units under the skin 2 (two) times a day before meals for 30 days   lidocaine (LMX) 4 % cream   No Yes   Sig: LMX, [employer], etc  - look for * LIDOCAINE * on the label   lisinopril (ZESTRIL) 5 mg tablet   No Yes   Sig: Take 1 tablet (5 mg total) by mouth daily   meclizine (ANTIVERT) 25 mg tablet   Yes Yes   Sig: Take 25 mg by mouth Three times daily as needed   metFORMIN (GLUCOPHAGE) 1000 MG tablet  Self No Yes   Sig: Take 1 tablet (1,000 mg total) by mouth 2 (two) times a day with meals   methocarbamol (ROBAXIN) 750 mg tablet   No Yes   Sig: Take 1 tablet (750 mg total) by mouth every 8 (eight) hours as needed for muscle spasms   methylPREDNISolone 4 MG tablet therapy pack   No No   Sig: Use as directed on package   multivitamin (THERAGRAN) TABS   No No   Sig: Take 1 tablet by mouth daily   ondansetron (ZOFRAN) 4 mg tablet  Self Yes Yes   Sig: Take 4 mg by mouth every 8 (eight) hours as needed for nausea or vomiting   oxyCODONE-acetaminophen (PERCOCET) 7 5-325 MG per tablet   No Yes   Sig: Take 1 tablet by mouth every 6 (six) hours as needed for moderate painMax Daily Amount: 4 tablets   sucralfate (CARAFATE) 1 g tablet   No No   Sig: Take 1 tablet (1 g total) by mouth 4 (four) times a day as needed (stomach pain, acid reflux)   Patient not taking: Reported on 10/18/2019      Facility-Administered Medications: None       Past Medical History:   Diagnosis Date    Anesthesia related hyperthermia     pt states she had high fevers after her lipoma surgery in  at our hospital and was shipped to [facility] where they said t was from an anesthesia med    Anxiety     Arthritis     Asthma     Chronic pain     Depression     Diabetes mellitus ([facility] [address] [address] )     GERD (gastroesophageal reflux disease)     Hyperlipidemia     Malignant hyperthermia due to anesthesia     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     Neuropathy     Obesity     PCOS (polycystic ovarian syndrome)        Past Surgical History:   Procedure Laterality Date    CERVICAL CONIZATION   W/ LASER       SECTION      DILATION AND CURETTAGE OF UTERUS      ENDOMETRIAL ABLATION      ENDOMETRIAL BIOPSY      EPIDURAL BLOCK INJECTION Bilateral 2019    Procedure: L5-S1 transforaminal epidural steroid injection;  Surgeon: Gillian Flores MD;  Location: [facility];  Service: Pain Management     EPIDURAL BLOCK INJECTION Bilateral 2019    Procedure: L5-S1 transforaminal epidural steroid injection;  Surgeon: Gillian Flores MD;  Location: [facility];  Service: Pain Management     FL GUIDED NEEDLE PLAC BX/ASP/INJ  2019    FL GUIDED NEEDLE PLAC BX/ASP/INJ  2019    FOOT SURGERY Left     HERNIA REPAIR      INDUCED       JOINT REPLACEMENT      KNEE ARTHROPLASTY Right     CA DEBRIDEMENT OPEN WOUND 20 SQ CM< Right 2018    Procedure: DEBRIDEMENT HAND/FINGER Wong MERIDA);   Surgeon: William Ochoa MD;  Location: [facility] MAIN OR;  Service: General    CA EXCISION TUMOR SOFT TISSUE BACK/FLANK SUBQ 3+CM N/A 2018    Procedure: BACK LIPOMA EXCISION;  Surgeon: Juan Hess MD;  Location: [address] [place] [address] OR;  Service: General    ROTATOR CUFF REPAIR Right     TONSILLECTOMY         Family History   Problem Relation Age of Onset    Polycystic ovary syndrome Mother     Mental illness Father     Diabetes Father      I have reviewed and agree with the history as documented  Social History     Tobacco Use    Smoking status: Never Smoker    Smokeless tobacco: Never Used   Substance Use Topics    Alcohol use: Yes     Comment: OCCASIONALLY    Drug use: No        Review of Systems   Constitutional: Negative for chills, fatigue and fever  HENT: Negative for congestion, ear pain, rhinorrhea, sinus pressure, sneezing, sore throat and trouble swallowing  Eyes: Negative for discharge and itching  Respiratory: Negative for cough, chest tightness, shortness of breath, wheezing and stridor  Cardiovascular: Negative for chest pain and palpitations  Gastrointestinal: Negative for abdominal pain, diarrhea, nausea and vomiting  Skin: Positive for color change (Redness of left foot) and wound ( ulcer bottom of left foot)  Neurological: Negative for dizziness, syncope, numbness and headaches  All other systems reviewed and are negative  Physical Exam  Physical Exam   Constitutional: She is oriented to person, place, and time  Vital signs are normal  She appears well-developed and well-nourished  She is cooperative  She does not appear ill  No distress  Morbid obesity   HENT:   Head: Normocephalic and atraumatic  Right Ear: External ear normal    Left Ear: External ear normal    Nose: Nose normal    Mouth/Throat: Oropharynx is clear and moist    Eyes: Pupils are equal, round, and reactive to light  Conjunctivae and EOM are normal    Neck: Normal range of motion  Cardiovascular: Normal rate, regular rhythm, normal heart sounds and intact distal pulses  Exam reveals no gallop and no friction rub  No murmur heard    Pulmonary/Chest: Effort normal and breath sounds normal  No stridor  No respiratory distress  She has no wheezes  She has no rales  Abdominal: Soft  Bowel sounds are normal  She exhibits no distension  There is no tenderness  There is no guarding  Musculoskeletal: Normal range of motion  She exhibits no tenderness  Bilateral dependent edema 2+  Neurological: She is alert and oriented to person, place, and time  Skin: Skin is warm  Capillary refill takes less than 2 seconds  She is not diaphoretic  There is erythema  Ulcer extending through the dermis noted on the plantar aspect of the left foot at the base of the great toe  Patient states that she has neuropathy in this area is unable to tell if it is painful  The erythema and redness extends to the top of the foot to the ankle and part way up the back of the calf  Please review media  Nursing note and vitals reviewed        Vital Signs  ED Triage Vitals [11/09/19 1039]   Temperature Pulse Respirations Blood Pressure SpO2   98 9 °F (37 2 °C) 78 18 154/75 95 %      Temp Source Heart Rate Source Patient Position - Orthostatic VS BP Location FiO2 (%)   Temporal -- Lying Left arm --      Pain Score       9           Vitals:    11/09/19 1039 11/09/19 1045 11/09/19 1230   BP: 154/75 154/75 126/72   Pulse: 78 74 68   Patient Position - Orthostatic VS: Lying           Visual Acuity      ED Medications  Medications   sodium chloride (PF) 0 9 % injection 3 mL (has no administration in time range)   sodium chloride 0 9 % bolus 250 mL (250 mL Intravenous New Bag 11/9/19 1145)   vancomycin (VANCOCIN) 1,250 mg in sodium chloride 0 9 % 250 mL IVPB (1,250 mg Intravenous New Bag 11/9/19 1300)   furosemide (LASIX) injection 40 mg (40 mg Intravenous Given 11/9/19 1145)       Diagnostic Studies  Results Reviewed     Procedure Component Value Units Date/Time    Comprehensive metabolic panel [[id number]]  (Abnormal) Collected:  11/09/19 1117    Lab Status:  Final result Specimen:  Blood from Arm, Right Updated:  11/09/19 1156     Sodium 138 mmol/L      Potassium 3 8 mmol/L      Chloride 102 mmol/L      CO2 28 mmol/L      ANION GAP 8 mmol/L      BUN 21 mg/dL      Creatinine 0 90 mg/dL      Glucose 158 mg/dL      Calcium 9 6 mg/dL      AST 17 U/L      ALT 16 U/L      Alkaline Phosphatase 65 U/L      Total Protein 7 0 g/dL      Albumin 3 9 g/dL      Total Bilirubin 0 30 mg/dL      eGFR 69 ml/min/1 73sq m     Narrative:       [place] guidelines for Chronic Kidney Disease (CKD):     Stage 1 with normal or high GFR (GFR > 90 mL/min/1 73 square meters)    Stage 2 Mild CKD (GFR = 60-89 mL/min/1 73 square meters)    Stage 3A Moderate CKD (GFR = 45-59 mL/min/1 73 square meters)    Stage 3B Moderate CKD (GFR = 30-44 mL/min/1 73 square meters)    Stage 4 Severe CKD (GFR = 15-29 mL/min/1 73 square meters)    Stage 5 End Stage CKD (GFR <15 mL/min/1 73 square meters)  Note: GFR calculation is accurate only with a steady state creatinine    Lactic acid x2 [[id number]]  (Normal) Collected:  11/09/19 1117    Lab Status:  Final result Specimen:  Blood from Arm, Right Updated:  11/09/19 1152     LACTIC ACID 2 0 mmol/L     Narrative:       Result may be elevated if tourniquet was used during collection      Urine Microscopic [[id number]]  (Abnormal) Collected:  11/09/19 1130    Lab Status:  Final result Specimen:  Urine, Clean Catch Updated:  11/09/19 1151     RBC, UA 0-1 /hpf      WBC, UA 2-4 /hpf      Epithelial Cells Moderate /hpf      Bacteria, UA None Seen /hpf     Protime-INR [[id number]]  (Normal) Collected:  11/09/19 1117    Lab Status:  Final result Specimen:  Blood from Arm, Right Updated:  11/09/19 1151     Protime 11 7 seconds      INR 1 01    APTT [[id number]]  (Normal) Collected:  11/09/19 1117    Lab Status:  Final result Specimen:  Blood from Arm, Right Updated:  11/09/19 1151     PTT 35 seconds     CBC and differential [[id number]]  (Abnormal) Collected:  11/09/19 1117    Lab Status: Final result Specimen:  Blood from Arm, Right Updated:  11/09/19 1142     WBC 5 50 Thousand/uL      RBC 4 57 Million/uL      Hemoglobin 13 1 g/dL      Hematocrit 39 9 %      MCV 87 fL      MCH 28 7 pg      MCHC 32 9 g/dL      RDW 14 4 %      MPV 7 7 fL      Platelets 698 Thousands/uL      Neutrophils Relative 48 %      Lymphocytes Relative 38 %      Monocytes Relative 7 %      Eosinophils Relative 7 %      Basophils Relative 1 %      Neutrophils Absolute 2 60 Thousands/µL      Lymphocytes Absolute 2 10 Thousands/µL      Monocytes Absolute 0 40 Thousand/µL      Eosinophils Absolute 0 40 Thousand/µL      Basophils Absolute 0 00 Thousands/µL     UA w Reflex to Microscopic w Reflex to Culture [[id number]]  (Abnormal) Collected:  11/09/19 1130    Lab Status:  Final result Specimen:  Urine, Clean Catch Updated:  11/09/19 1139     Color, UA Yellow     Clarity, UA Hazy     Specific Gravity, UA >=1 030     pH, UA 5 5     Leukocytes, UA Negative     Nitrite, UA Negative     Protein, UA Trace mg/dl      Glucose, UA Negative mg/dl      Ketones, UA Negative mg/dl      Urobilinogen, UA 0 2 E U /dl      Bilirubin, UA Negative     Blood, UA Negative    Blood culture #1 [[id number]] Collected:  11/09/19 1117    Lab Status: In process Specimen:  Blood from Arm, Right Updated:  11/09/19 1132    Blood culture #2 [[id number]] Collected:  11/09/19 1117    Lab Status: In process Specimen:  Blood from Arm, Right Updated:  11/09/19 1132    Lactic acid x2 [[id number]]     Lab Status:  No result Specimen:  Blood                  XR foot 3+ views LEFT    (Results Pending)              Procedures  Procedures       ED Course                               MDM  Number of Diagnoses or Management Options  Cellulitis:   Diabetic foot ulcer ([facility] [address] ):   Edema:   Diagnosis management comments: Patient has failed outpatient treatment for her infected ulcer  She has a history of MRSA    Recent wound culture performed by her podiatrist reveals that she is MRSA positive in this wound  Patient admitted to inpatient status for further management  Amount and/or Complexity of Data Reviewed  Clinical lab tests: ordered and reviewed  Tests in the radiology section of CPT®: ordered    Risk of Complications, Morbidity, and/or Mortality  Presenting problems: high  Diagnostic procedures: low  Management options: low    Patient Progress  Patient progress: stable      Disposition  Final diagnoses:   Diabetic foot ulcer ([facility] )   Cellulitis   Edema     Time reflects when diagnosis was documented in both MDM as applicable and the Disposition within this note     Time User Action Codes Description Comment    11/9/2019 12:47 PM Fanta Pardon Add [M19 [id number],  L97 [id number]] [facility]     11/9/2019 12:47 PM Fanta Pardon Add [L03 90] Cellulitis     11/9/2019 12:49 PM Fanta Pardon Add [R60 9] Edema       ED Disposition     ED Disposition Condition Date/Time Comment    Admit Stable Sat Nov 9, 2019  1:25 PM Case was discussed with Eva Barker and the patient's admission status was agreed to be Admission Status: inpatient status to the service of Dr Josesito Rai   Follow-up Information    None         Patient's Medications   Discharge Prescriptions    No medications on file     No discharge procedures on file      ED Provider  Electronically Signed by           Hank Diaz PA-C  11/09/19 0595

## 2019-11-09 NOTE — ASSESSMENT & PLAN NOTE
Lab Results   Component Value Date    HGBA1C 8 4 (H) 10/30/2019       No results for input(s): POCGLU in the last 72 hours      Blood Sugar Average: Last 72 hrs:  ·  patient treated outpatient by podiatrist failed outpatient antibiotics, wound culture positive for MRSA and Klebsiella oxytoca - failed Keflex as an outpatient  · Continue Vancomycin and pharmacy consult for protocol dosing  · Follow-up cultures  · Podiatry consult  · Continue insulin hold glipizide while in the hospital  · Add sliding scale insulin coverage

## 2019-11-09 NOTE — ASSESSMENT & PLAN NOTE
· With arthritis, neuropathy, history of human growth hormone as a child  · Supportive care, continue pain medications and muscle relaxers  · Patient may benefit from a palliative care referral at discharge

## 2019-11-09 NOTE — H&P
H&P- Donnalee Goldmann 1960, 62 y o  female MRN: [id number]    Unit/Bed#: -02 Encounter: [id number]    Primary Care Provider: Kesha Holliday DO   Date and time admitted to hospital: 11/9/2019 10:33 AM    * Diabetic ulcer of left midfoot associated with type 2 diabetes mellitus, with fat layer exposed [facility])  Assessment & Plan  Lab Results   Component Value Date    HGBA1C 8 4 (H) 10/30/2019       No results for input(s): POCGLU in the last 72 hours  Blood Sugar Average: Last 72 hrs:  ·  patient treated outpatient by podiatrist failed outpatient antibiotics, wound culture positive for MRSA and Klebsiella oxytoca - failed Keflex as an outpatient  · Continue Vancomycin and pharmacy consult for protocol dosing  · Follow-up cultures  · Podiatry consult  · Continue insulin hold glipizide while in the hospital  · Add sliding scale insulin coverage    Morbid obesity with BMI of 50 0-59 9, adult ([facility] )  Assessment & Plan  · BMI 50  · Weight loss recommended  · Follow-up with PCP with dietitian referral versus bariatric management referral    Chronic pain syndrome  Assessment & Plan  · With arthritis, neuropathy, history of human growth hormone as a child  · Supportive care, continue pain medications and muscle relaxers  · Patient may benefit from a palliative care referral at discharge    History of MRSA infection  Assessment & Plan  · Patient on contact precaution  · Positive recent wound culture for MRSA on November 6    [address]  · Continue Ativan p r n  Stage 3 chronic kidney disease (HCC)  Assessment & Plan  · Creatinine stable   · monitor    Hyperlipidemia  Assessment & Plan  · Statin    Essential hypertension  Assessment & Plan  · Continue meds and monitor    Vitamin D deficiency  Assessment & Plan  · P o   Replacement      VTE Prophylaxis: Enoxaparin (Lovenox)  Code Status: full code  POLST: There is no POLST form on file for this patient (pre-hospital)  Discussion with patient, dr Chayo Dee    Anticipated Length of Stay:  Patient will be admitted on an Inpatient basis with an anticipated length of stay of  > 2 midnights  Justification for Hospital Stay: IV antibiotics, specialist input    Chief Complaint:   Redness and swelling of foot and lower leg    History of Present Illness:    Andrew Burks is a 62 y o  female who presents with worsening redness and swelling of her foot lower leg  Patient with past medical history of diabetes mellitus type 2 on insulin with neuropathy and history of foot ulcers, hypertension, morbid obesity BMI >45, depression anxiety, chronic pain with arthritis and neuropathy presented to the ER secondary to worsening redness of her left foot  Patient has been on Keflex from her podiatrist   Nathen Chang with Dr Julien Son for foot ulcers and toe ulcer  Had rash on left foot, put on antibiotic  She notes increased redness of foot  Discussed with Dr Julien Son, he noted it was a small area on the left foot and did a culture and started her on the Keflex  She has neuropathy, has occasional shooting pains  Has constant pain from spine/neck  Sees Dr Kymberyl Tatum a pain specialist, had injections in back  Saw Neurosurgery about spinal stenosis  Review of Systems:  Review of Systems   Constitutional: Positive for fatigue  Negative for chills and fever  HENT: Negative for rhinorrhea, sore throat and trouble swallowing  Eyes: Negative for discharge and redness  Respiratory: Negative for cough and shortness of breath  Cardiovascular: Negative for chest pain and palpitations  Gastrointestinal: Positive for constipation and nausea  Negative for abdominal pain, diarrhea and vomiting  Genitourinary: Negative for dysuria and hematuria  Musculoskeletal: Positive for arthralgias, back pain and neck pain  Skin: Positive for wound  Negative for rash  Neurological: Positive for dizziness, weakness, numbness and headaches     Psychiatric/Behavioral: Negative for agitation and confusion  Past Medical and Surgical History:   Past Medical History:   Diagnosis Date    Anesthesia related hyperthermia     pt states she had high fevers after her lipoma surgery in  at our hospital and was shipped to [facility] where they said t was from an anesthesia med    Anxiety     Arthritis     Asthma     Chronic pain     Depression     Diabetes mellitus ([facility]     GERD (gastroesophageal reflux disease)     Hyperlipidemia     Malignant hyperthermia due to anesthesia     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     Neuropathy     Obesity     PCOS (polycystic ovarian syndrome)        Past Surgical History:   Procedure Laterality Date    CERVICAL CONIZATION   W/ LASER       SECTION      DILATION AND CURETTAGE OF UTERUS      ENDOMETRIAL ABLATION      ENDOMETRIAL BIOPSY      EPIDURAL BLOCK INJECTION Bilateral 2019    Procedure: L5-S1 transforaminal epidural steroid injection;  Surgeon: Joseph Streeter MD;  Location: [facility];  Service: Pain Management     EPIDURAL BLOCK INJECTION Bilateral 2019    Procedure: L5-S1 transforaminal epidural steroid injection;  Surgeon: Joseph Streeter MD;  Location: [facility];  Service: Pain Management     FL GUIDED NEEDLE PLAC BX/ASP/INJ  2019    FL GUIDED NEEDLE PLAC BX/ASP/INJ  2019    FOOT SURGERY Left     HERNIA REPAIR      INDUCED       JOINT REPLACEMENT      KNEE ARTHROPLASTY Right     AK DEBRIDEMENT OPEN WOUND 20 SQ CM< Right 2018    Procedure: DEBRIDEMENT HAND/FINGER [facility] OUT);   Surgeon: Jordan Osorio MD;  Location: [facility] MAIN OR;  Service: General    AK EXCISION TUMOR SOFT TISSUE BACK/FLANK SUBQ 3+CM N/A 2018    Procedure: BACK LIPOMA EXCISION;  Surgeon: Jordan Osorio MD;  Location: [facility] MAIN OR;  Service: General    ROTATOR CUFF REPAIR Right     TONSILLECTOMY         Meds/Allergies:  Prior to Admission medications    Medication Sig Start Date End Date Taking?  Authorizing Provider   ammonium lactate (LAC-HYDRIN) 12 % lotion  5/28/19  Yes Historical Provider, MD   aspirin 81 mg chewable tablet Chew 1 tablet (81 mg total) daily 9/4/18  Yes Mansi Ortiz PA-C   baclofen 20 mg tablet Take 10 mg by mouth as needed for muscle spasms   Yes Historical Provider, MD   cephalexin (KEFLEX) 500 mg capsule Take 500 mg by mouth every 6 (six) hours   Yes Historical Provider, MD   cholecalciferol (VITAMIN D3) 400 units tablet Take 400 Units by mouth daily 1 time every Wednesday prn   Yes Historical Provider, MD   furosemide (LASIX) 20 mg tablet Take 20 mg by mouth daily   Yes Historical Provider, MD   gabapentin (NEURONTIN) 600 MG tablet Take 1 tablet (600 mg total) by mouth 3 (three) times a day for 30 days 8/26/19 11/9/19 Yes ISABELLE Sparrow   glipiZIDE (GLUCOTROL) 5 mg tablet Take 5 mg by mouth 2 (two) times a day before meals   Yes Historical Provider, MD   insulin NPH-insulin regular (NovoLIN 70/30) 100 units/mL subcutaneous injection Inject 60 Units under the skin 2 (two) times a day before meals for 30 days 10/29/18 11/9/19 Yes ISABELLE Taylor   lidocaine (LMX) 4 % cream LMX, [place], etc  - look for * LIDOCAINE * on the label 10/18/19  Yes Marga Newman MD   lisinopril (ZESTRIL) 5 mg tablet Take 1 tablet (5 mg total) by mouth daily 7/9/19  Yes [employer], DO   meclizine (ANTIVERT) 25 mg tablet Take 25 mg by mouth Three times daily as needed   Yes Historical Provider, MD   metFORMIN (GLUCOPHAGE) 1000 MG tablet Take 1 tablet (1,000 mg total) by mouth 2 (two) times a day with meals 10/4/18  Yes ISABELLE Taylor   methocarbamol (ROBAXIN) 750 mg tablet Take 1 tablet (750 mg total) by mouth every 8 (eight) hours as needed for muscle spasms 7/9/19  Yes [employer], DO   Omega-3 Fatty Acids (FISH OIL ADULT GUMMIES PO) Take by mouth daily     Yes Historical Provider, MD   ondansetron (ZOFRAN) 4 mg tablet Take 4 mg by mouth every 8 (eight) hours as needed for nausea or vomiting   Yes Historical Provider, MD   oxyCODONE-acetaminophen (PERCOCET) 7 5-325 MG per tablet Take 1 tablet by mouth every 6 (six) hours as needed for moderate painMax Daily Amount: 4 tablets 11/30/19 12/30/19 Yes ISABELLE Sparrow   atorvastatin (LIPITOR) 40 mg tablet Take 1 tablet (40 mg total) by mouth daily with dinner 7/9/19   Meghan Rossi, DO   BD INSULIN SYRINGE U/F 31G X 5/16" 1 ML MISC  5/27/19   Historical Provider, MD   Calcium Carb-Cholecalciferol (CALCIUM 600+D3 PO) Take by mouth daily      Historical Provider, MD   cholecalciferol (VITAMIN D3) 400 units tablet Take 400 Units by mouth daily    Historical Provider, MD   citalopram (CeleXA) 20 mg tablet Take 1 tablet (20 mg total) by mouth daily 7/9/19   Meghan Rossi, DO   clotrimazole-betamethasone (LOTRISONE) 1-0 05 % cream Apply topically 2 (two) times a day 6/7/19   Shalonda Meek PA-C   glucagon (GLUCAGON EMERGENCY) 1 MG injection Inject 1 mg under the skin once as needed (PRN blood glucose less than 70 if unconscious or uncorrectable by oral means) for up to 1 dose 7/9/19 Julianyamila Rossi, DO   LORazepam (ATIVAN) 0 5 mg tablet Take by mouth every 8 (eight) hours as needed for anxiety    Historical Provider, MD   methylPREDNISolone 4 MG tablet therapy pack Use as directed on package 10/18/19   Josias Tanner MD   multivitamin [facility]) TABS Take 1 tablet by mouth daily 7/9/19   Meghan Rossi, DO   sucralfate (CARAFATE) 1 g tablet Take 1 tablet (1 g total) by mouth 4 (four) times a day as needed (stomach pain, acid reflux)  Patient not taking: Reported on 10/18/2019 7/9/19   Meghan Rossi, DO     I have reviewed home medications with patient personally  Allergies:    Allergies   Allergen Reactions    Cauliflower [Brassica Oleracea Italica] Other (See Comments)     Other reaction(s): GI upset  Abdominal pain    Citrus Bioflavonoid Other (See Comments)     Skin burns    Compro [Prochlorperazine] Shortness Of Breath and Wheezing    Latex Other (See Comments)     burning    Mobic [Meloxicam] Swelling    Morphine Hives and Itching    Albumen, Egg Vomiting     Other reaction(s): Nausea and/or vomiting    Metronidazole Itching    Sulfa Antibiotics Other (See Comments)     Other reaction(s): Unknown Reaction       Social History:  Marital Status: /Civil Union   Occupation:  Security as needed  Patient Pre-hospital Living Situation:  Home  Patient Pre-hospital Level of Mobility:  Mobile with cane  Patient Pre-hospital Diet Restrictions:  Diabetic  Substance Use History:     Social History     Substance and Sexual Activity   Alcohol Use Yes    Comment: OCCASIONALLY     Social History     Tobacco Use   Smoking Status Never Smoker   Smokeless Tobacco Never Used     Social History     Substance and Sexual Activity   Drug Use No       Family History:  I have reviewed the patients family history    Physical Exam:   Vitals:   Blood Pressure: 144/73 (11/09/19 1355)  Pulse: 83 (11/09/19 1355)  Temperature: 98 2 °F (36 8 °C) (11/09/19 1355)  Temp Source: Tympanic (11/09/19 1355)  Respirations: 18 (11/09/19 1355)  Height: 5' 2" (157 5 cm) (11/09/19 1354)  Weight - Scale: 125 kg (276 lb 3 8 oz) (11/09/19 1354)  SpO2: 94 % (11/09/19 1355)    Physical Exam   Constitutional: She is oriented to person, place, and time  She appears well-developed and well-nourished  Morbidly obese  female   HENT:   Head: Normocephalic and atraumatic  Eyes: Conjunctivae and EOM are normal  Right eye exhibits no discharge  Left eye exhibits no discharge  Neck: Normal range of motion  No tracheal deviation present  Cardiovascular: Normal rate, regular rhythm and normal heart sounds  Exam reveals no gallop and no friction rub  No murmur heard  Pulmonary/Chest: Effort normal and breath sounds normal  No respiratory distress  She has no wheezes  She has no rales  Abdominal: Soft  Bowel sounds are normal  She exhibits no distension and no mass  There is no tenderness  There is no guarding  Musculoskeletal: Normal range of motion  She exhibits edema  She exhibits no tenderness or deformity  Neurological: She is alert and oriented to person, place, and time  Skin: Skin is warm and dry  No rash noted  No erythema  No pallor  Right great toe with skin breakdown  Left mid foot 5th metatarsal region with open area no drainage noted - please see media image from ER  positive redness warmth and swelling of her foot and posterior lower leg   Psychiatric: She has a normal mood and affect  Her behavior is normal  Judgment and thought content normal    Nursing note and vitals reviewed  Additional Data:   Lab Results: I have personally reviewed pertinent reports  Results from last 7 days   Lab Units 11/09/19  1117   WBC Thousand/uL 5 50   HEMOGLOBIN g/dL 13 1   HEMATOCRIT % 39 9*   PLATELETS Thousands/uL 336   NEUTROS PCT % 48   LYMPHS PCT % 38   MONOS PCT % 7   EOS PCT % 7*     Results from last 7 days   Lab Units 11/09/19  1117   POTASSIUM mmol/L 3 8   CHLORIDE mmol/L 102   CO2 mmol/L 28   BUN mg/dL 21   CREATININE mg/dL 0 90   CALCIUM mg/dL 9 6   ALK PHOS U/L 65   ALT U/L 16   AST U/L 17     Results from last 7 days   Lab Units 11/09/19  1117   INR  1 01     Imaging: X-ray pending  XR foot 3+ views LEFT    (Results Pending)       [employer] Records Reviewed: Yes     ** Please Note: This note has been constructed using a voice recognition system   **

## 2019-11-09 NOTE — PROGRESS NOTES
Vancomycin Assessment    Horacio Varner is a 62 y o  female who is currently receiving vancomycin 10mg/kg (1250mg) iv q12h for MRSA confirmed, skin-soft tissue infection     Relevant clinical data and objective history reviewed:  Creatinine   Date Value Ref Range Status   11/09/2019 0 90 0 60 - 1 20 mg/dL Final     Comment:     Standardized to IDMS reference method   10/30/2019 0 85 0 60 - 1 30 mg/dL Final     Comment:     Standardized to IDMS reference method   10/18/2019 0 86 0 60 - 1 30 mg/dL Final     Comment:     Standardized to IDMS reference method     /73 (BP Location: Left arm)   Pulse 83   Temp 98 2 °F (36 8 °C) (Tympanic)   Resp 18   Ht 5' 2" (1 575 m)   Wt 125 kg (276 lb 3 8 oz)   SpO2 94%   BMI 50 52 kg/m²   No intake/output data recorded  Lab Results   Component Value Date/Time    BUN 21 11/09/2019 11:17 AM    WBC 5 50 11/09/2019 11:17 AM    HGB 13 1 11/09/2019 11:17 AM    HCT 39 9 (L) 11/09/2019 11:17 AM    MCV 87 11/09/2019 11:17 AM     11/09/2019 11:17 AM     Temp Readings from Last 3 Encounters:   11/09/19 98 2 °F (36 8 °C) (Tympanic)   10/31/19 97 5 °F (36 4 °C) (Tympanic)   10/18/19 98 2 °F (36 8 °C) (Temporal)     Vancomycin Days of Therapy: 1    Assessment/Plan  The patient is currently on vancomycin utilizing scheduled dosing based on adjusted body weight (due to obesity)  Baseline risks associated with therapy include: none   The patient is currently receiving 10mg/kg (1250mg) iv q12h and after clinical evaluation will be changed to 17 5mg/kg (1500mg) iv q12h  Pharmacy will also follow closely for s/sx of nephrotoxicity, infusion reactions and appropriateness of therapy  BMP and CBC will be ordered per protocol  Plan for trough as patient approaches steady state, prior to the 5th  dose at approximately 1230 on 11/11/19  Due to infection severity, will target a trough of 15-20 (appropriate for most indications)     Pharmacy will continue to follow the patients culture results and clinical progress daily      Jarad Sharma, Pharmacist

## 2019-11-09 NOTE — ED NOTES
Pt returns from x-ray  Family member brought lunch  Pt eating  No distress    ivf and vancomycin infusing     Amber Lagunas RN  11/09/19 3520

## 2019-11-10 ENCOUNTER — APPOINTMENT (INPATIENT)
Dept: CT IMAGING | Facility: HOSPITAL | Age: 59
DRG: 380 | End: 2019-11-10
Payer: COMMERCIAL

## 2019-11-10 PROBLEM — L97.421 DIABETIC ULCER OF LEFT MIDFOOT ASSOCIATED WITH TYPE 2 DIABETES MELLITUS, LIMITED TO BREAKDOWN OF SKIN (HCC): Status: ACTIVE | Noted: 2019-11-09

## 2019-11-10 LAB
ANION GAP SERPL CALCULATED.3IONS-SCNC: 7 MMOL/L (ref 4–13)
BUN SERPL-MCNC: 23 MG/DL (ref 7–25)
CALCIUM SERPL-MCNC: 8.7 MG/DL (ref 8.6–10.5)
CHLORIDE SERPL-SCNC: 105 MMOL/L (ref 98–107)
CO2 SERPL-SCNC: 28 MMOL/L (ref 21–31)
CREAT SERPL-MCNC: 1.02 MG/DL (ref 0.6–1.2)
ERYTHROCYTE [DISTWIDTH] IN BLOOD BY AUTOMATED COUNT: 14.4 % (ref 11.5–14.5)
GFR SERPL CREATININE-BSD FRML MDRD: 60 ML/MIN/1.73SQ M
GLUCOSE SERPL-MCNC: 107 MG/DL (ref 65–140)
GLUCOSE SERPL-MCNC: 169 MG/DL (ref 65–99)
GLUCOSE SERPL-MCNC: 240 MG/DL (ref 65–140)
GLUCOSE SERPL-MCNC: 274 MG/DL (ref 65–140)
HCT VFR BLD AUTO: 35.5 % (ref 42–47)
HGB BLD-MCNC: 11.7 G/DL (ref 12–16)
MCH RBC QN AUTO: 28.9 PG (ref 26–34)
MCHC RBC AUTO-ENTMCNC: 32.9 G/DL (ref 31–37)
MCV RBC AUTO: 88 FL (ref 81–99)
PLATELET # BLD AUTO: 314 THOUSANDS/UL (ref 149–390)
PMV BLD AUTO: 7.6 FL (ref 8.6–11.7)
POTASSIUM SERPL-SCNC: 3.8 MMOL/L (ref 3.5–5.5)
PROCALCITONIN SERPL-MCNC: 0.05 NG/ML
PROCALCITONIN SERPL-MCNC: <0.05 NG/ML
RBC # BLD AUTO: 4.05 MILLION/UL (ref 3.9–5.2)
SODIUM SERPL-SCNC: 140 MMOL/L (ref 134–143)
WBC # BLD AUTO: 5.3 THOUSAND/UL (ref 4.8–10.8)

## 2019-11-10 PROCEDURE — 80048 BASIC METABOLIC PNL TOTAL CA: CPT | Performed by: PHYSICIAN ASSISTANT

## 2019-11-10 PROCEDURE — 82948 REAGENT STRIP/BLOOD GLUCOSE: CPT

## 2019-11-10 PROCEDURE — 73700 CT LOWER EXTREMITY W/O DYE: CPT

## 2019-11-10 PROCEDURE — 99232 SBSQ HOSP IP/OBS MODERATE 35: CPT | Performed by: PHYSICIAN ASSISTANT

## 2019-11-10 PROCEDURE — 84145 PROCALCITONIN (PCT): CPT | Performed by: PHYSICIAN ASSISTANT

## 2019-11-10 PROCEDURE — 85027 COMPLETE CBC AUTOMATED: CPT | Performed by: PHYSICIAN ASSISTANT

## 2019-11-10 RX ORDER — FUROSEMIDE 10 MG/ML
20 INJECTION INTRAMUSCULAR; INTRAVENOUS ONCE
Status: COMPLETED | OUTPATIENT
Start: 2019-11-10 | End: 2019-11-10

## 2019-11-10 RX ADMIN — VANCOMYCIN HYDROCHLORIDE 1500 MG: 1 INJECTION, POWDER, LYOPHILIZED, FOR SOLUTION INTRAVENOUS at 13:18

## 2019-11-10 RX ADMIN — INSULIN LISPRO 2 UNITS: 100 INJECTION, SOLUTION INTRAVENOUS; SUBCUTANEOUS at 09:12

## 2019-11-10 RX ADMIN — ZINC 1 TABLET: TAB ORAL at 09:09

## 2019-11-10 RX ADMIN — CHOLECALCIFEROL TAB 10 MCG (400 UNIT) 400 UNITS: 10 TAB at 09:08

## 2019-11-10 RX ADMIN — LISINOPRIL 5 MG: 5 TABLET ORAL at 09:09

## 2019-11-10 RX ADMIN — VANCOMYCIN HYDROCHLORIDE 1500 MG: 1 INJECTION, POWDER, LYOPHILIZED, FOR SOLUTION INTRAVENOUS at 00:33

## 2019-11-10 RX ADMIN — FUROSEMIDE 20 MG: 10 INJECTION, SOLUTION INTRAMUSCULAR; INTRAVENOUS at 13:17

## 2019-11-10 RX ADMIN — OXYCODONE HYDROCHLORIDE AND ACETAMINOPHEN 1.5 TABLET: 5; 325 TABLET ORAL at 21:13

## 2019-11-10 RX ADMIN — ENOXAPARIN SODIUM 40 MG: 40 INJECTION SUBCUTANEOUS at 09:08

## 2019-11-10 RX ADMIN — OMEGA-3 FATTY ACIDS CAP 1000 MG 1000 MG: 1000 CAP at 09:08

## 2019-11-10 RX ADMIN — ATORVASTATIN CALCIUM 40 MG: 40 TABLET, FILM COATED ORAL at 16:04

## 2019-11-10 RX ADMIN — GABAPENTIN 600 MG: 300 CAPSULE ORAL at 09:08

## 2019-11-10 RX ADMIN — INSULIN LISPRO 2 UNITS: 100 INJECTION, SOLUTION INTRAVENOUS; SUBCUTANEOUS at 21:17

## 2019-11-10 RX ADMIN — GABAPENTIN 600 MG: 300 CAPSULE ORAL at 21:13

## 2019-11-10 RX ADMIN — FUROSEMIDE 20 MG: 20 TABLET ORAL at 06:07

## 2019-11-10 RX ADMIN — OXYCODONE HYDROCHLORIDE AND ACETAMINOPHEN 1.5 TABLET: 5; 325 TABLET ORAL at 10:18

## 2019-11-10 RX ADMIN — GABAPENTIN 600 MG: 300 CAPSULE ORAL at 15:10

## 2019-11-10 RX ADMIN — INSULIN LISPRO 6 UNITS: 100 INJECTION, SOLUTION INTRAVENOUS; SUBCUTANEOUS at 11:33

## 2019-11-10 RX ADMIN — INSULIN ASPART 60 UNITS: 100 INJECTION, SUSPENSION SUBCUTANEOUS at 16:10

## 2019-11-10 RX ADMIN — CALCIUM CARBONATE-VITAMIN D TAB 500 MG-200 UNIT 1 TABLET: 500-200 TAB at 09:07

## 2019-11-10 RX ADMIN — INSULIN ASPART 60 UNITS: 100 INJECTION, SUSPENSION SUBCUTANEOUS at 09:11

## 2019-11-10 RX ADMIN — ASPIRIN 81 MG 81 MG: 81 TABLET ORAL at 09:07

## 2019-11-10 NOTE — PROGRESS NOTES
Vancomycin IV Pharmacy-to-Dose Consultation    Andrew Burks is a 62 y o  female who is currently receiving Vancomycin IV with management by the Pharmacy Consult service  Assessment/Plan:  The patient was reviewed  Renal function is stable and no signs or symptoms of nephrotoxicity and/or infusion reactions were documented in the chart  Based on todays assessment, continue current vancomycin (day # 2) dosing of 1500mg iv q12h, with a plan for trough to be drawn at 1230 on 11/11/19  We will continue to follow the patients culture results and clinical progress daily      Cindi Menjivar, Pharmacist

## 2019-11-10 NOTE — CONSULTS
Consultation - General Surgery   Martha Slice 62 y o  female MRN: [id number]  Unit/Bed#: -02 Encounter: [id number]    Assessment/Plan     Assessment:  Cellulitis Left foot, new onset Diabetic Ulcer left foot as focus for cellulitis, Ulcer distal Right 1st toe  Plan:  Eval, advised Suggest CT Scan to R/O FB  Betadine with Gauze dressing with daily dressing changes left foot  History of Present Illness     HPI:  Martha Aparicio is a 62 y o  female who presents with Cellulitis acute left foot  New onset diabetic ulcer on the plantar left foot as the focus  Chronic but stable ulcer right 1st toe      Consults    Review of Systems    Historical Information   Past Medical History:   Diagnosis Date    Anesthesia related hyperthermia     pt states she had high fevers after her lipoma surgery in  at our hospital and was shipped to [facility] where they said t was from an anesthesia med    Anxiety     Arthritis     Asthma     Chronic pain     Depression     Diabetes mellitus ([address] )     GERD (gastroesophageal reflux disease)     Hyperlipidemia     Malignant hyperthermia due to anesthesia     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     Neuropathy     Obesity     PCOS (polycystic ovarian syndrome)      Past Surgical History:   Procedure Laterality Date    CERVICAL CONIZATION   W/ LASER       SECTION      DILATION AND CURETTAGE OF UTERUS      ENDOMETRIAL ABLATION      ENDOMETRIAL BIOPSY      EPIDURAL BLOCK INJECTION Bilateral 2019    Procedure: L5-S1 transforaminal epidural steroid injection;  Surgeon: Jesusita Henao MD;  [facility];  Service: Pain Management     EPIDURAL BLOCK INJECTION Bilateral 2019    Procedure: L5-S1 transforaminal epidural steroid injection;  Surgeon: Jesusita Henao MD;  [facility];  Service: Pain Management     FL GUIDED NEEDLE PLAC BX/ASP/INJ  2019    FL GUIDED NEEDLE PLAC BX/ASP/INJ  2019    FOOT SURGERY Left     HERNIA REPAIR      INDUCED       JOINT REPLACEMENT      KNEE ARTHROPLASTY Right     PA DEBRIDEMENT OPEN WOUND 20 SQ CM< Right 2018    Procedure: DEBRIDEMENT HAND/FINGER [facility] OUT);   Surgeon: Mike Saldana MD;  Location[facility] MAIN OR;  Service: General    PA EXCISION TUMOR SOFT TISSUE BACK/FLANK SUBQ 3+CM N/A 2018    Procedure: BACK LIPOMA EXCISION;  Surgeon: Mike Saldana MD;  Location: [facility] MAIN OR;  Service: General    [address] Right     TONSILLECTOMY       Social History   Social History     Substance and Sexual Activity   Alcohol Use Yes    Comment: OCCASIONALLY     Social History     Substance and Sexual Activity   Drug Use No     Social History     Tobacco Use   Smoking Status Never Smoker   Smokeless Tobacco Never Used     Family History:   Family History   Problem Relation Age of Onset    Polycystic ovary syndrome Mother     Mental illness Father     Diabetes Father        Meds/Allergies   all current active meds have been reviewed  Allergies   Allergen Reactions    Cauliflower [Brassica Oleracea Italica] Other (See Comments)     Other reaction(s): GI upset  Abdominal pain    Citrus Bioflavonoid Other (See Comments)     Skin burns    Compro [Prochlorperazine] Shortness Of Breath and Wheezing    Latex Other (See Comments)     burning    Mobic [Meloxicam] Swelling    Morphine Hives and Itching    Albumen, Egg Vomiting     Other reaction(s): Nausea and/or vomiting    Metronidazole Itching    Sulfa Antibiotics Other (See Comments)     Other reaction(s): Unknown Reaction       Objective   First Vitals:   Blood Pressure: 154/75 (19 103)  Pulse: 78 (19 103)  Temperature: 98 9 °F (37 2 °C) (19)  Temp Source: Temporal (19)  Respirations: 18 (19)  Height: 5' 2 5" (158 8 cm) (19)  Weight - Scale: 120 kg (265 lb) (19)  SpO2: 95 % (19)    Current Vitals:   Blood Pressure: 115/59 (11/10/19 0804)  Pulse: 65 (11/10/19 0804)  Temperature: 98 1 °F (36 7 °C) (11/10/19 0804)  Temp Source: Temporal (11/10/19 0804)  Respirations: 18 (11/10/19 0804)  Height: 5' 2" (157 5 cm) (11/09/19 1354)  Weight - Scale: 125 kg (276 lb 3 8 oz) (11/09/19 1354)  SpO2: 94 % (11/10/19 0804)      Intake/Output Summary (Last 24 hours) at 11/10/2019 1110  Last data filed at 11/9/2019 1700  Gross per 24 hour   Intake 220 ml   Output    Net 220 ml       Invasive Devices     Peripheral Intravenous Line            Peripheral IV 11/09/19 Left Wrist less than 1 day                Physical Exam    Lab Results: I have personally reviewed pertinent lab results  Imaging: I have personally reviewed pertinent reports  EKG, Pathology, and Other Studies: I have personally reviewed pertinent reports  Counseling / Coordination of Care  Total floor / unit time spent today 35 minutes  Greater than 50% of total time was spent with the patient and / or family counseling and / or coordination of care  A description of the counseling / coordination of care: Discussed with Medicine

## 2019-11-10 NOTE — CONSULTS
This patient is well-known to me  The patient is resting comfortably in a chair in her room  The patient states that she does have stabbing pain in her left foot from time to time  The patient was admitted through the emergency room yesterday because a recent onset of cellulitis in the left foot  I did see this patient prior to her hospitalization for the same issue  At the time the ulceration which is the focus of infection was in acute stage  The patient does have diabetes with neuropathy and therefore she does not know when the ulcer began  Initially the patient believed that it was a insect bite on the top of her left foot which created the redness  A culture was taken in the setting and sent for bacterial growth  The patient was also placed empirically on Keflex 500 mg  I did review the laboratory studies along with the CNS  The patient does have several bacteria growing but most notably is MRSA  The patient does have IV access and she is tolerating the vancomycin well  The patient's white cells are within the normal range  There is a dressing on the plantar aspect of the left foot at the site of the ulceration  Visually the ulcer appears deeper he had smaller in diameter than it had been several days ago  There is erythema of the entire left foot along with edema  The left foot is warm  There was no zahra pus noted at the ulcer site  There was no obvious foreign body at the ulcer site  Impression acute onset cellulitis left foot with focus of infection a diabetic ulcer on the plantar aspect of the left foot  2  Chronic ulcer on the distal aspect of the right 1st toe stable  3  Diabetes mellitus with neuropathy both feet    Plan we evaluated the patient's areas of concern we discussed her findings with the patient and also medicine  Laboratory studies were evaluated as well as x-ray    In view of the fact the patient does not have any feeling in her feet we are not certain whether the incident was created by a foreign body  Therefore a CT scan will be ordered without contrast   The patient will have local wound care to the left foot  An Ace bandage may be utilized to decrease the edema  Thank you for allowing us to participate in the care of this patient

## 2019-11-10 NOTE — PROGRESS NOTES
Progress Note - Andrew Burks 1960, 62 y o  female MRN: [id number]    Unit/Bed#: -02 Encounter: [id number]    Primary Care Provider: Jacob Burks DO   Date and time admitted to hospital: 11/9/2019 10:33 AM        * Diabetic ulcer of left midfoot associated with type 2 diabetes mellitus, limited to breakdown of skin [facility])  Assessment & Plan  Lab Results   Component Value Date    HGBA1C 8 4 (H) 10/30/2019       Recent Labs     11/09/19  1618 11/09/19 2039   POCGLU 177* 224*       Blood Sugar Average: Last 72 hrs:  · (P) 200 5 patient treated outpatient by podiatrist failed outpatient antibiotics, wound culture positive for MRSA and Klebsiella oxytoca - failed Keflex as an outpatient  · Continue Vancomycin and pharmacy consult for protocol dosing  · Follow-up cultures  · Podiatry consult - discussed w/ Dr Julien Son, recommend CT foot r/o foreign body, wound care ordered per discussion with Dr Julien Son, Betadine gauze daily to wounds on each foot, ACE WRAP to left leg on during day off at bedtime  · Continue insulin hold glipizide while in the hospital  · Add sliding scale insulin coverage    Morbid obesity with BMI of 50 0-59 9, adult ([facility] )  Assessment & Plan  · BMI 50  · Weight loss recommended  · Follow-up with PCP with dietitian referral versus bariatric management referral    Chronic pain syndrome  Assessment & Plan  · With arthritis, neuropathy, history of human growth hormone as a child  · Supportive care, continue pain medications and muscle relaxers  · Patient may benefit from a palliative care referral at discharge    History of MRSA infection  Assessment & Plan  · Patient on contact precaution  · Positive recent wound culture for MRSA on November 6    Anxiety  Assessment & Plan  · Continue Ativan p r n       Stage 3 chronic kidney disease (HCC)  Assessment & Plan  · Creatinine stable   · monitor    Hyperlipidemia  Assessment & Plan  · Statin    Essential hypertension  Assessment & Plan  · Continue meds and monitor    Vitamin D deficiency  Assessment & Plan  · P o  Replacement    VTE Pharmacologic Prophylaxis: Pharmacologic: Enoxaparin (Lovenox)    Patient Centered Rounds: I have performed bedside rounds with nursing staff today  Discussions with Specialists or Other Care Team Provider:  Dr Tony Brittle  Education and Discussions with Family / Patient:  Patient    Current Length of Stay: 1 day(s)    Current Patient Status: Inpatient   Certification Statement: The patient will continue to require additional inpatient hospital stay due to IV antibiotics, follow-up cultures, specialist input    Discharge Plan:  When medically stable, suspect discharge likely within next 48 hours    Code Status: Level 1 - Full Code    Subjective:   Patient seen in the chair having lunch  She reports less throbbing in her left foot and stay less swelling  She has no nausea vomiting she is tolerating diet she has been up ambulating to the bathroom  Objective:     Vitals:   Temp (24hrs), Av 1 °F (36 7 °C), Min:98 °F (36 7 °C), Max:98 2 °F (36 8 °C)    Temp:  [98 °F (36 7 °C)-98 2 °F (36 8 °C)] 98 1 °F (36 7 °C)  HR:  [65-83] 65  Resp:  [18] 18  BP: (115-144)/(59-84) 115/59  SpO2:  [94 %-95 %] 94 %  Body mass index is 50 52 kg/m²  Input and Output Summary (last 24 hours): Intake/Output Summary (Last 24 hours) at 11/10/2019 1149  Last data filed at 2019 1700  Gross per 24 hour   Intake 220 ml   Output    Net 220 ml       Physical Exam:     Physical Exam   Constitutional: She is oriented to person, place, and time  She appears well-developed and well-nourished  Morbidly obese   HENT:   Head: Normocephalic and atraumatic  Eyes: Conjunctivae and EOM are normal  Right eye exhibits no discharge  Left eye exhibits no discharge  Neck: Normal range of motion  No tracheal deviation present  Cardiovascular: Normal rate, regular rhythm and normal heart sounds   Exam reveals no gallop and no friction rub    No murmur heard  Pulmonary/Chest: Effort normal and breath sounds normal  No respiratory distress  She has no wheezes  She has no rales  Abdominal: Soft  Bowel sounds are normal  She exhibits no distension and no mass  There is no tenderness  There is no guarding  Musculoskeletal: Normal range of motion  She exhibits edema  She exhibits no tenderness or deformity  Feet:   Right Foot:   Skin Integrity: Positive for ulcer, skin breakdown, callus and dry skin  Left Foot:   Skin Integrity: Positive for ulcer, skin breakdown, callus and dry skin  Neurological: She is alert and oriented to person, place, and time  Skin: Skin is warm and dry  No rash noted  There is erythema (Erythema and pedal edema of the left foot)  No pallor  Right great toe ulcer, macerated appearance, no drainage noted  Left foot ulcer dorsal 5th metatarsal region  No drainage noted   Psychiatric: She has a normal mood and affect  Her behavior is normal  Judgment and thought content normal    Nursing note and vitals reviewed  Additional Data:     Labs:    Results from last 7 days   Lab Units 11/10/19  0510 11/09/19  1117   WBC Thousand/uL 5 30 5 50   HEMOGLOBIN g/dL 11 7* 13 1   HEMATOCRIT % 35 5* 39 9*   PLATELETS Thousands/uL 314 336   NEUTROS PCT %  --  48   LYMPHS PCT %  --  38   MONOS PCT %  --  7   EOS PCT %  --  7*     Results from last 7 days   Lab Units 11/10/19  0510 11/09/19  1117   POTASSIUM mmol/L 3 8 3 8   CHLORIDE mmol/L 105 102   CO2 mmol/L 28 28   BUN mg/dL 23 21   CREATININE mg/dL 1 02 0 90   CALCIUM mg/dL 8 7 9 6   ALK PHOS U/L  --  65   ALT U/L  --  16   AST U/L  --  17     Results from last 7 days   Lab Units 11/09/19  1117   INR  1 01     Results from last 7 days   Lab Units 11/09/19  2039 11/09/19  1618   POC GLUCOSE mg/dl 224* 177*           * I Have Reviewed All Lab Data Listed Above  * Additional Pertinent Lab Tests Reviewed:  [facility] Admission  Reviewed    Imaging:  Imaging Reports Reviewed Today Include:  X-ray foot from admission showing no acute osseous abnormality    Recent Cultures (last 7 days):     Results from last 7 days   Lab Units 11/09/19  1117 11/06/19  0000   BLOOD CULTURE  Received in Microbiology Lab  Culture in Progress  Received in Microbiology Lab  Culture in Progress    --    GRAM STAIN RESULT   --  No polys seen*  2+ Gram positive cocci in pairs*   WOUND CULTURE   --  3+ Growth of Methicillin Resistant Staphylococcus aureus*  2 colonies Klebsiella oxytoca*  3+ Growth of Beta Hemolytic Streptococcus Group B*       Last 24 Hours Medication List:     Current Facility-Administered Medications:  acetaminophen 650 mg Oral Q4H PRN Shayy Glory, PA-C    aspirin 81 mg Oral Daily Shayy Glory, PA-C    atorvastatin 40 mg Oral Daily With [employer], PA-C    baclofen 5 mg Oral BID PRN Shayy Glory, PA-KRISHNA    calcium carbonate-vitamin D 1 tablet Oral Daily With Breakfast Shayy Glory, PA-KRISHNA    cholecalciferol 400 Units Oral Daily Shayy Glory, PA-KRISHNA    enoxaparin 40 mg Subcutaneous Daily Shayy Glory, TIMUR    [START ON 11/13/2019] ergocalciferol 50,000 Units Oral Weekly Shayy Glory, PA-C    fish oil 1,000 mg Oral Daily Shayy Glory, PA-C    furosemide 20 mg Intravenous Once Shayy Glory, TIMUR    furosemide 20 mg Oral Early Morning Shayy Glory, PA-KRISHNA    gabapentin 600 mg Oral TID Shayy Glory, PA-KRISHNA    insulin aspart protamine-insulin aspart 60 Units Subcutaneous BID AC Mare Alexander PA-C    insulin lispro 1-5 Units Subcutaneous HS Mare Alexander PA-C    insulin lispro 2-12 Units Subcutaneous TID AC Mare Alexander PA-C    lisinopril 5 mg Oral Daily Shayy Glory, TIMUR    meclizine 25 mg Oral Q8H PRN Shayy Glory, TIMUR    methocarbamol 750 mg Oral Q8H PRN Shayy Glory, PA-KRSIHNA    multivitamin stress formula 1 tablet Oral Daily Mare Alexander PA-C    ondansetron 4 mg Intravenous Q6H PRN Shayy Glory, TIMUR    oxyCODONE-acetaminophen 1 5 tablet Oral Q6H PRN Jackie Olsen PA-C    sodium chloride (PF) 3 mL Intravenous PRN Sara Toribio PA-C    vancomycin 17 5 mg/kg (Adjusted) Intravenous Q12H Jackie Olsen PA-C Last Rate: 1,500 mg (11/10/19 0033)        Today, Patient Was Seen By: Jackie Olsen PA-C    ** Please Note: Dictation voice to text software may have been used in the creation of this document   **

## 2019-11-10 NOTE — PROGRESS NOTES
Dr Zachariah Remy and Benedict Briggs PA in to see pt  Order noted for [facility] and dry dressing to wound on left medial foot and right great toe wounds  CT done  Continues Vancomycin IV  Blood sugars monitored and covered per scale along with 70/30 as ordered  Contact isolation maintained  Left foot continues to be edematous and reddened  Order for additional Lasix and Ace wrap to LLE noted

## 2019-11-10 NOTE — ASSESSMENT & PLAN NOTE
Lab Results   Component Value Date    HGBA1C 8 4 (H) 10/30/2019       Recent Labs     11/09/19  1618 11/09/19 2039   POCGLU 177* 224*       Blood Sugar Average: Last 72 hrs:  · (P) 200 5 patient treated outpatient by podiatrist failed outpatient antibiotics, wound culture positive for MRSA and Klebsiella oxytoca - failed Keflex as an outpatient  · Continue Vancomycin and pharmacy consult for protocol dosing  · Follow-up cultures  · Podiatry consult - discussed w/ Dr Mac Reinoso, recommend CT foot r/o foreign body, wound care ordered per discussion with Dr Mac Reinoso, Betadine gauze daily to wounds on each foot, ACE WRAP to left leg on during day off at bedtime  · Continue insulin hold glipizide while in the hospital  · Add sliding scale insulin coverage

## 2019-11-11 LAB
GLUCOSE SERPL-MCNC: 146 MG/DL (ref 65–140)
GLUCOSE SERPL-MCNC: 211 MG/DL (ref 65–140)
GLUCOSE SERPL-MCNC: 259 MG/DL (ref 65–140)
GLUCOSE SERPL-MCNC: 311 MG/DL (ref 65–140)
VANCOMYCIN TROUGH SERPL-MCNC: 13 UG/ML (ref 5–12)

## 2019-11-11 PROCEDURE — 82948 REAGENT STRIP/BLOOD GLUCOSE: CPT

## 2019-11-11 PROCEDURE — 99232 SBSQ HOSP IP/OBS MODERATE 35: CPT | Performed by: NURSE PRACTITIONER

## 2019-11-11 PROCEDURE — 80202 ASSAY OF VANCOMYCIN: CPT | Performed by: PHYSICIAN ASSISTANT

## 2019-11-11 RX ORDER — CEFAZOLIN SODIUM 2 G/50ML
2000 SOLUTION INTRAVENOUS EVERY 8 HOURS
Status: DISCONTINUED | OUTPATIENT
Start: 2019-11-11 | End: 2019-11-13

## 2019-11-11 RX ORDER — DIPHENHYDRAMINE HCL 25 MG
25 TABLET ORAL
Status: DISCONTINUED | OUTPATIENT
Start: 2019-11-11 | End: 2019-11-13 | Stop reason: HOSPADM

## 2019-11-11 RX ADMIN — ATORVASTATIN CALCIUM 40 MG: 40 TABLET, FILM COATED ORAL at 17:00

## 2019-11-11 RX ADMIN — BACLOFEN 5 MG: 10 TABLET ORAL at 23:10

## 2019-11-11 RX ADMIN — OXYCODONE HYDROCHLORIDE AND ACETAMINOPHEN 1.5 TABLET: 5; 325 TABLET ORAL at 13:24

## 2019-11-11 RX ADMIN — GABAPENTIN 600 MG: 300 CAPSULE ORAL at 09:36

## 2019-11-11 RX ADMIN — LISINOPRIL 5 MG: 5 TABLET ORAL at 09:36

## 2019-11-11 RX ADMIN — DIPHENHYDRAMINE HCL 25 MG: 25 TABLET ORAL at 23:10

## 2019-11-11 RX ADMIN — GABAPENTIN 600 MG: 300 CAPSULE ORAL at 17:00

## 2019-11-11 RX ADMIN — CALCIUM CARBONATE-VITAMIN D TAB 500 MG-200 UNIT 1 TABLET: 500-200 TAB at 08:14

## 2019-11-11 RX ADMIN — VANCOMYCIN HYDROCHLORIDE 1500 MG: 1 INJECTION, POWDER, LYOPHILIZED, FOR SOLUTION INTRAVENOUS at 14:00

## 2019-11-11 RX ADMIN — OMEGA-3 FATTY ACIDS CAP 1000 MG 1000 MG: 1000 CAP at 09:36

## 2019-11-11 RX ADMIN — INSULIN LISPRO 8 UNITS: 100 INJECTION, SOLUTION INTRAVENOUS; SUBCUTANEOUS at 13:19

## 2019-11-11 RX ADMIN — ASPIRIN 81 MG 81 MG: 81 TABLET ORAL at 09:36

## 2019-11-11 RX ADMIN — CEFAZOLIN SODIUM 2000 MG: 2 SOLUTION INTRAVENOUS at 12:50

## 2019-11-11 RX ADMIN — INSULIN ASPART 60 UNITS: 100 INJECTION, SUSPENSION SUBCUTANEOUS at 17:01

## 2019-11-11 RX ADMIN — CEFAZOLIN SODIUM 2000 MG: 2 SOLUTION INTRAVENOUS at 22:29

## 2019-11-11 RX ADMIN — ENOXAPARIN SODIUM 40 MG: 40 INJECTION SUBCUTANEOUS at 09:36

## 2019-11-11 RX ADMIN — ZINC 1 TABLET: TAB ORAL at 09:36

## 2019-11-11 RX ADMIN — CHOLECALCIFEROL TAB 10 MCG (400 UNIT) 400 UNITS: 10 TAB at 09:36

## 2019-11-11 RX ADMIN — GABAPENTIN 600 MG: 300 CAPSULE ORAL at 22:28

## 2019-11-11 RX ADMIN — FUROSEMIDE 20 MG: 20 TABLET ORAL at 05:34

## 2019-11-11 RX ADMIN — INSULIN LISPRO 4 UNITS: 100 INJECTION, SOLUTION INTRAVENOUS; SUBCUTANEOUS at 17:03

## 2019-11-11 RX ADMIN — INSULIN ASPART 60 UNITS: 100 INJECTION, SUSPENSION SUBCUTANEOUS at 08:15

## 2019-11-11 RX ADMIN — VANCOMYCIN HYDROCHLORIDE 1500 MG: 1 INJECTION, POWDER, LYOPHILIZED, FOR SOLUTION INTRAVENOUS at 01:40

## 2019-11-11 NOTE — ASSESSMENT & PLAN NOTE
· Patient treated outpatient by podiatrist failed outpatient antibiotics, wound culture positive for MRSA and Klebsiella oxytoca - failed Keflex as an outpatient  · Wound culture shows MRSA and K oxytoca  · Continue Vancomycin day#3 and pharmacy consult for protocol dosing  · Will add cefazolin   · Blood cultures- negative to date  · Podiatry input appreciated  · CT foot r/o foreign body left foot shows no abscess or osteomyelitis  Plantar lateral midfoot soft tissue wound/ulcer  Diffuse cellulitis     · Continue with local wound care with better diet gauze daily to wound of each foot, ACE WRAP to left leg on during day and off at bedtime  · Continue insulin hold glipizide while in the hospital  · Continue with sliding scale insulin coverage

## 2019-11-11 NOTE — SOCIAL WORK
CM met with pt at bedside and explained role  Pt lives in a 2 story house with her family; [address], 12 steps on the inside  Pt reports she uses a cane at times to ambulate  She also has a shower chair at home  Pt reports she is completely independent with all ADLs  She works part time and drives  Pt PCP is Dr Esther Estrada  She uses [facility] and deneis any barriers to obtaining her medications  Pt denies any history of HHC, STR, MH and D&A treatment  Pt denies any discharge needs at this time  CM awaiting podiatry consult for recommendations on discharge needs  Family will transport home  Cm to follow  CM reviewed d/c planning process including the following: identifying help at home, patient preference for d/c planning needs, availability of treatment team to discuss questions or concerns patient and/or family may have regarding understanding medications and recognizing signs and symptoms once discharged  CM also encouraged patient to follow up with all recommended appointments after discharge  Patient advised of importance for patient and family to participate in managing patients medical well being

## 2019-11-11 NOTE — UTILIZATION REVIEW
Notification of Inpatient Admission/Inpatient Authorization Request   This is a Notification of Inpatient Admission for [address]  Be advised that this patient was admitted to our facility under Inpatient Status  Contact Sandi Oliver at [phone] for additional admission information  [facility]  DEDICATED [phone]  Patient Name:   Angeline Bush   YOB: 1960       [address]   [address]:   [address]  Tax ID: [id number]  NPI: [id number] Attending Provider/NPI: Pam Mccauley Md [[id number]]   Place of Service Code: 24     Place of Service Name:  [address] [place] [address]   Start Date: 11/9/19 1329     Discharge Date & Time: No discharge date for patient encounter  Type of Admission: Inpatient Status Discharge Disposition   (if discharged): Home/Self Care   Patient Diagnoses: Edema [R60 9]  Cellulitis [L03 90]  Diabetic foot ulcer ([address] ) [F82 385, L97 509]  Wound infection [T14  8XXA, L08 9]     Orders: Admission Orders (From admission, onward)     Ordered        11/09/19 1329  Inpatient Admission  Once                    Assigned Utilization Review Contact: Sandi Oliver  Utilization   Network Utilization Review Department  Phone: [phone]; Fax [phone]  Email: [email or web address]   ATTENTION PAYERS: Please call the assigned Utilization  directly with any questions or concerns ALL voicemails in the department are confidential  Send all requests for admission clinical reviews, approved or denied determinations and any other requests to dedicated fax number belonging to the campus where the patient is receiving treatment

## 2019-11-11 NOTE — PROGRESS NOTES
Pt requesting that ace wrap be removed from left leg because it is to tight  This RN explained that she could remove and reapply looser, pt stated that she wanted it off for bed time  RN removed and educated pt that it will need to be reapplied  Pt agreeable

## 2019-11-11 NOTE — PROGRESS NOTES
Progress Note - Margarita Riley 1960, 62 y o  female MRN: [id number]    Unit/Bed#: -01 Encounter: [id number]    Primary Care Provider: Enio Warren DO   Date and time admitted to hospital: 11/9/2019 10:33 AM        * Diabetic ulcer of left midfoot associated with type 2 diabetes mellitus, limited to breakdown of skin ([address] )  Assessment & Plan  · Patient treated outpatient by podiatrist failed outpatient antibiotics, wound culture positive for MRSA and Klebsiella oxytoca - failed Keflex as an outpatient  · Wound culture shows MRSA and K oxytoca  · Continue Vancomycin day#3 and pharmacy consult for protocol dosing  · Will add cefazolin   · Blood cultures- negative to date  · Podiatry input appreciated  · CT foot r/o foreign body left foot shows no abscess or osteomyelitis  Plantar lateral midfoot soft tissue wound/ulcer  Diffuse cellulitis  · Continue with local wound care with better diet gauze daily to wound of each foot, ACE WRAP to left leg on during day and off at bedtime  · Continue insulin hold glipizide while in the hospital  · Continue with sliding scale insulin coverage    Morbid obesity with BMI of 50 0-59 9, adult (HCC)  Assessment & Plan  · BMI 50  · Weight loss recommended  · Follow-up with PCP with dietitian referral versus bariatric management referral      Chronic pain syndrome  Assessment & Plan  · With arthritis, neuropathy, history of human growth hormone as a child  · Supportive care, continue pain medications and muscle relaxers  · Patient may benefit from a palliative care referral at discharge        History of MRSA infection  Assessment & Plan  · Patient on contact precaution  · Positive recent wound culture for MRSA on November 6  · Please see treatment outlined above    Anxiety  Assessment & Plan  · Continue Ativan p r n           Stage 3 chronic kidney disease (HCC)  Assessment & Plan  · Creatinine stable   · Monitor        Mixed hyperlipidemia  Assessment & Plan  · Continue with statin     Essential hypertension  Assessment & Plan  · Continue meds and monitor        Vitamin D deficiency  Assessment & Plan  · Continue with P o  Replacement        VTE Pharmacologic Prophylaxis: Pharmacologic: Enoxaparin (Lovenox)    Patient Centered Rounds: I have performed bedside rounds with nursing staff today  Discussions with Specialists or Other Care Team Provider:  Podiatry, nursing, pharmacy, case management  Education and Discussions with Family / Patient:  Patient    Current Length of Stay: 2 day(s)    Current Patient Status: Inpatient   Certification Statement: The patient will continue to require additional inpatient hospital stay due to Cellulitis of left foot    Discharge Plan:  Pending hospital course    Code Status: Level 1 - Full Code    Subjective:   Feeling about the same  Her left foot cellulitis does not appears to be much improved today  Continues to have pain on the left foot  She denies any fevers or chills  Objective:     Vitals:   Temp (24hrs), Av 3 °F (36 3 °C), Min:96 7 °F (35 9 °C), Max:97 8 °F (36 6 °C)    Temp:  [96 7 °F (35 9 °C)-97 8 °F (36 6 °C)] 96 7 °F (35 9 °C)  HR:  [65-68] 67  Resp:  [18-21] 18  BP: (102-116)/(55-59) 109/55  SpO2:  [92 %-96 %] 92 %  Body mass index is 50 52 kg/m²  Input and Output Summary (last 24 hours): Intake/Output Summary (Last 24 hours) at 2019 1327  Last data filed at 2019 0901  Gross per 24 hour   Intake 1020 ml   Output    Net 1020 ml       Physical Exam:     Physical Exam   Constitutional: She is oriented to person, place, and time  She appears well-developed and well-nourished  No distress  HENT:   Head: Normocephalic and atraumatic  Mouth/Throat: Oropharynx is clear and moist    Eyes: Pupils are equal, round, and reactive to light  Conjunctivae and EOM are normal    Neck: Normal range of motion  Neck supple  No thyromegaly present     Cardiovascular: Normal rate, regular rhythm and normal heart sounds  Exam reveals no gallop and no friction rub  No murmur heard  Pulmonary/Chest: Effort normal and breath sounds normal  She has no wheezes  She has no rales  Abdominal: Soft  Bowel sounds are normal  She exhibits no distension  There is no tenderness  There is no rebound  Musculoskeletal: Normal range of motion  She exhibits edema (BLEs)  She exhibits no tenderness or deformity  Feet:   Right Foot:   Skin Integrity: Positive for ulcer  Left Foot:   Skin Integrity: Positive for skin breakdown (left plantar, right great toe- non-healing ulcers)  Neurological: She is alert and oriented to person, place, and time  No cranial nerve deficit  Skin: Skin is warm and dry  No rash noted  There is erythema (left foot )  Psychiatric: She has a normal mood and affect  Her behavior is normal  Judgment and thought content normal    Vitals reviewed  Additional Data:     Labs:    Results from last 7 days   Lab Units 11/10/19  0510 11/09/19  1117   WBC Thousand/uL 5 30 5 50   HEMOGLOBIN g/dL 11 7* 13 1   HEMATOCRIT % 35 5* 39 9*   PLATELETS Thousands/uL 314 336   NEUTROS PCT %  --  48   LYMPHS PCT %  --  38   MONOS PCT %  --  7   EOS PCT %  --  7*     Results from last 7 days   Lab Units 11/10/19  0510 11/09/19  1117   POTASSIUM mmol/L 3 8 3 8   CHLORIDE mmol/L 105 102   CO2 mmol/L 28 28   BUN mg/dL 23 21   CREATININE mg/dL 1 02 0 90   CALCIUM mg/dL 8 7 9 6   ALK PHOS U/L  --  65   ALT U/L  --  16   AST U/L  --  17     Results from last 7 days   Lab Units 11/09/19  1117   INR  1 01     Results from last 7 days   Lab Units 11/11/19  1132 11/11/19  0627 11/10/19  2039 11/10/19  1551 11/10/19  1121 11/09/19 2039 11/09/19  1618   POC GLUCOSE mg/dl 311* 146* 240* 107 274* 224* 177*           * I Have Reviewed All Lab Data Listed Above  * Additional Pertinent Lab Tests Reviewed:  All Labs For Current Hospital Admission  Reviewed    Imaging:  Imaging Reports Reviewed Today Include: CT left foot Recent Cultures (last 7 days):     Results from last 7 days   Lab Units 11/09/19  1117 11/06/19  0000   BLOOD CULTURE  No Growth at 24 hrs    No Growth at 24 hrs   --    GRAM STAIN RESULT   --  No polys seen*  2+ Gram positive cocci in pairs*   WOUND CULTURE   --  3+ Growth of Methicillin Resistant Staphylococcus aureus*  2 colonies Klebsiella oxytoca*  3+ Growth of Beta Hemolytic Streptococcus Group B*       Last 24 Hours Medication List:     Current Facility-Administered Medications:  acetaminophen 650 mg Oral Q4H PRN Casimiro Dueñas PA-C    aspirin 81 mg Oral Daily Casimiro Dueñas PA-C    atorvastatin 40 mg Oral Daily With [employer], TIMUR    baclofen 5 mg Oral BID PRN Casimiro Dueñas PA-C    calcium carbonate-vitamin D 1 tablet Oral Daily With Breakfast Casimiro Dueñas PA-C    cefazolin 2,000 mg Intravenous Q8H ISABELLE Card Last Rate: 2,000 mg (11/11/19 1250)   cholecalciferol 400 Units Oral Daily Casimiro Dueñas PA-C    enoxaparin 40 mg Subcutaneous Daily Casimiro Dueñas PA-C    [START ON 11/13/2019] ergocalciferol 50,000 Units Oral Weekly Casimiro Dueñas PA-C    fish oil 1,000 mg Oral Daily Casimiro Dueñas PA-C    furosemide 20 mg Oral Early Morning Casimiro Dueñas PA-C    gabapentin 600 mg Oral TID Casimiro Dueñas PA-C    insulin aspart protamine-insulin aspart 60 Units Subcutaneous BID AC Mare Alexander PA-C    insulin lispro 1-5 Units Subcutaneous HS Casimiro Dueñas PA-C    insulin lispro 2-12 Units Subcutaneous TID  Mare Alexander PA-C    lisinopril 5 mg Oral Daily Casimiro Dueñas PA-C    meclizine 25 mg Oral Q8H PRN Casimiro Dueñas PA-C    methocarbamol 750 mg Oral Q8H PRN Casimiro Dueñas PA-C    multivitamin stress formula 1 tablet Oral Daily Mare Alexander PA-C    ondansetron 4 mg Intravenous Q6H PRN Casimiro Dueñas PA-C    oxyCODONE-acetaminophen 1 5 tablet Oral Q6H PRN Casimiro Dueñas PA-C    sodium chloride (PF) 3 mL Intravenous PRN Sofya Steele PA-C    vancomycin 17 5 mg/kg (Adjusted) Intravenous Q12H Piotr Cardozo PA-C Last Rate: 1,500 mg (11/11/19 0140)        Today, Patient Was Seen By: ISABELLE Sheffield    ** Please Note: Dictation voice to text software may have been used in the creation of this document   **

## 2019-11-11 NOTE — UTILIZATION REVIEW
Initial Clinical Review    Admission: Date/Time/Statement: Inpatient Admission Orders (From admission, onward)     Ordered        11/09/19 1329  Inpatient Admission  Once                   Orders Placed This Encounter   Procedures    Inpatient Admission     Standing Status:   Standing     Number of Occurrences:   1     Order Specific Question:   Admitting Physician     Answer:   Katie Reed [[id number]]     Order Specific Question:   Level of Care     Answer:   Med Surg [16]     Order Specific Question:   Estimated length of stay     Answer:   More than 2 Midnights     Order Specific Question:   Certification     Answer:   I certify that inpatient services are medically necessary for this patient for a duration of greater than two midnights  See H&P and MD Progress Notes for additional information about the patient's course of treatment  ED Arrival Information     Expected Arrival Acuity Means of Arrival Escorted By Service Admission Type    - 11/9/2019 10:26 [facility] Emergency    Arrival Complaint    pain foot        Chief Complaint   Patient presents with    Wound Infection     pt with open wound (2 cm) bottom of lt foot  treated by pmd, new redness and swelling to entire foot and lower leg  pt is taking keflex 500 mg qid     Assessment/Plan: 61 y/o female presents to ED from home with L foot ulcer  States she saw podiatry and was given keflex  Now with worsening swelling and redness  Denies pain due to neuropathy of feet  Ulcer is on plantar surface of foot near great toe  Redness extends up to ankle and part way up back of calf  Hx DM, MRSA  Admitted as inpatient due to diabetic foot ulcer  Had outpatient wound cx positive for MRSA and Klebsiella oxytoca  Failed outpatient therapy with keflex  Continue IV antibiotics  Podiatry consult  11/10 Podiatry consult:  Cellulitis L foot, new onset L foot diabetic ulcer, ulcer distal R 1st toe  CT to rule out foreign body  Daily dressing changes with betadine and gauze      ED Triage Vitals   Temperature Pulse Respirations Blood Pressure SpO2   11/09/19 1039 11/09/19 1039 11/09/19 1039 11/09/19 1039 11/09/19 1039   98 9 °F (37 2 °C) 78 18 154/75 95 %      Temp Source Heart Rate Source Patient Position - Orthostatic VS BP Location FiO2 (%)   11/09/19 1039 11/09/19 1355 11/09/19 1039 11/09/19 1039 --   Temporal Monitor Lying Left arm       Pain Score       11/09/19 1039       9        Wt Readings from Last 1 Encounters:   11/09/19 125 kg (276 lb 3 8 oz)     Additional Vital Signs:   11/11/19 0803  96 7 °F (35 9 °C)   67  18  109/55  92 %  None (Room air)   11/10/19 2233  97 8 °F (36 6 °C)  65  21  116/59  94 %  None (Room air)   11/10/19 1547  97 5 °F (36 4 °C)  68  20  102/59  96 %  None (Room air)   11/10/19 0804  98 1 °F (36 7 °C)  65  18  115/59  94 %  None (Room air)   11/09/19 2214  98 °F (36 7 °C)  77  18  137/66  95 %  None (Room air)   11/09/19 1355  98 2 °F (36 8 °C)  83  18  144/73  94 %  None (Room air)   11/09/19 1330        138/84       11/09/19 1230    68    126/72  94 %  None (Room air)       Pertinent Labs/Diagnostic Test Results:   Results from last 7 days   Lab Units 11/10/19  0510 11/09/19  1117   WBC Thousand/uL 5 30 5 50   HEMOGLOBIN g/dL 11 7* 13 1   HEMATOCRIT % 35 5* 39 9*   PLATELETS Thousands/uL 314 336   NEUTROS ABS Thousands/µL  --  2 60         Results from last 7 days   Lab Units 11/10/19  0510 11/09/19  1117   SODIUM mmol/L 140 138   POTASSIUM mmol/L 3 8 3 8   CHLORIDE mmol/L 105 102   CO2 mmol/L 28 28   ANION GAP mmol/L 7 8   BUN mg/dL 23 21   CREATININE mg/dL 1 02 0 90   EGFR ml/min/1 73sq m 60 69   CALCIUM mg/dL 8 7 9 6     Results from last 7 days   Lab Units 11/09/19  1117   AST U/L 17   ALT U/L 16   ALK PHOS U/L 65   TOTAL PROTEIN g/dL 7 0   ALBUMIN g/dL 3 9   TOTAL BILIRUBIN mg/dL 0 30     Results from last 7 days   Lab Units 11/11/19  1132 11/11/19  0627 11/10/19  2039 11/10/19  1551 11/10/19  1121 11/09/19  2039 11/09/19  1618   POC GLUCOSE mg/dl 311* 146* 240* 107 274* 224* 177*     Results from last 7 days   Lab Units 11/10/19  0510 11/09/19  1117   GLUCOSE RANDOM mg/dL 169* 158*       Results from last 7 days   Lab Units 11/09/19  1117   PROTIME seconds 11 7   INR  1 01   PTT seconds 35         Results from last 7 days   Lab Units 11/10/19  0510 11/09/19  1532   PROCALCITONIN ng/ml 0 05 <0 05     Results from last 7 days   Lab Units 11/09/19  1532 11/09/19  1117   LACTIC ACID mmol/L 2 0 2 0     Results from last 7 days   Lab Units 11/09/19  1130   CLARITY UA  Hazy   COLOR UA  Yellow   SPEC GRAV UA  >=1 030*   PH UA  5 5   GLUCOSE UA mg/dl Negative   KETONES UA mg/dl Negative   BLOOD UA  Negative   PROTEIN UA mg/dl Trace*   NITRITE UA  Negative   BILIRUBIN UA  Negative   UROBILINOGEN UA E U /dl 0 2   LEUKOCYTES UA  Negative   WBC UA /hpf 2-4*   RBC UA /hpf 0-1*   BACTERIA UA /hpf None Seen   EPITHELIAL CELLS WET PREP /hpf Moderate*       Results from last 7 days   Lab Units 11/09/19  1117 11/06/19  0000   BLOOD CULTURE  No Growth at 24 hrs  No Growth at 24 hrs   --    GRAM STAIN RESULT   --  No polys seen*  2+ Gram positive cocci in pairs*   WOUND CULTURE   --  3+ Growth of Methicillin Resistant Staphylococcus aureus*  2 colonies Klebsiella oxytoca*  3+ Growth of Beta Hemolytic Streptococcus Group B*     11/9 Xray L foot:  There is no acute fracture or dislocation    Mild degenerative changes are present in the  midfoot  There is no evidence of erosive arthropathy    No lytic or blastic lesions seen   Wesley Charm is diffuse soft tissue swelling, most marked in the dorsum of the left midfoot  11/10 CT L foot:  Plantar lateral midfoot soft tissue wound/ulcer  Diffuse cellulitis  No abscess  No osteomyelitis      ED Treatment:   Medication Administration from 11/09/2019 1026 to 11/09/2019 1353       Date/Time Order Dose Route Action     11/09/2019 1145 furosemide (LASIX) injection 40 mg 40 mg Intravenous Given     11/09/2019 1145 sodium chloride 0 9 % bolus 250 mL 250 mL Intravenous New Bag     11/09/2019 1300 vancomycin (VANCOCIN) 1,250 mg in sodium chloride 0 9 % 250 mL IVPB 1,250 mg Intravenous New Bag        Past Medical History:   Diagnosis Date    Anesthesia related hyperthermia     pt states she had high fevers after her lipoma surgery in 2001 at our hospital and was shipped to [facility] where they said t was from an anesthesia med    Anxiety     Arthritis     Asthma     Chronic pain     Depression     Diabetes mellitus ([facility] )     GERD (gastroesophageal reflux disease)     Hyperlipidemia     Malignant hyperthermia due to anesthesia     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     Neuropathy     Obesity     PCOS (polycystic ovarian syndrome)      Present on Admission:   Diabetic ulcer of left midfoot associated with type 2 diabetes mellitus, limited to breakdown of skin ([place] [address] )   History of MRSA infection   Hyperlipidemia   Chronic pain syndrome   Stage 3 chronic kidney disease ([place] [address] )   Essential hypertension   Vitamin D deficiency   Anxiety      Admitting Diagnosis: Edema [R60 9]  Cellulitis [L03 90]  Diabetic foot ulcer ([place] [address] ) [P40 841, L97 509]  Wound infection [T14  8XXA, L08 9]  Age/Sex: 62 y o  female  Admission Orders:  Scheduled Medications:    Medications:  aspirin 81 mg Oral Daily   atorvastatin 40 mg Oral Daily With Dinner   calcium carbonate-vitamin D 1 tablet Oral Daily With Breakfast   cefazolin 2,000 mg Intravenous Q8H   cholecalciferol 400 Units Oral Daily   enoxaparin 40 mg Subcutaneous Daily   [START ON 11/13/2019] ergocalciferol 50,000 Units Oral Weekly   fish oil 1,000 mg Oral Daily   furosemide 20 mg Oral Early Morning   gabapentin 600 mg Oral TID   insulin aspart protamine-insulin aspart 60 Units Subcutaneous BID AC   insulin lispro 1-5 Units Subcutaneous HS   insulin lispro 2-12 Units Subcutaneous TID AC   lisinopril 5 mg Oral Daily multivitamin stress formula 1 tablet Oral Daily   vancomycin 17 5 mg/kg (Adjusted) Intravenous Q12H     Continuous IV Infusions:     PRN Meds:    acetaminophen 650 mg Oral Q4H PRN   baclofen 5 mg Oral BID PRN   meclizine 25 mg Oral Q8H PRN   methocarbamol 750 mg Oral Q8H PRN   ondansetron 4 mg Intravenous Q6H PRN x2 11/9, x2 11/10   oxyCODONE-acetaminophen 1 5 tablet Oral Q6H PRN   sodium chloride (PF) 3 mL Intravenous PRN       IP CONSULT TO PHARMACY  IP CONSULT TO PODIATRY   Wound care  VS  Accuchecks  Diabetic diet    Network Utilization Review Department  [email or web address]  ATTENTION: Please call with any questions or concerns to [phone] and carefully listen to the prompts so that you are directed to the right person  All voicemails are confidential   Michelle Larson all requests for admission clinical reviews, approved or denied determinations and any other requests to dedicated fax number below belonging to the campus where the patient is receiving treatment    FACILITY NAME UR FAX NUMBER   ADMISSION DENIALS (Administrative/Medical Necessity) [address] [facility] (Maternity/NICU/Pediatrics) [phone]   [facility] [address] [facility][note 3] [phone]   [facility] [address] [facility] [phone]   [facility] [address] [phone]

## 2019-11-11 NOTE — PLAN OF CARE
Problem: Potential for Falls  Goal: Patient will remain free of falls  Description  INTERVENTIONS:  - Assess patient frequently for physical needs  -  Identify cognitive and physical deficits and behaviors that affect risk of falls    -  [place] fall precautions as indicated by assessment   - Educate patient/family on patient safety including physical limitations  - Instruct patient to call for assistance with activity based on assessment  - Modify environment to reduce risk of injury  - Consider OT/PT consult to assist with strengthening/mobility  Outcome: Progressing     Problem: PAIN - ADULT  Goal: Verbalizes/displays adequate comfort level or baseline comfort level  Description  Interventions:  - Encourage patient to monitor pain and request assistance  - Assess pain using appropriate pain scale  - Administer analgesics based on type and severity of pain and evaluate response  - Implement non-pharmacological measures as appropriate and evaluate response  - Consider cultural and social influences on pain and pain management  - Notify physician/advanced practitioner if interventions unsuccessful or patient reports new pain  Outcome: Progressing     Problem: INFECTION - ADULT  Goal: Absence or prevention of progression during hospitalization  Description  INTERVENTIONS:  - Assess and monitor for signs and symptoms of infection  - Monitor lab/diagnostic results  - Monitor all insertion sites, i e  indwelling lines, tubes, and drains  - Monitor endotracheal if appropriate and nasal secretions for changes in amount and color  - [place] appropriate cooling/warming therapies per order  - Administer medications as ordered  - Instruct and encourage patient and family to use good hand hygiene technique  - Identify and instruct in appropriate isolation precautions for identified infection/condition  Outcome: Progressing  Goal: Absence of fever/infection during neutropenic period  Description  INTERVENTIONS:  - Monitor WBC    Outcome: Progressing     Problem: SAFETY ADULT  Goal: Patient will remain free of falls  Description  INTERVENTIONS:  - Assess patient frequently for physical needs  -  Identify cognitive and physical deficits and behaviors that affect risk of falls    -  [place] fall precautions as indicated by assessment   - Educate patient/family on patient safety including physical limitations  - Instruct patient to call for assistance with activity based on assessment  - Modify environment to reduce risk of injury  - Consider OT/PT consult to assist with strengthening/mobility  Outcome: Progressing  Goal: Maintain or return to baseline ADL function  Description  INTERVENTIONS:  -  Assess patient's ability to carry out ADLs; assess patient's baseline for ADL function and identify physical deficits which impact ability to perform ADLs (bathing, care of mouth/teeth, toileting, grooming, dressing, etc )  - Assess/evaluate cause of self-care deficits   - Assess range of motion  - Assess patient's mobility; develop plan if impaired  - Assess patient's need for assistive devices and provide as appropriate  - Encourage maximum independence but intervene and supervise when necessary  - Involve family in performance of ADLs  - Assess for home care needs following discharge   - Consider OT consult to assist with ADL evaluation and planning for discharge  - Provide patient education as appropriate  Outcome: Progressing  Goal: Maintain or return mobility status to optimal level  Description  INTERVENTIONS:  - Assess patient's baseline mobility status (ambulation, transfers, stairs, etc )    - Identify cognitive and physical deficits and behaviors that affect mobility  - Identify mobility aids required to assist with transfers and/or ambulation (gait belt, sit-to-stand, lift, walker, cane, etc )  - [place] fall precautions as indicated by assessment  - Record patient progress and toleration of activity level on Mobility SBAR; progress patient to next Phase/Stage  - Instruct patient to call for assistance with activity based on assessment  - Consider rehabilitation consult to assist with strengthening/weightbearing, etc   Outcome: Progressing     Problem: DISCHARGE PLANNING  Goal: Discharge to home or other facility with appropriate resources  Description  INTERVENTIONS:  - Identify barriers to discharge w/patient and caregiver  - Arrange for needed discharge resources and transportation as appropriate  - Identify discharge learning needs (meds, wound care, etc )  - Arrange for interpretive services to assist at discharge as needed  - Refer to Case Management Department for coordinating discharge planning if the patient needs post-hospital services based on physician/advanced practitioner order or complex needs related to functional status, cognitive ability, or social support system  Outcome: Progressing     Problem: Knowledge Deficit  Goal: Patient/family/caregiver demonstrates understanding of disease process, treatment plan, medications, and discharge instructions  Description  Complete learning assessment and assess knowledge base    Interventions:  - Provide teaching at level of understanding  - Provide teaching via preferred learning methods  Outcome: Progressing     Problem: Prexisting or High Potential for Compromised Skin Integrity  Goal: Skin integrity is maintained or improved  Description  INTERVENTIONS:  - Identify patients at risk for skin breakdown  - Assess and monitor skin integrity  - Assess and monitor nutrition and hydration status  - Monitor labs   - Assess for incontinence   - Turn and reposition patient  - Assist with mobility/ambulation  - Relieve pressure over bony prominences  - Avoid friction and shearing  - Provide appropriate hygiene as needed including keeping skin clean and dry  - Evaluate need for skin moisturizer/barrier cream  - Collaborate with interdisciplinary team   - Patient/family teaching  - Consider wound care consult   Outcome: Progressing

## 2019-11-11 NOTE — PROGRESS NOTES
Vancomycin Assessment    Tasneem Hartmann is a 62 y o  female who is currently receiving vancomycin 1500mg iv q12h for MRSA confirmed, skin-soft tissue infection     Relevant clinical data and objective history reviewed:  Creatinine   Date Value Ref Range Status   11/10/2019 1 02 0 60 - 1 20 mg/dL Final     Comment:     Standardized to IDMS reference method   11/09/2019 0 90 0 60 - 1 20 mg/dL Final     Comment:     Standardized to IDMS reference method   10/30/2019 0 85 0 60 - 1 30 mg/dL Final     Comment:     Standardized to IDMS reference method     /54 (BP Location: Left arm)   Pulse 69   Temp 97 8 °F (36 6 °C) (Temporal)   Resp 18   Ht 5' 2" (1 575 m)   Wt 125 kg (276 lb 3 8 oz)   SpO2 95%   BMI 50 52 kg/m²   I/O last 3 completed shifts: In: 1080 [P O :1080]  Out: -   Lab Results   Component Value Date/Time    BUN 23 11/10/2019 05:10 AM    WBC 5 30 11/10/2019 05:10 AM    HGB 11 7 (L) 11/10/2019 05:10 AM    HCT 35 5 (L) 11/10/2019 05:10 AM    MCV 88 11/10/2019 05:10 AM     11/10/2019 05:10 AM     Temp Readings from Last 3 Encounters:   11/11/19 97 8 °F (36 6 °C) (Temporal)   10/31/19 97 5 °F (36 4 °C) (Tympanic)   10/18/19 98 2 °F (36 8 °C) (Temporal)     Vancomycin Days of Therapy: 3    Assessment/Plan  The patient is currently on vancomycin utilizing scheduled dosing  Baseline risks associated with therapy include: none   The patient is receiving 1500mg iv q12h with the most recent vancomycin level being at steady-state and sub-therapeutic based on a goal of 15-20 (appropriate for most indications) ; therefore, after clinical evaluation will be changed to 1750mg iv q12h   Pharmacy will continue to follow closely for s/sx of nephrotoxicity, infusion reactions and appropriateness of therapy  BMP and CBC will be ordered per protocol  Plan for trough as patient approaches steady state, prior to the 4th  dose at approximately 1330 on 11/13/19   Pharmacy will continue to follow the patients culture results and clinical progress daily      Linda Griffin, Pharmacist

## 2019-11-11 NOTE — PROGRESS NOTES
Pt stating that IV Vanco is making her itch at the iv site  IV checked, patent and flushing, no redness or swelling   Earnest Rom NP made aware suggested to turn off Vanco

## 2019-11-11 NOTE — ASSESSMENT & PLAN NOTE
· Patient on contact precaution  · Positive recent wound culture for MRSA on November 6  · Please see treatment outlined above

## 2019-11-12 ENCOUNTER — APPOINTMENT (INPATIENT)
Dept: INTERVENTIONAL RADIOLOGY/VASCULAR | Facility: HOSPITAL | Age: 59
DRG: 380 | End: 2019-11-12
Payer: COMMERCIAL

## 2019-11-12 LAB
ANION GAP SERPL CALCULATED.3IONS-SCNC: 6 MMOL/L (ref 4–13)
BASOPHILS # BLD AUTO: 0 THOUSANDS/ΜL (ref 0–0.1)
BASOPHILS NFR BLD AUTO: 1 % (ref 0–2)
BUN SERPL-MCNC: 19 MG/DL (ref 7–25)
CALCIUM SERPL-MCNC: 8.9 MG/DL (ref 8.6–10.5)
CHLORIDE SERPL-SCNC: 101 MMOL/L (ref 98–107)
CO2 SERPL-SCNC: 29 MMOL/L (ref 21–31)
CREAT SERPL-MCNC: 0.9 MG/DL (ref 0.6–1.2)
EOSINOPHIL # BLD AUTO: 0.5 THOUSAND/ΜL (ref 0–0.61)
EOSINOPHIL NFR BLD AUTO: 7 % (ref 0–5)
ERYTHROCYTE [DISTWIDTH] IN BLOOD BY AUTOMATED COUNT: 14.4 % (ref 11.5–14.5)
GFR SERPL CREATININE-BSD FRML MDRD: 69 ML/MIN/1.73SQ M
GLUCOSE SERPL-MCNC: 178 MG/DL (ref 65–140)
GLUCOSE SERPL-MCNC: 212 MG/DL (ref 65–140)
GLUCOSE SERPL-MCNC: 244 MG/DL (ref 65–99)
GLUCOSE SERPL-MCNC: 264 MG/DL (ref 65–140)
HCT VFR BLD AUTO: 36.1 % (ref 42–47)
HGB BLD-MCNC: 12.2 G/DL (ref 12–16)
LYMPHOCYTES # BLD AUTO: 2.4 THOUSANDS/ΜL (ref 0.6–4.47)
LYMPHOCYTES NFR BLD AUTO: 38 % (ref 21–51)
MCH RBC QN AUTO: 29.3 PG (ref 26–34)
MCHC RBC AUTO-ENTMCNC: 33.7 G/DL (ref 31–37)
MCV RBC AUTO: 87 FL (ref 81–99)
MONOCYTES # BLD AUTO: 0.4 THOUSAND/ΜL (ref 0.17–1.22)
MONOCYTES NFR BLD AUTO: 6 % (ref 2–12)
NEUTROPHILS # BLD AUTO: 2.9 THOUSANDS/ΜL (ref 1.4–6.5)
NEUTS SEG NFR BLD AUTO: 47 % (ref 42–75)
PLATELET # BLD AUTO: 328 THOUSANDS/UL (ref 149–390)
PMV BLD AUTO: 7.4 FL (ref 8.6–11.7)
POTASSIUM SERPL-SCNC: 3.7 MMOL/L (ref 3.5–5.5)
RBC # BLD AUTO: 4.14 MILLION/UL (ref 3.9–5.2)
SODIUM SERPL-SCNC: 136 MMOL/L (ref 134–143)
WBC # BLD AUTO: 6.2 THOUSAND/UL (ref 4.8–10.8)

## 2019-11-12 PROCEDURE — 77001 FLUOROGUIDE FOR VEIN DEVICE: CPT

## 2019-11-12 PROCEDURE — C1751 CATH, INF, PER/CENT/MIDLINE: HCPCS

## 2019-11-12 PROCEDURE — 99232 SBSQ HOSP IP/OBS MODERATE 35: CPT | Performed by: NURSE PRACTITIONER

## 2019-11-12 PROCEDURE — 85025 COMPLETE CBC W/AUTO DIFF WBC: CPT | Performed by: NURSE PRACTITIONER

## 2019-11-12 PROCEDURE — 36569 INSJ PICC 5 YR+ W/O IMAGING: CPT

## 2019-11-12 PROCEDURE — 36573 INSJ PICC RS&I 5 YR+: CPT | Performed by: RADIOLOGY

## 2019-11-12 PROCEDURE — 99024 POSTOP FOLLOW-UP VISIT: CPT | Performed by: RADIOLOGY

## 2019-11-12 PROCEDURE — 76937 US GUIDE VASCULAR ACCESS: CPT

## 2019-11-12 PROCEDURE — 82948 REAGENT STRIP/BLOOD GLUCOSE: CPT

## 2019-11-12 PROCEDURE — 02HV33Z INSERTION OF INFUSION DEVICE INTO SUPERIOR VENA CAVA, PERCUTANEOUS APPROACH: ICD-10-PCS | Performed by: RADIOLOGY

## 2019-11-12 PROCEDURE — 80048 BASIC METABOLIC PNL TOTAL CA: CPT | Performed by: NURSE PRACTITIONER

## 2019-11-12 RX ORDER — LIDOCAINE HYDROCHLORIDE 10 MG/ML
INJECTION, SOLUTION EPIDURAL; INFILTRATION; INTRACAUDAL; PERINEURAL CODE/TRAUMA/SEDATION MEDICATION
Status: COMPLETED | OUTPATIENT
Start: 2019-11-12 | End: 2019-11-12

## 2019-11-12 RX ADMIN — INSULIN ASPART 60 UNITS: 100 INJECTION, SUSPENSION SUBCUTANEOUS at 16:05

## 2019-11-12 RX ADMIN — INSULIN ASPART 60 UNITS: 100 INJECTION, SUSPENSION SUBCUTANEOUS at 08:40

## 2019-11-12 RX ADMIN — INSULIN LISPRO 6 UNITS: 100 INJECTION, SOLUTION INTRAVENOUS; SUBCUTANEOUS at 16:02

## 2019-11-12 RX ADMIN — ASPIRIN 81 MG 81 MG: 81 TABLET ORAL at 08:39

## 2019-11-12 RX ADMIN — ZINC 1 TABLET: TAB ORAL at 08:39

## 2019-11-12 RX ADMIN — OXYCODONE HYDROCHLORIDE AND ACETAMINOPHEN 1.5 TABLET: 5; 325 TABLET ORAL at 13:07

## 2019-11-12 RX ADMIN — INSULIN LISPRO 2 UNITS: 100 INJECTION, SOLUTION INTRAVENOUS; SUBCUTANEOUS at 21:26

## 2019-11-12 RX ADMIN — CHOLECALCIFEROL TAB 10 MCG (400 UNIT) 400 UNITS: 10 TAB at 08:39

## 2019-11-12 RX ADMIN — GABAPENTIN 600 MG: 300 CAPSULE ORAL at 16:02

## 2019-11-12 RX ADMIN — BACLOFEN 5 MG: 10 TABLET ORAL at 20:20

## 2019-11-12 RX ADMIN — OMEGA-3 FATTY ACIDS CAP 1000 MG 1000 MG: 1000 CAP at 08:39

## 2019-11-12 RX ADMIN — BACLOFEN 5 MG: 10 TABLET ORAL at 08:39

## 2019-11-12 RX ADMIN — CEFAZOLIN SODIUM 2000 MG: 2 SOLUTION INTRAVENOUS at 21:27

## 2019-11-12 RX ADMIN — CEFAZOLIN SODIUM 2000 MG: 2 SOLUTION INTRAVENOUS at 12:50

## 2019-11-12 RX ADMIN — INSULIN LISPRO 3 UNITS: 100 INJECTION, SOLUTION INTRAVENOUS; SUBCUTANEOUS at 00:24

## 2019-11-12 RX ADMIN — VANCOMYCIN HYDROCHLORIDE 1750 MG: 1 INJECTION, POWDER, LYOPHILIZED, FOR SOLUTION INTRAVENOUS at 03:19

## 2019-11-12 RX ADMIN — CALCIUM CARBONATE-VITAMIN D TAB 500 MG-200 UNIT 1 TABLET: 500-200 TAB at 08:39

## 2019-11-12 RX ADMIN — LISINOPRIL 5 MG: 5 TABLET ORAL at 08:39

## 2019-11-12 RX ADMIN — INSULIN LISPRO 2 UNITS: 100 INJECTION, SOLUTION INTRAVENOUS; SUBCUTANEOUS at 08:40

## 2019-11-12 RX ADMIN — ATORVASTATIN CALCIUM 40 MG: 40 TABLET, FILM COATED ORAL at 16:03

## 2019-11-12 RX ADMIN — VANCOMYCIN HYDROCHLORIDE 1750 MG: 1 INJECTION, POWDER, LYOPHILIZED, FOR SOLUTION INTRAVENOUS at 15:22

## 2019-11-12 RX ADMIN — GABAPENTIN 600 MG: 300 CAPSULE ORAL at 08:39

## 2019-11-12 RX ADMIN — FUROSEMIDE 20 MG: 20 TABLET ORAL at 05:31

## 2019-11-12 RX ADMIN — ENOXAPARIN SODIUM 40 MG: 40 INJECTION SUBCUTANEOUS at 08:40

## 2019-11-12 RX ADMIN — LIDOCAINE HYDROCHLORIDE 10 ML: 10 INJECTION, SOLUTION EPIDURAL; INFILTRATION; INTRACAUDAL; PERINEURAL at 11:48

## 2019-11-12 RX ADMIN — OXYCODONE HYDROCHLORIDE AND ACETAMINOPHEN 1.5 TABLET: 5; 325 TABLET ORAL at 21:25

## 2019-11-12 RX ADMIN — INSULIN LISPRO 2 UNITS: 100 INJECTION, SOLUTION INTRAVENOUS; SUBCUTANEOUS at 12:50

## 2019-11-12 RX ADMIN — GABAPENTIN 600 MG: 300 CAPSULE ORAL at 21:26

## 2019-11-12 NOTE — PROGRESS NOTES
Progress Note - Enedelia Barrera 1960, 62 y o  female MRN: [id number]    Unit/Bed#: -01 Encounter: [id number]    Primary Care Provider: Dylan Bhardwaj DO   Date and time admitted to hospital: 11/9/2019 10:33 AM        * Diabetic ulcer of left midfoot associated with type 2 diabetes mellitus, limited to breakdown of skin ([address] )  Assessment & Plan  · Patient treated outpatient by podiatrist failed outpatient antibiotics, wound culture positive for MRSA and Klebsiella oxytoca - failed Keflex as an outpatient  · Wound culture shows MRSA and K oxytoca  · Continue Vancomycin day#3 and pharmacy consult for protocol dosing  · Continue with cefazolin day#2  · Clinically is much improved  · Blood cultures- negative to date  · Podiatry input appreciated  · CT foot r/o foreign body left foot shows no abscess or osteomyelitis  Plantar lateral midfoot soft tissue wound/ulcer  Diffuse cellulitis  · Continue with local wound care with better diet gauze daily to wound of each foot, ACE WRAP to left leg on during day and off at bedtime  · Continue insulin hold glipizide while in the hospital  · Continue with sliding scale insulin coverage    Morbid obesity with BMI of 50 0-59 9, adult ([facility])  Assessment & Plan  · BMI 50  · Weight loss recommended  · Follow-up with PCP with dietitian referral versus bariatric management referral        Chronic pain syndrome  Assessment & Plan  · With arthritis, neuropathy, history of human growth hormone as a child  · Supportive care, continue pain medications and muscle relaxers  · Patient may benefit from a palliative care referral at discharge        History of MRSA infection  Assessment & Plan  · Patient on contact precaution  · Positive recent wound culture for MRSA on November 6  · Please see treatment outlined above      Anxiety  Assessment & Plan  · Continue Ativan p r n           Stage 3 chronic kidney disease (HCC)  Assessment & Plan  · Creatinine stable · Monitor            Mixed hyperlipidemia  Assessment & Plan  · Continue with statin       Essential hypertension  Assessment & Plan  · Continue meds and monitor        Vitamin D deficiency  Assessment & Plan  · Continue with P o  Replacement          VTE Pharmacologic Prophylaxis: Pharmacologic: Enoxaparin (Lovenox)    Patient Centered Rounds: I have performed bedside rounds with nursing staff today  Discussions with Specialists or Other Care Team Provider:  Nursing, pharmacy, case management, IR  Education and Discussions with Family / Patient: patient     Current Length of Stay: 3 day(s)    Current Patient Status: Inpatient   Certification Statement: The patient will continue to require additional inpatient hospital stay due to cellulitis     Discharge Plan: pending hospital course  Hopeful in 24-48 hours  Code Status: Level 1 - Full Code    Subjective:   Feeling good  Denies any pain  Patient just returned from getting a PICC line  Objective:     Vitals:   Temp (24hrs), Av 2 °F (36 2 °C), Min:96 7 °F (35 9 °C), Max:97 8 °F (36 6 °C)    Temp:  [96 7 °F (35 9 °C)-97 8 °F (36 6 °C)] 97 1 °F (36 2 °C)  HR:  [69-82] 82  Resp:  [18] 18  BP: (105-118)/(52-60) 118/60  SpO2:  [91 %-95 %] 94 %  Body mass index is 50 52 kg/m²  Input and Output Summary (last 24 hours): Intake/Output Summary (Last 24 hours) at 2019 1543  Last data filed at 2019 1700  Gross per 24 hour   Intake 360 ml   Output    Net 360 ml       Physical Exam:     Physical Exam   Constitutional: She is oriented to person, place, and time  She appears well-developed and well-nourished  No distress  HENT:   Head: Normocephalic and atraumatic  Mouth/Throat: Oropharynx is clear and moist    Eyes: Pupils are equal, round, and reactive to light  Conjunctivae and EOM are normal    Neck: Normal range of motion  Neck supple  No thyromegaly present  Cardiovascular: Normal rate, regular rhythm and normal heart sounds   Exam reveals no gallop and no friction rub  No murmur heard  Pulmonary/Chest: Effort normal and breath sounds normal  She has no wheezes  She has no rales  Abdominal: Soft  Bowel sounds are normal  She exhibits no distension  There is no tenderness  There is no rebound  Musculoskeletal: Normal range of motion  She exhibits edema (BLEs)  She exhibits no tenderness or deformity  Neurological: She is alert and oriented to person, place, and time  No cranial nerve deficit  Skin: Skin is warm and dry  No rash noted  No erythema  Psychiatric: She has a normal mood and affect  Her behavior is normal  Judgment and thought content normal    Vitals reviewed  Additional Data:     Labs:    Results from last 7 days   Lab Units 11/10/19  0510 11/09/19  1117   WBC Thousand/uL 5 30 5 50   HEMOGLOBIN g/dL 11 7* 13 1   HEMATOCRIT % 35 5* 39 9*   PLATELETS Thousands/uL 314 336   NEUTROS PCT %  --  48   LYMPHS PCT %  --  38   MONOS PCT %  --  7   EOS PCT %  --  7*     Results from last 7 days   Lab Units 11/10/19  0510 11/09/19  1117   POTASSIUM mmol/L 3 8 3 8   CHLORIDE mmol/L 105 102   CO2 mmol/L 28 28   BUN mg/dL 23 21   CREATININE mg/dL 1 02 0 90   CALCIUM mg/dL 8 7 9 6   ALK PHOS U/L  --  65   ALT U/L  --  16   AST U/L  --  17     Results from last 7 days   Lab Units 11/09/19  1117   INR  1 01     Results from last 7 days   Lab Units 11/12/19  0621 11/11/19  2113 11/11/19  1615 11/11/19  1132 11/11/19  0627 11/10/19  2039 11/10/19  1551 11/10/19  1121 11/09/19  2039 11/09/19  1618   POC GLUCOSE mg/dl 178* 259* 211* 311* 146* 240* 107 274* 224* 177*           * I Have Reviewed All Lab Data Listed Above  * Additional Pertinent Lab Tests Reviewed: [address] Admission  Reviewed    Imaging:  Imaging Reports Reviewed Today Include: n/a     Recent Cultures (last 7 days):     Results from last 7 days   Lab Units 11/09/19  1117 11/06/19  0000   BLOOD CULTURE  No Growth at 48 hrs    No Growth at 48 hrs   --    GRAM STAIN RESULT   --  No polys seen*  2+ Gram positive cocci in pairs*   WOUND CULTURE   --  3+ Growth of Methicillin Resistant Staphylococcus aureus*  2 colonies Klebsiella oxytoca*  3+ Growth of Beta Hemolytic Streptococcus Group B*       Last 24 Hours Medication List:     Current Facility-Administered Medications:  acetaminophen 650 mg Oral Q4H PRN Ethel Pay, PA-C    aspirin 81 mg Oral Daily Ethel Pay, PA-C    atorvastatin 40 mg Oral Daily With [employer], PA-C    baclofen 5 mg Oral BID PRN Ethel Pay, PA-C    calcium carbonate-vitamin D 1 tablet Oral Daily With Breakfast Ethel Pay, PA-C    cefazolin 2,000 mg Intravenous Q8H Bethany Damon CRJUNIOR Last Rate: 2,000 mg (11/12/19 1250)   cholecalciferol 400 Units Oral Daily Ethel Pay, PA-C    diphenhydrAMINE 25 mg Oral HS PRN Mare Dominion, PA-C    enoxaparin 40 mg Subcutaneous Daily Ethel Pay, PA-C    [START ON 11/13/2019] ergocalciferol 50,000 Units Oral Weekly Ethel Pay, PA-C    fish oil 1,000 mg Oral Daily Ethel Pay, PA-C    furosemide 20 mg Oral Early Morning Ethel Pay, PA-C    gabapentin 600 mg Oral TID Ethel Pay, PA-C    insulin aspart protamine-insulin aspart 60 Units Subcutaneous BID AC Mare Alexander, PA-C    insulin lispro 1-5 Units Subcutaneous HS Ethel Pay, PA-C    insulin lispro 2-12 Units Subcutaneous TID JEANNINE Alexander, PA-C    lisinopril 5 mg Oral Daily Ethel Pay, PA-C    meclizine 25 mg Oral Q8H PRN Ethel Pay, PA-C    methocarbamol 750 mg Oral Q8H PRN Ethel Pay, PA-C    multivitamin stress formula 1 tablet Oral Daily Mare Alexander, PA-C    ondansetron 4 mg Intravenous Q6H PRN Ethel Pay, PA-C    oxyCODONE-acetaminophen 1 5 tablet Oral Q6H PRN Ethel Pay, PA-C    sodium chloride (PF) 3 mL Intravenous PRN [place], PA-C    vancomycin 1,750 mg Intravenous Q12H Ethel Pay, PA-C Last Rate: 1,750 mg (11/12/19 4902)        Today, Patient Was Seen By: ISABELLE Deal    ** Please Note: Dictation voice to text software may have been used in the creation of this document   **

## 2019-11-12 NOTE — DISCHARGE INSTRUCTIONS
[address]  Interventional Radiology  Dr Roshan Foster: [phone] (M-F 7:30am - 4:00pm)  [place]: [phone] (Off hours or no answer)         Peripherally Inserted Central Catheter     WHAT YOU NEED TO KNOW:   A PICC is an IV placed into a large blood vessel near your heart  It is usually inserted through a blood vessel in your arm  Your PICC may have multiple ports  Ports are tubes where you can inject medicine  A PICC can stay in place for several weeks or months  You may need a PICC to get nutrition, medicine, or fluids  Blood samples can be removed from your PICC and sent to the lab for tests  DISCHARGE INSTRUCTIONS:    [address] and Alyssa Minor patients,    Contact Interventional Radiology at [phone] PATIENTS: Contact Interventional Radiology at [phone]   [facility] PATIENTS: Contact Interventional Radiology at [phone] if:  · Blood soaks through your bandage  · Your arm or leg feels warm, tender, and painful  It may look swollen and red  · You have trouble moving your arm  · Your catheter falls out  · You have a fever or swelling, redness, pain, or pus where the catheter was inserted  · Persistent nausea or vomiting  · You cannot flush your catheter, or you feel pain when you flush your catheter  · You see a hole or crack in the tubing of your catheter  · You see fluid leaking from the insertion site  · You run out of supplies to care for your catheter  · You have questions or concerns about your condition or care

## 2019-11-12 NOTE — NURSING NOTE
Patient's IV infiltrated while getting ordered vancomycin (VANCOCIN) 1,750 mg in sodium chloride 0 9 % 500 mL IVPB  IV removed, pressure applied, dry dressing applied  vancomycin (VANCOCIN) 1,750 mg in sodium chloride 0 9 % 500 mL IVPB not completed  Ordfered ceFAZolin (ANCEF) IVPB (premix) 2,000 mg now overdue  Morning labs overdue  Three RNs attempted to place an IV for a total of five attempts  Each attempt gave a flash but the vein instantly blew  Hospitalist informed  IR consult placed for PICC  Dayshift RN requested IR consult via [employer]

## 2019-11-12 NOTE — PROCEDURES
PICC Line Insertion  Date/Time: 11/12/2019 5:07 PM  Performed by: Olivia Stokes MD  Authorized by: Olivia Stokes MD     Patient location:  IR  Consent:     Consent obtained:  Written    Consent given by:  Patient    Risks discussed:  Arterial puncture, bleeding and infection    Alternatives discussed:  No treatment and delayed treatment  Universal protocol:     Procedure explained and questions answered to patient or proxy's satisfaction: yes      Relevant documents present and verified: yes      Test results available and properly labeled: yes      Radiology Images displayed and confirmed  If images not available, report reviewed: yes      Required blood products, implants, devices, and special equipment available: yes      Site/side marked: yes      Immediately prior to procedure, a time out was called: yes      Patient identity confirmed:  Verbally with patient and arm band  Pre-procedure details:     Hand hygiene: Hand hygiene performed prior to insertion      Sterile barrier technique: All elements of maximal sterile technique followed      Skin preparation:  2% chlorhexidine    Skin preparation agent: Skin preparation agent completely dried prior to procedure    Anesthesia (see MAR for exact dosages): Anesthesia method:  Local infiltration    Local anesthetic:  Lidocaine 1% w/o epi  Procedure details:     Location:  Basilic    Vessel type: vein      Laterality:  Left    Site selection rationale: Mass of the right arm, present chronically  Patient requests left side      Approach: percutaneous technique used      Patient position:  Flat    Procedural supplies:  Double lumen    Catheter size:  5 5 Fr    Landmarks identified: yes      Ultrasound guidance: yes      Sterile ultrasound techniques: Sterile gel and sterile probe covers were used      Number of attempts:  1    Successful placement: yes      Total catheter length (cm):  Forty-five    Catheter out on skin (cm):  0    Max flow rate:  Five Arm circumference:  Please see PACS  Post-procedure details:     Post-procedure:  Dressing applied and securement device placed    Assessment:  Blood return through all ports and placement verified by x-ray    Post-procedure complications: none      Patient tolerance of procedure:   Tolerated well, no immediate complications  Comments:      Okay to use

## 2019-11-12 NOTE — CONSULTS
Vancomycin IV Pharmacy-To-Dose Consultation:    James Estrada is a 62 y o  female who is currently receiving Vancomycin IV with management by the Pharmacy Consult service for the treatment of MRSA confirmed, skin-soft tissue infection    Assessment/Plan:    The patient's chart was reviewed  Renal function is stable  There are no signs or symptoms of nephrotoxicity and/or infusion reactions documented  Based on today's assessment, will continue current vancomycin (Day # 4) dosing of 1750mg Iv Q 12 Hrs, with a plan for trough to be drawn at 1330 on 11/13/19  We will continue to follow the patient's culture results and clinical progress daily      Nilesh Tovar, Pharmacist

## 2019-11-12 NOTE — ASSESSMENT & PLAN NOTE
· Patient treated outpatient by podiatrist failed outpatient antibiotics, wound culture positive for MRSA and Klebsiella oxytoca - failed Keflex as an outpatient  · Wound culture shows MRSA and K oxytoca  · Continue Vancomycin day#3 and pharmacy consult for protocol dosing  · Continue with cefazolin day#2  · Clinically is much improved  · Blood cultures- negative to date  · Podiatry input appreciated  · CT foot r/o foreign body left foot shows no abscess or osteomyelitis  Plantar lateral midfoot soft tissue wound/ulcer  Diffuse cellulitis     · Continue with local wound care with better diet gauze daily to wound of each foot, ACE WRAP to left leg on during day and off at bedtime  · Continue insulin hold glipizide while in the hospital  · Continue with sliding scale insulin coverage

## 2019-11-12 NOTE — PLAN OF CARE
Problem: Potential for Falls  Goal: Patient will remain free of falls  Description  INTERVENTIONS:  - Assess patient frequently for physical needs  -  Identify cognitive and physical deficits and behaviors that affect risk of falls    -  [place] fall precautions as indicated by assessment   - Educate patient/family on patient safety including physical limitations  - Instruct patient to call for assistance with activity based on assessment  - Modify environment to reduce risk of injury  - Consider OT/PT consult to assist with strengthening/mobility  Outcome: Progressing     Problem: PAIN - ADULT  Goal: Verbalizes/displays adequate comfort level or baseline comfort level  Description  Interventions:  - Encourage patient to monitor pain and request assistance  - Assess pain using appropriate pain scale  - Administer analgesics based on type and severity of pain and evaluate response  - Implement non-pharmacological measures as appropriate and evaluate response  - Consider cultural and social influences on pain and pain management  - Notify physician/advanced practitioner if interventions unsuccessful or patient reports new pain  Outcome: Progressing     Problem: INFECTION - ADULT  Goal: Absence or prevention of progression during hospitalization  Description  INTERVENTIONS:  - Assess and monitor for signs and symptoms of infection  - Monitor lab/diagnostic results  - Monitor all insertion sites, i e  indwelling lines, tubes, and drains  - Monitor endotracheal if appropriate and nasal secretions for changes in amount and color  - [place] appropriate cooling/warming therapies per order  - Administer medications as ordered  - Instruct and encourage patient and family to use good hand hygiene technique  - Identify and instruct in appropriate isolation precautions for identified infection/condition  Outcome: Progressing  Goal: Absence of fever/infection during neutropenic period  Description  INTERVENTIONS:  - Monitor WBC    Outcome: Progressing     Problem: SAFETY ADULT  Goal: Patient will remain free of falls  Description  INTERVENTIONS:  - Assess patient frequently for physical needs  -  Identify cognitive and physical deficits and behaviors that affect risk of falls    -  [place] fall precautions as indicated by assessment   - Educate patient/family on patient safety including physical limitations  - Instruct patient to call for assistance with activity based on assessment  - Modify environment to reduce risk of injury  - Consider OT/PT consult to assist with strengthening/mobility  Outcome: Progressing  Goal: Maintain or return to baseline ADL function  Description  INTERVENTIONS:  -  Assess patient's ability to carry out ADLs; assess patient's baseline for ADL function and identify physical deficits which impact ability to perform ADLs (bathing, care of mouth/teeth, toileting, grooming, dressing, etc )  - Assess/evaluate cause of self-care deficits   - Assess range of motion  - Assess patient's mobility; develop plan if impaired  - Assess patient's need for assistive devices and provide as appropriate  - Encourage maximum independence but intervene and supervise when necessary  - Involve family in performance of ADLs  - Assess for home care needs following discharge   - Consider OT consult to assist with ADL evaluation and planning for discharge  - Provide patient education as appropriate  Outcome: Progressing  Goal: Maintain or return mobility status to optimal level  Description  INTERVENTIONS:  - Assess patient's baseline mobility status (ambulation, transfers, stairs, etc )    - Identify cognitive and physical deficits and behaviors that affect mobility  - Identify mobility aids required to assist with transfers and/or ambulation (gait belt, sit-to-stand, lift, walker, cane, etc )  - [place] fall precautions as indicated by assessment  - Record patient progress and toleration of activity level on Mobility SBAR; progress patient to next Phase/Stage  - Instruct patient to call for assistance with activity based on assessment  - Consider rehabilitation consult to assist with strengthening/weightbearing, etc   Outcome: Progressing     Problem: DISCHARGE PLANNING  Goal: Discharge to home or other facility with appropriate resources  Description  INTERVENTIONS:  - Identify barriers to discharge w/patient and caregiver  - Arrange for needed discharge resources and transportation as appropriate  - Identify discharge learning needs (meds, wound care, etc )  - Arrange for interpretive services to assist at discharge as needed  - Refer to Case Management Department for coordinating discharge planning if the patient needs post-hospital services based on physician/advanced practitioner order or complex needs related to functional status, cognitive ability, or social support system  Outcome: Progressing     Problem: Knowledge Deficit  Goal: Patient/family/caregiver demonstrates understanding of disease process, treatment plan, medications, and discharge instructions  Description  Complete learning assessment and assess knowledge base    Interventions:  - Provide teaching at level of understanding  - Provide teaching via preferred learning methods  Outcome: Progressing     Problem: Prexisting or High Potential for Compromised Skin Integrity  Goal: Skin integrity is maintained or improved  Description  INTERVENTIONS:  - Identify patients at risk for skin breakdown  - Assess and monitor skin integrity  - Assess and monitor nutrition and hydration status  - Monitor labs   - Assess for incontinence   - Turn and reposition patient  - Assist with mobility/ambulation  - Relieve pressure over bony prominences  - Avoid friction and shearing  - Provide appropriate hygiene as needed including keeping skin clean and dry  - Evaluate need for skin moisturizer/barrier cream  - Collaborate with interdisciplinary team   - Patient/family teaching  - Consider wound care consult   Outcome: Progressing

## 2019-11-12 NOTE — PLAN OF CARE
Problem: Potential for Falls  Goal: Patient will remain free of falls  Description  INTERVENTIONS:  - Assess patient frequently for physical needs  -  Identify cognitive and physical deficits and behaviors that affect risk of falls    -  [place] fall precautions as indicated by assessment   - Educate patient/family on patient safety including physical limitations  - Instruct patient to call for assistance with activity based on assessment  - Modify environment to reduce risk of injury  - Consider OT/PT consult to assist with strengthening/mobility  Outcome: Progressing     Problem: PAIN - ADULT  Goal: Verbalizes/displays adequate comfort level or baseline comfort level  Description  Interventions:  - Encourage patient to monitor pain and request assistance  - Assess pain using appropriate pain scale  - Administer analgesics based on type and severity of pain and evaluate response  - Implement non-pharmacological measures as appropriate and evaluate response  - Consider cultural and social influences on pain and pain management  - Notify physician/advanced practitioner if interventions unsuccessful or patient reports new pain  Outcome: Progressing     Problem: INFECTION - ADULT  Goal: Absence or prevention of progression during hospitalization  Description  INTERVENTIONS:  - Assess and monitor for signs and symptoms of infection  - Monitor lab/diagnostic results  - Monitor all insertion sites, i e  indwelling lines, tubes, and drains  - Monitor endotracheal if appropriate and nasal secretions for changes in amount and color  - [place] appropriate cooling/warming therapies per order  - Administer medications as ordered  - Instruct and encourage patient and family to use good hand hygiene technique  - Identify and instruct in appropriate isolation precautions for identified infection/condition  Outcome: Progressing  Goal: Absence of fever/infection during neutropenic period  Description  INTERVENTIONS:  - Monitor WBC    Outcome: Progressing     Problem: SAFETY ADULT  Goal: Patient will remain free of falls  Description  INTERVENTIONS:  - Assess patient frequently for physical needs  -  Identify cognitive and physical deficits and behaviors that affect risk of falls    -  [place] fall precautions as indicated by assessment   - Educate patient/family on patient safety including physical limitations  - Instruct patient to call for assistance with activity based on assessment  - Modify environment to reduce risk of injury  - Consider OT/PT consult to assist with strengthening/mobility  Outcome: Progressing  Goal: Maintain or return to baseline ADL function  Description  INTERVENTIONS:  -  Assess patient's ability to carry out ADLs; assess patient's baseline for ADL function and identify physical deficits which impact ability to perform ADLs (bathing, care of mouth/teeth, toileting, grooming, dressing, etc )  - Assess/evaluate cause of self-care deficits   - Assess range of motion  - Assess patient's mobility; develop plan if impaired  - Assess patient's need for assistive devices and provide as appropriate  - Encourage maximum independence but intervene and supervise when necessary  - Involve family in performance of ADLs  - Assess for home care needs following discharge   - Consider OT consult to assist with ADL evaluation and planning for discharge  - Provide patient education as appropriate  Outcome: Progressing  Goal: Maintain or return mobility status to optimal level  Description  INTERVENTIONS:  - Assess patient's baseline mobility status (ambulation, transfers, stairs, etc )    - Identify cognitive and physical deficits and behaviors that affect mobility  - Identify mobility aids required to assist with transfers and/or ambulation (gait belt, sit-to-stand, lift, walker, cane, etc )  - [place] fall precautions as indicated by assessment  - Record patient progress and toleration of activity level on Mobility SBAR; progress patient to next Phase/Stage  - Instruct patient to call for assistance with activity based on assessment  - Consider rehabilitation consult to assist with strengthening/weightbearing, etc   Outcome: Progressing     Problem: DISCHARGE PLANNING  Goal: Discharge to home or other facility with appropriate resources  Description  INTERVENTIONS:  - Identify barriers to discharge w/patient and caregiver  - Arrange for needed discharge resources and transportation as appropriate  - Identify discharge learning needs (meds, wound care, etc )  - Arrange for interpretive services to assist at discharge as needed  - Refer to Case Management Department for coordinating discharge planning if the patient needs post-hospital services based on physician/advanced practitioner order or complex needs related to functional status, cognitive ability, or social support system  Outcome: Progressing     Problem: Knowledge Deficit  Goal: Patient/family/caregiver demonstrates understanding of disease process, treatment plan, medications, and discharge instructions  Description  Complete learning assessment and assess knowledge base    Interventions:  - Provide teaching at level of understanding  - Provide teaching via preferred learning methods  Outcome: Progressing     Problem: Prexisting or High Potential for Compromised Skin Integrity  Goal: Skin integrity is maintained or improved  Description  INTERVENTIONS:  - Identify patients at risk for skin breakdown  - Assess and monitor skin integrity  - Assess and monitor nutrition and hydration status  - Monitor labs   - Assess for incontinence   - Turn and reposition patient  - Assist with mobility/ambulation  - Relieve pressure over bony prominences  - Avoid friction and shearing  - Provide appropriate hygiene as needed including keeping skin clean and dry  - Evaluate need for skin moisturizer/barrier cream  - Collaborate with interdisciplinary team   - Patient/family teaching  - Consider wound care consult   Outcome: Progressing

## 2019-11-13 VITALS
WEIGHT: 276.24 LBS | DIASTOLIC BLOOD PRESSURE: 82 MMHG | RESPIRATION RATE: 20 BRPM | OXYGEN SATURATION: 95 % | HEIGHT: 62 IN | TEMPERATURE: 98.1 F | HEART RATE: 72 BPM | SYSTOLIC BLOOD PRESSURE: 154 MMHG | BODY MASS INDEX: 50.83 KG/M2

## 2019-11-13 LAB
GLUCOSE SERPL-MCNC: 133 MG/DL (ref 65–140)
GLUCOSE SERPL-MCNC: 165 MG/DL (ref 65–140)
GLUCOSE SERPL-MCNC: 337 MG/DL (ref 65–140)
GLUCOSE SERPL-MCNC: 364 MG/DL (ref 65–140)
VANCOMYCIN TROUGH SERPL-MCNC: 15.9 UG/ML (ref 5–12)

## 2019-11-13 PROCEDURE — 82948 REAGENT STRIP/BLOOD GLUCOSE: CPT

## 2019-11-13 PROCEDURE — 80202 ASSAY OF VANCOMYCIN: CPT | Performed by: PHYSICIAN ASSISTANT

## 2019-11-13 PROCEDURE — 99239 HOSP IP/OBS DSCHRG MGMT >30: CPT | Performed by: NURSE PRACTITIONER

## 2019-11-13 RX ORDER — CLINDAMYCIN HYDROCHLORIDE 150 MG/1
300 CAPSULE ORAL EVERY 6 HOURS SCHEDULED
Status: DISCONTINUED | OUTPATIENT
Start: 2019-11-13 | End: 2019-11-13 | Stop reason: HOSPADM

## 2019-11-13 RX ORDER — CLINDAMYCIN HYDROCHLORIDE 300 MG/1
300 CAPSULE ORAL EVERY 6 HOURS SCHEDULED
Qty: 20 CAPSULE | Refills: 0 | Status: SHIPPED | OUTPATIENT
Start: 2019-11-13 | End: 2019-11-18

## 2019-11-13 RX ORDER — CEPHALEXIN 500 MG/1
500 CAPSULE ORAL EVERY 6 HOURS SCHEDULED
Status: DISCONTINUED | OUTPATIENT
Start: 2019-11-13 | End: 2019-11-13 | Stop reason: HOSPADM

## 2019-11-13 RX ORDER — FLUCONAZOLE 100 MG/1
150 TABLET ORAL ONCE
Status: COMPLETED | OUTPATIENT
Start: 2019-11-13 | End: 2019-11-13

## 2019-11-13 RX ORDER — CEPHALEXIN 500 MG/1
500 CAPSULE ORAL EVERY 6 HOURS SCHEDULED
Qty: 20 CAPSULE | Refills: 0 | Status: SHIPPED | OUTPATIENT
Start: 2019-11-13 | End: 2019-11-18

## 2019-11-13 RX ADMIN — CALCIUM CARBONATE-VITAMIN D TAB 500 MG-200 UNIT 1 TABLET: 500-200 TAB at 10:10

## 2019-11-13 RX ADMIN — CEFAZOLIN SODIUM 2000 MG: 2 SOLUTION INTRAVENOUS at 05:43

## 2019-11-13 RX ADMIN — INSULIN LISPRO 2 UNITS: 100 INJECTION, SOLUTION INTRAVENOUS; SUBCUTANEOUS at 16:42

## 2019-11-13 RX ADMIN — CEFAZOLIN SODIUM 2000 MG: 2 SOLUTION INTRAVENOUS at 13:23

## 2019-11-13 RX ADMIN — GABAPENTIN 600 MG: 300 CAPSULE ORAL at 16:42

## 2019-11-13 RX ADMIN — GABAPENTIN 600 MG: 300 CAPSULE ORAL at 10:10

## 2019-11-13 RX ADMIN — OXYCODONE HYDROCHLORIDE AND ACETAMINOPHEN 1.5 TABLET: 5; 325 TABLET ORAL at 10:09

## 2019-11-13 RX ADMIN — ENOXAPARIN SODIUM 40 MG: 40 INJECTION SUBCUTANEOUS at 10:09

## 2019-11-13 RX ADMIN — INSULIN ASPART 60 UNITS: 100 INJECTION, SUSPENSION SUBCUTANEOUS at 10:09

## 2019-11-13 RX ADMIN — FLUCONAZOLE 150 MG: 100 TABLET ORAL at 16:42

## 2019-11-13 RX ADMIN — INSULIN LISPRO 8 UNITS: 100 INJECTION, SOLUTION INTRAVENOUS; SUBCUTANEOUS at 13:23

## 2019-11-13 RX ADMIN — FUROSEMIDE 20 MG: 20 TABLET ORAL at 05:54

## 2019-11-13 RX ADMIN — VANCOMYCIN HYDROCHLORIDE 1750 MG: 1 INJECTION, POWDER, LYOPHILIZED, FOR SOLUTION INTRAVENOUS at 14:21

## 2019-11-13 RX ADMIN — INSULIN ASPART 60 UNITS: 100 INJECTION, SUSPENSION SUBCUTANEOUS at 16:41

## 2019-11-13 RX ADMIN — CHOLECALCIFEROL TAB 10 MCG (400 UNIT) 400 UNITS: 10 TAB at 10:10

## 2019-11-13 RX ADMIN — ZINC 1 TABLET: TAB ORAL at 10:10

## 2019-11-13 RX ADMIN — ATORVASTATIN CALCIUM 40 MG: 40 TABLET, FILM COATED ORAL at 16:42

## 2019-11-13 RX ADMIN — OXYCODONE HYDROCHLORIDE AND ACETAMINOPHEN 1.5 TABLET: 5; 325 TABLET ORAL at 16:42

## 2019-11-13 RX ADMIN — BACLOFEN 5 MG: 10 TABLET ORAL at 10:10

## 2019-11-13 RX ADMIN — OMEGA-3 FATTY ACIDS CAP 1000 MG 1000 MG: 1000 CAP at 10:10

## 2019-11-13 RX ADMIN — LISINOPRIL 5 MG: 5 TABLET ORAL at 10:11

## 2019-11-13 RX ADMIN — ERGOCALCIFEROL 50000 UNITS: 1.25 CAPSULE ORAL at 10:19

## 2019-11-13 RX ADMIN — VANCOMYCIN HYDROCHLORIDE 1750 MG: 1 INJECTION, POWDER, LYOPHILIZED, FOR SOLUTION INTRAVENOUS at 02:16

## 2019-11-13 RX ADMIN — ASPIRIN 81 MG 81 MG: 81 TABLET ORAL at 10:10

## 2019-11-13 NOTE — SOCIAL WORK
Pt to be d/d home today as discussed at care coordination rounds, pt's friend will transport the pt home at 3 pm today, pt  Asked for rx for wound dressing supplies and the doctor and rn are aware, pt denies any additional d/c needs, pt did request that I not make her follow up appointments, she stated she will make her own appointments since she has her friend drive her, pt is in agreement with the planned d/c for today and d/c plan home with outpt follow up  Pt's friend will transport the pt home this afternoon

## 2019-11-13 NOTE — ASSESSMENT & PLAN NOTE
· Patient treated outpatient by podiatrist failed outpatient antibiotics, wound culture positive for MRSA and Klebsiella oxytoca - failed Keflex as an outpatient  · Wound culture shows MRSA and K oxytoca  · Received 4 days of Vancomycin, cefazolin 3 days   · Will transition patient to PO keflex for 5 more days of treatment   · Clinically is much improved  · Blood cultures- negative to date  · Podiatry input appreciated  · CT foot r/o foreign body left foot shows no abscess or osteomyelitis  Plantar lateral midfoot soft tissue wound/ulcer  Diffuse cellulitis     · Continue with local wound care with better diet gauze daily to wound of each foot, ACE WRAP to left leg on during day and off at bedtime  · Recommended the patient follow up with Podiatry as an outpatient and continue local wound care  · Continue with current insulin regimen

## 2019-11-13 NOTE — NURSING NOTE
Patient discharged home  IV removed  Verbalizes understanding of discharge instructions  Patient has all belongings  Escorted out by PCA

## 2019-11-13 NOTE — DISCHARGE SUMMARY
Discharge- Malcolm Lujan 1960, 62 y o  female MRN: [id number]    Unit/Bed#: -01 Encounter: [id number]    Primary Care Provider: Mateusz Frank DO   Date and time admitted to hospital: 11/9/2019 10:33 AM        * Diabetic ulcer of left midfoot associated with type 2 diabetes mellitus, limited to breakdown of skin ([facility] )  Assessment & Plan  · Patient treated outpatient by podiatrist failed outpatient antibiotics, wound culture positive for MRSA and Klebsiella oxytoca - failed Keflex as an outpatient  · Wound culture shows MRSA and K oxytoca  · Received 4 days of Vancomycin, cefazolin 3 days   · Will transition patient to PO keflex for 5 more days of treatment   · Clinically is much improved  · Blood cultures- negative to date  · Podiatry input appreciated  · CT foot r/o foreign body left foot shows no abscess or osteomyelitis  Plantar lateral midfoot soft tissue wound/ulcer  Diffuse cellulitis  · Continue with local wound care with better diet gauze daily to wound of each foot, ACE WRAP to left leg on during day and off at bedtime  · Recommended the patient follow up with Podiatry as an outpatient and continue local wound care  · Continue with current insulin regimen    Morbid obesity with BMI of 50 0-59 9, adult ([facility] )  Assessment & Plan  · BMI 50  · Weight loss recommended  · Follow-up with PCP with dietitian referral versus bariatric management referral          Chronic pain syndrome  Assessment & Plan  · With arthritis, neuropathy, history of human growth hormone as a child  · Supportive care, continue pain medications and muscle relaxers  · Patient may benefit from a palliative care referral at discharge          History of MRSA infection  Assessment & Plan  · Patient on contact precaution  · Positive recent wound culture for MRSA on November 6  · Please see treatment outlined above        Anxiety  Assessment & Plan  · Continue Ativan p r n             Stage 3 chronic kidney disease [facility])  Assessment & Plan  · Creatinine stable   · Monitor              Mixed hyperlipidemia  Assessment & Plan  · Continue with statin         Essential hypertension  Assessment & Plan  · Continue meds and monitor          Vitamin D deficiency  Assessment & Plan  · Continue with P o  Replacement              Discharging Physician / Practitioner: Chang Brannon  PCP: Delorse Buerger, DO  Admission Date:   Admission Orders (From admission, onward)     Ordered        11/09/19 1329  Inpatient Admission  Once                   Discharge Date: 11/13/19    Resolved Problems  Date Reviewed: 11/13/2019    None          Consultations During Hospital Stay:  · Podiatry  · IR    Procedures Performed:   · PICC line placement on 11/12    Significant Findings / Test Results:   · CT left foot Plantar lateral midfoot soft tissue wound/ulcer  Diffuse cellulitis  No abscess  No osteomyelitis  · X-ray left foot no acute process    Incidental Findings:   · None      Test Results Pending at Discharge (will require follow up): · Final blood cultures      Outpatient Tests Requested:  · Follow up with PCP   · Follow up with podiatry     Complications:     None     Reason for Admission:  Redness and swelling of left foot and leg    Hospital Course:     Ace Azul is a 62 y o  female patient who originally presented to the hospital on 11/9/2019 due to increasing redness and swelling of her left foot  Please refer to H&P for initial presenting complaint  In brief the patient with history of diabetes mellitus type 2 and chronic diabetic ulcer with presented to the ED with worsening redness of the foot and swelling  The patient was seen by her podiatrist Dr Mary Guerrier and was started on Keflex as an outpatient  The patient was started on vancomycin IV and cefazolin was added for K oxytoca coverage  Podiatry evaluation was done  CT of the left foot was done and osteomyelitis is ruled out    Podiatry recommend local wound care and continue with antibiotics  Overall clinically the patient is much improved and is medically cleared to be discharged today  She will be discharged on Keflex and clindamycin for 5 more days of treatment  The patient received a PICC line placement due to unable to get an IV access  Please see above list of diagnoses and related plan for additional information  Condition at Discharge: good     Discharge Day Visit / Exam:     Subjective:  Feeling much better  Denies any pain  Would like to go home  Vitals: Blood Pressure: 154/82 (11/13/19 1552)  Pulse: 72 (11/13/19 1552)  Temperature: 98 1 °F (36 7 °C) (11/13/19 1552)  Temp Source: Temporal (11/13/19 1552)  Respirations: 20 (11/13/19 1552)  Height: 5' 2" (157 5 cm) (11/09/19 1354)  Weight - Scale: 125 kg (276 lb 3 8 oz) (11/09/19 1354)  SpO2: 95 % (11/13/19 1552)  Exam:   Physical Exam   Constitutional: She is oriented to person, place, and time  She appears well-developed and well-nourished  No distress  HENT:   Head: Normocephalic and atraumatic  Mouth/Throat: Oropharynx is clear and moist    Eyes: Pupils are equal, round, and reactive to light  Conjunctivae and EOM are normal    Neck: Normal range of motion  Neck supple  No thyromegaly present  Cardiovascular: Normal rate, regular rhythm and normal heart sounds  Exam reveals no gallop and no friction rub  No murmur heard  Pulmonary/Chest: Effort normal and breath sounds normal  She has no wheezes  She has no rales  Abdominal: Soft  Bowel sounds are normal  She exhibits no distension  There is no tenderness  There is no rebound  Musculoskeletal: Normal range of motion  She exhibits edema  She exhibits no tenderness or deformity  Neurological: She is alert and oriented to person, place, and time  No cranial nerve deficit  Skin: Skin is warm and dry  No rash noted  No erythema  Let foot wound with granular tissue, dry no drainage  Appears to be healing well   Right great toe ulcer dry  Psychiatric: She has a normal mood and affect  Her behavior is normal  Judgment and thought content normal    Vitals reviewed  Discussion with Family: n/a     Discharge instructions/Information to patient and family:   See after visit summary for information provided to patient and family  Provisions for Follow-Up Care:  See after visit summary for information related to follow-up care and any pertinent home health orders  Disposition:     Home    For Discharges to [address] [facility]:   · Not Applicable to this Patient - Not Applicable to this Patient    Planned Readmission:    No      Discharge Statement:  I spent 36 minutes discharging the patient  This time was spent on the day of discharge  I had direct contact with the patient on the day of discharge  Greater than 50% of the total time was spent examining patient, answering all patient questions, arranging and discussing plan of care with patient as well as directly providing post-discharge instructions  Additional time then spent on discharge activities  Discharge Medications:  See after visit summary for reconciled discharge medications provided to patient and family        ** Please Note: This note has been constructed using a voice recognition system **

## 2019-11-13 NOTE — CONSULTS
The patient's vancomycin has been discontinued  Thank you for this consult  Pharmacy will sign off now

## 2019-11-13 NOTE — PLAN OF CARE
Problem: SKIN/TISSUE INTEGRITY - ADULT  Goal: Incision(s), wounds(s) or drain site(s) healing without S/S of infection  Description  INTERVENTIONS  - Assess and document risk factors for skin impairment   - Assess and document dressing, incision, wound bed, drain sites and surrounding tissue  - Consider nutrition services referral as needed  - Oral mucous membranes remain intact  - Provide patient/ family education  Outcome: Progressing

## 2019-11-14 LAB
BACTERIA BLD CULT: NORMAL
BACTERIA BLD CULT: NORMAL

## 2019-11-18 ENCOUNTER — OFFICE VISIT (OUTPATIENT)
Dept: NEPHROLOGY | Facility: CLINIC | Age: 59
End: 2019-11-18
Payer: COMMERCIAL

## 2019-11-18 VITALS
BODY MASS INDEX: 49.13 KG/M2 | WEIGHT: 267 LBS | HEIGHT: 62 IN | SYSTOLIC BLOOD PRESSURE: 126 MMHG | DIASTOLIC BLOOD PRESSURE: 74 MMHG | HEART RATE: 84 BPM

## 2019-11-18 DIAGNOSIS — R60.9 EDEMA, UNSPECIFIED TYPE: ICD-10-CM

## 2019-11-18 DIAGNOSIS — N18.30 STAGE 3 CHRONIC KIDNEY DISEASE (HCC): Chronic | ICD-10-CM

## 2019-11-18 DIAGNOSIS — E55.9 VITAMIN D DEFICIENCY: Primary | ICD-10-CM

## 2019-11-18 DIAGNOSIS — E11.65 TYPE 2 DIABETES MELLITUS WITH HYPERGLYCEMIA, UNSPECIFIED WHETHER LONG TERM INSULIN USE (HCC): ICD-10-CM

## 2019-11-18 DIAGNOSIS — R11.0 NAUSEA: ICD-10-CM

## 2019-11-18 DIAGNOSIS — Z79.4 TYPE 2 DIABETES MELLITUS WITH HYPERGLYCEMIA, WITH LONG-TERM CURRENT USE OF INSULIN (HCC): ICD-10-CM

## 2019-11-18 DIAGNOSIS — E11.65 TYPE 2 DIABETES MELLITUS WITH HYPERGLYCEMIA, WITH LONG-TERM CURRENT USE OF INSULIN (HCC): ICD-10-CM

## 2019-11-18 PROCEDURE — 3066F NEPHROPATHY DOC TX: CPT | Performed by: INTERNAL MEDICINE

## 2019-11-18 PROCEDURE — 99214 OFFICE O/P EST MOD 30 MIN: CPT | Performed by: INTERNAL MEDICINE

## 2019-11-18 RX ORDER — ERGOCALCIFEROL (VITAMIN D2) 1250 MCG
50000 CAPSULE ORAL WEEKLY
Qty: 4 CAPSULE | Refills: 5 | Status: SHIPPED | OUTPATIENT
Start: 2019-11-18

## 2019-11-18 RX ORDER — ONDANSETRON 4 MG/1
4 TABLET, FILM COATED ORAL EVERY 8 HOURS PRN
Qty: 45 TABLET | Refills: 1 | Status: SHIPPED | OUTPATIENT
Start: 2019-11-18 | End: 2020-01-01

## 2019-11-18 NOTE — ASSESSMENT & PLAN NOTE
Continue current insulin regimen  Will transition the patient to a pen needle from via for ease of use  Patient continued try to focus on reducing carbohydrate intake in her diet  Goal is to have from the least amount of insulin possible  Patient is agreeable with initiating and was sent Ozmepic 0 25 mg once weekly, after 2 or 3 weeks, this can be increased to 0 5 mg once weekly  Patient be reassessed after that  Side-effects were explained to the patient  Patient to hold glipizide in the meantime      Lab Results   Component Value Date    HGBA1C 8 4 (H) 10/30/2019

## 2019-11-18 NOTE — ASSESSMENT & PLAN NOTE
Kidney function is stable and doing well at this time  Continue to focus on improving blood sugar control as this will most likely cause greatest potential change for future kidney function loss

## 2019-11-18 NOTE — PROGRESS NOTES
[facility] [place], [place]    Name: Isabel Watt  YOB: 1960      Assessment/Plan:    Stage 3 chronic kidney disease [facility])    Kidney function is stable and doing well at this time  Continue to focus on improving blood sugar control as this will most likely cause greatest potential change for future kidney function loss  Type 2 diabetes mellitus with hyperglycemia, with long-term current use of insulin (HCC)    Continue current insulin regimen  Will transition the patient to a pen needle from via for ease of use  Patient continued try to focus on reducing carbohydrate intake in her diet  Goal is to have from the least amount of insulin possible  Patient is agreeable with initiating and was sent Ozmepic 0 25 mg once weekly, after 2 or 3 weeks, this can be increased to 0 5 mg once weekly  Patient be reassessed after that  Side-effects were explained to the patient  Patient to hold glipizide in the meantime  Lab Results   Component Value Date    HGBA1C 8 4 (H) 10/30/2019       Edema    Continue with low-sodium diet, continue furosemide, which is typically taking once daily  Vitamin D deficiency    Continue with ergocalciferol 50,000 units once weekly  Should be able to transition to regular dosing sometime this spring or summer 2020         Problem List Items Addressed This Visit        Endocrine    Type 2 diabetes mellitus with hyperglycemia, with long-term current use of insulin (HCC)       Continue current insulin regimen  Will transition the patient to a pen needle from via for ease of use  Patient continued try to focus on reducing carbohydrate intake in her diet  Goal is to have from the least amount of insulin possible  Patient is agreeable with initiating and was sent Ozmepic 0 25 mg once weekly, after 2 or 3 weeks, this can be increased to 0 5 mg once weekly  Patient be reassessed after that    Side-effects were explained to the patient  Patient to hold glipizide in the meantime  Lab Results   Component Value Date    HGBA1C 8 4 (H) 10/30/2019            Relevant Medications    insulin aspart protamine-insulin aspart (NOVOLOG MIX 70/30 FLEXPEN) 100 Units/mL injection pen    Semaglutide,0 25 or 0 5MG/DOS, (OZEMPIC, 0 25 OR 0 5 MG/DOSE,) 2 MG/1 5ML SOPN       Genitourinary    Stage 3 chronic kidney disease (HCC) (Chronic)       Kidney function is stable and doing well at this time  Continue to focus on improving blood sugar control as this will most likely cause greatest potential change for future kidney function loss  Other    Vitamin D deficiency - Primary (Chronic)       Continue with ergocalciferol 50,000 units once weekly  Should be able to transition to regular dosing sometime this spring or summer 2020         Relevant Medications    ergocalciferol (ERGOCALCIFEROL) 1 25 MG (95681 UT) capsule    Edema       Continue with low-sodium diet, continue furosemide, which is typically taking once daily  Other Visit Diagnoses     Type 2 diabetes mellitus with hyperglycemia, unspecified whether long term insulin use (HCC)        Relevant Medications    insulin aspart protamine-insulin aspart (NOVOLOG MIX 70/30 FLEXPEN) 100 Units/mL injection pen    Semaglutide,0 25 or 0 5MG/DOS, (OZEMPIC, 0 25 OR 0 5 MG/DOSE,) 2 MG/1 5ML SOPN    Other Relevant Orders    Hemoglobin A1C    Comprehensive metabolic panel    Microalbumin / creatinine urine ratio    Urinalysis with microscopic    Nausea        Relevant Medications    ondansetron (ZOFRAN) 4 mg tablet           patient is doing well at this time  We was here back in approximately 3-4 months for regular follow-up including lab review, and continuation and she has bent of diabetic medications  Subjective:      Patient ID: Andrew Burks is a 62 y o  female        Patient's blood sugars have been improving, though still remain above goal, with potassium between  mg/ dL  She continues to work toward this  Patient's  does most of the cooking from scratch which has significantly improved her blood sugars  Hypertension   This is a chronic problem  The current episode started more than 1 year ago  The problem is unchanged  The problem is controlled  Pertinent negatives include no chest pain or orthopnea  There are no associated agents to hypertension  Risk factors for coronary artery disease include diabetes mellitus and obesity  Past treatments include ACE inhibitors  There are no compliance problems  There is no history of kidney disease  Identifiable causes of hypertension include chronic renal disease  The following portions of the patient's history were reviewed and updated as appropriate: allergies, current medications, past family history, past medical history, past social history, past surgical history and problem list     Review of Systems   Cardiovascular: Negative for chest pain and orthopnea  All other systems reviewed and are negative          Social History     Socioeconomic History    Marital status: /Civil Union     Spouse name: None    Number of children: None    Years of education: None    Highest education level: None   Occupational History    None   Social Needs    Financial resource strain: None    Food insecurity:     Worry: None     Inability: None    Transportation needs:     Medical: None     Non-medical: None   Tobacco Use    Smoking status: Never Smoker    Smokeless tobacco: Never Used   Substance and Sexual Activity    Alcohol use: Yes     Comment: OCCASIONALLY    Drug use: No    Sexual activity: Never   Lifestyle    Physical activity:     Days per week: None     Minutes per session: None    Stress: None   Relationships    Social connections:     Talks on phone: None     Gets together: None     Attends Muslim service: None     Active member of club or organization: None     Attends meetings of clubs or organizations: None     Relationship status: None    Intimate partner violence:     Fear of current or ex partner: None     Emotionally abused: None     Physically abused: None     Forced sexual activity: None   Other Topics Concern    None   Social History Narrative    None     Past Medical History:   Diagnosis Date    Anesthesia related hyperthermia     pt states she had high fevers after her lipoma surgery in  at our hospital and was shipped to [facility] where they said t was from an anesthesia med    Anxiety     Arthritis     Asthma     Chronic pain     Depression     Diabetes mellitus ([facility] [address] )     GERD (gastroesophageal reflux disease)     Hyperlipidemia     Malignant hyperthermia due to anesthesia     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     Neuropathy     Obesity     PCOS (polycystic ovarian syndrome)      Past Surgical History:   Procedure Laterality Date    CERVICAL CONIZATION   W/ LASER       SECTION      DILATION AND CURETTAGE OF UTERUS      ENDOMETRIAL ABLATION      ENDOMETRIAL BIOPSY      EPIDURAL BLOCK INJECTION Bilateral 2019    Procedure: L5-S1 transforaminal epidural steroid injection;  Surgeon: Georgiana Tanner MD;  [facility];  Service: Pain Management     EPIDURAL BLOCK INJECTION Bilateral 2019    Procedure: L5-S1 transforaminal epidural steroid injection;  Surgeon: Georgiana Tanner MD;  [facility];  Service: Pain Management     FL GUIDED NEEDLE PLAC BX/ASP/INJ  2019    FL GUIDED NEEDLE PLAC BX/ASP/INJ  2019    FOOT SURGERY Left     HERNIA REPAIR      INDUCED       IR PICC LINE  2019    JOINT REPLACEMENT      KNEE ARTHROPLASTY Right     RI DEBRIDEMENT OPEN WOUND 20 SQ CM< Right 2018    Procedure: DEBRIDEMENT HAND/FINGER [facility] OUT);   Surgeon: Liliane Carrington MD;  Location: [address] [place] [address] MAIN OR;  Service: General    RI EXCISION TUMOR SOFT TISSUE BACK/FLANK SUBQ 3+CM N/A 12/4/2018    Procedure: BACK LIPOMA EXCISION;  Surgeon: Jordan Osorio MD;  Location: [facility] MAIN OR;  Service: General    ROTATOR CUFF REPAIR Right     TONSILLECTOMY         Current Outpatient Medications:     ammonium lactate (LAC-HYDRIN) 12 % lotion, , Disp: , Rfl: 0    aspirin 81 mg chewable tablet, Chew 1 tablet (81 mg total) daily, Disp: 30 tablet, Rfl: 0    atorvastatin (LIPITOR) 40 mg tablet, Take 1 tablet (40 mg total) by mouth daily with dinner, Disp: 30 tablet, Rfl: 0    baclofen 20 mg tablet, Take 10 mg by mouth as needed for muscle spasms, Disp: , Rfl:     BD INSULIN SYRINGE U/F 31G X 5/16" 1 ML MISC, , Disp: , Rfl: 0    Calcium Carb-Cholecalciferol (CALCIUM 600+D3 PO), Take by mouth daily  , Disp: , Rfl:     cephalexin (KEFLEX) 500 mg capsule, Take 1 capsule (500 mg total) by mouth every 6 (six) hours for 5 days, Disp: 20 capsule, Rfl: 0    cholecalciferol (VITAMIN D3) 400 units tablet, Take 400 Units by mouth daily, Disp: , Rfl:     clindamycin (CLEOCIN) 300 MG capsule, Take 1 capsule (300 mg total) by mouth every 6 (six) hours for 20 doses, Disp: 20 capsule, Rfl: 0    clotrimazole-betamethasone (LOTRISONE) 1-0 05 % cream, Apply topically 2 (two) times a day, Disp: 90 g, Rfl: 1    ergocalciferol (ERGOCALCIFEROL) 1 25 MG (17829 UT) capsule, Take 1 capsule (50,000 Units total) by mouth once a week, Disp: 4 capsule, Rfl: 5    furosemide (LASIX) 20 mg tablet, Take 20 mg by mouth daily, Disp: , Rfl:     glucagon (GLUCAGON EMERGENCY) 1 MG injection, Inject 1 mg under the skin once as needed (PRN blood glucose less than 70 if unconscious or uncorrectable by oral means) for up to 1 dose, Disp: 1 kit, Rfl: 0    lidocaine (LMX) 4 % cream, LMX, Gold Bond, etc  - look for * LIDOCAINE * on the label, Disp: 30 g, Rfl: 0    lisinopril (ZESTRIL) 5 mg tablet, Take 1 tablet (5 mg total) by mouth daily, Disp: 30 tablet, Rfl: 0    meclizine (ANTIVERT) 25 mg tablet, Take 25 mg by mouth Three times daily as needed, Disp: , Rfl:     metFORMIN (GLUCOPHAGE) 1000 MG tablet, Take 1 tablet (1,000 mg total) by mouth 2 (two) times a day with meals, Disp: 60 tablet, Rfl: 0    methocarbamol (ROBAXIN) 750 mg tablet, Take 1 tablet (750 mg total) by mouth every 8 (eight) hours as needed for muscle spasms, Disp: 15 tablet, Rfl: 0    multivitamin (THERAGRAN) TABS, Take 1 tablet by mouth daily, Disp: , Rfl: 0    Omega-3 Fatty Acids (FISH OIL ADULT GUMMIES PO), Take by mouth daily  , Disp: , Rfl:     ondansetron (ZOFRAN) 4 mg tablet, Take 1 tablet (4 mg total) by mouth every 8 (eight) hours as needed for nausea or vomiting, Disp: 45 tablet, Rfl: 1    [START ON 11/30/2019] oxyCODONE-acetaminophen (PERCOCET) 7 5-325 MG per tablet, Take 1 tablet by mouth every 6 (six) hours as needed for moderate painMax Daily Amount: 4 tablets, Disp: 120 tablet, Rfl: 0    gabapentin (NEURONTIN) 600 MG tablet, Take 1 tablet (600 mg total) by mouth 3 (three) times a day for 30 days, Disp: 90 tablet, Rfl: 1    insulin aspart protamine-insulin aspart (NOVOLOG MIX 70/30 FLEXPEN) 100 Units/mL injection pen, Inject 60 Units under the skin 2 (two) times a day before meals, Disp: 12 pen, Rfl: 5    Semaglutide,0 25 or 0 5MG/DOS, (OZEMPIC, 0 25 OR 0 5 MG/DOSE,) 2 MG/1 5ML SOPN, Inject 0 25 mg under the skin once a week, Disp: 1 pen, Rfl: 1    Lab Results   Component Value Date    SODIUM 136 11/12/2019    K 3 7 11/12/2019     11/12/2019    CO2 29 11/12/2019    AGAP 6 11/12/2019    BUN 19 11/12/2019    CREATININE 0 90 11/12/2019    GLUC 244 (H) 11/12/2019    GLUF 90 10/30/2019    CALCIUM 8 9 11/12/2019    AST 17 11/09/2019    ALT 16 11/09/2019    ALKPHOS 65 11/09/2019    TP 7 0 11/09/2019    TBILI 0 30 11/09/2019    EGFR 69 11/12/2019     Lab Results   Component Value Date    WBC 6 20 11/12/2019    HGB 12 2 11/12/2019    HCT 36 1 (L) 11/12/2019    MCV 87 11/12/2019     11/12/2019     Lab Results   Component Value Date CHOLESTEROL 234 (H) 10/30/2019    CHOLESTEROL 170 08/19/2019    CHOLESTEROL 236 (H) 02/08/2019     Lab Results   Component Value Date    HDL 44 10/30/2019    HDL 45 08/19/2019    HDL 55 02/08/2019     Lab Results   Component Value Date    LDLCALC 125 (H) 10/30/2019    LDLCALC 82 08/19/2019    LDLCALC 117 (H) 02/08/2019     Lab Results   Component Value Date    TRIG 324 (H) 10/30/2019    TRIG 215 (H) 08/19/2019    TRIG 319 (H) 02/08/2019     No results found for: [place], [place]  Lab Results   Component Value Date    FIR8XPASHQGF 1 550 08/19/2019     Lab Results   Component Value Date    CALCIUM 8 9 11/12/2019    PHOS 4 2 (H) 10/18/2019     No results found for: SPEP, UPEP  No results found for: AIME LEY4HUR        Objective:      /74 (BP Location: Left arm, Patient Position: Sitting, Cuff Size: Standard)   Pulse 84   Ht 5' 2" (1 575 m)   Wt 121 kg (267 lb)   BMI 48 83 kg/m²          Physical Exam   Constitutional: She is oriented to person, place, and time  She appears well-developed and well-nourished  No distress  HENT:   Head: Normocephalic and atraumatic  Eyes: Conjunctivae are normal    Neck: Neck supple  Cardiovascular: Normal rate and regular rhythm  Pulmonary/Chest: Effort normal and breath sounds normal    Abdominal: Soft  Musculoskeletal: She exhibits edema (trace edema bilaterally)  Neurological: She is alert and oriented to person, place, and time  No cranial nerve deficit  Skin: Skin is warm  No rash noted  Psychiatric: She has a normal mood and affect   Her behavior is normal

## 2019-11-18 NOTE — ASSESSMENT & PLAN NOTE
Continue with ergocalciferol 50,000 units once weekly    Should be able to transition to regular dosing sometime this spring or summer 2020

## 2019-11-20 ENCOUNTER — TELEPHONE (OUTPATIENT)
Dept: NEPHROLOGY | Facility: CLINIC | Age: 59
End: 2019-11-20

## 2019-11-20 NOTE — UTILIZATION REVIEW
Notification of Discharge  This is a Notification of Discharge from our facility [address]  Please be advised that this patient has been discharge from our facility  Below you will find the admission and discharge date and time including the patients disposition  PRESENTATION DATE: 11/9/2019 10:33 AM  OBS ADMISSION DATE:   IP ADMISSION DATE: 11/9/19 1329   DISCHARGE DATE: 11/13/2019  6:43 PM  DISPOSITION: Home/Self Care Home/Self Care   Admission Orders listed below:  Admission Orders (From admission, onward)     Ordered        11/09/19 1329  Inpatient Admission  Once                   Please contact the UR Department if additional information is required to close this patient's authorization/case  [address] Utilization Review Department  Main: [phone] x carefully listen to the prompts  All voicemails are confidential   [email or web address]  Send all requests for admission clinical reviews, approved or denied determinations and any other requests to dedicated fax number below belonging to the campus where the patient is receiving treatment    List of dedicated fax numbers:  [address] DENIALS (Administrative/Medical Necessity) [phone]   [address] (Maternity/NICU/Pediatrics) [phone]   Jose Yang [phone]   True Constant [phone]   [place] [phone]   [facility] [address] [facility] [phone]   Rahul Schwab [address] [phone]

## 2019-12-04 ENCOUNTER — TELEPHONE (OUTPATIENT)
Dept: NEPHROLOGY | Facility: CLINIC | Age: 59
End: 2019-12-04

## 2019-12-05 ENCOUNTER — TRANSCRIBE ORDERS (OUTPATIENT)
Dept: LAB | Facility: CLINIC | Age: 59
End: 2019-12-05

## 2019-12-05 ENCOUNTER — OFFICE VISIT (OUTPATIENT)
Dept: URGENT CARE | Facility: CLINIC | Age: 59
End: 2019-12-05
Payer: COMMERCIAL

## 2019-12-05 ENCOUNTER — APPOINTMENT (OUTPATIENT)
Dept: LAB | Facility: CLINIC | Age: 59
End: 2019-12-05
Payer: COMMERCIAL

## 2019-12-05 VITALS
RESPIRATION RATE: 16 BRPM | OXYGEN SATURATION: 94 % | TEMPERATURE: 98.3 F | HEART RATE: 71 BPM | DIASTOLIC BLOOD PRESSURE: 69 MMHG | SYSTOLIC BLOOD PRESSURE: 147 MMHG

## 2019-12-05 DIAGNOSIS — E11.65 UNCONTROLLED TYPE 2 DIABETES MELLITUS WITH HYPERGLYCEMIA (HCC): ICD-10-CM

## 2019-12-05 DIAGNOSIS — E78.5 HYPERLIPIDEMIA, UNSPECIFIED HYPERLIPIDEMIA TYPE: ICD-10-CM

## 2019-12-05 DIAGNOSIS — E78.2 MIXED HYPERLIPIDEMIA: ICD-10-CM

## 2019-12-05 DIAGNOSIS — E66.9 OBESITY, UNSPECIFIED CLASSIFICATION, UNSPECIFIED OBESITY TYPE, UNSPECIFIED WHETHER SERIOUS COMORBIDITY PRESENT: ICD-10-CM

## 2019-12-05 DIAGNOSIS — E66.3 OVERWEIGHT: ICD-10-CM

## 2019-12-05 DIAGNOSIS — E11.65 TYPE 2 DIABETES MELLITUS WITH HYPERGLYCEMIA, UNSPECIFIED WHETHER LONG TERM INSULIN USE (HCC): ICD-10-CM

## 2019-12-05 DIAGNOSIS — R73.09 OTHER ABNORMAL GLUCOSE: ICD-10-CM

## 2019-12-05 DIAGNOSIS — E78.1 PURE HYPERGLYCERIDEMIA: ICD-10-CM

## 2019-12-05 DIAGNOSIS — E34.9 ENDOCRINE DISORDER, UNSPECIFIED: ICD-10-CM

## 2019-12-05 DIAGNOSIS — R63.5 ABNORMAL WEIGHT GAIN: ICD-10-CM

## 2019-12-05 DIAGNOSIS — E55.9 VITAMIN D DEFICIENCY, UNSPECIFIED: ICD-10-CM

## 2019-12-05 DIAGNOSIS — E11.8 TYPE II DIABETES MELLITUS WITH MANIFESTATIONS (HCC): ICD-10-CM

## 2019-12-05 DIAGNOSIS — Z13.228 ENCOUNTER FOR SCREENING FOR OTHER METABOLIC DISORDERS: ICD-10-CM

## 2019-12-05 DIAGNOSIS — R73.01 IMPAIRED FASTING GLUCOSE: ICD-10-CM

## 2019-12-05 DIAGNOSIS — Z83.3 FAMILY HISTORY OF DIABETES MELLITUS: ICD-10-CM

## 2019-12-05 DIAGNOSIS — Z13.29 ENCOUNTER FOR SCREENING FOR OTHER SUSPECTED ENDOCRINE DISORDER: ICD-10-CM

## 2019-12-05 DIAGNOSIS — I10 ESSENTIAL HYPERTENSION: ICD-10-CM

## 2019-12-05 DIAGNOSIS — J01.90 ACUTE SINUSITIS, RECURRENCE NOT SPECIFIED, UNSPECIFIED LOCATION: Primary | ICD-10-CM

## 2019-12-05 DIAGNOSIS — E88.9 METABOLIC DISORDER, UNSPECIFIED: ICD-10-CM

## 2019-12-05 DIAGNOSIS — Z13.21 ENCOUNTER FOR SCREENING FOR NUTRITIONAL DISORDER: ICD-10-CM

## 2019-12-05 DIAGNOSIS — R63.5 ABNORMAL WEIGHT GAIN: Primary | ICD-10-CM

## 2019-12-05 LAB
25(OH)D3 SERPL-MCNC: 34.2 NG/ML (ref 30–100)
ALBUMIN SERPL BCP-MCNC: 3.9 G/DL (ref 3.5–5)
ALP SERPL-CCNC: 84 U/L (ref 46–116)
ALT SERPL W P-5'-P-CCNC: 27 U/L (ref 12–78)
ANION GAP SERPL CALCULATED.3IONS-SCNC: 6 MMOL/L (ref 4–13)
AST SERPL W P-5'-P-CCNC: 18 U/L (ref 5–45)
BILIRUB SERPL-MCNC: 0.46 MG/DL (ref 0.2–1)
BUN SERPL-MCNC: 18 MG/DL (ref 5–25)
CALCIUM SERPL-MCNC: 9.3 MG/DL (ref 8.3–10.1)
CHLORIDE SERPL-SCNC: 108 MMOL/L (ref 100–108)
CHOLEST SERPL-MCNC: 187 MG/DL (ref 50–200)
CO2 SERPL-SCNC: 26 MMOL/L (ref 21–32)
CREAT SERPL-MCNC: 0.94 MG/DL (ref 0.6–1.3)
CRP SERPL HS-MCNC: 20.4 MG/L
ERYTHROCYTE [SEDIMENTATION RATE] IN BLOOD: 34 MM/HOUR (ref 0–20)
EST. AVERAGE GLUCOSE BLD GHB EST-MCNC: 189 MG/DL
GFR SERPL CREATININE-BSD FRML MDRD: 66 ML/MIN/1.73SQ M
GLUCOSE P FAST SERPL-MCNC: 150 MG/DL (ref 65–99)
HBA1C MFR BLD: 8.2 % (ref 4.2–6.3)
HDLC SERPL-MCNC: 46 MG/DL
INSULIN SERPL-ACNC: 254.3 MU/L (ref 3–25)
LDLC SERPL CALC-MCNC: 102 MG/DL (ref 0–100)
LDLC SERPL DIRECT ASSAY-MCNC: 109 MG/DL (ref 0–100)
NONHDLC SERPL-MCNC: 141 MG/DL
NT-PROBNP SERPL-MCNC: 34 PG/ML
POTASSIUM SERPL-SCNC: 4.1 MMOL/L (ref 3.5–5.3)
PROT SERPL-MCNC: 7.4 G/DL (ref 6.4–8.2)
SODIUM SERPL-SCNC: 140 MMOL/L (ref 136–145)
TRIGL SERPL-MCNC: 197 MG/DL
TSH SERPL DL<=0.05 MIU/L-ACNC: 2.61 UIU/ML (ref 0.36–3.74)
URATE SERPL-MCNC: 5.4 MG/DL (ref 2–6.8)
VIT B12 SERPL-MCNC: 442 PG/ML (ref 100–900)

## 2019-12-05 PROCEDURE — 86618 LYME DISEASE ANTIBODY: CPT

## 2019-12-05 PROCEDURE — 82306 VITAMIN D 25 HYDROXY: CPT

## 2019-12-05 PROCEDURE — 80053 COMPREHEN METABOLIC PANEL: CPT

## 2019-12-05 PROCEDURE — 86141 C-REACTIVE PROTEIN HS: CPT

## 2019-12-05 PROCEDURE — 83520 IMMUNOASSAY QUANT NOS NONAB: CPT

## 2019-12-05 PROCEDURE — 83525 ASSAY OF INSULIN: CPT

## 2019-12-05 PROCEDURE — 86431 RHEUMATOID FACTOR QUANT: CPT

## 2019-12-05 PROCEDURE — 36415 COLL VENOUS BLD VENIPUNCTURE: CPT

## 2019-12-05 PROCEDURE — 84681 ASSAY OF C-PEPTIDE: CPT

## 2019-12-05 PROCEDURE — 99213 OFFICE O/P EST LOW 20 MIN: CPT | Performed by: PHYSICIAN ASSISTANT

## 2019-12-05 PROCEDURE — 85652 RBC SED RATE AUTOMATED: CPT

## 2019-12-05 PROCEDURE — 83880 ASSAY OF NATRIURETIC PEPTIDE: CPT

## 2019-12-05 PROCEDURE — 83721 ASSAY OF BLOOD LIPOPROTEIN: CPT

## 2019-12-05 PROCEDURE — 86430 RHEUMATOID FACTOR TEST QUAL: CPT

## 2019-12-05 PROCEDURE — G0382 LEV 3 HOSP TYPE B ED VISIT: HCPCS | Performed by: PHYSICIAN ASSISTANT

## 2019-12-05 PROCEDURE — 82607 VITAMIN B-12: CPT

## 2019-12-05 PROCEDURE — 99283 EMERGENCY DEPT VISIT LOW MDM: CPT | Performed by: PHYSICIAN ASSISTANT

## 2019-12-05 PROCEDURE — 80061 LIPID PANEL: CPT

## 2019-12-05 PROCEDURE — 84550 ASSAY OF BLOOD/URIC ACID: CPT

## 2019-12-05 PROCEDURE — 83036 HEMOGLOBIN GLYCOSYLATED A1C: CPT

## 2019-12-05 PROCEDURE — 84443 ASSAY THYROID STIM HORMONE: CPT

## 2019-12-05 RX ORDER — ALBUTEROL SULFATE 90 UG/1
2 AEROSOL, METERED RESPIRATORY (INHALATION) EVERY 6 HOURS PRN
Qty: 1 INHALER | Refills: 0 | Status: SHIPPED | OUTPATIENT
Start: 2019-12-05 | End: 2021-03-19

## 2019-12-05 RX ORDER — AZITHROMYCIN 250 MG/1
TABLET, FILM COATED ORAL
Qty: 6 TABLET | Refills: 0 | Status: SHIPPED | OUTPATIENT
Start: 2019-12-05 | End: 2019-12-10

## 2019-12-05 NOTE — PROGRESS NOTES
[facility]        NAME: Spencer Bunn is a 62 y o  female  : 1960    MRN: [id number]  DATE: 2019  TIME: 9:36 AM    Assessment and Plan   Acute sinusitis, recurrence not specified, unspecified location [J01 90]  1  Acute sinusitis, recurrence not specified, unspecified location  albuterol (VENTOLIN HFA) 90 mcg/act inhaler    azithromycin (ZITHROMAX) 250 mg tablet         Patient Instructions     Patient Instructions   Take medication as prescribed  Hydration and rest  Humidifier at night  Follow up with PCP  Go immediately to ED if symptoms worsen, or difficulty breathing  Follow up with PCP in 3-5 days  Proceed to  ER if symptoms worsen  Chief Complaint     Chief Complaint   Patient presents with    Cough     Pt c/o a cough and congestion since Monday  History of Present Illness       69-year-old female presents to clinic with complaints of sinus congestion and cough since Monday  Patient reports a history of asthma, denies any shortness of breath or chest pain  Patient is tolerating oral hydration and food  No over-the-counter medications  Patient is requesting a refill on Ventolin inhaler  Patient travels a lot for work and states she will be going to [place] in a few days  Review of Systems   Review of Systems   Constitutional: Negative for chills, fatigue and fever  HENT: Positive for postnasal drip, sinus pressure and sinus pain  Negative for congestion, ear discharge, ear pain, facial swelling, rhinorrhea, sore throat and voice change  Eyes: Negative for discharge and redness  Respiratory: Positive for cough (Productive) and wheezing  Negative for shortness of breath  Cardiovascular: Negative for chest pain  Gastrointestinal: Negative for diarrhea, nausea and vomiting  Musculoskeletal: Negative for myalgias  Neurological: Positive for headaches  Negative for dizziness  Hematological: Negative for adenopathy           Current Medications       Current Outpatient Medications:     albuterol (VENTOLIN HFA) 90 mcg/act inhaler, Inhale 2 puffs every 6 (six) hours as needed for wheezing, Disp: 1 Inhaler, Rfl: 0    ammonium lactate (LAC-HYDRIN) 12 % lotion, , Disp: , Rfl: 0    aspirin 81 mg chewable tablet, Chew 1 tablet (81 mg total) daily, Disp: 30 tablet, Rfl: 0    atorvastatin (LIPITOR) 40 mg tablet, Take 1 tablet (40 mg total) by mouth daily with dinner, Disp: 30 tablet, Rfl: 0    azithromycin (ZITHROMAX) 250 mg tablet, Take 2 tablets first day, then 1 tablet daily for 5 days, Disp: 6 tablet, Rfl: 0    baclofen 20 mg tablet, Take 10 mg by mouth as needed for muscle spasms, Disp: , Rfl:     BD INSULIN SYRINGE U/F 31G X 5/16" 1 ML MISC, , Disp: , Rfl: 0    Calcium Carb-Cholecalciferol (CALCIUM 600+D3 PO), Take by mouth daily  , Disp: , Rfl:     cholecalciferol (VITAMIN D3) 400 units tablet, Take 400 Units by mouth daily, Disp: , Rfl:     clotrimazole-betamethasone (LOTRISONE) 1-0 05 % cream, Apply topically 2 (two) times a day, Disp: 90 g, Rfl: 1    ergocalciferol (ERGOCALCIFEROL) 1 25 MG (86601 UT) capsule, Take 1 capsule (50,000 Units total) by mouth once a week, Disp: 4 capsule, Rfl: 5    furosemide (LASIX) 20 mg tablet, Take 20 mg by mouth daily, Disp: , Rfl:     gabapentin (NEURONTIN) 600 MG tablet, Take 1 tablet (600 mg total) by mouth 3 (three) times a day for 30 days, Disp: 90 tablet, Rfl: 1    glucagon (GLUCAGON EMERGENCY) 1 MG injection, Inject 1 mg under the skin once as needed (PRN blood glucose less than 70 if unconscious or uncorrectable by oral means) for up to 1 dose, Disp: 1 kit, Rfl: 0    insulin aspart protamine-insulin aspart (NOVOLOG MIX 70/30 FLEXPEN) 100 Units/mL injection pen, Inject 60 Units under the skin 2 (two) times a day before meals, Disp: 12 pen, Rfl: 5    lidocaine (LMX) 4 % cream, LMX, Gold Bond, etc  - look for * LIDOCAINE * on the label, Disp: 30 g, Rfl: 0    lisinopril (ZESTRIL) 5 mg tablet, Take 1 tablet (5 mg total) by mouth daily, Disp: 30 tablet, Rfl: 0    meclizine (ANTIVERT) 25 mg tablet, Take 25 mg by mouth Three times daily as needed, Disp: , Rfl:     metFORMIN (GLUCOPHAGE) 1000 MG tablet, Take 1 tablet (1,000 mg total) by mouth 2 (two) times a day with meals, Disp: 60 tablet, Rfl: 0    methocarbamol (ROBAXIN) 750 mg tablet, Take 1 tablet (750 mg total) by mouth every 8 (eight) hours as needed for muscle spasms, Disp: 15 tablet, Rfl: 0    multivitamin (THERAGRAN) TABS, Take 1 tablet by mouth daily, Disp: , Rfl: 0    Omega-3 Fatty Acids (FISH OIL ADULT GUMMIES PO), Take by mouth daily  , Disp: , Rfl:     ondansetron (ZOFRAN) 4 mg tablet, Take 1 tablet (4 mg total) by mouth every 8 (eight) hours as needed for nausea or vomiting, Disp: 45 tablet, Rfl: 1    oxyCODONE-acetaminophen (PERCOCET) 7 5-325 MG per tablet, Take 1 tablet by mouth every 6 (six) hours as needed for moderate painMax Daily Amount: 4 tablets, Disp: 120 tablet, Rfl: 0    Semaglutide,0 25 or 0 5MG/DOS, (OZEMPIC, 0 25 OR 0 5 MG/DOSE,) 2 MG/1 5ML SOPN, Inject 0 25 mg under the skin once a week, Disp: 1 pen, Rfl: 1    Current Allergies     Allergies as of 12/05/2019 - Reviewed 12/05/2019   Allergen Reaction Noted    Cauliflower Laura Sandoval oleracea italica] Other (See Comments) 03/18/2016    Citrus bioflavonoid Other (See Comments) 11/29/2018    Compro [prochlorperazine] Shortness Of Breath and Wheezing 07/15/2015    Latex Other (See Comments) 11/19/2018    Mobic [meloxicam] Swelling 11/12/2018    Morphine Hives and Itching 01/17/2017    Albumen, egg Vomiting 07/15/2015    Metronidazole Itching 03/17/2016    Sulfa antibiotics Other (See Comments) 06/18/2015            The following portions of the patient's history were reviewed and updated as appropriate: allergies, current medications, past family history, past medical history, past social history, past surgical history and problem list      Past Medical History:   Diagnosis Date    Anesthesia related hyperthermia     pt states she had high fevers after her lipoma surgery in  at our hospital and was shipped to [facility] where they said t was from an anesthesia med    Anxiety     Arthritis     Asthma     Chronic pain     Depression     Diabetes mellitus ([facility] [address] )     GERD (gastroesophageal reflux disease)     Hyperlipidemia     Malignant hyperthermia due to anesthesia     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     Neuropathy     Obesity     PCOS (polycystic ovarian syndrome)        Past Surgical History:   Procedure Laterality Date    CERVICAL CONIZATION   W/ LASER       SECTION      DILATION AND CURETTAGE OF UTERUS      ENDOMETRIAL ABLATION      ENDOMETRIAL BIOPSY      EPIDURAL BLOCK INJECTION Bilateral 2019    Procedure: L5-S1 transforaminal epidural steroid injection;  Surgeon: Mee Granger MD;  [facility];  Service: Pain Management     EPIDURAL BLOCK INJECTION Bilateral 2019    Procedure: L5-S1 transforaminal epidural steroid injection;  Surgeon: Mee Granger MD;  [facility];  Service: Pain Management     FL GUIDED NEEDLE PLAC BX/ASP/INJ  2019    FL GUIDED NEEDLE PLAC BX/ASP/INJ  2019    FOOT SURGERY Left     HERNIA REPAIR      INDUCED       IR PICC LINE  2019    JOINT REPLACEMENT      KNEE ARTHROPLASTY Right     [place] DEBRIDEMENT OPEN WOUND 20 SQ CM< Right 2018    Procedure: DEBRIDEMENT HAND/FINGER [facility] OUT);   Surgeon: Anson Timmons MD;  Location: [facility] MAIN OR;  Service: General    [place] EXCISION TUMOR SOFT TISSUE BACK/FLANK SUBQ 3+CM N/A 2018    Procedure: BACK LIPOMA EXCISION;  Surgeon: Anson Timmons MD;  Location: [facility] MAIN OR;  Service: General    ROTATOR CUFF REPAIR Right     TONSILLECTOMY         Family History   Problem Relation Age of Onset    Polycystic ovary syndrome Mother     Mental illness Father     Diabetes Father          Medications have been verified  Objective   /69   Pulse 71   Temp 98 3 °F (36 8 °C)   Resp 16   SpO2 94%        Physical Exam     Physical Exam   Constitutional: She appears well-developed  HENT:   Head: Normocephalic and atraumatic  Right Ear: Hearing, tympanic membrane and ear canal normal    Left Ear: Hearing, tympanic membrane and ear canal normal    Nose: Mucosal edema and sinus tenderness present  Right sinus exhibits frontal sinus tenderness  Left sinus exhibits frontal sinus tenderness  Mouth/Throat: Uvula is midline and oropharynx is clear and moist  No posterior oropharyngeal erythema  No tonsillar exudate  Eyes: Pupils are equal, round, and reactive to light  Cardiovascular: Normal rate, regular rhythm and normal heart sounds  Pulmonary/Chest: Effort normal  No respiratory distress  She has wheezes (Left upper lobe)  Lymphadenopathy:     She has no cervical adenopathy  Skin: Skin is warm and dry  No rash noted

## 2019-12-05 NOTE — PATIENT INSTRUCTIONS
Take medication as prescribed  Hydration and rest  Humidifier at night  Follow up with PCP  Go immediately to ED if symptoms worsen, or difficulty breathing

## 2019-12-06 DIAGNOSIS — E11.65 TYPE 2 DIABETES MELLITUS WITH HYPERGLYCEMIA, UNSPECIFIED WHETHER LONG TERM INSULIN USE (HCC): Primary | ICD-10-CM

## 2019-12-06 LAB
B BURGDOR IGG+IGM SER-ACNC: <0.91 ISR (ref 0–0.9)
C PEPTIDE SERPL-MCNC: 2.9 NG/ML (ref 1.1–4.4)
CRYOGLOB RF SER-ACNC: ABNORMAL [IU]/ML
RHEUMATOID FACT SER QL LA: POSITIVE

## 2019-12-06 NOTE — TELEPHONE ENCOUNTER
Pt needs a new glucometer sent to the her pharmacy  Her insurance is changing and will no longer cover her current test strips or lancets  Spoke with patient and this is the new glucometer they will cover

## 2019-12-08 RX ORDER — LANCETS 28 GAUGE
EACH MISCELLANEOUS
Qty: 100 EACH | Refills: 3 | Status: SHIPPED | OUTPATIENT
Start: 2019-12-08 | End: 2020-12-01

## 2019-12-09 ENCOUNTER — OFFICE VISIT (OUTPATIENT)
Dept: PAIN MEDICINE | Facility: CLINIC | Age: 59
End: 2019-12-09
Payer: COMMERCIAL

## 2019-12-09 VITALS
BODY MASS INDEX: 49.83 KG/M2 | DIASTOLIC BLOOD PRESSURE: 64 MMHG | SYSTOLIC BLOOD PRESSURE: 128 MMHG | HEIGHT: 62 IN | WEIGHT: 270.8 LBS

## 2019-12-09 DIAGNOSIS — M54.42 CHRONIC BILATERAL LOW BACK PAIN WITH BILATERAL SCIATICA: ICD-10-CM

## 2019-12-09 DIAGNOSIS — M54.41 CHRONIC BILATERAL LOW BACK PAIN WITH BILATERAL SCIATICA: ICD-10-CM

## 2019-12-09 DIAGNOSIS — F11.20 OPIOID DEPENDENCE, UNCOMPLICATED (HCC): ICD-10-CM

## 2019-12-09 DIAGNOSIS — G89.29 CHRONIC BACK PAIN, UNSPECIFIED BACK LOCATION, UNSPECIFIED BACK PAIN LATERALITY: ICD-10-CM

## 2019-12-09 DIAGNOSIS — M54.16 LUMBAR RADICULOPATHY: ICD-10-CM

## 2019-12-09 DIAGNOSIS — G89.4 CHRONIC PAIN SYNDROME: ICD-10-CM

## 2019-12-09 DIAGNOSIS — M48.062 SPINAL STENOSIS OF LUMBAR REGION WITH NEUROGENIC CLAUDICATION: Primary | ICD-10-CM

## 2019-12-09 DIAGNOSIS — M54.9 CHRONIC BACK PAIN, UNSPECIFIED BACK LOCATION, UNSPECIFIED BACK PAIN LATERALITY: ICD-10-CM

## 2019-12-09 DIAGNOSIS — G89.29 CHRONIC BILATERAL LOW BACK PAIN WITH BILATERAL SCIATICA: ICD-10-CM

## 2019-12-09 DIAGNOSIS — Z79.891 ENCOUNTER FOR LONG-TERM OPIATE ANALGESIC USE: ICD-10-CM

## 2019-12-09 DIAGNOSIS — M48.04 THORACIC SPINAL STENOSIS: ICD-10-CM

## 2019-12-09 DIAGNOSIS — E66.01 MORBID OBESITY WITH BMI OF 50.0-59.9, ADULT (HCC): ICD-10-CM

## 2019-12-09 DIAGNOSIS — Z79.891 LONG-TERM CURRENT USE OF OPIATE ANALGESIC: ICD-10-CM

## 2019-12-09 DIAGNOSIS — M47.812 SPONDYLOSIS OF CERVICAL REGION WITHOUT MYELOPATHY OR RADICULOPATHY: ICD-10-CM

## 2019-12-09 DIAGNOSIS — Z79.4 TYPE 2 DIABETES MELLITUS WITH HYPERGLYCEMIA, WITH LONG-TERM CURRENT USE OF INSULIN (HCC): Primary | ICD-10-CM

## 2019-12-09 DIAGNOSIS — E08.42 DIABETIC POLYNEUROPATHY ASSOCIATED WITH DIABETES MELLITUS DUE TO UNDERLYING CONDITION (HCC): ICD-10-CM

## 2019-12-09 DIAGNOSIS — M47.816 LUMBAR SPONDYLOSIS: ICD-10-CM

## 2019-12-09 DIAGNOSIS — E11.65 TYPE 2 DIABETES MELLITUS WITH HYPERGLYCEMIA, WITH LONG-TERM CURRENT USE OF INSULIN (HCC): Primary | ICD-10-CM

## 2019-12-09 LAB — LEPTIN SERPL-MCNC: 84.7 NG/ML

## 2019-12-09 PROCEDURE — 99214 OFFICE O/P EST MOD 30 MIN: CPT | Performed by: ANESTHESIOLOGY

## 2019-12-09 PROCEDURE — 80305 DRUG TEST PRSMV DIR OPT OBS: CPT | Performed by: ANESTHESIOLOGY

## 2019-12-09 RX ORDER — OXYCODONE AND ACETAMINOPHEN 7.5; 325 MG/1; MG/1
1 TABLET ORAL EVERY 6 HOURS PRN
Qty: 120 TABLET | Refills: 0 | Status: SHIPPED | OUTPATIENT
Start: 2019-12-09 | End: 2019-12-09

## 2019-12-09 RX ORDER — OXYCODONE AND ACETAMINOPHEN 7.5; 325 MG/1; MG/1
1 TABLET ORAL EVERY 6 HOURS PRN
Qty: 120 TABLET | Refills: 0 | Status: SHIPPED | OUTPATIENT
Start: 2020-01-08 | End: 2020-02-10 | Stop reason: SDUPTHER

## 2019-12-09 RX ORDER — BLOOD-GLUCOSE METER
EACH MISCELLANEOUS
Qty: 1 KIT | Refills: 0 | Status: SHIPPED | OUTPATIENT
Start: 2019-12-09 | End: 2021-10-22

## 2019-12-09 NOTE — PROGRESS NOTES
Assessment:  1  Spinal stenosis of lumbar region with neurogenic claudication    2  Lumbar spondylosis    3  Thoracic spinal stenosis    4  Chronic bilateral low back pain with bilateral sciatica    5  Morbid obesity with BMI of 50 0-59 9, adult ([facility] [address] )    6  Diabetic polyneuropathy associated with diabetes mellitus due to underlying condition ([facility] )    7  Chronic pain syndrome        Plan:    Patient is a 68-year-old female with complaints of neck pain, midback pain, low back pain and a history significant for cervical radiculopathy, cervical spondylosis, lumbar spinal stenosis, thoracic spinal stenosis presents to office for follow-up visit  Patient was seen in the emergency department regards to diffuse right foot cellulitis which she was provided with IV antibiotics  Patient's history is complicated by diabetes  At this time we feel that any 5 epidural steroid injection will have to of wait for patient's resolution of infected diabetic ulcer  Complete risks and benefits including bleeding, infection, tissue reaction, nerve injury and allergic reaction were discussed  The approach was demonstrated using models and literature was provided  Verbal and written consent was obtained  There are risks associated with opioid medications, including dependence, addiction and tolerance  The patient understands and agrees to use these medications only as prescribed  Potential side effects of the medications include, but are not limited to, constipation, drowsiness, addiction, impaired judgment and risk of fatal overdose if not taken as prescribed  The patient was warned against driving while taking sedation medications  Sharing medications is a felony  At this point in time, the patient is showing no signs of addiction, abuse, diversion or suicidal ideation  A urine drug screen was collected at today's office visit as part of our medication management protocol   The point of care testing results were appropriate for what was being prescribed  The specimen will be sent for confirmatory testing  The drug screen is medically necessary because the patient is either dependent on opioid medication or is being considered for opioid medication therapy and the results could impact ongoing or future treatment  The drug screen is to evaluate for the presences or absence of prescribed, non-prescribed, and/or illicit drugs/substances  [place] Prescription Drug Monitoring Program report was reviewed and was appropriate       History of Present Illness: The patient is a 62 y o  female who presents for a follow up office visit in regards to Arm Pain; Hip Pain; Foot Pain; Hand Pain; Neck Pain; Leg Pain; Knee Pain; and Back Pain  The patients current symptoms include 10/10 constant sharp, burning, tingling, numbness, dull/aching, pressure-like pain in midback, low back, right leg without any particular time pattern  Current pain medications includes:  Percocet 7 5/325 mg p o  Q i d , Robaxin 750 mg p o  T i d      The patient reports that this regimen is providing 40% pain relief  The patient is reporting no side effects from this pain medication regimen  I have personally reviewed and/or updated the patient's past medical history, past surgical history, family history, social history, current medications, allergies, and vital signs today  Review of Systems  Review of Systems   Gastrointestinal: Positive for nausea and vomiting  Musculoskeletal: Positive for arthralgias and gait problem  Decreased range of motion   Joint stiffness   Swelling - Lower legs and feet  Pain in extremity  - hands and feet    All other systems reviewed and are negative          Past Medical History:   Diagnosis Date    Anesthesia related hyperthermia     pt states she had high fevers after her lipoma surgery in 2001 at our hospital and was shipped to [facility] where they said t was from an anesthesia med    Anxiety  Arthritis     Asthma     Chronic pain     Depression     Diabetes mellitus (HCC)     GERD (gastroesophageal reflux disease)     Hyperlipidemia     Malignant hyperthermia due to anesthesia     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     Neuropathy     Obesity     PCOS (polycystic ovarian syndrome)        Past Surgical History:   Procedure Laterality Date    CERVICAL CONIZATION   W/ LASER       SECTION      DILATION AND CURETTAGE OF UTERUS      ENDOMETRIAL ABLATION      ENDOMETRIAL BIOPSY      EPIDURAL BLOCK INJECTION Bilateral 2019    Procedure: L5-S1 transforaminal epidural steroid injection;  Surgeon: Caden Zeng MD;  Location: [facility] MA[facility];  Service: Pain Management     EPIDURAL BLOCK INJECTION Bilateral 2019    Procedure: L5-S1 transforaminal epidural steroid injection;  Surgeon: Caden Zeng MD;  Location: [facility];  Service: Pain Management     FL GUIDED NEEDLE PLAC BX/ASP/INJ  2019    FL GUIDED NEEDLE PLAC BX/ASP/INJ  2019    FOOT SURGERY Left     HERNIA REPAIR      INDUCED       IR PICC LINE  2019    JOINT REPLACEMENT      KNEE ARTHROPLASTY Right     MT DEBRIDEMENT OPEN WOUND 20 SQ CM< Right 2018    Procedure: DEBRIDEMENT HAND/FINGER [facility] OUT);   Surgeon: Shantelle Valiente MD;  Location: [facility] MAIN OR;  Service: General    MT EXCISION TUMOR SOFT TISSUE BACK/FLANK SUBQ 3+CM N/A 2018    Procedure: BACK LIPOMA EXCISION;  Surgeon: Shantelle Valiente MD;  Location: [facility] MAIN OR;  Service: General    ROTATOR CUFF REPAIR Right     TONSILLECTOMY         Family History   Problem Relation Age of Onset    Polycystic ovary syndrome Mother     Mental illness Father     Diabetes Father        Social History     Occupational History    Not on file   Tobacco Use    Smoking status: Never Smoker    Smokeless tobacco: Never Used   Substance and Sexual Activity    Alcohol use: Yes     Comment: OCCASIONALLY    Drug use: No    Sexual activity: Never         Current Outpatient Medications:     albuterol (VENTOLIN HFA) 90 mcg/act inhaler, Inhale 2 puffs every 6 (six) hours as needed for wheezing, Disp: 1 Inhaler, Rfl: 0    ammonium lactate (LAC-HYDRIN) 12 % lotion, , Disp: , Rfl: 0    aspirin 81 mg chewable tablet, Chew 1 tablet (81 mg total) daily, Disp: 30 tablet, Rfl: 0    atorvastatin (LIPITOR) 40 mg tablet, Take 1 tablet (40 mg total) by mouth daily with dinner, Disp: 30 tablet, Rfl: 0    azithromycin (ZITHROMAX) 250 mg tablet, Take 2 tablets first day, then 1 tablet daily for 5 days, Disp: 6 tablet, Rfl: 0    baclofen 20 mg tablet, Take 10 mg by mouth as needed for muscle spasms, Disp: , Rfl:     BD INSULIN SYRINGE U/F 31G X 5/16" 1 ML MISC, , Disp: , Rfl: 0    Blood Glucose Monitoring Suppl (ONETOUCH VERIO FLEX SYSTEM) w/Device KIT, Use as directed, Disp: 1 kit, Rfl: 0    Calcium Carb-Cholecalciferol (CALCIUM 600+D3 PO), Take by mouth daily  , Disp: , Rfl:     cholecalciferol (VITAMIN D3) 400 units tablet, Take 400 Units by mouth daily, Disp: , Rfl:     clotrimazole-betamethasone (LOTRISONE) 1-0 05 % cream, Apply topically 2 (two) times a day, Disp: 90 g, Rfl: 1    ergocalciferol (ERGOCALCIFEROL) 1 25 MG (20873 UT) capsule, Take 1 capsule (50,000 Units total) by mouth once a week, Disp: 4 capsule, Rfl: 5    furosemide (LASIX) 20 mg tablet, Take 20 mg by mouth daily, Disp: , Rfl:     gabapentin (NEURONTIN) 600 MG tablet, Take 1 tablet (600 mg total) by mouth 3 (three) times a day for 30 days, Disp: 90 tablet, Rfl: 1    glucagon (GLUCAGON EMERGENCY) 1 MG injection, Inject 1 mg under the skin once as needed (PRN blood glucose less than 70 if unconscious or uncorrectable by oral means) for up to 1 dose, Disp: 1 kit, Rfl: 0    insulin aspart protamine-insulin aspart (NOVOLOG MIX 70/30 FLEXPEN) 100 Units/mL injection pen, Inject 60 Units under the skin 2 (two) times a day before meals, Disp: 12 pen, Rfl: 5    Insulin Syringes, Disposable, U-100 1 ML MISC, Use three times daily  , Disp: 100 each, Rfl: 5    Lancets (FREESTYLE) lancets, Check glucose three times daily  , Disp: 100 each, Rfl: 3    lidocaine (LMX) 4 % cream, LMX, [place], etc  - look for * LIDOCAINE * on the label, Disp: 30 g, Rfl: 0    lisinopril (ZESTRIL) 5 mg tablet, Take 1 tablet (5 mg total) by mouth daily, Disp: 30 tablet, Rfl: 0    meclizine (ANTIVERT) 25 mg tablet, Take 25 mg by mouth Three times daily as needed, Disp: , Rfl:     metFORMIN (GLUCOPHAGE) 1000 MG tablet, Take 1 tablet (1,000 mg total) by mouth 2 (two) times a day with meals, Disp: 60 tablet, Rfl: 0    methocarbamol (ROBAXIN) 750 mg tablet, Take 1 tablet (750 mg total) by mouth every 8 (eight) hours as needed for muscle spasms, Disp: 15 tablet, Rfl: 0    multivitamin (THERAGRAN) TABS, Take 1 tablet by mouth daily, Disp: , Rfl: 0    Omega-3 Fatty Acids (FISH OIL ADULT GUMMIES PO), Take by mouth daily  , Disp: , Rfl:     ondansetron (ZOFRAN) 4 mg tablet, Take 1 tablet (4 mg total) by mouth every 8 (eight) hours as needed for nausea or vomiting, Disp: 45 tablet, Rfl: 1    oxyCODONE-acetaminophen (PERCOCET) 7 5-325 MG per tablet, Take 1 tablet by mouth every 6 (six) hours as needed for moderate painMax Daily Amount: 4 tablets, Disp: 120 tablet, Rfl: 0    Semaglutide,0 25 or 0 5MG/DOS, (OZEMPIC, 0 25 OR 0 5 MG/DOSE,) 2 MG/1 5ML SOPN, Inject 0 25 mg under the skin once a week, Disp: 1 pen, Rfl: 1    Allergies   Allergen Reactions    Cauliflower [Brassica Oleracea Italica] Other (See Comments)     Other reaction(s): GI upset  Abdominal pain    Citrus Bioflavonoid Other (See Comments)     Skin burns    Compro [Prochlorperazine] Shortness Of Breath and Wheezing    Latex Other (See Comments)     burning    Mobic [Meloxicam] Swelling    Morphine Hives and Itching    Albumen, Egg Vomiting     Other reaction(s): Nausea and/or vomiting    Metronidazole Itching    Sulfa Antibiotics Other (See Comments)     Other reaction(s): Unknown Reaction       Physical Exam:    /64 (BP Location: Left arm, Patient Position: Sitting, Cuff Size: Large)   Ht 5' 2" (1 575 m)   Wt 123 kg (270 lb 12 8 oz)   BMI 49 53 kg/m²     Constitutional:normal, well developed, well nourished, alert, in no distress and non-toxic and no overt pain behavior  and obese  Eyes:anicteric  HEENT:grossly intact  Neck:supple, symmetric, trachea midline and no masses   Pulmonary:even and unlabored  Cardiovascular:No edema or pitting edema present  Skin:Normal without rashes or lesions and well hydrated  Psychiatric:Mood and affect appropriate  Neurologic:Cranial Nerves II-XII grossly intact  Musculoskeletal:normal, right foot diabetic ulcer with mild cellulitis    Imaging  No orders to display       No orders of the defined types were placed in this encounter

## 2019-12-12 LAB
6MAM UR QL CFM: NEGATIVE NG/ML
7AMINOCLONAZEPAM UR QL CFM: NEGATIVE NG/ML
A-OH ALPRAZ UR QL CFM: NEGATIVE NG/ML
AMPHET UR QL CFM: NEGATIVE NG/ML
AMPHET UR QL CFM: NEGATIVE NG/ML
BUPRENORPHINE UR QL CFM: NEGATIVE NG/ML
BUTALBITAL UR QL CFM: NEGATIVE NG/ML
BZE UR QL CFM: NEGATIVE NG/ML
CODEINE UR QL CFM: NEGATIVE NG/ML
DESIPRAMINE UR QL CFM: NEGATIVE NG/ML
DESIPRAMINE UR QL CFM: NEGATIVE NG/ML
EDDP UR QL CFM: NEGATIVE NG/ML
ETHYL GLUCURONIDE UR QL CFM: NEGATIVE NG/ML
ETHYL SULFATE UR QL SCN: NEGATIVE NG/ML
FENTANYL UR QL CFM: NEGATIVE NG/ML
GLIADIN IGG SER IA-ACNC: NEGATIVE NG/ML
GLUCOSE 30M P 50 G LAC PO SERPL-MCNC: NEGATIVE NG/ML
HYDROCODONE UR QL CFM: NEGATIVE NG/ML
HYDROCODONE UR QL CFM: NEGATIVE NG/ML
HYDROMORPHONE UR QL CFM: NEGATIVE NG/ML
IMIPRAMINE UR QL CFM: NEGATIVE NG/ML
LORAZEPAM UR QL CFM: NEGATIVE NG/ML
MDMA UR QL CFM: NEGATIVE NG/ML
ME-PHENIDATE UR QL CFM: NEGATIVE NG/ML
MEPERIDINE UR QL CFM: NEGATIVE NG/ML
MEPHEDRONE UR QL CFM: NEGATIVE NG/ML
METHADONE UR QL CFM: NEGATIVE NG/ML
METHAMPHET UR QL CFM: NEGATIVE NG/ML
MORPHINE UR QL CFM: NEGATIVE NG/ML
MORPHINE UR QL CFM: NEGATIVE NG/ML
NORBUPRENORPHINE UR QL CFM: NEGATIVE NG/ML
NORDIAZEPAM UR QL CFM: NEGATIVE NG/ML
NORFENTANYL UR QL CFM: NEGATIVE NG/ML
NORHYDROCODONE UR QL CFM: NEGATIVE NG/ML
NORHYDROCODONE UR QL CFM: NEGATIVE NG/ML
NORMEPERIDINE UR QL CFM: NEGATIVE NG/ML
NOROXYCODONE UR CFM-MCNC: ABNORMAL NG/ML
OLANZAPINE QUANTIFICATION: NEGATIVE NG/ML
OPC-3373 QUANTIFICATION: NEGATIVE
OXAZEPAM UR QL CFM: NEGATIVE NG/ML
OXYCODONE UR CFM-MCNC: ABNORMAL NG/ML
OXYMORPHONE UR CFM-MCNC: ABNORMAL NG/ML
OXYMORPHONE UR CFM-MCNC: ABNORMAL NG/ML
PCP UR QL CFM: NEGATIVE NG/ML
PHENOBARB UR QL CFM: NEGATIVE NG/ML
RESULT ALL_PRESCRIBED MEDS AND SPECIAL INSTRUCTIONS: NORMAL
SECOBARBITAL UR QL CFM: NEGATIVE NG/ML
SL AMB 3-METHYL-FENTANYL QUANTIFICATION: NORMAL NG/ML
SL AMB 4-ANPP QUANTIFICATION: NORMAL NG/ML
SL AMB 4-FIBF QUANTIFICATION: NORMAL NG/ML
SL AMB 5F-ADB-M7 METABOLITE QUANTIFICATION: NEGATIVE NG/ML
SL AMB 7-OH-MITRAGYNINE (KRATOM ALKALOID) QUANTIFICATION: NEGATIVE NG/ML
SL AMB AB-FUBINACA-M3 METABOLITE QUANTIFICATION: NEGATIVE NG/ML
SL AMB ACETYL FENTANYL QUANTIFICATION: NORMAL NG/ML
SL AMB ACETYL NORFENTANYL QUANTIFICATION: NORMAL NG/ML
SL AMB ACRYL FENTANYL QUANTIFICATION: NORMAL NG/ML
SL AMB BATH SALTS: NEGATIVE NG/ML
SL AMB BUTRYL FENTANYL QUANTIFICATION: NORMAL NG/ML
SL AMB CARFENTANIL QUANTIFICATION: NORMAL NG/ML
SL AMB CLOZAPINE QUANTIFICATION: NEGATIVE NG/ML
SL AMB CTHC (MARIJUANA METABOLITE) QUANTIFICATION: NEGATIVE NG/ML
SL AMB CYCLOPROPYL FENTANYL QUANTIFICATION: NORMAL NG/ML
SL AMB DEXTROMETHORPHAN QUANTIFICATION: NEGATIVE NG/ML
SL AMB DEXTRORPHAN QUANTIFICATION-MEASURED RESULT: ABNORMAL NG/ML
SL AMB DEXTRORPHAN QUANTIFICATION-MEASURED RESULT: ABNORMAL NG/ML
SL AMB FURANYL FENTANYL QUANTIFICATION: NORMAL NG/ML
SL AMB HALOPERIDOL  QUANTIFICATION: NEGATIVE NG/ML
SL AMB HALOPERIDOL METABOLITE QUANTIFICATION: NEGATIVE NG/ML
SL AMB HYDROXYRISPERIDONE QUANTIFICATION: NEGATIVE NG/ML
SL AMB JWH018 METABOLITE QUANTIFICATION: NEGATIVE NG/ML
SL AMB JWH073 METABOLITE QUANTIFICATION: NEGATIVE NG/ML
SL AMB MDMB-FUBINACA-M1 METABOLITE QUANTIFICATION: NEGATIVE NG/ML
SL AMB METHOXYACETYL FENTANYL QUANTIFICATION: NORMAL NG/ML
SL AMB METHYLONE QUANTIFICATION: NEGATIVE NG/ML
SL AMB N-DESMETHYL U-47700 QUANTIFICATION: NORMAL NG/ML
SL AMB N-DESMETHYL-TRAMADOL QUANTIFICATION: NEGATIVE NG/ML
SL AMB N-DESMETHYLCLOZAPINE QUANTIFICATION: NEGATIVE NG/ML
SL AMB NORQUETIAPINE QUANTIFICATION: NEGATIVE NG/ML
SL AMB PHENTERMINE QUANTIFICATION: NEGATIVE NG/ML
SL AMB QUETIAPINE QUANTIFICATION: NEGATIVE NG/ML
SL AMB RCS4 METABOLITE QUANTIFICATION: NEGATIVE NG/ML
SL AMB RISPERIDONE QUANTIFICATION: NEGATIVE NG/ML
SL AMB RITALINIC ACID QUANTIFICATION: NEGATIVE NG/ML
SL AMB U-47700 QUANTIFICATION: NORMAL NG/ML
SPECIMEN DRAWN SERPL: NEGATIVE NG/ML
TAPENTADOL UR QL CFM: NEGATIVE NG/ML
TEMAZEPAM UR QL CFM: NEGATIVE NG/ML
TEMAZEPAM UR QL CFM: NEGATIVE NG/ML
TRAMADOL UR QL CFM: NEGATIVE NG/ML
URATE/CREAT 24H UR: NEGATIVE NG/ML

## 2020-01-01 DIAGNOSIS — R11.0 NAUSEA: ICD-10-CM

## 2020-01-01 PROCEDURE — 3066F NEPHROPATHY DOC TX: CPT | Performed by: INTERNAL MEDICINE

## 2020-01-01 RX ORDER — ONDANSETRON 4 MG/1
TABLET, FILM COATED ORAL
Qty: 45 TABLET | Refills: 1 | Status: SHIPPED | OUTPATIENT
Start: 2020-01-01 | End: 2021-03-19

## 2020-01-02 ENCOUNTER — OFFICE VISIT (OUTPATIENT)
Dept: URGENT CARE | Facility: CLINIC | Age: 60
End: 2020-01-02
Payer: COMMERCIAL

## 2020-01-02 VITALS
RESPIRATION RATE: 16 BRPM | SYSTOLIC BLOOD PRESSURE: 139 MMHG | HEART RATE: 78 BPM | DIASTOLIC BLOOD PRESSURE: 70 MMHG | TEMPERATURE: 98.6 F

## 2020-01-02 DIAGNOSIS — J02.9 SORE THROAT: ICD-10-CM

## 2020-01-02 DIAGNOSIS — J02.9 ACUTE PHARYNGITIS, UNSPECIFIED ETIOLOGY: Primary | ICD-10-CM

## 2020-01-02 LAB — S PYO AG THROAT QL: NEGATIVE

## 2020-01-02 PROCEDURE — 99213 OFFICE O/P EST LOW 20 MIN: CPT | Performed by: EMERGENCY MEDICINE

## 2020-01-02 PROCEDURE — 87070 CULTURE OTHR SPECIMN AEROBIC: CPT | Performed by: EMERGENCY MEDICINE

## 2020-01-02 PROCEDURE — 87880 STREP A ASSAY W/OPTIC: CPT | Performed by: EMERGENCY MEDICINE

## 2020-01-02 PROCEDURE — G0382 LEV 3 HOSP TYPE B ED VISIT: HCPCS | Performed by: EMERGENCY MEDICINE

## 2020-01-02 PROCEDURE — 99283 EMERGENCY DEPT VISIT LOW MDM: CPT | Performed by: EMERGENCY MEDICINE

## 2020-01-02 NOTE — PATIENT INSTRUCTIONS
1   Stop the benedryl  2  Humidify air in bedroom  3  throat lozenges  4  Your rapid strep was negative today  A throat culture is pending in 48 hours  Pharyngitis   WHAT YOU NEED TO KNOW:   Pharyngitis, or sore throat, is inflammation of the tissues and structures in your pharynx (throat)  Pharyngitis is most often caused by bacteria  It may also be caused by a cold or flu virus  Other causes include smoking, allergies, or acid reflux  DISCHARGE INSTRUCTIONS:   Call 911 for any of the following:   · You have trouble breathing or swallowing because your throat is swollen or sore  Return to the emergency department if:   · You are drooling because it hurts too much to swallow  · Your fever is higher than 102? F (39?C) or lasts longer than 3 days  · You are confused  · You taste blood in your throat  Contact your healthcare provider if:   · Your throat pain gets worse  · You have a painful lump in your throat that does not go away after 5 days  · Your symptoms do not improve after 5 days  · You have questions or concerns about your condition or care  Medicines:  Viral pharyngitis will go away on its own without treatment  Your sore throat should start to feel better in 3 to 5 days for both viral and bacterial infections  You may need any of the following:  · Antibiotics  treat a bacterial infection  · NSAIDs , such as ibuprofen, help decrease swelling, pain, and fever  NSAIDs can cause stomach bleeding or kidney problems in certain people  If you take blood thinner medicine, always ask your healthcare provider if NSAIDs are safe for you  Always read the medicine label and follow directions  · Acetaminophen  decreases pain and fever  It is available without a doctor's order  Ask how much to take and how often to take it  Follow directions  Acetaminophen can cause liver damage if not taken correctly  · Take your medicine as directed    Contact your healthcare provider if you think your medicine is not helping or if you have side effects  Tell him or her if you are allergic to any medicine  Keep a list of the medicines, vitamins, and herbs you take  Include the amounts, and when and why you take them  Bring the list or the pill bottles to follow-up visits  Carry your medicine list with you in case of an emergency  Manage your symptoms:   · Gargle salt water  Mix ¼ teaspoon salt in an 8 ounce glass of warm water and gargle  This may help decrease swelling in your throat  · Drink liquids as directed  You may need to drink more liquids than usual  Liquids may help soothe your throat and prevent dehydration  Ask how much liquid to drink each day and which liquids are best for you  · Use a cool-steam humidifier  to help moisten the air in your room and calm your cough  · Soothe your throat  with cough drops, ice, soft foods, or popsicles  Prevent the spread of pharyngitis:  Cover your mouth and nose when you cough or sneeze  Do not share food or drinks  Wash your hands often  Use soap and water  If soap and water are unavailable, use an alcohol based hand   Follow up with your healthcare provider as directed:  Write down your questions so you remember to ask them during your visits  © 2017 [address] [employer] Information is for End User's use only and may not be sold, redistributed or otherwise used for commercial purposes  All illustrations and images included in CareNotes® are the copyrighted property of [employer]  or [employer]  The above information is an  only  It is not intended as medical advice for individual conditions or treatments  Talk to your doctor, nurse or pharmacist before following any medical regimen to see if it is safe and effective for you

## 2020-01-04 LAB — BACTERIA THROAT CULT: NORMAL

## 2020-02-10 ENCOUNTER — OFFICE VISIT (OUTPATIENT)
Dept: PAIN MEDICINE | Facility: CLINIC | Age: 60
End: 2020-02-10
Payer: COMMERCIAL

## 2020-02-10 VITALS
WEIGHT: 281.2 LBS | HEART RATE: 66 BPM | BODY MASS INDEX: 51.75 KG/M2 | HEIGHT: 62 IN | SYSTOLIC BLOOD PRESSURE: 106 MMHG | DIASTOLIC BLOOD PRESSURE: 67 MMHG

## 2020-02-10 DIAGNOSIS — M54.42 CHRONIC BILATERAL LOW BACK PAIN WITH BILATERAL SCIATICA: ICD-10-CM

## 2020-02-10 DIAGNOSIS — M48.062 SPINAL STENOSIS OF LUMBAR REGION WITH NEUROGENIC CLAUDICATION: ICD-10-CM

## 2020-02-10 DIAGNOSIS — M47.816 LUMBAR SPONDYLOSIS: ICD-10-CM

## 2020-02-10 DIAGNOSIS — M79.18 MYOFASCIAL PAIN SYNDROME: ICD-10-CM

## 2020-02-10 DIAGNOSIS — M47.812 SPONDYLOSIS OF CERVICAL REGION WITHOUT MYELOPATHY OR RADICULOPATHY: ICD-10-CM

## 2020-02-10 DIAGNOSIS — M54.16 LUMBAR RADICULOPATHY: ICD-10-CM

## 2020-02-10 DIAGNOSIS — M54.41 CHRONIC BILATERAL LOW BACK PAIN WITH BILATERAL SCIATICA: ICD-10-CM

## 2020-02-10 DIAGNOSIS — G89.4 CHRONIC PAIN SYNDROME: Primary | Chronic | ICD-10-CM

## 2020-02-10 DIAGNOSIS — G89.29 CHRONIC BILATERAL LOW BACK PAIN WITH BILATERAL SCIATICA: ICD-10-CM

## 2020-02-10 PROCEDURE — 1036F TOBACCO NON-USER: CPT | Performed by: NURSE PRACTITIONER

## 2020-02-10 PROCEDURE — 99214 OFFICE O/P EST MOD 30 MIN: CPT | Performed by: NURSE PRACTITIONER

## 2020-02-10 PROCEDURE — 3078F DIAST BP <80 MM HG: CPT | Performed by: NURSE PRACTITIONER

## 2020-02-10 PROCEDURE — 3008F BODY MASS INDEX DOCD: CPT | Performed by: NURSE PRACTITIONER

## 2020-02-10 PROCEDURE — 3074F SYST BP LT 130 MM HG: CPT | Performed by: NURSE PRACTITIONER

## 2020-02-10 RX ORDER — GABAPENTIN 600 MG/1
600 TABLET ORAL 3 TIMES DAILY
Qty: 90 TABLET | Refills: 1 | Status: SHIPPED | OUTPATIENT
Start: 2020-02-10 | End: 2022-07-06

## 2020-02-10 RX ORDER — OXYCODONE AND ACETAMINOPHEN 7.5; 325 MG/1; MG/1
1 TABLET ORAL EVERY 6 HOURS PRN
Qty: 120 TABLET | Refills: 0 | Status: SHIPPED | OUTPATIENT
Start: 2020-02-10 | End: 2020-03-13 | Stop reason: SDUPTHER

## 2020-02-10 RX ORDER — BACLOFEN 10 MG/1
10 TABLET ORAL 2 TIMES DAILY
Qty: 60 TABLET | Refills: 1 | Status: SHIPPED | OUTPATIENT
Start: 2020-02-10 | End: 2021-03-19

## 2020-02-10 NOTE — PATIENT INSTRUCTIONS
Opioid Pain Management   AMBULATORY CARE:   An opioid  is a type of medicine used to treat pain  Examples of opioids are oxycodone, morphine, fentanyl, or codeine  Take opioid medicines as directed, for the condition it is prescribed:  Common problems that may occur when you do not take opioid medicines as directed include the following:  · Health problems  may occur  You may have trouble breathing  You may also develop liver or kidney damage, or stomach bleeding  Any of these health problems can become life-threatening  · Opioid dependence  means your body needs the opioid medicine to keep it from going through withdrawal      · Opioid tolerance  means the opioid does not control pain as well as it used to  You need higher doses of the opioid to get pain relief  · Opioid addiction  means you are not able to control the use of the opioid medicine  You use it when you do not have pain  You crave the opioid medicine  Call 911 or have someone call 911 for any of the following:   · You are breathing slower than normal, or you have trouble breathing  · You cannot be awakened  · You have a seizure  Seek care immediately if:   · Your heart is beating slower than usual     · Your heart feels like it is jumping or fluttering  · You are so sleepy that you cannot stay awake  · You have severe muscle pain or weakness  · You see or hear things that are not real   Contact your healthcare provider if:   · You are too dizzy to stand up  · Your pain gets worse or you have new pain  · You cannot do your usual activities because of side effects from the opioid  · You are constipated or have abdominal pain  · You have questions or concerns about your condition or care  Opioid safety measures:   · Take your medicine as directed  Ask if you need more information on how to take your medicine correctly  Follow up with your healthcare provider regularly  You may need to have your dose adjusted   Do not use opioid medicine if you are pregnant or breastfeeding  · Give your healthcare provider a list of all your medicines  Include any over-the-counter medicines, vitamins, and herbs  It can be dangerous to take opioids with certain other medicines, such as antihistamines  · Keep opioid medicine in a safe place  Store your opioid medicine in a locked cabinet to keep it away from children and others  · Do not drink alcohol while you use opioids  Alcohol use with an opioid medicine can make you sleepy and slow your breathing rate  You may stop breathing completely  · Do not drive or operate heavy machinery after you take opioid medicine  Opioid medicine can make you drowsy and make it hard to concentrate  You may injure yourself or others if you drive or operate heavy machinery while taking your medicine  · Drink fluids and eat high-fiber foods  Fluids and fiber will help prevent constipation  Ask your healthcare provider what fluids are right for you and how much you should drink  Also ask for a list of foods that contain fiber  Follow up with your healthcare provider as directed: You may need to have your dose adjusted  You may be referred to a pain specialist  Write down your questions so you remember to ask them during your visits  © 2017 [address] Information is for End User's use only and may not be sold, redistributed or otherwise used for commercial purposes  All illustrations and images included in CareNotes® are the copyrighted property of [employer]  or Ryan Garvey  The above information is an  only  It is not intended as medical advice for individual conditions or treatments  Talk to your doctor, nurse or pharmacist before following any medical regimen to see if it is safe and effective for you

## 2020-02-10 NOTE — PROGRESS NOTES
Assessment:  1  Chronic pain syndrome    2  Chronic bilateral low back pain with bilateral sciatica    3  Lumbar radiculopathy    4  Spinal stenosis of lumbar region with neurogenic claudication    5  Lumbar spondylosis    6  Myofascial pain syndrome    7  Spondylosis of cervical region without myelopathy or radiculopathy        Plan:  While the patient was in the office today, I did have a thorough conversation regarding their chronic pain syndrome, medication management, and treatment plan options  At this point patient of planning to start using medical marijuana  She has an appointment scheduled with Dr Samuel Neff to discuss  I informed her that we would not continue to prescribe oxycodone if/when she begins using medical marijuana  I told her she should contact the office when she knows for sure if she will be starting medical marijuana  At that point we would plan to wean her off oxycodone  The patient was agreeable and verbalized an understanding  I renewed her prescription today for oxycodone 7 5/325 for 1 month supply  The prescription was sent electronically to her pharmacy  I did not send a 2nd prescription with a do not filled a due to the fact that I anticipate weaning her off of oxycodone if she starts using medical marijuana  Again, she will call the office and let us know  I provided her with a prescription for baclofen to address the myofascial component of her pain as well as cramping in her legs  A prescription for baclofen 10 mg 1 pill twice daily was sent electronically to her pharmacy  She will continue to take Neurontin 600 mg 3 times daily to address the neuropathic component of her pain as well as diabetic neuropathy  I renewed this prescription and send an electronic prescription to her pharmacy with 1 refill  follow-up in 8 weeks  There are risks associated with opioid medications, including dependence, addiction and tolerance   The patient understands and agrees to use these medications only as prescribed  Potential side effects of the medications include, but are not limited to, constipation, drowsiness, addiction, impaired judgment and risk of fatal overdose if not taken as prescribed  The patient was warned against driving while taking sedation medications  Sharing medications is a felony  At this point in time, the patient is showing no signs of addiction, abuse, diversion or suicidal ideation  [place] Prescription Drug Monitoring Program report was reviewed and was appropriate        While the patient was in the office today an opioid contract was thoroughly reviewed and signed by the patient  The patient was given adequate time to ask questions in regards to the contract and a signed copy was sent home for his/her records  History of Present Illness: The patient is a 62 y o  female who presents for a follow up office visit in regards to Hip Pain; Arm Pain; Shoulder Pain; Knee Pain; Foot Pain; Hand Pain; Neck Pain; Leg Pain; and Back Pain  The patients current symptoms include   Continue chronic pain involving her neck, thoracic, low back area pain can radiate down her right arm and her right leg  She also experiences burning pain in both hands and feet related to diabetic neuropathy  Current pain level is a 9/10  Pain is described as dull, aching, cramping, shooting, numb, pins and needles  Patient informs me today that she is planning to see Dr Hannah Rosa to discuss trying medical marijuana as a means to control her chronic pain  She states that she understands that she would need to be weaned off of oxycodone if she begins medical marijuana and is willing to do that if she is determined to be a candidate and she uses to try medical marijuana  She has an initial visit scheduled in the near future  Current pain medications includes:  Percocet 7 5/325 every 6 hours as needed for pain, gabapentin 600 mg 3 times daily,      The patient reports that this regimen is providing 50-60% pain relief  The patient is reporting no side effects from this pain medication regimen  I have personally reviewed and/or updated the patient's past medical history, past surgical history, family history, social history, current medications, allergies, and vital signs today  Review of Systems  Review of Systems   Gastrointestinal: Positive for constipation and nausea  Musculoskeletal: Positive for gait problem  Decreased range of motion  Joint stiffness  Swelling - ankles and feet  Pain in extremity - legs, hands, feet, and right arm   Neurological: Positive for dizziness  All other systems reviewed and are negative          Past Medical History:   Diagnosis Date    Anesthesia related hyperthermia     pt states she had high fevers after her lipoma surgery in  at our hospital and was shipped to [facility] where they said t was from an anesthesia med    Anxiety     Arthritis     Asthma     Chronic pain     Depression     Diabetes mellitus ([facility] [address] )     GERD (gastroesophageal reflux disease)     Hyperlipidemia     Malignant hyperthermia due to anesthesia     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     Neuropathy     Obesity     PCOS (polycystic ovarian syndrome)        Past Surgical History:   Procedure Laterality Date    CERVICAL CONIZATION   W/ LASER       SECTION      DILATION AND CURETTAGE OF UTERUS      ENDOMETRIAL ABLATION      ENDOMETRIAL BIOPSY      EPIDURAL BLOCK INJECTION Bilateral 2019    Procedure: L5-S1 transforaminal epidural steroid injection;  Surgeon: Rochelle Lentz MD;  Location: [facility] MA[facility];  Service: Pain Management     EPIDURAL BLOCK INJECTION Bilateral 2019    Procedure: L5-S1 transforaminal epidural steroid injection;  Surgeon: Rochelle Lentz MD;  Location: [facility];  Service: Pain Management     FL GUIDED NEEDLE PLAC BX/ASP/INJ  2019    FL GUIDED NEEDLE PLAC BX/ASP/INJ  2019    FOOT SURGERY Left     HERNIA REPAIR      INDUCED       IR PICC LINE  2019    JOINT REPLACEMENT      KNEE ARTHROPLASTY Right     NH DEBRIDEMENT OPEN WOUND 20 SQ CM< Right 2018    Procedure: DEBRIDEMENT HAND/FINGER [facility] OUT);   Surgeon: Shantelle Valiente MD;  Location: [facility] MAIN OR;  Service: General    NH EXCISION TUMOR SOFT TISSUE BACK/FLANK SUBQ 3+CM N/A 2018    Procedure: BACK LIPOMA EXCISION;  Surgeon: Shantelle Valiente MD;  Location: [facility] MAIN OR;  Service: General    ROTATOR CUFF REPAIR Right     TONSILLECTOMY         Family History   Problem Relation Age of Onset    Polycystic ovary syndrome Mother     Mental illness Father     Diabetes Father        Social History     Occupational History    Not on file   Tobacco Use    Smoking status: Never Smoker    Smokeless tobacco: Never Used   Substance and Sexual Activity    Alcohol use: Yes     Comment: OCCASIONALLY    Drug use: Yes     Comment: CBD    Sexual activity: Never         Current Outpatient Medications:     albuterol (VENTOLIN HFA) 90 mcg/act inhaler, Inhale 2 puffs every 6 (six) hours as needed for wheezing, Disp: 1 Inhaler, Rfl: 0    ammonium lactate (LAC-HYDRIN) 12 % lotion, , Disp: , Rfl: 0    aspirin 81 mg chewable tablet, Chew 1 tablet (81 mg total) daily, Disp: 30 tablet, Rfl: 0    atorvastatin (LIPITOR) 40 mg tablet, Take 1 tablet (40 mg total) by mouth daily with dinner, Disp: 30 tablet, Rfl: 0    BD INSULIN SYRINGE U/F 31G X 5/16" 1 ML MISC, , Disp: , Rfl: 0    Blood Glucose Monitoring Suppl (ONETOUCH VERIO FLEX SYSTEM) w/Device KIT, Use as directed, Disp: 1 kit, Rfl: 0    Calcium Carb-Cholecalciferol (CALCIUM 600+D3 PO), Take by mouth daily  , Disp: , Rfl:     cholecalciferol (VITAMIN D3) 400 units tablet, Take 400 Units by mouth daily, Disp: , Rfl:     clotrimazole-betamethasone (LOTRISONE) 1-0 05 % cream, Apply topically 2 (two) times a day, Disp: 90 g, Rfl: 1    ergocalciferol (ERGOCALCIFEROL) 1 25 MG (84471 UT) capsule, Take 1 capsule (50,000 Units total) by mouth once a week, Disp: 4 capsule, Rfl: 5    furosemide (LASIX) 20 mg tablet, Take 20 mg by mouth daily, Disp: , Rfl:     gabapentin (NEURONTIN) 600 MG tablet, Take 1 tablet (600 mg total) by mouth 3 (three) times a day, Disp: 90 tablet, Rfl: 1    glucagon (GLUCAGON EMERGENCY) 1 MG injection, Inject 1 mg under the skin once as needed (PRN blood glucose less than 70 if unconscious or uncorrectable by oral means) for up to 1 dose, Disp: 1 kit, Rfl: 0    insulin aspart protamine-insulin aspart (NOVOLOG MIX 70/30 FLEXPEN) 100 Units/mL injection pen, Inject 60 Units under the skin 2 (two) times a day before meals, Disp: 12 pen, Rfl: 5    Insulin Syringes, Disposable, U-100 1 ML MISC, Use three times daily  , Disp: 100 each, Rfl: 5    Lancets (FREESTYLE) lancets, Check glucose three times daily  , Disp: 100 each, Rfl: 3    Lido-Menthol-Methyl Sal-Camph (CBD KINGS EX), Apply topically, Disp: , Rfl:     lidocaine (LMX) 4 % cream, LMX, [place], etc  - look for * LIDOCAINE * on the label, Disp: 30 g, Rfl: 0    lisinopril (ZESTRIL) 5 mg tablet, Take 1 tablet (5 mg total) by mouth daily, Disp: 30 tablet, Rfl: 0    meclizine (ANTIVERT) 25 mg tablet, Take 25 mg by mouth Three times daily as needed, Disp: , Rfl:     multivitamin (THERAGRAN) TABS, Take 1 tablet by mouth daily, Disp: , Rfl: 0    Omega-3 Fatty Acids (FISH OIL ADULT GUMMIES PO), Take by mouth daily  , Disp: , Rfl:     ondansetron (ZOFRAN) 4 mg tablet, swallow 1 tablet every 8 hours if needed for nausea and vomiting, Disp: 45 tablet, Rfl: 1    oxyCODONE-acetaminophen (PERCOCET) 7 5-325 MG per tablet, Take 1 tablet by mouth every 6 (six) hours as needed for moderate painMax Daily Amount: 4 tablets, Disp: 120 tablet, Rfl: 0    Semaglutide,0 25 or 0 5MG/DOS, (OZEMPIC, 0 25 OR 0 5 MG/DOSE,) 2 MG/1 5ML SOPN, Inject 0 25 mg under the skin once a week, Disp: 1 pen, Rfl: 1    baclofen 10 mg tablet, Take 1 tablet (10 mg total) by mouth 2 (two) times a day As needed for spasms, Disp: 60 tablet, Rfl: 1    Allergies   Allergen Reactions    Cauliflower [Brassica Oleracea Italica] Other (See Comments)     Other reaction(s): GI upset  Abdominal pain    Citrus Bioflavonoid Other (See Comments)     Skin burns    Compro [Prochlorperazine] Shortness Of Breath and Wheezing    Latex Other (See Comments)     burning    Mobic [Meloxicam] Swelling    Morphine Hives and Itching    Albumen, Egg Vomiting     Other reaction(s): Nausea and/or vomiting    Metronidazole Itching    Sulfa Antibiotics Other (See Comments)     Other reaction(s): Unknown Reaction       Physical Exam:    /67 (BP Location: Left arm, Patient Position: Sitting, Cuff Size: Large)   Pulse 66   Ht 5' 2" (1 575 m)   Wt 128 kg (281 lb 3 2 oz)   BMI 51 43 kg/m²     Constitutional:normal, well developed, well nourished, alert, in no distress and non-toxic and no overt pain behavior  and overweight  Eyes:anicteric  HEENT:grossly intact  Neck:supple, symmetric, trachea midline and no masses   Pulmonary:even and unlabored  Cardiovascular:No edema or pitting edema present  Skin:Normal without rashes or lesions and well hydrated  Psychiatric:Mood and affect appropriate  Neurologic:Cranial Nerves II-XII grossly intact  Musculoskeletal:antalgic    Imaging  No orders to display       No orders of the defined types were placed in this encounter

## 2020-03-12 ENCOUNTER — TRANSCRIBE ORDERS (OUTPATIENT)
Dept: LAB | Facility: CLINIC | Age: 60
End: 2020-03-12

## 2020-03-12 ENCOUNTER — APPOINTMENT (OUTPATIENT)
Dept: LAB | Facility: CLINIC | Age: 60
End: 2020-03-12
Payer: COMMERCIAL

## 2020-03-12 DIAGNOSIS — R53.83 MALAISE AND FATIGUE: ICD-10-CM

## 2020-03-12 DIAGNOSIS — M79.642 PAIN IN BOTH HANDS: ICD-10-CM

## 2020-03-12 DIAGNOSIS — M79.672 PAIN IN BOTH FEET: ICD-10-CM

## 2020-03-12 DIAGNOSIS — Z13.89 SCREENING FOR RHEUMATIC DISORDER: ICD-10-CM

## 2020-03-12 DIAGNOSIS — M79.641 PAIN IN BOTH HANDS: ICD-10-CM

## 2020-03-12 DIAGNOSIS — R76.8 ELEVATED RHEUMATOID FACTOR: Primary | ICD-10-CM

## 2020-03-12 DIAGNOSIS — R53.81 MALAISE AND FATIGUE: ICD-10-CM

## 2020-03-12 DIAGNOSIS — R76.8 ELEVATED RHEUMATOID FACTOR: ICD-10-CM

## 2020-03-12 DIAGNOSIS — M79.671 PAIN IN BOTH FEET: ICD-10-CM

## 2020-03-12 DIAGNOSIS — E11.65 TYPE 2 DIABETES MELLITUS WITH HYPERGLYCEMIA, UNSPECIFIED WHETHER LONG TERM INSULIN USE (HCC): ICD-10-CM

## 2020-03-12 LAB
ALBUMIN SERPL BCP-MCNC: 3.7 G/DL (ref 3.5–5)
ALP SERPL-CCNC: 100 U/L (ref 46–116)
ALT SERPL W P-5'-P-CCNC: 39 U/L (ref 12–78)
ANION GAP SERPL CALCULATED.3IONS-SCNC: 6 MMOL/L (ref 4–13)
AST SERPL W P-5'-P-CCNC: 32 U/L (ref 5–45)
BACTERIA UR QL AUTO: NORMAL /HPF
BASOPHILS # BLD AUTO: 0.02 THOUSANDS/ΜL (ref 0–0.1)
BASOPHILS NFR BLD AUTO: 0 % (ref 0–1)
BILIRUB SERPL-MCNC: 0.47 MG/DL (ref 0.2–1)
BILIRUB UR QL STRIP: NEGATIVE
BUN SERPL-MCNC: 16 MG/DL (ref 5–25)
CALCIUM SERPL-MCNC: 9.4 MG/DL (ref 8.3–10.1)
CHLORIDE SERPL-SCNC: 104 MMOL/L (ref 100–108)
CK MB SERPL-MCNC: 4.9 % (ref 0–2.5)
CK MB SERPL-MCNC: 7.6 NG/ML (ref 0–5)
CK SERPL-CCNC: 155 U/L (ref 26–192)
CLARITY UR: CLEAR
CO2 SERPL-SCNC: 30 MMOL/L (ref 21–32)
COLOR UR: YELLOW
CREAT SERPL-MCNC: 0.96 MG/DL (ref 0.6–1.3)
CREAT UR-MCNC: <13 MG/DL
CRP SERPL QL: 4.1 MG/L
EOSINOPHIL # BLD AUTO: 0.2 THOUSAND/ΜL (ref 0–0.61)
EOSINOPHIL NFR BLD AUTO: 3 % (ref 0–6)
ERYTHROCYTE [DISTWIDTH] IN BLOOD BY AUTOMATED COUNT: 13.9 % (ref 11.6–15.1)
ERYTHROCYTE [SEDIMENTATION RATE] IN BLOOD: 26 MM/HOUR (ref 0–20)
EST. AVERAGE GLUCOSE BLD GHB EST-MCNC: 243 MG/DL
GFR SERPL CREATININE-BSD FRML MDRD: 64 ML/MIN/1.73SQ M
GLUCOSE P FAST SERPL-MCNC: 255 MG/DL (ref 65–99)
GLUCOSE UR STRIP-MCNC: NEGATIVE MG/DL
HAV IGM SER QL: NORMAL
HBA1C MFR BLD: 10.1 %
HBV CORE IGM SER QL: NORMAL
HBV SURFACE AG SER QL: NORMAL
HCT VFR BLD AUTO: 44 % (ref 34.8–46.1)
HCV AB SER QL: NORMAL
HGB BLD-MCNC: 13.9 G/DL (ref 11.5–15.4)
HGB UR QL STRIP.AUTO: NEGATIVE
HYALINE CASTS #/AREA URNS LPF: NORMAL /LPF
IMM GRANULOCYTES # BLD AUTO: 0.02 THOUSAND/UL (ref 0–0.2)
IMM GRANULOCYTES NFR BLD AUTO: 0 % (ref 0–2)
KETONES UR STRIP-MCNC: NEGATIVE MG/DL
LEUKOCYTE ESTERASE UR QL STRIP: NEGATIVE
LYMPHOCYTES # BLD AUTO: 1.92 THOUSANDS/ΜL (ref 0.6–4.47)
LYMPHOCYTES NFR BLD AUTO: 32 % (ref 14–44)
MCH RBC QN AUTO: 26.8 PG (ref 26.8–34.3)
MCHC RBC AUTO-ENTMCNC: 31.6 G/DL (ref 31.4–37.4)
MCV RBC AUTO: 85 FL (ref 82–98)
MICROALBUMIN UR-MCNC: <5 MG/L (ref 0–20)
MONOCYTES # BLD AUTO: 0.38 THOUSAND/ΜL (ref 0.17–1.22)
MONOCYTES NFR BLD AUTO: 6 % (ref 4–12)
NEUTROPHILS # BLD AUTO: 3.48 THOUSANDS/ΜL (ref 1.85–7.62)
NEUTS SEG NFR BLD AUTO: 59 % (ref 43–75)
NITRITE UR QL STRIP: NEGATIVE
NON-SQ EPI CELLS URNS QL MICRO: NORMAL /HPF
NRBC BLD AUTO-RTO: 0 /100 WBCS
PH UR STRIP.AUTO: 7 [PH]
PLATELET # BLD AUTO: 250 THOUSANDS/UL (ref 149–390)
PMV BLD AUTO: 10.8 FL (ref 8.9–12.7)
POTASSIUM SERPL-SCNC: 3.9 MMOL/L (ref 3.5–5.3)
PROT SERPL-MCNC: 7.4 G/DL (ref 6.4–8.2)
PROT UR STRIP-MCNC: NEGATIVE MG/DL
RBC # BLD AUTO: 5.19 MILLION/UL (ref 3.81–5.12)
RBC #/AREA URNS AUTO: NORMAL /HPF
SODIUM SERPL-SCNC: 140 MMOL/L (ref 136–145)
SP GR UR STRIP.AUTO: 1.01 (ref 1–1.03)
UROBILINOGEN UR QL STRIP.AUTO: 0.2 E.U./DL
WBC # BLD AUTO: 6.02 THOUSAND/UL (ref 4.31–10.16)
WBC #/AREA URNS AUTO: NORMAL /HPF

## 2020-03-12 PROCEDURE — 84165 PROTEIN E-PHORESIS SERUM: CPT | Performed by: PATHOLOGY

## 2020-03-12 PROCEDURE — 81374 HLA I TYPING 1 ANTIGEN LR: CPT

## 2020-03-12 PROCEDURE — 82550 ASSAY OF CK (CPK): CPT

## 2020-03-12 PROCEDURE — 82570 ASSAY OF URINE CREATININE: CPT | Performed by: INTERNAL MEDICINE

## 2020-03-12 PROCEDURE — 80053 COMPREHEN METABOLIC PANEL: CPT

## 2020-03-12 PROCEDURE — 86038 ANTINUCLEAR ANTIBODIES: CPT

## 2020-03-12 PROCEDURE — 36415 COLL VENOUS BLD VENIPUNCTURE: CPT

## 2020-03-12 PROCEDURE — 86140 C-REACTIVE PROTEIN: CPT

## 2020-03-12 PROCEDURE — 85025 COMPLETE CBC W/AUTO DIFF WBC: CPT

## 2020-03-12 PROCEDURE — 86235 NUCLEAR ANTIGEN ANTIBODY: CPT

## 2020-03-12 PROCEDURE — 82553 CREATINE MB FRACTION: CPT

## 2020-03-12 PROCEDURE — 84165 PROTEIN E-PHORESIS SERUM: CPT

## 2020-03-12 PROCEDURE — 86200 CCP ANTIBODY: CPT

## 2020-03-12 PROCEDURE — 85652 RBC SED RATE AUTOMATED: CPT

## 2020-03-12 PROCEDURE — 80074 ACUTE HEPATITIS PANEL: CPT

## 2020-03-12 PROCEDURE — 81001 URINALYSIS AUTO W/SCOPE: CPT | Performed by: INTERNAL MEDICINE

## 2020-03-12 PROCEDURE — 86334 IMMUNOFIX E-PHORESIS SERUM: CPT | Performed by: PATHOLOGY

## 2020-03-12 PROCEDURE — 83036 HEMOGLOBIN GLYCOSYLATED A1C: CPT

## 2020-03-12 PROCEDURE — 82043 UR ALBUMIN QUANTITATIVE: CPT | Performed by: INTERNAL MEDICINE

## 2020-03-12 PROCEDURE — 86334 IMMUNOFIX E-PHORESIS SERUM: CPT

## 2020-03-13 ENCOUNTER — TELEPHONE (OUTPATIENT)
Dept: PAIN MEDICINE | Facility: CLINIC | Age: 60
End: 2020-03-13

## 2020-03-13 DIAGNOSIS — M47.816 LUMBAR SPONDYLOSIS: ICD-10-CM

## 2020-03-13 DIAGNOSIS — M54.16 LUMBAR RADICULOPATHY: ICD-10-CM

## 2020-03-13 DIAGNOSIS — M48.062 SPINAL STENOSIS OF LUMBAR REGION WITH NEUROGENIC CLAUDICATION: ICD-10-CM

## 2020-03-13 DIAGNOSIS — M47.812 SPONDYLOSIS OF CERVICAL REGION WITHOUT MYELOPATHY OR RADICULOPATHY: ICD-10-CM

## 2020-03-13 LAB
ENA SS-A AB SER-ACNC: <0.2 AI (ref 0–0.9)
ENA SS-B AB SER-ACNC: <0.2 AI (ref 0–0.9)
RYE IGE QN: NEGATIVE

## 2020-03-13 RX ORDER — OXYCODONE AND ACETAMINOPHEN 7.5; 325 MG/1; MG/1
TABLET ORAL
Qty: 42 TABLET | Refills: 0 | Status: SHIPPED | OUTPATIENT
Start: 2020-03-13 | End: 2021-03-19

## 2020-03-13 NOTE — TELEPHONE ENCOUNTER
See below  S/W pt who states she got and started MM last night  States she has 8 pills left of Percocet and when she went to fill another RX, they told her it needs a prior authorization  However when viewing PDMP, last RX picked up 2/10/2020 which is the last Rx written for pt  Please advise weaning schedule and send new Rx to be used for weaning

## 2020-03-13 NOTE — TELEPHONE ENCOUNTER
Pt is going to start using her medical marijuana as prescribed by Dr Michael Lebron  Pt was instructed to call our office to plan weaning her off oxycodone   Phone number: [phone]

## 2020-03-14 LAB
ALBUMIN SERPL ELPH-MCNC: 4.13 G/DL (ref 3.5–5)
ALBUMIN SERPL ELPH-MCNC: 58.1 % (ref 52–65)
ALPHA1 GLOB SERPL ELPH-MCNC: 0.31 G/DL (ref 0.1–0.4)
ALPHA1 GLOB SERPL ELPH-MCNC: 4.3 % (ref 2.5–5)
ALPHA2 GLOB SERPL ELPH-MCNC: 0.83 G/DL (ref 0.4–1.2)
ALPHA2 GLOB SERPL ELPH-MCNC: 11.7 % (ref 7–13)
BETA GLOB ABNORMAL SERPL ELPH-MCNC: 0.47 G/DL (ref 0.4–0.8)
BETA1 GLOB SERPL ELPH-MCNC: 6.6 % (ref 5–13)
BETA2 GLOB SERPL ELPH-MCNC: 6.5 % (ref 2–8)
BETA2+GAMMA GLOB SERPL ELPH-MCNC: 0.46 G/DL (ref 0.2–0.5)
CCP IGA+IGG SERPL IA-ACNC: 10 UNITS (ref 0–19)
GAMMA GLOB ABNORMAL SERPL ELPH-MCNC: 0.91 G/DL (ref 0.5–1.6)
GAMMA GLOB SERPL ELPH-MCNC: 12.8 % (ref 12–22)
IGG/ALB SER: 1.39 {RATIO} (ref 1.1–1.8)
INTERPRETATION UR IFE-IMP: NORMAL
PROT PATTERN SERPL ELPH-IMP: NORMAL
PROT SERPL-MCNC: 7.1 G/DL (ref 6.4–8.2)

## 2020-03-16 ENCOUNTER — OFFICE VISIT (OUTPATIENT)
Dept: NEPHROLOGY | Facility: CLINIC | Age: 60
End: 2020-03-16
Payer: COMMERCIAL

## 2020-03-16 VITALS
DIASTOLIC BLOOD PRESSURE: 68 MMHG | BODY MASS INDEX: 50.61 KG/M2 | HEART RATE: 86 BPM | WEIGHT: 275 LBS | HEIGHT: 62 IN | SYSTOLIC BLOOD PRESSURE: 132 MMHG | OXYGEN SATURATION: 96 %

## 2020-03-16 DIAGNOSIS — R80.9 PROTEINURIA, UNSPECIFIED TYPE: Primary | ICD-10-CM

## 2020-03-16 DIAGNOSIS — E11.65 TYPE 2 DIABETES MELLITUS WITH HYPERGLYCEMIA, WITH LONG-TERM CURRENT USE OF INSULIN (HCC): ICD-10-CM

## 2020-03-16 DIAGNOSIS — Z79.4 TYPE 2 DIABETES MELLITUS WITH HYPERGLYCEMIA, WITH LONG-TERM CURRENT USE OF INSULIN (HCC): ICD-10-CM

## 2020-03-16 DIAGNOSIS — E66.01 MORBID OBESITY WITH BMI OF 50.0-59.9, ADULT (HCC): Chronic | ICD-10-CM

## 2020-03-16 DIAGNOSIS — N18.30 STAGE 3 CHRONIC KIDNEY DISEASE (HCC): ICD-10-CM

## 2020-03-16 PROCEDURE — 99214 OFFICE O/P EST MOD 30 MIN: CPT | Performed by: INTERNAL MEDICINE

## 2020-03-16 PROCEDURE — 1036F TOBACCO NON-USER: CPT | Performed by: INTERNAL MEDICINE

## 2020-03-16 PROCEDURE — 3008F BODY MASS INDEX DOCD: CPT | Performed by: INTERNAL MEDICINE

## 2020-03-16 PROCEDURE — 3075F SYST BP GE 130 - 139MM HG: CPT | Performed by: INTERNAL MEDICINE

## 2020-03-16 PROCEDURE — 3078F DIAST BP <80 MM HG: CPT | Performed by: INTERNAL MEDICINE

## 2020-03-16 NOTE — ASSESSMENT & PLAN NOTE
Renal function stable, continue to focus on blood sugar control  Patient may be losing her insurance and can month which may complicate matters with respect to medication compliance

## 2020-03-16 NOTE — TELEPHONE ENCOUNTER
Received a fax from the pharmacy that patient needed auth for her Oxycodone/acetaminophen  Auth was faxed to [id number] determination

## 2020-03-16 NOTE — PROGRESS NOTES
[facility] [place], [place]    Name: Jeffrey Paiz  YOB: 1960      Assessment/Plan:    Stage 3 chronic kidney disease [facility])  Renal function stable, continue to focus on blood sugar control  Patient may be losing her insurance and can month which may complicate matters with respect to medication compliance  Proteinuria    Patient had serum electrophoresis performed  She came back positive for faint potential band, immunofixation was negative  However, we will need to repeat this in approximately 6-12 months to ensure it comes back negative  Type 2 diabetes mellitus with hyperglycemia, with long-term current use of insulin ([facility])    Blood sugar significantly worsened over last 3-4 months  She is taking medications as prescribed  However, patient appears to be overtly depressed and may not be following along with her diet appropriately  There is also possibility that she may not be taking medications as prescribed, unclear at this time  Patient has the specific issue of being recently  as well as the strong likelihood that she will be losing her healthcare insurance at the end of this month  Lab Results   Component Value Date    HGBA1C 10 1 (H) 03/12/2020       Morbid obesity with BMI of 50 0-59 9, adult ([address] )    Encouraged to focus on diet for blood sugar control as well as weight management  Look for negative caloric intake  Problem List Items Addressed This Visit        Endocrine    Type 2 diabetes mellitus with hyperglycemia, with long-term current use of insulin ([address] )       Blood sugar significantly worsened over last 3-4 months  She is taking medications as prescribed  However, patient appears to be overtly depressed and may not be following along with her diet appropriately  There is also possibility that she may not be taking medications as prescribed, unclear at this time    Patient has the specific issue of being recently  as well as the strong likelihood that she will be losing her healthcare insurance at the end of this month  Lab Results   Component Value Date    HGBA1C 10 1 (H) 03/12/2020               Genitourinary    Stage 3 chronic kidney disease (HCC) (Chronic)     Renal function stable, continue to focus on blood sugar control  Patient may be losing her insurance and can month which may complicate matters with respect to medication compliance  Relevant Orders    Immunofixation IgA,IgG,IgM Qt  Immunofixation Serum Immunoglobulin    Protein electrophoresis, serum    Protein electrophoresis, urine    Comprehensive metabolic panel    IMMUNOFIXATION (NAV) AND PROTEIN ELECTROPHORESIS, RANDOM URINE       Other    Morbid obesity with BMI of 50 0-59 9, adult (HCC) (Chronic)       Encouraged to focus on diet for blood sugar control as well as weight management  Look for negative caloric intake  Proteinuria - Primary       Patient had serum electrophoresis performed  She came back positive for faint potential band, immunofixation was negative  However, we will need to repeat this in approximately 6-12 months to ensure it comes back negative  Relevant Orders    Immunofixation IgA,IgG,IgM Qt  Immunofixation Serum Immunoglobulin    Protein electrophoresis, serum    Protein electrophoresis, urine    Comprehensive metabolic panel    IMMUNOFIXATION (NAV) AND PROTEIN ELECTROPHORESIS, RANDOM URINE            Patient appears to be depressed due to multiple social issues at this time  She appears to be handling it fairly well, although is obvious that there are some issues when on her  I gave recommendations to follow up with the local [place] state representative as well as the hospital case workers should she not be able to maintain her healthcare insurance  Patient is at very high risk of becoming critically ill if she does not have access to appropriate medications        We will see her back in approximately 6 months, blood work to be done prior 2  If she does not have insurance, she will postpone this appointment to the new year, January of 2021  Subjective:      Patient ID: Jimbo Davila is a 62 y o  female  Reviewed labs with the patient  Hypertension   This is a chronic problem  The current episode started more than 1 year ago  The problem is unchanged  The problem is controlled  Pertinent negatives include no chest pain or orthopnea  There are no associated agents to hypertension  Risk factors for coronary artery disease include diabetes mellitus, obesity and post-menopausal state  Past treatments include ACE inhibitors  Compliance problems include diet  There is no history of kidney disease  There is no history of chronic renal disease  The following portions of the patient's history were reviewed and updated as appropriate: allergies, current medications, past family history, past medical history, past social history, past surgical history and problem list     Review of Systems   Cardiovascular: Negative for chest pain and orthopnea  All other systems reviewed and are negative          Social History     Socioeconomic History    Marital status: /Civil Union     Spouse name: None    Number of children: None    Years of education: None    Highest education level: None   Occupational History    None   Social Needs    Financial resource strain: None    Food insecurity:     Worry: None     Inability: None    Transportation needs:     Medical: None     Non-medical: None   Tobacco Use    Smoking status: Never Smoker    Smokeless tobacco: Never Used   Substance and Sexual Activity    Alcohol use: Yes     Comment: OCCASIONALLY    Drug use: Yes     Comment: CBD    Sexual activity: Never   Lifestyle    Physical activity:     Days per week: None     Minutes per session: None    Stress: None   Relationships    Social connections:     Talks on phone: None Gets together: None     Attends Restoration service: None     Active member of club or organization: None     Attends meetings of clubs or organizations: None     Relationship status: None    Intimate partner violence:     Fear of current or ex partner: None     Emotionally abused: None     Physically abused: None     Forced sexual activity: None   Other Topics Concern    None   Social History Narrative    None     Past Medical History:   Diagnosis Date    Anesthesia related hyperthermia     pt states she had high fevers after her lipoma surgery in  at our hospital and was shipped to [facility] where they said t was from an anesthesia med    Anxiety     Arthritis     Asthma     Chronic pain     Depression     Diabetes mellitus ([facility] [address] )     GERD (gastroesophageal reflux disease)     Hyperlipidemia     Malignant hyperthermia due to anesthesia     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     Neuropathy     Obesity     PCOS (polycystic ovarian syndrome)      Past Surgical History:   Procedure Laterality Date    CERVICAL CONIZATION   W/ LASER       SECTION      DILATION AND CURETTAGE OF UTERUS      ENDOMETRIAL ABLATION      ENDOMETRIAL BIOPSY      EPIDURAL BLOCK INJECTION Bilateral 2019    Procedure: L5-S1 transforaminal epidural steroid injection;  Surgeon: Chante Castañeda MD;  Location: [facility];  Service: Pain Management     EPIDURAL BLOCK INJECTION Bilateral 2019    Procedure: L5-S1 transforaminal epidural steroid injection;  Surgeon: Chante Castañeda MD;  Location: [facility];  Service: Pain Management     FL GUIDED NEEDLE PLAC BX/ASP/INJ  2019    FL GUIDED NEEDLE PLAC BX/ASP/INJ  2019    FOOT SURGERY Left     HERNIA REPAIR      INDUCED       IR PICC LINE  2019    JOINT REPLACEMENT      KNEE ARTHROPLASTY Right     TX DEBRIDEMENT OPEN WOUND 20 SQ CM< Right 2018    Procedure: DEBRIDEMENT HAND/FINGER [facility] OUT); Surgeon: Lenka Price MD;  Location: [facility] MAIN OR;  Service: General    UT EXCISION TUMOR SOFT TISSUE BACK/FLANK SUBQ 3+CM N/A 12/4/2018    Procedure: BACK LIPOMA EXCISION;  Surgeon: Lenka Price MD;  Location: [facility] MAIN OR;  Service: General    ROTATOR CUFF REPAIR Right     TONSILLECTOMY         Current Outpatient Medications:     albuterol (VENTOLIN HFA) 90 mcg/act inhaler, Inhale 2 puffs every 6 (six) hours as needed for wheezing, Disp: 1 Inhaler, Rfl: 0    ammonium lactate (LAC-HYDRIN) 12 % lotion, , Disp: , Rfl: 0    aspirin 81 mg chewable tablet, Chew 1 tablet (81 mg total) daily, Disp: 30 tablet, Rfl: 0    atorvastatin (LIPITOR) 40 mg tablet, Take 1 tablet (40 mg total) by mouth daily with dinner, Disp: 30 tablet, Rfl: 0    baclofen 10 mg tablet, Take 1 tablet (10 mg total) by mouth 2 (two) times a day As needed for spasms, Disp: 60 tablet, Rfl: 1    BD INSULIN SYRINGE U/F 31G X 5/16" 1 ML MISC, , Disp: , Rfl: 0    Blood Glucose Monitoring Suppl (ONETOUCH VERIO FLEX SYSTEM) w/Device KIT, Use as directed, Disp: 1 kit, Rfl: 0    Calcium Carb-Cholecalciferol (CALCIUM 600+D3 PO), Take by mouth daily  , Disp: , Rfl:     cholecalciferol (VITAMIN D3) 400 units tablet, Take 400 Units by mouth daily, Disp: , Rfl:     clotrimazole-betamethasone (LOTRISONE) 1-0 05 % cream, Apply topically 2 (two) times a day, Disp: 90 g, Rfl: 1    ergocalciferol (ERGOCALCIFEROL) 1 25 MG (84223 UT) capsule, Take 1 capsule (50,000 Units total) by mouth once a week, Disp: 4 capsule, Rfl: 5    furosemide (LASIX) 20 mg tablet, Take 20 mg by mouth daily, Disp: , Rfl:     glucagon (GLUCAGON EMERGENCY) 1 MG injection, Inject 1 mg under the skin once as needed (PRN blood glucose less than 70 if unconscious or uncorrectable by oral means) for up to 1 dose, Disp: 1 kit, Rfl: 0    insulin aspart protamine-insulin aspart (NOVOLOG MIX 70/30 FLEXPEN) 100 Units/mL injection pen, Inject 60 Units under the skin 2 (two) times a day before meals, Disp: 12 pen, Rfl: 5    Insulin Syringes, Disposable, U-100 1 ML MISC, Use three times daily  , Disp: 100 each, Rfl: 5    Lancets (FREESTYLE) lancets, Check glucose three times daily  , Disp: 100 each, Rfl: 3    Lido-Menthol-Methyl Sal-Camph (CBD KINGS EX), Apply topically, Disp: , Rfl:     lidocaine (LMX) 4 % cream, LMX, [place] [employer], etc  - look for * LIDOCAINE * on the label, Disp: 30 g, Rfl: 0    lisinopril (ZESTRIL) 5 mg tablet, Take 1 tablet (5 mg total) by mouth daily, Disp: 30 tablet, Rfl: 0    meclizine (ANTIVERT) 25 mg tablet, Take 25 mg by mouth Three times daily as needed, Disp: , Rfl:     multivitamin (THERAGRAN) TABS, Take 1 tablet by mouth daily, Disp: , Rfl: 0    Omega-3 Fatty Acids (FISH OIL ADULT GUMMIES PO), Take by mouth daily  , Disp: , Rfl:     ondansetron (ZOFRAN) 4 mg tablet, swallow 1 tablet every 8 hours if needed for nausea and vomiting, Disp: 45 tablet, Rfl: 1    oxyCODONE-acetaminophen (PERCOCET) 7 5-325 MG per tablet, 1 PO 3 times daily x 1 week, then 1 PO twice daily x 1 week, then 1 PO daily x 1 week, then discontinue , Disp: 42 tablet, Rfl: 0    Semaglutide,0 25 or 0 5MG/DOS, (OZEMPIC, 0 25 OR 0 5 MG/DOSE,) 2 MG/1 5ML SOPN, Inject 0 25 mg under the skin once a week, Disp: 1 pen, Rfl: 1    gabapentin (NEURONTIN) 600 MG tablet, Take 1 tablet (600 mg total) by mouth 3 (three) times a day, Disp: 90 tablet, Rfl: 1    Lab Results   Component Value Date    SODIUM 140 03/12/2020    K 3 9 03/12/2020     03/12/2020    CO2 30 03/12/2020    AGAP 6 03/12/2020    BUN 16 03/12/2020    CREATININE 0 96 03/12/2020    GLUC 244 (H) 11/12/2019    GLUF 255 (H) 03/12/2020    CALCIUM 9 4 03/12/2020    AST 32 03/12/2020    ALT 39 03/12/2020    ALKPHOS 100 03/12/2020    TP 7 1 03/12/2020    TP 7 4 03/12/2020    TBILI 0 47 03/12/2020    EGFR 64 03/12/2020     Lab Results   Component Value Date    WBC 6 02 03/12/2020    HGB 13 9 03/12/2020    HCT 44 0 03/12/2020    MCV 85 03/12/2020  03/12/2020     Lab Results   Component Value Date    CHOLESTEROL 187 12/05/2019    CHOLESTEROL 234 (H) 10/30/2019    CHOLESTEROL 170 08/19/2019     Lab Results   Component Value Date    HDL 46 12/05/2019    HDL 44 10/30/2019    HDL 45 08/19/2019     Lab Results   Component Value Date    LDLCALC 102 (H) 12/05/2019    LDLCALC 125 (H) 10/30/2019    LDLCALC 82 08/19/2019     Lab Results   Component Value Date    TRIG 197 (H) 12/05/2019    TRIG 324 (H) 10/30/2019    TRIG 215 (H) 08/19/2019     No results found for: [place], [place]  Lab Results   Component Value Date    YJL5SQTENBKE 2 610 12/05/2019     Lab Results   Component Value Date    CALCIUM 9 4 03/12/2020    PHOS 4 2 (H) 10/18/2019     Lab Results   Component Value Date    SPEP  03/12/2020     The SPEP shows a faint possible band in the gamma region  Immunofixation to be performed  Reviewed by: Marry Cadena MD  ([id number])  **Electronic Signature**     No results found for: Elliott Ku        Objective:      /68 (BP Location: Left arm, Patient Position: Sitting, Cuff Size: Large)   Pulse 86   Ht 5' 2" (1 575 m)   Wt 125 kg (275 lb)   SpO2 96%   BMI 50 30 kg/m²          Physical Exam   Constitutional: She is oriented to person, place, and time  She appears well-developed and well-nourished  No distress  HENT:   Head: Normocephalic and atraumatic  Eyes: Conjunctivae are normal    Neck: Neck supple  Cardiovascular: Normal rate and regular rhythm  Pulmonary/Chest: Effort normal and breath sounds normal    Abdominal: Soft  Musculoskeletal: She exhibits no edema  Neurological: She is alert and oriented to person, place, and time  No cranial nerve deficit  Skin: Skin is warm  No rash noted  Psychiatric: She has a normal mood and affect   Her behavior is normal

## 2020-03-16 NOTE — ASSESSMENT & PLAN NOTE
Encouraged to focus on diet for blood sugar control as well as weight management  Look for negative caloric intake

## 2020-03-16 NOTE — ASSESSMENT & PLAN NOTE
Patient had serum electrophoresis performed  She came back positive for faint potential band, immunofixation was negative  However, we will need to repeat this in approximately 6-12 months to ensure it comes back negative

## 2020-03-16 NOTE — ASSESSMENT & PLAN NOTE
Blood sugar significantly worsened over last 3-4 months  She is taking medications as prescribed  However, patient appears to be overtly depressed and may not be following along with her diet appropriately  There is also possibility that she may not be taking medications as prescribed, unclear at this time  Patient has the specific issue of being recently  as well as the strong likelihood that she will be losing her healthcare insurance at the end of this month      Lab Results   Component Value Date    HGBA1C 10 1 (H) 03/12/2020

## 2020-03-17 LAB — HLA-B27 QL NAA+PROBE: NEGATIVE

## 2020-03-17 NOTE — TELEPHONE ENCOUNTER
She had a urine drug screen on 02/26/2020  It was not ordered by us, but it was done and is under the lab section of her chart  That should do if we let them know    Thanks,  [employer]

## 2020-03-17 NOTE — TELEPHONE ENCOUNTER
Received a fax that the patients Oxycodone was denied because the patient does not have a current UDS on file  I called [facility] and they stated that the UDS would need to be within 3 months of the auth date  I asked if even though the patient was weaning off the medication if they would still need an updated UDS  They stated the only way it can be apporoved is for the patient to complete an updated UDS  Please adivsed as to how you want to proceed  Thanks

## 2020-03-17 NOTE — TELEPHONE ENCOUNTER
Called and updated [facility]  They will have a determination within 24 hours

## 2020-03-19 NOTE — TELEPHONE ENCOUNTER
Called and spoke with patient and advised her that her Sheila Marty was denied, at that point I called [facility] and asked them to reconsider with new information provided

## 2020-03-19 NOTE — TELEPHONE ENCOUNTER
Pt called stating that [facility] sent a letter to her home stating that it was denied  Pt states she would like to know the status of this   Pt advised that the letter was dated march 16th     Pt can be reached at [phone]

## 2020-03-20 NOTE — TELEPHONE ENCOUNTER
Received PA for Oxycodone 7 5-325mg from [employer] from 3/19/2020 - 9/19/2020  [facility] and patient and advised of authorization  Pt was appreciative

## 2020-03-20 NOTE — TELEPHONE ENCOUNTER
Received call back from Britt Edwards at [facility] stating that the PA was not going through and that his script was for 120 tables and the auth is for 68  Pulled script in Epic and read script to him  Per Britt Edwards he was looking at an old script from [facility] [employer] and not the script from [employer], [address]  He will reprocess the PA for the current script from [place] and call back if any problems

## 2020-04-21 ENCOUNTER — TELEPHONE (OUTPATIENT)
Dept: PAIN MEDICINE | Facility: MEDICAL CENTER | Age: 60
End: 2020-04-21

## 2020-04-21 DIAGNOSIS — G89.29 CHRONIC BILATERAL LOW BACK PAIN WITH BILATERAL SCIATICA: Primary | ICD-10-CM

## 2020-04-21 DIAGNOSIS — M54.42 CHRONIC BILATERAL LOW BACK PAIN WITH BILATERAL SCIATICA: Primary | ICD-10-CM

## 2020-04-21 DIAGNOSIS — M54.16 LUMBAR RADICULOPATHY: ICD-10-CM

## 2020-04-21 DIAGNOSIS — M54.41 CHRONIC BILATERAL LOW BACK PAIN WITH BILATERAL SCIATICA: Primary | ICD-10-CM

## 2020-04-22 RX ORDER — NORTRIPTYLINE HYDROCHLORIDE 10 MG/1
10 CAPSULE ORAL
Qty: 30 CAPSULE | Refills: 1 | Status: SHIPPED | OUTPATIENT
Start: 2020-04-22 | End: 2021-10-22

## 2020-05-06 ENCOUNTER — APPOINTMENT (OUTPATIENT)
Dept: LAB | Facility: CLINIC | Age: 60
End: 2020-05-06
Payer: COMMERCIAL

## 2020-05-06 ENCOUNTER — TRANSCRIBE ORDERS (OUTPATIENT)
Dept: ADMINISTRATIVE | Facility: HOSPITAL | Age: 60
End: 2020-05-06

## 2020-05-06 DIAGNOSIS — E11.9 TYPE 2 DIABETES MELLITUS WITHOUT COMPLICATION, UNSPECIFIED WHETHER LONG TERM INSULIN USE (HCC): ICD-10-CM

## 2020-05-06 DIAGNOSIS — E78.5 SERUM LIPIDS HIGH: ICD-10-CM

## 2020-05-06 DIAGNOSIS — E11.9 TYPE 2 DIABETES MELLITUS WITHOUT COMPLICATION, UNSPECIFIED WHETHER LONG TERM INSULIN USE (HCC): Primary | ICD-10-CM

## 2020-05-06 LAB
ALBUMIN SERPL BCP-MCNC: 3.5 G/DL (ref 3.5–5)
ALP SERPL-CCNC: 111 U/L (ref 46–116)
ALT SERPL W P-5'-P-CCNC: 48 U/L (ref 12–78)
ANION GAP SERPL CALCULATED.3IONS-SCNC: 6 MMOL/L (ref 4–13)
AST SERPL W P-5'-P-CCNC: 35 U/L (ref 5–45)
BACTERIA UR QL AUTO: ABNORMAL /HPF
BILIRUB SERPL-MCNC: 0.57 MG/DL (ref 0.2–1)
BILIRUB UR QL STRIP: NEGATIVE
BUN SERPL-MCNC: 14 MG/DL (ref 5–25)
CALCIUM SERPL-MCNC: 8.7 MG/DL (ref 8.3–10.1)
CHLORIDE SERPL-SCNC: 104 MMOL/L (ref 100–108)
CHOLEST SERPL-MCNC: 118 MG/DL (ref 50–200)
CLARITY UR: CLEAR
CO2 SERPL-SCNC: 29 MMOL/L (ref 21–32)
COLOR UR: YELLOW
CREAT SERPL-MCNC: 1.01 MG/DL (ref 0.6–1.3)
CREAT UR-MCNC: 94.1 MG/DL
EST. AVERAGE GLUCOSE BLD GHB EST-MCNC: 269 MG/DL
GFR SERPL CREATININE-BSD FRML MDRD: 60 ML/MIN/1.73SQ M
GLUCOSE P FAST SERPL-MCNC: 222 MG/DL (ref 65–99)
GLUCOSE UR STRIP-MCNC: NEGATIVE MG/DL
HBA1C MFR BLD: 11 %
HDLC SERPL-MCNC: 43 MG/DL
HGB UR QL STRIP.AUTO: NEGATIVE
HYALINE CASTS #/AREA URNS LPF: ABNORMAL /LPF
KETONES UR STRIP-MCNC: NEGATIVE MG/DL
LDLC SERPL CALC-MCNC: 34 MG/DL (ref 0–100)
LEUKOCYTE ESTERASE UR QL STRIP: ABNORMAL
MICROALBUMIN UR-MCNC: 138 MG/L (ref 0–20)
MICROALBUMIN/CREAT 24H UR: 147 MG/G CREATININE (ref 0–30)
NITRITE UR QL STRIP: NEGATIVE
NON-SQ EPI CELLS URNS QL MICRO: ABNORMAL /HPF
NONHDLC SERPL-MCNC: 75 MG/DL
PH UR STRIP.AUTO: 6 [PH]
POTASSIUM SERPL-SCNC: 3.9 MMOL/L (ref 3.5–5.3)
PROT SERPL-MCNC: 7 G/DL (ref 6.4–8.2)
PROT UR STRIP-MCNC: ABNORMAL MG/DL
RBC #/AREA URNS AUTO: ABNORMAL /HPF
SODIUM SERPL-SCNC: 139 MMOL/L (ref 136–145)
SP GR UR STRIP.AUTO: 1.01 (ref 1–1.03)
TRIGL SERPL-MCNC: 205 MG/DL
TSH SERPL DL<=0.05 MIU/L-ACNC: 2.53 UIU/ML (ref 0.36–3.74)
UROBILINOGEN UR QL STRIP.AUTO: 0.2 E.U./DL
WBC #/AREA URNS AUTO: ABNORMAL /HPF

## 2020-05-06 PROCEDURE — 82043 UR ALBUMIN QUANTITATIVE: CPT | Performed by: FAMILY MEDICINE

## 2020-05-06 PROCEDURE — 83036 HEMOGLOBIN GLYCOSYLATED A1C: CPT

## 2020-05-06 PROCEDURE — 82570 ASSAY OF URINE CREATININE: CPT | Performed by: FAMILY MEDICINE

## 2020-05-06 PROCEDURE — 3060F POS MICROALBUMINURIA REV: CPT | Performed by: INTERNAL MEDICINE

## 2020-05-06 PROCEDURE — 84443 ASSAY THYROID STIM HORMONE: CPT

## 2020-05-06 PROCEDURE — 81001 URINALYSIS AUTO W/SCOPE: CPT | Performed by: FAMILY MEDICINE

## 2020-05-06 PROCEDURE — 80053 COMPREHEN METABOLIC PANEL: CPT

## 2020-05-06 PROCEDURE — 36415 COLL VENOUS BLD VENIPUNCTURE: CPT

## 2020-05-06 PROCEDURE — 80061 LIPID PANEL: CPT

## 2020-05-06 PROCEDURE — 3046F HEMOGLOBIN A1C LEVEL >9.0%: CPT | Performed by: INTERNAL MEDICINE

## 2020-07-12 ENCOUNTER — APPOINTMENT (EMERGENCY)
Dept: CT IMAGING | Facility: HOSPITAL | Age: 60
End: 2020-07-12
Payer: COMMERCIAL

## 2020-07-12 ENCOUNTER — HOSPITAL ENCOUNTER (EMERGENCY)
Facility: HOSPITAL | Age: 60
Discharge: HOME | End: 2020-07-12
Attending: EMERGENCY MEDICINE | Admitting: EMERGENCY MEDICINE
Payer: COMMERCIAL

## 2020-07-12 VITALS
RESPIRATION RATE: 18 BRPM | SYSTOLIC BLOOD PRESSURE: 130 MMHG | OXYGEN SATURATION: 94 % | HEART RATE: 70 BPM | BODY MASS INDEX: 49.38 KG/M2 | TEMPERATURE: 97.8 F | DIASTOLIC BLOOD PRESSURE: 66 MMHG | WEIGHT: 270 LBS
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
## 2020-07-12 DIAGNOSIS — M54.2 NECK PAIN: ICD-10-CM

## 2020-07-12 DIAGNOSIS — S06.0X9A CONCUSSION: ICD-10-CM

## 2020-07-12 LAB — GLUCOSE SERPL-MCNC: 241 MG/DL (ref 65–140)

## 2020-07-12 PROCEDURE — 96372 THER/PROPH/DIAG INJ SC/IM: CPT

## 2020-07-12 PROCEDURE — 99285 EMERGENCY DEPT VISIT HI MDM: CPT | Performed by: EMERGENCY MEDICINE

## 2020-07-12 PROCEDURE — 99284 EMERGENCY DEPT VISIT MOD MDM: CPT

## 2020-07-12 PROCEDURE — 72125 CT NECK SPINE W/O DYE: CPT

## 2020-07-12 PROCEDURE — 96374 THER/PROPH/DIAG INJ IV PUSH: CPT

## 2020-07-12 PROCEDURE — 82948 REAGENT STRIP/BLOOD GLUCOSE: CPT

## 2020-07-12 PROCEDURE — 70450 CT HEAD/BRAIN W/O DYE: CPT

## 2020-07-12 RX ORDER — ACETAMINOPHEN 325 MG/1
975 TABLET ORAL ONCE
Status: COMPLETED | OUTPATIENT
Start: 2020-07-12 | End: 2020-07-12

## 2020-07-12 RX ORDER — ONDANSETRON 2 MG/ML
4 INJECTION INTRAMUSCULAR; INTRAVENOUS ONCE
Status: COMPLETED | OUTPATIENT
Start: 2020-07-12 | End: 2020-07-12

## 2020-07-12 RX ORDER — INSULIN ASPART 100 [IU]/ML
60 INJECTION, SUSPENSION SUBCUTANEOUS ONCE
Status: COMPLETED | OUTPATIENT
Start: 2020-07-12 | End: 2020-07-12

## 2020-07-12 RX ADMIN — ACETAMINOPHEN 975 MG: 325 TABLET ORAL at 21:11

## 2020-07-12 RX ADMIN — ONDANSETRON 4 MG: 2 INJECTION INTRAMUSCULAR; INTRAVENOUS at 21:13

## 2020-07-12 RX ADMIN — INSULIN ASPART 60 UNITS: 100 INJECTION, SUSPENSION SUBCUTANEOUS at 22:07
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
## 2020-07-28 DIAGNOSIS — E11.65 TYPE 2 DIABETES MELLITUS WITH HYPERGLYCEMIA, UNSPECIFIED WHETHER LONG TERM INSULIN USE (HCC): ICD-10-CM

## 2020-07-28 DIAGNOSIS — M54.41 CHRONIC BILATERAL LOW BACK PAIN WITH BILATERAL SCIATICA: ICD-10-CM

## 2020-07-28 DIAGNOSIS — G89.29 CHRONIC BILATERAL LOW BACK PAIN WITH BILATERAL SCIATICA: ICD-10-CM

## 2020-07-28 DIAGNOSIS — M54.42 CHRONIC BILATERAL LOW BACK PAIN WITH BILATERAL SCIATICA: ICD-10-CM

## 2020-07-28 DIAGNOSIS — M54.16 LUMBAR RADICULOPATHY: ICD-10-CM

## 2020-07-29 RX ORDER — NORTRIPTYLINE HYDROCHLORIDE 10 MG/1
CAPSULE ORAL
Qty: 30 CAPSULE | Refills: 1 | OUTPATIENT
Start: 2020-07-29

## 2020-07-29 RX ORDER — PEN NEEDLE, DIABETIC 29 G X1/2"
NEEDLE, DISPOSABLE MISCELLANEOUS
Qty: 100 EACH | Refills: 3 | Status: SHIPPED | OUTPATIENT
Start: 2020-07-29 | End: 2021-03-08

## 2020-10-29 ENCOUNTER — HOSPITAL ENCOUNTER (EMERGENCY)
Facility: HOSPITAL | Age: 60
Discharge: HOME | End: 2020-10-30
Attending: EMERGENCY MEDICINE
Payer: COMMERCIAL

## 2020-10-29 DIAGNOSIS — M79.2 NEUROPATHIC PAIN: ICD-10-CM

## 2020-10-29 DIAGNOSIS — E83.42 HYPOMAGNESEMIA: ICD-10-CM

## 2020-10-29 DIAGNOSIS — R42 LIGHTHEADEDNESS: Primary | ICD-10-CM

## 2020-10-29 PROCEDURE — 99285 EMERGENCY DEPT VISIT HI MDM: CPT | Performed by: EMERGENCY MEDICINE

## 2020-10-29 PROCEDURE — 99285 EMERGENCY DEPT VISIT HI MDM: CPT

## 2020-10-30 ENCOUNTER — APPOINTMENT (EMERGENCY)
Dept: CT IMAGING | Facility: HOSPITAL | Age: 60
End: 2020-10-30
Payer: COMMERCIAL

## 2020-10-30 ENCOUNTER — APPOINTMENT (EMERGENCY)
Dept: RADIOLOGY | Facility: HOSPITAL | Age: 60
End: 2020-10-30
Payer: COMMERCIAL

## 2020-10-30 VITALS
HEIGHT: 62 IN | DIASTOLIC BLOOD PRESSURE: 68 MMHG | OXYGEN SATURATION: 93 % | BODY MASS INDEX: 52.9 KG/M2 | WEIGHT: 287.48 LBS | RESPIRATION RATE: 20 BRPM | HEART RATE: 66 BPM | TEMPERATURE: 97.6 F | SYSTOLIC BLOOD PRESSURE: 149 MMHG

## 2020-10-30 LAB
ALBUMIN SERPL BCP-MCNC: 3.9 G/DL (ref 3.5–5.7)
ALP SERPL-CCNC: 98 U/L (ref 55–165)
ALT SERPL W P-5'-P-CCNC: 26 U/L (ref 7–52)
ANION GAP SERPL CALCULATED.3IONS-SCNC: 11 MMOL/L (ref 4–13)
APTT PPP: 38 SECONDS (ref 23–37)
AST SERPL W P-5'-P-CCNC: 19 U/L (ref 13–39)
ATRIAL RATE: 71 BPM
BASOPHILS # BLD AUTO: 0 THOUSANDS/ΜL (ref 0–0.1)
BASOPHILS NFR BLD AUTO: 1 % (ref 0–2)
BILIRUB SERPL-MCNC: 0.4 MG/DL (ref 0.2–1)
BUN SERPL-MCNC: 22 MG/DL (ref 7–25)
CALCIUM SERPL-MCNC: 9.2 MG/DL (ref 8.6–10.5)
CHLORIDE SERPL-SCNC: 101 MMOL/L (ref 98–107)
CK SERPL-CCNC: 104 U/L (ref 30–192)
CO2 SERPL-SCNC: 25 MMOL/L (ref 21–31)
CREAT SERPL-MCNC: 0.93 MG/DL (ref 0.6–1.2)
EOSINOPHIL # BLD AUTO: 0.2 THOUSAND/ΜL (ref 0–0.61)
EOSINOPHIL NFR BLD AUTO: 3 % (ref 0–5)
ERYTHROCYTE [DISTWIDTH] IN BLOOD BY AUTOMATED COUNT: 15.6 % (ref 11.5–14.5)
GFR SERPL CREATININE-BSD FRML MDRD: 66 ML/MIN/1.73SQ M
GLUCOSE SERPL-MCNC: 252 MG/DL (ref 65–99)
HCT VFR BLD AUTO: 42.2 % (ref 42–47)
HGB BLD-MCNC: 14.3 G/DL (ref 12–16)
INR PPP: 1 (ref 0.84–1.19)
LYMPHOCYTES # BLD AUTO: 2.1 THOUSANDS/ΜL (ref 0.6–4.47)
LYMPHOCYTES NFR BLD AUTO: 39 % (ref 21–51)
MAGNESIUM SERPL-MCNC: 1.7 MG/DL (ref 1.9–2.7)
MCH RBC QN AUTO: 28.7 PG (ref 26–34)
MCHC RBC AUTO-ENTMCNC: 33.8 G/DL (ref 31–37)
MCV RBC AUTO: 85 FL (ref 81–99)
MONOCYTES # BLD AUTO: 0.3 THOUSAND/ΜL (ref 0.17–1.22)
MONOCYTES NFR BLD AUTO: 6 % (ref 2–12)
NEUTROPHILS # BLD AUTO: 2.8 THOUSANDS/ΜL (ref 1.4–6.5)
NEUTS SEG NFR BLD AUTO: 51 % (ref 42–75)
P AXIS: 59 DEGREES
PLATELET # BLD AUTO: 201 THOUSANDS/UL (ref 149–390)
PMV BLD AUTO: 8.5 FL (ref 8.6–11.7)
POTASSIUM SERPL-SCNC: 3.5 MMOL/L (ref 3.5–5.5)
PR INTERVAL: 176 MS
PROT SERPL-MCNC: 6.6 G/DL (ref 6.4–8.9)
PROTHROMBIN TIME: 13.1 SECONDS (ref 11.6–14.5)
QRS AXIS: -47 DEGREES
QRSD INTERVAL: 108 MS
QT INTERVAL: 388 MS
QTC INTERVAL: 421 MS
RBC # BLD AUTO: 4.97 MILLION/UL (ref 3.9–5.2)
SODIUM SERPL-SCNC: 137 MMOL/L (ref 134–143)
T WAVE AXIS: 48 DEGREES
TROPONIN I SERPL-MCNC: <0.03 NG/ML
TROPONIN I SERPL-MCNC: <0.03 NG/ML
VENTRICULAR RATE: 71 BPM
WBC # BLD AUTO: 5.5 THOUSAND/UL (ref 4.8–10.8)

## 2020-10-30 PROCEDURE — 85025 COMPLETE CBC W/AUTO DIFF WBC: CPT | Performed by: EMERGENCY MEDICINE

## 2020-10-30 PROCEDURE — 70498 CT ANGIOGRAPHY NECK: CPT

## 2020-10-30 PROCEDURE — 82550 ASSAY OF CK (CPK): CPT | Performed by: EMERGENCY MEDICINE

## 2020-10-30 PROCEDURE — 83735 ASSAY OF MAGNESIUM: CPT | Performed by: EMERGENCY MEDICINE

## 2020-10-30 PROCEDURE — 80053 COMPREHEN METABOLIC PANEL: CPT | Performed by: EMERGENCY MEDICINE

## 2020-10-30 PROCEDURE — G1004 CDSM NDSC: HCPCS

## 2020-10-30 PROCEDURE — 36415 COLL VENOUS BLD VENIPUNCTURE: CPT | Performed by: EMERGENCY MEDICINE

## 2020-10-30 PROCEDURE — 84484 ASSAY OF TROPONIN QUANT: CPT | Performed by: EMERGENCY MEDICINE

## 2020-10-30 PROCEDURE — 93010 ELECTROCARDIOGRAM REPORT: CPT | Performed by: INTERNAL MEDICINE

## 2020-10-30 PROCEDURE — 93005 ELECTROCARDIOGRAM TRACING: CPT

## 2020-10-30 PROCEDURE — 85730 THROMBOPLASTIN TIME PARTIAL: CPT | Performed by: EMERGENCY MEDICINE

## 2020-10-30 PROCEDURE — 71045 X-RAY EXAM CHEST 1 VIEW: CPT

## 2020-10-30 PROCEDURE — 85610 PROTHROMBIN TIME: CPT | Performed by: EMERGENCY MEDICINE

## 2020-10-30 PROCEDURE — 96375 TX/PRO/DX INJ NEW DRUG ADDON: CPT

## 2020-10-30 PROCEDURE — 96366 THER/PROPH/DIAG IV INF ADDON: CPT

## 2020-10-30 PROCEDURE — 96365 THER/PROPH/DIAG IV INF INIT: CPT

## 2020-10-30 PROCEDURE — 70496 CT ANGIOGRAPHY HEAD: CPT

## 2020-10-30 RX ORDER — MAGNESIUM SULFATE HEPTAHYDRATE 40 MG/ML
2 INJECTION, SOLUTION INTRAVENOUS ONCE
Status: COMPLETED | OUTPATIENT
Start: 2020-10-30 | End: 2020-10-30

## 2020-10-30 RX ORDER — LORAZEPAM 2 MG/ML
0.5 INJECTION INTRAMUSCULAR ONCE
Status: COMPLETED | OUTPATIENT
Start: 2020-10-30 | End: 2020-10-30

## 2020-10-30 RX ADMIN — MAGNESIUM SULFATE IN WATER 2 G: 40 INJECTION, SOLUTION INTRAVENOUS at 01:57

## 2020-10-30 RX ADMIN — SODIUM CHLORIDE 1000 ML: 0.9 INJECTION, SOLUTION INTRAVENOUS at 01:30

## 2020-10-30 RX ADMIN — IOHEXOL 85 ML: 350 INJECTION, SOLUTION INTRAVENOUS at 01:33

## 2020-10-30 RX ADMIN — LORAZEPAM 0.5 MG: 2 INJECTION INTRAMUSCULAR; INTRAVENOUS at 00:23

## 2020-11-02 ENCOUNTER — APPOINTMENT (OUTPATIENT)
Dept: LAB | Facility: CLINIC | Age: 60
End: 2020-11-02
Payer: COMMERCIAL

## 2020-11-02 ENCOUNTER — TRANSCRIBE ORDERS (OUTPATIENT)
Dept: RADIOLOGY | Facility: CLINIC | Age: 60
End: 2020-11-02

## 2020-11-02 DIAGNOSIS — R19.7 WATERY DIARRHEA: Primary | ICD-10-CM

## 2020-11-02 PROCEDURE — 87493 C DIFF AMPLIFIED PROBE: CPT | Performed by: OBSTETRICS & GYNECOLOGY

## 2020-11-03 ENCOUNTER — TRANSCRIBE ORDERS (OUTPATIENT)
Dept: ADMINISTRATIVE | Facility: HOSPITAL | Age: 60
End: 2020-11-03

## 2020-11-03 ENCOUNTER — LAB (OUTPATIENT)
Dept: LAB | Facility: CLINIC | Age: 60
End: 2020-11-03
Payer: COMMERCIAL

## 2020-11-03 DIAGNOSIS — R19.7 DIARRHEA OF PRESUMED INFECTIOUS ORIGIN: ICD-10-CM

## 2020-11-03 DIAGNOSIS — R19.7 DIARRHEA OF PRESUMED INFECTIOUS ORIGIN: Primary | ICD-10-CM

## 2020-11-03 LAB
C DIFF TOX A+B STL QL IA: NEGATIVE
C DIFF TOX B TCDB STL QL NAA+PROBE: POSITIVE

## 2020-11-03 PROCEDURE — 87209 SMEAR COMPLEX STAIN: CPT

## 2020-11-03 PROCEDURE — 36415 COLL VENOUS BLD VENIPUNCTURE: CPT

## 2020-11-03 PROCEDURE — 87177 OVA AND PARASITES SMEARS: CPT

## 2020-11-03 PROCEDURE — 87046 STOOL CULTR AEROBIC BACT EA: CPT

## 2020-11-03 PROCEDURE — 87427 SHIGA-LIKE TOXIN AG IA: CPT

## 2020-11-03 PROCEDURE — 87045 FECES CULTURE AEROBIC BACT: CPT

## 2020-11-03 PROCEDURE — 87205 SMEAR GRAM STAIN: CPT

## 2020-11-04 LAB
O+P STL CONC: NORMAL
WBC STL QL MICRO: NORMAL

## 2020-11-10 ENCOUNTER — LAB (OUTPATIENT)
Dept: LAB | Facility: CLINIC | Age: 60
End: 2020-11-10
Payer: COMMERCIAL

## 2020-11-10 ENCOUNTER — TRANSCRIBE ORDERS (OUTPATIENT)
Dept: LAB | Facility: CLINIC | Age: 60
End: 2020-11-10

## 2020-11-10 DIAGNOSIS — R53.83 FATIGUE, UNSPECIFIED TYPE: ICD-10-CM

## 2020-11-10 DIAGNOSIS — E11.65 TYPE 2 DIABETES MELLITUS WITH HYPERGLYCEMIA, WITH LONG-TERM CURRENT USE OF INSULIN (HCC): Primary | ICD-10-CM

## 2020-11-10 DIAGNOSIS — Z79.4 TYPE 2 DIABETES MELLITUS WITH HYPERGLYCEMIA, WITH LONG-TERM CURRENT USE OF INSULIN (HCC): Primary | ICD-10-CM

## 2020-11-10 DIAGNOSIS — R42 VERTIGO: ICD-10-CM

## 2020-11-10 DIAGNOSIS — Z79.4 TYPE 2 DIABETES MELLITUS WITH HYPERGLYCEMIA, WITH LONG-TERM CURRENT USE OF INSULIN (HCC): ICD-10-CM

## 2020-11-10 DIAGNOSIS — E11.65 TYPE 2 DIABETES MELLITUS WITH HYPERGLYCEMIA, WITH LONG-TERM CURRENT USE OF INSULIN (HCC): ICD-10-CM

## 2020-11-10 LAB
ALBUMIN SERPL BCP-MCNC: 3.3 G/DL (ref 3.5–5)
ALP SERPL-CCNC: 100 U/L (ref 46–116)
ALT SERPL W P-5'-P-CCNC: 41 U/L (ref 12–78)
ANION GAP SERPL CALCULATED.3IONS-SCNC: 6 MMOL/L (ref 4–13)
AST SERPL W P-5'-P-CCNC: 33 U/L (ref 5–45)
BACTERIA UR QL AUTO: ABNORMAL /HPF
BASOPHILS # BLD AUTO: 0.05 THOUSANDS/ΜL (ref 0–0.1)
BASOPHILS NFR BLD AUTO: 1 % (ref 0–1)
BILIRUB SERPL-MCNC: 0.35 MG/DL (ref 0.2–1)
BILIRUB UR QL STRIP: NEGATIVE
BUN SERPL-MCNC: 16 MG/DL (ref 5–25)
CALCIUM ALBUM COR SERPL-MCNC: 9.8 MG/DL (ref 8.3–10.1)
CALCIUM SERPL-MCNC: 9.2 MG/DL (ref 8.3–10.1)
CHLORIDE SERPL-SCNC: 103 MMOL/L (ref 100–108)
CLARITY UR: CLEAR
CO2 SERPL-SCNC: 29 MMOL/L (ref 21–32)
COLOR UR: YELLOW
CREAT SERPL-MCNC: 1.17 MG/DL (ref 0.6–1.3)
CREAT UR-MCNC: 115 MG/DL
EOSINOPHIL # BLD AUTO: 0.42 THOUSAND/ΜL (ref 0–0.61)
EOSINOPHIL NFR BLD AUTO: 4 % (ref 0–6)
ERYTHROCYTE [DISTWIDTH] IN BLOOD BY AUTOMATED COUNT: 15 % (ref 11.6–15.1)
EST. AVERAGE GLUCOSE BLD GHB EST-MCNC: 260 MG/DL
FOLATE SERPL-MCNC: >20 NG/ML (ref 3.1–17.5)
GFR SERPL CREATININE-BSD FRML MDRD: 50 ML/MIN/1.73SQ M
GLUCOSE P FAST SERPL-MCNC: 401 MG/DL (ref 65–99)
GLUCOSE UR STRIP-MCNC: ABNORMAL MG/DL
HBA1C MFR BLD: 10.7 %
HCT VFR BLD AUTO: 45 % (ref 34.8–46.1)
HGB BLD-MCNC: 14.3 G/DL (ref 11.5–15.4)
HGB UR QL STRIP.AUTO: NEGATIVE
HYALINE CASTS #/AREA URNS LPF: ABNORMAL /LPF
IMM GRANULOCYTES # BLD AUTO: 0.05 THOUSAND/UL (ref 0–0.2)
IMM GRANULOCYTES NFR BLD AUTO: 1 % (ref 0–2)
KETONES UR STRIP-MCNC: NEGATIVE MG/DL
LEUKOCYTE ESTERASE UR QL STRIP: ABNORMAL
LYMPHOCYTES # BLD AUTO: 2.97 THOUSANDS/ΜL (ref 0.6–4.47)
LYMPHOCYTES NFR BLD AUTO: 31 % (ref 14–44)
MAGNESIUM SERPL-MCNC: 2 MG/DL (ref 1.6–2.6)
MCH RBC QN AUTO: 28.3 PG (ref 26.8–34.3)
MCHC RBC AUTO-ENTMCNC: 31.8 G/DL (ref 31.4–37.4)
MCV RBC AUTO: 89 FL (ref 82–98)
MICROALBUMIN UR-MCNC: 22.1 MG/L (ref 0–20)
MICROALBUMIN/CREAT 24H UR: 19 MG/G CREATININE (ref 0–30)
MONOCYTES # BLD AUTO: 0.54 THOUSAND/ΜL (ref 0.17–1.22)
MONOCYTES NFR BLD AUTO: 6 % (ref 4–12)
NEUTROPHILS # BLD AUTO: 5.58 THOUSANDS/ΜL (ref 1.85–7.62)
NEUTS SEG NFR BLD AUTO: 57 % (ref 43–75)
NITRITE UR QL STRIP: NEGATIVE
NON-SQ EPI CELLS URNS QL MICRO: ABNORMAL /HPF
NRBC BLD AUTO-RTO: 0 /100 WBCS
PH UR STRIP.AUTO: 6 [PH]
PLATELET # BLD AUTO: 256 THOUSANDS/UL (ref 149–390)
PMV BLD AUTO: 10.8 FL (ref 8.9–12.7)
POTASSIUM SERPL-SCNC: 4 MMOL/L (ref 3.5–5.3)
PROT SERPL-MCNC: 6.9 G/DL (ref 6.4–8.2)
PROT UR STRIP-MCNC: NEGATIVE MG/DL
RBC # BLD AUTO: 5.05 MILLION/UL (ref 3.81–5.12)
RBC #/AREA URNS AUTO: ABNORMAL /HPF
SODIUM SERPL-SCNC: 138 MMOL/L (ref 136–145)
SP GR UR STRIP.AUTO: 1.03 (ref 1–1.03)
TSH SERPL DL<=0.05 MIU/L-ACNC: 2.12 UIU/ML (ref 0.36–3.74)
UROBILINOGEN UR QL STRIP.AUTO: 0.2 E.U./DL
VIT B12 SERPL-MCNC: 601 PG/ML (ref 100–900)
WBC # BLD AUTO: 9.61 THOUSAND/UL (ref 4.31–10.16)
WBC #/AREA URNS AUTO: ABNORMAL /HPF

## 2020-11-10 PROCEDURE — 82570 ASSAY OF URINE CREATININE: CPT | Performed by: FAMILY MEDICINE

## 2020-11-10 PROCEDURE — 81001 URINALYSIS AUTO W/SCOPE: CPT | Performed by: FAMILY MEDICINE

## 2020-11-10 PROCEDURE — 82746 ASSAY OF FOLIC ACID SERUM: CPT

## 2020-11-10 PROCEDURE — 85025 COMPLETE CBC W/AUTO DIFF WBC: CPT

## 2020-11-10 PROCEDURE — 83735 ASSAY OF MAGNESIUM: CPT

## 2020-11-10 PROCEDURE — 36415 COLL VENOUS BLD VENIPUNCTURE: CPT

## 2020-11-10 PROCEDURE — 80053 COMPREHEN METABOLIC PANEL: CPT

## 2020-11-10 PROCEDURE — 83036 HEMOGLOBIN GLYCOSYLATED A1C: CPT

## 2020-11-10 PROCEDURE — 82043 UR ALBUMIN QUANTITATIVE: CPT | Performed by: FAMILY MEDICINE

## 2020-11-10 PROCEDURE — 84443 ASSAY THYROID STIM HORMONE: CPT

## 2020-11-10 PROCEDURE — 82607 VITAMIN B-12: CPT

## 2020-11-27 DIAGNOSIS — E11.65 TYPE 2 DIABETES MELLITUS WITH HYPERGLYCEMIA, UNSPECIFIED WHETHER LONG TERM INSULIN USE (HCC): ICD-10-CM

## 2020-12-01 RX ORDER — LANCETS 33 GAUGE
EACH MISCELLANEOUS
Qty: 100 EACH | Refills: 3 | Status: SHIPPED | OUTPATIENT
Start: 2020-12-01 | End: 2021-04-06

## 2021-02-08 ENCOUNTER — TRANSCRIBE ORDERS (OUTPATIENT)
Dept: ADMINISTRATIVE | Facility: HOSPITAL | Age: 61
End: 2021-02-08

## 2021-02-08 DIAGNOSIS — R10.9 ABDOMINAL PAIN: Primary | ICD-10-CM

## 2021-02-08 DIAGNOSIS — G47.30 SLEEP APNEA: ICD-10-CM

## 2021-02-08 DIAGNOSIS — M79.662 PAIN OF LEFT CALF: ICD-10-CM

## 2021-02-17 ENCOUNTER — LAB (OUTPATIENT)
Dept: LAB | Facility: CLINIC | Age: 61
End: 2021-02-17
Payer: COMMERCIAL

## 2021-02-17 ENCOUNTER — TELEPHONE (OUTPATIENT)
Dept: NEPHROLOGY | Facility: CLINIC | Age: 61
End: 2021-02-17

## 2021-02-17 ENCOUNTER — TRANSCRIBE ORDERS (OUTPATIENT)
Dept: LAB | Facility: CLINIC | Age: 61
End: 2021-02-17

## 2021-02-17 DIAGNOSIS — R26.89 LOSS OF BALANCE: ICD-10-CM

## 2021-02-17 DIAGNOSIS — G62.9 NEUROPATHY: ICD-10-CM

## 2021-02-17 DIAGNOSIS — G62.89: ICD-10-CM

## 2021-02-17 DIAGNOSIS — G62.9 NEUROPATHY: Primary | ICD-10-CM

## 2021-02-17 LAB
CREAT UR-MCNC: 239 MG/DL
CRYOGLOB RF SER-ACNC: ABNORMAL [IU]/ML
ERYTHROCYTE [SEDIMENTATION RATE] IN BLOOD: 34 MM/HOUR (ref 0–29)
PROT UR-MCNC: 100 MG/DL
PROT/CREAT UR: 0.42 MG/G{CREAT} (ref 0–0.1)
RHEUMATOID FACT SER QL LA: POSITIVE
RPR SER QL: NORMAL

## 2021-02-17 PROCEDURE — 84166 PROTEIN E-PHORESIS/URINE/CSF: CPT | Performed by: PATHOLOGY

## 2021-02-17 PROCEDURE — 85652 RBC SED RATE AUTOMATED: CPT

## 2021-02-17 PROCEDURE — 86430 RHEUMATOID FACTOR TEST QUAL: CPT

## 2021-02-17 PROCEDURE — 86431 RHEUMATOID FACTOR QUANT: CPT

## 2021-02-17 PROCEDURE — 86592 SYPHILIS TEST NON-TREP QUAL: CPT

## 2021-02-17 PROCEDURE — 84156 ASSAY OF PROTEIN URINE: CPT | Performed by: FAMILY MEDICINE

## 2021-02-17 PROCEDURE — 84165 PROTEIN E-PHORESIS SERUM: CPT | Performed by: PATHOLOGY

## 2021-02-17 PROCEDURE — 82570 ASSAY OF URINE CREATININE: CPT | Performed by: FAMILY MEDICINE

## 2021-02-17 PROCEDURE — 36415 COLL VENOUS BLD VENIPUNCTURE: CPT

## 2021-02-17 PROCEDURE — 84165 PROTEIN E-PHORESIS SERUM: CPT

## 2021-02-17 NOTE — TELEPHONE ENCOUNTER
Patient called requesting new orders be put in [facility] for her studies that you wanted repeated  Per last office visit note: 3/16/2020    Patient had serum electrophoresis performed  She came back positive for faint potential band, immunofixation was negative  However, we will need to repeat this in approximately 6-12 months to ensure it comes back negative

## 2021-02-17 NOTE — TELEPHONE ENCOUNTER
Looks like she just had labs done, they include a serum electrophoresis so we will see what those results show  A usually takes about a week or so to come back, it will probably not populate from a so she can give us a call when they are resulted and I can look them up

## 2021-02-18 LAB
ALBUMIN SERPL ELPH-MCNC: 3.85 G/DL (ref 3.5–5)
ALBUMIN SERPL ELPH-MCNC: 56.6 % (ref 52–65)
ALPHA1 GLOB SERPL ELPH-MCNC: 0.31 G/DL (ref 0.1–0.4)
ALPHA1 GLOB SERPL ELPH-MCNC: 4.5 % (ref 2.5–5)
ALPHA2 GLOB SERPL ELPH-MCNC: 0.91 G/DL (ref 0.4–1.2)
ALPHA2 GLOB SERPL ELPH-MCNC: 13.4 % (ref 7–13)
BETA GLOB ABNORMAL SERPL ELPH-MCNC: 0.46 G/DL (ref 0.4–0.8)
BETA1 GLOB SERPL ELPH-MCNC: 6.8 % (ref 5–13)
BETA2 GLOB SERPL ELPH-MCNC: 6.3 % (ref 2–8)
BETA2+GAMMA GLOB SERPL ELPH-MCNC: 0.43 G/DL (ref 0.2–0.5)
GAMMA GLOB ABNORMAL SERPL ELPH-MCNC: 0.84 G/DL (ref 0.5–1.6)
GAMMA GLOB SERPL ELPH-MCNC: 12.4 % (ref 12–22)
IGG/ALB SER: 1.3 {RATIO} (ref 1.1–1.8)
PROT PATTERN SERPL ELPH-IMP: ABNORMAL
PROT SERPL-MCNC: 6.8 G/DL (ref 6.4–8.2)

## 2021-02-19 ENCOUNTER — TELEPHONE (OUTPATIENT)
Dept: SLEEP CENTER | Facility: CLINIC | Age: 61
End: 2021-02-19

## 2021-02-19 NOTE — TELEPHONE ENCOUNTER
----- Message from Balbina Odell MD sent at 2/16/2021  3:09 PM EST -----  Approved  ----- Message -----  From: Carly Card  Sent: 2/9/2021   9:18 AM EST  To: Sleep Medicine [facility] Provider    This sleep study needs approval      If approved please sign and return to clerical pool  If denied please include reasons why  Also provide alternative testing if warranted  Please sign and return to clerical pool

## 2021-03-07 DIAGNOSIS — E11.65 TYPE 2 DIABETES MELLITUS WITH HYPERGLYCEMIA, UNSPECIFIED WHETHER LONG TERM INSULIN USE (HCC): ICD-10-CM

## 2021-03-08 RX ORDER — PEN NEEDLE, DIABETIC 29 G X1/2"
NEEDLE, DISPOSABLE MISCELLANEOUS
Qty: 100 EACH | Refills: 3 | Status: SHIPPED | OUTPATIENT
Start: 2021-03-08 | End: 2021-11-24 | Stop reason: SDUPTHER

## 2021-03-19 ENCOUNTER — OFFICE VISIT (OUTPATIENT)
Dept: NEPHROLOGY | Facility: CLINIC | Age: 61
End: 2021-03-19
Payer: COMMERCIAL

## 2021-03-19 VITALS
HEIGHT: 62 IN | SYSTOLIC BLOOD PRESSURE: 122 MMHG | TEMPERATURE: 98 F | WEIGHT: 276 LBS | DIASTOLIC BLOOD PRESSURE: 78 MMHG | BODY MASS INDEX: 50.79 KG/M2 | HEART RATE: 78 BPM | OXYGEN SATURATION: 97 %

## 2021-03-19 DIAGNOSIS — R80.9 PROTEINURIA, UNSPECIFIED TYPE: ICD-10-CM

## 2021-03-19 DIAGNOSIS — E11.65 TYPE 2 DIABETES MELLITUS WITH HYPERGLYCEMIA, WITH LONG-TERM CURRENT USE OF INSULIN (HCC): ICD-10-CM

## 2021-03-19 DIAGNOSIS — Z79.4 TYPE 2 DIABETES MELLITUS WITH HYPERGLYCEMIA, WITH LONG-TERM CURRENT USE OF INSULIN (HCC): ICD-10-CM

## 2021-03-19 DIAGNOSIS — E66.01 MORBID OBESITY WITH BMI OF 50.0-59.9, ADULT (HCC): Chronic | ICD-10-CM

## 2021-03-19 DIAGNOSIS — N18.31 STAGE 3A CHRONIC KIDNEY DISEASE (HCC): Primary | Chronic | ICD-10-CM

## 2021-03-19 DIAGNOSIS — I10 ESSENTIAL HYPERTENSION: Chronic | ICD-10-CM

## 2021-03-19 DIAGNOSIS — G89.4 CHRONIC PAIN SYNDROME: Chronic | ICD-10-CM

## 2021-03-19 PROCEDURE — 99214 OFFICE O/P EST MOD 30 MIN: CPT | Performed by: INTERNAL MEDICINE

## 2021-03-19 RX ORDER — FLUCONAZOLE 100 MG/1
TABLET ORAL
COMMUNITY
End: 2021-10-22 | Stop reason: ALTCHOICE

## 2021-03-19 RX ORDER — DULAGLUTIDE 0.75 MG/.5ML
INJECTION, SOLUTION SUBCUTANEOUS
COMMUNITY
Start: 2021-01-21 | End: 2021-10-22 | Stop reason: DRUGHIGH

## 2021-03-19 RX ORDER — ROSUVASTATIN CALCIUM 20 MG/1
TABLET, COATED ORAL
COMMUNITY
End: 2021-12-24 | Stop reason: HOSPADM

## 2021-03-19 RX ORDER — CELECOXIB 200 MG/1
CAPSULE ORAL
COMMUNITY
Start: 2021-03-07 | End: 2021-10-22

## 2021-03-19 NOTE — PROGRESS NOTES
[facility], [address] [place] [address]    Name: Ival Krabbe  YOB: 1960      Assessment/Plan:    Stage 3 chronic kidney disease [facility])  Lab Results   Component Value Date    EGFR 50 11/10/2020    EGFR 66 10/30/2020    EGFR 60 05/06/2020    CREATININE 1 17 11/10/2020    CREATININE 0 93 10/30/2020    CREATININE 1 01 05/06/2020     Patient's eGFR is lower than usual, likely of no significance at this time  Would reassess in a few months, avoid nephrotoxins at this time  Type 2 diabetes mellitus with hyperglycemia, with long-term current use of insulin ([facility] [address] )  Patient continues to struggle with blood sugar control, diet continues to need to be focused on  Continue with medications according to primary  Continue with lisinopril for kidney protection  Lab Results   Component Value Date    HGBA1C 10 7 (H) 11/10/2020       Essential hypertension  Blood pressure stable, continue with low sodium diet and lasix  Proteinuria  Repeat SPEP was negative, proteinuria most likely due to diabetic nephropathy  Problem List Items Addressed This Visit        Endocrine    Type 2 diabetes mellitus with hyperglycemia, with long-term current use of insulin ([facility] [address] )     Patient continues to struggle with blood sugar control, diet continues to need to be focused on  Continue with medications according to primary  Continue with lisinopril for kidney protection  Lab Results   Component Value Date    HGBA1C 10 7 (H) 11/10/2020            Relevant Medications    Dulaglutide (Trulicity) 9 97 KP/1 1MY SOPN       Cardiovascular and Mediastinum    Essential hypertension (Chronic)     Blood pressure stable, continue with low sodium diet and lasix              Genitourinary    Stage 3 chronic kidney disease - Primary (Chronic)     Lab Results   Component Value Date    EGFR 50 11/10/2020    EGFR 66 10/30/2020    EGFR 60 05/06/2020    CREATININE 1 17 11/10/2020    CREATININE 0 93 10/30/2020    CREATININE 1 01 05/06/2020     Patient's eGFR is lower than usual, likely of no significance at this time  Would reassess in a few months, avoid nephrotoxins at this time  Relevant Orders    Comprehensive metabolic panel    PTH, intact    Microalbumin / creatinine urine ratio       Other    Chronic pain syndrome (Chronic)    Morbid obesity with BMI of 50 0-59 9, adult (HCC) (Chronic)    Proteinuria     Repeat SPEP was negative, proteinuria most likely due to diabetic nephropathy  Patient stable from the renal standpoint, however, continues to have uncontrolled diabetes  She needs to better focus on diabetic diet  We will see her back for a regular appointment in about 6 months  Subjective:      Patient ID: Harvey Stern is a 56 y o  female  Patient presents for follow up regarding CKD 3  She is doing well at this time, taking all medications as prescribed with no specific side-effects  Her LE edema is controlled on furosemide and trying to focus on a low sodium diet  Hypertension  This is a chronic problem  The current episode started more than 1 year ago  The problem is unchanged  The problem is controlled  Associated symptoms include peripheral edema  Pertinent negatives include no chest pain, orthopnea or shortness of breath  There are no associated agents to hypertension  Risk factors for coronary artery disease include diabetes mellitus, obesity and post-menopausal state  Past treatments include ACE inhibitors, lifestyle changes and diuretics  Compliance problems include diet  Hypertensive end-organ damage includes kidney disease  Identifiable causes of hypertension include chronic renal disease         The following portions of the patient's history were reviewed and updated as appropriate: allergies, current medications, past family history, past medical history, past social history, past surgical history and problem list     Review of Systems Respiratory: Negative for shortness of breath  Cardiovascular: Negative for chest pain and orthopnea  All other systems reviewed and are negative          Social History     Socioeconomic History    Marital status: /Civil Union     Spouse name: None    Number of children: None    Years of education: None    Highest education level: None   Occupational History    None   Social Needs    Financial resource strain: None    Food insecurity     Worry: None     Inability: None    Transportation needs     Medical: None     Non-medical: None   Tobacco Use    Smoking status: Never Smoker    Smokeless tobacco: Never Used   Substance and Sexual Activity    Alcohol use: Not Currently     Comment: OCCASIONALLY    Drug use: Yes     Comment: CBD    Sexual activity: Never   Lifestyle    Physical activity     Days per week: None     Minutes per session: None    Stress: None   Relationships    Social connections     Talks on phone: None     Gets together: None     Attends Judaism service: None     Active member of club or organization: None     Attends meetings of clubs or organizations: None     Relationship status: None    Intimate partner violence     Fear of current or ex partner: None     Emotionally abused: None     Physically abused: None     Forced sexual activity: None   Other Topics Concern    None   Social History Narrative    None     Past Medical History:   Diagnosis Date    Anesthesia related hyperthermia     pt states she had high fevers after her lipoma surgery in 2001 at our hospital and was shipped to [facility] where they said t was from an anesthesia med    Anxiety     Arthritis     Asthma     Chronic pain     Depression     Diabetes mellitus ([facility] [address] )     GERD (gastroesophageal reflux disease)     Hyperlipidemia     Malignant hyperthermia due to anesthesia     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     Neuropathy     Obesity     PCOS (polycystic ovarian syndrome)      Past Surgical History:   Procedure Laterality Date    CERVICAL CONIZATION   W/ LASER       SECTION      DILATION AND CURETTAGE OF UTERUS      ENDOMETRIAL ABLATION      ENDOMETRIAL BIOPSY      EPIDURAL BLOCK INJECTION Bilateral 2019    Procedure: L5-S1 transforaminal epidural steroid injection;  Surgeon: Juan Pisano MD;  Location: [facility];  Service: Pain Management     EPIDURAL BLOCK INJECTION Bilateral 2019    Procedure: L5-S1 transforaminal epidural steroid injection;  Surgeon: Juan Pisano MD;  Location: [facility];  Service: Pain Management     FL GUIDED NEEDLE PLAC BX/ASP/INJ  2019    FL GUIDED NEEDLE PLAC BX/ASP/INJ  2019    FOOT SURGERY Left     HERNIA REPAIR      INDUCED       IR PICC LINE  2019    JOINT REPLACEMENT      KNEE ARTHROPLASTY Right     OK DEBRIDEMENT OPEN WOUND 20 SQ CM< Right 2018    Procedure: DEBRIDEMENT HAND/FINGER [facility] OUT);   Surgeon: Scottie Denver, MD;  Location: [facility] MAIN OR;  Service: General    OK EXCISION TUMOR SOFT TISSUE BACK/FLANK SUBQ 3+CM N/A 2018    Procedure: BACK LIPOMA EXCISION;  Surgeon: Scottie Denver, MD;  Location: [facility] MAIN OR;  Service: General    ROTATOR CUFF REPAIR Right     TONSILLECTOMY         Current Outpatient Medications:     ammonium lactate (LAC-HYDRIN) 12 % lotion, , Disp: , Rfl: 0    atorvastatin (LIPITOR) 40 mg tablet, Take 1 tablet (40 mg total) by mouth daily with dinner, Disp: 30 tablet, Rfl: 0    BD INSULIN SYRINGE U/F 31G X 5/16" 1 ML MISC, , Disp: , Rfl: 0    BD Insulin Syringe U/F 31G X 5/16" 1 ML MISC, USE THREE TIMES DAILY, Disp: 100 each, Rfl: 3    Blood Glucose Monitoring Suppl (ONETOUCH VERIO FLEX SYSTEM) w/Device KIT, Use as directed, Disp: 1 kit, Rfl: 0    cholecalciferol (VITAMIN D3) 400 units tablet, Take 400 Units by mouth daily, Disp: , Rfl:     Dulaglutide (Trulicity) 3 80 FJ/5 0QG SOPN, Trulicity 2 61 BM/2 0 mL subcutaneous pen injector  inject 1/2 milliliter subcutaneously weekly, Disp: , Rfl:     ergocalciferol (ERGOCALCIFEROL) 1 25 MG (88044 UT) capsule, Take 1 capsule (50,000 Units total) by mouth once a week, Disp: 4 capsule, Rfl: 5    furosemide (LASIX) 20 mg tablet, Take 20 mg by mouth daily, Disp: , Rfl:     glucagon (GLUCAGON EMERGENCY) 1 MG injection, Inject 1 mg under the skin once as needed (PRN blood glucose less than 70 if unconscious or uncorrectable by oral means) for up to 1 dose, Disp: 1 kit, Rfl: 0    insulin aspart protamine-insulin aspart (NOVOLOG MIX 70/30 FLEXPEN) 100 Units/mL injection pen, Inject 60 Units under the skin 2 (two) times a day before meals, Disp: 12 pen, Rfl: 5    Lancets (OneTouch Delica Plus [id number]) MISC, USE TO TEST BLOOD SUGARS three times a day, Disp: 100 each, Rfl: 3    Lido-Menthol-Methyl Sal-Camph (CBD KINGS EX), Apply topically, Disp: , Rfl:     lisinopril (ZESTRIL) 5 mg tablet, Take 1 tablet (5 mg total) by mouth daily, Disp: 30 tablet, Rfl: 0    meclizine (ANTIVERT) 25 mg tablet, Take 25 mg by mouth Three times daily as needed, Disp: , Rfl:     multivitamin (THERAGRAN) TABS, Take 1 tablet by mouth daily, Disp: , Rfl: 0    nortriptyline (PAMELOR) 10 mg capsule, Take 1 capsule (10 mg total) by mouth daily at bedtime, Disp: 30 capsule, Rfl: 1    Calcium Carb-Cholecalciferol (CALCIUM 600+D3 PO), Take by mouth daily  , Disp: , Rfl:     celecoxib (CeleBREX) 200 mg capsule, take 1 capsule by mouth once daily with food AS NEEDED FOR PAIN, Disp: , Rfl:     fluconazole (DIFLUCAN) 100 mg tablet, fluconazole 100 mg tablet  take 2 tablets by mouth on day 1 then 1 tablet daily until finished, Disp: , Rfl:     gabapentin (NEURONTIN) 600 MG tablet, Take 1 tablet (600 mg total) by mouth 3 (three) times a day, Disp: 90 tablet, Rfl: 1    magnesium oxide (MAG-OX) 400 mg, Take 1 tablet (400 mg total) by mouth 2 (two) times a day, Disp: 60 tablet, Rfl: 0    Omega-3 Fatty Acids (FISH OIL ADULT GUMMIES PO), Take by mouth daily  , Disp: , Rfl:     rosuvastatin (CRESTOR) 20 MG tablet, rosuvastatin 20 mg tablet  take 1 tablet by mouth once daily, Disp: , Rfl:     Lab Results   Component Value Date    SODIUM 138 11/10/2020    K 4 0 11/10/2020     11/10/2020    CO2 29 11/10/2020    AGAP 6 11/10/2020    BUN 16 11/10/2020    CREATININE 1 17 11/10/2020    GLUC 252 (H) 10/30/2020    GLUF 401 (H) 11/10/2020    CALCIUM 9 2 11/10/2020    AST 33 11/10/2020    ALT 41 11/10/2020    ALKPHOS 100 11/10/2020    TP 6 8 02/17/2021    TBILI 0 35 11/10/2020    EGFR 50 11/10/2020     Lab Results   Component Value Date    WBC 9 61 11/10/2020    HGB 14 3 11/10/2020    HCT 45 0 11/10/2020    MCV 89 11/10/2020     11/10/2020     Lab Results   Component Value Date    CHOLESTEROL 118 05/06/2020    CHOLESTEROL 187 12/05/2019    CHOLESTEROL 234 (H) 10/30/2019     Lab Results   Component Value Date    HDL 43 05/06/2020    HDL 46 12/05/2019    HDL 44 10/30/2019     Lab Results   Component Value Date    LDLCALC 34 05/06/2020    LDLCALC 102 (H) 12/05/2019    LDLCALC 125 (H) 10/30/2019     Lab Results   Component Value Date    TRIG 205 (H) 05/06/2020    TRIG 197 (H) 12/05/2019    TRIG 324 (H) 10/30/2019     No results found for: [place], [place]  Lab Results   Component Value Date    PDJ4AOJENFDJ 2 120 11/10/2020     Lab Results   Component Value Date    CALCIUM 9 2 11/10/2020    PHOS 4 2 (H) 10/18/2019     Lab Results   Component Value Date    SPEP See Comment 02/17/2021     No results found for: DUSTIN LEY        Objective:      /78 (BP Location: Left arm, Patient Position: Sitting, Cuff Size: Large)   Pulse 78   Temp 98 °F (36 7 °C) (Temporal)   Ht 5' 2" (1 575 m)   Wt 125 kg (276 lb)   SpO2 97%   BMI 50 48 kg/m²          Physical Exam  Vitals signs reviewed  Constitutional:       General: She is not in acute distress  Appearance: She is well-developed     HENT:      Head: Normocephalic and atraumatic  Eyes:      Conjunctiva/sclera: Conjunctivae normal    Neck:      Musculoskeletal: Neck supple  Cardiovascular:      Rate and Rhythm: Normal rate and regular rhythm  Pulmonary:      Effort: Pulmonary effort is normal       Breath sounds: Normal breath sounds  Abdominal:      Palpations: Abdomen is soft  Musculoskeletal:         General: Swelling (mild bilateral LE edema) present  Skin:     General: Skin is warm  Findings: No rash  Neurological:      Mental Status: She is alert and oriented to person, place, and time  Cranial Nerves: No cranial nerve deficit     Psychiatric:         Behavior: Behavior normal

## 2021-03-23 ENCOUNTER — HOSPITAL ENCOUNTER (OUTPATIENT)
Dept: NON INVASIVE DIAGNOSTICS | Facility: HOSPITAL | Age: 61
Discharge: HOME | End: 2021-03-23
Payer: COMMERCIAL

## 2021-03-23 DIAGNOSIS — M79.662 PAIN OF LEFT CALF: ICD-10-CM

## 2021-03-23 PROCEDURE — 93971 EXTREMITY STUDY: CPT

## 2021-03-23 PROCEDURE — 93971 EXTREMITY STUDY: CPT | Performed by: SURGERY

## 2021-03-30 ENCOUNTER — TELEPHONE (OUTPATIENT)
Dept: OBGYN CLINIC | Facility: HOSPITAL | Age: 61
End: 2021-03-30

## 2021-03-30 ENCOUNTER — APPOINTMENT (OUTPATIENT)
Dept: RADIOLOGY | Facility: CLINIC | Age: 61
End: 2021-03-30
Payer: COMMERCIAL

## 2021-03-30 ENCOUNTER — TRANSCRIBE ORDERS (OUTPATIENT)
Dept: RADIOLOGY | Facility: CLINIC | Age: 61
End: 2021-03-30

## 2021-03-30 DIAGNOSIS — S93.401A SPRAIN OF RIGHT ANKLE, UNSPECIFIED LIGAMENT, INITIAL ENCOUNTER: Primary | ICD-10-CM

## 2021-03-30 DIAGNOSIS — R29.898 SUSPECTED FRACTURE OF BONE: ICD-10-CM

## 2021-03-30 DIAGNOSIS — S93.401A SPRAIN OF RIGHT ANKLE, UNSPECIFIED LIGAMENT, INITIAL ENCOUNTER: ICD-10-CM

## 2021-03-30 PROCEDURE — 73610 X-RAY EXAM OF ANKLE: CPT

## 2021-03-30 NOTE — TELEPHONE ENCOUNTER
Patient called in wanting to make an appointment with Dr Santy Lombardi to be seen for her ankle, she is not sure if she has NE or not through [id number]  and is going to clarify and call back

## 2021-04-05 NOTE — ASSESSMENT & PLAN NOTE
Lab Results   Component Value Date    EGFR 50 11/10/2020    EGFR 66 10/30/2020    EGFR 60 05/06/2020    CREATININE 1 17 11/10/2020    CREATININE 0 93 10/30/2020    CREATININE 1 01 05/06/2020     Patient's eGFR is lower than usual, likely of no significance at this time  Would reassess in a few months, avoid nephrotoxins at this time

## 2021-04-05 NOTE — ASSESSMENT & PLAN NOTE
Patient continues to struggle with blood sugar control, diet continues to need to be focused on  Continue with medications according to primary  Continue with lisinopril for kidney protection      Lab Results   Component Value Date    HGBA1C 10 7 (H) 11/10/2020

## 2021-04-06 ENCOUNTER — TRANSCRIBE ORDERS (OUTPATIENT)
Dept: ADMINISTRATIVE | Facility: HOSPITAL | Age: 61
End: 2021-04-06

## 2021-04-06 DIAGNOSIS — M76.61 ACHILLES TENDINITIS OF RIGHT LOWER EXTREMITY: Primary | ICD-10-CM

## 2021-04-06 DIAGNOSIS — E11.65 TYPE 2 DIABETES MELLITUS WITH HYPERGLYCEMIA, UNSPECIFIED WHETHER LONG TERM INSULIN USE (HCC): ICD-10-CM

## 2021-04-06 RX ORDER — LANCETS 33 GAUGE
EACH MISCELLANEOUS
Qty: 100 EACH | Refills: 3 | Status: SHIPPED | OUTPATIENT
Start: 2021-04-06 | End: 2021-08-13

## 2021-04-13 ENCOUNTER — OFFICE VISIT (OUTPATIENT)
Dept: OBGYN CLINIC | Facility: CLINIC | Age: 61
End: 2021-04-13
Payer: COMMERCIAL

## 2021-04-13 VITALS — HEIGHT: 62 IN | WEIGHT: 276 LBS | BODY MASS INDEX: 50.79 KG/M2

## 2021-04-13 DIAGNOSIS — M67.971 DISORDER OF RIGHT ACHILLES TENDON: Primary | ICD-10-CM

## 2021-04-13 DIAGNOSIS — S86.011A STRAIN OF ACHILLES TENDON, RIGHT, INITIAL ENCOUNTER: Primary | ICD-10-CM

## 2021-04-13 PROCEDURE — 99203 OFFICE O/P NEW LOW 30 MIN: CPT | Performed by: ORTHOPAEDIC SURGERY

## 2021-04-13 NOTE — PROGRESS NOTES
Assessment/Plan:  Assessment/Plan   Diagnoses and all orders for this visit:    Strain of Achilles tendon, right, initial encounter        Discussed with patient that today's physical exam is concerning for significant strain, if not partial tear, of right Achilles tendon  She was offered a Cam boot for stabilization and comfort with ambulation, but she declined at this time, stating that she estimates that would be more cumbersome and problematic  She is currently scheduled for an MRI of the right lower extremity to be performed on 4/14/2021  She will be seen for follow-up early next week after MRI is complete, and MRI report has been generated and was had a chance to review  She can continue activity modification, ice, and elevation for relief of pain and swelling  the patient has diffuse tenderness along her right hindfoot  Physical exam shows a valgus deformity  There is also tenderness along the Achilles with fusiform swelling  No gross tendon dysfunction  She is to get an MRI tomorrow  She returned back once the MRI is complete  Subjective:   Patient ID: Emily Hodge is a 56 y o  female  HPI    Patient presents today for evaluation of right ankle injury sustained 3/26/2021  Patient states that she was walking, and felt a pop in the posterior aspect of her right ankle with immediate onset pain and difficulty with weight-bearing, specifically with pushing off  She has managed her symptoms conservatively with ice, elevation, compression, and rest, but her symptoms do not seem to be improving  On today's presentation she continues to complain of pain and swelling in the posterior and lateral aspects of the right foot  She continues to have difficulty weight-bearing,   She denies any associated numbness or tingling      The following portions of the patient's history were reviewed and updated as appropriate: allergies, current medications, past family history, past medical history, past social history, past surgical history and problem list     Past Medical History:   Diagnosis Date    Anesthesia related hyperthermia     pt states she had high fevers after her lipoma surgery in  at our hospital and was shipped to [facility] where they said t was from an anesthesia med    Anxiety     Arthritis     Asthma     Chronic pain     Depression     Diabetes mellitus ([facility] [address] )     GERD (gastroesophageal reflux disease)     Hyperlipidemia     Malignant hyperthermia due to anesthesia     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     Neuropathy     Obesity     PCOS (polycystic ovarian syndrome)      Past Surgical History:   Procedure Laterality Date    CERVICAL CONIZATION   W/ LASER       SECTION      DILATION AND CURETTAGE OF UTERUS      ENDOMETRIAL ABLATION      ENDOMETRIAL BIOPSY      EPIDURAL BLOCK INJECTION Bilateral 2019    Procedure: L5-S1 transforaminal epidural steroid injection;  Surgeon: Cherry Tejada MD;  Location: [facility];  Service: Pain Management     EPIDURAL BLOCK INJECTION Bilateral 2019    Procedure: L5-S1 transforaminal epidural steroid injection;  Surgeon: Cherry Tejada MD;  Location: [facility];  Service: Pain Management     FL GUIDED NEEDLE PLAC BX/ASP/INJ  2019    FL GUIDED NEEDLE PLAC BX/ASP/INJ  2019    FOOT SURGERY Left     HERNIA REPAIR      INDUCED       IR PICC LINE  2019    JOINT REPLACEMENT      KNEE ARTHROPLASTY Right     DE DEBRIDEMENT OPEN WOUND 20 SQ CM< Right 2018    Procedure: DEBRIDEMENT HAND/FINGER [facility] OUT);   Surgeon: Nai June MD;  Location: [facility] MAIN OR;  Service: General    DE EXCISION TUMOR SOFT TISSUE BACK/FLANK SUBQ 3+CM N/A 2018    Procedure: BACK LIPOMA EXCISION;  Surgeon: Nai June MD;  Location: [facility] MAIN OR;  Service: General    ROTATOR CUFF REPAIR Right     TONSILLECTOMY       Family History   Problem Relation Age of Onset    Polycystic ovary syndrome Mother     Mental illness Father     Diabetes Father      Social History     Socioeconomic History    Marital status: /Civil Union     Spouse name: None    Number of children: None    Years of education: None    Highest education level: None   Occupational History    None   Social Needs    Financial resource strain: None    Food insecurity     Worry: None     Inability: None    Transportation needs     Medical: None     Non-medical: None   Tobacco Use    Smoking status: Never Smoker    Smokeless tobacco: Never Used   Substance and Sexual Activity    Alcohol use: Not Currently     Comment: OCCASIONALLY    Drug use: Yes     Comment: CBD    Sexual activity: Never   Lifestyle    Physical activity     Days per week: None     Minutes per session: None    Stress: None   Relationships    Social connections     Talks on phone: None     Gets together: None     Attends Cheondoism service: None     Active member of club or organization: None     Attends meetings of clubs or organizations: None     Relationship status: None    Intimate partner violence     Fear of current or ex partner: None     Emotionally abused: None     Physically abused: None     Forced sexual activity: None   Other Topics Concern    None   Social History Narrative    None       Current Outpatient Medications:     ammonium lactate (LAC-HYDRIN) 12 % lotion, , Disp: , Rfl: 0    atorvastatin (LIPITOR) 40 mg tablet, Take 1 tablet (40 mg total) by mouth daily with dinner, Disp: 30 tablet, Rfl: 0    BD INSULIN SYRINGE U/F 31G X 5/16" 1 ML MISC, , Disp: , Rfl: 0    BD Insulin Syringe U/F 31G X 5/16" 1 ML MISC, USE THREE TIMES DAILY, Disp: 100 each, Rfl: 3    Blood Glucose Monitoring Suppl (ONETOUCH VERIO FLEX SYSTEM) w/Device KIT, Use as directed, Disp: 1 kit, Rfl: 0    Calcium Carb-Cholecalciferol (CALCIUM 600+D3 PO), Take by mouth daily  , Disp: , Rfl:     celecoxib (CeleBREX) 200 mg capsule, take 1 capsule by mouth once daily with food AS NEEDED FOR PAIN, Disp: , Rfl:     cholecalciferol (VITAMIN D3) 400 units tablet, Take 400 Units by mouth daily, Disp: , Rfl:     Dulaglutide (Trulicity) 1 93 XT/4 3QF SOPN, Trulicity 5 91 AQ/8 6 mL subcutaneous pen injector  inject 1/2 milliliter subcutaneously weekly, Disp: , Rfl:     ergocalciferol (ERGOCALCIFEROL) 1 25 MG (38724 UT) capsule, Take 1 capsule (50,000 Units total) by mouth once a week, Disp: 4 capsule, Rfl: 5    fluconazole (DIFLUCAN) 100 mg tablet, fluconazole 100 mg tablet  take 2 tablets by mouth on day 1 then 1 tablet daily until finished, Disp: , Rfl:     furosemide (LASIX) 20 mg tablet, Take 20 mg by mouth daily, Disp: , Rfl:     glucagon (GLUCAGON EMERGENCY) 1 MG injection, Inject 1 mg under the skin once as needed (PRN blood glucose less than 70 if unconscious or uncorrectable by oral means) for up to 1 dose, Disp: 1 kit, Rfl: 0    insulin aspart protamine-insulin aspart (NOVOLOG MIX 70/30 FLEXPEN) 100 Units/mL injection pen, Inject 60 Units under the skin 2 (two) times a day before meals, Disp: 12 pen, Rfl: 5    Lancets (OneTouch Delica Plus [id number]) MISC, USE TO TEST BLOOD SUGAR THREE TIMES DAILY, Disp: 100 each, Rfl: 3    Lido-Menthol-Methyl Sal-Camph (CBD KINGS EX), Apply topically, Disp: , Rfl:     lisinopril (ZESTRIL) 5 mg tablet, Take 1 tablet (5 mg total) by mouth daily, Disp: 30 tablet, Rfl: 0    meclizine (ANTIVERT) 25 mg tablet, Take 25 mg by mouth Three times daily as needed, Disp: , Rfl:     multivitamin (THERAGRAN) TABS, Take 1 tablet by mouth daily, Disp: , Rfl: 0    nortriptyline (PAMELOR) 10 mg capsule, Take 1 capsule (10 mg total) by mouth daily at bedtime, Disp: 30 capsule, Rfl: 1    Omega-3 Fatty Acids (FISH OIL ADULT GUMMIES PO), Take by mouth daily  , Disp: , Rfl:     rosuvastatin (CRESTOR) 20 MG tablet, rosuvastatin 20 mg tablet  take 1 tablet by mouth once daily, Disp: , Rfl:     gabapentin (NEURONTIN) 600 MG tablet, Take 1 tablet (600 mg total) by mouth 3 (three) times a day, Disp: 90 tablet, Rfl: 1    magnesium oxide (MAG-OX) 400 mg, Take 1 tablet (400 mg total) by mouth 2 (two) times a day, Disp: 60 tablet, Rfl: 0    Allergies   Allergen Reactions    Cauliflower [Brassica Oleracea] Other (See Comments)     Other reaction(s): GI upset  Abdominal pain    Citrus Bioflavonoid - Food Allergy Other (See Comments)     Skin burns    Compro [Prochlorperazine] Shortness Of Breath and Wheezing    Latex Other (See Comments)     burning    Mobic [Meloxicam] Swelling    Morphine Hives and Itching    Albumen, Egg - Food Allergy Vomiting     Other reaction(s): Nausea and/or vomiting    Metronidazole Itching    Sulfa Antibiotics Other (See Comments)     Other reaction(s): Unknown Reaction       Review of Systems   Constitutional: Negative for chills, fever and unexpected weight change  HENT: Negative for hearing loss, nosebleeds and sore throat  Eyes: Negative for pain, redness and visual disturbance  Respiratory: Negative for cough, shortness of breath and wheezing  Cardiovascular: Negative for chest pain, palpitations and leg swelling  Gastrointestinal: Negative for abdominal pain, nausea and vomiting  Endocrine: Negative for polydipsia and polyuria  Genitourinary: Negative for dysuria and hematuria  Musculoskeletal:        As noted in HPI   Skin: Negative for rash and wound  Neurological: Negative for dizziness, numbness and headaches  Psychiatric/Behavioral: Negative for decreased concentration and suicidal ideas  The patient is not nervous/anxious          Objective:  Ht 5' 2" (1 575 m)   Wt 125 kg (276 lb)   BMI 50 48 kg/m²     Ortho Exam   Right Foot -   Patient ambulates with antalgic gait pattern  Uses single-point cane in ipsilateral upper extremity as assistive device  Patient presents with rearfoot valgus deformity in standing posture  Skin is warm and dry with no signs of erythema or infection -  Light colored ecchymosis noted in posterior ankle and calcaneal region  TTP over distal peroneal tendons, fusiform swelling along achilles insertion but no void   weakness and pain exacerbation with active dorsiflexion  -  Mcpherson sign   2+ TP and DP pulses with brisk capillary refill to the toes   Sural, saphenous, tibial, superficial and deep peroneal motor and sensory distributions intact  Sensation light touch intact distally    Physical Exam  Constitutional:       Appearance: She is well-developed  She is obese  HENT:      Right Ear: External ear normal       Left Ear: External ear normal       Nose: Nose normal    Eyes:      Conjunctiva/sclera: Conjunctivae normal       Pupils: Pupils are equal, round, and reactive to light  Neck:      Musculoskeletal: Normal range of motion  Pulmonary:      Effort: Pulmonary effort is normal    Musculoskeletal: Normal range of motion  Skin:     General: Skin is warm and dry  Neurological:      Mental Status: She is alert and oriented to person, place, and time  Psychiatric:         Behavior: Behavior normal          Thought Content: Thought content normal          Judgment: Judgment normal          Imaging:   Attending Physician has personally reviewed pertinent imaging in PACS, impression is as follows:    Review of radiographic series taken  3/30/2021 of the  Right ankle shows  No acute osseous abnormality or malalignment  There is notable soft tissue swelling in the lateral and posterior aspects of the ankle      Scribe Attestation    I,:  Sim Amaro am acting as a scribe while in the presence of the attending physician :       I,:  Leila Bonilla DO personally performed the services described in this documentation    as scribed in my presence :

## 2021-04-27 ENCOUNTER — APPOINTMENT (EMERGENCY)
Dept: CT IMAGING | Facility: HOSPITAL | Age: 61
End: 2021-04-27
Payer: COMMERCIAL

## 2021-04-27 ENCOUNTER — HOSPITAL ENCOUNTER (EMERGENCY)
Facility: HOSPITAL | Age: 61
Discharge: HOME | End: 2021-04-27
Attending: EMERGENCY MEDICINE | Admitting: EMERGENCY MEDICINE
Payer: COMMERCIAL

## 2021-04-27 VITALS
HEART RATE: 73 BPM | OXYGEN SATURATION: 92 % | TEMPERATURE: 97.5 F | RESPIRATION RATE: 20 BRPM | DIASTOLIC BLOOD PRESSURE: 76 MMHG | SYSTOLIC BLOOD PRESSURE: 146 MMHG

## 2021-04-27 DIAGNOSIS — E11.65 HYPERGLYCEMIA DUE TO DIABETES MELLITUS (HCC): ICD-10-CM

## 2021-04-27 DIAGNOSIS — R11.2 NAUSEA VOMITING AND DIARRHEA: ICD-10-CM

## 2021-04-27 DIAGNOSIS — K52.9 ENTERITIS: Primary | ICD-10-CM

## 2021-04-27 DIAGNOSIS — R10.9 ABDOMINAL PAIN: ICD-10-CM

## 2021-04-27 DIAGNOSIS — K76.0 HEPATIC STEATOSIS: ICD-10-CM

## 2021-04-27 DIAGNOSIS — R19.7 NAUSEA VOMITING AND DIARRHEA: ICD-10-CM

## 2021-04-27 LAB
ALBUMIN SERPL BCP-MCNC: 2.9 G/DL (ref 3.5–5)
ALP SERPL-CCNC: 89 U/L (ref 46–116)
ALT SERPL W P-5'-P-CCNC: 26 U/L (ref 12–78)
ANION GAP SERPL CALCULATED.3IONS-SCNC: 6 MMOL/L (ref 4–13)
APTT PPP: 35 SECONDS (ref 23–37)
AST SERPL W P-5'-P-CCNC: 29 U/L (ref 5–45)
BASOPHILS # BLD AUTO: 0.05 THOUSANDS/ΜL (ref 0–0.1)
BASOPHILS NFR BLD AUTO: 1 % (ref 0–1)
BILIRUB SERPL-MCNC: 0.58 MG/DL (ref 0.2–1)
BUN SERPL-MCNC: 27 MG/DL (ref 5–25)
CALCIUM ALBUM COR SERPL-MCNC: 9.6 MG/DL (ref 8.3–10.1)
CALCIUM SERPL-MCNC: 8.7 MG/DL (ref 8.3–10.1)
CHLORIDE SERPL-SCNC: 98 MMOL/L (ref 100–108)
CO2 SERPL-SCNC: 30 MMOL/L (ref 21–32)
CREAT SERPL-MCNC: 1.27 MG/DL (ref 0.6–1.3)
EOSINOPHIL # BLD AUTO: 0.2 THOUSAND/ΜL (ref 0–0.61)
EOSINOPHIL NFR BLD AUTO: 3 % (ref 0–6)
ERYTHROCYTE [DISTWIDTH] IN BLOOD BY AUTOMATED COUNT: 13.7 % (ref 11.6–15.1)
GFR SERPL CREATININE-BSD FRML MDRD: 45 ML/MIN/1.73SQ M
GLUCOSE SERPL-MCNC: 303 MG/DL (ref 65–140)
HCT VFR BLD AUTO: 42.9 % (ref 34.8–46.1)
HGB BLD-MCNC: 13.7 G/DL (ref 11.5–15.4)
IMM GRANULOCYTES # BLD AUTO: 0.03 THOUSAND/UL (ref 0–0.2)
IMM GRANULOCYTES NFR BLD AUTO: 0 % (ref 0–2)
INR PPP: 1.01 (ref 0.84–1.19)
LACTATE SERPL-SCNC: 1.4 MMOL/L (ref 0.5–2)
LIPASE SERPL-CCNC: 93 U/L (ref 73–393)
LYMPHOCYTES # BLD AUTO: 2.67 THOUSANDS/ΜL (ref 0.6–4.47)
LYMPHOCYTES NFR BLD AUTO: 36 % (ref 14–44)
MAGNESIUM SERPL-MCNC: 2.1 MG/DL (ref 1.6–2.6)
MCH RBC QN AUTO: 27.7 PG (ref 26.8–34.3)
MCHC RBC AUTO-ENTMCNC: 31.9 G/DL (ref 31.4–37.4)
MCV RBC AUTO: 87 FL (ref 82–98)
MONOCYTES # BLD AUTO: 0.49 THOUSAND/ΜL (ref 0.17–1.22)
MONOCYTES NFR BLD AUTO: 7 % (ref 4–12)
NEUTROPHILS # BLD AUTO: 4.08 THOUSANDS/ΜL (ref 1.85–7.62)
NEUTS SEG NFR BLD AUTO: 53 % (ref 43–75)
NRBC BLD AUTO-RTO: 0 /100 WBCS
PLATELET # BLD AUTO: 245 THOUSANDS/UL (ref 149–390)
PMV BLD AUTO: 10 FL (ref 8.9–12.7)
POTASSIUM SERPL-SCNC: 4 MMOL/L (ref 3.5–5.3)
PROT SERPL-MCNC: 7 G/DL (ref 6.4–8.2)
PROTHROMBIN TIME: 13.1 SECONDS (ref 11.6–14.5)
RBC # BLD AUTO: 4.94 MILLION/UL (ref 3.81–5.12)
SODIUM SERPL-SCNC: 134 MMOL/L (ref 136–145)
TROPONIN I SERPL-MCNC: <0.02 NG/ML
WBC # BLD AUTO: 7.52 THOUSAND/UL (ref 4.31–10.16)

## 2021-04-27 PROCEDURE — 84484 ASSAY OF TROPONIN QUANT: CPT | Performed by: PHYSICIAN ASSISTANT

## 2021-04-27 PROCEDURE — 83605 ASSAY OF LACTIC ACID: CPT | Performed by: PHYSICIAN ASSISTANT

## 2021-04-27 PROCEDURE — 96375 TX/PRO/DX INJ NEW DRUG ADDON: CPT

## 2021-04-27 PROCEDURE — 36415 COLL VENOUS BLD VENIPUNCTURE: CPT | Performed by: PHYSICIAN ASSISTANT

## 2021-04-27 PROCEDURE — 99284 EMERGENCY DEPT VISIT MOD MDM: CPT

## 2021-04-27 PROCEDURE — 80053 COMPREHEN METABOLIC PANEL: CPT | Performed by: PHYSICIAN ASSISTANT

## 2021-04-27 PROCEDURE — 96365 THER/PROPH/DIAG IV INF INIT: CPT

## 2021-04-27 PROCEDURE — 74177 CT ABD & PELVIS W/CONTRAST: CPT

## 2021-04-27 PROCEDURE — 85730 THROMBOPLASTIN TIME PARTIAL: CPT | Performed by: PHYSICIAN ASSISTANT

## 2021-04-27 PROCEDURE — 99285 EMERGENCY DEPT VISIT HI MDM: CPT | Performed by: PHYSICIAN ASSISTANT

## 2021-04-27 PROCEDURE — 83690 ASSAY OF LIPASE: CPT | Performed by: PHYSICIAN ASSISTANT

## 2021-04-27 PROCEDURE — 83735 ASSAY OF MAGNESIUM: CPT | Performed by: PHYSICIAN ASSISTANT

## 2021-04-27 PROCEDURE — 85025 COMPLETE CBC W/AUTO DIFF WBC: CPT | Performed by: PHYSICIAN ASSISTANT

## 2021-04-27 PROCEDURE — G1004 CDSM NDSC: HCPCS

## 2021-04-27 PROCEDURE — 93005 ELECTROCARDIOGRAM TRACING: CPT

## 2021-04-27 PROCEDURE — 85610 PROTHROMBIN TIME: CPT | Performed by: PHYSICIAN ASSISTANT

## 2021-04-27 RX ORDER — FENTANYL CITRATE 50 UG/ML
50 INJECTION, SOLUTION INTRAMUSCULAR; INTRAVENOUS ONCE
Status: COMPLETED | OUTPATIENT
Start: 2021-04-27 | End: 2021-04-27

## 2021-04-27 RX ORDER — DICYCLOMINE HYDROCHLORIDE 10 MG/1
10 CAPSULE ORAL 4 TIMES DAILY PRN
Qty: 20 CAPSULE | Refills: 0 | Status: SHIPPED | OUTPATIENT
Start: 2021-04-27 | End: 2022-07-06

## 2021-04-27 RX ORDER — METOCLOPRAMIDE HYDROCHLORIDE 5 MG/ML
10 INJECTION INTRAMUSCULAR; INTRAVENOUS ONCE
Status: COMPLETED | OUTPATIENT
Start: 2021-04-27 | End: 2021-04-27

## 2021-04-27 RX ORDER — ONDANSETRON 4 MG/1
4 TABLET, ORALLY DISINTEGRATING ORAL EVERY 6 HOURS PRN
Qty: 12 TABLET | Refills: 0 | Status: SHIPPED | OUTPATIENT
Start: 2021-04-27

## 2021-04-27 RX ORDER — ONDANSETRON 2 MG/ML
4 INJECTION INTRAMUSCULAR; INTRAVENOUS ONCE
Status: COMPLETED | OUTPATIENT
Start: 2021-04-27 | End: 2021-04-27

## 2021-04-27 RX ORDER — DICYCLOMINE HCL 20 MG
20 TABLET ORAL ONCE
Status: COMPLETED | OUTPATIENT
Start: 2021-04-27 | End: 2021-04-27

## 2021-04-27 RX ADMIN — IOHEXOL 100 ML: 350 INJECTION, SOLUTION INTRAVENOUS at 18:36

## 2021-04-27 RX ADMIN — ONDANSETRON 4 MG: 2 INJECTION INTRAMUSCULAR; INTRAVENOUS at 18:00

## 2021-04-27 RX ADMIN — FENTANYL CITRATE 50 MCG: 50 INJECTION, SOLUTION INTRAMUSCULAR; INTRAVENOUS at 17:42

## 2021-04-27 RX ADMIN — SODIUM CHLORIDE, SODIUM LACTATE, POTASSIUM CHLORIDE, AND CALCIUM CHLORIDE 1000 ML: .6; .31; .03; .02 INJECTION, SOLUTION INTRAVENOUS at 17:41

## 2021-04-27 RX ADMIN — DICYCLOMINE HYDROCHLORIDE 20 MG: 20 TABLET ORAL at 19:36

## 2021-04-27 RX ADMIN — METOCLOPRAMIDE HYDROCHLORIDE 10 MG: 5 INJECTION INTRAMUSCULAR; INTRAVENOUS at 19:36

## 2021-04-27 NOTE — DISCHARGE INSTRUCTIONS
Rest, plenty of fluids  Medications as needed for symptoms  Zofran for nausea/vomiting  Bentyl as needed for abdominal cramping  OTC immodium as needed for diarrhea  Continue home medications as prescribed  Continue to monitor your blood sugars  Follow up with PCP for recheck or return to ER as needed

## 2021-04-27 NOTE — ED PROVIDER NOTES
History  Chief Complaint   Patient presents with    Abdominal Pain     started with ruq pain 3 days ago then vomiting and diarrhea noted     56year old female presents with spouse for evaluation of abdominal pain  She reports onset of symptoms on Friday night  Pain was primarily right upper quadrant  Spouse notes all weekend she's had fatigue and decreased appetite  On Sunday began having "explosive diarrhea", nausea and vomiting  Continues with pain today  She now reports pain throughout the abdomen  She also reports that her abdomen feels bloated  Pt states, "I just don't feel good"  No fevers reports  She does admit to feeling hot then cold which spouse notes is chronic for her  Denies chest pain, SOB  Denies cough or congestion  Denies any urinary complaints  Reports pain in right knee and ankle which has been chronic  Denies sick contacts  Denies recent travel  No reported aggravating or alleviating factors  Has been taking home medications and medical marijuana without relief  PMH includes DM, HLD, obesity, chronic pain, arthritis, anxiety/depression  Prior abdominal surgeries include C section with 2 other surgeries due to wound dihiscence        History provided by:  Patient and spouse   used: No    Abdominal Pain  Pain location:  RUQ  Pain quality: sharp    Pain radiates to:  Periumbilical region  Duration:  4 days  Timing:  Constant  Progression:  Worsening  Chronicity:  New  Context: retching    Context: not recent illness, not recent travel, not sick contacts, not suspicious food intake and not trauma    Relieved by:  None tried  Associated symptoms: diarrhea, fatigue, nausea and vomiting    Associated symptoms: no chest pain, no chills, no constipation, no cough, no dysuria, no fever, no hematuria, no shortness of breath and no sore throat    Risk factors: obesity    Risk factors: no recent hospitalization        Prior to Admission Medications   Prescriptions Last Dose Informant Patient Reported? Taking?    BD INSULIN SYRINGE U/F 31G X 5/16" 1 ML MISC   Yes No   BD Insulin Syringe U/F 31G X 5/16" 1 ML MISC   No No   Sig: USE THREE TIMES DAILY   Blood Glucose Monitoring Suppl ([employer] FLEX SYSTEM) w/Device KIT   No No   Sig: Use as directed   Calcium Carb-Cholecalciferol (CALCIUM 600+D3 PO)  Self Yes No   Sig: Take by mouth daily     Dulaglutide (Trulicity) 2 36 ZL/2 6NR SOPN   Yes No   Sig: Trulicity 1 45 HE/8 9 mL subcutaneous pen injector   inject 1/2 milliliter subcutaneously weekly   Lancets (OneTouch Delica Plus [id number]) MISC   No No   Sig: USE TO TEST BLOOD SUGAR THREE TIMES DAILY   Lido-Menthol-Methyl Sal-Camph (CBD KINGS EX)  Self Yes No   Sig: Apply topically   Omega-3 Fatty Acids (FISH OIL ADULT GUMMIES PO)  Self Yes No   Sig: Take by mouth daily     ammonium lactate (LAC-HYDRIN) 12 % lotion   Yes No   atorvastatin (LIPITOR) 40 mg tablet   No No   Sig: Take 1 tablet (40 mg total) by mouth daily with dinner   celecoxib (CeleBREX) 200 mg capsule   Yes No   Sig: take 1 capsule by mouth once daily with food AS NEEDED FOR PAIN   cholecalciferol (VITAMIN D3) 400 units tablet   Yes No   Sig: Take 400 Units by mouth daily   ergocalciferol (ERGOCALCIFEROL) 1 25 MG (60729 UT) capsule   No No   Sig: Take 1 capsule (50,000 Units total) by mouth once a week   fluconazole (DIFLUCAN) 100 mg tablet   Yes No   Sig: fluconazole 100 mg tablet   take 2 tablets by mouth on day 1 then 1 tablet daily until finished   furosemide (LASIX) 20 mg tablet  Self Yes No   Sig: Take 20 mg by mouth daily   gabapentin (NEURONTIN) 600 MG tablet   No No   Sig: Take 1 tablet (600 mg total) by mouth 3 (three) times a day   glucagon (GLUCAGON EMERGENCY) 1 MG injection   No No   Sig: Inject 1 mg under the skin once as needed (PRN blood glucose less than 70 if unconscious or uncorrectable by oral means) for up to 1 dose   insulin aspart protamine-insulin aspart (NOVOLOG MIX 70/30 FLEXPEN) 100 Units/mL injection pen   No No   Sig: Inject 60 Units under the skin 2 (two) times a day before meals   lisinopril (ZESTRIL) 5 mg tablet   No No   Sig: Take 1 tablet (5 mg total) by mouth daily   magnesium oxide (MAG-OX) 400 mg   No No   Sig: Take 1 tablet (400 mg total) by mouth 2 (two) times a day   meclizine (ANTIVERT) 25 mg tablet   Yes No   Sig: Take 25 mg by mouth Three times daily as needed   multivitamin (THERAGRAN) TABS   No No   Sig: Take 1 tablet by mouth daily   nortriptyline (PAMELOR) 10 mg capsule   No No   Sig: Take 1 capsule (10 mg total) by mouth daily at bedtime   rosuvastatin (CRESTOR) 20 MG tablet   Yes No   Sig: rosuvastatin 20 mg tablet   take 1 tablet by mouth once daily      Facility-Administered Medications: None       Past Medical History:   Diagnosis Date    Anesthesia related hyperthermia     pt states she had high fevers after her lipoma surgery in  at our hospital and was shipped to [facility] where they said t was from an anesthesia med    Anxiety     Arthritis     Asthma     Chronic pain     Depression     Diabetes mellitus ([facility] [address] )     GERD (gastroesophageal reflux disease)     Hyperlipidemia     Malignant hyperthermia due to anesthesia     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     Neuropathy     Obesity     PCOS (polycystic ovarian syndrome)        Past Surgical History:   Procedure Laterality Date    CERVICAL CONIZATION   W/ LASER       SECTION      DILATION AND CURETTAGE OF UTERUS      ENDOMETRIAL ABLATION      ENDOMETRIAL BIOPSY      EPIDURAL BLOCK INJECTION Bilateral 2019    Procedure: L5-S1 transforaminal epidural steroid injection;  Surgeon: Georgiana Estrada MD;  Location: [facility];  Service: Pain Management     EPIDURAL BLOCK INJECTION Bilateral 2019    Procedure: L5-S1 transforaminal epidural steroid injection;  Surgeon: Georgiana Estrada MD;  Location: [facility];  Service: Pain Management     FL GUIDED NEEDLE PLAC BX/ASP/INJ  2019    FL GUIDED NEEDLE PLAC BX/ASP/INJ  2019    FOOT SURGERY Left     HERNIA REPAIR      INDUCED       IR PICC LINE  2019    JOINT REPLACEMENT      KNEE ARTHROPLASTY Right     AZ DEBRIDEMENT OPEN WOUND 20 SQ CM< Right 2018    Procedure: DEBRIDEMENT HAND/FINGER [facility] OUT); Surgeon: Patt Goss MD;  Location: [address] [place] [address] OR;  Service: General    AZ EXCISION TUMOR SOFT TISSUE BACK/FLANK SUBQ 3+CM N/A 2018    Procedure: BACK LIPOMA EXCISION;  Surgeon: Patt Goss MD;  [facility] OR;  Service: General    ROTATOR CUFF REPAIR Right     TONSILLECTOMY         Family History   Problem Relation Age of Onset    Polycystic ovary syndrome Mother     Mental illness Father     Diabetes Father      I have reviewed and agree with the history as documented  E-Cigarette/Vaping    E-Cigarette Use Never User      E-Cigarette/Vaping Substances     Social History     Tobacco Use    Smoking status: Never Smoker    Smokeless tobacco: Never Used   Substance Use Topics    Alcohol use: Not Currently     Comment: OCCASIONALLY    Drug use: Yes     Types: Marijuana     Comment: CBD       Review of Systems   Constitutional: Positive for appetite change and fatigue  Negative for chills and fever  HENT: Negative  Negative for congestion, rhinorrhea and sore throat  Eyes: Negative  Negative for visual disturbance  Respiratory: Negative  Negative for cough, shortness of breath and wheezing  Cardiovascular: Negative  Negative for chest pain, palpitations and leg swelling  Gastrointestinal: Positive for abdominal pain, diarrhea, nausea and vomiting  Negative for constipation  Genitourinary: Negative  Negative for dysuria, flank pain, frequency and hematuria  Musculoskeletal: Positive for arthralgias  Negative for back pain  Skin: Negative  Negative for rash  Neurological: Negative  Negative for dizziness, light-headedness and headaches  Psychiatric/Behavioral: Negative  Negative for confusion  All other systems reviewed and are negative  Physical Exam  Physical Exam  Vitals signs and nursing note reviewed  Constitutional:       General: She is in acute distress  Appearance: She is well-developed  She is obese  She is not toxic-appearing  HENT:      Head: Normocephalic and atraumatic  Right Ear: Hearing and external ear normal       Left Ear: Hearing and external ear normal       Nose: Nose normal       Mouth/Throat:      Mouth: Mucous membranes are moist       Pharynx: Oropharynx is clear  Uvula midline  No oropharyngeal exudate  Eyes:      General: No scleral icterus  Conjunctiva/sclera: Conjunctivae normal       Pupils: Pupils are equal, round, and reactive to light  Neck:      Musculoskeletal: Normal range of motion and neck supple  Trachea: Trachea and phonation normal  No tracheal deviation  Cardiovascular:      Rate and Rhythm: Normal rate and regular rhythm  Pulses: Normal pulses  Heart sounds: Normal heart sounds, S1 normal and S2 normal  No murmur  Pulmonary:      Effort: Pulmonary effort is normal  No tachypnea or respiratory distress  Breath sounds: Normal breath sounds  No wheezing, rhonchi or rales  Abdominal:      General: Bowel sounds are normal       Palpations: Abdomen is soft  Tenderness: There is generalized abdominal tenderness  There is guarding (voluntary)  There is no rebound  Comments: Pt limits my exam secondary to reported pain  Musculoskeletal: Normal range of motion  General: No tenderness  Skin:     General: Skin is warm and dry  Capillary Refill: Capillary refill takes less than 2 seconds  Findings: No rash  Neurological:      General: No focal deficit present  Mental Status: She is alert and oriented to person, place, and time  GCS: GCS eye subscore is 4  GCS verbal subscore is 5  GCS motor subscore is 6        Cranial Nerves: No cranial nerve deficit  Sensory: No sensory deficit  Motor: No abnormal muscle tone  Psychiatric:         Mood and Affect: Mood is anxious  Affect is tearful           Speech: Speech normal          Behavior: Behavior normal          Vital Signs  ED Triage Vitals   Temperature Pulse Respirations Blood Pressure SpO2   04/27/21 1653 04/27/21 1653 04/27/21 1653 04/27/21 1800 04/27/21 1653   97 5 °F (36 4 °C) 83 20 143/71 93 %      Temp Source Heart Rate Source Patient Position - Orthostatic VS BP Location FiO2 (%)   04/27/21 1653 04/27/21 1800 04/27/21 1800 04/27/21 1800 --   Temporal Monitor Lying Left arm       Pain Score       04/27/21 1656       9           Vitals:    04/27/21 1653 04/27/21 1800 04/27/21 1935   BP:  143/71 146/76   Pulse: 83 78 73   Patient Position - Orthostatic VS:  Lying Lying         Visual Acuity      ED Medications  Medications   ondansetron (ZOFRAN) injection 4 mg (4 mg Intravenous Given 4/27/21 1800)   fentanyl citrate (PF) 100 MCG/2ML 50 mcg (50 mcg Intravenous Given 4/27/21 1742)   lactated ringers bolus 1,000 mL (0 mL Intravenous Stopped 4/27/21 1841)   iohexol (OMNIPAQUE) 350 MG/ML injection (SINGLE-DOSE) 100 mL (100 mL Intravenous Given 4/27/21 1836)   dicyclomine (BENTYL) tablet 20 mg (20 mg Oral Given 4/27/21 1936)   metoclopramide (REGLAN) injection 10 mg (10 mg Intravenous Given 4/27/21 1936)       Diagnostic Studies  Results Reviewed     Procedure Component Value Units Date/Time    Comprehensive metabolic panel [[id number]]  (Abnormal) Collected: 04/27/21 1741    Lab Status: Final result Specimen: Blood from Arm, Left Updated: 04/27/21 1817     Sodium 134 mmol/L      Potassium 4 0 mmol/L      Chloride 98 mmol/L      CO2 30 mmol/L      ANION GAP 6 mmol/L      BUN 27 mg/dL      Creatinine 1 27 mg/dL      Glucose 303 mg/dL      Calcium 8 7 mg/dL      Corrected Calcium 9 6 mg/dL      AST 29 U/L      ALT 26 U/L      Alkaline Phosphatase 89 U/L      Total Protein 7 0 g/dL      Albumin 2 9 g/dL      Total Bilirubin 0 58 mg/dL      eGFR 45 ml/min/1 73sq m     Narrative:      [place] guidelines for Chronic Kidney Disease (CKD):     Stage 1 with normal or high GFR (GFR > 90 mL/min/1 73 square meters)    Stage 2 Mild CKD (GFR = 60-89 mL/min/1 73 square meters)    Stage 3A Moderate CKD (GFR = 45-59 mL/min/1 73 square meters)    Stage 3B Moderate CKD (GFR = 30-44 mL/min/1 73 square meters)    Stage 4 Severe CKD (GFR = 15-29 mL/min/1 73 square meters)    Stage 5 End Stage CKD (GFR <15 mL/min/1 73 square meters)  Note: GFR calculation is accurate only with a steady state creatinine    Troponin I [[id number]]  (Normal) Collected: 04/27/21 1741    Lab Status: Final result Specimen: Blood from Arm, Left Updated: 04/27/21 1817     Troponin I <0 02 ng/mL     Lactic acid [[id number]]  (Normal) Collected: 04/27/21 1741    Lab Status: Final result Specimen: Blood from Arm, Left Updated: 04/27/21 1816     LACTIC ACID 1 4 mmol/L     Narrative:      Result may be elevated if tourniquet was used during collection      Radhameghan Alvarado [[id number]]  (Normal) Collected: 04/27/21 1741    Lab Status: Final result Specimen: Blood from Arm, Left Updated: 04/27/21 1810     Protime 13 1 seconds      INR 1 01    APTT [[id number]]  (Normal) Collected: 04/27/21 1741    Lab Status: Final result Specimen: Blood from Arm, Left Updated: 04/27/21 1810     PTT 35 seconds     Lipase [[id number]]  (Normal) Collected: 04/27/21 1741    Lab Status: Final result Specimen: Blood from Arm, Left Updated: 04/27/21 1808     Lipase 93 u/L     Magnesium [[id number]]  (Normal) Collected: 04/27/21 1741    Lab Status: Final result Specimen: Blood from Arm, Left Updated: 04/27/21 1808     Magnesium 2 1 mg/dL     CBC and differential [[id number]] Collected: 04/27/21 1741    Lab Status: Final result Specimen: Blood from Arm, Left Updated: 04/27/21 1805     WBC 7 52 Thousand/uL      RBC 4 94 Million/uL Hemoglobin 13 7 g/dL      Hematocrit 42 9 %      MCV 87 fL      MCH 27 7 pg      MCHC 31 9 g/dL      RDW 13 7 %      MPV 10 0 fL      Platelets 733 Thousands/uL      nRBC 0 /100 WBCs      Neutrophils Relative 53 %      Immat GRANS % 0 %      Lymphocytes Relative 36 %      Monocytes Relative 7 %      Eosinophils Relative 3 %      Basophils Relative 1 %      Neutrophils Absolute 4 08 Thousands/µL      Immature Grans Absolute 0 03 Thousand/uL      Lymphocytes Absolute 2 67 Thousands/µL      Monocytes Absolute 0 49 Thousand/µL      Eosinophils Absolute 0 20 Thousand/µL      Basophils Absolute 0 05 Thousands/µL     UA (URINE) with reflex to Scope [[id number]]     Lab Status: No result Specimen: Urine                  CT abdomen pelvis with contrast   Final Result by Nai Barnett MD (04/27 1906)      Possible enteritis  Hepatic steatosis and hepatomegaly  Limited pelvis evaluation due to streak artifact from hip hardware  Workstation performed: [id number]                    Procedures  ECG 12 Lead Documentation Only    Date/Time: 4/27/2021 6:10 PM  Performed by: Kina Hollis PA-C  Authorized by: Kina Hollis PA-C     Indications / Diagnosis:  Abd pain  ECG reviewed by me, the ED Provider: yes    Patient location:  ED  Previous ECG:     Previous ECG:  Compared to current    Comparison ECG info:  10/30/20    Similarity:  No change    Comparison to cardiac monitor: Yes    Interpretation:     Interpretation: non-specific    Rate:     ECG rate:  77    ECG rate assessment: normal    Rhythm:     Rhythm: sinus rhythm    Ectopy:     Ectopy: none    QRS:     QRS axis:  Normal    QRS intervals:  Normal  Conduction:     Conduction: abnormal      Abnormal conduction: LAFB    ST segments:     ST segments:  Normal  T waves:     T waves: normal    Comments:      , , QT/QTc 402/454; no acute ischemic changes, no significant interval change               ED Course  ED Course as of Apr 27 2038   Tue Apr 27, 2021   1805 WBC: 7 52   1805 Hemoglobin: 13 7   1805 Platelet Count: 147   1810 Lipase: 93   1812 Magnesium: 2 1   1812 INR: 1 01   1812 Protime: 13 1   1812 PTT: 35   1816 LACTIC ACID: 1 4   1818 Known diabetic   Glucose, Random(!): 303   1819 Increased from 1 17 five months ago   Creatinine: 1 27   1819 BUN(!): 27   1819 Sodium(!): 134   1819 Potassium: 4 0   1819 Chloride(!): 98   1819 CO2: 30   1819 Anion Gap: 6   1819 CORRECTED CALCIUM: 9 6   1819 AST: 29   1819 ALT: 26   1819 Alkaline Phosphatase: 89   1819 Total Protein: 7 0   1819 Albumin(!): 2 9   1819 TOTAL BILIRUBIN: 0 58   1819 eGFR: 45   1819 Troponin I: <0 02   1820 Pt updated on lab results  She notes pain still present but improved  She voiced concern regarding CT scan with dye as she notes she had kidney problems in the past   Renal function is stable, recommended CT with IV contrast for best evaluation of her symptoms  Discussed risks/benefits and alternative options  After discussion, she is agreeable to proceed with the CT imaging with IV contrast       1849 CT performed and pending interpretation  1909 IMPRESSION:     Possible enteritis      Hepatic steatosis and hepatomegaly      Limited pelvis evaluation due to streak artifact from hip hardware  CT abdomen pelvis with contrast   1910 Pt and spouse updated results  She reports "I just feel wiped out"  She notes continued abdominal cramping  No emesis or diarrhea while here  Pt afebrile and hemodynamically stable  I offered admission for observation for further symptom control  Pt and spouse declined and she would prefer to go home  Will give additional medication for symptom control and plan to discharge  Discussed continued symptomatic and supportive care  Suspect gastroenteritis based on symptoms and CT findings  Other than hyperglycemia related to her diabetes, no other significant lab abnormalities  Pt afebrile and hemodynamically stable    Symptomatically she is improving  Abdominal exam is non peritoneal   Reviewed dietary guidance  BRAT (bananas, rice, apples, toast) diet and advance as tolerated  Advised to push plenty of fluids  Rx zofran as needed for nausea/vomiting  OTC immodium as needed for diarrhea  Bentyl as needed for bowel spasms  Advised to continue home meds  Continue to monitor blood sugars  Strict return precautions outlined  Advised outpatient follow up with PCP for recheck or return to ER for change in condition as outlined  Pt and significant other verbalized understanding and had no further questions              HEART Risk Score      Most Recent Value   Heart Score Risk Calculator   History  0 Filed at: 04/27/2021 1851   ECG  1 Filed at: 04/27/2021 1851   Age  1 Filed at: 04/27/2021 1851   Risk Factors  1 Filed at: 04/27/2021 1851   Troponin  0 Filed at: 04/27/2021 1851   HEART Score  3 Filed at: 04/27/2021 1851                                    MDM  Number of Diagnoses or Management Options  Abdominal pain: new and requires workup  Enteritis: new and requires workup  Hepatic steatosis:   Hyperglycemia due to diabetes mellitus ([address] ): established and worsening  Nausea vomiting and diarrhea: new and requires workup     Amount and/or Complexity of Data Reviewed  Clinical lab tests: ordered and reviewed  Tests in the radiology section of CPT®: ordered and reviewed  Decide to obtain previous medical records or to obtain history from someone other than the patient: yes  Obtain history from someone other than the patient: yes  Review and summarize past medical records: yes  Discuss the patient with other providers: yes (attending)  Independent visualization of images, tracings, or specimens: yes    Patient Progress  Patient progress: improved      Disposition  Final diagnoses:   Enteritis   Abdominal pain   Nausea vomiting and diarrhea   Hyperglycemia due to diabetes mellitus ([address] )   Hepatic steatosis     Time reflects when diagnosis was documented in both MDM as applicable and the Disposition within this note     Time User Action Codes Description Comment    4/27/2021  7:11 PM May Lunch [K52 9] Enteritis     4/27/2021  7:11 PM Ajcinda Brew Add [R10 9] Abdominal pain     4/27/2021  7:11 PM Jacinda Brew Add [R11 2,  R19 7] Nausea vomiting and diarrhea     4/27/2021  7:11 PM Jacinda Brew Add [E11 65] Hyperglycemia due to diabetes mellitus ([facility] [address] )     4/27/2021  7:12 PM Jacinda Brew Add [K76 0] Hepatic steatosis       ED Disposition     ED Disposition Condition Date/Time Comment    Discharge Stable Tue Apr 27, 2021  7:26 PM Sarah Parikh discharge to home/self care              Follow-up Information     Follow up With Specialties Details Why Contact Info Additional Gely Desai 10, DO Family Medicine Schedule an appointment as soon as possible for a visit   [address] Emergency Department Emergency Medicine  As needed [phone]  [address] [place] [address] Emergency Department[address], [address]          Discharge Medication List as of 4/27/2021  7:39 PM      START taking these medications    Details   dicyclomine (BENTYL) 10 mg capsule Take 1 capsule (10 mg total) by mouth 4 (four) times a day as needed (abd pain), Starting Tue 4/27/2021, Normal      ondansetron (ZOFRAN-ODT) 4 mg disintegrating tablet Take 1 tablet (4 mg total) by mouth every 6 (six) hours as needed for nausea or vomiting, Starting Tue 4/27/2021, Normal         CONTINUE these medications which have NOT CHANGED    Details   ammonium lactate (LAC-HYDRIN) 12 % lotion Starting Tue 5/28/2019, Historical Med      atorvastatin (LIPITOR) 40 mg tablet Take 1 tablet (40 mg total) by mouth daily with dinner, Starting Tue 7/9/2019, Normal      !! BD INSULIN SYRINGE U/F 31G X 5/16" 1 ML MISC Starting Mon 5/27/2019, Historical Med      !! BD Insulin Syringe U/F 31G X 5/16" 1 ML MISC USE THREE TIMES DAILY, Normal      Blood Glucose Monitoring Suppl ([address]) w/Device KIT Use as directed, Normal      Calcium Carb-Cholecalciferol (CALCIUM 600+D3 PO) Take by mouth daily  , Historical Med      celecoxib (CeleBREX) 200 mg capsule take 1 capsule by mouth once daily with food AS NEEDED FOR PAIN, Historical Med      cholecalciferol (VITAMIN D3) 400 units tablet Take 400 Units by mouth daily, Historical Med      Dulaglutide (Trulicity) 6 99 EDDY/3 2SC SOPN Trulicity 0 48 NI/2 2 mL subcutaneous pen injector   inject 1/2 milliliter subcutaneously weekly, Historical Med      ergocalciferol (ERGOCALCIFEROL) 1 25 MG (96267 UT) capsule Take 1 capsule (50,000 Units total) by mouth once a week, Starting Mon 11/18/2019, Normal      fluconazole (DIFLUCAN) 100 mg tablet fluconazole 100 mg tablet   take 2 tablets by mouth on day 1 then 1 tablet daily until finished, Historical Med      furosemide (LASIX) 20 mg tablet Take 20 mg by mouth daily, Historical Med      gabapentin (NEURONTIN) 600 MG tablet Take 1 tablet (600 mg total) by mouth 3 (three) times a day, Starting Mon 2/10/2020, Until Fri 10/30/2020, Normal      glucagon (GLUCAGON EMERGENCY) 1 MG injection Inject 1 mg under the skin once as needed (PRN blood glucose less than 70 if unconscious or uncorrectable by oral means) for up to 1 dose, Starting Tue 7/9/2019, Normal      insulin aspart protamine-insulin aspart (NOVOLOG MIX 70/30 FLEXPEN) 100 Units/mL injection pen Inject 60 Units under the skin 2 (two) times a day before meals, Starting Thu 12/5/2019, Print      Lancets (OneTouch Delica Plus [id number]) MISC USE TO TEST BLOOD SUGAR THREE TIMES DAILY, Normal      Lido-Menthol-Methyl Sal-Camph (CBD KINGS EX) Apply topically, Historical Med      lisinopril (ZESTRIL) 5 mg tablet Take 1 tablet (5 mg total) by mouth daily, Starting Tue 7/9/2019, Normal magnesium oxide (MAG-OX) 400 mg Take 1 tablet (400 mg total) by mouth 2 (two) times a day, Starting Fri 10/30/2020, Until Sun 11/29/2020, Normal      meclizine (ANTIVERT) 25 mg tablet Take 25 mg by mouth Three times daily as needed, Historical Med      multivitamin (THERAGRAN) TABS Take 1 tablet by mouth daily, Starting Tue 7/9/2019, No Print      nortriptyline (PAMELOR) 10 mg capsule Take 1 capsule (10 mg total) by mouth daily at bedtime, Starting Wed 4/22/2020, Normal      Omega-3 Fatty Acids (FISH OIL ADULT GUMMIES PO) Take by mouth daily  , Historical Med      rosuvastatin (CRESTOR) 20 MG tablet rosuvastatin 20 mg tablet   take 1 tablet by mouth once daily, Historical Med       !! - Potential duplicate medications found  Please discuss with provider  No discharge procedures on file      PDMP Review       Value Time User    PDMP Reviewed  Yes 3/13/2020  9:40 AM Jefe Maradiaga 10 [place] [facility]  Electronically Signed by           Terri Cosme PA-C  04/27/21 2038

## 2021-04-28 LAB
ATRIAL RATE: 77 BPM
P AXIS: 57 DEGREES
PR INTERVAL: 176 MS
QRS AXIS: -29 DEGREES
QRSD INTERVAL: 104 MS
QT INTERVAL: 402 MS
QTC INTERVAL: 454 MS
T WAVE AXIS: 48 DEGREES
VENTRICULAR RATE: 77 BPM

## 2021-04-28 PROCEDURE — 93010 ELECTROCARDIOGRAM REPORT: CPT | Performed by: INTERNAL MEDICINE

## 2021-04-30 ENCOUNTER — TRANSCRIBE ORDERS (OUTPATIENT)
Dept: LAB | Facility: CLINIC | Age: 61
End: 2021-04-30

## 2021-04-30 ENCOUNTER — APPOINTMENT (OUTPATIENT)
Dept: LAB | Facility: CLINIC | Age: 61
End: 2021-04-30
Payer: COMMERCIAL

## 2021-04-30 DIAGNOSIS — E11.9 DIABETES MELLITUS WITHOUT COMPLICATION (HCC): Primary | ICD-10-CM

## 2021-04-30 DIAGNOSIS — E11.9 DIABETES MELLITUS WITHOUT COMPLICATION (HCC): ICD-10-CM

## 2021-04-30 LAB
ANION GAP SERPL CALCULATED.3IONS-SCNC: 7 MMOL/L (ref 4–13)
BUN SERPL-MCNC: 20 MG/DL (ref 5–25)
CALCIUM SERPL-MCNC: 8.8 MG/DL (ref 8.3–10.1)
CHLORIDE SERPL-SCNC: 103 MMOL/L (ref 100–108)
CO2 SERPL-SCNC: 24 MMOL/L (ref 21–32)
CREAT SERPL-MCNC: 1.17 MG/DL (ref 0.6–1.3)
EST. AVERAGE GLUCOSE BLD GHB EST-MCNC: 280 MG/DL
GFR SERPL CREATININE-BSD FRML MDRD: 50 ML/MIN/1.73SQ M
GLUCOSE P FAST SERPL-MCNC: 305 MG/DL (ref 65–99)
HBA1C MFR BLD: 11.4 %
POTASSIUM SERPL-SCNC: 4 MMOL/L (ref 3.5–5.3)
SODIUM SERPL-SCNC: 134 MMOL/L (ref 136–145)

## 2021-04-30 PROCEDURE — 80048 BASIC METABOLIC PNL TOTAL CA: CPT

## 2021-04-30 PROCEDURE — 83036 HEMOGLOBIN GLYCOSYLATED A1C: CPT

## 2021-04-30 PROCEDURE — 36415 COLL VENOUS BLD VENIPUNCTURE: CPT

## 2021-06-04 NOTE — ED NOTES
Contacted Dr Mauricio Gonzales regarding Victoza  Patient stated she not currently taking same  Dr Carmen Mazariegos  Order to be discontinued       Erika Weir RN  07/08/19 8051
Dr Ham visited and examined       Nay Pratt RN  07/08/19 4021
Patient sleeping at this time  Breakfast tray placed in patients room at this time       Christy Kwon  07/08/19 0664
Patient states  Moving easier        Ciara Akins RN  07/08/19 3087
Sleeping   at bedside       Misty Bloch, RN  07/08/19 8764
normal sinus rhythm

## 2021-06-17 ENCOUNTER — HOSPITAL ENCOUNTER (OUTPATIENT)
Dept: SLEEP CENTER | Facility: HOSPITAL | Age: 61
Discharge: HOME | End: 2021-06-17
Payer: COMMERCIAL

## 2021-06-17 DIAGNOSIS — G47.30 SLEEP APNEA: ICD-10-CM

## 2021-06-17 PROCEDURE — 95810 POLYSOM 6/> YRS 4/> PARAM: CPT

## 2021-06-18 NOTE — PROGRESS NOTES
Sleep Study Documentation    Pre-Sleep Study       Sleep testing procedure explained to patient:YES    Patient napped prior to study:NO    [address] [place] worker after 12PM   Caffeine use:NO    Alcohol:Dayshift workers after 5PM: Alcohol use:NO    Typical day for patient:YES       Study Documentation    Sleep Study Indications: snoring, witnessed apneas, EDS, unrefreshing sleep    Sleep Study: Diagnostic   Snore: Moderate  Supplemental O2: no    O2 flow rate (L/min) range n/a  O2 flow rate (L/min) final n/a  Minimum SaO2 74  Baseline SaO2 88        Mode of Therapy:    EKG abnormalities: no     EEG abnormalities: no    Sleep Study Recorded < 2 hours: N/A    Sleep Study Recorded > 2 hours but incomplete study: N/A    Sleep Study Recorded 6 hours but no sleep obtained: NO    Patient classification: employed       Post-Sleep Study    Medication used at bedtime or during sleep study:YES other prescription medications    Patient reports time it took to fall asleep:30 to 60 minutes    Patient reports waking up during study:Denied    Patient reports sleeping 4 to 6 hours without dreaming  Patient reports sleep during study:typical    Patient rated sleepiness: Very sleepy or tired    PAP treatment:no

## 2021-06-21 ENCOUNTER — TELEPHONE (OUTPATIENT)
Dept: SLEEP CENTER | Facility: CLINIC | Age: 61
End: 2021-06-21

## 2021-06-21 NOTE — TELEPHONE ENCOUNTER
Spoke with patient, advised sleep study resulted and [facility]

## 2021-08-09 ENCOUNTER — OFFICE VISIT (OUTPATIENT)
Dept: URGENT CARE | Facility: CLINIC | Age: 61
End: 2021-08-09
Payer: COMMERCIAL

## 2021-08-09 VITALS
DIASTOLIC BLOOD PRESSURE: 78 MMHG | TEMPERATURE: 97.7 F | HEART RATE: 83 BPM | RESPIRATION RATE: 18 BRPM | SYSTOLIC BLOOD PRESSURE: 122 MMHG | OXYGEN SATURATION: 96 %

## 2021-08-09 DIAGNOSIS — N30.01 ACUTE CYSTITIS WITH HEMATURIA: ICD-10-CM

## 2021-08-09 DIAGNOSIS — R30.0 DYSURIA: Primary | ICD-10-CM

## 2021-08-09 LAB
SL AMB  POCT GLUCOSE, UA: 2000
SL AMB LEUKOCYTE ESTERASE,UA: ABNORMAL
SL AMB POCT BILIRUBIN,UA: ABNORMAL
SL AMB POCT BLOOD,UA: ABNORMAL
SL AMB POCT CLARITY,UA: ABNORMAL
SL AMB POCT COLOR,UA: YELLOW
SL AMB POCT KETONES,UA: ABNORMAL
SL AMB POCT NITRITE,UA: ABNORMAL
SL AMB POCT PH,UA: 6
SL AMB POCT SPECIFIC GRAVITY,UA: 1.01
SL AMB POCT URINE PROTEIN: 100
SL AMB POCT UROBILINOGEN: 0.2

## 2021-08-09 PROCEDURE — 87186 SC STD MICRODIL/AGAR DIL: CPT | Performed by: PHYSICIAN ASSISTANT

## 2021-08-09 PROCEDURE — 87086 URINE CULTURE/COLONY COUNT: CPT | Performed by: PHYSICIAN ASSISTANT

## 2021-08-09 PROCEDURE — 81002 URINALYSIS NONAUTO W/O SCOPE: CPT | Performed by: PHYSICIAN ASSISTANT

## 2021-08-09 PROCEDURE — 87077 CULTURE AEROBIC IDENTIFY: CPT | Performed by: PHYSICIAN ASSISTANT

## 2021-08-09 PROCEDURE — 99213 OFFICE O/P EST LOW 20 MIN: CPT | Performed by: PHYSICIAN ASSISTANT

## 2021-08-09 RX ORDER — FLUCONAZOLE 150 MG/1
150 TABLET ORAL ONCE
Qty: 1 TABLET | Refills: 1 | Status: SHIPPED | OUTPATIENT
Start: 2021-08-09 | End: 2021-08-09

## 2021-08-09 RX ORDER — NITROFURANTOIN 25; 75 MG/1; MG/1
100 CAPSULE ORAL 2 TIMES DAILY
Qty: 10 CAPSULE | Refills: 0 | Status: SHIPPED | OUTPATIENT
Start: 2021-08-09 | End: 2021-09-10

## 2021-08-09 NOTE — PROGRESS NOTES
[facility]        NAME: Charissa Ramos is a 56 y o  female  : 1960    MRN: [id number]  DATE: 2021  TIME: 2:51 PM    Assessment and Plan   Dysuria [R30 0]  1  Dysuria  Urine culture    POCT urine dip   2  Acute cystitis with hematuria  nitrofurantoin (MACROBID) 100 mg capsule    fluconazole (DIFLUCAN) 150 mg tablet         Patient Instructions   Patient Instructions     Urinary Tract Infection in Older Adults   WHAT YOU NEED TO KNOW:   A urinary tract infection (UTI) is caused by bacteria that get inside your urinary tract  Your urinary tract includes your kidneys, ureters, bladder, and urethra  Urine is made in your kidneys, and it flows from the ureters to the bladder  Urine leaves the bladder through the urethra  A UTI is more common in your lower urinary tract, which includes your bladder and urethra  DISCHARGE INSTRUCTIONS:   Return to the emergency department if:   · You are urinating very little or not at all  · You are vomiting  · You have a high fever with shaking chills  · You have side or back pain that gets worse  Call your doctor if:   · You have a fever  · You are a woman and you have increased white or yellow discharge from your vagina  · You do not feel better after 2 days of taking antibiotics  · You have questions or concerns about your condition or care  Medicines:   · Medicines  help treat the bacterial infection or decrease pain and burning when you urinate  You may also need medicines to decrease the urge to urinate often  If you have UTIs often (called recurrent UTIs), you may be given antibiotics to take regularly  You will be given directions for when and how to use antibiotics  The goal is to prevent UTIs but not cause antibiotic resistance by using antibiotics too often  · Take your medicine as directed  Contact your healthcare provider if you think your medicine is not helping or if you have side effects   Tell him or her if you are allergic to any medicine  Keep a list of the medicines, vitamins, and herbs you take  Include the amounts, and when and why you take them  Bring the list or the pill bottles to follow-up visits  Carry your medicine list with you in case of an emergency  Self-care:   · Drink liquids as directed  Liquids can help flush bacteria from your urinary tract  Ask how much liquid to drink each day and which liquids are best for you  You may need to drink more liquids than usual to help flush out the bacteria  Do not drink alcohol, caffeine, and citrus juices  These can irritate your bladder and increase your symptoms  · Apply heat  on your abdomen for 20 to 30 minutes every 2 hours for as many days as directed  Heat helps decrease discomfort and pressure in your bladder  Prevent a UTI:   · Urinate when you feel the urge  Do not hold your urine  Bacteria can grow if urine stays in the bladder too long  It may be helpful to urinate at least every 3 to 4 hours  · Urinate after you have sex  to flush away bacteria that can enter your urinary tract during sex  · Wear cotton underwear and clothes that are loose  Tight pants and nylon underwear can trap moisture and cause bacteria to grow  · Cranberry juice or cranberry supplements  may help prevent UTIs  Your healthcare provider can recommend the right juice or supplement for you  · Women should wipe front to back  after urinating or having a bowel movement  This may prevent germs from getting into the urinary tract  Do not douche or use feminine deodorants  These can change the chemical balance in your vagina  You may also be given vaginal estrogen medicine  This medicine helps prevent recurrent UTIs in women who have gone through menopause or are in mary-menopause  Follow up with your healthcare provider as directed:  Write down your questions so you remember to ask them during your visits     © Copyright [employer] 2021 Information is for End User's use only and may not be sold, redistributed or otherwise used for commercial purposes  All illustrations and images included in CareNotes® are the copyrighted property of A D A M , Inc  or [employer]don [employer]   The above information is an  only  It is not intended as medical advice for individual conditions or treatments  Talk to your doctor, nurse or pharmacist before following any medical regimen to see if it is safe and effective for you  Follow up with PCP in 3-5 days  Proceed to  ER if symptoms worsen  Chief Complaint     Chief Complaint   Patient presents with    Possible UTI     Pt c/o back pain and blood in urine since Tuesday  History of Present Illness       -The patient states symptoms started with dysuria and urinary frequency last Tuesday  -She states she started with lower back right sided back pain 2 days ago  -She states she noticed blood in urine last Tuesday and also states she had some "sand"  -She has a h/o renal calculi over 20 years ago and was in the hospital  -Does not see urologist  -She states left flank pain is constant and burning in nature-Worse with urinating  -She did not take any medications  -Denies fever, chills, nausea, vomiting, generalized weakness  -Does not check sugars  -Follows with urology for CKD  Last level was WNl      Review of Systems   Review of Systems   Constitutional: Positive for fatigue  Negative for chills and fever  HENT: Negative for sore throat  Respiratory: Negative for cough and shortness of breath  Cardiovascular: Negative for chest pain and palpitations  Gastrointestinal: Negative for abdominal pain, diarrhea, nausea and vomiting  Genitourinary: Positive for dysuria, flank pain and frequency  Negative for hematuria  Musculoskeletal: Negative for arthralgias and back pain  Skin: Negative for color change and rash  Neurological: Negative for seizures and syncope     All other systems reviewed and are negative          Current Medications       Current Outpatient Medications:     ammonium lactate (LAC-HYDRIN) 12 % lotion, , Disp: , Rfl: 0    atorvastatin (LIPITOR) 40 mg tablet, Take 1 tablet (40 mg total) by mouth daily with dinner, Disp: 30 tablet, Rfl: 0    BD INSULIN SYRINGE U/F 31G X 5/16" 1 ML MISC, , Disp: , Rfl: 0    BD Insulin Syringe U/F 31G X 5/16" 1 ML MISC, USE THREE TIMES DAILY, Disp: 100 each, Rfl: 3    Blood Glucose Monitoring Suppl (ONETOUCH VERIO FLEX SYSTEM) w/Device KIT, Use as directed, Disp: 1 kit, Rfl: 0    Calcium Carb-Cholecalciferol (CALCIUM 600+D3 PO), Take by mouth daily  , Disp: , Rfl:     celecoxib (CeleBREX) 200 mg capsule, take 1 capsule by mouth once daily with food AS NEEDED FOR PAIN, Disp: , Rfl:     cholecalciferol (VITAMIN D3) 400 units tablet, Take 400 Units by mouth daily, Disp: , Rfl:     dicyclomine (BENTYL) 10 mg capsule, Take 1 capsule (10 mg total) by mouth 4 (four) times a day as needed (abd pain), Disp: 20 capsule, Rfl: 0    Dulaglutide (Trulicity) 3 07 EI/3 5JS SOPN, Trulicity 3 17 OM/2 8 mL subcutaneous pen injector  inject 1/2 milliliter subcutaneously weekly, Disp: , Rfl:     ergocalciferol (ERGOCALCIFEROL) 1 25 MG (49924 UT) capsule, Take 1 capsule (50,000 Units total) by mouth once a week, Disp: 4 capsule, Rfl: 5    fluconazole (DIFLUCAN) 100 mg tablet, fluconazole 100 mg tablet  take 2 tablets by mouth on day 1 then 1 tablet daily until finished, Disp: , Rfl:     fluconazole (DIFLUCAN) 150 mg tablet, Take 1 tablet (150 mg total) by mouth once for 1 dose, Disp: 1 tablet, Rfl: 1    furosemide (LASIX) 20 mg tablet, Take 20 mg by mouth daily, Disp: , Rfl:     gabapentin (NEURONTIN) 600 MG tablet, Take 1 tablet (600 mg total) by mouth 3 (three) times a day, Disp: 90 tablet, Rfl: 1    glucagon (GLUCAGON EMERGENCY) 1 MG injection, Inject 1 mg under the skin once as needed (PRN blood glucose less than 70 if unconscious or uncorrectable by oral means) for up to 1 dose, Disp: 1 kit, Rfl: 0    insulin aspart protamine-insulin aspart (NOVOLOG MIX 70/30 FLEXPEN) 100 Units/mL injection pen, Inject 60 Units under the skin 2 (two) times a day before meals, Disp: 12 pen, Rfl: 5    Lancets (OneTouch Delica Plus [id number]) MISC, USE TO TEST BLOOD SUGAR THREE TIMES DAILY, Disp: 100 each, Rfl: 3    Lido-Menthol-Methyl Sal-Camph (CBD KINGS EX), Apply topically, Disp: , Rfl:     lisinopril (ZESTRIL) 5 mg tablet, Take 1 tablet (5 mg total) by mouth daily, Disp: 30 tablet, Rfl: 0    magnesium oxide (MAG-OX) 400 mg, Take 1 tablet (400 mg total) by mouth 2 (two) times a day, Disp: 60 tablet, Rfl: 0    meclizine (ANTIVERT) 25 mg tablet, Take 25 mg by mouth Three times daily as needed, Disp: , Rfl:     multivitamin (THERAGRAN) TABS, Take 1 tablet by mouth daily, Disp: , Rfl: 0    nitrofurantoin (MACROBID) 100 mg capsule, Take 1 capsule (100 mg total) by mouth 2 (two) times a day, Disp: 10 capsule, Rfl: 0    nortriptyline (PAMELOR) 10 mg capsule, Take 1 capsule (10 mg total) by mouth daily at bedtime, Disp: 30 capsule, Rfl: 1    Omega-3 Fatty Acids (FISH OIL ADULT GUMMIES PO), Take by mouth daily  , Disp: , Rfl:     ondansetron (ZOFRAN-ODT) 4 mg disintegrating tablet, Take 1 tablet (4 mg total) by mouth every 6 (six) hours as needed for nausea or vomiting, Disp: 12 tablet, Rfl: 0    rosuvastatin (CRESTOR) 20 MG tablet, rosuvastatin 20 mg tablet  take 1 tablet by mouth once daily, Disp: , Rfl:     Current Allergies     Allergies as of 08/09/2021 - Reviewed 08/09/2021   Allergen Reaction Noted    Caueladioer Josue Vallesave oleracea] Other (See Comments) 03/18/2016    Citrus bioflavonoid - food allergy Other (See Comments) 11/29/2018    Compro [prochlorperazine] Shortness Of Breath and Wheezing 07/15/2015    Latex Other (See Comments) 11/19/2018    Mobic [meloxicam] Swelling 11/12/2018    Morphine Hives and Itching 01/17/2017    Albumen, egg - food allergy Vomiting 07/15/2015    Metronidazole Itching 2016    Sulfa antibiotics Other (See Comments) 2015            The following portions of the patient's history were reviewed and updated as appropriate: allergies, current medications, past family history, past medical history, past social history, past surgical history and problem list      Past Medical History:   Diagnosis Date    Anesthesia related hyperthermia     pt states she had high fevers after her lipoma surgery in  at our hospital and was shipped to [facility] where they said t was from an anesthesia med    Anxiety     Arthritis     Asthma     Chronic pain     Depression     Diabetes mellitus ([facility] [address] )     GERD (gastroesophageal reflux disease)     Hyperlipidemia     Malignant hyperthermia due to anesthesia     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     Neuropathy     Obesity     PCOS (polycystic ovarian syndrome)        Past Surgical History:   Procedure Laterality Date    CERVICAL CONIZATION   W/ LASER       SECTION      DILATION AND CURETTAGE OF UTERUS      ENDOMETRIAL ABLATION      ENDOMETRIAL BIOPSY      EPIDURAL BLOCK INJECTION Bilateral 2019    Procedure: L5-S1 transforaminal epidural steroid injection;  Surgeon: Adrianna Ghotra MD;  Location: [facility] MA[facility];  Service: Pain Management     EPIDURAL BLOCK INJECTION Bilateral 2019    Procedure: L5-S1 transforaminal epidural steroid injection;  Surgeon: Adrianna Ghotra MD;  Location: [facility];  Service: Pain Management     FL GUIDED NEEDLE PLAC BX/ASP/INJ  2019    FL GUIDED NEEDLE PLAC BX/ASP/INJ  2019    FOOT SURGERY Left     HERNIA REPAIR      INDUCED       IR PICC LINE  2019    JOINT REPLACEMENT      KNEE ARTHROPLASTY Right     ND DEBRIDEMENT OPEN WOUND 20 SQ CM< Right 2018    Procedure: DEBRIDEMENT HAND/FINGER [facility] FUAD);   Surgeon: Coco Menjivar MD;  [facility] MA[facility] OR;  Service: Robinson Joyner ND EXCISION TUMOR SOFT TISSUE BACK/FLANK SUBQ 3+CM N/A 12/4/2018    Procedure: BACK LIPOMA EXCISION;  Surgeon: Robyn Sam MD;  [address] [place], [place] [address] MAIN OR;  Service: General    ROTATOR CUFF REPAIR Right     TONSILLECTOMY         Family History   Problem Relation Age of Onset    Polycystic ovary syndrome Mother     Mental illness Father     Diabetes Father          Medications have been verified  Objective   /78   Pulse 83   Temp 97 7 °F (36 5 °C)   Resp 18   SpO2 96%        Physical Exam     Physical Exam  Eyes:      Pupils: Pupils are equal, round, and reactive to light  Pulmonary:      Effort: Pulmonary effort is normal       Breath sounds: Normal breath sounds  No rhonchi  Abdominal:      Tenderness: There is abdominal tenderness  There is no right CVA tenderness or left CVA tenderness  Comments: There is suprapubic tenderness to deep palpation    Musculoskeletal:      Cervical back: Normal range of motion  Neurological:      Mental Status: She is alert        -Will give Diflucan since she gets "yeast infection" from antibiotics  -Check UTI  -I suggest follow up with pcp or go to the ER if symptoms worsen

## 2021-08-09 NOTE — PATIENT INSTRUCTIONS
Urinary Tract Infection in Older Adults   WHAT YOU NEED TO KNOW:   A urinary tract infection (UTI) is caused by bacteria that get inside your urinary tract  Your urinary tract includes your kidneys, ureters, bladder, and urethra  Urine is made in your kidneys, and it flows from the ureters to the bladder  Urine leaves the bladder through the urethra  A UTI is more common in your lower urinary tract, which includes your bladder and urethra  DISCHARGE INSTRUCTIONS:   Return to the emergency department if:   · You are urinating very little or not at all  · You are vomiting  · You have a high fever with shaking chills  · You have side or back pain that gets worse  Call your doctor if:   · You have a fever  · You are a woman and you have increased white or yellow discharge from your vagina  · You do not feel better after 2 days of taking antibiotics  · You have questions or concerns about your condition or care  Medicines:   · Medicines  help treat the bacterial infection or decrease pain and burning when you urinate  You may also need medicines to decrease the urge to urinate often  If you have UTIs often (called recurrent UTIs), you may be given antibiotics to take regularly  You will be given directions for when and how to use antibiotics  The goal is to prevent UTIs but not cause antibiotic resistance by using antibiotics too often  · Take your medicine as directed  Contact your healthcare provider if you think your medicine is not helping or if you have side effects  Tell him or her if you are allergic to any medicine  Keep a list of the medicines, vitamins, and herbs you take  Include the amounts, and when and why you take them  Bring the list or the pill bottles to follow-up visits  Carry your medicine list with you in case of an emergency  Self-care:   · Drink liquids as directed  Liquids can help flush bacteria from your urinary tract   Ask how much liquid to drink each day and which liquids are best for you  You may need to drink more liquids than usual to help flush out the bacteria  Do not drink alcohol, caffeine, and citrus juices  These can irritate your bladder and increase your symptoms  · Apply heat  on your abdomen for 20 to 30 minutes every 2 hours for as many days as directed  Heat helps decrease discomfort and pressure in your bladder  Prevent a UTI:   · Urinate when you feel the urge  Do not hold your urine  Bacteria can grow if urine stays in the bladder too long  It may be helpful to urinate at least every 3 to 4 hours  · Urinate after you have sex  to flush away bacteria that can enter your urinary tract during sex  · Wear cotton underwear and clothes that are loose  Tight pants and nylon underwear can trap moisture and cause bacteria to grow  · Cranberry juice or cranberry supplements  may help prevent UTIs  Your healthcare provider can recommend the right juice or supplement for you  · Women should wipe front to back  after urinating or having a bowel movement  This may prevent germs from getting into the urinary tract  Do not douche or use feminine deodorants  These can change the chemical balance in your vagina  You may also be given vaginal estrogen medicine  This medicine helps prevent recurrent UTIs in women who have gone through menopause or are in mary-menopause  Follow up with your healthcare provider as directed:  Write down your questions so you remember to ask them during your visits  © Copyright [employer] 2021 Information is for End User's use only and may not be sold, redistributed or otherwise used for commercial purposes  All illustrations and images included in CareNotes® are the copyrighted property of A [employer] M , Inc  or Lyndon Hadley  The above information is an  only  It is not intended as medical advice for individual conditions or treatments   Talk to your doctor, nurse or pharmacist before following any medical regimen to see if it is safe and effective for you

## 2021-08-11 LAB — BACTERIA UR CULT: ABNORMAL

## 2021-08-13 DIAGNOSIS — E11.65 TYPE 2 DIABETES MELLITUS WITH HYPERGLYCEMIA, UNSPECIFIED WHETHER LONG TERM INSULIN USE (HCC): ICD-10-CM

## 2021-08-13 RX ORDER — LANCETS 33 GAUGE
EACH MISCELLANEOUS
Qty: 100 EACH | Refills: 3 | Status: SHIPPED | OUTPATIENT
Start: 2021-08-13

## 2021-09-08 NOTE — PROGRESS NOTES
PT Evaluation     Today's date: 21  Patient name: Teressa Montez  : 1960  MRN: [id number]  Referring provider: Lexx Fabian DPM  Dx:   Encounter Diagnosis     ICD-10-CM    1  Achilles tendon rupture, right, subsequent encounter  S86 011D                   Assessment  Assessment details: Teressa Montez is a 56 y o  female presenting to outpatient physical therapy with noted impairments including pain, impaired soft tissue mobility, reduced range of motion, reduced strength, reduced postural awareness, and reduced activity tolerance  Signs and symptoms at present are consistent with referring diagnosis of L achilles tendon rupture    Due to noted impairments, the patient's present functional limitations include difficulty with ADLs with increased need for assistance, reliance on medication and/or modalities for pain relief, reduced tolerance for functional mobility and activity, and difficulty completing HH and community responsibilities  Patient to benefit from skilled outpatient physical therapy 2x/week for 4-6 weeks in order to reduce pain, maximize pain free range of motion, increase strength and stability, and improve functional mobility/functional activity in order to maximize return to prior level of function with reduced limitations  Home exercise program was provided and all questions answered to patient's level of satisfaction  Thank you for your referral       Impairments: abnormal gait, abnormal or restricted ROM, activity intolerance, impaired balance, impaired physical strength, lacks appropriate home exercise program and pain with function  Barriers to therapy: Obesity, extensive PMH  Understanding of Dx/Px/POC: good   Prognosis: fair    Goals  STGs to be achieved in 4 weeks:  1  Pt to demonstrate reduced subjective pain rating "at worst" by at least 2-3 points from Initial Eval in order to allow for reduced pain with ADLs and improved functional activity tolerance     2  Pt to demonstrate increased AROM of R ankle by at least 5-10 degrees in order to allow for greater ease and independence with ADLs and functional mobility  3  Pt to demonstrate full PROM of R ankle in order to maximize joint mobility and function and allow for progression of exercise program and achievement of goals  4  Pt to demonstrate increased MMT of R ankleby at least 1/2-1 grade in order to improve safety and stability with ADLs and functional mobility  LTGs to be achieved in 6-8 weeks:  1  Pt will be I with HEP in order to continue to improve quality of life and independence and reduce risk for re-injury  2  Pt to demonstrate return to amb w/o AD's  without limitations or restrictions  3  Pt to demonstrate improved function as noted by achieving or exceeding predicted score on FOTO outcomes assessment tool  Plan  Patient would benefit from: skilled physical therapy  Planned therapy interventions: manual therapy, patient education, stretching, strengthening, therapeutic exercise, home exercise program and neuromuscular re-education  Frequency: 2x week  Duration in weeks: 8  Plan of Care beginning date: 2021  Plan of Care expiration date: 10/21/2021  Treatment plan discussed with: patient        Subjective Evaluation    History of Present Illness  Mechanism of injury: Pt states in March of this year she was walking  In her home when she had a popping sensation in the R achilles area which occurred again over the next 2 days  Pain became excruciating  Seen by ortho Dr Leanne Hernandez, MRI refused by insurance  Seen by Dr Shannan Anderson who recommended PT  Pt states she cannot drive, is having diff walking, and doing everything  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[employer]  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  Pt has complicated medical history            Not a recurrent problem   Quality of life: poor    Pain  Current pain ratin  At best pain ratin  At worst pain rating: 9  Quality: dull ache, sharp, burning and discomfort  Alleviating factors: medical marijuana  Aggravating factors: walking and standing  Progression: worsening    Social Support  Steps to enter house: yes  Stairs in house: yes (pt does not use them)   Lives in: multiple-level home  Lives with: significant other (children aged 15, 15, 12)    Employment status: working  Treatments  Current treatment: physical therapy  Patient Goals  Patient goals for therapy: decreased pain, improved balance, increased motion, increased strength, independence with ADLs/IADLs and return to work  Patient goal: walk w/o cane or walker, go on vacation        Objective     Observations     Right Ankle/Foot   Positive for deformity  Additional Observation Details  Large indentation R achilles tendon area with tight/firm distal calf muscle area  R foot toes slightly flexed at rest      Tenderness     Right Ankle/Foot   Tenderness in the Achilles insertion and proximal Achilles  Additional Tenderness Details  L posterior calf and knee  Medial L shin area    Neurological Testing     Reflexes     Right   Achilles (S1): absent (0)    Additional Neurological Details  Neuropathy B feet    Active Range of Motion     Right Ankle/Foot   Dorsiflexion (ke): 10 degrees   Plantar flexion: 40 degrees   Inversion: 20 degrees   Eversion: 5 degrees     Passive Range of Motion     Right Ankle/Foot    Dorsiflexion (ke): 20 degrees   Plantar flexion: 55 degrees   Inversion: 25 degrees   Eversion: 10 degrees     Strength/Myotome Testing     Right Ankle/Foot   Dorsiflexion: 3-  Plantar flexion: 2+  Inversion: 3  Eversion: 3    Tests     Right Ankle/Foot   Positive for Mcpherson       Ambulation   Weight-Bearing Status   Weight-Bearing Status (Right): full weight-bearing    Assistive device used: single point cane    Ambulation: Level Surfaces   Ambulation with assistive device: independent    Observational Gait   Gait: antalgic   Stride length within functional limits  Decreased walking speed     Right foot contact pattern: foot flat  Base of support: increased      Flowsheet Rows      Most Recent Value   PT/OT G-Codes   Current Score  25   Projected Score  46             Precautions: extensive PMH see chart for same      Re-eval Date: 10/9/21    Date 9/9/21       Visit Count 1       FOTO completed       Pain In See IE       Pain Out See IE             Manuals 9/9       PROM R ankle                                Neuro Re-Ed        aeromat  Sidestep  tandem        Wobble board        Seated TR's/HR's        Ham stretch                                Ther Ex        Nustep        AROM R ankle        TB R ankle        Ankle circles        Towel slides        Leg press        Knee ext mach        Leg flex mach        Ther Activity                        Gait Training                        Modalities

## 2021-09-09 ENCOUNTER — EVALUATION (OUTPATIENT)
Dept: PHYSICAL THERAPY | Facility: CLINIC | Age: 61
End: 2021-09-09
Payer: COMMERCIAL

## 2021-09-09 DIAGNOSIS — S86.011D ACHILLES TENDON RUPTURE, RIGHT, SUBSEQUENT ENCOUNTER: Primary | ICD-10-CM

## 2021-09-09 PROCEDURE — 97163 PT EVAL HIGH COMPLEX 45 MIN: CPT

## 2021-09-09 NOTE — LETTER
2021    [address] [address]  [address] [address]    Patient: Tawanda Ambriz   YOB: 1960   Date of Visit: 2021     Encounter Diagnosis     ICD-10-CM    1  Achilles tendon rupture, right, subsequent encounter  S86 011D        Dear Dr Paul Huddleston: Thank you for your recent referral of Tawanda Ambriz  Please review the attached evaluation summary from Maryan's recent visit  Please verify that you agree with the plan of care by signing the attached order  If you have any questions or concerns, please do not hesitate to call  I sincerely appreciate the opportunity to share in the care of one of your patients and hope to have another opportunity to work with you in the near future  Sincerely,    Candelaria Moreira, PT      Referring Provider:      I certify that I have read the below Plan of Care and certify the need for these services furnished under this plan of treatment while under my care  ERIK[address]  [address] [address]  Via Fax: [phone]          PT Evaluation     Today's date: 21  Patient name: Tawanda Ambriz  : 1960  MRN: [id number]  Referring provider: Sid Barbosa DPM  Dx:   Encounter Diagnosis     ICD-10-CM    1  Achilles tendon rupture, right, subsequent encounter  S86 011D                   Assessment  Assessment details: Tawanda Ambriz is a 56 y o  female presenting to outpatient physical therapy with noted impairments including pain, impaired soft tissue mobility, reduced range of motion, reduced strength, reduced postural awareness, and reduced activity tolerance  Signs and symptoms at present are consistent with referring diagnosis of L achilles tendon rupture    Due to noted impairments, the patient's present functional limitations include difficulty with ADLs with increased need for assistance, reliance on medication and/or modalities for pain relief, reduced tolerance for functional mobility and activity, and difficulty completing [place] and community responsibilities  Patient to benefit from skilled outpatient physical therapy 2x/week for 4-6 weeks in order to reduce pain, maximize pain free range of motion, increase strength and stability, and improve functional mobility/functional activity in order to maximize return to prior level of function with reduced limitations  Home exercise program was provided and all questions answered to patient's level of satisfaction  Thank you for your referral       Impairments: abnormal gait, abnormal or restricted ROM, activity intolerance, impaired balance, impaired physical strength, lacks appropriate home exercise program and pain with function  Barriers to therapy: Obesity, extensive PMH  Understanding of Dx/Px/POC: good   Prognosis: fair    Goals  STGs to be achieved in 4 weeks:  1  Pt to demonstrate reduced subjective pain rating "at worst" by at least 2-3 points from Initial Eval in order to allow for reduced pain with ADLs and improved functional activity tolerance  2  Pt to demonstrate increased AROM of R ankle by at least 5-10 degrees in order to allow for greater ease and independence with ADLs and functional mobility  3  Pt to demonstrate full PROM of R ankle in order to maximize joint mobility and function and allow for progression of exercise program and achievement of goals  4  Pt to demonstrate increased MMT of R ankleby at least 1/2-1 grade in order to improve safety and stability with ADLs and functional mobility  LTGs to be achieved in 6-8 weeks:  1  Pt will be I with HEP in order to continue to improve quality of life and independence and reduce risk for re-injury  2  Pt to demonstrate return to amb w/o AD's  without limitations or restrictions  3  Pt to demonstrate improved function as noted by achieving or exceeding predicted score on FOTO outcomes assessment tool         Plan  Patient would benefit from: skilled physical therapy  Planned therapy interventions: manual therapy, patient education, stretching, strengthening, therapeutic exercise, home exercise program and neuromuscular re-education  Frequency: 2x week  Duration in weeks: 8  Plan of Care beginning date: 2021  Plan of Care expiration date: 10/21/2021  Treatment plan discussed with: patient        Subjective Evaluation    History of Present Illness  Mechanism of injury: Pt states in March of this year she was walking  In her home when she had a popping sensation in the R achilles area which occurred again over the next 2 days  Pain became excruciating  Seen by ortho Dr Andrzej Faustin, MRI refused by insurance  Seen by Dr Viridiana Spivey who recommended PT  Pt states she cannot drive, is having diff walking, and doing everything  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[employer]  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  Pt has complicated medical history  Not a recurrent problem   Quality of life: poor    Pain  Current pain ratin  At best pain ratin  At worst pain ratin  Quality: dull ache, sharp, burning and discomfort  Alleviating factors: medical marijuana  Aggravating factors: walking and standing  Progression: worsening    Social Support  Steps to enter house: yes  Stairs in house: yes (pt does not use them)   Lives in: multiple-level home  Lives with: significant other (children aged 15, 15, 12)    Employment status: working  Treatments  Current treatment: physical therapy  Patient Goals  Patient goals for therapy: decreased pain, improved balance, increased motion, increased strength, independence with ADLs/IADLs and return to work  Patient goal: walk w/o cane or walker, go on vacation        Objective     Observations     Right Ankle/Foot   Positive for deformity  Additional Observation Details  Large indentation R achilles tendon area with tight/firm distal calf muscle area    R foot toes slightly flexed at rest      Tenderness     Right Ankle/Foot   Tenderness in the Achilles insertion and proximal Achilles  Additional Tenderness Details  L posterior calf and knee  Medial L shin area    Neurological Testing     Reflexes     Right   Achilles (S1): absent (0)    Additional Neurological Details  Neuropathy B feet    Active Range of Motion     Right Ankle/Foot   Dorsiflexion (ke): 10 degrees   Plantar flexion: 40 degrees   Inversion: 20 degrees   Eversion: 5 degrees     Passive Range of Motion     Right Ankle/Foot    Dorsiflexion (ke): 20 degrees   Plantar flexion: 55 degrees   Inversion: 25 degrees   Eversion: 10 degrees     Strength/Myotome Testing     Right Ankle/Foot   Dorsiflexion: 3-  Plantar flexion: 2+  Inversion: 3  Eversion: 3    Tests     Right Ankle/Foot   Positive for Mcpherson  Ambulation   Weight-Bearing Status   Weight-Bearing Status (Right): full weight-bearing    Assistive device used: single point cane    Ambulation: Level Surfaces   Ambulation with assistive device: independent    Observational Gait   Gait: antalgic   Stride length within functional limits  Decreased walking speed     Right foot contact pattern: foot flat  Base of support: increased      Flowsheet Rows      Most Recent Value   PT/OT G-Codes   Current Score  25   Projected Score  46             Precautions: extensive PMH see chart for same      Re-eval Date: 10/9/21    Date 9/9/21       Visit Count 1       FOTO completed       Pain In See IE       Pain Out See IE             Manuals 9/9       PROM R ankle                                Neuro Re-Ed        aeromat  Sidestep  tandem        Wobble board        Seated TR's/HR's        Ham stretch                                Ther Ex        Nustep        AROM R ankle        TB R ankle        Ankle circles        Towel slides        Leg press        Knee ext mach        Leg flex mach        Ther Activity                        Gait Training                        Modalities

## 2021-09-10 ENCOUNTER — OFFICE VISIT (OUTPATIENT)
Dept: NEPHROLOGY | Facility: CLINIC | Age: 61
End: 2021-09-10
Payer: COMMERCIAL

## 2021-09-10 VITALS
WEIGHT: 281.8 LBS | SYSTOLIC BLOOD PRESSURE: 150 MMHG | OXYGEN SATURATION: 98 % | DIASTOLIC BLOOD PRESSURE: 78 MMHG | BODY MASS INDEX: 51.54 KG/M2 | HEART RATE: 58 BPM

## 2021-09-10 DIAGNOSIS — E55.9 VITAMIN D DEFICIENCY: Chronic | ICD-10-CM

## 2021-09-10 DIAGNOSIS — E11.65 TYPE 2 DIABETES MELLITUS WITH HYPERGLYCEMIA, WITH LONG-TERM CURRENT USE OF INSULIN (HCC): ICD-10-CM

## 2021-09-10 DIAGNOSIS — N18.31 STAGE 3A CHRONIC KIDNEY DISEASE (HCC): Primary | Chronic | ICD-10-CM

## 2021-09-10 DIAGNOSIS — E66.01 MORBID OBESITY WITH BMI OF 50.0-59.9, ADULT (HCC): Chronic | ICD-10-CM

## 2021-09-10 DIAGNOSIS — I10 ESSENTIAL HYPERTENSION: Chronic | ICD-10-CM

## 2021-09-10 DIAGNOSIS — G47.33 OBSTRUCTIVE SLEEP APNEA SYNDROME: ICD-10-CM

## 2021-09-10 DIAGNOSIS — Z79.4 TYPE 2 DIABETES MELLITUS WITH HYPERGLYCEMIA, WITH LONG-TERM CURRENT USE OF INSULIN (HCC): ICD-10-CM

## 2021-09-10 PROCEDURE — 99214 OFFICE O/P EST MOD 30 MIN: CPT | Performed by: INTERNAL MEDICINE

## 2021-09-10 NOTE — PROGRESS NOTES
[facility] [place], [place]    Name: Vicente Damon  YOB: 1960      Assessment/Plan:    Stage 3 chronic kidney disease [facility])  Lab Results   Component Value Date    EGFR 50 04/30/2021    EGFR 45 04/27/2021    EGFR 50 11/10/2020    CREATININE 1 17 04/30/2021    CREATININE 1 27 04/27/2021    CREATININE 1 17 11/10/2020       Patient had blood work in the near future, will confirm that she remains at her typical baseline creatinine between 1 1 - 1 2 mg/dL    Type 2 diabetes mellitus with hyperglycemia, with long-term current use of insulin ([address] )   Follow-up A1c with next set of labs, continue work on controlling carbohydrates in diet and managing insulin as needed  Patient will be seen Endocrinology in the near future  Lab Results   Component Value Date    HGBA1C 11 4 (H) 04/30/2021       Essential hypertension  Blood pressure is little elevated, patient was struggling  Getting in to the office to be seen  Should be re-evaluated when she is at rest at home, if remains elevated will adjust medications appropriately  Vitamin D deficiency    Continue vitamin-D supplementation  Problem List Items Addressed This Visit        Endocrine    Type 2 diabetes mellitus with hyperglycemia, with long-term current use of insulin ([address] )      Follow-up A1c with next set of labs, continue work on controlling carbohydrates in diet and managing insulin as needed  Patient will be seen Endocrinology in the near future  Lab Results   Component Value Date    HGBA1C 11 4 (H) 04/30/2021            Relevant Orders    Hemoglobin A1C       Respiratory    Obstructive sleep apnea syndrome       Cardiovascular and Mediastinum    Essential hypertension (Chronic)     Blood pressure is little elevated, patient was struggling  Getting in to the office to be seen    Should be re-evaluated when she is at rest at home, if remains elevated will adjust medications appropriately  Genitourinary    Stage 3 chronic kidney disease ([facility] ) - Primary (Chronic)     Lab Results   Component Value Date    EGFR 50 04/30/2021    EGFR 45 04/27/2021    EGFR 50 11/10/2020    CREATININE 1 17 04/30/2021    CREATININE 1 27 04/27/2021    CREATININE 1 17 11/10/2020       Patient had blood work in the near future, will confirm that she remains at her typical baseline creatinine between 1 1 - 1 2 mg/dL            Other    Vitamin D deficiency (Chronic)       Continue vitamin-D supplementation  Morbid obesity with BMI of 50 0-59 9, adult ([facility] ) (Chronic)            Patient will have blood work done in the very near future, will follow-up on those labs in let her know the results  In the meantime no medication changes were made  We will need to keep an eye on her blood pressures at home to ensure that they are stable and appropriate  Subjective:      Patient ID: Beth Baker is a 56 y o  female  Patient presents for follow up  Patient has not had a chance to have labs done  Patient is taking all medications as prescribed, with no specific issues at this time  she recently had injury to her right Achilles heel at this time it appears to not be amenable to surgical correction  She will be starting physical therapy soon  Patient's blood sugars have improved, latest A1c is from April remains in double digits at over 11%  However she believes that this has improved in the meantime  Hypertension  This is a chronic problem  The current episode started more than 1 year ago  The problem is unchanged  The problem is controlled  Pertinent negatives include no chest pain, orthopnea or peripheral edema  There are no associated agents to hypertension  Risk factors for coronary artery disease include obesity and post-menopausal state  Past treatments include ACE inhibitors  Compliance problems include diet  Hypertensive end-organ damage includes kidney disease  Identifiable causes of hypertension include chronic renal disease  The following portions of the patient's history were reviewed and updated as appropriate: allergies, current medications, past family history, past medical history, past social history, past surgical history and problem list     Review of Systems   Cardiovascular: Negative for chest pain and orthopnea  All other systems reviewed and are negative  Social History     Socioeconomic History    Marital status:      Spouse name: Not on file    Number of children: Not on file    Years of education: Not on file    Highest education level: Not on file   Occupational History    Not on file   Tobacco Use    Smoking status: Never Smoker    Smokeless tobacco: Never Used   Vaping Use    Vaping Use: Never used   Substance and Sexual Activity    Alcohol use: Not Currently     Comment: OCCASIONALLY    Drug use: Yes     Types: Marijuana     Comment: CBD    Sexual activity: Never   Other Topics Concern    Not on file   Social History Narrative    Not on file     Social Determinants of Health     Financial Resource Strain:     Difficulty of Paying Living Expenses:    Food Insecurity:     Worried About Running Out of Food in the Last Year:     [address] in the Last Year:    Transportation Needs:     Lack of Transportation (Medical):      Lack of Transportation (Non-Medical):    Physical Activity:     Days of Exercise per Week:     Minutes of Exercise per Session:    Stress:     Feeling of Stress :    Social Connections:     Frequency of Communication with Friends and Family:     Frequency of Social Gatherings with Friends and Family:     Attends Hindu Services:     Active Member of Clubs or Organizations:     Attends Club or Organization Meetings:     Marital Status:    Intimate Partner Violence:     Fear of Current or Ex-Partner:     Emotionally Abused:     Physically Abused:     Sexually Abused:      Past Medical History:   Diagnosis Date    Anesthesia related hyperthermia     pt states she had high fevers after her lipoma surgery in  at our hospital and was shipped to [facility] where they said t was from an anesthesia med    Anxiety     Arthritis     Asthma     Chronic pain     Depression     Diabetes mellitus ([facility] )     GERD (gastroesophageal reflux disease)     Hyperlipidemia     Malignant hyperthermia due to anesthesia     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     Neuropathy     Obesity     PCOS (polycystic ovarian syndrome)      Past Surgical History:   Procedure Laterality Date    CERVICAL CONIZATION   W/ LASER       SECTION      DILATION AND CURETTAGE OF UTERUS      ENDOMETRIAL ABLATION      ENDOMETRIAL BIOPSY      EPIDURAL BLOCK INJECTION Bilateral 2019    Procedure: L5-S1 transforaminal epidural steroid injection;  Surgeon: Kenyatta Stewart MD;  Location: [facility] MA[facility];  Service: Pain Management     EPIDURAL BLOCK INJECTION Bilateral 2019    Procedure: L5-S1 transforaminal epidural steroid injection;  Surgeon: Kenyatta Stewart MD;  Location: [facility];  Service: Pain Management     FL GUIDED NEEDLE PLAC BX/ASP/INJ  2019    FL GUIDED NEEDLE PLAC BX/ASP/INJ  2019    FOOT SURGERY Left     HERNIA REPAIR      INDUCED       IR PICC LINE  2019    JOINT REPLACEMENT      KNEE ARTHROPLASTY Right     IA DEBRIDEMENT OPEN WOUND 20 SQ CM< Right 2018    Procedure: DEBRIDEMENT HAND/FINGER [facility] OUT);   Surgeon: Jordana Cesar MD;  Location: [facility] MAIN OR;  Service: General    IA EXCISION TUMOR SOFT TISSUE BACK/FLANK SUBQ 3+CM N/A 2018    Procedure: BACK LIPOMA EXCISION;  Surgeon: Jordana Cesar MD;  Location: [facility] MAIN OR;  Service: General    ROTATOR CUFF REPAIR Right     TONSILLECTOMY         Current Outpatient Medications:     ammonium lactate (LAC-HYDRIN) 12 % lotion, , Disp: , Rfl: 0    atorvastatin (LIPITOR) 40 mg tablet, Take 1 tablet (40 mg total) by mouth daily with dinner, Disp: 30 tablet, Rfl: 0    BD INSULIN SYRINGE U/F 31G X 5/16" 1 ML MISC, , Disp: , Rfl: 0    BD Insulin Syringe U/F 31G X 5/16" 1 ML MISC, USE THREE TIMES DAILY, Disp: 100 each, Rfl: 3    Blood Glucose Monitoring Suppl (ONETOUCH VERIO FLEX SYSTEM) w/Device KIT, Use as directed, Disp: 1 kit, Rfl: 0    Calcium Carb-Cholecalciferol (CALCIUM 600+D3 PO), Take by mouth daily  , Disp: , Rfl:     celecoxib (CeleBREX) 200 mg capsule, take 1 capsule by mouth once daily with food AS NEEDED FOR PAIN, Disp: , Rfl:     cholecalciferol (VITAMIN D3) 400 units tablet, Take 400 Units by mouth daily, Disp: , Rfl:     dicyclomine (BENTYL) 10 mg capsule, Take 1 capsule (10 mg total) by mouth 4 (four) times a day as needed (abd pain), Disp: 20 capsule, Rfl: 0    Dulaglutide (Trulicity) 1 92 IM/5 8ZL SOPN, Trulicity 6 55 PU/2 3 mL subcutaneous pen injector  inject 1/2 milliliter subcutaneously weekly, Disp: , Rfl:     ergocalciferol (ERGOCALCIFEROL) 1 25 MG (80916 UT) capsule, Take 1 capsule (50,000 Units total) by mouth once a week, Disp: 4 capsule, Rfl: 5    fluconazole (DIFLUCAN) 100 mg tablet, fluconazole 100 mg tablet  take 2 tablets by mouth on day 1 then 1 tablet daily until finished, Disp: , Rfl:     furosemide (LASIX) 20 mg tablet, Take 20 mg by mouth daily, Disp: , Rfl:     glucagon (GLUCAGON EMERGENCY) 1 MG injection, Inject 1 mg under the skin once as needed (PRN blood glucose less than 70 if unconscious or uncorrectable by oral means) for up to 1 dose, Disp: 1 kit, Rfl: 0    insulin aspart protamine-insulin aspart (NOVOLOG MIX 70/30 FLEXPEN) 100 Units/mL injection pen, Inject 60 Units under the skin 2 (two) times a day before meals, Disp: 12 pen, Rfl: 5    Lancets (OneTouch Delica Plus [id number]) MISC, USE TO TEST BLOOD SUGAR THREE TIMES DAILY, Disp: 100 each, Rfl: 3    Lido-Menthol-Methyl Sal-Camph (CBD KINGS EX), Apply topically, Disp: , Rfl:     lisinopril (ZESTRIL) 5 mg tablet, Take 1 tablet (5 mg total) by mouth daily, Disp: 30 tablet, Rfl: 0    meclizine (ANTIVERT) 25 mg tablet, Take 25 mg by mouth Three times daily as needed, Disp: , Rfl:     multivitamin (THERAGRAN) TABS, Take 1 tablet by mouth daily, Disp: , Rfl: 0    nortriptyline (PAMELOR) 10 mg capsule, Take 1 capsule (10 mg total) by mouth daily at bedtime, Disp: 30 capsule, Rfl: 1    Omega-3 Fatty Acids (FISH OIL ADULT GUMMIES PO), Take by mouth daily  , Disp: , Rfl:     ondansetron (ZOFRAN-ODT) 4 mg disintegrating tablet, Take 1 tablet (4 mg total) by mouth every 6 (six) hours as needed for nausea or vomiting, Disp: 12 tablet, Rfl: 0    rosuvastatin (CRESTOR) 20 MG tablet, rosuvastatin 20 mg tablet  take 1 tablet by mouth once daily, Disp: , Rfl:     gabapentin (NEURONTIN) 600 MG tablet, Take 1 tablet (600 mg total) by mouth 3 (three) times a day, Disp: 90 tablet, Rfl: 1    magnesium oxide (MAG-OX) 400 mg, Take 1 tablet (400 mg total) by mouth 2 (two) times a day, Disp: 60 tablet, Rfl: 0    Lab Results   Component Value Date    SODIUM 134 (L) 04/30/2021    K 4 0 04/30/2021     04/30/2021    CO2 24 04/30/2021    AGAP 7 04/30/2021    BUN 20 04/30/2021    CREATININE 1 17 04/30/2021    GLUC 303 (H) 04/27/2021    GLUF 305 (H) 04/30/2021    CALCIUM 8 8 04/30/2021    AST 29 04/27/2021    ALT 26 04/27/2021    ALKPHOS 89 04/27/2021    TP 7 0 04/27/2021    TBILI 0 58 04/27/2021    EGFR 50 04/30/2021     Lab Results   Component Value Date    WBC 7 52 04/27/2021    HGB 13 7 04/27/2021    HCT 42 9 04/27/2021    MCV 87 04/27/2021     04/27/2021     Lab Results   Component Value Date    CHOLESTEROL 118 05/06/2020    CHOLESTEROL 187 12/05/2019    CHOLESTEROL 234 (H) 10/30/2019     Lab Results   Component Value Date    HDL 43 05/06/2020    HDL 46 12/05/2019    HDL 44 10/30/2019     Lab Results   Component Value Date    LDLCALC 34 05/06/2020    LDLCALC 102 (H) 12/05/2019    [facility] 125 (H) 10/30/2019     Lab Results   Component Value Date    TRIG 205 (H) 05/06/2020    TRIG 197 (H) 12/05/2019    TRIG 324 (H) 10/30/2019     No results found for: [place], [place]  Lab Results   Component Value Date    CSK7HQJHLSOP 2 120 11/10/2020     Lab Results   Component Value Date    CALCIUM 8 8 04/30/2021    PHOS 4 2 (H) 10/18/2019     Lab Results   Component Value Date    SPEP See Comment 02/17/2021     No results found for: AIME LEY4HUR        Objective:      /78   Pulse 58   Wt 128 kg (281 lb 12 8 oz)   SpO2 98%   BMI 51 54 kg/m²          Physical Exam  Vitals reviewed  Constitutional:       General: She is not in acute distress  Appearance: She is well-developed  HENT:      Head: Normocephalic and atraumatic  Eyes:      Conjunctiva/sclera: Conjunctivae normal    Cardiovascular:      Rate and Rhythm: Normal rate and regular rhythm  Pulmonary:      Effort: Pulmonary effort is normal       Breath sounds: Normal breath sounds  Abdominal:      Palpations: Abdomen is soft  Musculoskeletal:         General: Swelling ( mild bilateral lower extremity edema) present  Cervical back: Neck supple  Skin:     General: Skin is warm  Findings: No rash  Neurological:      Mental Status: She is alert and oriented to person, place, and time  Cranial Nerves: No cranial nerve deficit     Psychiatric:         Behavior: Behavior normal

## 2021-09-10 NOTE — ASSESSMENT & PLAN NOTE
Blood pressure is little elevated, patient was struggling  Getting in to the office to be seen  Should be re-evaluated when she is at rest at home, if remains elevated will adjust medications appropriately

## 2021-09-10 NOTE — ASSESSMENT & PLAN NOTE
Follow-up A1c with next set of labs, continue work on controlling carbohydrates in diet and managing insulin as needed  Patient will be seen Endocrinology in the near future        Lab Results   Component Value Date    HGBA1C 11 4 (H) 04/30/2021

## 2021-09-10 NOTE — PATIENT INSTRUCTIONS
COVID:    Vit C 500 mg twice a day  (up to 1,000 mg twice a day for 14 days after getting it)  Zinc 100 mg once daily, with a meal (up to 200 mg daily for 14 days after getting it)

## 2021-09-10 NOTE — ASSESSMENT & PLAN NOTE
Lab Results   Component Value Date    EGFR 50 04/30/2021    EGFR 45 04/27/2021    EGFR 50 11/10/2020    CREATININE 1 17 04/30/2021    CREATININE 1 27 04/27/2021    CREATININE 1 17 11/10/2020       Patient had blood work in the near future, will confirm that she remains at her typical baseline creatinine between 1 1 - 1 2 mg/dL

## 2021-09-17 ENCOUNTER — OFFICE VISIT (OUTPATIENT)
Dept: PHYSICAL THERAPY | Facility: CLINIC | Age: 61
End: 2021-09-17
Payer: COMMERCIAL

## 2021-09-17 DIAGNOSIS — S86.011D ACHILLES TENDON RUPTURE, RIGHT, SUBSEQUENT ENCOUNTER: Primary | ICD-10-CM

## 2021-09-17 PROCEDURE — 97110 THERAPEUTIC EXERCISES: CPT

## 2021-09-17 NOTE — PROGRESS NOTES
Daily Note     Today's date: 2021  Patient name: Tawanda Ambriz  : 1960  MRN: [id number]  Referring provider: Sid Barbosa DPM  Dx:   Encounter Diagnosis     ICD-10-CM    1  Achilles tendon rupture, right, subsequent encounter  S86 011D                   Subjective:  Pt states she is having pain in her R knee on Nustep and has diee walking on inclines and declines because it feels as though her artificial parts don't line up  States she feels as though her muscle is pulling  up in her upper calf      Objective: See treatment diary below      Assessment: Tolerated treatment fair  Patient demonstrated fatigue post treatment and would benefit from continued PT  Pt completes TE very slowly  Walks slowly with caution using SPC  Notes her neck and back issues add to her slow gait  Gait is not symmetric with R ankle injury and toe off during stance phase of gait  Plan: Continue per plan of care        Precautions: extensive PMH see chart for same      Re-eval Date: 10/9/21    Date 21      Visit Count 1 2      FOTO completed       Pain In See IE 6/10      Pain Out See IE   7/10            Manuals       PROM R ankle                                Neuro Re-Ed        aeromat  Sidestep  tandem        Wobble board  Seated 20 ea dir      Seated TR's/HR's  30x      Ham stretch  R 10 x 10"                              Ther Ex        Nustep  L1  22'      AROM R ankle  DF 30  Inv/ev 30x      TB R ankle        Ankle circles  20 ea      Towel slides  2 min ea      Leg press  30#        Knee ext mach        Leg flex mach        Ther Activity                        Gait Training                        Modalities

## 2021-09-21 ENCOUNTER — OFFICE VISIT (OUTPATIENT)
Dept: PHYSICAL THERAPY | Facility: CLINIC | Age: 61
End: 2021-09-21
Payer: COMMERCIAL

## 2021-09-21 DIAGNOSIS — S86.011D ACHILLES TENDON RUPTURE, RIGHT, SUBSEQUENT ENCOUNTER: Primary | ICD-10-CM

## 2021-09-21 PROCEDURE — 97110 THERAPEUTIC EXERCISES: CPT

## 2021-09-21 PROCEDURE — 97140 MANUAL THERAPY 1/> REGIONS: CPT

## 2021-09-21 NOTE — PROGRESS NOTES
Daily Note     Today's date: 2021  Patient name: Teressa Montez  : 1960  MRN: [id number]  Referring provider: Lexx Fabian DPM  Dx:   Encounter Diagnosis     ICD-10-CM    1  Achilles tendon rupture, right, subsequent encounter  S86 011D                   Subjective: Pt  states "It's uncomfortable" re: current status of R ankle/achilles region  Objective: See treatment diary below      Assessment: Tolerated treatment Fair @ best with ther exer performed  Received MT fair+ to Fairly Well range overall      Plan: Con't services 2x/week  Progress as able and tolerated         Precautions: extensive PMH see chart for same      Re-eval Date: 10/9/21    Date 21 9  21     Visit Count 1 2 3     FOTO completed       Pain In See IE 6/10 uncomfortable     Pain Out See IE   7/10            Manuals  9 21 21     PROM R ankle   **10 min  Light, gentle, gingerly PROM                             Neuro Re-Ed   9  21     aeromat  Sidestep  tandem        Wobble board  Seated 20 ea dir Seated 25 ea dir     Seated TR's/HR's  30x 30x     Ham stretch  R 10 x 10" R 10 x 10"                             Ther Ex   9 21 21       Nustep  L1  22' L1  10'       AROM R ankle  DF 30  Inv/ev 30x DF 30x  Inv/ev 30x     TB R ankle        Ankle circles  20 ea 20x       Towel slides  2 min ea 2 min ea       Leg press  30#        Knee ext mach        Leg flex mach        Ther Activity                        Gait Training                        Modalities   9 21 21     CP   **to R Ankle/  Achilles region

## 2021-09-24 ENCOUNTER — OFFICE VISIT (OUTPATIENT)
Dept: PHYSICAL THERAPY | Facility: CLINIC | Age: 61
End: 2021-09-24
Payer: COMMERCIAL

## 2021-09-24 DIAGNOSIS — S86.011D ACHILLES TENDON RUPTURE, RIGHT, SUBSEQUENT ENCOUNTER: Primary | ICD-10-CM

## 2021-09-24 PROCEDURE — 97140 MANUAL THERAPY 1/> REGIONS: CPT

## 2021-09-24 PROCEDURE — 97110 THERAPEUTIC EXERCISES: CPT

## 2021-09-24 NOTE — PROGRESS NOTES
Daily Note     Today's date: 2021  Patient name: Zoe Kirkpatrick  : 1960  MRN: [id number]  Referring provider: Eden Kathleen DPM  Dx:   Encounter Diagnosis     ICD-10-CM    1  Achilles tendon rupture, right, subsequent encounter  S86 011D                   Subjective: Pt states she gets very sore after PT txs  Objective: See treatment diary below      Assessment: Tolerated treatment fair  Patient demonstrated fatigue post treatment and would benefit from continued PT  Completes TE with fair technique  Amb with SPC and abnl gt pattern due to R ankle weakness  Pt concerned that ins will be changing/discontinued as of 21  Plan: Continue per plan of care        Precautions: extensive PMH see chart for same      Re-eval Date: 10/9/21    Date 21    Visit Count 1 2 3 4    FOTO completed       Pain In See IE 6/10 uncomfortable 7/10    Pain Out See IE   7/10            Manuals     PROM R ankle   **10 min  Light, gentle, gingerly PROM 10'                            Neuro Re-Ed       aeromat  Sidestep  tandem        Wobble board  Seated 20 ea dir Seated 25 ea dir Seated 30 ea dir    Seated TR's/HR's  30x 30x 30    Ham stretch  R 10 x 10" R 10 x 10" R 10 x 10"                            Ther Ex   9 21 21       Nustep  L1  22' L1  10'   L1  10'    AROM R ankle  DF 30  Inv/ev 30x DF 30x  Inv/ev 30x TRs/HRs seated 30ea    TB R ankle        Ankle circles  20 ea 20x   30 ea    Towel slides  2 min ea 2 min ea   2 min ea    Leg press  30#    30#  30x    Knee ext mach        Leg flex mach        Ther Activity                        Gait Training                        Modalities   9 21 21     CP   **to R Ankle/  Achilles region 10' R ankle

## 2021-09-28 ENCOUNTER — APPOINTMENT (OUTPATIENT)
Dept: PHYSICAL THERAPY | Facility: CLINIC | Age: 61
End: 2021-09-28
Payer: COMMERCIAL

## 2021-10-01 ENCOUNTER — OFFICE VISIT (OUTPATIENT)
Dept: PHYSICAL THERAPY | Facility: CLINIC | Age: 61
End: 2021-10-01
Payer: COMMERCIAL

## 2021-10-01 DIAGNOSIS — S86.011D ACHILLES TENDON RUPTURE, RIGHT, SUBSEQUENT ENCOUNTER: Primary | ICD-10-CM

## 2021-10-01 PROCEDURE — 97110 THERAPEUTIC EXERCISES: CPT

## 2021-10-01 PROCEDURE — 97140 MANUAL THERAPY 1/> REGIONS: CPT

## 2021-10-22 ENCOUNTER — TELEPHONE (OUTPATIENT)
Dept: ENDOCRINOLOGY | Facility: CLINIC | Age: 61
End: 2021-10-22

## 2021-10-22 ENCOUNTER — OFFICE VISIT (OUTPATIENT)
Dept: ENDOCRINOLOGY | Facility: CLINIC | Age: 61
End: 2021-10-22
Payer: COMMERCIAL

## 2021-10-22 VITALS
HEIGHT: 62 IN | BODY MASS INDEX: 52.26 KG/M2 | DIASTOLIC BLOOD PRESSURE: 70 MMHG | RESPIRATION RATE: 20 BRPM | SYSTOLIC BLOOD PRESSURE: 142 MMHG | HEART RATE: 88 BPM | TEMPERATURE: 97.5 F | WEIGHT: 284 LBS

## 2021-10-22 DIAGNOSIS — I10 ESSENTIAL HYPERTENSION: Chronic | ICD-10-CM

## 2021-10-22 DIAGNOSIS — E11.65 TYPE 2 DIABETES MELLITUS WITH HYPERGLYCEMIA, WITH LONG-TERM CURRENT USE OF INSULIN (HCC): Primary | ICD-10-CM

## 2021-10-22 DIAGNOSIS — G62.9 NEUROPATHY: ICD-10-CM

## 2021-10-22 DIAGNOSIS — G47.33 OBSTRUCTIVE SLEEP APNEA SYNDROME: ICD-10-CM

## 2021-10-22 DIAGNOSIS — Z79.4 TYPE 2 DIABETES MELLITUS WITH HYPERGLYCEMIA, WITH LONG-TERM CURRENT USE OF INSULIN (HCC): Primary | ICD-10-CM

## 2021-10-22 PROBLEM — E78.5 HYPERLIPIDEMIA ASSOCIATED WITH TYPE 2 DIABETES MELLITUS (HCC): Status: ACTIVE | Noted: 2021-07-14

## 2021-10-22 PROBLEM — E11.69 HYPERLIPIDEMIA ASSOCIATED WITH TYPE 2 DIABETES MELLITUS (HCC): Status: ACTIVE | Noted: 2021-07-14

## 2021-10-22 LAB — SL AMB POCT HEMOGLOBIN AIC: 11.1 (ref ?–6.5)

## 2021-10-22 PROCEDURE — 99204 OFFICE O/P NEW MOD 45 MIN: CPT | Performed by: NURSE PRACTITIONER

## 2021-10-22 PROCEDURE — 83036 HEMOGLOBIN GLYCOSYLATED A1C: CPT | Performed by: NURSE PRACTITIONER

## 2021-10-22 RX ORDER — BLOOD-GLUCOSE METER
EACH MISCELLANEOUS
Qty: 1 KIT | Refills: 0 | Status: SHIPPED | OUTPATIENT
Start: 2021-10-22

## 2021-10-22 RX ORDER — BLOOD SUGAR DIAGNOSTIC
STRIP MISCELLANEOUS
Qty: 200 EACH | Refills: 2 | Status: SHIPPED | OUTPATIENT
Start: 2021-10-22 | End: 2022-01-19 | Stop reason: SDUPTHER

## 2021-10-22 RX ORDER — DULAGLUTIDE 1.5 MG/.5ML
1.5 INJECTION, SOLUTION SUBCUTANEOUS WEEKLY
Qty: 6 ML | Refills: 1 | Status: SHIPPED | OUTPATIENT
Start: 2021-10-22 | End: 2021-11-17 | Stop reason: SDUPTHER

## 2021-10-22 RX ORDER — ASPIRIN 81 MG/1
81 TABLET ORAL DAILY
COMMUNITY

## 2021-10-22 RX ORDER — LANCETS 30 GAUGE
EACH MISCELLANEOUS
Qty: 100 EACH | Refills: 3 | Status: SHIPPED | OUTPATIENT
Start: 2021-10-22

## 2021-10-22 RX ORDER — AMLODIPINE BESYLATE 5 MG/1
5 TABLET ORAL DAILY
COMMUNITY
End: 2022-07-06

## 2021-11-03 ENCOUNTER — APPOINTMENT (OUTPATIENT)
Dept: PHYSICAL THERAPY | Facility: CLINIC | Age: 61
End: 2021-11-03
Payer: COMMERCIAL

## 2021-11-05 ENCOUNTER — OFFICE VISIT (OUTPATIENT)
Dept: PHYSICAL THERAPY | Facility: CLINIC | Age: 61
End: 2021-11-05
Payer: COMMERCIAL

## 2021-11-05 DIAGNOSIS — S86.011D ACHILLES TENDON RUPTURE, RIGHT, SUBSEQUENT ENCOUNTER: Primary | ICD-10-CM

## 2021-11-05 PROCEDURE — 97110 THERAPEUTIC EXERCISES: CPT

## 2021-11-05 PROCEDURE — 97112 NEUROMUSCULAR REEDUCATION: CPT

## 2021-11-08 ENCOUNTER — TELEPHONE (OUTPATIENT)
Dept: ENDOCRINOLOGY | Facility: CLINIC | Age: 61
End: 2021-11-08

## 2021-11-09 ENCOUNTER — OFFICE VISIT (OUTPATIENT)
Dept: PHYSICAL THERAPY | Facility: CLINIC | Age: 61
End: 2021-11-09
Payer: COMMERCIAL

## 2021-11-09 DIAGNOSIS — S86.011D ACHILLES TENDON RUPTURE, RIGHT, SUBSEQUENT ENCOUNTER: Primary | ICD-10-CM

## 2021-11-09 PROCEDURE — 97110 THERAPEUTIC EXERCISES: CPT

## 2021-11-12 ENCOUNTER — OFFICE VISIT (OUTPATIENT)
Dept: PHYSICAL THERAPY | Facility: CLINIC | Age: 61
End: 2021-11-12
Payer: COMMERCIAL

## 2021-11-12 DIAGNOSIS — S86.011D ACHILLES TENDON RUPTURE, RIGHT, SUBSEQUENT ENCOUNTER: Primary | ICD-10-CM

## 2021-11-12 PROCEDURE — 97110 THERAPEUTIC EXERCISES: CPT

## 2021-11-17 ENCOUNTER — TELEPHONE (OUTPATIENT)
Dept: DIABETES SERVICES | Facility: CLINIC | Age: 61
End: 2021-11-17

## 2021-11-17 DIAGNOSIS — Z79.4 TYPE 2 DIABETES MELLITUS WITH HYPERGLYCEMIA, WITH LONG-TERM CURRENT USE OF INSULIN (HCC): ICD-10-CM

## 2021-11-17 DIAGNOSIS — E11.65 TYPE 2 DIABETES MELLITUS WITH HYPERGLYCEMIA, UNSPECIFIED WHETHER LONG TERM INSULIN USE (HCC): ICD-10-CM

## 2021-11-17 DIAGNOSIS — E11.65 TYPE 2 DIABETES MELLITUS WITH HYPERGLYCEMIA, WITH LONG-TERM CURRENT USE OF INSULIN (HCC): ICD-10-CM

## 2021-11-17 RX ORDER — INSULIN ASPART 100 [IU]/ML
70 INJECTION, SUSPENSION SUBCUTANEOUS
Qty: 45 ML | Refills: 3 | Status: SHIPPED | OUTPATIENT
Start: 2021-11-17 | End: 2021-11-18 | Stop reason: DRUGHIGH

## 2021-11-17 RX ORDER — DULAGLUTIDE 1.5 MG/.5ML
1.5 INJECTION, SOLUTION SUBCUTANEOUS WEEKLY
Qty: 6 ML | Refills: 1 | Status: SHIPPED | OUTPATIENT
Start: 2021-11-17 | End: 2021-12-15 | Stop reason: SDUPTHER

## 2021-11-18 DIAGNOSIS — Z79.4 TYPE 2 DIABETES MELLITUS WITH HYPERGLYCEMIA, WITH LONG-TERM CURRENT USE OF INSULIN (HCC): Primary | ICD-10-CM

## 2021-11-18 DIAGNOSIS — E11.65 TYPE 2 DIABETES MELLITUS WITH HYPERGLYCEMIA, WITH LONG-TERM CURRENT USE OF INSULIN (HCC): Primary | ICD-10-CM

## 2021-11-18 RX ORDER — INSULIN HUMAN 100 [IU]/ML
INJECTION, SUSPENSION SUBCUTANEOUS
Qty: 30 ML | Refills: 1 | Status: SHIPPED | OUTPATIENT
Start: 2021-11-18 | End: 2021-11-24

## 2021-11-23 ENCOUNTER — TELEPHONE (OUTPATIENT)
Dept: ENDOCRINOLOGY | Facility: CLINIC | Age: 61
End: 2021-11-23

## 2021-11-23 ENCOUNTER — OFFICE VISIT (OUTPATIENT)
Dept: DIABETES SERVICES | Facility: CLINIC | Age: 61
End: 2021-11-23
Payer: COMMERCIAL

## 2021-11-23 VITALS — BODY MASS INDEX: 52.08 KG/M2 | WEIGHT: 283 LBS | HEIGHT: 62 IN

## 2021-11-23 DIAGNOSIS — E11.65 TYPE 2 DIABETES MELLITUS WITH HYPERGLYCEMIA, UNSPECIFIED WHETHER LONG TERM INSULIN USE (HCC): ICD-10-CM

## 2021-11-23 DIAGNOSIS — Z79.4 TYPE 2 DIABETES MELLITUS WITH HYPERGLYCEMIA, WITH LONG-TERM CURRENT USE OF INSULIN (HCC): Primary | ICD-10-CM

## 2021-11-23 DIAGNOSIS — E11.65 TYPE 2 DIABETES MELLITUS WITH HYPERGLYCEMIA, WITH LONG-TERM CURRENT USE OF INSULIN (HCC): Primary | ICD-10-CM

## 2021-11-23 DIAGNOSIS — E11.65 TYPE 2 DIABETES MELLITUS WITH HYPERGLYCEMIA, WITH LONG-TERM CURRENT USE OF INSULIN (HCC): ICD-10-CM

## 2021-11-23 DIAGNOSIS — Z79.4 TYPE 2 DIABETES MELLITUS WITH HYPERGLYCEMIA, WITH LONG-TERM CURRENT USE OF INSULIN (HCC): ICD-10-CM

## 2021-11-23 PROCEDURE — 97802 MEDICAL NUTRITION INDIV IN: CPT | Performed by: DIETITIAN, REGISTERED

## 2021-11-24 RX ORDER — INSULIN HUMAN 100 [IU]/ML
INJECTION, SUSPENSION SUBCUTANEOUS
Qty: 30 ML | Refills: 1
Start: 2021-11-24 | End: 2021-12-07

## 2021-11-24 RX ORDER — BLOOD SUGAR DIAGNOSTIC
STRIP MISCELLANEOUS
Qty: 100 EACH | Refills: 3 | Status: SHIPPED | OUTPATIENT
Start: 2021-11-24

## 2021-12-03 ENCOUNTER — OFFICE VISIT (OUTPATIENT)
Dept: PHYSICAL THERAPY | Facility: CLINIC | Age: 61
End: 2021-12-03
Payer: COMMERCIAL

## 2021-12-03 DIAGNOSIS — S86.011D ACHILLES TENDON RUPTURE, RIGHT, SUBSEQUENT ENCOUNTER: Primary | ICD-10-CM

## 2021-12-03 PROCEDURE — 97110 THERAPEUTIC EXERCISES: CPT

## 2021-12-03 PROCEDURE — 97140 MANUAL THERAPY 1/> REGIONS: CPT

## 2021-12-06 DIAGNOSIS — E11.9 TYPE 2 DIABETES MELLITUS WITHOUT COMPLICATION, WITH LONG-TERM CURRENT USE OF INSULIN (HCC): Primary | ICD-10-CM

## 2021-12-06 DIAGNOSIS — Z79.4 TYPE 2 DIABETES MELLITUS WITHOUT COMPLICATION, WITH LONG-TERM CURRENT USE OF INSULIN (HCC): Primary | ICD-10-CM

## 2021-12-06 RX ORDER — PEN NEEDLE, DIABETIC 30 GX3/16"
NEEDLE, DISPOSABLE MISCELLANEOUS 3 TIMES DAILY
Qty: 90 EACH | Refills: 1 | Status: CANCELLED | OUTPATIENT
Start: 2021-12-06

## 2021-12-07 RX ORDER — PEN NEEDLE, DIABETIC 30 GX3/16"
NEEDLE, DISPOSABLE MISCELLANEOUS 3 TIMES DAILY
Qty: 100 EACH | Refills: 0 | Status: SHIPPED | OUTPATIENT
Start: 2021-12-07 | End: 2022-01-10 | Stop reason: SDUPTHER

## 2021-12-07 RX ORDER — INSULIN NPH HUM/REG INSULIN HM 70-30/ML
VIAL (ML) SUBCUTANEOUS
Qty: 50 ML | Refills: 5 | Status: SHIPPED | OUTPATIENT
Start: 2021-12-07 | End: 2021-12-15

## 2021-12-08 ENCOUNTER — APPOINTMENT (OUTPATIENT)
Dept: PHYSICAL THERAPY | Facility: CLINIC | Age: 61
End: 2021-12-08
Payer: COMMERCIAL

## 2021-12-10 ENCOUNTER — APPOINTMENT (OUTPATIENT)
Dept: PHYSICAL THERAPY | Facility: CLINIC | Age: 61
End: 2021-12-10
Payer: COMMERCIAL

## 2021-12-12 ENCOUNTER — OFFICE VISIT (OUTPATIENT)
Dept: URGENT CARE | Facility: CLINIC | Age: 61
End: 2021-12-12
Payer: COMMERCIAL

## 2021-12-12 VITALS
SYSTOLIC BLOOD PRESSURE: 143 MMHG | HEART RATE: 83 BPM | OXYGEN SATURATION: 96 % | DIASTOLIC BLOOD PRESSURE: 65 MMHG | RESPIRATION RATE: 18 BRPM | TEMPERATURE: 97.4 F

## 2021-12-12 DIAGNOSIS — L25.9 CONTACT DERMATITIS, UNSPECIFIED CONTACT DERMATITIS TYPE, UNSPECIFIED TRIGGER: Primary | ICD-10-CM

## 2021-12-12 DIAGNOSIS — L30.4 INTERTRIGO: ICD-10-CM

## 2021-12-12 PROCEDURE — 99213 OFFICE O/P EST LOW 20 MIN: CPT | Performed by: PHYSICIAN ASSISTANT

## 2021-12-12 RX ORDER — DIAPER,BRIEF,INFANT-TODD,DISP
EACH MISCELLANEOUS 2 TIMES DAILY
Qty: 30 G | Refills: 0 | Status: SHIPPED | OUTPATIENT
Start: 2021-12-12

## 2021-12-12 RX ORDER — NYSTATIN 100000 U/G
CREAM TOPICAL 2 TIMES DAILY
Qty: 30 G | Refills: 0 | Status: SHIPPED | OUTPATIENT
Start: 2021-12-12

## 2021-12-12 RX ORDER — PREDNISONE 10 MG/1
TABLET ORAL
Qty: 26 TABLET | Refills: 0 | Status: SHIPPED | OUTPATIENT
Start: 2021-12-12 | End: 2021-12-24 | Stop reason: HOSPADM

## 2021-12-13 ENCOUNTER — TELEPHONE (OUTPATIENT)
Dept: BARIATRICS | Facility: CLINIC | Age: 61
End: 2021-12-13

## 2021-12-14 DIAGNOSIS — Z79.4 TYPE 2 DIABETES MELLITUS WITH HYPERGLYCEMIA, WITH LONG-TERM CURRENT USE OF INSULIN (HCC): ICD-10-CM

## 2021-12-14 DIAGNOSIS — E11.9 TYPE 2 DIABETES MELLITUS WITHOUT COMPLICATION, WITH LONG-TERM CURRENT USE OF INSULIN (HCC): ICD-10-CM

## 2021-12-14 DIAGNOSIS — Z79.4 TYPE 2 DIABETES MELLITUS WITHOUT COMPLICATION, WITH LONG-TERM CURRENT USE OF INSULIN (HCC): ICD-10-CM

## 2021-12-14 DIAGNOSIS — E11.65 TYPE 2 DIABETES MELLITUS WITH HYPERGLYCEMIA, WITH LONG-TERM CURRENT USE OF INSULIN (HCC): ICD-10-CM

## 2021-12-15 RX ORDER — DULAGLUTIDE 1.5 MG/.5ML
1.5 INJECTION, SOLUTION SUBCUTANEOUS WEEKLY
Qty: 6 ML | Refills: 1 | Status: SHIPPED | OUTPATIENT
Start: 2021-12-15 | End: 2022-02-24 | Stop reason: DRUGHIGH

## 2021-12-15 RX ORDER — INSULIN NPH HUM/REG INSULIN HM 70-30/ML
VIAL (ML) SUBCUTANEOUS
Qty: 50 ML | Refills: 5
Start: 2021-12-15 | End: 2022-02-17 | Stop reason: SDUPTHER

## 2021-12-18 ENCOUNTER — APPOINTMENT (EMERGENCY)
Dept: CT IMAGING | Facility: HOSPITAL | Age: 61
DRG: 137 | End: 2021-12-18
Payer: COMMERCIAL

## 2021-12-18 ENCOUNTER — HOSPITAL ENCOUNTER (INPATIENT)
Facility: HOSPITAL | Age: 61
LOS: 6 days | Discharge: HOME HEALTH | DRG: 137 | End: 2021-12-24
Attending: EMERGENCY MEDICINE | Admitting: FAMILY MEDICINE
Payer: COMMERCIAL

## 2021-12-18 DIAGNOSIS — M25.551 RIGHT HIP PAIN: ICD-10-CM

## 2021-12-18 DIAGNOSIS — M54.9 CHRONIC BACK PAIN, UNSPECIFIED BACK LOCATION, UNSPECIFIED BACK PAIN LATERALITY: ICD-10-CM

## 2021-12-18 DIAGNOSIS — R09.02 HYPOXIA: Primary | ICD-10-CM

## 2021-12-18 DIAGNOSIS — M54.16 LUMBAR RADICULOPATHY: ICD-10-CM

## 2021-12-18 DIAGNOSIS — U07.1 COVID-19: ICD-10-CM

## 2021-12-18 DIAGNOSIS — G89.29 CHRONIC BACK PAIN, UNSPECIFIED BACK LOCATION, UNSPECIFIED BACK PAIN LATERALITY: ICD-10-CM

## 2021-12-18 DIAGNOSIS — M47.816 LUMBAR SPONDYLOSIS: ICD-10-CM

## 2021-12-18 DIAGNOSIS — E11.65 TYPE 2 DIABETES MELLITUS WITH HYPERGLYCEMIA, WITH LONG-TERM CURRENT USE OF INSULIN (HCC): ICD-10-CM

## 2021-12-18 DIAGNOSIS — Z79.4 TYPE 2 DIABETES MELLITUS WITH HYPERGLYCEMIA, WITH LONG-TERM CURRENT USE OF INSULIN (HCC): ICD-10-CM

## 2021-12-18 PROBLEM — J96.01 ACUTE HYPOXEMIC RESPIRATORY FAILURE DUE TO COVID-19 (HCC): Status: ACTIVE | Noted: 2021-12-18

## 2021-12-18 LAB
ALBUMIN SERPL BCP-MCNC: 3.4 G/DL (ref 3.5–5)
ALP SERPL-CCNC: 75 U/L (ref 46–116)
ALT SERPL W P-5'-P-CCNC: 78 U/L (ref 12–78)
ANION GAP SERPL CALCULATED.3IONS-SCNC: 9 MMOL/L (ref 4–13)
AST SERPL W P-5'-P-CCNC: 52 U/L (ref 5–45)
BASOPHILS # BLD AUTO: 0.01 THOUSANDS/ΜL (ref 0–0.1)
BASOPHILS NFR BLD AUTO: 0 % (ref 0–1)
BILIRUB SERPL-MCNC: 0.36 MG/DL (ref 0.2–1)
BUN SERPL-MCNC: 24 MG/DL (ref 5–25)
CALCIUM ALBUM COR SERPL-MCNC: 9 MG/DL (ref 8.3–10.1)
CALCIUM SERPL-MCNC: 8.5 MG/DL (ref 8.3–10.1)
CARDIAC TROPONIN I PNL SERPL HS: 7 NG/L
CHLORIDE SERPL-SCNC: 101 MMOL/L (ref 100–108)
CO2 SERPL-SCNC: 25 MMOL/L (ref 21–32)
CREAT SERPL-MCNC: 1.24 MG/DL (ref 0.6–1.3)
D DIMER PPP FEU-MCNC: 0.59 UG/ML FEU
EOSINOPHIL # BLD AUTO: 0 THOUSAND/ΜL (ref 0–0.61)
EOSINOPHIL NFR BLD AUTO: 0 % (ref 0–6)
ERYTHROCYTE [DISTWIDTH] IN BLOOD BY AUTOMATED COUNT: 13.6 % (ref 11.6–15.1)
FLUAV RNA RESP QL NAA+PROBE: NEGATIVE
FLUBV RNA RESP QL NAA+PROBE: NEGATIVE
GFR SERPL CREATININE-BSD FRML MDRD: 46 ML/MIN/1.73SQ M
GLUCOSE SERPL-MCNC: 86 MG/DL (ref 65–140)
HCT VFR BLD AUTO: 42.4 % (ref 34.8–46.1)
HGB BLD-MCNC: 13.9 G/DL (ref 11.5–15.4)
IMM GRANULOCYTES # BLD AUTO: 0.02 THOUSAND/UL (ref 0–0.2)
IMM GRANULOCYTES NFR BLD AUTO: 0 % (ref 0–2)
LACTATE SERPL-SCNC: 1.6 MMOL/L (ref 0.5–2)
LYMPHOCYTES # BLD AUTO: 1.04 THOUSANDS/ΜL (ref 0.6–4.47)
LYMPHOCYTES NFR BLD AUTO: 14 % (ref 14–44)
MCH RBC QN AUTO: 28.1 PG (ref 26.8–34.3)
MCHC RBC AUTO-ENTMCNC: 32.8 G/DL (ref 31.4–37.4)
MCV RBC AUTO: 86 FL (ref 82–98)
MONOCYTES # BLD AUTO: 0.46 THOUSAND/ΜL (ref 0.17–1.22)
MONOCYTES NFR BLD AUTO: 6 % (ref 4–12)
NEUTROPHILS # BLD AUTO: 5.67 THOUSANDS/ΜL (ref 1.85–7.62)
NEUTS SEG NFR BLD AUTO: 80 % (ref 43–75)
NRBC BLD AUTO-RTO: 0 /100 WBCS
PLATELET # BLD AUTO: 166 THOUSANDS/UL (ref 149–390)
PMV BLD AUTO: 9.9 FL (ref 8.9–12.7)
POTASSIUM SERPL-SCNC: 3.9 MMOL/L (ref 3.5–5.3)
PROT SERPL-MCNC: 7 G/DL (ref 6.4–8.2)
RBC # BLD AUTO: 4.95 MILLION/UL (ref 3.81–5.12)
RSV RNA RESP QL NAA+PROBE: NEGATIVE
SARS-COV-2 RNA RESP QL NAA+PROBE: POSITIVE
SODIUM SERPL-SCNC: 135 MMOL/L (ref 136–145)
WBC # BLD AUTO: 7.2 THOUSAND/UL (ref 4.31–10.16)

## 2021-12-18 PROCEDURE — 85025 COMPLETE CBC W/AUTO DIFF WBC: CPT | Performed by: PHYSICIAN ASSISTANT

## 2021-12-18 PROCEDURE — 96361 HYDRATE IV INFUSION ADD-ON: CPT

## 2021-12-18 PROCEDURE — XW033E5 INTRODUCTION OF REMDESIVIR ANTI-INFECTIVE INTO PERIPHERAL VEIN, PERCUTANEOUS APPROACH, NEW TECHNOLOGY GROUP 5: ICD-10-PCS | Performed by: INTERNAL MEDICINE

## 2021-12-18 PROCEDURE — 99285 EMERGENCY DEPT VISIT HI MDM: CPT

## 2021-12-18 PROCEDURE — 93005 ELECTROCARDIOGRAM TRACING: CPT

## 2021-12-18 PROCEDURE — 99223 1ST HOSP IP/OBS HIGH 75: CPT | Performed by: FAMILY MEDICINE

## 2021-12-18 PROCEDURE — 87040 BLOOD CULTURE FOR BACTERIA: CPT | Performed by: PHYSICIAN ASSISTANT

## 2021-12-18 PROCEDURE — 99285 EMERGENCY DEPT VISIT HI MDM: CPT | Performed by: PHYSICIAN ASSISTANT

## 2021-12-18 PROCEDURE — 84484 ASSAY OF TROPONIN QUANT: CPT | Performed by: PHYSICIAN ASSISTANT

## 2021-12-18 PROCEDURE — 36415 COLL VENOUS BLD VENIPUNCTURE: CPT | Performed by: PHYSICIAN ASSISTANT

## 2021-12-18 PROCEDURE — 83605 ASSAY OF LACTIC ACID: CPT | Performed by: PHYSICIAN ASSISTANT

## 2021-12-18 PROCEDURE — 85379 FIBRIN DEGRADATION QUANT: CPT | Performed by: PHYSICIAN ASSISTANT

## 2021-12-18 PROCEDURE — 0241U HB NFCT DS VIR RESP RNA 4 TRGT: CPT | Performed by: PHYSICIAN ASSISTANT

## 2021-12-18 PROCEDURE — 80053 COMPREHEN METABOLIC PANEL: CPT | Performed by: PHYSICIAN ASSISTANT

## 2021-12-18 PROCEDURE — 96360 HYDRATION IV INFUSION INIT: CPT

## 2021-12-18 RX ORDER — ALBUTEROL SULFATE 90 UG/1
2 AEROSOL, METERED RESPIRATORY (INHALATION) EVERY 4 HOURS PRN
Status: DISCONTINUED | OUTPATIENT
Start: 2021-12-18 | End: 2021-12-24 | Stop reason: HOSPADM

## 2021-12-18 RX ORDER — GABAPENTIN 300 MG/1
600 CAPSULE ORAL 3 TIMES DAILY
Status: DISCONTINUED | OUTPATIENT
Start: 2021-12-18 | End: 2021-12-24 | Stop reason: HOSPADM

## 2021-12-18 RX ORDER — ACETAMINOPHEN 325 MG/1
650 TABLET ORAL ONCE
Status: COMPLETED | OUTPATIENT
Start: 2021-12-18 | End: 2021-12-18

## 2021-12-18 RX ORDER — AMLODIPINE BESYLATE 5 MG/1
5 TABLET ORAL DAILY
Status: DISCONTINUED | OUTPATIENT
Start: 2021-12-19 | End: 2021-12-24 | Stop reason: HOSPADM

## 2021-12-18 RX ORDER — MELATONIN
1000 DAILY
Status: DISCONTINUED | OUTPATIENT
Start: 2021-12-19 | End: 2021-12-24 | Stop reason: HOSPADM

## 2021-12-18 RX ORDER — ASCORBIC ACID 500 MG
1000 TABLET ORAL 2 TIMES DAILY
Status: DISCONTINUED | OUTPATIENT
Start: 2021-12-18 | End: 2021-12-24 | Stop reason: HOSPADM

## 2021-12-18 RX ORDER — INSULIN ASPART 100 [IU]/ML
50 INJECTION, SUSPENSION SUBCUTANEOUS
Status: DISCONTINUED | OUTPATIENT
Start: 2021-12-19 | End: 2021-12-24 | Stop reason: HOSPADM

## 2021-12-18 RX ORDER — LISINOPRIL 10 MG/1
10 TABLET ORAL DAILY
Status: DISCONTINUED | OUTPATIENT
Start: 2021-12-19 | End: 2021-12-20

## 2021-12-18 RX ORDER — FUROSEMIDE 20 MG/1
20 TABLET ORAL DAILY
Status: DISCONTINUED | OUTPATIENT
Start: 2021-12-19 | End: 2021-12-21

## 2021-12-18 RX ORDER — ZINC SULFATE 50(220)MG
220 CAPSULE ORAL DAILY
Status: DISCONTINUED | OUTPATIENT
Start: 2021-12-19 | End: 2021-12-24 | Stop reason: HOSPADM

## 2021-12-18 RX ORDER — ASPIRIN 81 MG/1
81 TABLET, CHEWABLE ORAL DAILY
Status: DISCONTINUED | OUTPATIENT
Start: 2021-12-19 | End: 2021-12-24 | Stop reason: HOSPADM

## 2021-12-18 RX ORDER — DEXAMETHASONE SODIUM PHOSPHATE 4 MG/ML
6 INJECTION, SOLUTION INTRA-ARTICULAR; INTRALESIONAL; INTRAMUSCULAR; INTRAVENOUS; SOFT TISSUE EVERY 24 HOURS
Status: DISCONTINUED | OUTPATIENT
Start: 2021-12-18 | End: 2021-12-24 | Stop reason: HOSPADM

## 2021-12-18 RX ORDER — ATORVASTATIN CALCIUM 40 MG/1
40 TABLET, FILM COATED ORAL
Status: DISCONTINUED | OUTPATIENT
Start: 2021-12-18 | End: 2021-12-24 | Stop reason: HOSPADM

## 2021-12-18 RX ADMIN — ACETAMINOPHEN 650 MG: 325 TABLET, FILM COATED ORAL at 14:33

## 2021-12-18 RX ADMIN — GABAPENTIN 600 MG: 300 CAPSULE ORAL at 22:10

## 2021-12-18 RX ADMIN — ATORVASTATIN CALCIUM 40 MG: 40 TABLET, FILM COATED ORAL at 22:10

## 2021-12-18 RX ADMIN — ENOXAPARIN SODIUM 60 MG: 60 INJECTION SUBCUTANEOUS at 22:09

## 2021-12-18 RX ADMIN — REMDESIVIR 200 MG: 100 INJECTION, POWDER, LYOPHILIZED, FOR SOLUTION INTRAVENOUS at 22:08

## 2021-12-18 RX ADMIN — OXYCODONE HYDROCHLORIDE AND ACETAMINOPHEN 1000 MG: 500 TABLET ORAL at 22:11

## 2021-12-18 RX ADMIN — MAGNESIUM OXIDE TAB 400 MG (241.3 MG ELEMENTAL MG) 400 MG: 400 (241.3 MG) TAB at 22:12

## 2021-12-18 RX ADMIN — SODIUM CHLORIDE 500 ML: 0.9 INJECTION, SOLUTION INTRAVENOUS at 14:35

## 2021-12-18 RX ADMIN — DEXAMETHASONE SODIUM PHOSPHATE 6 MG: 4 INJECTION, SOLUTION INTRA-ARTICULAR; INTRALESIONAL; INTRAMUSCULAR; INTRAVENOUS; SOFT TISSUE at 22:05

## 2021-12-18 NOTE — ED NOTES
Per CT, patients IV was out of arm when attempting to administer CT dye   Provider aware     Cordelia Lundborg, RN  12/18/21 Petar Douglas RN  12/18/21 [id number]

## 2021-12-18 NOTE — ED NOTES
Patient transported to [address] [place] [address], [address] [place] [address]  12/18/21 [id number]

## 2021-12-18 NOTE — ASSESSMENT & PLAN NOTE
Lab Results   Component Value Date    HGBA1C 11 1 (A) 10/22/2021       No results for input(s): POCGLU in the last 72 hours      Blood Sugar Average: Last 72 hrs:     Poorly controlled  Continue 70/30 at 50 units BID along ISS coverage  Diabetic diet

## 2021-12-18 NOTE — ED PROVIDER NOTES
History  Chief Complaint   Patient presents with    Cough     rash on right rib, cough, congestion, fever; unvaccinated     Patient presents to the emergency department today offering complaints of a rash under the right breast that has been present for approximately 1 week, she states she saw Urgent Care for this and they started her on a cream and it is improving  She also complains of a constellation of viral symptoms that included generalize headache, generalized myalgias, nausea, cough, chest pain, shortness of breath, fevers, chills, sweats  She did not receive the COVID vaccine she alleges at the recommendation of her nephrologist   She is febrile here 101 8 degrees with a heart rate of 90 oxygen saturations 94 percent on ambient air  Prior to Admission Medications   Prescriptions Last Dose Informant Patient Reported? Taking? BD INSULIN SYRINGE U/F 31G X 5/16" 1 ML MISC   Yes No   Blood Glucose Monitoring Suppl (OneTouch Verio) w/Device KIT   No No   Sig: Use to test blood sugar 3x daily  Dulaglutide (Trulicity) 1 5 ZD/4 1KK SOPN   No No   Sig: Inject 0 5 mL (1 5 mg total) under the skin once a week   Insulin Pen Needle (Pen Needles) 32G X 4 MM MISC   No No   Sig: Use 3 (three) times a day   Insulin Syringe-Needle U-100 (BD Insulin Syringe U/F) 31G X 5/16" 1 ML MISC   No No   Sig: Use three times daily for insulin injection  Lancets (OneTouch Delica Plus [id number]) MISC   No No   Sig: Use to test blood sugar 3x daily     Lancets (OneTouch Delica Plus [id number]) MISC   No No   Sig: USE TO TEST BLOOD SUGAR THREE TIMES DAILY   amLODIPine (NORVASC) 5 mg tablet   Yes No   Sig: Take 5 mg by mouth daily   ammonium lactate (LAC-HYDRIN) 12 % lotion   Yes No   aspirin (Aspir-Low) 81 mg EC tablet   Yes No   Sig: Take 81 mg by mouth daily   atorvastatin (LIPITOR) 40 mg tablet   No No   Sig: Take 1 tablet (40 mg total) by mouth daily with dinner   cholecalciferol (VITAMIN D3) 400 units tablet   Yes No Sig: Take 400 Units by mouth daily   dicyclomine (BENTYL) 10 mg capsule   No No   Sig: Take 1 capsule (10 mg total) by mouth 4 (four) times a day as needed (abd pain)   Patient not taking: Reported on 10/22/2021   ergocalciferol (ERGOCALCIFEROL) 1 25 MG (26443 UT) capsule   No No   Sig: Take 1 capsule (50,000 Units total) by mouth once a week   furosemide (LASIX) 20 mg tablet  Self Yes No   Sig: Take 20 mg by mouth daily   gabapentin (NEURONTIN) 600 MG tablet   No No   Sig: Take 1 tablet (600 mg total) by mouth 3 (three) times a day   Patient taking differently: Take 800 mg by mouth 3 (three) times a day    glucagon (GLUCAGON EMERGENCY) 1 MG injection   No No   Sig: Inject 1 mg under the skin once as needed (PRN blood glucose less than 70 if unconscious or uncorrectable by oral means) for up to 1 dose   glucose blood (OneTouch Verio) test strip   No No   Sig: Use to test blood sugar 3x daily     hydrocortisone 0 5 % cream   No No   Sig: Apply topically 2 (two) times a day   insulin NPH-insulin regular (HumuLIN 70/30) 100 units/mL subcutaneous injection   No No   Sig: Inject 68 units before breakfast, 36 units before lunch, and 68 units prior to dinner   lisinopril (ZESTRIL) 5 mg tablet   No No   Sig: Take 1 tablet (5 mg total) by mouth daily   Patient taking differently: Take 10 mg by mouth daily    magnesium oxide (MAG-OX) 400 mg   No No   Sig: Take 1 tablet (400 mg total) by mouth 2 (two) times a day   meclizine (ANTIVERT) 25 mg tablet   Yes No   Sig: Take 25 mg by mouth Three times daily as needed   metFORMIN (GLUCOPHAGE) 1000 MG tablet   Yes No   Sig: Take 1,000 mg by mouth 2 (two) times a day with meals   nystatin (MYCOSTATIN) cream   No No   Sig: Apply topically 2 (two) times a day   ondansetron (ZOFRAN-ODT) 4 mg disintegrating tablet   No No   Sig: Take 1 tablet (4 mg total) by mouth every 6 (six) hours as needed for nausea or vomiting   predniSONE 10 mg tablet   No No   Sig: Take 3 tabs BID X 2 days, 2 tabs BID X 2 days, 1 tab BID X 2 days, 1 tab daily X 2 days   rosuvastatin (CRESTOR) 20 MG tablet   Yes No   Sig: rosuvastatin 20 mg tablet   take 1 tablet by mouth once daily      Facility-Administered Medications: None       Past Medical History:   Diagnosis Date    Anesthesia related hyperthermia     pt states she had high fevers after her lipoma surgery in  at our hospital and was shipped to [facility] where they said t was from an anesthesia med    Anxiety     Arthritis     Asthma     Chronic pain     Depression     Diabetes mellitus ([facility] [address] )     GERD (gastroesophageal reflux disease)     Hyperlipidemia     Malignant hyperthermia due to anesthesia     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     Neuropathy     Obesity     PCOS (polycystic ovarian syndrome)        Past Surgical History:   Procedure Laterality Date    CERVICAL CONIZATION   W/ LASER       SECTION      DILATION AND CURETTAGE OF UTERUS      ENDOMETRIAL ABLATION      ENDOMETRIAL BIOPSY      EPIDURAL BLOCK INJECTION Bilateral 2019    Procedure: L5-S1 transforaminal epidural steroid injection;  Surgeon: Carmelina Yates MD;  Location: [facility];  Service: Pain Management     EPIDURAL BLOCK INJECTION Bilateral 2019    Procedure: L5-S1 transforaminal epidural steroid injection;  Surgeon: Carmelina Yates MD;  Location: [facility];  Service: Pain Management     FL GUIDED NEEDLE PLAC BX/ASP/INJ  2019    FL GUIDED NEEDLE PLAC BX/ASP/INJ  2019    FOOT SURGERY Left     HERNIA REPAIR      INDUCED       IR PICC LINE  2019    JOINT REPLACEMENT      KNEE ARTHROPLASTY Right     WY DEBRIDEMENT OPEN WOUND 20 SQ CM< Right 2018    Procedure: DEBRIDEMENT HAND/FINGER [facility] OUT);   Surgeon: Letty Cotton MD;  Location: [address] [place] [address] MAIN OR;  Service: General    WY EXCISION TUMOR SOFT TISSUE BACK/FLANK SUBQ 3+CM N/A 2018    Procedure: BACK LIPOMA EXCISION;  Surgeon: Kwan Pastrana Keisha Latham MD;  Location: [address] [place] [address] OR;  Service: General    ROTATOR CUFF REPAIR Right     TONSILLECTOMY         Family History   Problem Relation Age of Onset    Polycystic ovary syndrome Mother     Mental illness Father     Diabetes Father      I have reviewed and agree with the history as documented  E-Cigarette/Vaping    E-Cigarette Use Never User      E-Cigarette/Vaping Substances     Social History     Tobacco Use    Smoking status: Never Smoker    Smokeless tobacco: Never Used   Vaping Use    Vaping Use: Never used   Substance Use Topics    Alcohol use: Not Currently     Comment: OCCASIONALLY    Drug use: Yes     Types: Marijuana     Comment: CBD       Review of Systems   Constitutional: Positive for activity change, appetite change and fever  Negative for chills  HENT: Positive for ear pain  Negative for sore throat  Eyes: Negative for pain and visual disturbance  Respiratory: Positive for cough and shortness of breath  Cardiovascular: Positive for chest pain  Negative for palpitations  Gastrointestinal: Positive for nausea  Negative for abdominal pain and vomiting  Genitourinary: Negative for dysuria and hematuria  Musculoskeletal: Positive for myalgias  Negative for arthralgias and back pain  Skin: Positive for rash  Negative for color change  Neurological: Positive for weakness and headaches  Negative for seizures and syncope  All other systems reviewed and are negative  Physical Exam  Physical Exam  Vitals reviewed  Constitutional:       General: She is not in acute distress  Appearance: She is well-developed  She is obese  She is not ill-appearing, toxic-appearing or diaphoretic  HENT:      Right Ear: External ear normal  No swelling  Tympanic membrane is not bulging  Left Ear: External ear normal  No swelling  Tympanic membrane is not bulging  Nose: Congestion present  Mouth/Throat:      Pharynx: No oropharyngeal exudate     Eyes: General: Lids are normal       Conjunctiva/sclera: Conjunctivae normal       Pupils: Pupils are equal, round, and reactive to light  Neck:      Thyroid: No thyromegaly  Vascular: No JVD  Trachea: No tracheal deviation  Cardiovascular:      Rate and Rhythm: Normal rate and regular rhythm  Pulses: Normal pulses  Heart sounds: Normal heart sounds  No murmur heard  No friction rub  No gallop  Pulmonary:      Effort: Pulmonary effort is normal  No respiratory distress  Breath sounds: Normal breath sounds  No stridor  No wheezing or rales  Chest:      Chest wall: No tenderness  Abdominal:      General: Bowel sounds are normal  There is no distension  Palpations: Abdomen is soft  There is no mass  Tenderness: There is no abdominal tenderness  There is no guarding or rebound  Negative signs include Kennedy's sign  Hernia: No hernia is present  Musculoskeletal:         General: No tenderness  Normal range of motion  Cervical back: Normal range of motion and neck supple  No edema  Normal range of motion  Lymphadenopathy:      Cervical: No cervical adenopathy  Skin:     General: Skin is warm and dry  Capillary Refill: Capillary refill takes less than 2 seconds  Coloration: Skin is not pale  Findings: No erythema or rash  Comments: Minute rash in of the right breast consistent fungal derivative   Neurological:      General: No focal deficit present  Mental Status: She is alert and oriented to person, place, and time  GCS: GCS eye subscore is 4  GCS verbal subscore is 5  GCS motor subscore is 6  Cranial Nerves: No cranial nerve deficit  Sensory: No sensory deficit  Deep Tendon Reflexes: Reflexes are normal and symmetric  Psychiatric:         Mood and Affect: Mood normal          Speech: Speech normal          Behavior: Behavior normal          Thought Content:  Thought content normal          Judgment: Judgment normal  Vital Signs  ED Triage Vitals [12/18/21 1308]   Temperature Pulse Respirations Blood Pressure SpO2   (!) 101 8 °F (38 8 °C) 90 20 167/74 93 %      Temp Source Heart Rate Source Patient Position - Orthostatic VS BP Location FiO2 (%)   Tympanic Monitor Sitting Right arm --      Pain Score       9           Vitals:    12/18/21 1308 12/18/21 1530 12/18/21 1800   BP: 167/74 124/60 153/82   Pulse: 90 81 84   Patient Position - Orthostatic VS: Sitting Lying Lying         Visual Acuity      ED Medications  Medications   amLODIPine (NORVASC) tablet 5 mg (has no administration in time range)   aspirin chewable tablet 81 mg (has no administration in time range)   atorvastatin (LIPITOR) tablet 40 mg (has no administration in time range)   furosemide (LASIX) tablet 20 mg (has no administration in time range)   gabapentin (NEURONTIN) capsule 600 mg (has no administration in time range)   lisinopril (ZESTRIL) tablet 10 mg (has no administration in time range)   magnesium oxide (MAG-OX) tablet 400 mg (has no administration in time range)   insulin aspart protamine-insulin aspart (NovoLOG 70/30) 100 units/mL subcutaneous injection 50 Units (has no administration in time range)   insulin lispro (HumaLOG) 100 units/mL subcutaneous injection 4-20 Units (has no administration in time range)   dexamethasone (DECADRON) injection 6 mg (has no administration in time range)   enoxaparin (LOVENOX) subcutaneous injection 60 mg (has no administration in time range)   remdesivir (Veklury) 200 mg in sodium chloride 0 9 % 290 mL IVPB (has no administration in time range)     Followed by   remdesivir Naresh Eliud) 100 mg in sodium chloride 0 9 % 270 mL IVPB (has no administration in time range)   ascorbic acid (VITAMIN C) tablet 1,000 mg (has no administration in time range)   cholecalciferol (VITAMIN D3) tablet 1,000 Units (has no administration in time range)   zinc sulfate (ZINCATE) capsule 220 mg (has no administration in time range) albuterol (PROVENTIL HFA,VENTOLIN HFA) inhaler 2 puff (has no administration in time range)   acetaminophen (TYLENOL) tablet 650 mg (650 mg Oral Given 12/18/21 1433)   sodium chloride 0 9 % bolus 500 mL (0 mL Intravenous Stopped 12/18/21 1730)       Diagnostic Studies  Results Reviewed     Procedure Component Value Units Date/Time    HS Troponin 0hr (reflex protocol) [[id number]]     Lab Status: No result Specimen: Blood     Lactic acid [[id number]]  (Normal) Collected: 12/18/21 1630    Lab Status: Final result Specimen: Blood from Arm, Right Updated: 12/18/21 1701     LACTIC ACID 1 6 mmol/L     Narrative:      Result may be elevated if tourniquet was used during collection      D-Dimer [[id number]]  (Abnormal) Collected: 12/18/21 1631    Lab Status: Final result Specimen: Blood from Arm, Right Updated: 12/18/21 1650     D-Dimer, Quant 0 59 ug/ml FEU     HS Troponin 0hr (reflex protocol) [[id number]]  (Normal) Collected: 12/18/21 1502    Lab Status: Final result Specimen: Blood from Arm, Right Updated: 12/18/21 1535     hs TnI 0hr 7 ng/L     Comprehensive metabolic panel [[id number]]  (Abnormal) Collected: 12/18/21 1502    Lab Status: Final result Specimen: Blood from Arm, Right Updated: 12/18/21 1526     Sodium 135 mmol/L      Potassium 3 9 mmol/L      Chloride 101 mmol/L      CO2 25 mmol/L      ANION GAP 9 mmol/L      BUN 24 mg/dL      Creatinine 1 24 mg/dL      Glucose 86 mg/dL      Calcium 8 5 mg/dL      Corrected Calcium 9 0 mg/dL      AST 52 U/L      ALT 78 U/L      Alkaline Phosphatase 75 U/L      Total Protein 7 0 g/dL      Albumin 3 4 g/dL      Total Bilirubin 0 36 mg/dL      eGFR 46 ml/min/1 73sq m     Narrative:      Radha guidelines for Chronic Kidney Disease (CKD):     Stage 1 with normal or high GFR (GFR > 90 mL/min/1 73 square meters)    Stage 2 Mild CKD (GFR = 60-89 mL/min/1 73 square meters)    Stage 3A Moderate CKD (GFR = 45-59 mL/min/1 73 square meters)    Stage 3B Moderate CKD (GFR = 30-44 mL/min/1 73 square meters)    Stage 4 Severe CKD (GFR = 15-29 mL/min/1 73 square meters)    Stage 5 End Stage CKD (GFR <15 mL/min/1 73 square meters)  Note: GFR calculation is accurate only with a steady state creatinine    COVID/FLU/RSV - 2 hour TAT [[id number]]  (Abnormal) Collected: 12/18/21 1435    Lab Status: Final result Specimen: Nares from Nasopharyngeal Swab Updated: 12/18/21 1525     SARS-CoV-2 Positive     INFLUENZA A PCR Negative     INFLUENZA B PCR Negative     RSV PCR Negative    Narrative:      FOR PEDIATRIC PATIENTS - copy/paste COVID Guidelines URL to browser: [email or web address]     This test has been authorized by FDA under an EUA (Emergency Use Assay) for use by authorized laboratories  Clinical caution and judgement should be used with the interpretation of these results with consideration of the clinical impression and other laboratory testing  Testing reported as "Positive" or "Negative" has been proven to be accurate according to standard laboratory validation requirements  All testing is performed with control materials showing appropriate reactivity at standard intervals      CBC and differential [[id number]]  (Abnormal) Collected: 12/18/21 1502    Lab Status: Final result Specimen: Blood from Arm, Right Updated: 12/18/21 1510     WBC 7 20 Thousand/uL      RBC 4 95 Million/uL      Hemoglobin 13 9 g/dL      Hematocrit 42 4 %      MCV 86 fL      MCH 28 1 pg      MCHC 32 8 g/dL      RDW 13 6 %      MPV 9 9 fL      Platelets 715 Thousands/uL      nRBC 0 /100 WBCs      Neutrophils Relative 80 %      Immat GRANS % 0 %      Lymphocytes Relative 14 %      Monocytes Relative 6 %      Eosinophils Relative 0 %      Basophils Relative 0 %      Neutrophils Absolute 5 67 Thousands/µL      Immature Grans Absolute 0 02 Thousand/uL      Lymphocytes Absolute 1 04 Thousands/µL      Monocytes Absolute 0 46 Thousand/µL Eosinophils Absolute 0 00 Thousand/µL      Basophils Absolute 0 01 Thousands/µL     Blood culture #2 [[id number]] Collected: 12/18/21 1502    Lab Status: In process Specimen: Blood from Arm, Right Updated: 12/18/21 1510    Blood culture #1 [[id number]] Collected: 12/18/21 1502    Lab Status: In process Specimen: Blood from Hand, Right Updated: 12/18/21 1509                 CTA ED chest PE Study    (Results Pending)              Procedures  US Guided Peripheral IV    Date/Time: 12/18/2021 4:06 PM  Performed by: Simin Miller PA-C  Authorized by: Simin Miller PA-C     Patient location:  Bedside  Performed by:  NP/PA  Indications:     Indications: difficulty obtaining IV access      Image availability:  Not saved  Procedure details:     Patient evaluated for contraindications to access (i e  fistula, thrombosis, etc): Yes      Standard clean technique used for ultrasound access: Yes      Location: Left Brachial     Catheter size:  18 gauge    Number of attempts:  1    Successful placement: yes    Post-procedure details:     Post-procedure:  Dressing applied    Assessment: free fluid flow and no signs of infiltration      Post-procedure complications: none      Patient tolerance of procedure:   Tolerated well, no immediate complications             ED Course  ED Course as of 12/18/21 2206   Sat Dec 18, 2021   1320 SpO2: 93 %   1320 Respirations: 20   1320 Pulse: 90   1320 Temperature(!): 101 8 °F (38 8 °C)   1320 Blood Pressure: 167/74   1440 Patient was 85 percent on room air requiring nasal cannula oxygen administration   1520 WBC: 7 20   1520 Hemoglobin: 13 9   1520 Platelet Count: 262   1533 SARS-COV-2(!): Positive   1533 Sodium(!): 135   1533 eGFR: 46   1653 D-Dimer, Quant(!): 0 59                                   Wells' Criteria for PE      Most Recent Value   Wells' Criteria for PE    Clinical signs and symptoms of DVT 0 Filed at: 12/18/2021 1654   PE is primary diagnosis or equally likely 3 Filed at: 12/18/2021 1654   HR >100 0 Filed at: 12/18/2021 1654   Immobilization at least 3 days or Surgery in the previous 4 weeks 0 Filed at: 12/18/2021 1654   Previous, objectively diagnosed PE or DVT 1 5 Filed at: 12/18/2021 1654   Hemoptysis 0 Filed at: 12/18/2021 1654   Malignancy with treatment within 6 months or palliative 0 Filed at: 12/18/2021 1654   Wells' Criteria Total 4 5 Filed at: 12/18/2021 1654                MDM    Disposition  Final diagnoses:   Hypoxia   COVID-19     Time reflects when diagnosis was documented in both MDM as applicable and the Disposition within this note     Time User Action Codes Description Comment    12/18/2021  2:41 PM Gregg Duos Add [R09 02] Hypoxia     12/18/2021  4:07 PM Gregg Duos Add [U07 1] COVID-19       ED Disposition     ED Disposition Condition Date/Time Comment    Admit Stable Sat Dec 18, 2021  6:16 PM Case was discussed with Dr Betty Goyal and the patient's admission status was agreed to be Admission Status: inpatient status to the service of Dr Betty Goyal   Follow-up Information    None         Patient's Medications   Discharge Prescriptions    No medications on file       No discharge procedures on file      PDMP Review       Value Time User    PDMP Reviewed  Yes 3/13/2020  9:40 AM Toi Arellano, [address]          ED Provider  Electronically Signed by           Jozef Zamora PA-C  12/18/21 0593

## 2021-12-18 NOTE — LETTER
[facility]  [address] [place] [id number]  Dept: [phone]    December 24, 2021     Patient: Pepe Farr   YOB: 1960   Date of Visit: 12/18/2021       To Whom it May Concern:    Sebastian Benton is under my professional care  She was seen in the hospital from 12/18/2021   to 12/24/21    If you have any questions or concerns, please don't hesitate to call           Sincerely,          Gisela Christine RN

## 2021-12-18 NOTE — ED NOTES
Patient oxygen saturation dropped to 84%  Patient put on 3L and saturation now up to 91%   Provider aware     Jose Minaya RN  12/18/21 6092

## 2021-12-18 NOTE — ASSESSMENT & PLAN NOTE
Lab Results   Component Value Date    EGFR 46 12/18/2021    EGFR 50 04/30/2021    EGFR 45 04/27/2021    CREATININE 1 24 12/18/2021    CREATININE 1 17 04/30/2021    CREATININE 1 27 04/27/2021     Baseline around 1 2

## 2021-12-18 NOTE — H&P
H&P Exam - Betty Cerna 59 y o  female MRN: [id number]    Unit/Bed#: BL59 Encounter: [id number]    Acute hypoxemic respiratory failure due to COVID-19 [facility])  Assessment & Plan  Requiring 3 L NC , no baseline O2 requirement  Treat along mild algorithm  Day 1 of 5 of remdesivir  Day 1 of Decadron 6 mg IV daily  Lovenox 60mg Subq Q12  Vitamin-C/vitamin-D/Zinc  Bedside pro air  D-Dimer   59   Check CRP , Procal, BNP in am     Hyperlipidemia associated with type 2 diabetes mellitus ([facility] [address] )  Assessment & Plan  Lab Results   Component Value Date    HGBA1C 11 1 (A) 10/22/2021       No results for input(s): POCGLU in the last 72 hours  Blood Sugar Average: Last 72 hrs:     Poorly controlled  Continue 70/30 at 50 units BID along ISS coverage  Diabetic diet     Morbid obesity with BMI of 50 0-59 9, adult (HCC)  Assessment & Plan  TLC      Stage 3 chronic kidney disease [facility])  Assessment & Plan  Lab Results   Component Value Date    EGFR 46 12/18/2021    EGFR 50 04/30/2021    EGFR 45 04/27/2021    CREATININE 1 24 12/18/2021    CREATININE 1 17 04/30/2021    CREATININE 1 27 04/27/2021     Baseline around 1 2     Essential hypertension  Assessment & Plan  Norvasc 5 mg daily  Lisinopril 10mg Daily  Lasix 20mg Daily       History of Present Illness      58 y/o F unvaccinated presents with SOb x 3 days associated with fatigue and fevers  Found to have covid 19 and requiring 3 LNC  Denies any chest pain / nausea / vomiting  Does endorse decreased PO intake  Full code  Review of Systems   Constitutional: Positive for appetite change and fever  Respiratory: Positive for shortness of breath  All other systems reviewed and are negative        Historical Information   Past Medical History:   Diagnosis Date    Anesthesia related hyperthermia     pt states she had high fevers after her lipoma surgery in 2001 at our hospital and was shipped to [facility] where they said t was from an anesthesia med    Anxiety     Arthritis     Asthma     Chronic pain     Depression     Diabetes mellitus (HCC)     GERD (gastroesophageal reflux disease)     Hyperlipidemia     Malignant hyperthermia due to anesthesia     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     Neuropathy     Obesity     PCOS (polycystic ovarian syndrome)      Past Surgical History:   Procedure Laterality Date    CERVICAL CONIZATION   W/ LASER       SECTION      DILATION AND CURETTAGE OF UTERUS      ENDOMETRIAL ABLATION      ENDOMETRIAL BIOPSY      EPIDURAL BLOCK INJECTION Bilateral 2019    Procedure: L5-S1 transforaminal epidural steroid injection;  Surgeon: Soham Retana MD;  Location: [facility] MAIN OR;  Service: Pain Management     EPIDURAL BLOCK INJECTION Bilateral 2019    Procedure: L5-S1 transforaminal epidural steroid injection;  Surgeon: Soham Retana MD;  Location: [facility];  Service: Pain Management     FL GUIDED NEEDLE PLAC BX/ASP/INJ  2019    FL GUIDED NEEDLE PLAC BX/ASP/INJ  2019    FOOT SURGERY Left     HERNIA REPAIR      INDUCED       IR PICC LINE  2019    JOINT REPLACEMENT      KNEE ARTHROPLASTY Right     DC DEBRIDEMENT OPEN WOUND 20 SQ CM< Right 2018    Procedure: DEBRIDEMENT HAND/FINGER [facility] OUT);   Surgeon: Sheila Vargas MD;  Location: [facility] MAIN OR;  Service: General    DC EXCISION TUMOR SOFT TISSUE BACK/FLANK SUBQ 3+CM N/A 2018    Procedure: BACK LIPOMA EXCISION;  Surgeon: Sheila Vargas MD;  [facility] MAIN OR;  Service: General    ROTATOR CUFF REPAIR Right     TONSILLECTOMY       Social History   Social History     Substance and Sexual Activity   Alcohol Use Not Currently    Comment: OCCASIONALLY     Social History     Substance and Sexual Activity   Drug Use Yes    Types: Marijuana    Comment: CBD     Social History     Tobacco Use   Smoking Status Never Smoker   Smokeless Tobacco Never Used     E-Cigarette Use: Never User     E-Cigarette/Vaping Substances       Family History: non-contributory    Meds/Allergies   all medications and allergies reviewed  Allergies   Allergen Reactions    Cauliflower [Brassica Oleracea - Food Allergy] Other (See Comments)     Other reaction(s): GI upset  Abdominal pain    Citrus Bioflavonoid - Food Allergy Other (See Comments)     Skin burns    Compro [Prochlorperazine] Shortness Of Breath and Wheezing    Latex Other (See Comments)     burning    Mobic [Meloxicam] Swelling    Morphine Hives and Itching    Albumen, Egg - Food Allergy Vomiting     Other reaction(s): Nausea and/or vomiting    Metronidazole Itching    Sulfa Antibiotics Other (See Comments)     Other reaction(s): Unknown Reaction       Objective   First Vitals:   Blood Pressure: 167/74 (12/18/21 1308)  Pulse: 90 (12/18/21 1308)  Temperature: (!) 101 8 °F (38 8 °C) (12/18/21 1308)  Temp Source: Tympanic (12/18/21 1308)  Respirations: 20 (12/18/21 1308)  Height: 5' 2" (157 5 cm) (12/18/21 1308)  Weight - Scale: 128 kg (282 lb 3 oz) (12/18/21 1308)  SpO2: 93 % (12/18/21 1308)    Current Vitals:   Blood Pressure: 153/82 (12/18/21 1800)  Pulse: 84 (12/18/21 1800)  Temperature: (!) 101 8 °F (38 8 °C) (12/18/21 1308)  Temp Source: Tympanic (12/18/21 1308)  Respirations: 22 (12/18/21 1800)  Height: 5' 2" (157 5 cm) (12/18/21 1308)  Weight - Scale: 128 kg (282 lb 3 oz) (12/18/21 1308)  SpO2: 94 % (12/18/21 1800)    No intake or output data in the 24 hours ending 12/18/21 1827    Invasive Devices  Report    Peripheral Intravenous Line            Peripheral IV 12/18/21 Upper;Left Arm <1 day                Physical Exam  Vitals and nursing note reviewed  Constitutional:       Appearance: Normal appearance  HENT:      Head: Normocephalic and atraumatic  Right Ear: External ear normal       Left Ear: External ear normal       Nose: Nose normal  No congestion        Mouth/Throat:      Mouth: Mucous membranes are moist    Eyes:      Pupils: Pupils are equal, round, and reactive to light  Cardiovascular:      Rate and Rhythm: Normal rate and regular rhythm  Pulses: Normal pulses  Heart sounds: No murmur heard  Abdominal:      General: Abdomen is flat  Bowel sounds are normal  There is no distension  Tenderness: There is no abdominal tenderness  Musculoskeletal:         General: No swelling  Normal range of motion  Cervical back: Normal range of motion  Skin:     General: Skin is warm and dry  Capillary Refill: Capillary refill takes 2 to 3 seconds  Coloration: Skin is not jaundiced  Neurological:      General: No focal deficit present  Mental Status: She is alert and oriented to person, place, and time           Lab Results:   Lab Results   Component Value Date    WBC 7 20 12/18/2021    HGB 13 9 12/18/2021    HCT 42 4 12/18/2021    MCV 86 12/18/2021     12/18/2021     Lab Results   Component Value Date    SODIUM 135 (L) 12/18/2021    K 3 9 12/18/2021     12/18/2021    CO2 25 12/18/2021    AGAP 9 12/18/2021    BUN 24 12/18/2021    CREATININE 1 24 12/18/2021    GLUC 86 12/18/2021    GLUF 305 (H) 04/30/2021    CALCIUM 8 5 12/18/2021    AST 52 (H) 12/18/2021    ALT 78 12/18/2021    ALKPHOS 75 12/18/2021    TP 7 0 12/18/2021    TBILI 0 36 12/18/2021    EGFR 46 12/18/2021       Imaging:   CTA ED chest PE Study    (Results Pending)     CXR Read,  CTA pending  EKG, Pathology, and Other Studies:     Code Status: Prior  Advance Directive and Living Will:      Power of :    POLST:      Counseling / Coordination of Care:

## 2021-12-18 NOTE — ASSESSMENT & PLAN NOTE
Requiring 3 L NC , no baseline O2 requirement  Treat along mild algorithm  Day 2 of 5 of remdesivir  Day 2 of Decadron 6 mg IV daily  Vitamin-C/vitamin-D/Zinc  Bedside pro air  D-Dimer   61   Am labs pending

## 2021-12-19 LAB
GLUCOSE SERPL-MCNC: 111 MG/DL (ref 65–140)
GLUCOSE SERPL-MCNC: 219 MG/DL (ref 65–140)
GLUCOSE SERPL-MCNC: 315 MG/DL (ref 65–140)
GLUCOSE SERPL-MCNC: 317 MG/DL (ref 65–140)
GLUCOSE SERPL-MCNC: 360 MG/DL (ref 65–140)

## 2021-12-19 PROCEDURE — 82948 REAGENT STRIP/BLOOD GLUCOSE: CPT

## 2021-12-19 PROCEDURE — 94760 N-INVAS EAR/PLS OXIMETRY 1: CPT

## 2021-12-19 PROCEDURE — 99232 SBSQ HOSP IP/OBS MODERATE 35: CPT | Performed by: FAMILY MEDICINE

## 2021-12-19 RX ORDER — ACETAMINOPHEN 325 MG/1
650 TABLET ORAL EVERY 6 HOURS PRN
Status: DISCONTINUED | OUTPATIENT
Start: 2021-12-19 | End: 2021-12-24 | Stop reason: HOSPADM

## 2021-12-19 RX ADMIN — ENOXAPARIN SODIUM 60 MG: 60 INJECTION SUBCUTANEOUS at 08:47

## 2021-12-19 RX ADMIN — OXYCODONE HYDROCHLORIDE AND ACETAMINOPHEN 1000 MG: 500 TABLET ORAL at 08:45

## 2021-12-19 RX ADMIN — ZINC SULFATE 220 MG (50 MG) CAPSULE 220 MG: CAPSULE at 08:45

## 2021-12-19 RX ADMIN — INSULIN LISPRO 12 UNITS: 100 INJECTION, SOLUTION INTRAVENOUS; SUBCUTANEOUS at 08:50

## 2021-12-19 RX ADMIN — MAGNESIUM OXIDE TAB 400 MG (241.3 MG ELEMENTAL MG) 400 MG: 400 (241.3 MG) TAB at 21:35

## 2021-12-19 RX ADMIN — Medication 1000 UNITS: at 08:44

## 2021-12-19 RX ADMIN — INSULIN ASPART 50 UNITS: 100 INJECTION, SUSPENSION SUBCUTANEOUS at 16:51

## 2021-12-19 RX ADMIN — GABAPENTIN 600 MG: 300 CAPSULE ORAL at 21:35

## 2021-12-19 RX ADMIN — OXYCODONE HYDROCHLORIDE AND ACETAMINOPHEN 1000 MG: 500 TABLET ORAL at 21:35

## 2021-12-19 RX ADMIN — GABAPENTIN 600 MG: 300 CAPSULE ORAL at 08:47

## 2021-12-19 RX ADMIN — FUROSEMIDE 20 MG: 40 TABLET ORAL at 08:49

## 2021-12-19 RX ADMIN — ENOXAPARIN SODIUM 60 MG: 60 INJECTION SUBCUTANEOUS at 21:35

## 2021-12-19 RX ADMIN — REMDESIVIR 100 MG: 100 INJECTION, POWDER, LYOPHILIZED, FOR SOLUTION INTRAVENOUS at 21:36

## 2021-12-19 RX ADMIN — INSULIN LISPRO 8 UNITS: 100 INJECTION, SOLUTION INTRAVENOUS; SUBCUTANEOUS at 16:39

## 2021-12-19 RX ADMIN — LISINOPRIL 10 MG: 10 TABLET ORAL at 08:46

## 2021-12-19 RX ADMIN — ACETAMINOPHEN 650 MG: 325 TABLET, FILM COATED ORAL at 01:50

## 2021-12-19 RX ADMIN — ATORVASTATIN CALCIUM 40 MG: 40 TABLET, FILM COATED ORAL at 21:35

## 2021-12-19 RX ADMIN — MAGNESIUM OXIDE TAB 400 MG (241.3 MG ELEMENTAL MG) 400 MG: 400 (241.3 MG) TAB at 08:44

## 2021-12-19 RX ADMIN — ASPIRIN 81 MG CHEWABLE TABLET 81 MG: 81 TABLET CHEWABLE at 08:44

## 2021-12-19 RX ADMIN — INSULIN ASPART 50 UNITS: 100 INJECTION, SUSPENSION SUBCUTANEOUS at 08:53

## 2021-12-19 RX ADMIN — DEXAMETHASONE SODIUM PHOSPHATE 6 MG: 4 INJECTION, SOLUTION INTRA-ARTICULAR; INTRALESIONAL; INTRAMUSCULAR; INTRAVENOUS; SOFT TISSUE at 21:35

## 2021-12-19 RX ADMIN — INSULIN LISPRO 12 UNITS: 100 INJECTION, SOLUTION INTRAVENOUS; SUBCUTANEOUS at 13:11

## 2021-12-19 RX ADMIN — GABAPENTIN 600 MG: 300 CAPSULE ORAL at 16:20

## 2021-12-19 RX ADMIN — AMLODIPINE BESYLATE 5 MG: 5 TABLET ORAL at 08:45

## 2021-12-19 NOTE — PLAN OF CARE
Problem: PAIN - ADULT  Goal: Verbalizes/displays adequate comfort level or baseline comfort level  Description: Interventions:  - Encourage patient to monitor pain and request assistance  - Assess pain using appropriate pain scale  - Administer analgesics based on type and severity of pain and evaluate response  - Implement non-pharmacological measures as appropriate and evaluate response  - Consider cultural and social influences on pain and pain management  - Notify physician/advanced practitioner if interventions unsuccessful or patient reports new pain  Outcome: Progressing     Problem: INFECTION - ADULT  Goal: Absence or prevention of progression during hospitalization  Description: INTERVENTIONS:  - Assess and monitor for signs and symptoms of infection  - Monitor lab/diagnostic results  - Monitor all insertion sites, i e  indwelling lines, tubes, and drains  - Monitor endotracheal if appropriate and nasal secretions for changes in amount and color  - [place] appropriate cooling/warming therapies per order  - Administer medications as ordered  - Instruct and encourage patient and family to use good hand hygiene technique  - Identify and instruct in appropriate isolation precautions for identified infection/condition  Outcome: Progressing  Goal: Absence of fever/infection during neutropenic period  Description: INTERVENTIONS:  - Monitor WBC    Outcome: Progressing     Problem: SAFETY ADULT  Goal: Patient will remain free of falls  Description: INTERVENTIONS:  - Educate patient/family on patient safety including physical limitations  - Instruct patient to call for assistance with activity   - Consult OT/PT to assist with strengthening/mobility   - Keep Call bell within reach  - Keep bed low and locked with side rails adjusted as appropriate  - Keep care items and personal belongings within reach  - Initiate and maintain comfort rounds  - Make Fall Risk Sign visible to staff  - Offer Toileting every  Hours, in advance of need  - Initiate/Maintain alarm  - Obtain necessary fall risk management equipment:   - Apply yellow socks and bracelet for high fall risk patients  - Consider moving patient to room near nurses station  Outcome: Progressing  Goal: Maintain or return to baseline ADL function  Description: INTERVENTIONS:  -  Assess patient's ability to carry out ADLs; assess patient's baseline for ADL function and identify physical deficits which impact ability to perform ADLs (bathing, care of mouth/teeth, toileting, grooming, dressing, etc )  - Assess/evaluate cause of self-care deficits   - Assess range of motion  - Assess patient's mobility; develop plan if impaired  - Assess patient's need for assistive devices and provide as appropriate  - Encourage maximum independence but intervene and supervise when necessary  - Involve family in performance of ADLs  - Assess for home care needs following discharge   - Consider OT consult to assist with ADL evaluation and planning for discharge  - Provide patient education as appropriate  Outcome: Progressing  Goal: Maintains/Returns to pre admission functional level  Description: INTERVENTIONS:  - Perform BMAT or MOVE assessment daily    - Set and communicate daily mobility goal to care team and patient/family/caregiver  - Collaborate with rehabilitation services on mobility goals if consulted  - Perform Range of Motion times a day  - Reposition patient every  hours    - Dangle patient  times a day  - Stand patient times a day  - Ambulate patient  times a day  - Out of bed to chair  times a day   - Out of bed for meals times a day  - Out of bed for toileting  - Record patient progress and toleration of activity level   Outcome: Progressing     Problem: DISCHARGE PLANNING  Goal: Discharge to home or other facility with appropriate resources  Description: INTERVENTIONS:  - Identify barriers to discharge w/patient and caregiver  - Arrange for needed discharge resources and transportation as appropriate  - Identify discharge learning needs (meds, wound care, etc )  - Arrange for interpretive services to assist at discharge as needed  - Refer to Case Management Department for coordinating discharge planning if the patient needs post-hospital services based on physician/advanced practitioner order or complex needs related to functional status, cognitive ability, or social support system  Outcome: Progressing     Problem: Knowledge Deficit  Goal: Patient/family/caregiver demonstrates understanding of disease process, treatment plan, medications, and discharge instructions  Description: Complete learning assessment and assess knowledge base  Interventions:  - Provide teaching at level of understanding  - Provide teaching via preferred learning methods  Outcome: Progressing     Problem: NEUROSENSORY - ADULT  Goal: Achieves stable or improved neurological status  Description: INTERVENTIONS  - Monitor and report changes in neurological status  - Monitor vital signs such as temperature, blood pressure, glucose, and any other labs ordered   - Initiate measures to prevent increased intracranial pressure  - Monitor for seizure activity and implement precautions if appropriate      Outcome: Progressing  Goal: Achieves maximal functionality and self care  Description: INTERVENTIONS  - Monitor swallowing and airway patency with patient fatigue and changes in neurological status  - Encourage and assist patient to increase activity and self care     - Encourage visually impaired, hearing impaired and aphasic patients to use assistive/communication devices  Outcome: Progressing     Problem: CARDIOVASCULAR - ADULT  Goal: Maintains optimal cardiac output and hemodynamic stability  Description: INTERVENTIONS:  - Monitor I/O, vital signs and rhythm  - Monitor for S/S and trends of decreased cardiac output  - Administer and titrate ordered vasoactive medications to optimize hemodynamic stability  - Assess quality of pulses, skin color and temperature  - Assess for signs of decreased coronary artery perfusion  - Instruct patient to report change in severity of symptoms  Outcome: Progressing  Goal: Absence of cardiac dysrhythmias or at baseline rhythm  Description: INTERVENTIONS:  - Continuous cardiac monitoring, vital signs, obtain 12 lead EKG if ordered  - Administer antiarrhythmic and heart rate control medications as ordered  - Monitor electrolytes and administer replacement therapy as ordered  Outcome: Progressing     Problem: RESPIRATORY - ADULT  Goal: Achieves optimal ventilation and oxygenation  Description: INTERVENTIONS:  - Assess for changes in respiratory status  - Assess for changes in mentation and behavior  - Position to facilitate oxygenation and minimize respiratory effort  - Oxygen administered by appropriate delivery if ordered  - Initiate smoking cessation education as indicated  - Encourage broncho-pulmonary hygiene including cough, deep breathe, Incentive Spirometry  - Assess the need for suctioning and aspirate as needed  - Assess and instruct to report SOB or any respiratory difficulty  - Respiratory Therapy support as indicated  Outcome: Progressing     Problem: GASTROINTESTINAL - ADULT  Goal: Minimal or absence of nausea and/or vomiting  Description: INTERVENTIONS:  - Administer IV fluids if ordered to ensure adequate hydration  - Maintain NPO status until nausea and vomiting are resolved  - Nasogastric tube if ordered  - Administer ordered antiemetic medications as needed  - Provide nonpharmacologic comfort measures as appropriate  - Advance diet as tolerated, if ordered  - Consider nutrition services referral to assist patient with adequate nutrition and appropriate food choices  Outcome: Progressing  Goal: Maintains or returns to baseline bowel function  Description: INTERVENTIONS:  - Assess bowel function  - Encourage oral fluids to ensure adequate hydration  - Administer IV fluids if ordered to ensure adequate hydration  - Administer ordered medications as needed  - Encourage mobilization and activity  - Consider nutritional services referral to assist patient with adequate nutrition and appropriate food choices  Outcome: Progressing  Goal: Maintains adequate nutritional intake  Description: INTERVENTIONS:  - Monitor percentage of each meal consumed  - Identify factors contributing to decreased intake, treat as appropriate  - Assist with meals as needed  - Monitor I&O, weight, and lab values if indicated  - Obtain nutrition services referral as needed  Outcome: Progressing  Goal: Oral mucous membranes remain intact  Description: INTERVENTIONS  - Assess oral mucosa and hygiene practices  - Implement preventative oral hygiene regimen  - Implement oral medicated treatments as ordered  - Initiate Nutrition services referral as needed  Outcome: Progressing     Problem: GENITOURINARY - ADULT  Goal: Maintains or returns to baseline urinary function  Description: INTERVENTIONS:  - Assess urinary function  - Encourage oral fluids to ensure adequate hydration if ordered  - Administer IV fluids as ordered to ensure adequate hydration  - Administer ordered medications as needed  - Offer frequent toileting  - Follow urinary retention protocol if ordered  Outcome: Progressing  Goal: Absence of urinary retention  Description: INTERVENTIONS:  - Assess patients ability to void and empty bladder  - Monitor I/O  - Bladder scan as needed  - Discuss with physician/AP medications to alleviate retention as needed  - Discuss catheterization for long term situations as appropriate  Outcome: Progressing     Problem: METABOLIC, FLUID AND ELECTROLYTES - ADULT  Goal: Electrolytes maintained within normal limits  Description: INTERVENTIONS:  - Monitor labs and assess patient for signs and symptoms of electrolyte imbalances  - Administer electrolyte replacement as ordered  - Monitor response to electrolyte replacements, including repeat lab results as appropriate  - Instruct patient on fluid and nutrition as appropriate  Outcome: Progressing  Goal: Fluid balance maintained  Description: INTERVENTIONS:  - Monitor labs   - Monitor I/O and WT  - Instruct patient on fluid and nutrition as appropriate  - Assess for signs & symptoms of volume excess or deficit  Outcome: Progressing  Goal: Glucose maintained within target range  Description: INTERVENTIONS:  - Monitor Blood Glucose as ordered  - Assess for signs and symptoms of hyperglycemia and hypoglycemia  - Administer ordered medications to maintain glucose within target range  - Assess nutritional intake and initiate nutrition service referral as needed  Outcome: Progressing     Problem: SKIN/TISSUE INTEGRITY - ADULT  Goal: Skin Integrity remains intact(Skin Breakdown Prevention)  Description: Assess:  -Perform Denis assessment every   -Clean and moisturize skin every  -Inspect skin when repositioning, toileting, and assisting with ADLS  -Assess under medical devices such as  every   -Assess extremities for adequate circulation and sensation     Bed Management:  -Have minimal linens on bed & keep smooth, unwrinkled  -Change linens as needed when moist or perspiring  -Avoid sitting or lying in one position for more than  hours while in bed  -Keep HOB atdegrees     Toileting:  -Offer bedside commode  -Assess for incontinence every   -Use incontinent care products after each incontinent episode such as    Activity:  -Mobilize patient  times a day  -Encourage activity and walks on unit  -Encourage or provide ROM exercises   -Turn and reposition patient every  Hours  -Use appropriate equipment to lift or move patient in bed  -Instruct/ Assist with weight shifting every  when out of bed in chair  -Consider limitation of chair time hour intervals    Skin Care:  -Avoid use of baby powder, tape, friction and shearing, hot water or constrictive clothing  -Relieve pressure over bony prominences using   -Do not massage red bony areas    Next Steps:  -Teach patient strategies to minimize risks such as    -Consider consults to  interdisciplinary teams such as  Outcome: Progressing     Problem: HEMATOLOGIC - ADULT  Goal: Maintains hematologic stability  Description: INTERVENTIONS  - Assess for signs and symptoms of bleeding or hemorrhage  - Monitor labs  - Administer supportive blood products/factors as ordered and appropriate  Outcome: Progressing     Problem: MUSCULOSKELETAL - ADULT  Goal: Maintain or return mobility to safest level of function  Description: INTERVENTIONS:  - Assess patient's ability to carry out ADLs; assess patient's baseline for ADL function and identify physical deficits which impact ability to perform ADLs (bathing, care of mouth/teeth, toileting, grooming, dressing, etc )  - Assess/evaluate cause of self-care deficits   - Assess range of motion  - Assess patient's mobility  - Assess patient's need for assistive devices and provide as appropriate  - Encourage maximum independence but intervene and supervise when necessary  - Involve family in performance of ADLs  - Assess for home care needs following discharge   - Consider OT consult to assist with ADL evaluation and planning for discharge  - Provide patient education as appropriate  Outcome: Progressing  Goal: Maintain proper alignment of affected body part  Description: INTERVENTIONS:  - Support, maintain and protect limb and body alignment  - Provide patient/ family with appropriate education  Outcome: Progressing

## 2021-12-19 NOTE — ED NOTES
Pt IV found R-arm found to be partially out and bent  Removed and site wnl  New IV attempted without success  Remdesivir not completed  Attending notified  C/O generalized aches /pains, tylenol given as requested  Ambulated to BR independently  Pinon and pudding given, requested something to eat        Maricruz Torres RN  12/19/21 8031

## 2021-12-19 NOTE — ED NOTES
Several attempts made by several nurses to obtain bloodwork and IV  Patient will not let nurse use certain areas on arm therefore unsuccessful  Pt has no IV at this time   Provider aware     Sushma Lizarraga, RN  12/19/21 [id number]

## 2021-12-19 NOTE — PROGRESS NOTES
[address] [facility] [address] [place]  Progress Note - Adriana Shultz 1960, 59 y o  female MRN: [id number]  Unit/Bed#: OW97 Encounter: [id number]  Primary Care Provider: Tri Quiñones DO   Date and time admitted to hospital: 12/18/2021  1:11 PM    Acute hypoxemic respiratory failure due to COVID-19 [facility])  Assessment & Plan  Requiring 3 L NC , no baseline O2 requirement  Treat along mild algorithm  Day 2 of 5 of remdesivir  Day 2 of Decadron 6 mg IV daily  Vitamin-C/vitamin-D/Zinc  Bedside pro air  D-Dimer   61   Am labs pending         Hyperlipidemia associated with type 2 diabetes mellitus ([facility] [address] )  Assessment & Plan  Lab Results   Component Value Date    HGBA1C 11 1 (A) 10/22/2021       No results for input(s): POCGLU in the last 72 hours  Blood Sugar Average: Last 72 hrs:     Poorly controlled  Continue 70/30 at 50 units BID along ISS coverage  Diabetic diet     Morbid obesity with BMI of 50 0-59 9, adult (HCC)  Assessment & Plan  TLC      Stage 3 chronic kidney disease [facility])  Assessment & Plan  Lab Results   Component Value Date    EGFR 46 12/18/2021    EGFR 50 04/30/2021    EGFR 45 04/27/2021    CREATININE 1 24 12/18/2021    CREATININE 1 17 04/30/2021    CREATININE 1 27 04/27/2021     Baseline around 1 2     Essential hypertension  Assessment & Plan  Norvasc 5 mg daily  Lisinopril 10mg Daily  Lasix 20mg Daily         Progress Note - Adriana Shultz 59 y o  female MRN: [id number]    Unit/Bed#: AV98 Encounter: [id number]        Subjective:   Patient seen and examined at bedside  Feeling better ambulating to br without Sob    Objective:     Vitals:   Vitals:    12/19/21 0842   BP: 124/76   Pulse: 72   Resp: 20   Temp: 98 2 °F (36 8 °C)   SpO2: 95%     Body mass index is 51 61 kg/m²      Intake/Output Summary (Last 24 hours) at 12/19/2021 1024  Last data filed at 12/18/2021 1730  Gross per 24 hour   Intake 500 ml   Output --   Net 500 ml       Physical Exam:   /76 (BP Location: Right arm)   Pulse 72 Temp 98 2 °F (36 8 °C) (Tympanic)   Resp 20   Ht 5' 2" (1 575 m)   Wt 128 kg (282 lb 3 oz)   SpO2 95%   BMI 51 61 kg/m²   General appearance: alert and oriented, in no acute distress  Head: Normocephalic, without obvious abnormality, atraumatic  Lungs: clear to auscultation bilaterally  Heart: regular rate and rhythm, S1, S2 normal, no murmur, click, rub or gallop  Abdomen: soft, non-tender; bowel sounds normal; no masses,  no organomegaly  Extremities: extremities normal, warm and well-perfused; no cyanosis, clubbing, or edema  Pulses: 2+ and symmetric  Neurologic: Grossly normal     Invasive Devices  Report    Peripheral Intravenous Line            Peripheral IV 12/18/21 Left Antecubital 1 day                Results from last 7 days   Lab Units 12/18/21  1502   WBC Thousand/uL 7 20   HEMOGLOBIN g/dL 13 9   HEMATOCRIT % 42 4   PLATELETS Thousands/uL 166       Results from last 7 days   Lab Units 12/18/21  1502   POTASSIUM mmol/L 3 9   CHLORIDE mmol/L 101   CO2 mmol/L 25   BUN mg/dL 24   CREATININE mg/dL 1 24   CALCIUM mg/dL 8 5   ALK PHOS U/L 75   ALT U/L 78   AST U/L 52*       Medication Administration - last 24 hours from 12/18/2021 1024 to 12/19/2021 1024       Date/Time Order Dose Route Action Action by     12/18/2021 1433 acetaminophen (TYLENOL) tablet 650 mg 650 mg Oral Given Lico Triana RN     12/18/2021 1730 sodium chloride 0 9 % bolus 500 mL 0 mL Intravenous Stopped Lico Triana RN     12/18/2021 1435 sodium chloride 0 9 % bolus 500 mL 500 mL Intravenous New [facility] Lico Triana RN     12/19/2021 0845 amLODIPine (NORVASC) tablet 5 mg 5 mg Oral Given Lico YeeCARL     12/19/2021 0844 aspirin chewable tablet 81 mg 81 mg Oral Given Lico YeeCARL     12/18/2021 2210 atorvastatin (LIPITOR) tablet 40 mg 40 mg Oral Given [facility], CRAL     12/19/2021 0849 furosemide (LASIX) tablet 20 mg 20 mg Oral Given Kamla Nakia, CARL     12/19/2021 0847 gabapentin (NEURONTIN) capsule 600 mg 600 mg Oral Given Anthony Ling RN     12/18/2021 2210 gabapentin (NEURONTIN) capsule 600 mg 600 mg Oral Given Zahraa Kapoor RN     12/19/2021 0846 lisinopril (ZESTRIL) tablet 10 mg 10 mg Oral Given Anthony Ling RN     12/19/2021 0844 magnesium oxide (MAG-OX) tablet 400 mg 400 mg Oral Given Anthony Ling RN     12/18/2021 2212 magnesium oxide (MAG-OX) tablet 400 mg 400 mg Oral Given Zahraa Kapoor RN     12/19/2021 3195 insulin aspart protamine-insulin aspart (NovoLOG 70/30) 100 units/mL subcutaneous injection 50 Units 50 Units Subcutaneous Given Anthony Ling RN     12/19/2021 0850 insulin lispro (HumaLOG) 100 units/mL subcutaneous injection 4-20 Units 12 Units Subcutaneous Given Anthony Ling RN     12/18/2021 2205 dexamethasone (DECADRON) injection 6 mg 6 mg Intravenous Given Zahraa Kapoor RN     12/19/2021 0847 enoxaparin (LOVENOX) subcutaneous injection 60 mg 60 mg Subcutaneous Given Anthony Ling RN     12/18/2021 2209 enoxaparin (LOVENOX) subcutaneous injection 60 mg 60 mg Subcutaneous Given Zahraa Kapoor RN     12/18/2021 2208 remdesivir (Veklury) 200 mg in sodium chloride 0 9 % 290 mL IVPB 200 mg Intravenous Given Zahraa Kapoor RN     12/19/2021 0845 ascorbic acid (VITAMIN C) tablet 1,000 mg 1,000 mg Oral Given Anthony Ling RN     12/18/2021 2211 ascorbic acid (VITAMIN C) tablet 1,000 mg 1,000 mg Oral Given Zahraa Kapoor RN     12/19/2021 0663 cholecalciferol (VITAMIN D3) tablet 1,000 Units 1,000 Units Oral Given Anthony Ling RN     12/19/2021 0845 zinc sulfate (ZINCATE) capsule 220 mg 220 mg Oral Given Anthony Ling RN     12/19/2021 0150 acetaminophen (TYLENOL) tablet 650 mg 650 mg Oral Given Zahraa Kapoor RN            Lab, Imaging and other studies: I have personally reviewed pertinent reports      VTE Pharmacologic Prophylaxis: Sequential compression device (Venodyne)  and Enoxaparin (Lovenox)  VTE Mechanical Prophylaxis: sequential compression device     Dougals Justice MD  12/19/2021,10:24 AM

## 2021-12-19 NOTE — RESPIRATORY THERAPY NOTE
12/19/21 0006   Respiratory Assessment   Assessment Type Assess only   General Appearance Sleeping;Drowsy   Resp Comments patient received in her room, she is laying on her left side, oxygen saturation is 99% on 3 lpm, I have dropped her oxygen, I have notified the nurse  I have brought the incentive spirometer unwrapped in her room, she is too groggy to be instructed to   I did mention to her laying on her side and self-proning is ideal  I will notify her nurse of the oxygen change   Oxygen Therapy/Pulse Ox   O2 Device Nasal cannula   O2 Therapy Oxygen   Nasal Cannula O2 Flow Rate (L/min) 2 L/min   Calculated FIO2 (%) - Nasal Cannula 28   SpO2 99 %   SpO2 Activity At Rest   $ Pulse Oximetry Spot Check Charge Completed

## 2021-12-19 NOTE — UTILIZATION REVIEW
Initial Clinical Review    Admission: Date/Time/Statement:   Admission Orders (From admission, onward)     Ordered        12/18/21 1819  Inpatient Admission  Once                      Orders Placed This Encounter   Procedures    Inpatient Admission     Standing Status:   Standing     Number of Occurrences:   1     Order Specific Question:   Level of Care     Answer:   Med Surg [16]     Order Specific Question:   Estimated length of stay     Answer:   More than 2 Midnights     Order Specific Question:   Certification     Answer:   I certify that inpatient services are medically necessary for this patient for a duration of greater than two midnights  See H&P and MD Progress Notes for additional information about the patient's course of treatment  ED Arrival Information     Expected Arrival Acuity    - 12/18/2021 12:59 Urgent         Means of arrival Escorted by Service Admission type    Wheelchair Family Member Hospitalist Urgent         Arrival complaint    Cough        Chief Complaint   Patient presents with    Cough     rash on right rib, cough, congestion, fever; unvaccinated     Initial Presentation:   Ms Cheryle Hotter is a 59 yof who presents to the ED from home with c/o SOB X 3 days with fatigue, fevers, decreased oral intake, found to have Covid 19 and needing oxygen at 3L NC got pulse ox 84%  PMH: HLD, IDDM, CKD, HTN  In the ED she had elevated D dimer and was febrile  She is admitted to INPATIENT status with Acute hypoxemic respiratory failure d/t Covid - oxygen, mild pathway, IV Remdesivir, Decadron, Lovenox sq, Vitamin cocktail, check CRP, Procal   HLD with IDDM - home insulin and SSI cover  Date: 12/19   Day 2:   C/o generalized aches and pains today, treated with Tylenol  Continues on day 2 of Covid specific IV therapies  Remains on oxygen at 2 L NC today  Imaging pending         ED Triage Vitals [12/18/21 1308]   Temperature Pulse Respirations Blood Pressure SpO2   (!) 101 8 °F (38 8 °C) 90 20 167/74 93 %      Temp Source Heart Rate Source Patient Position - Orthostatic VS BP Location FiO2 (%)   Tympanic Monitor Sitting Right arm --      Pain Score       9          Wt Readings from Last 1 Encounters:   12/18/21 128 kg (282 lb 3 oz)     Additional Vital Signs:   12/19/21 0842 98 2 °F (36 8 °C) 72 20 124/76 -- 95 % 28 2 L/min Nasal cannula --   12/19/21 0259 -- -- -- -- -- -- -- -- Nasal cannula --   12/19/21 0006 -- -- -- -- -- 99 % 28 2 L/min Nasal cannula --   12/18/21 1800 -- 84 22 153/82 106 94 % -- -- -- Lying   12/18/21 1530 -- 81 20 124/60 86 94 % 32 3 L/min Nasal cannula Lying   12/18/21 1440 -- -- -- -- -- 84 % Abnormal  -- -- None (Room air) --     Pertinent Labs/Diagnostic Test Results:     12/18 CTA ED chest PE study - pending     Results from last 7 days   Lab Units 12/18/21  1435   SARS-COV-2  Positive*     Results from last 7 days   Lab Units 12/18/21  1502   WBC Thousand/uL 7 20   HEMOGLOBIN g/dL 13 9   HEMATOCRIT % 42 4   PLATELETS Thousands/uL 166   NEUTROS ABS Thousands/µL 5 67         Results from last 7 days   Lab Units 12/18/21  1502   SODIUM mmol/L 135*   POTASSIUM mmol/L 3 9   CHLORIDE mmol/L 101   CO2 mmol/L 25   ANION GAP mmol/L 9   BUN mg/dL 24   CREATININE mg/dL 1 24   EGFR ml/min/1 73sq m 46   CALCIUM mg/dL 8 5     Results from last 7 days   Lab Units 12/18/21  1502   AST U/L 52*   ALT U/L 78   ALK PHOS U/L 75   TOTAL PROTEIN g/dL 7 0   ALBUMIN g/dL 3 4*   TOTAL BILIRUBIN mg/dL 0 36     Results from last 7 days   Lab Units 12/19/21  1109 12/19/21  0839   POC GLUCOSE mg/dl 360* 317*     Results from last 7 days   Lab Units 12/18/21  1502   GLUCOSE RANDOM mg/dL 86     Results from last 7 days   Lab Units 12/18/21  1502   HS TNI 0HR ng/L 7     Results from last 7 days   Lab Units 12/18/21  1631   D-DIMER QUANTITATIVE ug/ml FEU 0 59*     Results from last 7 days   Lab Units 12/18/21  1630   LACTIC ACID mmol/L 1 6     Results from last 7 days   Lab Units 12/18/21  1435   INFLUENZA A PCR  Negative   INFLUENZA B PCR  Negative   RSV PCR  Negative     Results from last 7 days   Lab Units 12/18/21  1502   BLOOD CULTURE  Received in Microbiology Lab  Culture in Progress  Received in Microbiology Lab  Culture in Progress       ED Treatment:   Medication Administration from 12/18/2021 1259 to 12/19/2021 1304    Date/Time Order Dose Route Action   12/18/2021 1433 acetaminophen (TYLENOL) tablet 650 mg 650 mg Oral Given   12/18/2021 1435 sodium chloride 0 9 % bolus 500 mL 500 mL Intravenous New Bag   12/19/2021 0845 amLODIPine (NORVASC) tablet 5 mg 5 mg Oral Given   12/19/2021 0844 aspirin chewable tablet 81 mg 81 mg Oral Given   12/18/2021 2210 atorvastatin (LIPITOR) tablet 40 mg 40 mg Oral Given   12/19/2021 0849 furosemide (LASIX) tablet 20 mg 20 mg Oral Given   12/19/2021 0847 gabapentin (NEURONTIN) capsule 600 mg 600 mg Oral Given   12/18/2021 2210 gabapentin (NEURONTIN) capsule 600 mg 600 mg Oral Given   12/19/2021 0846 lisinopril (ZESTRIL) tablet 10 mg 10 mg Oral Given   12/19/2021 0844 magnesium oxide (MAG-OX) tablet 400 mg 400 mg Oral Given   12/18/2021 2212 magnesium oxide (MAG-OX) tablet 400 mg 400 mg Oral Given   12/19/2021 0853 insulin aspart protamine-insulin aspart (NovoLOG 70/30) 100 units/mL subcutaneous injection 50 Units 50 Units Subcutaneous Given   12/19/2021 0850 insulin lispro (HumaLOG) 100 units/mL subcutaneous injection 4-20 Units 12 Units Subcutaneous Given   12/18/2021 2205 dexamethasone (DECADRON) injection 6 mg 6 mg Intravenous Given   12/19/2021 0847 enoxaparin (LOVENOX) subcutaneous injection 60 mg 60 mg Subcutaneous Given   12/18/2021 2209 enoxaparin (LOVENOX) subcutaneous injection 60 mg 60 mg Subcutaneous Given   12/18/2021 2208 remdesivir (Veklury) 200 mg in sodium chloride 0 9 % 290 mL IVPB 200 mg Intravenous Given   12/19/2021 0845 ascorbic acid (VITAMIN C) tablet 1,000 mg 1,000 mg Oral Given   12/18/2021 2211 ascorbic acid (VITAMIN C) tablet 1,000 mg 1,000 mg Oral Given   12/19/2021 0844 cholecalciferol (VITAMIN D3) tablet 1,000 Units 1,000 Units Oral Given   12/19/2021 0845 zinc sulfate (ZINCATE) capsule 220 mg 220 mg Oral Given   12/19/2021 0150 acetaminophen (TYLENOL) tablet 650 mg 650 mg Oral Given        Past Medical History:   Diagnosis Date    Anesthesia related hyperthermia     pt states she had high fevers after her lipoma surgery in 2001 at our hospital and was shipped to [facility] where they said t was from an anesthesia med    Anxiety     Arthritis     Asthma     Chronic pain     Depression     Diabetes mellitus ([facility] [address] )     GERD (gastroesophageal reflux disease)     Hyperlipidemia     Malignant hyperthermia due to anesthesia     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     Neuropathy     Obesity     PCOS (polycystic ovarian syndrome)      Present on Admission:   Hyperlipidemia associated with type 2 diabetes mellitus (HCC)   Stage 3 chronic kidney disease ([facility] [address] )   Essential hypertension      Admitting Diagnosis: Cough [R05 9]  Age/Sex: 59 y o  female  Admission Orders:  Scheduled Medications:  amLODIPine, 5 mg, Oral, Daily  ascorbic acid, 1,000 mg, Oral, BID  aspirin, 81 mg, Oral, Daily  atorvastatin, 40 mg, Oral, Daily With Dinner  cholecalciferol, 1,000 Units, Oral, Daily  dexamethasone, 6 mg, Intravenous, Q24H  enoxaparin, 60 mg, Subcutaneous, Q12H FORTINO  furosemide, 20 mg, Oral, Daily  gabapentin, 600 mg, Oral, TID  insulin aspart protamine-insulin aspart, 50 Units, Subcutaneous, BID AC  insulin lispro, 4-20 Units, Subcutaneous, TID AC  lisinopril, 10 mg, Oral, Daily  magnesium oxide, 400 mg, Oral, BID  remdesivir, 100 mg, Intravenous, Q24H  zinc sulfate, 220 mg, Oral, Daily      Continuous IV Infusions:     PRN Meds:  acetaminophen, 650 mg, Oral, Q6H PRN - x 1 12/19  albuterol, 2 puff, Inhalation, Q4H PRN    Isolation   SCDs  OOB as shanna   NC oxygen at 2L   POC GLUCOSE AC/HS WITH SSI COVERAGE     Network Utilization Review Department  ATTENTION: Please call with any questions or concerns to [phone] and carefully listen to the prompts so that you are directed to the right person  All voicemails are confidential   Lynette Kussmaul all requests for admission clinical reviews, approved or denied determinations and any other requests to dedicated fax number below belonging to the campus where the patient is receiving treatment   List of dedicated fax numbers for the Facilities:  [address] DENIALS (Administrative/Medical Necessity) [phone]   [address] (Maternity/NICU/Pediatrics) [phone]   [address]  [address] [address] [facility] [place] [address] [address] [place] [address] [address] [facility] [place] [address] [phone]

## 2021-12-19 NOTE — ED NOTES
Unable to obtain am lab draws, unable to access vein  Attending notified        Leigh Guerrier RN  12/19/21 9198

## 2021-12-20 ENCOUNTER — APPOINTMENT (INPATIENT)
Dept: CT IMAGING | Facility: HOSPITAL | Age: 61
DRG: 137 | End: 2021-12-20
Payer: COMMERCIAL

## 2021-12-20 LAB
2HR DELTA HS TROPONIN: -2 NG/L
ALBUMIN SERPL BCP-MCNC: 3.1 G/DL (ref 3.5–5)
ALBUMIN SERPL BCP-MCNC: 3.2 G/DL (ref 3.5–5)
ALP SERPL-CCNC: 69 U/L (ref 46–116)
ALP SERPL-CCNC: 71 U/L (ref 46–116)
ALT SERPL W P-5'-P-CCNC: 58 U/L (ref 12–78)
ALT SERPL W P-5'-P-CCNC: 59 U/L (ref 12–78)
ANION GAP SERPL CALCULATED.3IONS-SCNC: 7 MMOL/L (ref 4–13)
ANION GAP SERPL CALCULATED.3IONS-SCNC: 8 MMOL/L (ref 4–13)
AST SERPL W P-5'-P-CCNC: 40 U/L (ref 5–45)
AST SERPL W P-5'-P-CCNC: 42 U/L (ref 5–45)
ATRIAL RATE: 85 BPM
BASOPHILS # BLD AUTO: 0.01 THOUSANDS/ΜL (ref 0–0.1)
BASOPHILS NFR BLD AUTO: 0 % (ref 0–1)
BILIRUB SERPL-MCNC: 0.34 MG/DL (ref 0.2–1)
BILIRUB SERPL-MCNC: 0.36 MG/DL (ref 0.2–1)
BUN SERPL-MCNC: 35 MG/DL (ref 5–25)
BUN SERPL-MCNC: 36 MG/DL (ref 5–25)
CALCIUM ALBUM COR SERPL-MCNC: 9.2 MG/DL (ref 8.3–10.1)
CALCIUM ALBUM COR SERPL-MCNC: 9.2 MG/DL (ref 8.3–10.1)
CALCIUM SERPL-MCNC: 8.5 MG/DL (ref 8.3–10.1)
CALCIUM SERPL-MCNC: 8.6 MG/DL (ref 8.3–10.1)
CARDIAC TROPONIN I PNL SERPL HS: 4 NG/L
CARDIAC TROPONIN I PNL SERPL HS: 6 NG/L
CHLORIDE SERPL-SCNC: 101 MMOL/L (ref 100–108)
CHLORIDE SERPL-SCNC: 101 MMOL/L (ref 100–108)
CK MB SERPL-MCNC: 1.1 % (ref 0–2.5)
CK MB SERPL-MCNC: 1.5 NG/ML (ref 0–5)
CK SERPL-CCNC: 140 U/L (ref 26–192)
CO2 SERPL-SCNC: 27 MMOL/L (ref 21–32)
CO2 SERPL-SCNC: 28 MMOL/L (ref 21–32)
CREAT SERPL-MCNC: 1.23 MG/DL (ref 0.6–1.3)
CREAT SERPL-MCNC: 1.26 MG/DL (ref 0.6–1.3)
CRP SERPL QL: 42.9 MG/L
CRP SERPL QL: 43.1 MG/L
EOSINOPHIL # BLD AUTO: 0 THOUSAND/ΜL (ref 0–0.61)
EOSINOPHIL NFR BLD AUTO: 0 % (ref 0–6)
ERYTHROCYTE [DISTWIDTH] IN BLOOD BY AUTOMATED COUNT: 13.9 % (ref 11.6–15.1)
ERYTHROCYTE [DISTWIDTH] IN BLOOD BY AUTOMATED COUNT: 13.9 % (ref 11.6–15.1)
GFR SERPL CREATININE-BSD FRML MDRD: 45 ML/MIN/1.73SQ M
GFR SERPL CREATININE-BSD FRML MDRD: 46 ML/MIN/1.73SQ M
GLUCOSE SERPL-MCNC: 155 MG/DL (ref 65–140)
GLUCOSE SERPL-MCNC: 195 MG/DL (ref 65–140)
GLUCOSE SERPL-MCNC: 198 MG/DL (ref 65–140)
GLUCOSE SERPL-MCNC: 218 MG/DL (ref 65–140)
GLUCOSE SERPL-MCNC: 370 MG/DL (ref 65–140)
HCT VFR BLD AUTO: 43 % (ref 34.8–46.1)
HCT VFR BLD AUTO: 43.5 % (ref 34.8–46.1)
HGB BLD-MCNC: 14.1 G/DL (ref 11.5–15.4)
HGB BLD-MCNC: 14.2 G/DL (ref 11.5–15.4)
IMM GRANULOCYTES # BLD AUTO: 0.02 THOUSAND/UL (ref 0–0.2)
IMM GRANULOCYTES NFR BLD AUTO: 0 % (ref 0–2)
LYMPHOCYTES # BLD AUTO: 1.02 THOUSANDS/ΜL (ref 0.6–4.47)
LYMPHOCYTES NFR BLD AUTO: 18 % (ref 14–44)
MCH RBC QN AUTO: 28.1 PG (ref 26.8–34.3)
MCH RBC QN AUTO: 28.2 PG (ref 26.8–34.3)
MCHC RBC AUTO-ENTMCNC: 32.6 G/DL (ref 31.4–37.4)
MCHC RBC AUTO-ENTMCNC: 32.8 G/DL (ref 31.4–37.4)
MCV RBC AUTO: 86 FL (ref 82–98)
MCV RBC AUTO: 86 FL (ref 82–98)
MONOCYTES # BLD AUTO: 0.31 THOUSAND/ΜL (ref 0.17–1.22)
MONOCYTES NFR BLD AUTO: 6 % (ref 4–12)
NEUTROPHILS # BLD AUTO: 4.29 THOUSANDS/ΜL (ref 1.85–7.62)
NEUTS SEG NFR BLD AUTO: 76 % (ref 43–75)
NRBC BLD AUTO-RTO: 0 /100 WBCS
NT-PROBNP SERPL-MCNC: 30 PG/ML
P AXIS: 42 DEGREES
PLATELET # BLD AUTO: 181 THOUSANDS/UL (ref 149–390)
PLATELET # BLD AUTO: 183 THOUSANDS/UL (ref 149–390)
PMV BLD AUTO: 10.1 FL (ref 8.9–12.7)
PMV BLD AUTO: 10.3 FL (ref 8.9–12.7)
POTASSIUM SERPL-SCNC: 4.7 MMOL/L (ref 3.5–5.3)
POTASSIUM SERPL-SCNC: 4.7 MMOL/L (ref 3.5–5.3)
PR INTERVAL: 146 MS
PROT SERPL-MCNC: 6.8 G/DL (ref 6.4–8.2)
PROT SERPL-MCNC: 7 G/DL (ref 6.4–8.2)
QRS AXIS: -78 DEGREES
QRSD INTERVAL: 90 MS
QT INTERVAL: 344 MS
QTC INTERVAL: 409 MS
RBC # BLD AUTO: 5.01 MILLION/UL (ref 3.81–5.12)
RBC # BLD AUTO: 5.04 MILLION/UL (ref 3.81–5.12)
SODIUM SERPL-SCNC: 136 MMOL/L (ref 136–145)
SODIUM SERPL-SCNC: 136 MMOL/L (ref 136–145)
T WAVE AXIS: 22 DEGREES
VENTRICULAR RATE: 85 BPM
WBC # BLD AUTO: 5.38 THOUSAND/UL (ref 4.31–10.16)
WBC # BLD AUTO: 5.65 THOUSAND/UL (ref 4.31–10.16)

## 2021-12-20 PROCEDURE — 80053 COMPREHEN METABOLIC PANEL: CPT | Performed by: FAMILY MEDICINE

## 2021-12-20 PROCEDURE — G1004 CDSM NDSC: HCPCS

## 2021-12-20 PROCEDURE — 86140 C-REACTIVE PROTEIN: CPT | Performed by: FAMILY MEDICINE

## 2021-12-20 PROCEDURE — 82948 REAGENT STRIP/BLOOD GLUCOSE: CPT

## 2021-12-20 PROCEDURE — 85027 COMPLETE CBC AUTOMATED: CPT | Performed by: FAMILY MEDICINE

## 2021-12-20 PROCEDURE — 97167 OT EVAL HIGH COMPLEX 60 MIN: CPT

## 2021-12-20 PROCEDURE — 99233 SBSQ HOSP IP/OBS HIGH 50: CPT | Performed by: INTERNAL MEDICINE

## 2021-12-20 PROCEDURE — 84484 ASSAY OF TROPONIN QUANT: CPT | Performed by: FAMILY MEDICINE

## 2021-12-20 PROCEDURE — 93010 ELECTROCARDIOGRAM REPORT: CPT | Performed by: INTERNAL MEDICINE

## 2021-12-20 PROCEDURE — 82550 ASSAY OF CK (CPK): CPT | Performed by: FAMILY MEDICINE

## 2021-12-20 PROCEDURE — 97163 PT EVAL HIGH COMPLEX 45 MIN: CPT

## 2021-12-20 PROCEDURE — 71275 CT ANGIOGRAPHY CHEST: CPT

## 2021-12-20 PROCEDURE — 84145 PROCALCITONIN (PCT): CPT | Performed by: INTERNAL MEDICINE

## 2021-12-20 PROCEDURE — 83880 ASSAY OF NATRIURETIC PEPTIDE: CPT | Performed by: FAMILY MEDICINE

## 2021-12-20 PROCEDURE — 82553 CREATINE MB FRACTION: CPT | Performed by: FAMILY MEDICINE

## 2021-12-20 PROCEDURE — 36415 COLL VENOUS BLD VENIPUNCTURE: CPT | Performed by: FAMILY MEDICINE

## 2021-12-20 PROCEDURE — 85025 COMPLETE CBC W/AUTO DIFF WBC: CPT | Performed by: FAMILY MEDICINE

## 2021-12-20 RX ADMIN — OXYCODONE HYDROCHLORIDE AND ACETAMINOPHEN 1000 MG: 500 TABLET ORAL at 08:20

## 2021-12-20 RX ADMIN — LISINOPRIL 10 MG: 10 TABLET ORAL at 08:20

## 2021-12-20 RX ADMIN — INSULIN LISPRO 8 UNITS: 100 INJECTION, SOLUTION INTRAVENOUS; SUBCUTANEOUS at 08:23

## 2021-12-20 RX ADMIN — ZINC SULFATE 220 MG (50 MG) CAPSULE 220 MG: CAPSULE at 08:20

## 2021-12-20 RX ADMIN — GABAPENTIN 600 MG: 300 CAPSULE ORAL at 08:20

## 2021-12-20 RX ADMIN — GABAPENTIN 600 MG: 300 CAPSULE ORAL at 17:49

## 2021-12-20 RX ADMIN — INSULIN ASPART 50 UNITS: 100 INJECTION, SUSPENSION SUBCUTANEOUS at 17:49

## 2021-12-20 RX ADMIN — MAGNESIUM OXIDE TAB 400 MG (241.3 MG ELEMENTAL MG) 400 MG: 400 (241.3 MG) TAB at 21:02

## 2021-12-20 RX ADMIN — GABAPENTIN 600 MG: 300 CAPSULE ORAL at 21:03

## 2021-12-20 RX ADMIN — ATORVASTATIN CALCIUM 40 MG: 40 TABLET, FILM COATED ORAL at 21:03

## 2021-12-20 RX ADMIN — REMDESIVIR 100 MG: 100 INJECTION, POWDER, LYOPHILIZED, FOR SOLUTION INTRAVENOUS at 21:02

## 2021-12-20 RX ADMIN — IOHEXOL 100 ML: 350 INJECTION, SOLUTION INTRAVENOUS at 11:16

## 2021-12-20 RX ADMIN — ENOXAPARIN SODIUM 60 MG: 60 INJECTION SUBCUTANEOUS at 21:02

## 2021-12-20 RX ADMIN — ENOXAPARIN SODIUM 60 MG: 60 INJECTION SUBCUTANEOUS at 08:21

## 2021-12-20 RX ADMIN — DEXAMETHASONE SODIUM PHOSPHATE 6 MG: 4 INJECTION, SOLUTION INTRA-ARTICULAR; INTRALESIONAL; INTRAMUSCULAR; INTRAVENOUS; SOFT TISSUE at 21:02

## 2021-12-20 RX ADMIN — Medication 1000 UNITS: at 08:20

## 2021-12-20 RX ADMIN — INSULIN LISPRO 4 UNITS: 100 INJECTION, SOLUTION INTRAVENOUS; SUBCUTANEOUS at 17:49

## 2021-12-20 RX ADMIN — OXYCODONE HYDROCHLORIDE AND ACETAMINOPHEN 1000 MG: 500 TABLET ORAL at 21:02

## 2021-12-20 RX ADMIN — AMLODIPINE BESYLATE 5 MG: 5 TABLET ORAL at 08:20

## 2021-12-20 RX ADMIN — FUROSEMIDE 20 MG: 40 TABLET ORAL at 08:20

## 2021-12-20 RX ADMIN — ASPIRIN 81 MG CHEWABLE TABLET 81 MG: 81 TABLET CHEWABLE at 08:20

## 2021-12-20 RX ADMIN — INSULIN ASPART 50 UNITS: 100 INJECTION, SUSPENSION SUBCUTANEOUS at 08:21

## 2021-12-20 RX ADMIN — MAGNESIUM OXIDE TAB 400 MG (241.3 MG ELEMENTAL MG) 400 MG: 400 (241.3 MG) TAB at 08:20

## 2021-12-20 RX ADMIN — INSULIN LISPRO 16 UNITS: 100 INJECTION, SOLUTION INTRAVENOUS; SUBCUTANEOUS at 12:21

## 2021-12-20 NOTE — PLAN OF CARE
Problem: PAIN - ADULT  Goal: Verbalizes/displays adequate comfort level or baseline comfort level  Description: Interventions:  - Encourage patient to monitor pain and request assistance  - Assess pain using appropriate pain scale  - Administer analgesics based on type and severity of pain and evaluate response  - Implement non-pharmacological measures as appropriate and evaluate response  - Consider cultural and social influences on pain and pain management  - Notify physician/advanced practitioner if interventions unsuccessful or patient reports new pain  Outcome: Progressing     Problem: INFECTION - ADULT  Goal: Absence or prevention of progression during hospitalization  Description: INTERVENTIONS:  - Assess and monitor for signs and symptoms of infection  - Monitor lab/diagnostic results  - Monitor all insertion sites, i e  indwelling lines, tubes, and drains  - Monitor endotracheal if appropriate and nasal secretions for changes in amount and color  - [place] appropriate cooling/warming therapies per order  - Administer medications as ordered  - Instruct and encourage patient and family to use good hand hygiene technique  - Identify and instruct in appropriate isolation precautions for identified infection/condition  Outcome: Progressing  Goal: Absence of fever/infection during neutropenic period  Description: INTERVENTIONS:  - Monitor WBC    Outcome: Progressing     Problem: SAFETY ADULT  Goal: Patient will remain free of falls  Description: INTERVENTIONS:  - Educate patient/family on patient safety including physical limitations  - Instruct patient to call for assistance with activity   - Consult OT/PT to assist with strengthening/mobility   - Keep Call bell within reach  - Keep bed low and locked with side rails adjusted as appropriate  - Keep care items and personal belongings within reach  - Initiate and maintain comfort rounds  - Make Fall Risk Sign visible to staff  - Offer Toileting every 2 Hours, in advance of need  - Initiate/Maintain bed alarm  - Obtain necessary fall risk management equipment:   - Apply yellow socks and bracelet for high fall risk patients  - Consider moving patient to room near nurses station  Outcome: Progressing  Goal: Maintain or return to baseline ADL function  Description: INTERVENTIONS:  -  Assess patient's ability to carry out ADLs; assess patient's baseline for ADL function and identify physical deficits which impact ability to perform ADLs (bathing, care of mouth/teeth, toileting, grooming, dressing, etc )  - Assess/evaluate cause of self-care deficits   - Assess range of motion  - Assess patient's mobility; develop plan if impaired  - Assess patient's need for assistive devices and provide as appropriate  - Encourage maximum independence but intervene and supervise when necessary  - Involve family in performance of ADLs  - Assess for home care needs following discharge   - Consider OT consult to assist with ADL evaluation and planning for discharge  - Provide patient education as appropriate  Outcome: Progressing  Goal: Maintains/Returns to pre admission functional level  Description: INTERVENTIONS:  - Perform BMAT or MOVE assessment daily    - Set and communicate daily mobility goal to care team and patient/family/caregiver  - Collaborate with rehabilitation services on mobility goals if consulted  - Perform Range of Motion prn times a day  - Reposition patient every 2 hours    - Dangle patient prn times a day  - Stand patient prn times a day  - Ambulate patient prn times a day  - Out of bed to chair prn times a day   - Out of bed for meals 3 times a day  - Out of bed for toileting  - Record patient progress and toleration of activity level   Outcome: Progressing     Problem: DISCHARGE PLANNING  Goal: Discharge to home or other facility with appropriate resources  Description: INTERVENTIONS:  - Identify barriers to discharge w/patient and caregiver  - Arrange for needed discharge resources and transportation as appropriate  - Identify discharge learning needs (meds, wound care, etc )  - Arrange for interpretive services to assist at discharge as needed  - Refer to Case Management Department for coordinating discharge planning if the patient needs post-hospital services based on physician/advanced practitioner order or complex needs related to functional status, cognitive ability, or social support system  Outcome: Progressing     Problem: Knowledge Deficit  Goal: Patient/family/caregiver demonstrates understanding of disease process, treatment plan, medications, and discharge instructions  Description: Complete learning assessment and assess knowledge base  Interventions:  - Provide teaching at level of understanding  - Provide teaching via preferred learning methods  Outcome: Progressing     Problem: NEUROSENSORY - ADULT  Goal: Achieves stable or improved neurological status  Description: INTERVENTIONS  - Monitor and report changes in neurological status  - Monitor vital signs such as temperature, blood pressure, glucose, and any other labs ordered   - Initiate measures to prevent increased intracranial pressure  - Monitor for seizure activity and implement precautions if appropriate      Outcome: Progressing  Goal: Achieves maximal functionality and self care  Description: INTERVENTIONS  - Monitor swallowing and airway patency with patient fatigue and changes in neurological status  - Encourage and assist patient to increase activity and self care     - Encourage visually impaired, hearing impaired and aphasic patients to use assistive/communication devices  Outcome: Progressing     Problem: CARDIOVASCULAR - ADULT  Goal: Maintains optimal cardiac output and hemodynamic stability  Description: INTERVENTIONS:  - Monitor I/O, vital signs and rhythm  - Monitor for S/S and trends of decreased cardiac output  - Administer and titrate ordered vasoactive medications to optimize hemodynamic stability  - Assess quality of pulses, skin color and temperature  - Assess for signs of decreased coronary artery perfusion  - Instruct patient to report change in severity of symptoms  Outcome: Progressing  Goal: Absence of cardiac dysrhythmias or at baseline rhythm  Description: INTERVENTIONS:  - Continuous cardiac monitoring, vital signs, obtain 12 lead EKG if ordered  - Administer antiarrhythmic and heart rate control medications as ordered  - Monitor electrolytes and administer replacement therapy as ordered  Outcome: Progressing     Problem: RESPIRATORY - ADULT  Goal: Achieves optimal ventilation and oxygenation  Description: INTERVENTIONS:  - Assess for changes in respiratory status  - Assess for changes in mentation and behavior  - Position to facilitate oxygenation and minimize respiratory effort  - Oxygen administered by appropriate delivery if ordered  - Initiate smoking cessation education as indicated  - Encourage broncho-pulmonary hygiene including cough, deep breathe, Incentive Spirometry  - Assess the need for suctioning and aspirate as needed  - Assess and instruct to report SOB or any respiratory difficulty  - Respiratory Therapy support as indicated  Outcome: Progressing     Problem: GASTROINTESTINAL - ADULT  Goal: Minimal or absence of nausea and/or vomiting  Description: INTERVENTIONS:  - Administer IV fluids if ordered to ensure adequate hydration  - Maintain NPO status until nausea and vomiting are resolved  - Nasogastric tube if ordered  - Administer ordered antiemetic medications as needed  - Provide nonpharmacologic comfort measures as appropriate  - Advance diet as tolerated, if ordered  - Consider nutrition services referral to assist patient with adequate nutrition and appropriate food choices  Outcome: Progressing  Goal: Maintains or returns to baseline bowel function  Description: INTERVENTIONS:  - Assess bowel function  - Encourage oral fluids to ensure adequate hydration  - Administer IV fluids if ordered to ensure adequate hydration  - Administer ordered medications as needed  - Encourage mobilization and activity  - Consider nutritional services referral to assist patient with adequate nutrition and appropriate food choices  Outcome: Progressing  Goal: Maintains adequate nutritional intake  Description: INTERVENTIONS:  - Monitor percentage of each meal consumed  - Identify factors contributing to decreased intake, treat as appropriate  - Assist with meals as needed  - Monitor I&O, weight, and lab values if indicated  - Obtain nutrition services referral as needed  Outcome: Progressing  Goal: Oral mucous membranes remain intact  Description: INTERVENTIONS  - Assess oral mucosa and hygiene practices  - Implement preventative oral hygiene regimen  - Implement oral medicated treatments as ordered  - Initiate Nutrition services referral as needed  Outcome: Progressing     Problem: GENITOURINARY - ADULT  Goal: Maintains or returns to baseline urinary function  Description: INTERVENTIONS:  - Assess urinary function  - Encourage oral fluids to ensure adequate hydration if ordered  - Administer IV fluids as ordered to ensure adequate hydration  - Administer ordered medications as needed  - Offer frequent toileting  - Follow urinary retention protocol if ordered  Outcome: Progressing  Goal: Absence of urinary retention  Description: INTERVENTIONS:  - Assess patients ability to void and empty bladder  - Monitor I/O  - Bladder scan as needed  - Discuss with physician/AP medications to alleviate retention as needed  - Discuss catheterization for long term situations as appropriate  Outcome: Progressing     Problem: METABOLIC, FLUID AND ELECTROLYTES - ADULT  Goal: Electrolytes maintained within normal limits  Description: INTERVENTIONS:  - Monitor labs and assess patient for signs and symptoms of electrolyte imbalances  - Administer electrolyte replacement as ordered  - Monitor response to electrolyte replacements, including repeat lab results as appropriate  - Instruct patient on fluid and nutrition as appropriate  Outcome: Progressing  Goal: Fluid balance maintained  Description: INTERVENTIONS:  - Monitor labs   - Monitor I/O and WT  - Instruct patient on fluid and nutrition as appropriate  - Assess for signs & symptoms of volume excess or deficit  Outcome: Progressing  Goal: Glucose maintained within target range  Description: INTERVENTIONS:  - Monitor Blood Glucose as ordered  - Assess for signs and symptoms of hyperglycemia and hypoglycemia  - Administer ordered medications to maintain glucose within target range  - Assess nutritional intake and initiate nutrition service referral as needed  Outcome: Progressing     Problem: SKIN/TISSUE INTEGRITY - ADULT  Goal: Skin Integrity remains intact(Skin Breakdown Prevention)  Description: Assess:  -Perform Denis assessment every shift   -Clean and moisturize skin every   -Inspect skin when repositioning, toileting, and assisting with ADLS  -Assess under medical devices such as SCD's  every shift  -Assess extremities for adequate circulation and sensation     Bed Management:  -Have minimal linens on bed & keep smooth, unwrinkled  -Change linens as needed when moist or perspiring  -Avoid sitting or lying in one position for more than 2 hours while in bed  -Keep HOB at 30 degrees     Toileting:  -Offer bedside commode  -Assess for incontinence every 2 hrs  -Use incontinent care products after each incontinent episode such as     Activity:  -Mobilize patient prn times a day  -Encourage activity and walks on unit  -Encourage or provide ROM exercises   -Turn and reposition patient every 2 Hours  -Use appropriate equipment to lift or move patient in bed  -Instruct/ Assist with weight shifting every 2hrs when out of bed in chair  -Consider limitation of chair time 2 hour intervals    Skin Care:  -Avoid use of baby powder, tape, friction and shearing, hot water or constrictive clothing  -Relieve pressure over bony prominences using   -Do not massage red bony areas    Next Steps:  -Teach patient strategies to minimize risks such as    -Consider consults to  interdisciplinary teams such as   Outcome: Progressing     Problem: HEMATOLOGIC - ADULT  Goal: Maintains hematologic stability  Description: INTERVENTIONS  - Assess for signs and symptoms of bleeding or hemorrhage  - Monitor labs  - Administer supportive blood products/factors as ordered and appropriate  Outcome: Progressing     Problem: MUSCULOSKELETAL - ADULT  Goal: Maintain or return mobility to safest level of function  Description: INTERVENTIONS:  - Assess patient's ability to carry out ADLs; assess patient's baseline for ADL function and identify physical deficits which impact ability to perform ADLs (bathing, care of mouth/teeth, toileting, grooming, dressing, etc )  - Assess/evaluate cause of self-care deficits   - Assess range of motion  - Assess patient's mobility  - Assess patient's need for assistive devices and provide as appropriate  - Encourage maximum independence but intervene and supervise when necessary  - Involve family in performance of ADLs  - Assess for home care needs following discharge   - Consider OT consult to assist with ADL evaluation and planning for discharge  - Provide patient education as appropriate  Outcome: Progressing  Goal: Maintain proper alignment of affected body part  Description: INTERVENTIONS:  - Support, maintain and protect limb and body alignment  - Provide patient/ family with appropriate education  Outcome: Progressing     Problem: Potential for Falls  Goal: Patient will remain free of falls  Description: INTERVENTIONS:  - Educate patient/family on patient safety including physical limitations  - Instruct patient to call for assistance with activity   - Consult OT/PT to assist with strengthening/mobility   - Keep Call bell within reach  - Keep bed low and locked with side rails adjusted as appropriate  - Keep care items and personal belongings within reach  - Initiate and maintain comfort rounds  - Make Fall Risk Sign visible to staff  - Offer Toileting every 2 Hours, in advance of need  - Initiate/Maintain bed alarm  - Obtain necessary fall risk management equipment:   - Apply yellow socks and bracelet for high fall risk patients  - Consider moving patient to room near nurses station  Outcome: Progressing

## 2021-12-20 NOTE — OCCUPATIONAL THERAPY NOTE
Occupational Therapy Evaluation     Patient Name: Ival Krabbe PUCDE'T Date: 2021  Problem List  Active Problems:    Essential hypertension    Stage 3 chronic kidney disease ([facility] [address] )    Morbid obesity with BMI of 50 0-59 9, adult ([address] )    Hyperlipidemia associated with type 2 diabetes mellitus ([facility] [address] )    Acute hypoxemic respiratory failure due to COVID-19 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     Neuropathy     Obesity     PCOS (polycystic ovarian syndrome)      Past Surgical History  Past Surgical History:   Procedure Laterality Date    CERVICAL CONIZATION   W/ LASER       SECTION      DILATION AND CURETTAGE OF UTERUS      ENDOMETRIAL ABLATION      ENDOMETRIAL BIOPSY      EPIDURAL BLOCK INJECTION Bilateral 2019    Procedure: L5-S1 transforaminal epidural steroid injection;  Surgeon: César Villavicencio MD;  Location: [facility] MAIN OR;  Service: Pain Management     EPIDURAL BLOCK INJECTION Bilateral 2019    Procedure: L5-S1 transforaminal epidural steroid injection;  Surgeon: César Villavicencio MD;  Location: [facility] MA[facility];  Service: Pain Management     FL GUIDED NEEDLE PLAC BX/ASP/INJ  2019    FL GUIDED NEEDLE PLAC BX/ASP/INJ  2019    FOOT SURGERY Left     HERNIA REPAIR      INDUCED       IR PICC LINE  2019    JOINT REPLACEMENT      KNEE ARTHROPLASTY Right     MA DEBRIDEMENT OPEN WOUND 20 SQ CM< Right 2018    Procedure: DEBRIDEMENT HAND/FINGER [facility] OUT); Surgeon: Cameron Braun MD;  Location: [facility] MAIN OR;  Service: General    WA EXCISION TUMOR SOFT TISSUE BACK/FLANK SUBQ 3+CM N/A 12/4/2018    Procedure: BACK LIPOMA EXCISION;  Surgeon: Cameron Braun MD;  Location: [facility] MAIN OR;  Service: General    ROTATOR CUFF REPAIR Right     TONSILLECTOMY               12/20/21 0946   OT Last Visit   OT Visit Date 12/20/21   Note Type   Note type Evaluation   Restrictions/Precautions   Weight Bearing Precautions Per Order No   Other Precautions Chair Alarm;Multiple lines;Telemetry; Fall Risk   Pain Assessment   Pain Assessment Tool 0-10   Pain Score 8   Pain Location/Orientation Location: Head   Home Living   Type of Home House   Home Layout Multi-level;Performs ADLs on one level; Able to live on main level with bedroom/bathroom; Other (Comment)  (FOS to 2nd c HR)   Bathroom Shower/Tub Tub/shower unit   Bathroom Toilet Standard   Bathroom Equipment Shower chair   Bathroom Accessibility Accessible   Home Equipment Cane;Walker   Additional Comments pt performs functional mobility with RW "at times"    Prior Function   Level of [place] Independent with ADLs and functional mobility   Lives With Spouse   ADL Assistance Independent   IADLs Independent   Falls in the last 6 months 0   Vocational Retired   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    Psychosocial   Psychosocial (WDL) X   Patient Behaviors/Mood Anxious   Subjective   Subjective "I want to take a shower but they won't let me"   ADL   Where Assessed Edge of bed   LB Dressing Assistance 4  Minimal Assistance   LB Dressing Deficit Don/doff R sock   Toileting Assistance  4  Minimal Assistance   Toileting Deficit Perineal hygiene   Additional Comments pt with difficulties donning R sock seated EOB and stated "I can't because of this hip"   Bed Mobility   Supine to Sit 5  Supervision   Additional items Increased time required   Sit to Supine   (seated in chair at end of session)   Additional Comments pt on 2L O2 during session; SpO2 >90% with mild SOB and multiple coughing fits   Transfers   Sit to Stand 5  Supervision   Additional items Increased time required;Verbal cues  (RW)   Stand to Sit 5  Supervision   Additional items Increased time required;Verbal cues  (RW)   Additional Comments pt performed functional transfers with (S) level with no significant LOB nor instability, however just cautious mobility   Functional Mobility   Functional Mobility 5  Supervision   Additional Comments pt performed functional mobility in room ~40ft with no significant LOB, however poor endurance at this time    Additional items Rolling walker   Balance   Static Sitting Fair +   Dynamic Sitting Fair +   Static Standing Fair   Dynamic Standing Fair   Ambulatory Fair -   Activity Tolerance   Activity Tolerance Patient limited by pain; Patient limited by fatigue   RUE Assessment   RUE Assessment WFL   LUE Assessment   LUE Assessment WFL   Hand Function   Gross Motor Coordination Functional   Fine Motor Coordination Functional   Sensation   Light Touch No apparent deficits   Sharp/Dull No apparent deficits   Cognition   Overall Cognitive Status WFL   Arousal/Participation Alert   Attention Within functional limits   Orientation Level Oriented X4   Memory Within functional limits   Following Commands Follows all commands and directions without difficulty   Assessment   Limitation Decreased ADL status; Decreased UE strength;Decreased Safe judgement during ADL;Decreased endurance;Decreased self-care trans;Decreased high-level ADLs   Assessment Pt is a 59 y o  female seen for OT evaluation s/p admit to [facility] on [facility]/18/2021 w/ <principal problem not specified>  Comorbidities affecting pt's functional performance at time of assessment include: DM, PCOS, chronic pain, hypothermia, chronic pain, neuropathy, asthma, GERD, obesity   Personal factors affecting pt at time of IE include:steps to enter environment, behavioral pattern, difficulty performing ADLS, difficulty performing IADLS , limited insight into deficits, compliance, decreased initiation and engagement  and health management   Prior to admission, pt was (I) with ADLs and IADLs with use of RW during mobility "at times"  Upon evaluation: Pt requires (S)-min (A) level with use of RW during mobility 2* the following deficits impacting occupational performance: weakness, decreased strength, decreased balance, decreased tolerance, impaired initiation, impaired problem solving, decreased safety awareness and impaired interpersonal skills  Pt to benefit from continued skilled OT tx while in the hospital to address deficits as defined above and maximize level of functional independence w ADL's and functional mobility  Occupational Performance areas to address include: grooming, bathing/shower, toilet hygiene, dressing, functional mobility, community mobility and clothing management  The patient's raw score on the AM-PAC Daily Activity inpatient short form is 21, standardized score is 44 27, greater than 39 4  Patients at this level are likely to benefit from discharge to home  Please refer to the recommendation of the Occupational Therapist for safe discharge planning  Co treatment with PT secondary to complex medical condition of pt, possible A of 2 required to achieve and maintain transitional movements, requiring the need of skilled therapeutic intervention of 2 therapists to achieve delivery of services  Goals   Patient Goals to go home   Short Term Goal  pt will perform UE strengthening exercises    Long Term Goal #1 pt will perform functional mobility with RW at mod (I) level    Long Term Goal #2 pt will demonstrate UB/LB bathing and grooming tasks at (I) level    Long Term Goal pt will demonstrate toilet transfers and hygiene at (I) level    Plan   Treatment Interventions ADL retraining;Functional transfer training;UE strengthening/ROM; Endurance training;Patient/family training; Activityengagement   Goal Expiration Date 01/03/22   OT Frequency 3-5x/wk   Recommendation   OT Discharge Recommendation Home with home health rehabilitation   AM-PAC Daily Activity Inpatient   Lower Body Dressing 3   Bathing 3   Toileting 3   Upper Body Dressing 4   Grooming 4   Eating 4   Daily Activity Raw Score 21   Daily Activity Standardized Score (Calc for Raw Score >=11) 44 27   AM-PAC Applied Cognition Inpatient   Following a Speech/Presentation 3   Understanding Ordinary Conversation 3   Taking Medications 3   Remembering Where Things Are Placed or Put Away 4   Remembering List of 4-5 Errands 4   Taking Care of Complicated Tasks 4   Applied Cognition Raw Score 21   Applied Cognition Standardized Score 44 3     Pt will benefit from continued OT services in order to maximize (I) c ADL performance, FM c RW, and improve overall endurance/strength required to complete functional tasks in preparation for d/c  Pt left seated in chair at end of session; all needs within reach; all lines intact

## 2021-12-20 NOTE — ASSESSMENT & PLAN NOTE
Lab Results   Component Value Date    HGBA1C 11 1 (A) 10/22/2021       Recent Labs     12/19/21  2133 12/20/21  0738 12/20/21  1145 12/20/21  1708   POCGLU 111 218* 370* 155*       Blood Sugar Average: Last 72 hrs:  (P) 258 125   Poorly controlled    Continue 70/30 at 50 units BID along ISS coverage  Diabetic diet

## 2021-12-20 NOTE — PHYSICAL THERAPY NOTE
Physical Therapy Evaluation    Patient Name: Otoniel Hughes    SDIRZ'K Date: 2021     Problem List  Active Problems:    Essential hypertension    Stage 3 chronic kidney disease ([facility] [address] )    Morbid obesity with BMI of 50 0-59 9, adult ([facility] )    Hyperlipidemia associated with type 2 diabetes mellitus ([facility] [address] )    Acute hypoxemic respiratory failure due to COVID-19 [facility])       Past Medical History  Past Medical History:   Diagnosis Date    Anesthesia related hyperthermia     pt states she had high fevers after her lipoma surgery in  at our hospital and was shipped to [facility] where they said t was from an anesthesia med    Anxiety     Arthritis     Asthma     Chronic pain     Depression     Diabetes mellitus (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     Neuropathy     Obesity     PCOS (polycystic ovarian syndrome)         Past Surgical History  Past Surgical History:   Procedure Laterality Date    CERVICAL CONIZATION   W/ LASER       SECTION      DILATION AND CURETTAGE OF UTERUS      ENDOMETRIAL ABLATION      ENDOMETRIAL BIOPSY      EPIDURAL BLOCK INJECTION Bilateral 2019    Procedure: L5-S1 transforaminal epidural steroid injection;  Surgeon: Elyse Street MD;  Location: [facility] MAIN [facility];  Service: Pain Management     EPIDURAL BLOCK INJECTION Bilateral 2019    Procedure: L5-S1 transforaminal epidural steroid injection;  Surgeon: Elyse Street MD;  Location: [facility];  Service: Pain Management     FL GUIDED NEEDLE PLAC BX/ASP/INJ  2019    FL GUIDED NEEDLE PLAC BX/ASP/INJ  2019    FOOT SURGERY Left     HERNIA REPAIR      INDUCED       IR PICC LINE  2019    JOINT REPLACEMENT      KNEE ARTHROPLASTY Right     NM DEBRIDEMENT OPEN WOUND 20 SQ CM< Right 12/4/2018    Procedure: DEBRIDEMENT HAND/FINGER [facility] OUT); Surgeon: Shad Rogers MD;  Location: [facility] MAIN OR;  Service: General    IL EXCISION TUMOR SOFT TISSUE BACK/FLANK SUBQ 3+CM N/A 12/4/2018    Procedure: BACK LIPOMA EXCISION;  Surgeon: Shad Rogers MD;  Location: [facility] MAIN OR;  Service: General    ROTATOR CUFF REPAIR Right     TONSILLECTOMY             12/20/21 0947   PT Last Visit   PT Visit Date 12/20/21   Note Type   Note type Evaluation   Pain Assessment   Pain Assessment Tool 0-10   Pain Score No Pain   Restrictions/Precautions   Weight Bearing Precautions Per Order No   Other Precautions Fall Risk;O2;Multiple lines; Bed Alarm; Chair Alarm   Home Living   Type of [address] [place] [address] Multi-level;Performs ADLs on one level; Able to live on main level with bedroom/bathroom;Stairs to enter with rails  (1-2 ERIKA)   Bathroom Shower/Tub Tub/shower unit   Bathroom Toilet Standard   Bathroom Equipment Shower chair   Bathroom Accessibility Accessible   Home Equipment Walker;Cane   Additional Comments pt reports recent use of RW   Prior Function   Level of [place] Independent with ADLs and functional mobility   Lives With Family   ADL Assistance Independent   IADLs Independent   Comments (+) driving   General   Family/Caregiver Present No   Cognition   Overall Cognitive Status WFL   Arousal/Participation Alert   Orientation Level Oriented X4   Following Commands Follows one step commands without difficulty   Subjective   Subjective "I can't reach the right one because of my hip"   RLE Assessment   RLE Assessment X  (4-/5 grossly)   LLE Assessment   LLE Assessment X  (4-/5 grossly)   Bed Mobility   Supine to Sit 5  Supervision   Additional items Increased time required;Verbal cues; Bedrails   Sit to Supine   (pt OOB at end of session)   Transfers   Sit to Stand 5  Supervision   Additional items Increased time required;Verbal cues   Stand to Sit 5  Supervision   Additional items Increased time required;Verbal cues   Additional Comments RW used   Ambulation/Elevation   Gait pattern Excessively slow; Short stride; Foward flexed; Improper Weight shift   Gait Assistance 5  Supervision   Additional items Verbal cues   Assistive Device Rolling walker   Distance 30'   Balance   Static Sitting Good   Dynamic Sitting Fair +   Static Standing Fair -   Dynamic Standing [address] -  (with RW)   Endurance Deficit   Endurance Deficit Yes   Endurance Deficit Description pt extremely fatigued at baseline; exacerbated with mobility   Activity Tolerance   Activity Tolerance Patient limited by fatigue   Assessment   Prognosis Guarded   Problem List Decreased strength;Decreased endurance; Impaired balance;Decreased mobility   Assessment Patient is a 59 y o  female evaluated by Physical Therapy s/p admit to [facility] on 12/18/2021 with admitting diagnosis of: Cough, Hypoxia, COVID-19  PT was consulted to assess patient's functional mobility and discharge needs  Ordered are PT Evaluation and treatment with activity level of: ambulate patient  Comorbidities affecting patient's physical performance at time of assessment include: DM, PCOS, neuropathy, chronic pain, arthritis, GERD, asthma  Personal factors affecting the patient at time of IE include: lives in 2 story home, ambulating with assistive device, step(s) to enter home, inability to navigate community distances, decreased initiation and engagement, inability/difficulty performing IADLs and inability/difficulty performing ADLs  Please locate objective findings from PT assessment regarding body systems outlined above  Upon evaluation, pt able to perform all functional mobility with SUP, RW, and increased time  Occasional verbal cuing for safety awareness and sequencing; encouragement provided to participate  Seated rest break taken following 30' of ambulation d/t fatigue and SOB   No true LOB experienced; HR and SpO2 remained WFL on 3L O2 throughout  Pt likely able to perform more mobility but appears self limiting at this time; perseverating on coughing fits and wanting to shower  The patient's AM-[facility] Basic Mobility Inpatient Short Form Raw Score is 17  A Raw score of greater than 16 suggests the patient may benefit from discharge to home  Please also refer to the recommendation of the Physical Therapist for safe discharge planning  Co treatment with OT secondary to complex medical condition of pt, possible A of 2 required to achieve and maintain transitional movements, requiring the need of skilled therapeutic intervention of 2 therapists to achieve delivery of services  Pt will benefit from continued PT intervention during LOS to address current deficits, increase LOF, and facilitate safe d/c to next level of care when medically appropriate  D/c recommendation at this time is home with home health rehabilitation  Goals   Patient Goals to shower   Short Term Goal #1 Pt will participate in B LE strengthening exercises to facilitate improved functional mobility  Short Term Goal #2 Pt will perform all functional transfers and bed mobility mod(I) with good safety awareness  LTG Expiration Date 01/03/22   Long Term Goal #1 Pt will ambulate 200' with LRAD and SUP while maintaining good functional dynamic balance  Long Term Goal #2 Pt will ascend/descend 2 stairs with HR and SUP to reflect the ability to safely enter her home  Plan   Treatment/Interventions Functional transfer training;Elevations;LE strengthening/ROM; Therapeutic exercise; Endurance training;Bed mobility;Gait training   PT Frequency 3-5x/wk   Recommendation   PT Discharge Recommendation Home with home health rehabilitation   AM-PAC Basic Mobility Inpatient   Turning in Bed Without Bedrails 3   Lying on Back to Sitting on Edge of Flat Bed 3   Moving Bed to Chair 3   Standing Up From Chair 3   Walk in Room 3   Climb 3-5 Stairs 2   Basic Mobility Inpatient Raw Score 17   Basic Mobility Standardized Score 39 67   Highest Level Of Mobility   -[facility] Goal 5: Stand one or more mins   -[facility] Highest Level of Mobility 7: Walk 25 feet or more   -[facility] Goal Achieved Yes   End of Consult   Patient Position at End of Consult Bedside chair;Bed/Chair alarm activated; All needs within reach

## 2021-12-20 NOTE — PLAN OF CARE
Problem: OCCUPATIONAL THERAPY ADULT  Goal: Performs self-care activities at highest level of function for planned discharge setting  See evaluation for individualized goals  Description: Treatment Interventions: ADL retraining,Functional transfer training,UE strengthening/ROM,Endurance training,Patient/family training,Activityengagement          See flowsheet documentation for full assessment, interventions and recommendations  Note: Limitation: Decreased ADL status,Decreased UE strength,Decreased Safe judgement during ADL,Decreased endurance,Decreased self-care trans,Decreased high-level ADLs     Assessment: Pt is a 59 y o  female seen for OT evaluation s/p admit to [facility] on 12/18/2021 w/ <principal problem not specified>  Comorbidities affecting pt's functional performance at time of assessment include: DM, PCOS, chronic pain, hypothermia, chronic pain, neuropathy, asthma, GERD, obesity  Personal factors affecting pt at time of IE include:steps to enter environment, behavioral pattern, difficulty performing ADLS, difficulty performing IADLS , limited insight into deficits, compliance, decreased initiation and engagement  and health management   Prior to admission, pt was (I) with ADLs and IADLs with use of RW during mobility "at times"  Upon evaluation: Pt requires (S)-min (A) level with use of RW during mobility 2* the following deficits impacting occupational performance: weakness, decreased strength, decreased balance, decreased tolerance, impaired initiation, impaired problem solving, decreased safety awareness and impaired interpersonal skills  Pt to benefit from continued skilled OT tx while in the hospital to address deficits as defined above and maximize level of functional independence w ADL's and functional mobility  Occupational Performance areas to address include: grooming, bathing/shower, toilet hygiene, dressing, functional mobility, community mobility and clothing management   The patient's raw score on the AM-PAC Daily Activity inpatient short form is 21, standardized score is 44 27, greater than 39 4  Patients at this level are likely to benefit from discharge to home  Please refer to the recommendation of the Occupational Therapist for safe discharge planning  Co treatment with PT secondary to complex medical condition of pt, possible A of 2 required to achieve and maintain transitional movements, requiring the need of skilled therapeutic intervention of 2 therapists to achieve delivery of services        OT Discharge Recommendation: Home with home health rehabilitation
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
## 2021-12-20 NOTE — ASSESSMENT & PLAN NOTE
Lab Results   Component Value Date    EGFR 45 12/20/2021    EGFR 46 12/20/2021    EGFR 46 12/18/2021    CREATININE 1 26 12/20/2021    CREATININE 1 23 12/20/2021    CREATININE 1 24 12/18/2021     Baseline creatinine of 1 2  Renal function appears stable at baseline

## 2021-12-20 NOTE — UTILIZATION REVIEW
Continued Stay Review    Date:   12-20-21              Current Patient Class:  Inpatient  Current Level of Care: med surg    HPI:61 y o  female initially admitted on 12-18-21     Assessment/Plan:     Temp 99 7%  Continue iv remdesivir, sq lovenox, iv dexamethasone  Continue to require 3L:O2nc to maintain O2 sats 91%           Vital Signs:       Date/  Time Temp Pulse Resp BP MAP  SpO2 Nasal Cannula O2 Flow Rate  O2 Device   12/20/21 0825 -- -- -- -- -- -- 3 L/min Nasal cannula   12/20/21 07:40:27 99 7 °F (37 6 °C) 68 18 116/52 73 91 % -- --   12/19/21 21:41:24 100 5 °F (38 1 °C) 76 22 101/47   Abnormal  65 92 % 3 L/min Nasal cannula   12/19/21 15:57:16 100 7 °F (38 2 °C)   Abnormal  72 -- 95/58 70 93 % -- --   12/19/21 0842 98 2 °F (36 8 °C) 72 20 124/76 -- 95 % 2 L/min Nasal cannula   12/19/21 0259 -- -- -- -- -- -- -- Nasal cannula   12/19/21 0006 -- -- -- -- -- 99 % 2 L/min Nasal cannula           Pertinent Labs/Diagnostic Results:   Results from last 7 days   Lab Units 12/18/21  1435   SARS-COV-2  Positive*     Results from last 7 days   Lab Units 12/20/21  0546 12/18/21  1502   WBC Thousand/uL 5 65  5 38 7 20   HEMOGLOBIN g/dL 14 2  14 1 13 9   HEMATOCRIT % 43 5  43 0 42 4   PLATELETS Thousands/uL 183  181 166   NEUTROS ABS Thousands/µL 4 29 5 67         Results from last 7 days   Lab Units 12/20/21  0546 12/18/21  1502   SODIUM mmol/L 136  136 135*   POTASSIUM mmol/L 4 7  4 7 3 9   CHLORIDE mmol/L 101  101 101   CO2 mmol/L 27  28 25   ANION GAP mmol/L 8  7 9   BUN mg/dL 35*  36* 24   CREATININE mg/dL 1 23  1 26 1 24   EGFR ml/min/1 73sq m 46  45 46   CALCIUM mg/dL 8 5  8 6 8 5     Results from last 7 days   Lab Units 12/20/21  0546 12/18/21  1502   AST U/L 40  42 52*   ALT U/L 59  58 78   ALK PHOS U/L 69  71 75   TOTAL PROTEIN g/dL 6 8  7 0 7 0   ALBUMIN g/dL 3 1*  3 2* 3 4*   TOTAL BILIRUBIN mg/dL 0 36  0 34 0 36     Results from last 7 days   Lab Units 12/20/21  1145 12/20/21  8318 12/19/21  2133 12/19/21  1632 12/19/21  1310 12/19/21  1109 12/19/21  0839   POC GLUCOSE mg/dl 370* 218* 111 219* 315* 360* 317*     Results from last 7 days   Lab Units 12/20/21  0546 12/18/21  1502   GLUCOSE RANDOM mg/dL 195*  198* 86             Results from last 7 days   Lab Units 12/20/21  0546   CK TOTAL U/L 140   CK MB INDEX % 1 1   CK MB ng/mL 1 5     Results from last 7 days   Lab Units 12/20/21  0759 12/20/21  0546 12/18/21  1502   HS TNI 0HR ng/L  --  6 7   HS TNI 2HR ng/L 4  --   --    HSTNI D2 ng/L -2  --   --      Results from last 7 days   Lab Units 12/18/21  1631   D-DIMER QUANTITATIVE ug/ml FEU 0 59*       Results from last 7 days   Lab Units 12/18/21  1630   LACTIC ACID mmol/L 1 6         Results from last 7 days   Lab Units 12/20/21  0546   NT-PRO BNP pg/mL 30     Results from last 7 days   Lab Units 12/20/21  0546   CRP mg/L 43 1*  42 9*       Results from last 7 days   Lab Units 12/18/21  1435   INFLUENZA A PCR  Negative   INFLUENZA B PCR  Negative   RSV PCR  Negative       Results from last 7 days   Lab Units 12/18/21  1502   BLOOD CULTURE  No Growth at 24 hrs  No Growth at 24 hrs  Scheduled Medications:  amLODIPine, 5 mg, Oral, Daily  ascorbic acid, 1,000 mg, Oral, BID  aspirin, 81 mg, Oral, Daily  atorvastatin, 40 mg, Oral, Daily With Dinner  cholecalciferol, 1,000 Units, Oral, Daily  dexamethasone, 6 mg, Intravenous, Q24H  enoxaparin, 60 mg, Subcutaneous, Q12H FORTINO  furosemide, 20 mg, Oral, Daily  gabapentin, 600 mg, Oral, TID  insulin aspart protamine-insulin aspart, 50 Units, Subcutaneous, BID AC  insulin lispro, 4-20 Units, Subcutaneous, TID AC  lisinopril, 10 mg, Oral, Daily  magnesium oxide, 400 mg, Oral, BID  remdesivir, 100 mg, Intravenous, Q24H  zinc sulfate, 220 mg, Oral, Daily      Continuous IV Infusions:     PRN Meds:  acetaminophen, 650 mg, Oral, Q6H PRN  albuterol, 2 puff, Inhalation, Q4H PRN        Discharge Plan:   To be determined     Network Utilization Review Department  ATTENTION: Please call with any questions or concerns to [phone] and carefully listen to the prompts so that you are directed to the right person  All voicemails are confidential   Ivette Morales all requests for admission clinical reviews, approved or denied determinations and any other requests to dedicated fax number below belonging to the campus where the patient is receiving treatment   List of dedicated fax numbers for the Facilities:  [address] DENIALS (Administrative/Medical Necessity) [phone]   [address] (Maternity/NICU/Pediatrics) [phone]   [address]  [address] [facility] [place] [address] [address] [place] [address] [address] [facility] [place] [address] [phone]

## 2021-12-20 NOTE — PROGRESS NOTES
[address] [facility] [address] [place]  Progress Note - Betyt Cerna 1960, 59 y o  female MRN: [id number]  Unit/Bed#: 423-01 Encounter: [id number]  Primary Care Provider: Steph Chris DO   Date and time admitted to hospital: 12/18/2021  1:11 PM    * Acute hypoxemic respiratory failure due to COVID-19 [facility])  Assessment & Plan  Requiring 3 L NC , no baseline O2 requirement  Currently being treated under the mild algorithm  · Day 3 of 5 of remdesivir  · Day 3 of Decadron 6 mg IV daily  · D-Dimer   59 - continue VT prophylaxis with SC enoxaparin  · Check daily labs  · Check procalcitonin  · Trend CRP, D-dimer  Essential hypertension  Assessment & Plan  Blood pressure mildly hypotensive  Continue calcium channel blocker, hold ACE-inhibitor  Patient is also on low-dose daily furosemide  Stage 3 chronic kidney disease [facility])  Assessment & Plan  Lab Results   Component Value Date    EGFR 45 12/20/2021    EGFR 46 12/20/2021    EGFR 46 12/18/2021    CREATININE 1 26 12/20/2021    CREATININE 1 23 12/20/2021    CREATININE 1 24 12/18/2021     Baseline creatinine of 1 2  Renal function appears stable at baseline  Hyperlipidemia associated with type 2 diabetes mellitus [facility])  Assessment & Plan  Lab Results   Component Value Date    HGBA1C 11 1 (A) 10/22/2021       Recent Labs     12/19/21  2133 12/20/21  0738 12/20/21  1145 12/20/21  1708   POCGLU 111 218* 370* 155*       Blood Sugar Average: Last 72 hrs:  (P) 258 125   Poorly controlled  Continue 70/30 at 50 units BID along ISS coverage  Diabetic diet     Morbid obesity with BMI of 50 0-59 9, adult [facility])  Assessment & Plan  History noted  VTE Pharmacologic Prophylaxis: VTE Score: 8 High Risk (Score >/= 5) - Pharmacological DVT Prophylaxis Ordered: enoxaparin (Lovenox)  Sequential Compression Devices Ordered  Patient Centered Rounds: I performed bedside rounds with nursing staff today    Discussions with Specialists or Other Care Team Provider: None    Education and Discussions with Family / Patient: Attempted to update  (wife) via phone  Unable to contact  Voicemail was not set up  Time Spent for Care: 30 minutes  More than 50% of total time spent on counseling and coordination of care as described above  Current Length of Stay: 2 day(s)  Current Patient Status: Inpatient   Certification Statement: The patient will continue to require additional inpatient hospital stay due to Continue treatment of COVID-19 pneumonia, acute hypoxic respiratory failure  Discharge Plan: Anticipate discharge in 48-72 hrs to home  Code Status: Level 1 - Full Code    Subjective:   Patient reports feeling somewhat improved since initial presentation, but with continued dyspnea and generalized malaise  Very anxious appearing  No overnight events reported  Remains febrile, with last fever of 100 7° yesterday afternoon  Objective:     Vitals:   Temp (24hrs), Av 1 °F (37 8 °C), Min:99 7 °F (37 6 °C), Max:100 5 °F (38 1 °C)    Temp:  [99 7 °F (37 6 °C)-100 5 °F (38 1 °C)] 99 7 °F (37 6 °C)  HR:  [68-86] 86  Resp:  [18-22] 18  BP: (101-116)/(47-52) 116/52  SpO2:  [91 %-92 %] 91 %  Body mass index is 51 61 kg/m²  Input and Output Summary (last 24 hours): Intake/Output Summary (Last 24 hours) at 2021 8467  Last data filed at 2021 1259  Gross per 24 hour   Intake 1230 ml   Output --   Net 1230 ml       Physical Exam:   Physical Exam  Constitutional:       General: She is not in acute distress  Appearance: She is obese  She is not ill-appearing or toxic-appearing  Neurological:      General: No focal deficit present  Mental Status: She is alert and oriented to person, place, and time  Psychiatric:         Behavior: Behavior normal          Thought Content: Thought content normal          Judgment: Judgment normal       Comments: Anxious appearing         Additional Data:     Labs:  Results from last 7 days   Lab Units 12/20/21  0546   WBC Thousand/uL 5 65  5 38   HEMOGLOBIN g/dL 14 2  14 1   HEMATOCRIT % 43 5  43 0   PLATELETS Thousands/uL 183  181   NEUTROS PCT % 76*   LYMPHS PCT % 18   MONOS PCT % 6   EOS PCT % 0     Results from last 7 days   Lab Units 12/20/21  0546   SODIUM mmol/L 136  136   POTASSIUM mmol/L 4 7  4 7   CHLORIDE mmol/L 101  101   CO2 mmol/L 27  28   BUN mg/dL 35*  36*   CREATININE mg/dL 1 23  1 26   ANION GAP mmol/L 8  7   CALCIUM mg/dL 8 5  8 6   ALBUMIN g/dL 3 1*  3 2*   TOTAL BILIRUBIN mg/dL 0 36  0 34   ALK PHOS U/L 69  71   ALT U/L 59  58   AST U/L 40  42   GLUCOSE RANDOM mg/dL 195*  198*         Results from last 7 days   Lab Units 12/20/21  1708 12/20/21  1145 12/20/21  0738 12/19/21  2133 12/19/21  1632 12/19/21  1310 12/19/21  1109 12/19/21  0839   POC GLUCOSE mg/dl 155* 370* 218* 111 219* 315* 360* 317*         Results from last 7 days   Lab Units 12/18/21  1630   LACTIC ACID mmol/L 1 6       Lines/Drains:  Invasive Devices  Report    Peripheral Intravenous Line            Peripheral IV 12/19/21 Left;Ventral (anterior) Wrist 1 day    Peripheral IV 12/20/21 Left Antecubital <1 day                      Imaging: Reviewed radiology reports from this admission including: chest CT scan    Recent Cultures (last 7 days):   Results from last 7 days   Lab Units 12/18/21  1502   BLOOD CULTURE  No Growth at 24 hrs  No Growth at 24 hrs         Last 24 Hours Medication List:   Current Facility-Administered Medications   Medication Dose Route Frequency Provider Last Rate    acetaminophen  650 mg Oral Q6H PRN Flaquito Rodrigez PA-C      albuterol  2 puff Inhalation Q4H PRN Yelitza Wright MD      amLODIPine  5 mg Oral Daily Yelitza Wright MD      ascorbic acid  1,000 mg Oral BID Yelitza Wright MD      aspirin  81 mg Oral Daily Yelitza Wright MD      atorvastatin  40 mg Oral Daily With Maty Eng MD      cholecalciferol  1,000 Units Oral Daily April Marroquin MD     [facility] [place] dexamethasone  6 mg Intravenous Q24H Yelitza Miranda MD      enoxaparin  60 mg Subcutaneous Q12H [address] Yelitza Miranda MD      furosemide  20 mg Oral Daily Yelitza Miranda MD      gabapentin  600 mg Oral TID Yelitza Miranda MD      insulin aspart protamine-insulin aspart  50 Units Subcutaneous BID AC Yelitza Miranda MD      insulin lispro  4-20 Units Subcutaneous TID AC Yelitza Miranda MD      magnesium oxide  400 mg Oral BID Yelitza Miranda MD      remdesivir  100 mg Intravenous Q24H Yelitza Miranda MD Stopped (12/19/21 2206)    zinc sulfate  220 mg Oral Daily Bright King MD          Today, Patient Was Seen By: Mitra Quach MD    **Please Note: This note may have been constructed using a voice recognition system  **

## 2021-12-20 NOTE — PLAN OF CARE
Problem: PHYSICAL THERAPY ADULT  Goal: Performs mobility at highest level of function for planned discharge setting  See evaluation for individualized goals  Description: Treatment/Interventions: Functional transfer training,Elevations,LE strengthening/ROM,Therapeutic exercise,Endurance training,Bed mobility,Gait training          See flowsheet documentation for full assessment, interventions and recommendations  Note: Prognosis: Guarded  Problem List: Decreased strength,Decreased endurance,Impaired balance,Decreased mobility  Assessment: Patient is a 59 y o  female evaluated by Physical Therapy s/p admit to [address] [place] [address] on 12/18/2021 with admitting diagnosis of: Cough, Hypoxia, COVID-19  PT was consulted to assess patient's functional mobility and discharge needs  Ordered are PT Evaluation and treatment with activity level of: ambulate patient  Comorbidities affecting patient's physical performance at time of assessment include: DM, PCOS, neuropathy, chronic pain, arthritis, GERD, asthma  Personal factors affecting the patient at time of IE include: lives in 2 story home, ambulating with assistive device, step(s) to enter home, inability to navigate community distances, decreased initiation and engagement, inability/difficulty performing IADLs and inability/difficulty performing ADLs  Please locate objective findings from PT assessment regarding body systems outlined above  Upon evaluation, pt able to perform all functional mobility with SUP, RW, and increased time  Occasional verbal cuing for safety awareness and sequencing; encouragement provided to participate  Seated rest break taken following 30' of ambulation d/t fatigue and SOB  No true LOB experienced; HR and SpO2 remained WFL on 3L O2 throughout  Pt likely able to perform more mobility but appears self limiting at this time; perseverating on coughing fits and wanting to shower   The patient's AM-PAC Basic Mobility Inpatient Short Form Raw Score is 17  A Raw score of greater than 16 suggests the patient may benefit from discharge to home  Please also refer to the recommendation of the Physical Therapist for safe discharge planning  Co treatment with OT secondary to complex medical condition of pt, possible A of 2 required to achieve and maintain transitional movements, requiring the need of skilled therapeutic intervention of 2 therapists to achieve delivery of services  Pt will benefit from continued PT intervention during LOS to address current deficits, increase LOF, and facilitate safe d/c to next level of care when medically appropriate  D/c recommendation at this time is home with home health rehabilitation  PT Discharge Recommendation: Home with home health rehabilitation          See flowsheet documentation for full assessment

## 2021-12-20 NOTE — PLAN OF CARE
Problem: PAIN - ADULT  Goal: Verbalizes/displays adequate comfort level or baseline comfort level  Description: Interventions:  - Encourage patient to monitor pain and request assistance  - Assess pain using appropriate pain scale (0-10 pain scale)  - Administer analgesics based on type and severity of pain and evaluate response  - Implement non-pharmacological measures as appropriate and evaluate response  - Consider cultural and social influences on pain and pain management  - Notify physician/advanced practitioner if interventions unsuccessful or patient reports new pain  Outcome: Progressing     Problem: INFECTION - ADULT  Goal: Absence or prevention of progression during hospitalization  Description: INTERVENTIONS:  - Assess and monitor for signs and symptoms of infection  - Monitor lab/diagnostic results  - Monitor all insertion sites, i e  indwelling lines  - Administer medications as ordered  - Instruct and encourage patient and family to use good hand hygiene technique  - Identify and instruct in appropriate isolation precautions for identified infection/condition (contact and airborne precautions)  Outcome: Progressing     Problem: SAFETY ADULT  Goal: Patient will remain free of falls  Description: INTERVENTIONS:  - Educate patient/family on patient safety including physical limitations  - Instruct patient to call for assistance with activity (contact guard assist)  - Consult OT/PT to assist with strengthening/mobility   - Keep Call bell within reach  - Keep bed low and locked with side rails adjusted as appropriate  - Keep care items and personal belongings within reach  - Initiate and maintain comfort rounds  - Make Fall Risk Sign visible to staff (high fall risk)  - Offer Toileting every 2 Hours, in advance of need  - Initiate/Maintain bed/chair alarm  - Obtain necessary fall risk management equipment: nonskid footwear, fall bracelet, alarms  - Apply yellow socks and bracelet for high fall risk patients  - Consider moving patient to room near nurses station  Outcome: Progressing  Goal: Maintain or return to baseline ADL function  Description: INTERVENTIONS:  -  Assess patient's ability to carry out ADLs; (min to mod assist with adls)  - Assess/evaluate cause of self-care deficits (dyspnea with exertion, oxygen, fatigue)  - Assess range of motion  - Assess patient's mobility; (contact guard assist)  - Assess patient's need for assistive devices and provide as appropriate  - Encourage maximum independence but intervene and supervise when necessary  - Assess for home care needs following discharge   - Consider OT consult to assist with ADL evaluation and planning for discharge  - Provide patient education as appropriate  Outcome: Progressing  Goal: Maintains/Returns to pre admission functional level  Description: INTERVENTIONS:  - Perform BMAT or MOVE assessment daily    - Set and communicate daily mobility goal to care team and patient/family/caregiver     - Collaborate with rehabilitation services on mobility goals if consulted  - Ambulate patient 3-5 times a day  - Out of bed to chair 3-5 times a day   - Out of bed for meals 3 times a day  - Out of bed for toileting  - Record patient progress and toleration of activity level   Outcome: Progressing     Problem: DISCHARGE PLANNING  Goal: Discharge to home or other facility with appropriate resources  Description: INTERVENTIONS:  - Identify barriers to discharge w/patient and caregiver  - Arrange for needed discharge resources and transportation as appropriate  - Identify discharge learning needs (meds, wound care, etc )  - Refer to Case Management Department for coordinating discharge planning if the patient needs post-hospital services based on physician/advanced practitioner order or complex needs related to functional status, cognitive ability, or social support system  Outcome: Progressing     Problem: Knowledge Deficit  Goal: Patient/family/caregiver demonstrates understanding of disease process, treatment plan, medications, and discharge instructions  Description: Complete learning assessment and assess knowledge base    Interventions:  - Provide teaching at level of understanding  - Provide teaching via preferred learning methods  Outcome: Progressing     Problem: RESPIRATORY - ADULT  Goal: Achieves optimal ventilation and oxygenation  Description: INTERVENTIONS:  - Assess for changes in respiratory status  - Assess for changes in mentation and behavior  - Position to facilitate oxygenation and minimize respiratory effort  - Oxygen administered by appropriate delivery (3 liters nasal oxygen)  - Encourage broncho-pulmonary hygiene including cough, deep breathe, Incentive Spirometry  - Assess and instruct to report SOB or any respiratory difficulty  - Respiratory Therapy support as indicated  Outcome: Progressing     Problem: METABOLIC, FLUID AND ELECTROLYTES - ADULT  Goal: Electrolytes maintained within normal limits  Description: INTERVENTIONS:  - Monitor labs and assess patient for signs and symptoms of electrolyte imbalances  - Administer electrolyte replacement as ordered  - Monitor response to electrolyte replacements, including repeat lab results as appropriate  - Instruct patient on fluid and nutrition as appropriate  Outcome: Progressing  Goal: Fluid balance maintained  Description: INTERVENTIONS:  - Monitor labs   - Monitor I/O and WT  - Instruct patient on fluid and nutrition as appropriate  - Assess for signs & symptoms of volume excess or deficit  Outcome: Progressing  Goal: Glucose maintained within target range  Description: INTERVENTIONS:  - Monitor Blood Glucose as ordered  - Assess for signs and symptoms of hyperglycemia and hypoglycemia  - Administer ordered medications to maintain glucose within target range  - Assess nutritional intake and initiate nutrition service referral as needed  Outcome: Progressing     Problem: SKIN/TISSUE INTEGRITY - ADULT  Goal: Skin Integrity remains intact(Skin Breakdown Prevention)  Description: Assess:  -Perform Denis assessment every shift  -Clean and moisturize skin every shift  -Inspect skin when repositioning, toileting, and assisting with ADLS  -Assess extremities for adequate circulation and sensation     Bed Management:  -Have minimal linens on bed & keep smooth, unwrinkled  -Change linens as needed when moist or perspiring  -Avoid sitting or lying in one position for more than 2 hours while in bed    Toileting:  -Offer bedside commode  -Assess for incontinence every 2 hours prn  -Use incontinent care products after each incontinent episode such as moisture barrier cream    Activity:  -Mobilize patient 3-6 times a day  -Encourage activity and walks in room  -Encourage or provide ROM exercises   -Instruct/ Assist with weight shifting every 2 hours when out of bed in chair    Skin Care:  -Avoid use of baby powder, tape, friction and shearing, hot water or constrictive clothing  -Relieve pressure over bony prominences using pillow support  -Do not massage red bony areas    Next Steps:  -Teach patient strategies to minimize risks such as frequent repositioning, keep skin clean and dry  -Consider consults to  interdisciplinary teams such as nutrition, pt, ot  Outcome: Progressing     Problem: Potential for Falls  Goal: Patient will remain free of falls  Description: INTERVENTIONS:  - Educate patient/family on patient safety including physical limitations  - Instruct patient to call for assistance with activity (contact guard assist)  - Consult OT/PT to assist with strengthening/mobility   - Keep Call bell within reach  - Keep bed low and locked with side rails adjusted as appropriate  - Keep care items and personal belongings within reach  - Initiate and maintain comfort rounds  - Make Fall Risk Sign visible to staff (high fall risk)  - Offer Toileting every 2 Hours, in advance of need  - Initiate/Maintain bed/chair alarm  - Obtain necessary fall risk management equipment: nonskid footwear, fall bracelet, alarms  - Apply yellow socks and bracelet for high fall risk patients  - Consider moving patient to room near nurses station  Outcome: Progressing

## 2021-12-20 NOTE — ASSESSMENT & PLAN NOTE
Requiring 3 L NC , no baseline O2 requirement  Currently being treated under the mild algorithm  · Day 3 of 5 of remdesivir  · Day 3 of Decadron 6 mg IV daily  · D-Dimer   59 - continue VT prophylaxis with SC enoxaparin  · Check daily labs  · Check procalcitonin  · Trend CRP, D-dimer

## 2021-12-21 LAB
ALBUMIN SERPL BCP-MCNC: 3 G/DL (ref 3.5–5)
ALP SERPL-CCNC: 72 U/L (ref 46–116)
ALT SERPL W P-5'-P-CCNC: 52 U/L (ref 12–78)
ANION GAP SERPL CALCULATED.3IONS-SCNC: 11 MMOL/L (ref 4–13)
AST SERPL W P-5'-P-CCNC: 47 U/L (ref 5–45)
BASOPHILS # BLD AUTO: 0 THOUSANDS/ΜL (ref 0–0.1)
BASOPHILS NFR BLD AUTO: 0 % (ref 0–1)
BILIRUB SERPL-MCNC: 0.47 MG/DL (ref 0.2–1)
BUN SERPL-MCNC: 43 MG/DL (ref 5–25)
CALCIUM ALBUM COR SERPL-MCNC: 9.1 MG/DL (ref 8.3–10.1)
CALCIUM SERPL-MCNC: 8.3 MG/DL (ref 8.3–10.1)
CHLORIDE SERPL-SCNC: 99 MMOL/L (ref 100–108)
CO2 SERPL-SCNC: 24 MMOL/L (ref 21–32)
CREAT SERPL-MCNC: 1.34 MG/DL (ref 0.6–1.3)
CRP SERPL QL: 27.1 MG/L
D DIMER PPP FEU-MCNC: 0.32 UG/ML FEU
EOSINOPHIL # BLD AUTO: 0 THOUSAND/ΜL (ref 0–0.61)
EOSINOPHIL NFR BLD AUTO: 0 % (ref 0–6)
ERYTHROCYTE [DISTWIDTH] IN BLOOD BY AUTOMATED COUNT: 14.1 % (ref 11.6–15.1)
GFR SERPL CREATININE-BSD FRML MDRD: 42 ML/MIN/1.73SQ M
GLUCOSE SERPL-MCNC: 276 MG/DL (ref 65–140)
GLUCOSE SERPL-MCNC: 320 MG/DL (ref 65–140)
GLUCOSE SERPL-MCNC: 332 MG/DL (ref 65–140)
GLUCOSE SERPL-MCNC: 442 MG/DL (ref 65–140)
HCT VFR BLD AUTO: 45.1 % (ref 34.8–46.1)
HGB BLD-MCNC: 14.7 G/DL (ref 11.5–15.4)
IMM GRANULOCYTES # BLD AUTO: 0.02 THOUSAND/UL (ref 0–0.2)
IMM GRANULOCYTES NFR BLD AUTO: 1 % (ref 0–2)
LYMPHOCYTES # BLD AUTO: 1.03 THOUSANDS/ΜL (ref 0.6–4.47)
LYMPHOCYTES NFR BLD AUTO: 31 % (ref 14–44)
MCH RBC QN AUTO: 27.9 PG (ref 26.8–34.3)
MCHC RBC AUTO-ENTMCNC: 32.6 G/DL (ref 31.4–37.4)
MCV RBC AUTO: 86 FL (ref 82–98)
MONOCYTES # BLD AUTO: 0.23 THOUSAND/ΜL (ref 0.17–1.22)
MONOCYTES NFR BLD AUTO: 7 % (ref 4–12)
NEUTROPHILS # BLD AUTO: 2 THOUSANDS/ΜL (ref 1.85–7.62)
NEUTS SEG NFR BLD AUTO: 61 % (ref 43–75)
NRBC BLD AUTO-RTO: 0 /100 WBCS
PLATELET # BLD AUTO: 187 THOUSANDS/UL (ref 149–390)
PMV BLD AUTO: 9.7 FL (ref 8.9–12.7)
POTASSIUM SERPL-SCNC: 4.9 MMOL/L (ref 3.5–5.3)
PROCALCITONIN SERPL-MCNC: <0.05 NG/ML
PROCALCITONIN SERPL-MCNC: <0.05 NG/ML
PROT SERPL-MCNC: 7.1 G/DL (ref 6.4–8.2)
RBC # BLD AUTO: 5.26 MILLION/UL (ref 3.81–5.12)
SODIUM SERPL-SCNC: 134 MMOL/L (ref 136–145)
WBC # BLD AUTO: 3.28 THOUSAND/UL (ref 4.31–10.16)

## 2021-12-21 PROCEDURE — 86140 C-REACTIVE PROTEIN: CPT | Performed by: INTERNAL MEDICINE

## 2021-12-21 PROCEDURE — 80053 COMPREHEN METABOLIC PANEL: CPT | Performed by: INTERNAL MEDICINE

## 2021-12-21 PROCEDURE — 97530 THERAPEUTIC ACTIVITIES: CPT

## 2021-12-21 PROCEDURE — 97110 THERAPEUTIC EXERCISES: CPT

## 2021-12-21 PROCEDURE — 85379 FIBRIN DEGRADATION QUANT: CPT | Performed by: INTERNAL MEDICINE

## 2021-12-21 PROCEDURE — 99232 SBSQ HOSP IP/OBS MODERATE 35: CPT | Performed by: INTERNAL MEDICINE

## 2021-12-21 PROCEDURE — 97116 GAIT TRAINING THERAPY: CPT

## 2021-12-21 PROCEDURE — 84145 PROCALCITONIN (PCT): CPT | Performed by: INTERNAL MEDICINE

## 2021-12-21 PROCEDURE — 85025 COMPLETE CBC W/AUTO DIFF WBC: CPT | Performed by: INTERNAL MEDICINE

## 2021-12-21 PROCEDURE — 82948 REAGENT STRIP/BLOOD GLUCOSE: CPT

## 2021-12-21 RX ORDER — SODIUM CHLORIDE 9 MG/ML
75 INJECTION, SOLUTION INTRAVENOUS CONTINUOUS
Status: DISCONTINUED | OUTPATIENT
Start: 2021-12-21 | End: 2021-12-22

## 2021-12-21 RX ADMIN — REMDESIVIR 100 MG: 100 INJECTION, POWDER, LYOPHILIZED, FOR SOLUTION INTRAVENOUS at 20:50

## 2021-12-21 RX ADMIN — GABAPENTIN 600 MG: 300 CAPSULE ORAL at 17:27

## 2021-12-21 RX ADMIN — ATORVASTATIN CALCIUM 40 MG: 40 TABLET, FILM COATED ORAL at 20:49

## 2021-12-21 RX ADMIN — INSULIN LISPRO 12 UNITS: 100 INJECTION, SOLUTION INTRAVENOUS; SUBCUTANEOUS at 17:27

## 2021-12-21 RX ADMIN — GABAPENTIN 600 MG: 300 CAPSULE ORAL at 20:49

## 2021-12-21 RX ADMIN — INSULIN LISPRO 12 UNITS: 100 INJECTION, SOLUTION INTRAVENOUS; SUBCUTANEOUS at 08:18

## 2021-12-21 RX ADMIN — MAGNESIUM OXIDE TAB 400 MG (241.3 MG ELEMENTAL MG) 400 MG: 400 (241.3 MG) TAB at 08:17

## 2021-12-21 RX ADMIN — AMLODIPINE BESYLATE 5 MG: 5 TABLET ORAL at 08:17

## 2021-12-21 RX ADMIN — OXYCODONE HYDROCHLORIDE AND ACETAMINOPHEN 1000 MG: 500 TABLET ORAL at 08:16

## 2021-12-21 RX ADMIN — GABAPENTIN 600 MG: 300 CAPSULE ORAL at 08:16

## 2021-12-21 RX ADMIN — ENOXAPARIN SODIUM 60 MG: 60 INJECTION SUBCUTANEOUS at 08:17

## 2021-12-21 RX ADMIN — ASPIRIN 81 MG CHEWABLE TABLET 81 MG: 81 TABLET CHEWABLE at 08:16

## 2021-12-21 RX ADMIN — DEXAMETHASONE SODIUM PHOSPHATE 6 MG: 4 INJECTION, SOLUTION INTRA-ARTICULAR; INTRALESIONAL; INTRAMUSCULAR; INTRAVENOUS; SOFT TISSUE at 20:50

## 2021-12-21 RX ADMIN — Medication 1000 UNITS: at 08:17

## 2021-12-21 RX ADMIN — ENOXAPARIN SODIUM 60 MG: 60 INJECTION SUBCUTANEOUS at 20:50

## 2021-12-21 RX ADMIN — INSULIN ASPART 50 UNITS: 100 INJECTION, SUSPENSION SUBCUTANEOUS at 17:27

## 2021-12-21 RX ADMIN — OXYCODONE HYDROCHLORIDE AND ACETAMINOPHEN 1000 MG: 500 TABLET ORAL at 20:50

## 2021-12-21 RX ADMIN — INSULIN ASPART 50 UNITS: 100 INJECTION, SUSPENSION SUBCUTANEOUS at 08:18

## 2021-12-21 RX ADMIN — INSULIN LISPRO 20 UNITS: 100 INJECTION, SOLUTION INTRAVENOUS; SUBCUTANEOUS at 11:52

## 2021-12-21 RX ADMIN — SODIUM CHLORIDE 75 ML/HR: 0.9 INJECTION, SOLUTION INTRAVENOUS at 08:20

## 2021-12-21 RX ADMIN — ZINC SULFATE 220 MG (50 MG) CAPSULE 220 MG: CAPSULE at 08:16

## 2021-12-21 RX ADMIN — MAGNESIUM OXIDE TAB 400 MG (241.3 MG ELEMENTAL MG) 400 MG: 400 (241.3 MG) TAB at 20:49

## 2021-12-21 NOTE — PLAN OF CARE
Problem: OCCUPATIONAL THERAPY ADULT  Goal: Performs self-care activities at highest level of function for planned discharge setting  See evaluation for individualized goals  Description: Treatment Interventions: ADL retraining,Functional transfer training,UE strengthening/ROM,Endurance training,Patient/family training,Activityengagement          See flowsheet documentation for full assessment, interventions and recommendations  Outcome: Progressing  Note: Limitation: Decreased ADL status,Decreased UE strength,Decreased Safe judgement during ADL,Decreased endurance,Decreased self-care trans,Decreased high-level ADLs     Assessment: Patient participated in Skilled OT session this date with interventions consisting of safety awareness and fall prevention techniques, therapeutic exercise to: increase functional use of BUEs, increase BUE muscle strength ,  therapeutic activities to: increase activity tolerance and increase dynamic sit/ stand balance during functional activity    Patient agreeable to OT treatment session, upon arrival patient was found seated OOB to Recliner  Patient requiring ocassional safety reminders  Patient continues to be functioning below baseline level, occupational performance remains limited secondary to factors listed above and increased risk for falls and injury  From OT standpoint, recommendation at time of d/c would be Home with home health rehabilitation  The patient's raw score on the AM-PAC Daily Activity inpatient short form is 21, standardized score is 44 27, greater than 39 4  Patients at this level are likely to benefit from discharge to home  Please refer to the recommendation of the Occupational Therapist for safe discharge planning       OT Discharge Recommendation: Home with home health rehabilitation  OT - OK to Discharge: Yes (once medically cleared)     Marianela Villegas OT

## 2021-12-21 NOTE — PLAN OF CARE
Problem: PHYSICAL THERAPY ADULT  Goal: Performs mobility at highest level of function for planned discharge setting  See evaluation for individualized goals  Description: Treatment/Interventions: Functional transfer training,Elevations,LE strengthening/ROM,Therapeutic exercise,Endurance training,Bed mobility,Gait training          See flowsheet documentation for full assessment, interventions and recommendations  Outcome: Progressing  Note: Prognosis: Fair  Problem List: Decreased strength,Decreased endurance,Impaired balance,Decreased mobility  Assessment: Pt  seen for PT treatment session this date with interventions consisting of transfers and  gait training w/ emphasis on improving pt's ability to ambulate  Pt  Currently performing  tx and ambulation at ( SUP) x 1 level of function  Please also refer to physical therapy recommendation for safe DC planning  In comparison to previous session, Pt  With improvements in activity tolerance  SpO2 WFL's with 3 L O2, mild SOB  Pt is in need of continued activity in PT to improve strength balance endurance mobility transfers and ambulation with return to maximize LOF  From PT/mobility standpoint, recommendation at time of d/c would be home health rehab  in order to promote return to PLOF and independence  The patient's AM-PAC Basic Mobility Inpatient Short Form Raw Score is 18  A Raw score of greater than 16 suggests the patient may benefit from discharge to home  PT Discharge Recommendation: Home with home health rehabilitation          See flowsheet documentation for full assessment

## 2021-12-21 NOTE — OCCUPATIONAL THERAPY NOTE
Occupational Therapy Treatment Note     Patient Name: Ayana MANE Date: 12/21/2021  Problem List  Principal Problem:    Acute hypoxemic respiratory failure due to COVID-19 [facility])  Active Problems:    Essential hypertension    Stage [facility] ([facility] )    Morbid obesity with BMI of 50 0-59 9, adult ([facility] )    Hyperlipidemia associated with type 2 diabetes mellitus ([facility] )            12/21/21 1507   OT Last Visit   OT Visit Date 12/21/21   Note Type   Note Type Treatment   Restrictions/Precautions   Weight Bearing Precautions Per Order No   Other Precautions Contact/isolation; Airborne/isolation;Multiple lines;Telemetry;O2;Fall Risk   Pain Assessment   Pain Assessment Tool 0-10   Pain Score No Pain   ADL   Toileting Comments Pt declined to perform any ADLs during today's session as she noted she had completed all this morning, and has been using commode next to recliner frequently  Bed Mobility   Additional Comments Pt seated in recliner at start of session  Pt on 3L nasal O2  Vitals WNL  Pt returned to recliner at end of session  Transfers   Sit to Stand 5  Supervision   Additional items Verbal cues; Increased time required   Stand to Sit 5  Supervision   Additional items Increased time required;Verbal cues   Additional Comments Pt supported on RW  No LOB or unsteadiness  Functional Mobility   Functional Mobility 5  Supervision   Additional Comments Pt performed functional mobility in room, to window and then back to recliner with supervision and RW  Pt able to stand at window for approximately 3 minutes and tell long story with supervision and RW, no LOB or unsteadiness  Pt remained on 3L nasal O2, SPO2 at 89% post functional mobility  Pt given instructions to take rest breaks to focus on breathing during functional mobility  Additional items Rolling walker   Therapeutic Exercise - ROM   UE-ROM Yes   ROM- Right Upper Extremities   R Shoulder AROM; Flexion;ABduction; Extension   R Weight/Reps/Sets 2 x 15   RUE ROM Comment Pt denied any pain   ROM - Left Upper Extremities    L Shoulder AROM; Flexion;ABduction; Extension   L Weight/Reps/Sets 2 x 15   Cognition   Overall Cognitive Status WFL   Arousal/Participation Alert; Responsive; Cooperative   Attention Within functional limits   Orientation Level Oriented X4   Memory Within functional limits   Following Commands Follows all commands and directions without difficulty   Comments Pt agreeable to OT treatment session  and very pleasant  Pt very talkative throughout entire session  Activity Tolerance   Activity Tolerance Patient tolerated treatment well   Medical Staff Made Aware CARL oFley verbalized pt approriate for OT treatment session  Assessment   Assessment Patient participated in Skilled OT session this date with interventions consisting of safety awareness and fall prevention techniques, therapeutic exercise to: increase functional use of BUEs, increase BUE muscle strength ,  therapeutic activities to: increase activity tolerance and increase dynamic sit/ stand balance during functional activity    Patient agreeable to OT treatment session, upon arrival patient was found seated OOB to Recliner  Patient requiring ocassional safety reminders  Patient continues to be functioning below baseline level, occupational performance remains limited secondary to factors listed above and increased risk for falls and injury  From OT standpoint, recommendation at time of d/c would be Home with home health rehabilitation  The patient's raw score on the AM-PAC Daily Activity inpatient short form is 21, standardized score is 44 27, greater than 39 4  Patients at this level are likely to benefit from discharge to home  Please refer to the recommendation of the Occupational Therapist for safe discharge planning     Plan   Treatment Interventions Functional transfer training;UE strengthening/ROM   Goal Expiration Date 01/03/21   OT Treatment Day 1   OT Frequency 3-5x/wk   Recommendation   OT Discharge Recommendation Home with home health rehabilitation   OT - OK to Discharge Yes  (once medically cleared)   Additional Comments  Pt left seated in recliner, all needs met, call bell in reach      AM-PAC Daily Activity Inpatient   Lower Body Dressing 3   Bathing 3   Toileting 3   Upper Body Dressing 4   Grooming 4   Eating 4   Daily Activity Raw Score 21   Daily Activity Standardized Score (Calc for Raw Score >=11) 44 27   AM-PAC Applied Cognition Inpatient   Following a Speech/Presentation 4   Understanding Ordinary Conversation 4   Taking Medications 4   Remembering Where Things Are Placed or Put Away 4   Remembering List of 4-5 Errands 4   Taking Care of Complicated Tasks 4   Applied Cognition Raw Score 24   Applied Cognition Standardized Score 62 21     Leno Ware, OT

## 2021-12-21 NOTE — PHYSICAL THERAPY NOTE
PHYSICAL THERAPY NOTE          Patient Name: Adriana HAYNES Date: 12/21/2021 12/21/21 1150   Note Type   Note Type Treatment   Restrictions/Precautions   Weight Bearing Precautions Per Order No   Other Precautions Contact/isolation; Airborne/isolation  (Chair Alarm; Multiple lines; Telemetry; Fall Risk)   General   Family/Caregiver Present No   Cognition   Overall Cognitive Status WFL   Following Commands Follows all commands and directions without difficulty   Subjective   Subjective Agreeable to therapy  OOB in chair , states she need sto use the [facility]   Transfers   Sit to Stand 5  Supervision   Additional items Assist x 1; Armrests; Increased time required;Verbal cues   Stand to Sit 5  Supervision   Additional items Assist x 1; Increased time required;Verbal cues   Stand pivot 5  Supervision   Additional items Verbal cues   Toilet transfer 5  Supervision   Additional items Assist x 1; Increased time required;Verbal cues; Commode   Additional Comments Able to perfoem hygiene and clothing management with fair+ static standing balance   Ambulation/Elevation   Gait pattern   (Excessively slow; Short stride; Foward flexed; Improper [employer])   Gait Assistance 5  Supervision   Additional items Assist x 1;Verbal cues   Assistive Device Rolling walker   Distance 55'   Balance   Static Sitting Good   Dynamic Sitting Fair +   Static Standing Fair   Dynamic Standing Fair   Ambulatory Fair   Endurance Deficit   Endurance Deficit Yes   Activity Tolerance   Activity Tolerance Patient limited by fatigue   Assessment   Prognosis Fair   Problem List Decreased strength;Decreased endurance; Impaired balance;Decreased mobility   Assessment Pt  seen for PT treatment session this date with interventions consisting of transfers and  gait training w/ emphasis on improving pt's ability to ambulate   Pt  Currently performing  tx and ambulation at ( SUP) x 1 level of function  Please also refer to physical therapy recommendation for safe DC planning  In comparison to previous session, Pt  With improvements in activity tolerance  SpO2 WFL's with 3 L O2, mild SOB  Pt is in need of continued activity in PT to improve strength balance endurance mobility transfers and ambulation with return to maximize LOF  From PT/mobility standpoint, recommendation at time of d/c would be home health rehab  in order to promote return to PLOF and independence  The patient's AM-PAC Basic Mobility Inpatient Short Form Raw Score is 18  A Raw score of greater than 16 suggests the patient may benefit from discharge to home  Goals   LTG Expiration Date 01/03/21   Plan   Treatment/Interventions Functional transfer training;LE strengthening/ROM; Therapeutic exercise; Endurance training;Gait training;Bed mobility   Progress Progressing toward goals   Recommendation   PT Discharge Recommendation Home with home health rehabilitation   [address] [place] [address] Mobility Inpatient   Turning in Bed Without Bedrails 3   Lying on Back to Sitting on Edge of Flat Bed 3   Moving Bed to Chair 3   Standing Up From Chair 4   Walk in Room 3   Climb 3-5 Stairs 2   Basic Mobility Inpatient Raw Score 18   Basic Mobility Standardized Score 41 05   Highest Level Of Mobility   JH-HLM Goal 6: Walk 10 steps or more   JH-HLM Highest Level of Mobility 7: Walk 25 feet or more   JH-HLM Goal Achieved Yes   Education   Education Provided Mobility training   Patient Demonstrates acceptance/verbal understanding   End of Consult   Patient Position at End of Consult Bedside chair; All needs within reach

## 2021-12-21 NOTE — ASSESSMENT & PLAN NOTE
Blood pressure mildly hypotensive  Continue calcium channel blocker, hold ACE-inhibitor, and continue to maintain on IV fluids  Patient's low-dose daily furosemide is currently on hold

## 2021-12-21 NOTE — PROGRESS NOTES
[address] [facility] [address] [place]  Progress Note - Emily Hodge 1960, 59 y o  female MRN: [id number]  Unit/Bed#: 423-01 Encounter: [id number]  Primary Care Provider: Mariia Rashid DO   Date and time admitted to hospital: 12/18/2021  1:11 PM    * Acute hypoxemic respiratory failure due to COVID-19 [facility])  Assessment & Plan  Requiring 3 L NC , no baseline O2 requirement  Currently being treated under the mild algorithm  · Day 4 of 5 of remdesivir  · Day 4 of Decadron 6 mg IV daily  · D-Dimer   61 - continue VTE prophylaxis with SC enoxaparin  · Check daily labs  · Initial procalcitonin negative with repeat pending  · Trend CRP, D-dimer  Essential hypertension  Assessment & Plan  Blood pressure mildly hypotensive  Continue calcium channel blocker, hold ACE-inhibitor, and continue to maintain on IV fluids  Patient's low-dose daily furosemide is currently on hold  Stage 3 chronic kidney disease [facility])  Assessment & Plan  Lab Results   Component Value Date    EGFR 42 12/21/2021    EGFR 45 12/20/2021    EGFR 46 12/20/2021    CREATININE 1 34 (H) 12/21/2021    CREATININE 1 26 12/20/2021    CREATININE 1 23 12/20/2021     Baseline creatinine of 1 2  Renal function appears stable at baseline  Hyperlipidemia associated with type 2 diabetes mellitus [facility])  Assessment & Plan  Lab Results   Component Value Date    HGBA1C 11 1 (A) 10/22/2021       Recent Labs     12/20/21  1145 12/20/21  1708 12/21/21  0728 12/21/21  1120   POCGLU 370* 155* 320* 442*       Blood Sugar Average: Last 72 hrs:  (P) 282 7   Poorly controlled  Continue 70/30 at 50 units BID along ISS coverage  Diabetic diet     Morbid obesity with BMI of 50 0-59 9, adult [facility])  Assessment & Plan  History noted  VTE Pharmacologic Prophylaxis: VTE Score: 8 High Risk (Score >/= 5) - Pharmacological DVT Prophylaxis Ordered: enoxaparin (Lovenox)   Sequential Compression Devices Ordered      Patient Centered Rounds: I performed bedside rounds with nursing staff today  Discussions with Specialists or Other Care Team Provider: None     Education and Discussions with Family / Patient: Updated  () via phone      Time Spent for Care: 20 minutes  More than 50% of total time spent on counseling and coordination of care as described above      Current Length of Stay: 3 day(s)  Current Patient Status: Inpatient   Certification Statement: The patient will continue to require additional inpatient hospital stay due to Continue treatment of COVID-19 pneumonia, acute hypoxic respiratory failure  Discharge Plan: Anticipate discharge in 48-72 hrs to home      Code Status: Level 1 - Full Code    Subjective:   Patient seen and examined - appears unchanged since prior exam, and reports continued dyspnea, cough, fatigue, and generalized malaise  No overnight events reported  Is now afebrile over the course of the last 24 hours, with a T-max of 99 2 degrees  Objective:     Vitals:   Temp (24hrs), Av 4 °F (36 9 °C), Min:97 6 °F (36 4 °C), Max:99 2 °F (37 3 °C)    Temp:  [97 6 °F (36 4 °C)-99 2 °F (37 3 °C)] 97 6 °F (36 4 °C)  HR:  [62-80] 62  Resp:  [18] 18  BP: (114-121)/(47-80) 121/80  SpO2:  [90 %-92 %] 92 %  Body mass index is 51 61 kg/m²  Input and Output Summary (last 24 hours): Intake/Output Summary (Last 24 hours) at 2021 1636  Last data filed at 2021 1253  Gross per 24 hour   Intake 990 ml   Output --   Net 990 ml       Physical Exam:     Physical Exam  Constitutional:       Appearance: Normal appearance  She is normal weight  Cardiovascular:      Rate and Rhythm: Normal rate and regular rhythm  Heart sounds: No murmur heard  No gallop  Pulmonary:      Effort: Pulmonary effort is normal  No respiratory distress  Breath sounds: Rhonchi present  No wheezing or rales  Comments: Mildly rhonchorous breath sounds at the basis and laterally  Abdominal:      General: Abdomen is flat   Bowel sounds are normal  There is no distension  Palpations: Abdomen is soft  Tenderness: There is no abdominal tenderness  There is no guarding or rebound  Musculoskeletal:         General: No swelling or tenderness  Skin:     General: Skin is warm and dry  Neurological:      General: No focal deficit present  Mental Status: She is alert and oriented to person, place, and time  Psychiatric:         Mood and Affect: Mood normal          Behavior: Behavior normal        Additional Data:     Labs:  Results from last 7 days   Lab Units 12/21/21  0654   WBC Thousand/uL 3 28*   HEMOGLOBIN g/dL 14 7   HEMATOCRIT % 45 1   PLATELETS Thousands/uL 187   NEUTROS PCT % 61   LYMPHS PCT % 31   MONOS PCT % 7   EOS PCT % 0     Results from last 7 days   Lab Units 12/21/21  0634   SODIUM mmol/L 134*   POTASSIUM mmol/L 4 9   CHLORIDE mmol/L 99*   CO2 mmol/L 24   BUN mg/dL 43*   CREATININE mg/dL 1 34*   ANION GAP mmol/L 11   CALCIUM mg/dL 8 3   ALBUMIN g/dL 3 0*   TOTAL BILIRUBIN mg/dL 0 47   ALK PHOS U/L 72   ALT U/L 52   AST U/L 47*   GLUCOSE RANDOM mg/dL 332*         Results from last 7 days   Lab Units 12/21/21  1120 12/21/21  0728 12/20/21  1708 12/20/21  1145 12/20/21  0738 12/19/21  2133 12/19/21  1632 12/19/21  1310 12/19/21  1109 12/19/21  0839   POC GLUCOSE mg/dl 442* 320* 155* 370* 218* 111 219* 315* 360* 317*         Results from last 7 days   Lab Units 12/20/21  0546 12/18/21  1630   LACTIC ACID mmol/L  --  1 6   PROCALCITONIN ng/ml <0 05  --        Lines/Drains:  Invasive Devices  Report    Peripheral Intravenous Line            Peripheral IV 12/20/21 Left Antecubital 1 day                      Imaging: No pertinent imaging reviewed  Recent Cultures (last 7 days):   Results from last 7 days   Lab Units 12/18/21  1502   BLOOD CULTURE  No Growth at 48 hrs  No Growth at 48 hrs         Last 24 Hours Medication List:   Current Facility-Administered Medications   Medication Dose Route Frequency Provider Last Rate    acetaminophen  650 mg Oral Q6H PRN Flaquito Rodrigez PA-C      albuterol  2 puff Inhalation Q4H PRN Yelitza Galvez MD      amLODIPine  5 mg Oral Daily Yelitza Galvez MD      ascorbic acid  1,000 mg Oral BID Yelitza Galvez MD      aspirin  81 mg Oral Daily Yelitza Galvez MD      atorvastatin  40 mg Oral Daily With Steve Schultz MD      cholecalciferol  1,000 Units Oral Daily Yelitza Galvez MD      dexamethasone  6 mg Intravenous Q24H Yelitza Galvez MD      enoxaparin  60 mg Subcutaneous Q12H [address] Yelitza Galvez MD      gabapentin  600 mg Oral TID Yelitza Galvez MD      insulin aspart protamine-insulin aspart  50 Units Subcutaneous BID AC Yelitza Galvez MD      insulin lispro  4-20 Units Subcutaneous TID AC Yelitza Galvez MD      magnesium oxide  400 mg Oral BID Yelitza Galvez MD      remdesivir  100 mg Intravenous Q24H Yelitza Galvez MD Stopped (12/20/21 2132)    sodium chloride  75 mL/hr Intravenous Continuous Jania Blake MD 75 mL/hr (12/21/21 0820)    zinc sulfate  220 mg Oral Daily Yoli Smyth MD          Today, Patient Was Seen By: Jania Blake MD    **Please Note: This note may have been constructed using a voice recognition system  **

## 2021-12-21 NOTE — PLAN OF CARE
Problem: PAIN - ADULT  Goal: Verbalizes/displays adequate comfort level or baseline comfort level  Description: Interventions:  - Encourage patient to monitor pain and request assistance  - Assess pain using appropriate pain scale (0-10 pain scale)  - Administer analgesics based on type and severity of pain and evaluate response  - Implement non-pharmacological measures as appropriate and evaluate response  - Consider cultural and social influences on pain and pain management  - Notify physician/advanced practitioner if interventions unsuccessful or patient reports new pain  Outcome: Progressing     Problem: INFECTION - ADULT  Goal: Absence or prevention of progression during hospitalization  Description: INTERVENTIONS:  - Assess and monitor for signs and symptoms of infection  - Monitor lab/diagnostic results  - Monitor all insertion sites, i e  indwelling lines  - Administer medications as ordered  - Instruct and encourage patient and family to use good hand hygiene technique  - Identify and instruct in appropriate isolation precautions for identified infection/condition (contact and airborne precautions)  Outcome: Progressing     Problem: SAFETY ADULT  Goal: Patient will remain free of falls  Description: INTERVENTIONS:  - Educate patient/family on patient safety including physical limitations  - Instruct patient to call for assistance with activity (contact guard assist)  - Consult OT/PT to assist with strengthening/mobility   - Keep Call bell within reach  - Keep bed low and locked with side rails adjusted as appropriate  - Keep care items and personal belongings within reach  - Initiate and maintain comfort rounds  - Make Fall Risk Sign visible to staff (high fall risk)  - Offer Toileting every 2 Hours, in advance of need  - Initiate/Maintain bed/chair alarm  - Obtain necessary fall risk management equipment: nonskid footwear, fall bracelet, alarms  - Apply yellow socks and bracelet for high fall risk patients  - Consider moving patient to room near nurses station  Outcome: Progressing  Goal: Maintain or return to baseline ADL function  Description: INTERVENTIONS:  -  Assess patient's ability to carry out ADLs; (min to mod assist with adls)  - Assess/evaluate cause of self-care deficits (dyspnea with exertion, oxygen, fatigue)  - Assess range of motion  - Assess patient's mobility; (contact guard assist)  - Assess patient's need for assistive devices and provide as appropriate  - Encourage maximum independence but intervene and supervise when necessary  - Assess for home care needs following discharge   - Consider OT consult to assist with ADL evaluation and planning for discharge  - Provide patient education as appropriate  Outcome: Progressing  Goal: Maintains/Returns to pre admission functional level  Description: INTERVENTIONS:  - Perform BMAT or MOVE assessment daily    - Set and communicate daily mobility goal to care team and patient/family/caregiver     - Collaborate with rehabilitation services on mobility goals if consulted  - Ambulate patient 3-5 times a day  - Out of bed to chair 3-5 times a day   - Out of bed for meals 3 times a day  - Out of bed for toileting  - Record patient progress and toleration of activity level   Outcome: Progressing     Problem: DISCHARGE PLANNING  Goal: Discharge to home or other facility with appropriate resources  Description: INTERVENTIONS:  - Identify barriers to discharge w/patient and caregiver  - Arrange for needed discharge resources and transportation as appropriate  - Identify discharge learning needs (meds, wound care, etc )  - Refer to Case Management Department for coordinating discharge planning if the patient needs post-hospital services based on physician/advanced practitioner order or complex needs related to functional status, cognitive ability, or social support system  Outcome: Progressing     Problem: Knowledge Deficit  Goal: Patient/family/caregiver demonstrates understanding of disease process, treatment plan, medications, and discharge instructions  Description: Complete learning assessment and assess knowledge base    Interventions:  - Provide teaching at level of understanding  - Provide teaching via preferred learning methods  Outcome: Progressing     Problem: RESPIRATORY - ADULT  Goal: Achieves optimal ventilation and oxygenation  Description: INTERVENTIONS:  - Assess for changes in respiratory status  - Assess for changes in mentation and behavior  - Position to facilitate oxygenation and minimize respiratory effort  - Oxygen administered by appropriate delivery (3 liters nasal oxygen)  - Encourage broncho-pulmonary hygiene including cough, deep breathe, Incentive Spirometry  - Assess and instruct to report SOB or any respiratory difficulty  - Respiratory Therapy support as indicated  Outcome: Progressing     Problem: METABOLIC, FLUID AND ELECTROLYTES - ADULT  Goal: Electrolytes maintained within normal limits  Description: INTERVENTIONS:  - Monitor labs and assess patient for signs and symptoms of electrolyte imbalances  - Administer electrolyte replacement as ordered  - Monitor response to electrolyte replacements, including repeat lab results as appropriate  - Instruct patient on fluid and nutrition as appropriate  Outcome: Progressing  Goal: Fluid balance maintained  Description: INTERVENTIONS:  - Monitor labs   - Monitor I/O and WT  - Instruct patient on fluid and nutrition as appropriate  - Assess for signs & symptoms of volume excess or deficit  Outcome: Progressing  Goal: Glucose maintained within target range  Description: INTERVENTIONS:  - Monitor Blood Glucose as ordered  - Assess for signs and symptoms of hyperglycemia and hypoglycemia  - Administer ordered medications to maintain glucose within target range  - Assess nutritional intake and initiate nutrition service referral as needed  Outcome: Progressing     Problem: SKIN/TISSUE INTEGRITY - ADULT  Goal: Skin Integrity remains intact(Skin Breakdown Prevention)  Description: Assess:  -Perform Denis assessment every shift  -Clean and moisturize skin every shift  -Inspect skin when repositioning, toileting, and assisting with ADLS  -Assess extremities for adequate circulation and sensation     Bed Management:  -Have minimal linens on bed & keep smooth, unwrinkled  -Change linens as needed when moist or perspiring  -Avoid sitting or lying in one position for more than 2 hours while in bed    Toileting:  -Offer bedside commode  -Assess for incontinence every 2 hours prn  -Use incontinent care products after each incontinent episode such as moisture barrier cream    Activity:  -Mobilize patient 3-6 times a day  -Encourage activity and walks in room  -Encourage or provide ROM exercises   -Instruct/ Assist with weight shifting every 2 hours when out of bed in chair    Skin Care:  -Avoid use of baby powder, tape, friction and shearing, hot water or constrictive clothing  -Relieve pressure over bony prominences using pillow support  -Do not massage red bony areas    Next Steps:  -Teach patient strategies to minimize risks such as frequent repositioning, keep skin clean and dry  -Consider consults to  interdisciplinary teams such as nutrition, pt, ot  Outcome: Progressing     Problem: Potential for Falls  Goal: Patient will remain free of falls  Description: INTERVENTIONS:  - Educate patient/family on patient safety including physical limitations  - Instruct patient to call for assistance with activity (contact guard assist)  - Consult OT/PT to assist with strengthening/mobility   - Keep Call bell within reach  - Keep bed low and locked with side rails adjusted as appropriate  - Keep care items and personal belongings within reach  - Initiate and maintain comfort rounds  - Make Fall Risk Sign visible to staff (high fall risk)  - Offer Toileting every 2 Hours, in advance of need  - Initiate/Maintain bed/chair alarm  - Obtain necessary fall risk management equipment: nonskid footwear, fall bracelet, alarms  - Apply yellow socks and bracelet for high fall risk patients  - Consider moving patient to room near nurses station  Outcome: Progressing     Problem: MOBILITY - ADULT  Goal: Maintain or return to baseline ADL function  Description: INTERVENTIONS:  -  Assess patient's ability to carry out ADLs; (min to mod assist with adls)  - Assess/evaluate cause of self-care deficits (dyspnea with exertion, oxygen, fatigue)  - Assess range of motion  - Assess patient's mobility; (contact guard assist)  - Assess patient's need for assistive devices and provide as appropriate  - Encourage maximum independence but intervene and supervise when necessary  - Assess for home care needs following discharge   - Consider OT consult to assist with ADL evaluation and planning for discharge  - Provide patient education as appropriate  Outcome: Progressing  Goal: Maintains/Returns to pre admission functional level  Description: INTERVENTIONS:  - Perform BMAT or MOVE assessment daily    - Set and communicate daily mobility goal to care team and patient/family/caregiver     - Collaborate with rehabilitation services on mobility goals if consulted  - Ambulate patient 3-5 times a day  - Out of bed to chair 3-5 times a day   - Out of bed for meals 3 times a day  - Out of bed for toileting  - Record patient progress and toleration of activity level   Outcome: Progressing

## 2021-12-21 NOTE — ASSESSMENT & PLAN NOTE
Lab Results   Component Value Date    HGBA1C 11 1 (A) 10/22/2021       Recent Labs     12/20/21  1145 12/20/21  1708 12/21/21  0728 12/21/21  1120   POCGLU 370* 155* 320* 442*       Blood Sugar Average: Last 72 hrs:  (P) 282 7   Poorly controlled    Continue 70/30 at 50 units BID along ISS coverage  Diabetic diet

## 2021-12-21 NOTE — ASSESSMENT & PLAN NOTE
Requiring 3 L NC , no baseline O2 requirement  Currently being treated under the mild algorithm  · Day 4 of 5 of remdesivir  · Day 4 of Decadron 6 mg IV daily  · D-Dimer   61 - continue VTE prophylaxis with SC enoxaparin  · Check daily labs  · Initial procalcitonin negative with repeat pending  · Trend CRP, D-dimer

## 2021-12-21 NOTE — ASSESSMENT & PLAN NOTE
Lab Results   Component Value Date    EGFR 42 12/21/2021    EGFR 45 12/20/2021    EGFR 46 12/20/2021    CREATININE 1 34 (H) 12/21/2021    CREATININE 1 26 12/20/2021    CREATININE 1 23 12/20/2021     Baseline creatinine of 1 2  Renal function appears stable at baseline

## 2021-12-22 LAB
ALBUMIN SERPL BCP-MCNC: 2.9 G/DL (ref 3.5–5)
ALP SERPL-CCNC: 74 U/L (ref 46–116)
ALT SERPL W P-5'-P-CCNC: 48 U/L (ref 12–78)
ANION GAP SERPL CALCULATED.3IONS-SCNC: 11 MMOL/L (ref 4–13)
AST SERPL W P-5'-P-CCNC: 47 U/L (ref 5–45)
BASOPHILS # BLD AUTO: 0.01 THOUSANDS/ΜL (ref 0–0.1)
BASOPHILS NFR BLD AUTO: 0 % (ref 0–1)
BILIRUB SERPL-MCNC: 0.44 MG/DL (ref 0.2–1)
BUN SERPL-MCNC: 31 MG/DL (ref 5–25)
CALCIUM ALBUM COR SERPL-MCNC: 8.9 MG/DL (ref 8.3–10.1)
CALCIUM SERPL-MCNC: 8 MG/DL (ref 8.3–10.1)
CHLORIDE SERPL-SCNC: 103 MMOL/L (ref 100–108)
CO2 SERPL-SCNC: 23 MMOL/L (ref 21–32)
CREAT SERPL-MCNC: 1 MG/DL (ref 0.6–1.3)
EOSINOPHIL # BLD AUTO: 0 THOUSAND/ΜL (ref 0–0.61)
EOSINOPHIL NFR BLD AUTO: 0 % (ref 0–6)
ERYTHROCYTE [DISTWIDTH] IN BLOOD BY AUTOMATED COUNT: 13.8 % (ref 11.6–15.1)
GFR SERPL CREATININE-BSD FRML MDRD: 60 ML/MIN/1.73SQ M
GLUCOSE SERPL-MCNC: 186 MG/DL (ref 65–140)
GLUCOSE SERPL-MCNC: 233 MG/DL (ref 65–140)
GLUCOSE SERPL-MCNC: 262 MG/DL (ref 65–140)
GLUCOSE SERPL-MCNC: 305 MG/DL (ref 65–140)
GLUCOSE SERPL-MCNC: 357 MG/DL (ref 65–140)
HCT VFR BLD AUTO: 43.8 % (ref 34.8–46.1)
HGB BLD-MCNC: 14.3 G/DL (ref 11.5–15.4)
IMM GRANULOCYTES # BLD AUTO: 0.02 THOUSAND/UL (ref 0–0.2)
IMM GRANULOCYTES NFR BLD AUTO: 0 % (ref 0–2)
LYMPHOCYTES # BLD AUTO: 1.08 THOUSANDS/ΜL (ref 0.6–4.47)
LYMPHOCYTES NFR BLD AUTO: 20 % (ref 14–44)
MCH RBC QN AUTO: 28 PG (ref 26.8–34.3)
MCHC RBC AUTO-ENTMCNC: 32.6 G/DL (ref 31.4–37.4)
MCV RBC AUTO: 86 FL (ref 82–98)
MONOCYTES # BLD AUTO: 0.24 THOUSAND/ΜL (ref 0.17–1.22)
MONOCYTES NFR BLD AUTO: 4 % (ref 4–12)
NEUTROPHILS # BLD AUTO: 4.1 THOUSANDS/ΜL (ref 1.85–7.62)
NEUTS SEG NFR BLD AUTO: 76 % (ref 43–75)
NRBC BLD AUTO-RTO: 0 /100 WBCS
PLATELET # BLD AUTO: 174 THOUSANDS/UL (ref 149–390)
PMV BLD AUTO: 10.1 FL (ref 8.9–12.7)
POTASSIUM SERPL-SCNC: 5.1 MMOL/L (ref 3.5–5.3)
PROT SERPL-MCNC: 6.8 G/DL (ref 6.4–8.2)
RBC # BLD AUTO: 5.1 MILLION/UL (ref 3.81–5.12)
SODIUM SERPL-SCNC: 137 MMOL/L (ref 136–145)
WBC # BLD AUTO: 5.45 THOUSAND/UL (ref 4.31–10.16)

## 2021-12-22 PROCEDURE — 82948 REAGENT STRIP/BLOOD GLUCOSE: CPT

## 2021-12-22 PROCEDURE — 80053 COMPREHEN METABOLIC PANEL: CPT | Performed by: INTERNAL MEDICINE

## 2021-12-22 PROCEDURE — 97535 SELF CARE MNGMENT TRAINING: CPT

## 2021-12-22 PROCEDURE — 85025 COMPLETE CBC W/AUTO DIFF WBC: CPT | Performed by: INTERNAL MEDICINE

## 2021-12-22 PROCEDURE — 99232 SBSQ HOSP IP/OBS MODERATE 35: CPT | Performed by: INTERNAL MEDICINE

## 2021-12-22 PROCEDURE — 97530 THERAPEUTIC ACTIVITIES: CPT

## 2021-12-22 RX ORDER — TRAZODONE HYDROCHLORIDE 50 MG/1
50 TABLET ORAL
Status: DISCONTINUED | OUTPATIENT
Start: 2021-12-22 | End: 2021-12-24 | Stop reason: HOSPADM

## 2021-12-22 RX ADMIN — ACETAMINOPHEN 650 MG: 325 TABLET, FILM COATED ORAL at 21:57

## 2021-12-22 RX ADMIN — ENOXAPARIN SODIUM 60 MG: 60 INJECTION SUBCUTANEOUS at 08:24

## 2021-12-22 RX ADMIN — ENOXAPARIN SODIUM 60 MG: 60 INJECTION SUBCUTANEOUS at 21:57

## 2021-12-22 RX ADMIN — AMLODIPINE BESYLATE 5 MG: 5 TABLET ORAL at 08:24

## 2021-12-22 RX ADMIN — DEXAMETHASONE SODIUM PHOSPHATE 6 MG: 4 INJECTION, SOLUTION INTRA-ARTICULAR; INTRALESIONAL; INTRAMUSCULAR; INTRAVENOUS; SOFT TISSUE at 21:57

## 2021-12-22 RX ADMIN — INSULIN LISPRO 8 UNITS: 100 INJECTION, SOLUTION INTRAVENOUS; SUBCUTANEOUS at 07:31

## 2021-12-22 RX ADMIN — INSULIN ASPART 50 UNITS: 100 INJECTION, SUSPENSION SUBCUTANEOUS at 16:19

## 2021-12-22 RX ADMIN — GABAPENTIN 600 MG: 300 CAPSULE ORAL at 21:57

## 2021-12-22 RX ADMIN — TRAZODONE HYDROCHLORIDE 50 MG: 50 TABLET ORAL at 23:29

## 2021-12-22 RX ADMIN — GABAPENTIN 600 MG: 300 CAPSULE ORAL at 08:24

## 2021-12-22 RX ADMIN — INSULIN LISPRO 16 UNITS: 100 INJECTION, SOLUTION INTRAVENOUS; SUBCUTANEOUS at 11:37

## 2021-12-22 RX ADMIN — ZINC SULFATE 220 MG (50 MG) CAPSULE 220 MG: CAPSULE at 08:24

## 2021-12-22 RX ADMIN — INSULIN ASPART 50 UNITS: 100 INJECTION, SUSPENSION SUBCUTANEOUS at 07:30

## 2021-12-22 RX ADMIN — OXYCODONE HYDROCHLORIDE AND ACETAMINOPHEN 1000 MG: 500 TABLET ORAL at 08:24

## 2021-12-22 RX ADMIN — GABAPENTIN 600 MG: 300 CAPSULE ORAL at 16:19

## 2021-12-22 RX ADMIN — ASPIRIN 81 MG CHEWABLE TABLET 81 MG: 81 TABLET CHEWABLE at 08:24

## 2021-12-22 RX ADMIN — ATORVASTATIN CALCIUM 40 MG: 40 TABLET, FILM COATED ORAL at 21:57

## 2021-12-22 RX ADMIN — MAGNESIUM OXIDE TAB 400 MG (241.3 MG ELEMENTAL MG) 400 MG: 400 (241.3 MG) TAB at 08:25

## 2021-12-22 RX ADMIN — MAGNESIUM OXIDE TAB 400 MG (241.3 MG ELEMENTAL MG) 400 MG: 400 (241.3 MG) TAB at 21:57

## 2021-12-22 RX ADMIN — INSULIN LISPRO 12 UNITS: 100 INJECTION, SOLUTION INTRAVENOUS; SUBCUTANEOUS at 16:19

## 2021-12-22 RX ADMIN — OXYCODONE HYDROCHLORIDE AND ACETAMINOPHEN 1000 MG: 500 TABLET ORAL at 21:57

## 2021-12-22 RX ADMIN — Medication 1000 UNITS: at 08:24

## 2021-12-22 RX ADMIN — REMDESIVIR 100 MG: 100 INJECTION, POWDER, LYOPHILIZED, FOR SOLUTION INTRAVENOUS at 21:57

## 2021-12-22 NOTE — PLAN OF CARE
Problem: OCCUPATIONAL THERAPY ADULT  Goal: Performs self-care activities at highest level of function for planned discharge setting  See evaluation for individualized goals  Description: Treatment Interventions: ADL retraining,Functional transfer training,UE strengthening/ROM,Endurance training,Patient/family training,Activityengagement          See flowsheet documentation for full assessment, interventions and recommendations  Outcome: Progressing  Note: Limitation: Decreased ADL status,Decreased UE strength,Decreased Safe judgement during ADL,Decreased endurance,Decreased self-care trans,Decreased high-level ADLs     Assessment: Patient participated in Skilled OT session this date with interventions consisting of ADL re training with the use of correct body mechnaics, safety awareness and fall prevention techniques,  therapeutic activities to: increase activity tolerance, increase standing tolerance time with unilateral UE support to complete sink level ADLs and increase OOB/ sitting tolerance   Patient agreeable to OT treatment session, upon arrival patient was found seated at edge of bed  Patient requiring ocassional safety reminders  Patient continues to be functioning below baseline level, occupational performance remains limited secondary to factors listed above and increased risk for falls and injury  From OT standpoint, recommendation at time of d/c would be Home with home health rehabilitation  The patient's raw score on the AM-PAC Daily Activity inpatient short form is 20, standardized score is 42 03, greater than 39 4  Patients at this level are likely to benefit from discharge to home  Please refer to the recommendation of the Occupational Therapist for safe discharge planning       OT Discharge Recommendation: Home with home health rehabilitation  OT - OK to Discharge: Yes  Gladys Singhable, OT

## 2021-12-22 NOTE — OCCUPATIONAL THERAPY NOTE
Occupational Therapy Progress Note     Patient Name: Graham Wall  [facility]'O Date: 12/22/2021  Problem List  Principal Problem:    Acute hypoxemic respiratory failure due to COVID-19 [facility])  Active Problems:    Essential hypertension    Stage 3 chronic kidney disease ([facility] [address] )    Morbid obesity with BMI of 50 0-59 9, [facility] )    Hyperlipidemia associated with type 2 diabetes mellitus ([facility] )          12/22/21 1158   OT Last Visit   OT Visit Date 12/22/21   Note Type   Note Type Treatment   Restrictions/Precautions   Weight Bearing Precautions Per Order No   Other Precautions Contact/isolation; Airborne/isolation;Multiple lines;Telemetry;O2;Fall Risk   Pain Assessment   Pain Assessment Tool 0-10   Pain Score 8   Pain Location/Orientation Location: Back   ADL   Where Assessed Edge of bed   Grooming Assistance 5  Supervision/Setup   Grooming Deficit Verbal cueing;Supervision/safety; Teeth care   Grooming Comments Pt brushed her teeth while standing at sink with supervision  Pt combed her hair while seated EOB with supervision  UB Dressing Assistance 5  Supervision/Setup   UB Dressing Deficit Verbal cueing;Supervision/safety   UB Dressing Comments Pt don gown with supervision while seated EOB  LB Dressing Assistance 3  Moderate Assistance   LB Dressing Deficit Don/doff R sock; Verbal cueing;Supervision/safety; Increased time to complete   LB Dressing Comments Pt mod A to don bilateral socks, requiring A to don R sock as pt noted she can not don R sock, but can L, encouraged pt to don L sock  Pt able to complete with increased time  Bed Mobility   Additional Comments Pt seated EOB at start of session  Pt on 3L  Pt requested to sit EOB at end of session to eat lunch  Transfers   Sit to Stand 5  Supervision   Additional items Verbal cues   Stand to Sit 5  Supervision   Additional items Verbal cues   Additional Comments Pt supported on RW  No LOB or unsteadiness   Pt able to stand at sink for ~7 minutes to complete teeth care with supervision and RW  Functional Mobility   Functional Mobility 5  Supervision   Additional Comments Pt performed functional mobility in room with supervision and RW, to sink in room and back to bed, no LOB or unsteadiness  SPO2 at 91% post functional mobility  Additional items Rolling walker   Cognition   Overall Cognitive Status WFL   Arousal/Participation Alert; Responsive; Cooperative   Attention Within functional limits   Orientation Level Oriented X4   Memory Within functional limits   Following Commands Follows all commands and directions without difficulty   Comments Pt agreeable to OT treatment session and very pleasant  Activity Tolerance   Activity Tolerance Patient tolerated treatment well   Medical Staff Made Aware CARL Singh verbalized pt appropriate for OT treatment session  Assessment   Assessment Patient participated in Skilled OT session this date with interventions consisting of ADL re training with the use of correct body mechnaics, safety awareness and fall prevention techniques,  therapeutic activities to: increase activity tolerance, increase standing tolerance time with unilateral UE support to complete sink level ADLs and increase OOB/ sitting tolerance   Patient agreeable to OT treatment session, upon arrival patient was found seated at edge of bed  Patient requiring ocassional safety reminders  Patient continues to be functioning below baseline level, occupational performance remains limited secondary to factors listed above and increased risk for falls and injury  From OT standpoint, recommendation at time of d/c would be Home with home health rehabilitation  The patient's raw score on the AM-PAC Daily Activity inpatient short form is 20, standardized score is 42 03, greater than 39 4  Patients at this level are likely to benefit from discharge to home  Please refer to the recommendation of the Occupational Therapist for safe discharge planning     Plan Treatment Interventions ADL retraining;Functional transfer training   Goal Expiration Date 01/03/21   OT Treatment Day 2   OT Frequency 3-5x/wk   Recommendation   OT Discharge Recommendation Home with home health rehabilitation   OT - OK to Discharge Yes   Additional Comments  Pt left seated in recliner at end of session, all needs met, call bell in reach      AM-PAC Daily Activity Inpatient   Lower Body Dressing 2   Bathing 3   Toileting 3   Upper Body Dressing 4   Grooming 4   Eating 4   Daily Activity Raw Score 20   Daily Activity Standardized Score (Calc for Raw Score >=11) 42 03   AM-PAC Applied Cognition Inpatient   Following a Speech/Presentation 4   Understanding Ordinary Conversation 4   Taking Medications 4   Remembering Where Things Are Placed or Put Away 4   Remembering List of 4-5 Errands 4   Taking Care of Complicated Tasks 4   Applied Cognition Raw Score 24   Applied Cognition Standardized Score 62 21       Sandra Jalloh OT

## 2021-12-22 NOTE — ASSESSMENT & PLAN NOTE
Blood pressure mildly hypotensive initially  Continue calcium channel blocker, hold ACE-inhibitor  Current blood pressure improved  Discontinue IV fluids  Patient's low-dose daily furosemide is currently on hold

## 2021-12-22 NOTE — PROGRESS NOTES
[facility] [facility]  Progress Note - Lynetta Skiff 1960, 59 y o  female MRN: [id number]  Unit/Bed#: 423-01 Encounter: [id number]  Primary Care Provider: William Emery DO   Date and time admitted to hospital: 12/18/2021  1:11 PM    * Acute hypoxemic respiratory failure due to COVID-19 [facility])  Assessment & Plan  Requiring 3 L NC , no baseline O2 requirement  Currently being treated under the mild algorithm  · Day 5 of 5 of remdesivir  · Day 5 of Decadron 6 mg IV daily  · D-Dimer   61 - continue VTE prophylaxis with SC enoxaparin  · Check daily labs  · Initial procalcitonin negative with repeat pending  · Trend CRP, D-dimer  Essential hypertension  Assessment & Plan  Blood pressure mildly hypotensive initially  Continue calcium channel blocker, hold ACE-inhibitor  Current blood pressure improved  Discontinue IV fluids  Patient's low-dose daily furosemide is currently on hold  Stage 3 chronic kidney disease [facility])  Assessment & Plan  Lab Results   Component Value Date    EGFR 60 12/22/2021    EGFR 42 12/21/2021    EGFR 45 12/20/2021    EGFR 46 12/20/2021    CREATININE 1 00 12/22/2021    CREATININE 1 34 (H) 12/21/2021    CREATININE 1 26 12/20/2021    CREATININE 1 23 12/20/2021     Baseline creatinine of 1 2  Renal function appears stable at baseline  Hyperlipidemia associated with type 2 diabetes mellitus [facility])  Assessment & Plan  Lab Results   Component Value Date    HGBA1C 11 1 (A) 10/22/2021       Recent Labs     12/21/21  1120 12/21/21  1656 12/22/21  0713 12/22/21  1133   POCGLU 442* 276* 262* 357*       Blood Sugar Average: Last 72 hrs:  (P) 286 [id number]   Poorly controlled  Continue 70/30 at 50 units BID along ISS coverage  Diabetic diet   Current hyperglycemia in setting of IV steroids  Morbid obesity with BMI of 50 0-59 9, adult [facility])  Assessment & Plan  History noted        VTE Pharmacologic Prophylaxis: VTE Score: 8 High Risk (Score >/= 5) - Pharmacological DVT Prophylaxis Ordered: enoxaparin (Lovenox)  Sequential Compression Devices Ordered      Patient Centered Rounds: I performed bedside rounds with nursing staff today  Discussions with Specialists or Other Care Team Provider: None     Education and Discussions with Family / Patient: Updated  () via phone      Time Spent for Care: 20 minutes  More than 50% of total time spent on counseling and coordination of care as described above      Current Length of Stay: 4 day(s)  Current Patient Status: Inpatient   Certification Statement: The patient will continue to require additional inpatient hospital stay due to Continue treatment of COVID-19 pneumonia, acute hypoxic respiratory failure  Discharge Plan: Anticipate discharge in 48-72 hrs to home      Code Status: Level 1 - Full Code    Subjective:   Patient reports continuing to feel ill, but overall remains stable with unchanged oxygen requirement  Requesting to shower  No overnight events reported  Remains afebrile  Objective:     Vitals:   Temp (24hrs), Av 9 °F (36 6 °C), Min:96 6 °F (35 9 °C), Max:98 8 °F (37 1 °C)    Temp:  [96 6 °F (35 9 °C)-98 8 °F (37 1 °C)] 96 6 °F (35 9 °C)  HR:  [60-68] 64  Resp:  [16-20] 16  BP: (121-129)/(54-78) 129/66  SpO2:  [92 %-95 %] 95 %  Body mass index is 51 61 kg/m²  Input and Output Summary (last 24 hours): Intake/Output Summary (Last 24 hours) at 2021 1402  Last data filed at 2021 1237  Gross per 24 hour   Intake 780 ml   Output 400 ml   Net 380 ml       Physical Exam:   Vital signs reviewed  Constitutional:       Appearance: Normal appearance  She is normal weight  Neurological:      General: No focal deficit present  Mental Status: She is alert and oriented to person, place, and time     Psychiatric:         Mood and Affect: Mood normal          Behavior: Behavior normal      Additional Data:     Labs:  Results from last 7 days   Lab Units 21  8770 WBC Thousand/uL 5 45   HEMOGLOBIN g/dL 14 3   HEMATOCRIT % 43 8   PLATELETS Thousands/uL 174   NEUTROS PCT % 76*   LYMPHS PCT % 20   MONOS PCT % 4   EOS PCT % 0     Results from last 7 days   Lab Units 12/22/21  0453   SODIUM mmol/L 137   POTASSIUM mmol/L 5 1   CHLORIDE mmol/L 103   CO2 mmol/L 23   BUN mg/dL 31*   CREATININE mg/dL 1 00   ANION GAP mmol/L 11   CALCIUM mg/dL 8 0*   ALBUMIN g/dL 2 9*   TOTAL BILIRUBIN mg/dL 0 44   ALK PHOS U/L 74   ALT U/L 48   AST U/L 47*   GLUCOSE RANDOM mg/dL 233*         Results from last 7 days   Lab Units 12/22/21  1133 12/22/21  0713 12/21/21  1656 12/21/21  1120 12/21/21  0728 12/20/21  1708 12/20/21  1145 12/20/21  0738 12/19/21  2133 12/19/21  1632 12/19/21  1310 12/19/21  1109   POC GLUCOSE mg/dl 357* 262* 276* 442* 320* 155* 370* 218* 111 219* 315* 360*         Results from last 7 days   Lab Units 12/21/21  0635 12/20/21  0546 12/18/21  1630   LACTIC ACID mmol/L  --   --  1 6   PROCALCITONIN ng/ml <0 05 <0 05  --        Lines/Drains:  Invasive Devices  Report    Peripheral Intravenous Line            Peripheral IV 12/20/21 Left Antecubital 2 days                      Imaging: No pertinent imaging reviewed  Recent Cultures (last 7 days):   Results from last 7 days   Lab Units 12/18/21  1502   BLOOD CULTURE  No Growth at 72 hrs  No Growth at 72 hrs         Last 24 Hours Medication List:   Current Facility-Administered Medications   Medication Dose Route Frequency Provider Last Rate    acetaminophen  650 mg Oral Q6H PRN Flaquito Rodrigez PA-C      albuterol  2 puff Inhalation Q4H PRN Yelitza Wood MD      amLODIPine  5 mg Oral Daily Yelitza Wood MD      ascorbic acid  1,000 mg Oral BID Yelitza Wood MD      aspirin  81 mg Oral Daily Yelitza Wood MD      atorvastatin  40 mg Oral Daily With Popeye Fagan MD      cholecalciferol  1,000 Units Oral Daily Yelitza Wood MD      dexamethasone  6 mg Intravenous Q24H Yelitza Wood MD      enoxaparin 60 mg Subcutaneous Q12H [address] Yelitza Hancock MD      gabapentin  600 mg Oral TID Yelitza Hancock MD      insulin aspart protamine-insulin aspart  50 Units Subcutaneous BID AC Yelitza Hancock MD      insulin lispro  4-20 Units Subcutaneous TID AC Yelitza Hancock MD      magnesium oxide  400 mg Oral BID Yelitza Hancock MD      remdesivir  100 mg Intravenous Q24H Yelitza Hancock MD 0 mg (12/20/21 2132)    zinc sulfate  220 mg Oral Daily Tramaine Islas MD          Today, Patient Was Seen By: Tian Smith MD    **Please Note: This note may have been constructed using a voice recognition system  **

## 2021-12-22 NOTE — ASSESSMENT & PLAN NOTE
Requiring 3 L NC , no baseline O2 requirement  Currently being treated under the mild algorithm  · Day 5 of 5 of remdesivir  · Day 5 of Decadron 6 mg IV daily  · D-Dimer   59 - continue VTE prophylaxis with SC enoxaparin  · Check daily labs  · Initial procalcitonin negative with repeat pending  · Trend CRP, D-dimer

## 2021-12-22 NOTE — ASSESSMENT & PLAN NOTE
Lab Results   Component Value Date    HGBA1C 11 1 (A) 10/22/2021       Recent Labs     12/21/21  1120 12/21/21  1656 12/22/21  0713 12/22/21  1133   POCGLU 442* 276* 262* 357*       Blood Sugar Average: Last 72 hrs:  (P) 286 [id number]   Poorly controlled  Continue 70/30 at 50 units BID along ISS coverage  Diabetic diet   Current hyperglycemia in setting of IV steroids

## 2021-12-22 NOTE — ASSESSMENT & PLAN NOTE
Lab Results   Component Value Date    EGFR 60 12/22/2021    EGFR 42 12/21/2021    EGFR 45 12/20/2021    EGFR 46 12/20/2021    CREATININE 1 00 12/22/2021    CREATININE 1 34 (H) 12/21/2021    CREATININE 1 26 12/20/2021    CREATININE 1 23 12/20/2021     Baseline creatinine of 1 2  Renal function appears stable at baseline

## 2021-12-23 LAB
ALBUMIN SERPL BCP-MCNC: 2.9 G/DL (ref 3.5–5)
ALP SERPL-CCNC: 70 U/L (ref 46–116)
ALT SERPL W P-5'-P-CCNC: 47 U/L (ref 12–78)
ANION GAP SERPL CALCULATED.3IONS-SCNC: 10 MMOL/L (ref 4–13)
AST SERPL W P-5'-P-CCNC: 41 U/L (ref 5–45)
BASOPHILS # BLD AUTO: 0 THOUSANDS/ΜL (ref 0–0.1)
BASOPHILS NFR BLD AUTO: 0 % (ref 0–1)
BILIRUB SERPL-MCNC: 0.48 MG/DL (ref 0.2–1)
BUN SERPL-MCNC: 27 MG/DL (ref 5–25)
CALCIUM ALBUM COR SERPL-MCNC: 9.2 MG/DL (ref 8.3–10.1)
CALCIUM SERPL-MCNC: 8.3 MG/DL (ref 8.3–10.1)
CHLORIDE SERPL-SCNC: 103 MMOL/L (ref 100–108)
CO2 SERPL-SCNC: 24 MMOL/L (ref 21–32)
CREAT SERPL-MCNC: 0.93 MG/DL (ref 0.6–1.3)
EOSINOPHIL # BLD AUTO: 0 THOUSAND/ΜL (ref 0–0.61)
EOSINOPHIL NFR BLD AUTO: 0 % (ref 0–6)
ERYTHROCYTE [DISTWIDTH] IN BLOOD BY AUTOMATED COUNT: 13.7 % (ref 11.6–15.1)
GFR SERPL CREATININE-BSD FRML MDRD: 65 ML/MIN/1.73SQ M
GLUCOSE SERPL-MCNC: 197 MG/DL (ref 65–140)
GLUCOSE SERPL-MCNC: 244 MG/DL (ref 65–140)
GLUCOSE SERPL-MCNC: 257 MG/DL (ref 65–140)
GLUCOSE SERPL-MCNC: 264 MG/DL (ref 65–140)
GLUCOSE SERPL-MCNC: 477 MG/DL (ref 65–140)
HCT VFR BLD AUTO: 43.6 % (ref 34.8–46.1)
HGB BLD-MCNC: 14.4 G/DL (ref 11.5–15.4)
IMM GRANULOCYTES # BLD AUTO: 0.02 THOUSAND/UL (ref 0–0.2)
IMM GRANULOCYTES NFR BLD AUTO: 1 % (ref 0–2)
LYMPHOCYTES # BLD AUTO: 1.02 THOUSANDS/ΜL (ref 0.6–4.47)
LYMPHOCYTES NFR BLD AUTO: 24 % (ref 14–44)
MCH RBC QN AUTO: 28 PG (ref 26.8–34.3)
MCHC RBC AUTO-ENTMCNC: 33 G/DL (ref 31.4–37.4)
MCV RBC AUTO: 85 FL (ref 82–98)
MONOCYTES # BLD AUTO: 0.14 THOUSAND/ΜL (ref 0.17–1.22)
MONOCYTES NFR BLD AUTO: 3 % (ref 4–12)
NEUTROPHILS # BLD AUTO: 3.03 THOUSANDS/ΜL (ref 1.85–7.62)
NEUTS SEG NFR BLD AUTO: 72 % (ref 43–75)
NRBC BLD AUTO-RTO: 0 /100 WBCS
PLATELET # BLD AUTO: 194 THOUSANDS/UL (ref 149–390)
PMV BLD AUTO: 9.8 FL (ref 8.9–12.7)
POTASSIUM SERPL-SCNC: 5 MMOL/L (ref 3.5–5.3)
PROCALCITONIN SERPL-MCNC: <0.05 NG/ML
PROT SERPL-MCNC: 6.9 G/DL (ref 6.4–8.2)
RBC # BLD AUTO: 5.15 MILLION/UL (ref 3.81–5.12)
SODIUM SERPL-SCNC: 137 MMOL/L (ref 136–145)
WBC # BLD AUTO: 4.21 THOUSAND/UL (ref 4.31–10.16)

## 2021-12-23 PROCEDURE — 97116 GAIT TRAINING THERAPY: CPT

## 2021-12-23 PROCEDURE — 84145 PROCALCITONIN (PCT): CPT | Performed by: INTERNAL MEDICINE

## 2021-12-23 PROCEDURE — 85025 COMPLETE CBC W/AUTO DIFF WBC: CPT | Performed by: INTERNAL MEDICINE

## 2021-12-23 PROCEDURE — 97110 THERAPEUTIC EXERCISES: CPT

## 2021-12-23 PROCEDURE — 82948 REAGENT STRIP/BLOOD GLUCOSE: CPT

## 2021-12-23 PROCEDURE — 99232 SBSQ HOSP IP/OBS MODERATE 35: CPT | Performed by: INTERNAL MEDICINE

## 2021-12-23 PROCEDURE — 80053 COMPREHEN METABOLIC PANEL: CPT | Performed by: INTERNAL MEDICINE

## 2021-12-23 RX ORDER — FUROSEMIDE 20 MG/1
20 TABLET ORAL DAILY
Status: DISCONTINUED | OUTPATIENT
Start: 2021-12-23 | End: 2021-12-24 | Stop reason: HOSPADM

## 2021-12-23 RX ORDER — CARISOPRODOL 350 MG/1
350 TABLET ORAL 3 TIMES DAILY
Status: DISCONTINUED | OUTPATIENT
Start: 2021-12-23 | End: 2021-12-24 | Stop reason: HOSPADM

## 2021-12-23 RX ORDER — LIDOCAINE 50 MG/G
1 PATCH TOPICAL DAILY
Status: DISCONTINUED | OUTPATIENT
Start: 2021-12-23 | End: 2021-12-24 | Stop reason: HOSPADM

## 2021-12-23 RX ADMIN — Medication 1000 UNITS: at 09:18

## 2021-12-23 RX ADMIN — AMLODIPINE BESYLATE 5 MG: 5 TABLET ORAL at 09:18

## 2021-12-23 RX ADMIN — ENOXAPARIN SODIUM 60 MG: 60 INJECTION SUBCUTANEOUS at 09:18

## 2021-12-23 RX ADMIN — GABAPENTIN 600 MG: 300 CAPSULE ORAL at 17:17

## 2021-12-23 RX ADMIN — ASPIRIN 81 MG CHEWABLE TABLET 81 MG: 81 TABLET CHEWABLE at 09:18

## 2021-12-23 RX ADMIN — DEXAMETHASONE SODIUM PHOSPHATE 6 MG: 4 INJECTION, SOLUTION INTRA-ARTICULAR; INTRALESIONAL; INTRAMUSCULAR; INTRAVENOUS; SOFT TISSUE at 21:14

## 2021-12-23 RX ADMIN — OXYCODONE HYDROCHLORIDE AND ACETAMINOPHEN 1000 MG: 500 TABLET ORAL at 09:18

## 2021-12-23 RX ADMIN — CARISOPRODOL 350 MG: 350 TABLET ORAL at 21:13

## 2021-12-23 RX ADMIN — LIDOCAINE 5% 1 PATCH: 700 PATCH TOPICAL at 09:19

## 2021-12-23 RX ADMIN — MAGNESIUM OXIDE TAB 400 MG (241.3 MG ELEMENTAL MG) 400 MG: 400 (241.3 MG) TAB at 21:14

## 2021-12-23 RX ADMIN — INSULIN LISPRO 8 UNITS: 100 INJECTION, SOLUTION INTRAVENOUS; SUBCUTANEOUS at 09:23

## 2021-12-23 RX ADMIN — INSULIN ASPART 50 UNITS: 100 INJECTION, SUSPENSION SUBCUTANEOUS at 17:17

## 2021-12-23 RX ADMIN — INSULIN LISPRO 8 UNITS: 100 INJECTION, SOLUTION INTRAVENOUS; SUBCUTANEOUS at 17:17

## 2021-12-23 RX ADMIN — GABAPENTIN 600 MG: 300 CAPSULE ORAL at 09:18

## 2021-12-23 RX ADMIN — ENOXAPARIN SODIUM 60 MG: 60 INJECTION SUBCUTANEOUS at 21:13

## 2021-12-23 RX ADMIN — ZINC SULFATE 220 MG (50 MG) CAPSULE 220 MG: CAPSULE at 09:18

## 2021-12-23 RX ADMIN — OXYCODONE HYDROCHLORIDE AND ACETAMINOPHEN 1000 MG: 500 TABLET ORAL at 21:13

## 2021-12-23 RX ADMIN — MAGNESIUM OXIDE TAB 400 MG (241.3 MG ELEMENTAL MG) 400 MG: 400 (241.3 MG) TAB at 09:19

## 2021-12-23 RX ADMIN — GABAPENTIN 600 MG: 300 CAPSULE ORAL at 21:14

## 2021-12-23 RX ADMIN — ATORVASTATIN CALCIUM 40 MG: 40 TABLET, FILM COATED ORAL at 21:14

## 2021-12-23 RX ADMIN — INSULIN ASPART 50 UNITS: 100 INJECTION, SUSPENSION SUBCUTANEOUS at 09:23

## 2021-12-23 RX ADMIN — INSULIN LISPRO 20 UNITS: 100 INJECTION, SOLUTION INTRAVENOUS; SUBCUTANEOUS at 11:32

## 2021-12-23 RX ADMIN — FUROSEMIDE 20 MG: 20 TABLET ORAL at 09:19

## 2021-12-23 NOTE — PHYSICAL THERAPY NOTE
PHYSICAL THERAPY NOTE          Patient Name: Jamar Cleveland  IZGVN'G Date: 12/23/2021 12/23/21 1403   Note Type   Note Type Treatment   Restrictions/Precautions   Weight Bearing Precautions Per Order No   Other Precautions   (Chair Alarm; Multiple lines; Telemetry; Fall Risk)   General   Family/Caregiver Present No   Cognition   Overall Cognitive Status WFL   Subjective   Subjective Pt  would like to ambulate  Transfers   Sit to Stand 5  Supervision   Additional items Assist x 1; Armrests   Stand to Sit 5  Supervision   Additional items Assist x 1; Armrests   Stand pivot 5  Supervision   Ambulation/Elevation   Gait pattern   (Short stride; Foward flexed; Improper Weight shift)   Gait Assistance 5  Supervision   Additional items Assist x 1   Assistive Device Rolling walker   Distance 150'   Balance   Static Sitting Good   Dynamic Sitting Good   Static Standing Fair +   Dynamic Standing Fair   Ambulatory Fair   Endurance Deficit   Endurance Deficit Yes   Activity Tolerance   Activity Tolerance Patient limited by fatigue   Exercises   Hip Flexion Sitting;10 reps   Hip Abduction Sitting;10 reps   Hip Adduction Sitting;10 reps   Knee AROM Long Arc Quad Sitting;10 reps   Ankle Pumps Sitting;10 reps   Assessment   Prognosis Good   Problem List Decreased strength;Decreased endurance; Impaired balance;Decreased mobility   Assessment Pt  seen for PT treatment session this date with interventions consisting of  therapeutic exercises, transfers and  gait training w/ emphasis on improving pt's ability to ambulate  Pt  Currently performing  tx and ambulation at ( SUP) x 1 level of function  Please also refer to physical therapy recommendation for safe DC planning  In comparison to previous session, Pt  With improvements in activity tolerance  SpO2 92-91 with ambulation 3 L O2    Pt is in need of continued activity in PT to improve strength balance endurance mobility transfers and ambulation with return to maximize LOF  From PT/mobility standpoint, recommendation at time of d/c would be home health PT in order to promote return to PLOF and independence  The patient's AM-PAC Basic Mobility Inpatient Short Form Raw Score is 22  A Raw score of greater than 16 suggests the patient may benefit from discharge to home  Goals   LTG Expiration Date 01/03/21   Plan   Treatment/Interventions Functional transfer training;LE strengthening/ROM; Therapeutic exercise; Endurance training;Elevations; Bed mobility;Gait training   Progress Progressing toward goals   Recommendation   PT Discharge Recommendation Home with home health rehabilitation   [address] Mobility Inpatient   Turning in Bed Without Bedrails 4   Lying on Back to Sitting on Edge of Flat Bed 3   Moving Bed to Chair 4   Standing Up From Chair 4   Walk in Room 4   Climb 3-5 Stairs 3   Basic Mobility Inpatient Raw Score 22   Basic Mobility Standardized Score 47 4   Highest Level Of Mobility   JH-HLM Goal 7: Walk 25 feet or more   JH-HLM Highest Level of Mobility 7: Walk 25 feet or more   JH-HLM Goal Achieved Yes   Education   Education Provided Mobility training   Patient Demonstrates acceptance/verbal understanding   End of Consult   Patient Position at End of Consult Bedside chair; All needs within reach   End of Consult Comments discussed POC with PT

## 2021-12-23 NOTE — PROGRESS NOTES
[address] [facility] [address]  Progress Note - Clarice Arias 1960, 59 y o  female MRN: [id number]  Unit/Bed#: 423-01 Encounter: [id number]  Primary Care Provider: Katy Chavez DO   Date and time admitted to hospital: 12/18/2021  1:11 PM    * Acute hypoxemic respiratory failure due to COVID-19 [facility])  Assessment & Plan  Requiring 3 L NC , no baseline O2 requirement  Currently being treated under the mild algorithm  Patient reports improvement overall in symptoms  · Completed course of remdesivir  · Day #6 of Decadron 6 mg IV daily  · D-Dimer   59 - continue VTE prophylaxis with SC enoxaparin  · Check daily labs  · Procalcitonin negative X 2   · Trend CRP, D-dimer  Essential hypertension  Assessment & Plan  Blood pressure mildly hypotensive initially  Continue calcium channel blocker, hold ACE-inhibitor  Current blood pressure improved  Discontinued IV fluids  Patient's low-dose daily furosemide previously on hold, resume today  Stage 3 chronic kidney disease [facility])  Assessment & Plan  Lab Results   Component Value Date    EGFR 65 12/23/2021    EGFR 60 12/22/2021    EGFR 42 12/21/2021    CREATININE 0 93 12/23/2021    CREATININE 1 00 12/22/2021    CREATININE 1 34 (H) 12/21/2021     Baseline creatinine of 1 2  Renal function appears stable at baseline  Hyperlipidemia associated with type 2 diabetes mellitus [facility])  Assessment & Plan  Lab Results   Component Value Date    HGBA1C 11 1 (A) 10/22/2021       Recent Labs     12/22/21  2127 12/23/21  0711 12/23/21  1125 12/23/21  1705   POCGLU 186* 257* 477* 264*       Blood Sugar Average: Last 72 hrs:  (P) 416 [id number]   Poorly controlled  Continue 70/30 at 50 units BID along ISS coverage  Diabetic diet   Current hyperglycemia in setting of IV steroids  Morbid obesity with BMI of 50 0-59 9, adult [facility])  Assessment & Plan  History noted        VTE Pharmacologic Prophylaxis: VTE Score: 8 High Risk (Score >/= 5) - Pharmacological DVT Prophylaxis Ordered: enoxaparin (Lovenox)  Sequential Compression Devices Ordered      Patient Centered Rounds: I performed bedside rounds with nursing staff today  Discussions with Specialists or Other Care Team Provider: None     Education and Discussions with Family / Patient: Updated  () via phone      Time Spent for Care: 20 minutes  More than 50% of total time spent on counseling and coordination of care as described above      Current Length of Stay: 5 day(s)  Current Patient Status: Inpatient   Certification Statement: The patient will continue to require additional inpatient hospital stay due to Continue treatment of COVID-19 pneumonia, acute hypoxic respiratory failure  Discharge Plan: Anticipate discharge in 48-72 hrs to home      Code Status: Level 1 - Full Code    Subjective:   Patient reports subjective improvement in her symptoms today, with decrease in dyspnea  Still requiring 3L supplemental O2  No overnight events reported  Remains afebrile  Objective:     Vitals:   Temp (24hrs), Av 7 °F (36 5 °C), Min:97 4 °F (36 3 °C), Max:97 9 °F (36 6 °C)    Temp:  [97 4 °F (36 3 °C)-97 9 °F (36 6 °C)] 97 4 °F (36 3 °C)  HR:  [58-73] 73  Resp:  [16-18] 18  BP: (118-153)/(56-82) 153/82  SpO2:  [85 %-95 %] 85 %  Body mass index is 51 61 kg/m²  Input and Output Summary (last 24 hours): Intake/Output Summary (Last 24 hours) at 2021 1808  Last data filed at 2021 1256  Gross per 24 hour   Intake 780 ml   Output 400 ml   Net 380 ml       Physical Exam:   Vital signs reviewed  Constitutional:       Appearance: Normal appearance  She is normal weight  Neurological:      General: No focal deficit present       Mental Status: She is alert and oriented to person, place, and time     Psychiatric: Ozella Habermann and Affect: Mood normal          Behavior: Behavior normal      Additional Data:     Labs:  Results from last 7 days   Lab Units 21  0558   WBC Thousand/uL 4 21*   HEMOGLOBIN g/dL 14 4   HEMATOCRIT % 43 6   PLATELETS Thousands/uL 194   NEUTROS PCT % 72   LYMPHS PCT % 24   MONOS PCT % 3*   EOS PCT % 0     Results from last 7 days   Lab Units 12/23/21  0558   SODIUM mmol/L 137   POTASSIUM mmol/L 5 0   CHLORIDE mmol/L 103   CO2 mmol/L 24   BUN mg/dL 27*   CREATININE mg/dL 0 93   ANION GAP mmol/L 10   CALCIUM mg/dL 8 3   ALBUMIN g/dL 2 9*   TOTAL BILIRUBIN mg/dL 0 48   ALK PHOS U/L 70   ALT U/L 47   AST U/L 41   GLUCOSE RANDOM mg/dL 244*         Results from last 7 days   Lab Units 12/23/21  1705 12/23/21  1125 12/23/21  0711 12/22/21  2127 12/22/21  1607 12/22/21  1133 12/22/21  0713 12/21/21  1656 12/21/21  1120 12/21/21  0728 12/20/21  1708 12/20/21  1145   POC GLUCOSE mg/dl 264* 477* 257* 186* 305* 357* 262* 276* 442* 320* 155* 370*         Results from last 7 days   Lab Units 12/21/21  0635 12/20/21  0546 12/18/21  1630   LACTIC ACID mmol/L  --   --  1 6   PROCALCITONIN ng/ml <0 05 <0 05  --        Lines/Drains:  Invasive Devices  Report    Peripheral Intravenous Line            Peripheral IV 12/20/21 Left Antecubital 3 days                Imaging: No pertinent imaging reviewed  Recent Cultures (last 7 days):   Results from last 7 days   Lab Units 12/18/21  1502   BLOOD CULTURE  No Growth After 4 Days  No Growth After 4 Days         Last 24 Hours Medication List:   Current Facility-Administered Medications   Medication Dose Route Frequency Provider Last Rate    acetaminophen  650 mg Oral Q6H PRN Flaquito Rodrigez PA-C      albuterol  2 puff Inhalation Q4H PRN Yelitza Cedillo MD      amLODIPine  5 mg Oral Daily Yelitza Cedillo MD      ascorbic acid  1,000 mg Oral BID Yelitza Cedillo MD      aspirin  81 mg Oral Daily Yelitza Cedillo MD      atorvastatin  40 mg Oral Daily With Henna Pratt MD      carisoprodol  350 mg Oral TID Danella Duane, MD      cholecalciferol  1,000 Units Oral Daily Yelitza Cedillo MD      dexamethasone  6 mg Intravenous Q24H Peter Evangelista MD      enoxaparin  60 mg Subcutaneous Q12H [address] Yelitza Oro MD      furosemide  20 mg Oral Daily Peter Alfredo MD      gabapentin  600 mg Oral TID Yelitza Oro MD      insulin aspart protamine-insulin aspart  50 Units Subcutaneous BID AC Yelitza Oro MD      insulin lispro  4-20 Units Subcutaneous TID AC Yelitza Oro MD      lidocaine  1 patch Topical Daily ISABELLE Cartagena      magnesium oxide  400 mg Oral BID Yelitza Oro MD      traZODone  50 mg Oral HS PRN ISABELLE Juarez      zinc sulfate  220 mg Oral Daily Peter Evangelista MD          Today, Patient Was Seen By: Peter Alfredo MD    **Please Note: This note may have been constructed using a voice recognition system  **

## 2021-12-23 NOTE — ASSESSMENT & PLAN NOTE
Requiring 3 L NC , no baseline O2 requirement  Currently being treated under the mild algorithm  Patient reports improvement overall in symptoms  · Completed course of remdesivir  · Day #6 of Decadron 6 mg IV daily  · D-Dimer   59 - continue VTE prophylaxis with SC enoxaparin  · Check daily labs  · Procalcitonin negative X 2   · Trend CRP, D-dimer

## 2021-12-23 NOTE — ASSESSMENT & PLAN NOTE
Blood pressure mildly hypotensive initially  Continue calcium channel blocker, hold ACE-inhibitor  Current blood pressure improved  Discontinued IV fluids  Patient's low-dose daily furosemide previously on hold, resume today

## 2021-12-23 NOTE — UTILIZATION REVIEW
Continued Stay Review    Date:  15- 22-23-21                     Current Patient Class: inpatient  Current Level of Care:  med surg    HPI:61 y o  female initially admitted on  12-18-21     Assessment/Plan:     Continue iv dexamethasone, sq lovenox  Patient requires 3L O2nc to maintain O2 sats at least 93%  Vital Signs:     Date/Time Temp Pulse Resp BP MAP  SpO2 Nasal Cannula O2 Flow Rate (L/min)   12/23/21 07:11:13 97 9 °F (36 6 °C) 58 16 128/77 94 95 % 3 L/min   12/22/21 23:40:27 97 9 °F (36 6 °C) 58 16 118/56 77 95 % --   12/22/21 2157 -- -- -- -- -- -- 3 L/min   12/22/21 15:38:14 98 3 °F (36 8 °C) 67 18 156/85 109 93 % --   12/22/21 06:42:59 96 6 °F (35 9 °C)   Abnormal  64 16 129/66 87 95 % --             Pertinent Labs/Diagnostic Results:   Results from last 7 days   Lab Units 12/18/21  1435   SARS-COV-2  Positive*     Results from last 7 days   Lab Units 12/23/21  0558 12/22/21  0453 12/21/21  0654 12/20/21  0546 12/20/21  0546 12/18/21  1502 12/18/21  1502   WBC Thousand/uL 4 21* 5 45 3 28*   < > 5 65  5 38   < > 7 20   HEMOGLOBIN g/dL 14 4 14 3 14 7  --  14 2  14 1  --  13 9   HEMATOCRIT % 43 6 43 8 45 1  --  43 5  43 0  --  42 4   PLATELETS Thousands/uL 194 174 187   < > 183  181   < > 166   NEUTROS ABS Thousands/µL 3 03 4 10 2 00   < > 4 29   < > 5 67    < > = values in this interval not displayed           Results from last 7 days   Lab Units 12/23/21  0558 12/22/21  0453 12/21/21  0634 12/20/21  0546 12/18/21  1502   SODIUM mmol/L 137 137 134* 136  136 135*   POTASSIUM mmol/L 5 0 5 1 4 9 4 7  4 7 3 9   CHLORIDE mmol/L 103 103 99* 101  101 101   CO2 mmol/L 24 23 24 27  28 25   ANION GAP mmol/L 10 11 11 8  7 9   BUN mg/dL 27* 31* 43* 35*  36* 24   CREATININE mg/dL 0 93 1 00 1 34* 1 23  1 26 1 24   EGFR ml/min/1 73sq m 65 60 42 46  45 46   CALCIUM mg/dL 8 3 8 0* 8 3 8 5  8 6 8 5     Results from last 7 days   Lab Units 12/23/21  0558 12/22/21  0453 12/21/21  0634 12/20/21  0546 12/18/21  1502   AST U/L 41 47* 47* 40  42 52*   ALT U/L 47 48 52 59  58 78   ALK PHOS U/L 70 74 72 69  71 75   TOTAL PROTEIN g/dL 6 9 6 8 7 1 6 8  7 0 7 0   ALBUMIN g/dL 2 9* 2 9* 3 0* 3 1*  3 2* 3 4*   TOTAL BILIRUBIN mg/dL 0 48 0 44 0 47 0 36  0 34 0 36     Results from last 7 days   Lab Units 12/23/21  1125 12/23/21  0711 12/22/21  2127 12/22/21  1607 12/22/21  1133 12/22/21  0713 12/21/21  1656 12/21/21  1120 12/21/21  0728 12/20/21  1708 12/20/21  1145 12/20/21  0738   POC GLUCOSE mg/dl 477* 257* 186* 305* 357* 262* 276* 442* 320* 155* 370* 218*     Results from last 7 days   Lab Units 12/23/21  0558 12/22/21  0453 12/21/21  0634 12/20/21  0546 12/18/21  1502   GLUCOSE RANDOM mg/dL 244* 233* 332* 195*  198* 86         Results from last 7 days   Lab Units 12/20/21  0546   CK TOTAL U/L 140   CK MB INDEX % 1 1   CK MB ng/mL 1 5     Results from last 7 days   Lab Units 12/20/21  0759 12/20/21  0546 12/18/21  1502   HS TNI 0HR ng/L  --  6 7   HS TNI 2HR ng/L 4  --   --    HSTNI D2 ng/L -2  --   --      Results from last 7 days   Lab Units 12/21/21  0635 12/18/21  1631   D-DIMER QUANTITATIVE ug/ml FEU 0 32 0 59*             Results from last 7 days   Lab Units 12/21/21  0635 12/20/21  0546   PROCALCITONIN ng/ml <0 05 <0 05     Results from last 7 days   Lab Units 12/18/21  1630   LACTIC ACID mmol/L 1 6             Results from last 7 days   Lab Units 12/20/21  0546   NT-PRO BNP pg/mL 30       Results from last 7 days   Lab Units 12/21/21  0635 12/20/21  0546   CRP mg/L 27 1* 43 1*  42 9*       Results from last 7 days   Lab Units 12/18/21  1435   INFLUENZA A PCR  Negative   INFLUENZA B PCR  Negative   RSV PCR  Negative         Results from last 7 days   Lab Units 12/18/21  1502   BLOOD CULTURE  No Growth After 4 Days  No Growth After 4 Days         Scheduled Medications:  amLODIPine, 5 mg, Oral, Daily  ascorbic acid, 1,000 mg, Oral, BID  aspirin, 81 mg, Oral, Daily  atorvastatin, 40 mg, Oral, Daily With Dinner  cholecalciferol, 1,000 Units, Oral, Daily  dexamethasone, 6 mg, Intravenous, Q24H  enoxaparin, 60 mg, Subcutaneous, Q12H [place]  furosemide, 20 mg, Oral, Daily  gabapentin, 600 mg, Oral, TID  insulin aspart protamine-insulin aspart, 50 Units, Subcutaneous, BID AC  insulin lispro, 4-20 Units, Subcutaneous, TID AC  lidocaine, 1 patch, Topical, Daily  magnesium oxide, 400 mg, Oral, BID  zinc sulfate, 220 mg, Oral, Daily      Continuous IV Infusions:     PRN Meds:  acetaminophen, 650 mg, Oral, Q6H PRN  albuterol, 2 puff, Inhalation, Q4H PRN  traZODone, 50 mg, Oral, HS PRN        Discharge Plan: to be determined     Network Utilization Review Department  ATTENTION: Please call with any questions or concerns to [phone] and carefully listen to the prompts so that you are directed to the right person  All voicemails are confidential   Bg Bruce all requests for admission clinical reviews, approved or denied determinations and any other requests to dedicated fax number below belonging to the campus where the patient is receiving treatment   List of dedicated fax numbers for the Facilities:  [address] DENIALS (Administrative/Medical Necessity) [phone]   [address] (Maternity/NICU/Pediatrics) [phone]   [address]  [address] [facility] [place] [address] [id number] [place] [address] [address]ia [address] [place] [address] [phone]

## 2021-12-23 NOTE — ASSESSMENT & PLAN NOTE
Lab Results   Component Value Date    EGFR 65 12/23/2021    EGFR 60 12/22/2021    EGFR 42 12/21/2021    CREATININE 0 93 12/23/2021    CREATININE 1 00 12/22/2021    CREATININE 1 34 (H) 12/21/2021     Baseline creatinine of 1 2  Renal function appears stable at baseline

## 2021-12-23 NOTE — PLAN OF CARE
Problem: PHYSICAL THERAPY ADULT  Goal: Performs mobility at highest level of function for planned discharge setting  See evaluation for individualized goals  Description: Treatment/Interventions: Functional transfer training,Elevations,LE strengthening/ROM,Therapeutic exercise,Endurance training,Bed mobility,Gait training          See flowsheet documentation for full assessment, interventions and recommendations  Outcome: Progressing  Note: Prognosis: Good  Problem List: Decreased strength,Decreased endurance,Impaired balance,Decreased mobility  Assessment: Pt  seen for PT treatment session this date with interventions consisting of  therapeutic exercises, transfers and  gait training w/ emphasis on improving pt's ability to ambulate  Pt  Currently performing  tx and ambulation at ( SUP) x 1 level of function  Please also refer to physical therapy recommendation for safe DC planning  In comparison to previous session, Pt  With improvements in activity tolerance  SpO2 92-91 with ambulation 3 L O2  Pt is in need of continued activity in PT to improve strength balance endurance mobility transfers and ambulation with return to maximize LOF  From PT/mobility standpoint, recommendation at time of d/c would be home health PT in order to promote return to PLOF and independence  The patient's AM-PAC Basic Mobility Inpatient Short Form Raw Score is 22  A Raw score of greater than 16 suggests the patient may benefit from discharge to home  PT Discharge Recommendation: Home with home health rehabilitation          See flowsheet documentation for full assessment

## 2021-12-23 NOTE — ASSESSMENT & PLAN NOTE
Lab Results   Component Value Date    HGBA1C 11 1 (A) 10/22/2021       Recent Labs     12/22/21  2127 12/23/21  0711 12/23/21  1125 12/23/21  1705   POCGLU 186* 257* 477* 264*       Blood Sugar Average: Last 72 hrs:  (P) 078 [id number]   Poorly controlled  Continue 70/30 at 50 units BID along ISS coverage  Diabetic diet   Current hyperglycemia in setting of IV steroids

## 2021-12-24 VITALS
DIASTOLIC BLOOD PRESSURE: 77 MMHG | TEMPERATURE: 98.4 F | HEART RATE: 70 BPM | OXYGEN SATURATION: 89 % | WEIGHT: 266.98 LBS | SYSTOLIC BLOOD PRESSURE: 143 MMHG | RESPIRATION RATE: 20 BRPM | HEIGHT: 62 IN | BODY MASS INDEX: 49.13 KG/M2

## 2021-12-24 LAB
ALBUMIN SERPL BCP-MCNC: 3 G/DL (ref 3.5–5)
ALP SERPL-CCNC: 75 U/L (ref 46–116)
ALT SERPL W P-5'-P-CCNC: 63 U/L (ref 12–78)
ANION GAP SERPL CALCULATED.3IONS-SCNC: 8 MMOL/L (ref 4–13)
AST SERPL W P-5'-P-CCNC: 53 U/L (ref 5–45)
BACTERIA BLD CULT: NORMAL
BACTERIA BLD CULT: NORMAL
BASOPHILS # BLD AUTO: 0.01 THOUSANDS/ΜL (ref 0–0.1)
BASOPHILS NFR BLD AUTO: 0 % (ref 0–1)
BILIRUB SERPL-MCNC: 0.57 MG/DL (ref 0.2–1)
BUN SERPL-MCNC: 31 MG/DL (ref 5–25)
CALCIUM ALBUM COR SERPL-MCNC: 9.6 MG/DL (ref 8.3–10.1)
CALCIUM SERPL-MCNC: 8.8 MG/DL (ref 8.3–10.1)
CHLORIDE SERPL-SCNC: 102 MMOL/L (ref 100–108)
CO2 SERPL-SCNC: 26 MMOL/L (ref 21–32)
CREAT SERPL-MCNC: 0.99 MG/DL (ref 0.6–1.3)
EOSINOPHIL # BLD AUTO: 0 THOUSAND/ΜL (ref 0–0.61)
EOSINOPHIL NFR BLD AUTO: 0 % (ref 0–6)
ERYTHROCYTE [DISTWIDTH] IN BLOOD BY AUTOMATED COUNT: 13.7 % (ref 11.6–15.1)
GFR SERPL CREATININE-BSD FRML MDRD: 60 ML/MIN/1.73SQ M
GLUCOSE SERPL-MCNC: 255 MG/DL (ref 65–140)
GLUCOSE SERPL-MCNC: 317 MG/DL (ref 65–140)
GLUCOSE SERPL-MCNC: 381 MG/DL (ref 65–140)
HCT VFR BLD AUTO: 44.4 % (ref 34.8–46.1)
HGB BLD-MCNC: 14.7 G/DL (ref 11.5–15.4)
IMM GRANULOCYTES # BLD AUTO: 0.03 THOUSAND/UL (ref 0–0.2)
IMM GRANULOCYTES NFR BLD AUTO: 1 % (ref 0–2)
LYMPHOCYTES # BLD AUTO: 1.1 THOUSANDS/ΜL (ref 0.6–4.47)
LYMPHOCYTES NFR BLD AUTO: 22 % (ref 14–44)
MCH RBC QN AUTO: 27.7 PG (ref 26.8–34.3)
MCHC RBC AUTO-ENTMCNC: 33.1 G/DL (ref 31.4–37.4)
MCV RBC AUTO: 84 FL (ref 82–98)
MONOCYTES # BLD AUTO: 0.13 THOUSAND/ΜL (ref 0.17–1.22)
MONOCYTES NFR BLD AUTO: 3 % (ref 4–12)
NEUTROPHILS # BLD AUTO: 3.64 THOUSANDS/ΜL (ref 1.85–7.62)
NEUTS SEG NFR BLD AUTO: 74 % (ref 43–75)
NRBC BLD AUTO-RTO: 0 /100 WBCS
PLATELET # BLD AUTO: 238 THOUSANDS/UL (ref 149–390)
PMV BLD AUTO: 9.6 FL (ref 8.9–12.7)
POTASSIUM SERPL-SCNC: 4.5 MMOL/L (ref 3.5–5.3)
PROCALCITONIN SERPL-MCNC: <0.05 NG/ML
PROT SERPL-MCNC: 7.1 G/DL (ref 6.4–8.2)
RBC # BLD AUTO: 5.3 MILLION/UL (ref 3.81–5.12)
SODIUM SERPL-SCNC: 136 MMOL/L (ref 136–145)
WBC # BLD AUTO: 4.91 THOUSAND/UL (ref 4.31–10.16)

## 2021-12-24 PROCEDURE — 94760 N-INVAS EAR/PLS OXIMETRY 1: CPT

## 2021-12-24 PROCEDURE — 85025 COMPLETE CBC W/AUTO DIFF WBC: CPT | Performed by: INTERNAL MEDICINE

## 2021-12-24 PROCEDURE — 99239 HOSP IP/OBS DSCHRG MGMT >30: CPT | Performed by: INTERNAL MEDICINE

## 2021-12-24 PROCEDURE — 84145 PROCALCITONIN (PCT): CPT | Performed by: INTERNAL MEDICINE

## 2021-12-24 PROCEDURE — 94761 N-INVAS EAR/PLS OXIMETRY MLT: CPT

## 2021-12-24 PROCEDURE — 82948 REAGENT STRIP/BLOOD GLUCOSE: CPT

## 2021-12-24 PROCEDURE — 80053 COMPREHEN METABOLIC PANEL: CPT | Performed by: INTERNAL MEDICINE

## 2021-12-24 RX ORDER — LIDOCAINE 50 MG/G
1 PATCH TOPICAL DAILY
Qty: 10 PATCH | Refills: 0 | Status: SHIPPED | OUTPATIENT
Start: 2021-12-25

## 2021-12-24 RX ORDER — CARISOPRODOL 350 MG/1
350 TABLET ORAL 3 TIMES DAILY
Qty: 30 TABLET | Refills: 0 | Status: SHIPPED | OUTPATIENT
Start: 2021-12-24 | End: 2022-01-03

## 2021-12-24 RX ORDER — LISINOPRIL 5 MG/1
10 TABLET ORAL DAILY
Start: 2021-12-24

## 2021-12-24 RX ADMIN — OXYCODONE HYDROCHLORIDE AND ACETAMINOPHEN 1000 MG: 500 TABLET ORAL at 08:02

## 2021-12-24 RX ADMIN — CARISOPRODOL 350 MG: 350 TABLET ORAL at 08:02

## 2021-12-24 RX ADMIN — INSULIN ASPART 50 UNITS: 100 INJECTION, SUSPENSION SUBCUTANEOUS at 08:02

## 2021-12-24 RX ADMIN — Medication 1000 UNITS: at 08:02

## 2021-12-24 RX ADMIN — ASPIRIN 81 MG CHEWABLE TABLET 81 MG: 81 TABLET CHEWABLE at 08:02

## 2021-12-24 RX ADMIN — AMLODIPINE BESYLATE 5 MG: 5 TABLET ORAL at 08:02

## 2021-12-24 RX ADMIN — FUROSEMIDE 20 MG: 20 TABLET ORAL at 08:02

## 2021-12-24 RX ADMIN — INSULIN LISPRO 16 UNITS: 100 INJECTION, SOLUTION INTRAVENOUS; SUBCUTANEOUS at 11:42

## 2021-12-24 RX ADMIN — LIDOCAINE 5% 1 PATCH: 700 PATCH TOPICAL at 08:03

## 2021-12-24 RX ADMIN — MAGNESIUM OXIDE TAB 400 MG (241.3 MG ELEMENTAL MG) 400 MG: 400 (241.3 MG) TAB at 08:02

## 2021-12-24 RX ADMIN — ZINC SULFATE 220 MG (50 MG) CAPSULE 220 MG: CAPSULE at 08:02

## 2021-12-24 RX ADMIN — GABAPENTIN 600 MG: 300 CAPSULE ORAL at 08:02

## 2021-12-24 RX ADMIN — ENOXAPARIN SODIUM 60 MG: 60 INJECTION SUBCUTANEOUS at 08:02

## 2021-12-24 RX ADMIN — INSULIN LISPRO 12 UNITS: 100 INJECTION, SOLUTION INTRAVENOUS; SUBCUTANEOUS at 07:51

## 2021-12-24 NOTE — DISCHARGE SUMMARY
[address] [facility] [address] [place]  Discharge- Sarah Parikh 1960, 59 y o  female MRN: [id number]  Unit/Bed#: 423-01 Encounter: [id number]  Primary Care Provider: Nia Wade DO   Date and time admitted to hospital: 12/18/2021  1:11 PM    * Acute hypoxemic respiratory failure due to COVID-19 [facility])  Assessment & Plan  Initially required 3 L NC , no baseline O2 requirement  Was treated under the mild algorithm  Patient with significant improvement overall in symptoms, and now on room air this morning  Was ambulated by RT, found to have no home O2 requirements  · Completed course of remdesivir  · Received 6 days of Decadron 6 mg IV daily  · D-Dimer   61 - was maintained on VTE prophylaxis with SC enoxaparin  · Checked daily labs  · Procalcitonin negative X 2   · Trended CRP, D-dimer  Essential hypertension  Assessment & Plan  Blood pressure mildly hypotensive initially  Was maintained on calcium channel blocker, with ACE-inhibitor on hold  Current blood pressure improved  Discontinued IV fluids  Patient's low-dose daily furosemide previously on hold, resume previously  Patient's blood pressures have rebounded, and are now mildly hypertensive in the 140s  Plan will be to resume ACE-inhibitor at home following discharge  Stage 3 chronic kidney disease [facility])  Assessment & Plan  Lab Results   Component Value Date    EGFR 60 12/24/2021    EGFR 65 12/23/2021    EGFR 60 12/22/2021    CREATININE 0 99 12/24/2021    CREATININE 0 93 12/23/2021    CREATININE 1 00 12/22/2021     Baseline creatinine of 1 2  Renal function appears stable at baseline      Hyperlipidemia associated with type 2 diabetes mellitus [facility])  Assessment & Plan  Lab Results   Component Value Date    HGBA1C 11 1 (A) 10/22/2021       Recent Labs     12/23/21  1705 12/23/21  2112 12/24/21  0714 12/24/21  1129   POCGLU 264* 197* 317* 381*       Blood Sugar Average: Last 72 hrs:  (P) 124 [id number]   Poorly controlled  Continue home insulin regimen  Was maintained on sliding scale coverage and ADA diet while hospitalized  Hyperglycemic in the setting of IV steroid use  Morbid obesity with BMI of 50 0-59 9, adult [facility])  Assessment & Plan  History noted  Medical Problems             Resolved Problems  Date Reviewed: 12/24/2021    None              Discharging Physician / Practitioner: Danella Duane, MD  PCP: Toyin Loco DO  Admission Date:   Admission Orders (From admission, onward)     Ordered        12/18/21 1819  Inpatient Admission  Once                      Discharge Date: 12/24/21    Consultations During Hospital Stay:  · None    Procedures Performed:   · None    Significant Findings / Test Results:   CTA ED chest PE Study    Result Date: 12/20/2021  Impression: No pulmonary embolism, noting limited evaluation of subsegmental pulmonary arteries  Pulmonary parenchymal findings of known Covid 19 pneumonia  Workstation performed: [id number]     Incidental Findings:   · As above     Test Results Pending at Discharge (will require follow up): · None     Outpatient Tests Requested:  · None    Complications:  None    Reason for Admission: COVID-19 Pneumonia, acute hypoxic respiratory failure  HPI from original H&P dated 12/18/2021:     60 y/o F unvaccinated presents with SOb x 3 days associated with fatigue and fevers  Found to have covid 19 and requiring 3 LNC  Denies any chest pain / nausea / vomiting  Does endorse decreased PO intake  Full code  Please see above list of diagnoses and related plan for additional information       Condition at Discharge: stable    Discharge Day Visit / Exam:     Vitals: Blood Pressure: 143/77 (12/24/21 0636)  Pulse: 70 (12/24/21 0817)  Temperature: 98 4 °F (36 9 °C) (12/24/21 0636)  Temp Source: Temporal (12/24/21 0636)  Respirations: 20 (12/24/21 0636)  Height: 5' 2" (157 5 cm) (12/18/21 1308)  Weight - Scale: 121 kg (266 lb 15 6 oz) (12/24/21 0535)  SpO2: (!) 89 % (12/24/21 1049)    Discussion with Family: Updated  (significant other) via phone      Discharge instructions/Information to patient and family:   See after visit summary for information provided to patient and family  Provisions for Follow-Up Care:  See after visit summary for information related to follow-up care and any pertinent home health orders  Disposition:   Home with [facility] Services (Reminder: Complete face to face encounter)    Planned Readmission: No     Discharge Statement:  I spent 35 minutes discharging the patient  This time was spent on the day of discharge  I had direct contact with the patient on the day of discharge  Greater than 50% of the total time was spent examining patient, answering all patient questions, arranging and discussing plan of care with patient as well as directly providing post-discharge instructions  Additional time then spent on discharge activities  Discharge Medications:  See after visit summary for reconciled discharge medications provided to patient and/or family        **Please Note: This note may have been constructed using a voice recognition system**

## 2021-12-24 NOTE — CASE MANAGEMENT
Case Management Discharge Planning Note    Patient name Berenice Felder /[id number] MRN [id number]  : 1960 Date 2021       Current Admission Date: 2021  Current Admission Diagnosis:Acute hypoxemic respiratory failure due to [address])   Patient Active Problem List    Diagnosis Date Noted    Acute hypoxemic respiratory failure due to [address]) 2021    Hyperlipidemia associated with type 2 diabetes mellitus ([facility] ) 2021    Obstructive sleep apnea syndrome     Proteinuria 2020    Neuropathy 2020    Edema 2019    Diabetic ulcer of left midfoot associated with type 2 diabetes mellitus, limited to breakdown of skin [facility] ) 2019    Right hip pain 2019    Morbid obesity with BMI of 50 0-59 9, adult ([facility] ) 2019    Myofascial pain syndrome 2019    Lumbar spondylosis 2019    Spinal stenosis of lumbar region with neurogenic claudication 2019    Thoracic spinal stenosis 2019    Chronic back pain 2019    Chronic bilateral low back pain with bilateral sciatica 2018    Lumbar radiculopathy 2018    Diabetic neuropathy (HCC) 2018    Skin callus 2018    Lipoma of back 2018    Leg swelling 10/18/2018    Osteoarthritis of spine 10/08/2018    Chronic pain syndrome 10/02/2018    History of MRSA infection 2018    Screening for colon cancer 2018    Screening for breast cancer 2018    Screening for cervical cancer 2018    Eye exam, routine 2018    Stage 3 chronic kidney disease ([facility] ) 2017    Vitamin D deficiency 2017    Type 2 diabetes mellitus with hyperglycemia, with long-term current use of insulin ([facility] ) 2017    Insomnia 2017    Essential hypertension 2017    GERD (gastroesophageal reflux disease) 2017    Gait disturbance 2017    Benign mole 2017    Anxiety 2017    History of Salmonella infection 03/20/2016    Mixed hyperlipidemia 03/20/2016    History of pulmonary embolism 03/20/2016    Achilles tendinitis of left lower extremity 03/20/2016    Diabetic ulcer of left heel ([facility] ) 03/18/2016    Ulcer of toe of left foot ([facility] ) 03/18/2016    Melanocytic nevus of trunk 11/19/2015    Menopausal and perimenopausal disorder 07/15/2015      LOS (days): 6  Geometric Mean LOS (GMLOS) (days):   Days to GMLOS:     OBJECTIVE:  Risk of Unplanned Readmission Score: 15         Current admission status: Inpatient   Preferred Pharmacy:   [facility], [address] [place] [address] [address] [address]  Phone: [phone] Fax: [phone]    Primary Care Provider: Steve Sneed DO    Primary Insurance: Linda Foss  Secondary Insurance:     DISCHARGE DETAILS:    Discharge planning discussed with[de-identified] patient  Freedom of Choice: Yes  Comments - Freedom of Choice: no preference for [facility] whoever is takes insurance  CM contacted family/caregiver?: No- see comments (pt alert and oriented)  Were Treatment Team discharge recommendations reviewed with patient/caregiver?: Yes  Did patient/caregiver verbalize understanding of patient care needs?: Yes  Were patient/caregiver advised of the risks associated with not following Treatment Team discharge recommendations?: Yes    Contacts  Reason/Outcome: Discharge [address]         Is the patient interested in [facility] at discharge?: Yes  [address] requested[de-identified] [address] Name[de-identified] [address] Provider[de-identified] PCP  Home Health Services Needed[de-identified] Strengthening/Theraputic Exercises to Improve Function,Evaluate Functional Status and Safety (nursing for respiratory assessment)  Homebound Criteria Met[de-identified] Requires the Assistance of Another Person for Safe Ambulation or to Leave the Home  Supporting Clincal Findings[de-identified] Limited Endurance      Would you like to participate in our [address] service program?  : No - Declined    Treatment Team Recommendation: Home with [address] [facility] [address] [address]  Discharge Destination Plan[de-identified] Home with [place] at Discharge : Auto with designated        Discussed with patient preferences on discharge;understanding how to manage health at home; purpose of taking medications; importance of follow up care/appointments; and symptoms to watch out for once discharged home  Will send out referrals for [facility] that take her insurance  Instructed pt they might not get in touch with her until next week due to the holiday  Pt did request note for work that she was hospitalized  Family to transport home

## 2021-12-24 NOTE — RESPIRATORY THERAPY NOTE
12/24/21 0027   Respiratory Assessment   Assessment Type Assess only   Oxygen Therapy/Pulse Ox   O2 Device Nasal cannula   O2 Therapy Oxygen humidified   Nasal Cannula O2 Flow Rate (L/min) 2 5 L/min   Calculated FIO2 (%) - Nasal Cannula 30   SpO2 Activity At Rest   $ Pulse Oximetry Spot Check Charge Completed

## 2021-12-24 NOTE — ASSESSMENT & PLAN NOTE
Lab Results   Component Value Date    EGFR 60 12/24/2021    EGFR 65 12/23/2021    EGFR 60 12/22/2021    CREATININE 0 99 12/24/2021    CREATININE 0 93 12/23/2021    CREATININE 1 00 12/22/2021     Baseline creatinine of 1 2  Renal function appears stable at baseline

## 2021-12-24 NOTE — ASSESSMENT & PLAN NOTE
Lab Results   Component Value Date    HGBA1C 11 1 (A) 10/22/2021       Recent Labs     12/23/21  1705 12/23/21  2112 12/24/21  0714 12/24/21  1129   POCGLU 264* 197* 317* 381*       Blood Sugar Average: Last 72 hrs:  (P) 982 [id number]   Poorly controlled  Continue home insulin regimen  Was maintained on sliding scale coverage and ADA diet while hospitalized  Hyperglycemic in the setting of IV steroid use

## 2021-12-24 NOTE — ASSESSMENT & PLAN NOTE
Blood pressure mildly hypotensive initially  Was maintained on calcium channel blocker, with ACE-inhibitor on hold  Current blood pressure improved  Discontinued IV fluids  Patient's low-dose daily furosemide previously on hold, resume previously  Patient's blood pressures have rebounded, and are now mildly hypertensive in the 140s  Plan will be to resume ACE-inhibitor at home following discharge

## 2021-12-24 NOTE — ASSESSMENT & PLAN NOTE
Initially required 3 L NC , no baseline O2 requirement  Was treated under the mild algorithm  Patient with significant improvement overall in symptoms, and now on room air this morning  Was ambulated by RT, found to have no home O2 requirements  · Completed course of remdesivir  · Received 6 days of Decadron 6 mg IV daily  · D-Dimer   61 - was maintained on VTE prophylaxis with SC enoxaparin  · Checked daily labs  · Procalcitonin negative X 2   · Trended CRP, D-dimer

## 2021-12-27 NOTE — CASE MANAGEMENT
Case Management Discharge Planning Note    Patient name Otoniel Foil  Location /387-86 MRN [id number]  : 1960 Date 2021       Current Admission Date: 2021  Current Admission Diagnosis:Acute hypoxemic respiratory failure due to [address])   Patient Active Problem List    Diagnosis Date Noted    Acute hypoxemic respiratory failure due to [address]) 2021    Hyperlipidemia associated with type [facility] diabetes mellitus [facility] ) 2021    Obstructive sleep apnea syndrome     Proteinuria 2020    Neuropathy 2020    Edema 2019    Diabetic ulcer of left midfoot associated with type 2 diabetes mellitus, limited to breakdown of skin ([facility] ) 2019    Right hip pain 2019    Morbid obesity with BMI of 50 0-59 9, adult ([facility] ) 2019    Myofascial pain syndrome 2019    Lumbar spondylosis 2019    Spinal stenosis of lumbar region with neurogenic claudication 2019    Thoracic spinal stenosis 2019    Chronic back pain 2019    Chronic bilateral low back pain with bilateral sciatica 2018    Lumbar radiculopathy 2018    Diabetic neuropathy (HCC) 2018    Skin callus 2018    Lipoma of back 2018    Leg swelling 10/18/2018    Osteoarthritis of spine 10/08/2018    Chronic pain syndrome 10/02/2018    History of MRSA infection 2018    Screening for colon cancer 2018    Screening for breast cancer 2018    Screening for cervical cancer 2018    Eye exam, routine 2018    Stage 3 chronic kidney disease ([facility] ) 2017    Vitamin D deficiency 2017    Type 2 diabetes mellitus with hyperglycemia, with long-term current use of insulin ([facility] ) 2017    Insomnia 2017    Essential hypertension 2017    GERD (gastroesophageal reflux disease) 2017    Gait disturbance 2017    Benign mole 2017    Anxiety 2017    History of Salmonella infection 03/20/2016    Mixed hyperlipidemia 03/20/2016    History of pulmonary embolism 03/20/2016    Achilles tendinitis of left lower extremity 03/20/2016    Diabetic ulcer of left heel ([facility] ) 03/18/2016    Ulcer of toe of left foot ([facility] ) 03/18/2016    Melanocytic nevus of trunk 11/19/2015    Menopausal and perimenopausal disorder 07/15/2015      LOS (days): 6  Geometric Mean LOS (GMLOS) (days):   Days to GMLOS:     OBJECTIVE:  Risk of Unplanned Readmission Score: 15         Current admission status: Inpatient   Preferred Pharmacy:   [address] [facility], [address] [place] [address] [address] [address]  Phone: [phone] Fax: [phone]    Primary Care Provider: Tristen Alvarez DO    Primary Insurance: [address] [place] [address]  Secondary Insurance:     DISCHARGE DETAILS:call placed to pt and let her know [address] can  her case starting 1/3  Told her we were limited due to insurance  Pt states she did call her PCP today and is waiting for them to call back for follow up  AVS faxed to [place]

## 2021-12-27 NOTE — UTILIZATION REVIEW
Notification of Discharge   This is a Notification of Discharge from our facility [address]  Please be advised that this patient has been discharge from our facility  Below you will find the admission and discharge date and time including the patients disposition  UTILIZATION REVIEW CONTACT:  Lois Rojas  Utilization   Network Utilization Review Department  Phone: [phone] x carefully listen to the prompts  All voicemails are confidential   Email: [email or web address]     PHYSICIAN ADVISORY SERVICES:  FOR UNUN-DI-WRPE REVIEW - MEDICAL NECESSITY DENIAL  Phone: [phone]  Fax: [phone]  Email: [email or web address]     PRESENTATION DATE: 12/18/2021  1:11 PM  OBERVATION ADMISSION DATE:   INPATIENT ADMISSION DATE: 12/18/21  6:19 PM   DISCHARGE DATE: 12/24/2021  2:15 PM  DISPOSITION: Home with [place] with [address] [place] [address] Road INFORMATION:  Send all requests for admission clinical reviews, approved or denied determinations and any other requests to dedicated fax number below belonging to the campus where the patient is receiving treatment   List of dedicated fax numbers:  [address] DENIALS (Administrative/Medical Necessity) [phone]   [address] (Maternity/NICU/Pediatrics) [phone]   [facility] [phone]   [address] [facility] [phone]   [address] [place] [address] [phone]   Malini [facility] [address] [place] [address] [phone]   [facility]  [phone] [address] [facility]  [address] [facility] [address] [facility] [phone]

## 2022-01-10 DIAGNOSIS — E11.9 TYPE 2 DIABETES MELLITUS WITHOUT COMPLICATION, WITH LONG-TERM CURRENT USE OF INSULIN (HCC): ICD-10-CM

## 2022-01-10 DIAGNOSIS — Z79.4 TYPE 2 DIABETES MELLITUS WITHOUT COMPLICATION, WITH LONG-TERM CURRENT USE OF INSULIN (HCC): ICD-10-CM

## 2022-01-10 NOTE — TELEPHONE ENCOUNTER
Patient needs refill on [place] only      Pharmacy: [facility] [address], [address] [facility]

## 2022-01-13 RX ORDER — PEN NEEDLE, DIABETIC 30 GX3/16"
NEEDLE, DISPOSABLE MISCELLANEOUS 3 TIMES DAILY
Qty: 100 EACH | Refills: 2 | Status: SHIPPED | OUTPATIENT
Start: 2022-01-13

## 2022-01-19 ENCOUNTER — TELEPHONE (OUTPATIENT)
Dept: PULMONOLOGY | Facility: CLINIC | Age: 62
End: 2022-01-19

## 2022-01-19 DIAGNOSIS — Z79.4 TYPE 2 DIABETES MELLITUS WITH HYPERGLYCEMIA, WITH LONG-TERM CURRENT USE OF INSULIN (HCC): ICD-10-CM

## 2022-01-19 DIAGNOSIS — E11.65 TYPE 2 DIABETES MELLITUS WITH HYPERGLYCEMIA, WITH LONG-TERM CURRENT USE OF INSULIN (HCC): ICD-10-CM

## 2022-01-19 RX ORDER — BLOOD SUGAR DIAGNOSTIC
STRIP MISCELLANEOUS
Qty: 200 EACH | Refills: 2 | Status: SHIPPED | OUTPATIENT
Start: 2022-01-19 | End: 2022-07-07 | Stop reason: SDUPTHER

## 2022-01-31 ENCOUNTER — APPOINTMENT (OUTPATIENT)
Dept: LAB | Facility: CLINIC | Age: 62
End: 2022-01-31
Payer: COMMERCIAL

## 2022-01-31 DIAGNOSIS — E11.641 UNCONTROLLED TYPE 2 DIABETES MELLITUS WITH HYPOGLYCEMIA AND COMA (HCC): ICD-10-CM

## 2022-01-31 DIAGNOSIS — Z86.718 HISTORY OF BLOOD CLOTS: ICD-10-CM

## 2022-01-31 DIAGNOSIS — Z79.4 TYPE 2 DIABETES MELLITUS WITH HYPERGLYCEMIA, WITH LONG-TERM CURRENT USE OF INSULIN (HCC): ICD-10-CM

## 2022-01-31 DIAGNOSIS — E11.65 TYPE 2 DIABETES MELLITUS WITH HYPERGLYCEMIA, WITH LONG-TERM CURRENT USE OF INSULIN (HCC): ICD-10-CM

## 2022-01-31 DIAGNOSIS — N18.31 STAGE 3A CHRONIC KIDNEY DISEASE (HCC): Chronic | ICD-10-CM

## 2022-01-31 LAB
ALBUMIN SERPL BCP-MCNC: 3.9 G/DL (ref 3.5–5)
ALP SERPL-CCNC: 78 U/L (ref 46–116)
ALT SERPL W P-5'-P-CCNC: 33 U/L (ref 12–78)
ANION GAP SERPL CALCULATED.3IONS-SCNC: 6 MMOL/L (ref 4–13)
AST SERPL W P-5'-P-CCNC: 30 U/L (ref 5–45)
BILIRUB SERPL-MCNC: 0.87 MG/DL (ref 0.2–1)
BUN SERPL-MCNC: 17 MG/DL (ref 5–25)
CALCIUM SERPL-MCNC: 9.8 MG/DL (ref 8.3–10.1)
CHLORIDE SERPL-SCNC: 109 MMOL/L (ref 100–108)
CHOLEST SERPL-MCNC: 143 MG/DL
CO2 SERPL-SCNC: 26 MMOL/L (ref 21–32)
CREAT SERPL-MCNC: 1.09 MG/DL (ref 0.6–1.3)
CREAT UR-MCNC: 144 MG/DL
GFR SERPL CREATININE-BSD FRML MDRD: 54 ML/MIN/1.73SQ M
GLUCOSE P FAST SERPL-MCNC: 145 MG/DL (ref 65–99)
HDLC SERPL-MCNC: 46 MG/DL
LDLC SERPL CALC-MCNC: 45 MG/DL (ref 0–100)
MICROALBUMIN UR-MCNC: 36.8 MG/L (ref 0–20)
MICROALBUMIN/CREAT 24H UR: 26 MG/G CREATININE (ref 0–30)
NONHDLC SERPL-MCNC: 97 MG/DL
POTASSIUM SERPL-SCNC: 4.3 MMOL/L (ref 3.5–5.3)
PROT SERPL-MCNC: 7.6 G/DL (ref 6.4–8.2)
PTH-INTACT SERPL-MCNC: 32.4 PG/ML (ref 18.4–80.1)
SODIUM SERPL-SCNC: 141 MMOL/L (ref 136–145)
TRIGL SERPL-MCNC: 259 MG/DL

## 2022-01-31 PROCEDURE — 80061 LIPID PANEL: CPT

## 2022-01-31 PROCEDURE — 82043 UR ALBUMIN QUANTITATIVE: CPT

## 2022-01-31 PROCEDURE — 83036 HEMOGLOBIN GLYCOSYLATED A1C: CPT

## 2022-01-31 PROCEDURE — 81241 F5 GENE: CPT

## 2022-01-31 PROCEDURE — 80053 COMPREHEN METABOLIC PANEL: CPT

## 2022-01-31 PROCEDURE — 36415 COLL VENOUS BLD VENIPUNCTURE: CPT

## 2022-01-31 PROCEDURE — 82570 ASSAY OF URINE CREATININE: CPT

## 2022-01-31 PROCEDURE — 83970 ASSAY OF PARATHORMONE: CPT

## 2022-02-01 LAB
EST. AVERAGE GLUCOSE BLD GHB EST-MCNC: 232 MG/DL
HBA1C MFR BLD: 9.7 %

## 2022-02-04 LAB — F5 GENE MUT ANL BLD/T: ABNORMAL

## 2022-02-06 ENCOUNTER — OFFICE VISIT (OUTPATIENT)
Dept: URGENT CARE | Facility: CLINIC | Age: 62
End: 2022-02-06
Payer: COMMERCIAL

## 2022-02-06 VITALS
TEMPERATURE: 97.2 F | HEART RATE: 85 BPM | DIASTOLIC BLOOD PRESSURE: 86 MMHG | RESPIRATION RATE: 16 BRPM | SYSTOLIC BLOOD PRESSURE: 142 MMHG | OXYGEN SATURATION: 96 %

## 2022-02-06 DIAGNOSIS — N39.0 URINARY TRACT INFECTION WITHOUT HEMATURIA, SITE UNSPECIFIED: Primary | ICD-10-CM

## 2022-02-06 LAB
SL AMB  POCT GLUCOSE, UA: ABNORMAL
SL AMB LEUKOCYTE ESTERASE,UA: ABNORMAL
SL AMB POCT BILIRUBIN,UA: ABNORMAL
SL AMB POCT BLOOD,UA: ABNORMAL
SL AMB POCT CLARITY,UA: CLEAR
SL AMB POCT COLOR,UA: YELLOW
SL AMB POCT KETONES,UA: 15
SL AMB POCT NITRITE,UA: ABNORMAL
SL AMB POCT PH,UA: 6
SL AMB POCT SPECIFIC GRAVITY,UA: 1.01
SL AMB POCT URINE PROTEIN: ABNORMAL
SL AMB POCT UROBILINOGEN: 0.2

## 2022-02-06 PROCEDURE — 81002 URINALYSIS NONAUTO W/O SCOPE: CPT | Performed by: PHYSICIAN ASSISTANT

## 2022-02-06 PROCEDURE — 87086 URINE CULTURE/COLONY COUNT: CPT | Performed by: PHYSICIAN ASSISTANT

## 2022-02-06 PROCEDURE — 99213 OFFICE O/P EST LOW 20 MIN: CPT | Performed by: PHYSICIAN ASSISTANT

## 2022-02-06 RX ORDER — FLUCONAZOLE 150 MG/1
150 TABLET ORAL ONCE
Qty: 1 TABLET | Refills: 0 | Status: SHIPPED | OUTPATIENT
Start: 2022-02-06 | End: 2022-02-06

## 2022-02-06 RX ORDER — CEPHALEXIN 500 MG/1
500 CAPSULE ORAL EVERY 8 HOURS SCHEDULED
Qty: 21 CAPSULE | Refills: 0 | Status: SHIPPED | OUTPATIENT
Start: 2022-02-06 | End: 2022-02-13

## 2022-02-06 NOTE — PROGRESS NOTES
[facility]    NAME: Hawa Velazco is a 59 y o  female  : 1960    MRN: [id number]  DATE: 2022  TIME: 12:23 PM    Assessment and Plan   Urinary tract infection without hematuria, site unspecified [N39 0]  1  Urinary tract infection without hematuria, site unspecified  Urine culture    POCT urine dip    cephalexin (KEFLEX) 500 mg capsule    fluconazole (DIFLUCAN) 150 mg tablet       Patient Instructions     Patient Instructions   I have prescribed an antibiotic for the infection  Please take the antibiotic as prescribed and finish the entire prescription  I recommend that the patient takes an over the counter probiotic or eats yogurt with live cultures in it [place]) to keep good bacteria in the gut and help prevent diarrhea  If not improving over the next 7-10 days, follow up with PCP  Go to the emergency department if:   You have severe back, side, or abdominal pain  You have fever and shaking chills  You vomit several times in a row  Contact your primary care provider if:   Your symptoms do not go away, even after treatment  Drink more liquids  Liquids help flush out bacteria that may be causing an infection  Chief Complaint     Chief Complaint   Patient presents with    Possible UTI     Dysuria and frequency for four days  History of Present Illness   59year old female here with complaint of urinary frequency, burning with urination for the past 3-4 days  No fever, chills  No back pain or flank pain, no nausea or vomiting  Review of Systems   Review of Systems   Constitutional: Negative for activity change, appetite change, chills, fatigue and fever  Respiratory: Negative for cough  Cardiovascular: Negative for chest pain  Gastrointestinal: Negative for abdominal pain, constipation, diarrhea, nausea and vomiting  Genitourinary: Positive for dysuria and frequency   Negative for difficulty urinating, flank pain, hematuria and urgency  Musculoskeletal: Negative for back pain and myalgias  All other systems reviewed and are negative  Current Medications     Current Outpatient Medications:     amLODIPine (NORVASC) 5 mg tablet, Take 5 mg by mouth daily, Disp: , Rfl:     ammonium lactate (LAC-HYDRIN) 12 % lotion, , Disp: , Rfl: 0    aspirin (Aspir-Low) 81 mg EC tablet, Take 81 mg by mouth daily, Disp: , Rfl:     atorvastatin (LIPITOR) 40 mg tablet, Take 1 tablet (40 mg total) by mouth daily with dinner, Disp: 30 tablet, Rfl: 0    BD INSULIN SYRINGE U/F 31G X 5/16" 1 ML MISC, , Disp: , Rfl: 0    Blood Glucose Monitoring Suppl (OneTouch Verio) w/Device KIT, Use to test blood sugar 3x daily  , Disp: 1 kit, Rfl: 0    carisoprodol (SOMA) 350 mg tablet, Take 1 tablet (350 mg total) by mouth 3 (three) times a day for 10 days, Disp: 30 tablet, Rfl: 0    cephalexin (KEFLEX) 500 mg capsule, Take 1 capsule (500 mg total) by mouth every 8 (eight) hours for 7 days, Disp: 21 capsule, Rfl: 0    cholecalciferol (VITAMIN D3) 400 units tablet, Take 400 Units by mouth daily, Disp: , Rfl:     dicyclomine (BENTYL) 10 mg capsule, Take 1 capsule (10 mg total) by mouth 4 (four) times a day as needed (abd pain) (Patient not taking: Reported on 10/22/2021), Disp: 20 capsule, Rfl: 0    Dulaglutide (Trulicity) 1 5 FR/7 5HS SOPN, Inject 0 5 mL (1 5 mg total) under the skin once a week, Disp: 6 mL, Rfl: 1    ergocalciferol (ERGOCALCIFEROL) 1 25 MG (09711 UT) capsule, Take 1 capsule (50,000 Units total) by mouth once a week, Disp: 4 capsule, Rfl: 5    fluconazole (DIFLUCAN) 150 mg tablet, Take 1 tablet (150 mg total) by mouth once for 1 dose, Disp: 1 tablet, Rfl: 0    furosemide (LASIX) 20 mg tablet, Take 20 mg by mouth daily, Disp: , Rfl:     gabapentin (NEURONTIN) 600 MG tablet, Take 1 tablet (600 mg total) by mouth 3 (three) times a day (Patient taking differently: Take 800 mg by mouth 3 (three) times a day ), Disp: 90 tablet, Rfl: 1    glucagon (GLUCAGON EMERGENCY) 1 MG injection, Inject 1 mg under the skin once as needed (PRN blood glucose less than 70 if unconscious or uncorrectable by oral means) for up to 1 dose, Disp: 1 kit, Rfl: 0    glucose blood (OneTouch Verio) test strip, Use to test blood sugar 3x daily  , Disp: 200 each, Rfl: 2    hydrocortisone 0 5 % cream, Apply topically 2 (two) times a day, Disp: 30 g, Rfl: 0    insulin NPH-insulin regular (HumuLIN 70/30) 100 units/mL subcutaneous injection, Inject 68 units before breakfast, 36 units before lunch, and 68 units prior to dinner, Disp: 50 mL, Rfl: 5    Insulin Pen Needle (Pen Needles) 32G X 4 MM MISC, Use 3 (three) times a day, Disp: 100 each, Rfl: 2    Insulin Syringe-Needle U-100 (BD Insulin Syringe U/F) 31G X 5/16" 1 ML MISC, Use three times daily for insulin injection  , Disp: 100 each, Rfl: 3    Lancets (OneTouch Delica Plus [id number]) MISC, Use to test blood sugar 3x daily  , Disp: 100 each, Rfl: 3    Lancets (OneTouch Delica Plus [id number]) MISC, USE TO TEST BLOOD SUGAR THREE TIMES DAILY, Disp: 100 each, Rfl: 3    lidocaine (LIDODERM) 5 %, Apply 1 patch topically daily Remove & Discard patch within 12 hours or as directed by MD, Disp: 10 patch, Rfl: 0    lisinopril (ZESTRIL) 5 mg tablet, Take 2 tablets (10 mg total) by mouth daily, Disp: , Rfl:     magnesium oxide (MAG-OX) 400 mg, Take 1 tablet (400 mg total) by mouth 2 (two) times a day, Disp: 60 tablet, Rfl: 0    meclizine (ANTIVERT) 25 mg tablet, Take 25 mg by mouth Three times daily as needed, Disp: , Rfl:     metFORMIN (GLUCOPHAGE) 1000 MG tablet, Take 1,000 mg by mouth 2 (two) times a day with meals, Disp: , Rfl:     nystatin (MYCOSTATIN) cream, Apply topically 2 (two) times a day, Disp: 30 g, Rfl: 0    ondansetron (ZOFRAN-ODT) 4 mg disintegrating tablet, Take 1 tablet (4 mg total) by mouth every 6 (six) hours as needed for nausea or vomiting, Disp: 12 tablet, Rfl: 0    Current Allergies     Allergies as of 02/06/2022 - Reviewed 2022   Allergen Reaction Noted    Cauliflower Pasha Goodness oleracea - food allergy] Other (See Comments) 2016    Citrus bioflavonoid - food allergy Other (See Comments) 2018    Compro [prochlorperazine] Shortness Of Breath and Wheezing 07/15/2015    Latex Other (See Comments) 2018    Mobic [meloxicam] Swelling 2018    Morphine Hives and Itching 2017    Albumen, egg - food allergy Vomiting 07/15/2015    Metronidazole Itching 2016    Sulfa antibiotics Other (See Comments) 2015          The following portions of the patient's history were reviewed and updated as appropriate: allergies, current medications, past family history, past medical history, past social history, past surgical history and problem list    Past Medical History:   Diagnosis Date    Anesthesia related hyperthermia     pt states she had high fevers after her lipoma surgery in  at our hospital and was shipped to [facility] where they said t was from an anesthesia med    Anxiety     Arthritis     Asthma     Chronic pain     Depression     Diabetes mellitus ([facility] [address] )     GERD (gastroesophageal reflux disease)     Hyperlipidemia     Malignant hyperthermia due to anesthesia     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     Neuropathy     Obesity     PCOS (polycystic ovarian syndrome)      Past Surgical History:   Procedure Laterality Date    CERVICAL CONIZATION   W/ LASER       SECTION      DILATION AND CURETTAGE OF UTERUS      ENDOMETRIAL ABLATION      ENDOMETRIAL BIOPSY      EPIDURAL BLOCK INJECTION Bilateral 2019    Procedure: L5-S1 transforaminal epidural steroid injection;  Surgeon: Ramiro Morrow MD;  Location: [facility] MAIN [facility];  Service: Pain Management     EPIDURAL BLOCK INJECTION Bilateral 2019    Procedure: L5-S1 transforaminal epidural steroid injection;  Surgeon: Ramiro Morrow MD;  Location: [facility];  Service: Pain Management     FL GUIDED NEEDLE PLAC BX/ASP/INJ  2019    FL GUIDED NEEDLE PLAC BX/ASP/INJ  2019    FOOT SURGERY Left     HERNIA REPAIR      INDUCED       IR PICC LINE  2019    JOINT REPLACEMENT      KNEE ARTHROPLASTY Right     IL DEBRIDEMENT OPEN WOUND 20 SQ CM< Right 2018    Procedure: DEBRIDEMENT HAND/FINGER [facility] OUT); Surgeon: Van Harry MD;  [facility] MAIN OR;  Service: General    IL EXCISION TUMOR SOFT TISSUE BACK/FLANK SUBQ 3+CM N/A 2018    Procedure: BACK LIPOMA EXCISION;  Surgeon: Van Harry MD;  Location: [address] MAIN OR;  Service: General    ROTATOR CUFF REPAIR Right     TONSILLECTOMY       Family History   Problem Relation Age of Onset    Polycystic ovary syndrome Mother     Mental illness Father     Diabetes Father      Social History     Socioeconomic History    Marital status:      Spouse name: Not on file    Number of children: Not on file    Years of education: Not on file    Highest education level: Not on file   Occupational History    Not on file   Tobacco Use    Smoking status: Never Smoker    Smokeless tobacco: Never Used   Vaping Use    Vaping Use: Never used   Substance and Sexual Activity    Alcohol use: Not Currently     Comment: OCCASIONALLY    Drug use: Yes     Types: Marijuana     Comment: CBD    Sexual activity: Never   Other Topics Concern    Not on file   Social History Narrative    Not on file     Social Determinants of Health     Financial Resource Strain: Not on file   Food Insecurity: No Food Insecurity    Worried About Running Out of Food in the Last Year: Never true    Maggie of Food in the Last Year: Never true   Transportation Needs: No Transportation Needs    Lack of Transportation (Medical): No    Lack of Transportation (Non-Medical):  No   Physical Activity: Not on file   Stress: Not on file   Social Connections: Not on file   Intimate Partner Violence: Not on file   Housing Stability: Low Risk     Unable to Pay for Housing in the Last Year: No    Number of Places Lived in the Last Year: 1    Unstable Housing in the Last Year: No     Medications have been verified  Objective   /86   Pulse 85   Temp (!) 97 2 °F (36 2 °C)   Resp 16   SpO2 96%      Physical Exam   Physical Exam  Vitals and nursing note reviewed  Constitutional:       General: She is not in acute distress  Appearance: Normal appearance  She is well-developed  HENT:      Head: Normocephalic and atraumatic  Nose: Nose normal       Mouth/Throat:      Mouth: Mucous membranes are moist    Cardiovascular:      Rate and Rhythm: Normal rate and regular rhythm  Heart sounds: Normal heart sounds  No murmur heard  Pulmonary:      Effort: Pulmonary effort is normal  No respiratory distress  Breath sounds: Normal breath sounds  Abdominal:      General: Bowel sounds are normal       Tenderness: There is no abdominal tenderness  There is no right CVA tenderness or left CVA tenderness

## 2022-02-07 LAB — BACTERIA UR CULT: NORMAL

## 2022-02-17 DIAGNOSIS — Z79.4 TYPE 2 DIABETES MELLITUS WITHOUT COMPLICATION, WITH LONG-TERM CURRENT USE OF INSULIN (HCC): ICD-10-CM

## 2022-02-17 DIAGNOSIS — E11.9 TYPE 2 DIABETES MELLITUS WITHOUT COMPLICATION, WITH LONG-TERM CURRENT USE OF INSULIN (HCC): ICD-10-CM

## 2022-02-17 RX ORDER — INSULIN NPH HUM/REG INSULIN HM 70-30/ML
VIAL (ML) SUBCUTANEOUS
Qty: 50 ML | Refills: 5 | Status: SHIPPED | OUTPATIENT
Start: 2022-02-17

## 2022-02-17 NOTE — TELEPHONE ENCOUNTER
Patient called this morning for a refill for her Humilin 70/30  She is taking 68 units before breakfast, 28 to 32 units(depending on glucose level) before lunch and 68 units prior to dinner  She had refills left on the script  I called the pharmacy and they said that this prescription canceled electronically by the hospital when she was admitted for [facility]  She was d/c's on this medication and she does need a refill  Pt has an appointment with this office on 2/24/2022 and she will be bringing her glucose logs

## 2022-02-24 ENCOUNTER — OFFICE VISIT (OUTPATIENT)
Dept: ENDOCRINOLOGY | Facility: CLINIC | Age: 62
End: 2022-02-24
Payer: COMMERCIAL

## 2022-02-24 VITALS
DIASTOLIC BLOOD PRESSURE: 78 MMHG | HEART RATE: 88 BPM | SYSTOLIC BLOOD PRESSURE: 140 MMHG | BODY MASS INDEX: 48.58 KG/M2 | TEMPERATURE: 98.4 F | HEIGHT: 62 IN | WEIGHT: 264 LBS

## 2022-02-24 DIAGNOSIS — E55.9 VITAMIN D DEFICIENCY: Chronic | ICD-10-CM

## 2022-02-24 DIAGNOSIS — Z79.4 TYPE 2 DIABETES MELLITUS WITH HYPERGLYCEMIA, WITH LONG-TERM CURRENT USE OF INSULIN (HCC): Primary | ICD-10-CM

## 2022-02-24 DIAGNOSIS — I10 ESSENTIAL HYPERTENSION: Chronic | ICD-10-CM

## 2022-02-24 DIAGNOSIS — E11.65 TYPE 2 DIABETES MELLITUS WITH HYPERGLYCEMIA, WITH LONG-TERM CURRENT USE OF INSULIN (HCC): Primary | ICD-10-CM

## 2022-02-24 DIAGNOSIS — E66.01 MORBID OBESITY WITH BMI OF 50.0-59.9, ADULT (HCC): ICD-10-CM

## 2022-02-24 DIAGNOSIS — E11.69 HYPERLIPIDEMIA ASSOCIATED WITH TYPE 2 DIABETES MELLITUS (HCC): ICD-10-CM

## 2022-02-24 DIAGNOSIS — E78.5 HYPERLIPIDEMIA ASSOCIATED WITH TYPE 2 DIABETES MELLITUS (HCC): ICD-10-CM

## 2022-02-24 PROCEDURE — 99214 OFFICE O/P EST MOD 30 MIN: CPT | Performed by: NURSE PRACTITIONER

## 2022-02-24 RX ORDER — TRIAMCINOLONE ACETONIDE 1 MG/G
CREAM TOPICAL
COMMUNITY
Start: 2022-01-27

## 2022-02-24 RX ORDER — INSULIN ASPART 100 [IU]/ML
INJECTION, SUSPENSION SUBCUTANEOUS
COMMUNITY
Start: 2021-11-18 | End: 2022-07-06 | Stop reason: SDUPTHER

## 2022-02-24 RX ORDER — HYDROCORTISONE 0.5 G/100G
CREAM TOPICAL
COMMUNITY
Start: 2021-12-12

## 2022-02-24 RX ORDER — LISINOPRIL 10 MG/1
10 TABLET ORAL DAILY
COMMUNITY
Start: 2022-02-07

## 2022-02-24 RX ORDER — GABAPENTIN 800 MG/1
800 TABLET ORAL 3 TIMES DAILY
COMMUNITY
Start: 2022-01-25

## 2022-02-24 RX ORDER — ROSUVASTATIN CALCIUM 20 MG/1
20 TABLET, COATED ORAL EVERY EVENING
COMMUNITY
Start: 2022-01-27 | End: 2022-07-06

## 2022-02-24 RX ORDER — FLUCONAZOLE 150 MG/1
TABLET ORAL
COMMUNITY
Start: 2022-02-06

## 2022-02-24 NOTE — PROGRESS NOTES
Established Patient Progress Note      Chief Complaint   Patient presents with    Diabetes Type 2        History of Present Illness:   Lynetta Skiff is a 59 y o  female with HTN, HLD, and type 2 diabetes with long term use of insulin since 1997  Reports complications of proteinuria, peripheral neuropathy, and diabetic ulcer of left midfoot  Denies recent severe hypoglycemic or severe hyperglycemic episodes  Denies any issues with her current regimen  home glucose monitoring: are performed regularly 3x daily    At patient's initial appointment on 82/52/8892, Trulicity was increased to 1 5 mg weekly  Insulin was adjusted in between appointments through review of glucose logs  Mixed insulin was changed from twice daily to 3 times daily  Patient was hospitalized on 12/18/2021 for COVID-19 related illness  She was hyperglycemic during her stay due to IV steroid use  Component      Latest Ref Rng & Units 4/30/2021 10/22/2021 1/31/2022   Hemoglobin A1C      Normal 3 8-5 6%; PreDiabetic 5 7-6 4%; Diabetic >=6 5%; Glycemic control for adults with diabetes <7 0% % 11 4 (H) 11 1 (A) 9 7 (H)   eAG, EST AVG Glucose      mg/dl 280  232       Home blood glucose readings:   Before breakfast:  183, 129, 227, 196, 235, 206, 136, 152, 249, 149, 40  Before lunch:  103, 140, 178, 267, 195, 193, 215  Before dinner:  217, 301, 267, 169, 103  Bedtime:  Does not routinely check     Current regimen:   Trulicity 1 5 mg once weekly  Humulin 70/30 68-32-68  Metformin 1000 mg twice daily    Last Eye Exam:  Reports having dilated eye exam in June of 2021  Last Foot Exam:  Up-to-date    For hyperlipidemia, she is taking 40 mg of atorvastatin nightly  She denies myalgias  For hypertension, she is taking 5 mg of amlodipine and 10 mg of lisinopril daily  She denies headache, pedal edema, cough      Patient Active Problem List   Diagnosis    Vitamin D deficiency    Type 2 diabetes mellitus with hyperglycemia, with long-term current use of insulin (HCC)    History of Salmonella infection    Melanocytic nevus of trunk    Menopausal and perimenopausal disorder    Insomnia    Essential hypertension    Mixed hyperlipidemia    History of pulmonary embolism    GERD (gastroesophageal reflux disease)    Gait disturbance    Diabetic ulcer of left heel (HCC)    Stage 3 chronic kidney disease (HCC)    Benign mole    Anxiety    Achilles tendinitis of left lower extremity    Ulcer of toe of left foot (HCC)    History of MRSA infection    Screening for colon cancer    Screening for breast cancer    Screening for cervical cancer    Eye exam, routine    Chronic pain syndrome    Osteoarthritis of spine    Leg swelling    Skin callus    Lipoma of back    Chronic bilateral low back pain with bilateral sciatica    Lumbar radiculopathy    Diabetic neuropathy (HCC)    Myofascial pain syndrome    Lumbar spondylosis    Spinal stenosis of lumbar region with neurogenic claudication    Thoracic spinal stenosis    Chronic back pain    Right hip pain    Morbid obesity with BMI of 50 0-59 9, adult ([address] )    Diabetic ulcer of left midfoot associated with type 2 diabetes mellitus, limited to breakdown of skin (HCC)    Edema    Proteinuria    Obstructive sleep apnea syndrome    Hyperlipidemia associated with type 2 diabetes mellitus (HCC)    Neuropathy    Acute hypoxemic respiratory failure due to COVID-19 [facility])      Past Medical History:   Diagnosis Date    Anesthesia related hyperthermia     pt states she had high fevers after her lipoma surgery in 2001 at our hospital and was shipped to [facility] where they said t was from an anesthesia med    Anxiety     Arthritis     Asthma     Chronic pain     Depression     Diabetes mellitus [address] [address]     GERD (gastroesophageal reflux disease)     Hyperlipidemia     Malignant hyperthermia due to anesthesia     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     Neuropathy  Obesity     PCOS (polycystic ovarian syndrome)       Past Surgical History:   Procedure Laterality Date    CERVICAL CONIZATION   W/ LASER       SECTION      DILATION AND CURETTAGE OF UTERUS      ENDOMETRIAL ABLATION      ENDOMETRIAL BIOPSY      EPIDURAL BLOCK INJECTION Bilateral 2019    Procedure: L5-S1 transforaminal epidural steroid injection;  Surgeon: Lit Sesay MD;  Location: [facility];  Service: Pain Management     EPIDURAL BLOCK INJECTION Bilateral 2019    Procedure: L5-S1 transforaminal epidural steroid injection;  Surgeon: Lit Sesay MD;  Location: [facility];  Service: Pain Management     FL GUIDED NEEDLE PLAC BX/ASP/INJ  2019    FL GUIDED NEEDLE PLAC BX/ASP/INJ  2019    FOOT SURGERY Left     HERNIA REPAIR      INDUCED       IR PICC LINE  2019    JOINT REPLACEMENT      KNEE ARTHROPLASTY Right     SC DEBRIDEMENT OPEN WOUND 20 SQ CM< Right 2018    Procedure: DEBRIDEMENT HAND/FINGER [facility] OUT);   Surgeon: Isaca Thakkar MD;  Location: [facility] MAIN OR;  Service: General    SC EXCISION TUMOR SOFT TISSUE BACK/FLANK SUBQ 3+CM N/A 2018    Procedure: BACK LIPOMA EXCISION;  Surgeon: Isaac Thakkar MD;  Location: [facility] MAIN OR;  Service: General    ROTATOR CUFF REPAIR Right     TONSILLECTOMY        Family History   Problem Relation Age of Onset    Polycystic ovary syndrome Mother     Mental illness Father     Diabetes Father      Social History     Tobacco Use    Smoking status: Never Smoker    Smokeless tobacco: Never Used   Substance Use Topics    Alcohol use: Not Currently     Comment: OCCASIONALLY     Allergies   Allergen Reactions    Cauliflower [Brassica Oleracea - Food Allergy] Other (See Comments)     Other reaction(s): GI upset  Abdominal pain    Citrus Bioflavonoid - Food Allergy Other (See Comments)     Skin burns    Compro [Prochlorperazine] Shortness Of Breath and Wheezing    Latex Other (See Comments) burning    Mobic [Meloxicam] Swelling    Morphine Hives and Itching    Albumen, Egg - Food Allergy Vomiting     Other reaction(s): Nausea and/or vomiting    Metronidazole Itching    Sulfa Antibiotics Other (See Comments)     Other reaction(s): Unknown Reaction         Current Outpatient Medications:     amLODIPine (NORVASC) 5 mg tablet, Take 5 mg by mouth daily, Disp: , Rfl:     ammonium lactate (LAC-HYDRIN) 12 % lotion, , Disp: , Rfl: 0    aspirin (Aspir-Low) 81 mg EC tablet, Take 81 mg by mouth daily, Disp: , Rfl:     atorvastatin (LIPITOR) 40 mg tablet, Take 1 tablet (40 mg total) by mouth daily with dinner, Disp: 30 tablet, Rfl: 0    BD INSULIN SYRINGE U/F 31G X 5/16" 1 ML MISC, , Disp: , Rfl: 0    Blood Glucose Monitoring Suppl (OneTouch Verio) w/Device KIT, Use to test blood sugar 3x daily  , Disp: 1 kit, Rfl: 0    cholecalciferol (VITAMIN D3) 400 units tablet, Take 400 Units by mouth daily, Disp: , Rfl:     dicyclomine (BENTYL) 10 mg capsule, Take 1 capsule (10 mg total) by mouth 4 (four) times a day as needed (abd pain), Disp: 20 capsule, Rfl: 0    ergocalciferol (ERGOCALCIFEROL) 1 25 MG (41022 UT) capsule, Take 1 capsule (50,000 Units total) by mouth once a week, Disp: 4 capsule, Rfl: 5    fluconazole (DIFLUCAN) 150 mg tablet, take 1 tablet by mouth AS A ONE TIME DOSE, Disp: , Rfl:     furosemide (LASIX) 20 mg tablet, Take 20 mg by mouth daily, Disp: , Rfl:     gabapentin (NEURONTIN) 800 mg tablet, Take 800 mg by mouth 3 (three) times a day, Disp: , Rfl:     glucagon (GLUCAGON EMERGENCY) 1 MG injection, Inject 1 mg under the skin once as needed (PRN blood glucose less than 70 if unconscious or uncorrectable by oral means) for up to 1 dose, Disp: 1 kit, Rfl: 0    glucose blood (OneTouch Verio) test strip, Use to test blood sugar 3x daily  , Disp: 200 each, Rfl: 2    hydrocortisone 0 5 % cream, Apply topically 2 (two) times a day, Disp: 30 g, Rfl: 0    hydrocortisone-aloe 0 5 % cream, apply topically to affected area twice a day, Disp: , Rfl:     insulin NPH-insulin regular (HumuLIN 70/30) 100 units/mL subcutaneous injection, Inject 68 units before breakfast, 36 units before lunch, and 68 units prior to dinner, Disp: 50 mL, Rfl: 5    Insulin Pen Needle (Pen Needles) 32G X 4 MM MISC, Use 3 (three) times a day, Disp: 100 each, Rfl: 2    Insulin Syringe-Needle U-100 (BD Insulin Syringe U/F) 31G X 5/16" 1 ML MISC, Use three times daily for insulin injection  , Disp: 100 each, Rfl: 3    Lancets (OneTouch Delica Plus [id number]) MISC, Use to test blood sugar 3x daily  , Disp: 100 each, Rfl: 3    Lancets (OneTouch Delica Plus [id number]) MISC, USE TO TEST BLOOD SUGAR THREE TIMES DAILY, Disp: 100 each, Rfl: 3    lidocaine (LIDODERM) 5 %, Apply 1 patch topically daily Remove & Discard patch within 12 hours or as directed by MD, Disp: 10 patch, Rfl: 0    lisinopril (ZESTRIL) 10 mg tablet, Take 10 mg by mouth daily, Disp: , Rfl:     lisinopril (ZESTRIL) 5 mg tablet, Take 2 tablets (10 mg total) by mouth daily, Disp: , Rfl:     meclizine (ANTIVERT) 25 mg tablet, Take 25 mg by mouth Three times daily as needed, Disp: , Rfl:     metFORMIN (GLUCOPHAGE) 1000 MG tablet, Take 1,000 mg by mouth 2 (two) times a day with meals, Disp: , Rfl:     NovoLOG Mix 70/30 FlexPen (70-30) 100 units/mL injection pen, , Disp: , Rfl:     nystatin (MYCOSTATIN) cream, Apply topically 2 (two) times a day, Disp: 30 g, Rfl: 0    ondansetron (ZOFRAN-ODT) 4 mg disintegrating tablet, Take 1 tablet (4 mg total) by mouth every 6 (six) hours as needed for nausea or vomiting, Disp: 12 tablet, Rfl: 0    rosuvastatin (CRESTOR) 20 MG tablet, Take 20 mg by mouth every evening, Disp: , Rfl:     triamcinolone (KENALOG) 0 1 % cream, apply topically to affected area twice a day, Disp: , Rfl:     carisoprodol (SOMA) 350 mg tablet, Take 1 tablet (350 mg total) by mouth 3 (three) times a day for 10 days, Disp: 30 tablet, Rfl: 0   Dulaglutide 3 MG/0 5ML SOPN, Inject 0 5 mL (3 mg total) under the skin once a week, Disp: 6 mL, Rfl: 1    gabapentin (NEURONTIN) 600 MG tablet, Take 1 tablet (600 mg total) by mouth 3 (three) times a day (Patient taking differently: Take 800 mg by mouth 3 (three) times a day ), Disp: 90 tablet, Rfl: 1    magnesium oxide (MAG-OX) 400 mg, Take 1 tablet (400 mg total) by mouth 2 (two) times a day, Disp: 60 tablet, Rfl: 0    Review of Systems   Constitutional: Positive for fatigue  Negative for activity change, appetite change and unexpected weight change  HENT: Negative for dental problem, sore throat, trouble swallowing and voice change  Eyes: Negative for visual disturbance  Respiratory: Positive for shortness of breath  Negative for choking and chest tightness  Cardiovascular: Negative for chest pain, palpitations and leg swelling  Gastrointestinal: Negative for constipation, diarrhea, nausea and vomiting  Endocrine: Negative for polydipsia, polyphagia and polyuria  Genitourinary: Negative for frequency  Musculoskeletal: Positive for back pain and gait problem  Negative for joint swelling and myalgias  Skin: Negative for wound  Allergic/Immunologic: Positive for environmental allergies, food allergies and immunocompromised state  Neurological: Positive for numbness  Negative for dizziness, weakness, light-headedness and headaches  Hematological: Does not bruise/bleed easily  Psychiatric/Behavioral: Negative for decreased concentration, dysphoric mood and sleep disturbance  The patient is not nervous/anxious  Physical Exam:  Body mass index is 48 29 kg/m²  /78   Pulse 88   Temp 98 4 °F (36 9 °C)   Ht 5' 2" (1 575 m)   Wt 120 kg (264 lb)   BMI 48 29 kg/m²    Wt Readings from Last 3 Encounters:   02/24/22 120 kg (264 lb)   12/24/21 121 kg (266 lb 15 6 oz)   11/23/21 128 kg (283 lb)       Physical Exam  Vitals reviewed     Constitutional:       General: She is not in acute distress  Appearance: She is well-developed  She is obese  She is not ill-appearing  HENT:      Head: Normocephalic and atraumatic  Comments: Mask in place  Eyes:      Pupils: Pupils are equal, round, and reactive to light  Neck:      Thyroid: No thyromegaly  Cardiovascular:      Rate and Rhythm: Normal rate and regular rhythm  Pulmonary:      Effort: Pulmonary effort is normal       Breath sounds: Normal breath sounds  Abdominal:      General: Bowel sounds are normal       Palpations: Abdomen is soft  Musculoskeletal:      Cervical back: Normal range of motion and neck supple  Right lower leg: No edema  Left lower leg: No edema  Lymphadenopathy:      Cervical: No cervical adenopathy  Skin:     General: Skin is warm and dry  Capillary Refill: Capillary refill takes less than 2 seconds  Neurological:      Mental Status: She is alert and oriented to person, place, and time        Gait: Gait normal    Psychiatric:         Mood and Affect: Mood normal          Behavior: Behavior normal            Labs:   Lab Results   Component Value Date    HGBA1C 9 7 (H) 01/31/2022    HGBA1C 11 1 (A) 10/22/2021    HGBA1C 11 4 (H) 04/30/2021     Lab Results   Component Value Date    CREATININE 1 09 01/31/2022    CREATININE 0 99 12/24/2021    CREATININE 0 93 12/23/2021    BUN 17 01/31/2022    K 4 3 01/31/2022     (H) 01/31/2022    CO2 26 01/31/2022     eGFR   Date Value Ref Range Status   01/31/2022 54 ml/min/1 73sq m Final     Lab Results   Component Value Date    HDL 46 (L) 01/31/2022    TRIG 259 (H) 01/31/2022     Lab Results   Component Value Date    ALT 33 01/31/2022    AST 30 01/31/2022    ALKPHOS 78 01/31/2022     Lab Results   Component Value Date    ESE9GSGCMURK 2 120 11/10/2020    NAB8KBRAIWEM 2 530 05/06/2020    KKS7ABYBSCCY 2 610 12/05/2019     Lab Results   Component Value Date    FREET4 1 00 08/19/2019       Impression & Plan:    Problem List Items Addressed This Visit Endocrine    Type 2 diabetes mellitus with hyperglycemia, with long-term current use of insulin ([address] ) - Primary     While patient's numbers are improving, she continues to be uncontrolled with frequent hyperglycemia  Increase Trulicity to 3 mg once weekly  Continue with metformin and current insulin regimen  Continue to test blood sugars 3 times daily  Counseled on the importance of notifying me as blood sugars respond to increase of Trulicity  Ideally, we will be able to decrease insulin needs  Continue to focus on healthy diet  Engage in physical activity as tolerated  Check labs prior to next appointment in 4 months  Lab Results   Component Value Date    HGBA1C 9 7 (H) 01/31/2022            Relevant Medications    NovoLOG Mix 70/30 FlexPen (70-30) 100 units/mL injection pen    Dulaglutide 3 MG/0 5ML SOPN    Other Relevant Orders    Comprehensive metabolic panel Lab Collect    HEMOGLOBIN A1C W/ EAG ESTIMATION Lab Collect    Microalbumin / creatinine urine ratio Lab Collect    Hyperlipidemia associated with type 2 diabetes mellitus (HCC)     LDL is stable  Continue statin  Triglycerides are elevated in the setting hyperglycemia  Lab Results   Component Value Date    HGBA1C 9 7 (H) 01/31/2022            Relevant Medications    NovoLOG Mix 70/30 FlexPen (70-30) 100 units/mL injection pen    Dulaglutide 3 MG/0 5ML SOPN       Cardiovascular and Mediastinum    Essential hypertension (Chronic)     BP stable 140/78  Continue current regimen  Relevant Medications    lisinopril (ZESTRIL) 10 mg tablet       Other    Vitamin D deficiency (Chronic)     Continue vitamin-D supplements  Morbid obesity with BMI of 50 0-59 9, adult (HCC) (Chronic)     Increase Trulicity to 3 mg once weekly           Relevant Medications    Dulaglutide 3 MG/0 5ML SOPN          Orders Placed This Encounter   Procedures    Comprehensive metabolic panel Lab Collect     This is a patient instruction: Patient fasting for 8 hours or longer recommended  Standing Status:   Future     Standing Expiration Date:   2/24/2023    HEMOGLOBIN A1C W/ EAG ESTIMATION Lab Collect     Standing Status:   Future     Standing Expiration Date:   2/24/2023    Microalbumin / creatinine urine ratio Lab Collect     Standing Status:   Future     Standing Expiration Date:   2/24/2023       Patient Instructions   1  Increase Trulicity to 3 mg once weekly  2  Continue current insulin regimen  Continue metformin  3  Continue to test blood sugar 3x daily  Be mindful of sugars starting to go lower  As they do, we will want to reduce your insulin to avoid hypoglycemia  Discussed with the patient and all questioned fully answered  She will call me if any problems arise  Follow-up appointment in 4 months       Counseled patient on diagnostic results, prognosis, risk and benefit of treatment options, instruction for management, importance of treatment compliance, Risk  factor reduction and impressions    ISABELLE Flores

## 2022-02-24 NOTE — ASSESSMENT & PLAN NOTE
While patient's numbers are improving, she continues to be uncontrolled with frequent hyperglycemia  Increase Trulicity to 3 mg once weekly  Continue with metformin and current insulin regimen  Continue to test blood sugars 3 times daily  Counseled on the importance of notifying me as blood sugars respond to increase of Trulicity  Ideally, we will be able to decrease insulin needs  Continue to focus on healthy diet  Engage in physical activity as tolerated  Check labs prior to next appointment in 4 months    Lab Results   Component Value Date    HGBA1C 9 7 (H) 01/31/2022

## 2022-02-24 NOTE — ASSESSMENT & PLAN NOTE
LDL is stable  Continue statin  Triglycerides are elevated in the setting hyperglycemia    Lab Results   Component Value Date    HGBA1C 9 7 (H) 01/31/2022

## 2022-03-08 ENCOUNTER — OFFICE VISIT (OUTPATIENT)
Dept: DIABETES SERVICES | Facility: CLINIC | Age: 62
End: 2022-03-08
Payer: COMMERCIAL

## 2022-03-08 ENCOUNTER — TELEPHONE (OUTPATIENT)
Dept: DIABETES SERVICES | Facility: CLINIC | Age: 62
End: 2022-03-08

## 2022-03-08 VITALS — BODY MASS INDEX: 50.48 KG/M2 | WEIGHT: 276 LBS

## 2022-03-08 DIAGNOSIS — E11.65 TYPE 2 DIABETES MELLITUS WITH HYPERGLYCEMIA, WITH LONG-TERM CURRENT USE OF INSULIN (HCC): Primary | ICD-10-CM

## 2022-03-08 DIAGNOSIS — Z79.4 TYPE 2 DIABETES MELLITUS WITH HYPERGLYCEMIA, WITH LONG-TERM CURRENT USE OF INSULIN (HCC): Primary | ICD-10-CM

## 2022-03-08 DIAGNOSIS — E11.65 TYPE 2 DIABETES MELLITUS WITH HYPERGLYCEMIA, WITH LONG-TERM CURRENT USE OF INSULIN (HCC): ICD-10-CM

## 2022-03-08 DIAGNOSIS — Z79.4 TYPE 2 DIABETES MELLITUS WITH HYPERGLYCEMIA, WITH LONG-TERM CURRENT USE OF INSULIN (HCC): ICD-10-CM

## 2022-03-08 PROCEDURE — 97803 MED NUTRITION INDIV SUBSEQ: CPT | Performed by: DIETITIAN, REGISTERED

## 2022-03-08 NOTE — PROGRESS NOTES
Medical Nutrition Therapy      Assessment    Chief complaint Patient has multiple family and life stresses impacting her self-care  She is raising 3 teenaged grandchildren with little assistance and is consumed by their special needs  She is still recovering from [place] and hospitalization in December and is finding physical exertion of any kind to be exhausting  Her grandchildren do the grocery shopping now as patient does not have the energy for it  Visit Type: Follow-up visit    HPI: Guy Cobian returned for follow-up today  Maryans food recall reveals 3 meals and some excessive snacks  She finds some solace in food and in her mixed drink at night  Her diet is inadequate in vegetables, fruits, and whole grains  Overall, Maryans meals provide 30 - 60 grams carbohydrate  Based on meal plan review and reported decrease in BG readings provided education about including more whole fruits and vegetables and treating hypoglycemia properly  She mentioned her glucagon treatment and took it out of her bag  Her glucagon  over a year ago  Explained about the new glucagon options and patient was interested  Explained that she would need someone else to administer this to her  Reviewed 15/15 rule handout and discussed options to keep in her bag and car in case of emergency  Ht Readings from Last 1 Encounters:   22 5' 2" (1 575 m)     Wt Readings from Last 2 Encounters:   22 120 kg (264 lb)   21 121 kg (266 lb 15 6 oz)     Weight Change: Yes gaining weight    Medical Diagnosis/reason for visit E11 65, Z79 4 (ICD-10-CM) - Type 2 diabetes mellitus with hyperglycemia, with long-term current use of insulin (HCC)    Food Log: Completed via the method of food recall     Breakfast: 8-8:30, 2 Activia yogurt cups (unsure if light), applesauce (unsure if unsweetened)  Morning Snack:not often  Lunch: 11-2, may have a sandwich and piece of fruit or just a sandwich - turkey or roast beef OR soup  Yesterday had a chicken sandwich on wheat bread & pickles  Afternoon Snack: may have 6-8, Ritz crackers and cheese and olives  Dinner: beef and 1 cup rice, no gravy left for her  Applesauce cup  Evening Snack:not too much  Having mixed drink at night several times in the past few weeks    Beverages: water, Gatorade zero, diet javon green tea  Eating out/Take out:very little    Exercise uses a cane and moves very slowly, has ruptured Achilles tendon    Calorie needs 1400kcals/day Carbs: 45g/meal, 15g/snack       Nutrition Diagnosis:  Limited adherence to nutrition related recommendations  related to Lack of value for behavior change or competing values as evidenced by Lack of compliance or inconsistent compliance with plan    Intervention: label reading, carbohydrate counting and hypoglycemia treatment     Treatment Goals: Patient will monitor portion control, Patient will exercise and treat hypoglycemia properly    Monitoring and evaluation:    Term code indicator  FH 1 6 3 Carbohydrate Intake Criteria: Choose about 45 g carb per meal  Term code indicator  CH 2 2 Treatments/Therapy/Alternative Medicine Criteria: Treat hypoglycemia properly    Patients Response to Instruction:  Juli Spears  Expected Compliancefair    Thank you for coming to the [facility] for education today  Please feel free to call with any questions or concerns      [address] [address] [phone]

## 2022-03-08 NOTE — PATIENT INSTRUCTIONS
1  Aim for 45 g carb per meal and 15 g carb per snack  2  Read food labels  3  Keep hypoglycemic treatment with you at all times, 4 glucose tablets or 5 lifesavers or 6 jellybeans, see yellow sheet  4   Increase activity as you can

## 2022-03-09 ENCOUNTER — TELEPHONE (OUTPATIENT)
Dept: NEPHROLOGY | Facility: CLINIC | Age: 62
End: 2022-03-09

## 2022-03-09 NOTE — TELEPHONE ENCOUNTER
Appointment Confirmation   Person confirmed appointment with  If not patient, name of the person Patient    Date and time of appointment 03/10/22 @ 10am    Patient acknowledged and will be at appointment? yes    Did you advise the patient that they will need a urine sample if they are a new patient?  Yes    Did you advise the patient to bring their current medications for verification? (including any OTC) Yes    Additional Information

## 2022-06-27 ENCOUNTER — TELEPHONE (OUTPATIENT)
Dept: NEPHROLOGY | Facility: CLINIC | Age: 62
End: 2022-06-27

## 2022-06-27 DIAGNOSIS — N18.31 STAGE 3A CHRONIC KIDNEY DISEASE (HCC): Primary | ICD-10-CM

## 2022-07-01 ENCOUNTER — APPOINTMENT (OUTPATIENT)
Dept: LAB | Facility: CLINIC | Age: 62
End: 2022-07-01
Payer: COMMERCIAL

## 2022-07-01 DIAGNOSIS — Z79.4 TYPE 2 DIABETES MELLITUS WITH HYPERGLYCEMIA, WITH LONG-TERM CURRENT USE OF INSULIN (HCC): ICD-10-CM

## 2022-07-01 DIAGNOSIS — E11.65 TYPE 2 DIABETES MELLITUS WITH HYPERGLYCEMIA, WITH LONG-TERM CURRENT USE OF INSULIN (HCC): ICD-10-CM

## 2022-07-01 DIAGNOSIS — N18.31 STAGE 3A CHRONIC KIDNEY DISEASE (HCC): ICD-10-CM

## 2022-07-01 LAB
ALBUMIN SERPL BCP-MCNC: 3.5 G/DL (ref 3.5–5)
ALP SERPL-CCNC: 74 U/L (ref 46–116)
ALT SERPL W P-5'-P-CCNC: 61 U/L (ref 12–78)
ANION GAP SERPL CALCULATED.3IONS-SCNC: 8 MMOL/L (ref 4–13)
AST SERPL W P-5'-P-CCNC: 46 U/L (ref 5–45)
BILIRUB SERPL-MCNC: 0.52 MG/DL (ref 0.2–1)
BUN SERPL-MCNC: 21 MG/DL (ref 5–25)
CALCIUM SERPL-MCNC: 9.5 MG/DL (ref 8.3–10.1)
CHLORIDE SERPL-SCNC: 104 MMOL/L (ref 100–108)
CO2 SERPL-SCNC: 25 MMOL/L (ref 21–32)
CREAT SERPL-MCNC: 1.13 MG/DL (ref 0.6–1.3)
CREAT UR-MCNC: 160 MG/DL
EST. AVERAGE GLUCOSE BLD GHB EST-MCNC: 223 MG/DL
GFR SERPL CREATININE-BSD FRML MDRD: 51 ML/MIN/1.73SQ M
GLUCOSE SERPL-MCNC: 163 MG/DL (ref 65–140)
HBA1C MFR BLD: 9.4 %
MICROALBUMIN UR-MCNC: 11.4 MG/L (ref 0–20)
MICROALBUMIN/CREAT 24H UR: 7 MG/G CREATININE (ref 0–30)
POTASSIUM SERPL-SCNC: 4.5 MMOL/L (ref 3.5–5.3)
PROT SERPL-MCNC: 7.4 G/DL (ref 6.4–8.2)
SODIUM SERPL-SCNC: 137 MMOL/L (ref 136–145)

## 2022-07-01 PROCEDURE — 82570 ASSAY OF URINE CREATININE: CPT

## 2022-07-01 PROCEDURE — 80053 COMPREHEN METABOLIC PANEL: CPT

## 2022-07-01 PROCEDURE — 36415 COLL VENOUS BLD VENIPUNCTURE: CPT

## 2022-07-01 PROCEDURE — 82043 UR ALBUMIN QUANTITATIVE: CPT

## 2022-07-01 PROCEDURE — 83036 HEMOGLOBIN GLYCOSYLATED A1C: CPT

## 2022-07-06 ENCOUNTER — OFFICE VISIT (OUTPATIENT)
Dept: NEPHROLOGY | Facility: CLINIC | Age: 62
End: 2022-07-06
Payer: COMMERCIAL

## 2022-07-06 VITALS
HEART RATE: 108 BPM | SYSTOLIC BLOOD PRESSURE: 128 MMHG | OXYGEN SATURATION: 96 % | WEIGHT: 288.2 LBS | BODY MASS INDEX: 53.04 KG/M2 | DIASTOLIC BLOOD PRESSURE: 76 MMHG | HEIGHT: 62 IN

## 2022-07-06 DIAGNOSIS — Z79.4 TYPE 2 DIABETES MELLITUS WITH HYPERGLYCEMIA, WITH LONG-TERM CURRENT USE OF INSULIN (HCC): ICD-10-CM

## 2022-07-06 DIAGNOSIS — N18.31 STAGE 3A CHRONIC KIDNEY DISEASE (HCC): Primary | Chronic | ICD-10-CM

## 2022-07-06 DIAGNOSIS — I10 ESSENTIAL HYPERTENSION: Chronic | ICD-10-CM

## 2022-07-06 DIAGNOSIS — E55.9 VITAMIN D DEFICIENCY: Chronic | ICD-10-CM

## 2022-07-06 DIAGNOSIS — E11.65 TYPE 2 DIABETES MELLITUS WITH HYPERGLYCEMIA, WITH LONG-TERM CURRENT USE OF INSULIN (HCC): ICD-10-CM

## 2022-07-06 DIAGNOSIS — M79.89 LEG SWELLING: ICD-10-CM

## 2022-07-06 PROCEDURE — 99214 OFFICE O/P EST MOD 30 MIN: CPT | Performed by: INTERNAL MEDICINE

## 2022-07-06 RX ORDER — INSULIN ASPART 100 [IU]/ML
INJECTION, SUSPENSION SUBCUTANEOUS
Qty: 15 ML | Refills: 0
Start: 2022-07-06 | End: 2022-08-16 | Stop reason: SDUPTHER

## 2022-07-07 DIAGNOSIS — E11.65 TYPE 2 DIABETES MELLITUS WITH HYPERGLYCEMIA, WITH LONG-TERM CURRENT USE OF INSULIN (HCC): ICD-10-CM

## 2022-07-07 DIAGNOSIS — Z79.4 TYPE 2 DIABETES MELLITUS WITH HYPERGLYCEMIA, WITH LONG-TERM CURRENT USE OF INSULIN (HCC): ICD-10-CM

## 2022-07-07 RX ORDER — BLOOD SUGAR DIAGNOSTIC
STRIP MISCELLANEOUS
Qty: 200 EACH | Refills: 2 | Status: SHIPPED | OUTPATIENT
Start: 2022-07-07

## 2022-07-07 NOTE — ASSESSMENT & PLAN NOTE
Continue medications according to our Endocrinology colleagues  Patient continues to try to improve diet and reduce carbohydrates, after review of endocrinology notes, it was noted that her insulin regimen was increased which the patient has not been following  Patient agrees her insulin up to 68 units with breakfast and supper and 36 units with lunch  She was asked to contact her endocrinologist regarding additional adjustments to her insulin given that her blood sugars remained fairly uncontrolled at this time        Lab Results   Component Value Date    HGBA1C 9 4 (H) 07/01/2022

## 2022-07-07 NOTE — ASSESSMENT & PLAN NOTE
Continue to encourage low-sodium diet, and is on furosemide 20 mg as needed which appears to work and increase urination when she takes it

## 2022-07-07 NOTE — ASSESSMENT & PLAN NOTE
Lab Results   Component Value Date    EGFR 51 07/01/2022    EGFR 54 01/31/2022    EGFR 60 12/24/2021    CREATININE 1 13 07/01/2022    CREATININE 1 09 01/31/2022    CREATININE 0 99 12/24/2021       Patient's kidney function is essentially stable with a creatinine around 1 1 mg/ dL  Continue avoid potential nephrotoxins and optimize overall care

## 2022-07-29 ENCOUNTER — TELEPHONE (OUTPATIENT)
Dept: GASTROENTEROLOGY | Facility: CLINIC | Age: 62
End: 2022-07-29

## 2022-07-29 NOTE — TELEPHONE ENCOUNTER
Pt left msg for refill of a One Touch Brinda Reflex Blood Meter, she stated hers is not working    She uses [address]

## 2022-08-14 DIAGNOSIS — Z79.4 TYPE 2 DIABETES MELLITUS WITHOUT COMPLICATION, WITH LONG-TERM CURRENT USE OF INSULIN (HCC): ICD-10-CM

## 2022-08-14 DIAGNOSIS — E11.9 TYPE 2 DIABETES MELLITUS WITHOUT COMPLICATION, WITH LONG-TERM CURRENT USE OF INSULIN (HCC): ICD-10-CM

## 2022-08-16 RX ORDER — INSULIN NPH HUM/REG INSULIN HM 70-30/ML
VIAL (ML) SUBCUTANEOUS
Qty: 50 ML | Refills: 5 | Status: SHIPPED | OUTPATIENT
Start: 2022-08-16

## 2022-10-17 ENCOUNTER — TELEPHONE (OUTPATIENT)
Dept: ENDOCRINOLOGY | Facility: CLINIC | Age: 62
End: 2022-10-17

## 2022-10-17 NOTE — TELEPHONE ENCOUNTER
Pt lft voicemail to reschedule appt with Endo  I called pt back but I got the recording stating the pt does not have a voicemail set up yet  Please call pt again at a later time

## 2022-11-07 ENCOUNTER — OFFICE VISIT (OUTPATIENT)
Dept: NEPHROLOGY | Facility: CLINIC | Age: 62
End: 2022-11-07

## 2022-11-07 VITALS
HEART RATE: 82 BPM | WEIGHT: 280 LBS | SYSTOLIC BLOOD PRESSURE: 122 MMHG | DIASTOLIC BLOOD PRESSURE: 70 MMHG | BODY MASS INDEX: 51.21 KG/M2 | OXYGEN SATURATION: 97 %

## 2022-11-07 DIAGNOSIS — M79.89 LEG SWELLING: ICD-10-CM

## 2022-11-07 DIAGNOSIS — E66.01 MORBID OBESITY WITH BMI OF 50.0-59.9, ADULT (HCC): Chronic | ICD-10-CM

## 2022-11-07 DIAGNOSIS — R42 VERTIGO: ICD-10-CM

## 2022-11-07 DIAGNOSIS — N18.31 STAGE 3A CHRONIC KIDNEY DISEASE (HCC): Primary | Chronic | ICD-10-CM

## 2022-11-07 NOTE — ASSESSMENT & PLAN NOTE
Continue to encourage weight loss  Patient has lost about 8 5 lb since we last saw her  She was encouraged to consume the fewest amount of calories necessary in order to maintain proper nutrition

## 2022-11-07 NOTE — PROGRESS NOTES
[address] [facility]    Name: Damien Irvin  YOB: 1960      Assessment/Plan:    Stage 3 chronic kidney disease [facility])  Lab Results   Component Value Date    EGFR 51 07/01/2022    EGFR 54 01/31/2022    EGFR 60 12/24/2021    CREATININE 1 13 07/01/2022    CREATININE 1 09 01/31/2022    CREATININE 0 99 12/24/2021       Patient kidney function is stable to slightly better at this time with an eGFR just over 60 ml/min  Continue to focus on proper BS control  Leg swelling    Continue to encourage low-sodium diet, continue to use furosemide 20 mg once daily as needed  Morbid obesity with BMI of 50 0-59 9, adult ([facility] )    Continue to encourage weight loss  Patient has lost about 8 5 lb since we last saw her  She was encouraged to consume the fewest amount of calories necessary in order to maintain proper nutrition  Problem List Items Addressed This Visit        Genitourinary    Stage 3 chronic kidney disease ([facility] ) - Primary (Chronic)     Lab Results   Component Value Date    EGFR 51 07/01/2022    EGFR 54 01/31/2022    EGFR 60 12/24/2021    CREATININE 1 13 07/01/2022    CREATININE 1 09 01/31/2022    CREATININE 0 99 12/24/2021       Patient kidney function is stable to slightly better at this time with an eGFR just over 60 ml/min  Continue to focus on proper BS control  Relevant Orders    Microalbumin / creatinine urine ratio    Urinalysis with microscopic    Comprehensive metabolic panel    CBC and differential    Vitamin D 25 hydroxy    PTH, intact    Magnesium    Phosphorus       Other    Morbid obesity with BMI of 50 0-59 9, adult ([facility]) (Chronic)       Continue to encourage weight loss  Patient has lost about 8 5 lb since we last saw her  She was encouraged to consume the fewest amount of calories necessary in order to maintain proper nutrition           Leg swelling       Continue to encourage low-sodium diet, continue to use furosemide 20 mg once daily as needed  Vertigo            Patient is stable from renal standpoint  We will see her back for regular appointment approximately 6 months  Subjective:      Patient ID: Mallory Nunn is a 57 y o  female  Patient presents for follow-up appointment  We reviewed the patient's labs in detail, creatinine is at 0 99 mg/dL with no significant electrolyte abnormality  Patient is taking all medications as prescribed with no specific side effects at this time  Hypertension  This is a chronic problem  The current episode started more than 1 year ago  The problem is unchanged  The problem is controlled  Pertinent negatives include no chest pain or orthopnea  There are no associated agents to hypertension  Risk factors for coronary artery disease include post-menopausal state and obesity  Past treatments include lifestyle changes, diuretics and ACE inhibitors  Compliance problems include diet  Hypertensive end-organ damage includes kidney disease  Identifiable causes of hypertension include chronic renal disease  The following portions of the patient's history were reviewed and updated as appropriate: allergies, current medications, past family history, past medical history, past social history, past surgical history and problem list     Review of Systems   Cardiovascular: Negative for chest pain and orthopnea  All other systems reviewed and are negative  Social History     Socioeconomic History   • Marital status:       Spouse name: None   • Number of children: None   • Years of education: None   • Highest education level: None   Occupational History   • None   Tobacco Use   • Smoking status: Never Smoker   • Smokeless tobacco: Never Used   Vaping Use   • Vaping Use: Never used   Substance and Sexual Activity   • Alcohol use: Not Currently     Comment: OCCASIONALLY   • Drug use: Yes     Types: Marijuana     Comment: CBD, medical marijuana   • Sexual activity: Never   Other Topics Concern   • None   Social History Narrative   • None     Social Determinants of Health     Financial Resource Strain: Not on file   Food Insecurity: No Food Insecurity   • Worried About Running Out of Food in the Last Year: Never true   • Ran Out of Food in the Last Year: Never true   Transportation Needs: No Transportation Needs   • Lack of Transportation (Medical): No   • Lack of Transportation (Non-Medical):  No   Physical Activity: Not on file   Stress: Not on file   Social Connections: Not on file   Intimate Partner Violence: Not on file   Housing Stability: Low Risk    • Unable to Pay for Housing in the Last Year: No   • Number of Places Lived in the Last Year: 1   • Unstable Housing in the Last Year: No     Past Medical History:   Diagnosis Date   • Anesthesia related hyperthermia     pt states she had high fevers after her lipoma surgery in  at our hospital and was shipped to [facility] where they said t was from an anesthesia med   • Anxiety    • Arthritis    • Asthma    • Chronic pain    • Depression    • Diabetes mellitus ([address] )    • GERD (gastroesophageal reflux disease)    • Hyperlipidemia    • Malignant hyperthermia due to anesthesia    • 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    • Neuropathy    • Obesity    • PCOS (polycystic ovarian syndrome)      Past Surgical History:   Procedure Laterality Date   • CERVICAL CONIZATION   W/ LASER     •  SECTION     • DILATION AND CURETTAGE OF UTERUS     • ENDOMETRIAL ABLATION     • ENDOMETRIAL BIOPSY     • EPIDURAL BLOCK INJECTION Bilateral 2019    Procedure: L5-S1 transforaminal epidural steroid injection;  Surgeon: Yuan Pearson MD;  Location: [facility];  Service: Pain Management    • EPIDURAL BLOCK INJECTION Bilateral 2019    Procedure: L5-S1 transforaminal epidural steroid injection;  Surgeon: Yuan Pearson MD;  Location: [facility];  Service: Pain Management • FL GUIDED NEEDLE PLAC BX/ASP/INJ  2019   • FL GUIDED NEEDLE PLAC BX/ASP/INJ  2019   • FOOT SURGERY Left    • HERNIA REPAIR     • INDUCED      • IR PICC LINE  2019   • JOINT REPLACEMENT     • KNEE ARTHROPLASTY Right    • NV DEBRIDEMENT OPEN WOUND 20 SQ CM< Right 2018    Procedure: DEBRIDEMENT HAND/FINGER [facility] OUT); Surgeon: Lenka Price MD;  Location: [facility] MAIN OR;  Service: General   • NV EXCISION TUMOR SOFT TISSUE BACK/FLANK SUBQ 3+CM N/A 2018    Procedure: BACK LIPOMA EXCISION;  Surgeon: Lenka Price MD;  Location: [facility] MAIN OR;  Service: General   • ROTATOR CUFF REPAIR Right    • TONSILLECTOMY         Current Outpatient Medications:   •  ammonium lactate (LAC-HYDRIN) 12 % lotion, , Disp: , Rfl: 0  •  aspirin (ECOTRIN LOW STRENGTH) 81 mg EC tablet, Take 81 mg by mouth daily, Disp: , Rfl:   •  BD INSULIN SYRINGE U/F 31G X 16" 1 ML MISC, , Disp: , Rfl: 0  •  Blood Glucose Monitoring Suppl (OneTouch Verio) w/Device KIT, Use to test blood sugar 3x daily  , Disp: 1 kit, Rfl: 0  •  Dulaglutide 3 MG/0 5ML SOPN, Inject 0 5 mL (3 mg total) under the skin once a week, Disp: 6 mL, Rfl: 1  •  ergocalciferol (ERGOCALCIFEROL) 1 25 MG (76259 UT) capsule, Take 1 capsule (50,000 Units total) by mouth once a week, Disp: 4 capsule, Rfl: 5  •  fluconazole (DIFLUCAN) 150 mg tablet, take 1 tablet by mouth AS A ONE TIME DOSE, Disp: , Rfl:   •  furosemide (LASIX) 20 mg tablet, Take 20 mg by mouth daily as needed, Disp: , Rfl:   •  gabapentin (NEURONTIN) 800 mg tablet, Take 800 mg by mouth 3 (three) times a day, Disp: , Rfl:   •  glucagon 1 MG injection, Inject 1 mg under the skin once as needed (PRN blood glucose less than 70 if unconscious or uncorrectable by oral means) for up to 1 dose, Disp: 1 kit, Rfl: 0  •  glucose blood (OneTouch Verio) test strip, Use to test blood sugar 3x daily  , Disp: 200 each, Rfl: 2  •  hydrocortisone 0 5 % cream, Apply topically 2 (two) times a day, Disp: 30 g, Rfl: 0  •  hydrocortisone-aloe 0 5 % cream, apply topically to affected area twice a day, Disp: , Rfl:   •  insulin NPH-insulin regular (HumuLIN 70/30) 100 units/mL subcutaneous injection, inject 68 units before breakfast then 36 units BEFORE LUNCH, THEN 68 UNITS BEFORE DINNER, Disp: 50 mL, Rfl: 5  •  Insulin Pen Needle (Pen Needles) 32G X 4 MM MISC, Use 3 (three) times a day, Disp: 100 each, Rfl: 2  •  Insulin Syringe-Needle U-100 (BD Insulin Syringe U/F) 31G X 5/16" 1 ML MISC, Use three times daily for insulin injection  , Disp: 100 each, Rfl: 3  •  Lancets (OneTouch Delica Plus [id number]) MISC, Use to test blood sugar 3x daily  , Disp: 100 each, Rfl: 3  •  Lancets (OneTouch Delica Plus [id number]) MISC, USE TO TEST BLOOD SUGAR THREE TIMES DAILY, Disp: 100 each, Rfl: 3  •  lisinopril (ZESTRIL) 10 mg tablet, Take 10 mg by mouth daily, Disp: , Rfl:   •  lisinopril (ZESTRIL) 5 mg tablet, Take 2 tablets (10 mg total) by mouth daily, Disp: , Rfl:   •  meclizine (ANTIVERT) 25 mg tablet, Take 25 mg by mouth Three times daily as needed, Disp: , Rfl:   •  metFORMIN (GLUCOPHAGE) 1000 MG tablet, Take 1,000 mg by mouth 2 (two) times a day with meals, Disp: , Rfl:   •  nystatin (MYCOSTATIN) cream, Apply topically 2 (two) times a day, Disp: 30 g, Rfl: 0  •  ondansetron (ZOFRAN-ODT) 4 mg disintegrating tablet, Take 1 tablet (4 mg total) by mouth every 6 (six) hours as needed for nausea or vomiting, Disp: 12 tablet, Rfl: 0  •  triamcinolone (KENALOG) 0 1 % cream, apply topically to affected area twice a day, Disp: , Rfl:   •  carisoprodol (SOMA) 350 mg tablet, Take 1 tablet (350 mg total) by mouth 3 (three) times a day for 10 days, Disp: 30 tablet, Rfl: 0    Lab Results   Component Value Date    SODIUM 137 07/01/2022    K 4 5 07/01/2022     07/01/2022    CO2 25 07/01/2022    AGAP 8 07/01/2022    BUN 21 07/01/2022    CREATININE 1 13 07/01/2022    GLUC 163 (H) 07/01/2022    GLUF 145 (H) 01/31/2022    CALCIUM 9 5 07/01/2022 AST 46 (H) 07/01/2022    ALT 61 07/01/2022    ALKPHOS 74 07/01/2022    TP 7 4 07/01/2022    TBILI 0 52 07/01/2022    EGFR 51 07/01/2022     Lab Results   Component Value Date    WBC 4 91 12/24/2021    HGB 14 7 12/24/2021    HCT 44 4 12/24/2021    MCV 84 12/24/2021     12/24/2021     Lab Results   Component Value Date    CHOLESTEROL 143 01/31/2022    CHOLESTEROL 118 05/06/2020    CHOLESTEROL 187 12/05/2019     Lab Results   Component Value Date    HDL 46 (L) 01/31/2022    HDL 43 05/06/2020    HDL 46 12/05/2019     Lab Results   Component Value Date    LDLCALC 45 01/31/2022    LDLCALC 34 05/06/2020    LDLCALC 102 (H) 12/05/2019     Lab Results   Component Value Date    TRIG 259 (H) 01/31/2022    TRIG 205 (H) 05/06/2020    TRIG 197 (H) 12/05/2019     No results found for: [place], [place]  Lab Results   Component Value Date    ONM4NJFSEAZJ 2 120 11/10/2020     Lab Results   Component Value Date    PTH 32 4 01/31/2022    CALCIUM 9 5 07/01/2022    PHOS 4 2 (H) 10/18/2019     Lab Results   Component Value Date    SPEP See Comment 02/17/2021     No results found for: AIME [id number]        Objective:      /70   Pulse 82   Wt 127 kg (280 lb)   SpO2 97%   BMI 51 21 kg/m²          Physical Exam  Vitals reviewed  Constitutional:       General: She is not in acute distress  Appearance: She is well-developed  HENT:      Head: Normocephalic and atraumatic  Eyes:      Conjunctiva/sclera: Conjunctivae normal    Cardiovascular:      Rate and Rhythm: Normal rate and regular rhythm  Pulmonary:      Effort: Pulmonary effort is normal       Breath sounds: Normal breath sounds  Abdominal:      Palpations: Abdomen is soft  Musculoskeletal:      Cervical back: Neck supple  Skin:     General: Skin is warm  Findings: No rash  Neurological:      Mental Status: She is alert and oriented to person, place, and time  Cranial Nerves: No cranial nerve deficit     Psychiatric:         Behavior: Behavior normal

## 2022-11-07 NOTE — ASSESSMENT & PLAN NOTE
Lab Results   Component Value Date    EGFR 51 07/01/2022    EGFR 54 01/31/2022    EGFR 60 12/24/2021    CREATININE 1 13 07/01/2022    CREATININE 1 09 01/31/2022    CREATININE 0 99 12/24/2021       Patient kidney function is stable to slightly better at this time with an eGFR just over 60 ml/min  Continue to focus on proper BS control

## 2022-12-01 ENCOUNTER — TELEPHONE (OUTPATIENT)
Dept: ENDOCRINOLOGY | Facility: CLINIC | Age: 62
End: 2022-12-01

## 2022-12-01 DIAGNOSIS — Z79.4 TYPE 2 DIABETES MELLITUS WITH HYPERGLYCEMIA, WITH LONG-TERM CURRENT USE OF INSULIN (HCC): ICD-10-CM

## 2022-12-01 DIAGNOSIS — E66.01 MORBID OBESITY WITH BMI OF 50.0-59.9, ADULT (HCC): ICD-10-CM

## 2022-12-01 DIAGNOSIS — E11.65 TYPE 2 DIABETES MELLITUS WITH HYPERGLYCEMIA, WITH LONG-TERM CURRENT USE OF INSULIN (HCC): ICD-10-CM

## 2022-12-01 NOTE — TELEPHONE ENCOUNTER
Patient called stating her pharmacy cant get in the trulicity and she wants to know if there is something else she can take

## 2022-12-02 NOTE — PROGRESS NOTES
Established Patient Progress Note      Chief Complaint   Patient presents with   • Diabetes Mellitus        History of Present Illness:   Chantelle Hill is a 57 y o  female with HTN, HLD, and type 2 diabetes with long term use of insulin since 1997  Reports complications of proteinuria, peripheral neuropathy, and diabetic ulcer of left midfoot  Denies recent severe hypoglycemic or severe hyperglycemic episodes  Denies any issues with her current regimen  home glucose monitoring: are performed regularly 3x daily    Patient presents today for late follow-up  Her last appointment was on 02/24/2022  At that time, Trulicity was increased to 3 mg once weekly  She continues to test her blood sugars but unfortunately did not bring any to today's appointment  She continues to experience significant stress taking care of her children and grandchildren, some of whom are special needs  She admits to dietary indiscretion  She also was unable to receive her Trulicity for a few weeks due to the medication being on back order  Home blood glucose readings: None for review  Recent POC HgA1C was 9 2%    She reports fatigue and polyuria  Denies any episodes of hypoglycemia  Current regimen:   Trulicity 3 mg once weekly  NPH 68-36-68 (if she is only eating twice daily, she will take 84 units BID)  Metformin 1000 mg twice daily- reports she is having more frequent episodes of diarrhea  Last Eye Exam: UTD  Last Foot Exam: UTD    For hypertension, she is taking 15 mg of lisinopril daily  She denies headache and cough  For hyperlipidemia, she is taking 20 mg of rosuvastatin nightly  She denies myalgias  For vitamin-D deficiency, she is taking 90120 units of vitamin-D once weekly  For obstructive sleep apnea, she is using her CPAP      Patient Active Problem List   Diagnosis   • Vitamin D deficiency   • Type 2 diabetes mellitus with hyperglycemia, with long-term current use of insulin ([facility] )   • History of Salmonella infection   • Melanocytic nevus of trunk   • Menopausal and perimenopausal disorder   • Insomnia   • Essential hypertension   • Mixed hyperlipidemia   • History of pulmonary embolism   • GERD (gastroesophageal reflux disease)   • Gait disturbance   • Diabetic ulcer of left heel (HCC)   • Stage 3 chronic kidney disease (HCC)   • Benign mole   • Anxiety   • Achilles tendinitis of left lower extremity   • Ulcer of toe of left foot (HCC)   • History of MRSA infection   • Screening for colon cancer   • Screening for breast cancer   • Screening for cervical cancer   • Eye exam, routine   • Chronic pain syndrome   • Osteoarthritis of spine   • Leg swelling   • Skin callus   • Lipoma of back   • Chronic bilateral low back pain with bilateral sciatica   • Lumbar radiculopathy   • Diabetic neuropathy ([facility])   • Myofascial pain syndrome   • Lumbar spondylosis   • Spinal stenosis of lumbar region with neurogenic claudication   • Thoracic spinal stenosis   • Chronic back pain   • Right hip pain   • Morbid obesity with BMI of 50 0-59 9, adult ([facility] )   • Diabetic ulcer of left midfoot associated with type 2 diabetes mellitus, limited to breakdown of skin (HCC)   • Edema   • Proteinuria   • Obstructive sleep apnea syndrome   • Hyperlipidemia associated with type 2 diabetes mellitus ([facility] )   • Neuropathy   • Acute hypoxemic respiratory failure due to COVID-19 [facility])   • Vertigo      Past Medical History:   Diagnosis Date   • Anesthesia related hyperthermia     pt states she had high fevers after her lipoma surgery in 2001 at our hospital and was shipped to [facility] where they said t was from an anesthesia med   • Anxiety    • Arthritis    • Asthma    • Chronic pain    • Depression    • Diabetes mellitus ([facility] )    • GERD (gastroesophageal reflux disease)    • Hyperlipidemia    • Malignant hyperthermia due to anesthesia    • 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    • Neuropathy    • Obesity    • PCOS (polycystic ovarian syndrome)       Past Surgical History:   Procedure Laterality Date   • CERVICAL CONIZATION   W/ LASER     •  SECTION     • DILATION AND CURETTAGE OF UTERUS     • ENDOMETRIAL ABLATION     • ENDOMETRIAL BIOPSY     • EPIDURAL BLOCK INJECTION Bilateral 2019    Procedure: L5-S1 transforaminal epidural steroid injection;  Surgeon: Napoleon Ames MD;  Location: [facility] MAIN OR;  Service: Pain Management    • EPIDURAL BLOCK INJECTION Bilateral 2019    Procedure: L5-S1 transforaminal epidural steroid injection;  Surgeon: Napoleon Ames MD;  Location: [facility] MAIN OR;  Service: Pain Management    • FL GUIDED NEEDLE PLAC BX/ASP/INJ  2019   • FL GUIDED NEEDLE PLAC BX/ASP/INJ  2019   • FOOT SURGERY Left    • HERNIA REPAIR     • INDUCED      • IR PICC LINE  2019   • JOINT REPLACEMENT     • KNEE ARTHROPLASTY Right    • NH DEBRIDEMENT OPEN WOUND 20 SQ CM< Right 2018    Procedure: DEBRIDEMENT HAND/FINGER [facility] OUT);   Surgeon: Kamran Mathews MD;  Location: [address] MAIN OR;  Service: General   • NH EXCISION TUMOR SOFT TISSUE BACK/FLANK SUBQ 3+CM N/A 2018    Procedure: BACK LIPOMA EXCISION;  Surgeon: Kamran Mathews MD;  Location: [address] MAIN OR;  Service: General   • ROTATOR CUFF REPAIR Right    • TONSILLECTOMY        Family History   Problem Relation Age of Onset   • Polycystic ovary syndrome Mother    • Mental illness Father    • Diabetes Father      Social History     Tobacco Use   • Smoking status: Never   • Smokeless tobacco: Never   Substance Use Topics   • Alcohol use: Not Currently     Comment: OCCASIONALLY     Allergies   Allergen Reactions   • Cauliflower [Brassica Oleracea - Food Allergy] Other (See Comments)     Other reaction(s): GI upset  Abdominal pain   • Citrus Bioflavonoid - Food Allergy Other (See Comments)     Skin burns   • Compro [Prochlorperazine] Shortness Of Breath and Wheezing   • Latex Other (See Comments)     burning   • Mobic [Meloxicam] Swelling • Morphine Hives and Itching   • Albumen, Egg - Food Allergy Vomiting     Other reaction(s): Nausea and/or vomiting   • Metronidazole Itching   • Sevoflurane Other (See Comments)   • Succinylcholine Other (See Comments)   • Sulfa Antibiotics Other (See Comments)     Other reaction(s): Unknown Reaction         Current Outpatient Medications:   •  ammonium lactate (LAC-HYDRIN) 12 % lotion, , Disp: , Rfl: 0  •  aspirin (ECOTRIN LOW STRENGTH) 81 mg EC tablet, Take 81 mg by mouth daily, Disp: , Rfl:   •  BD INSULIN SYRINGE U/F 31G X 5/16" 1 ML MISC, , Disp: , Rfl: 0  •  Blood Glucose Monitoring Suppl (OneTouch Verio) w/Device KIT, Use to test blood sugar 3x daily  , Disp: 1 kit, Rfl: 0  •  clindamycin (CLEOCIN T) 1 % lotion, apply twice a day to affected area ON face, Disp: , Rfl:   •  Dulaglutide (Trulicity) 4 5 XW/6 6PH SOPN, Inject 0 5 mL (4 5 mg total) under the skin once a week, Disp: 6 mL, Rfl: 1  •  Elidel 1 % cream, apply twice a day for 6 WEEKS to affected area ON face, Disp: , Rfl:   •  ergocalciferol (ERGOCALCIFEROL) 1 25 MG (54954 UT) capsule, Take 1 capsule (50,000 Units total) by mouth once a week, Disp: 4 capsule, Rfl: 5  •  furosemide (LASIX) 20 mg tablet, Take 20 mg by mouth daily as needed, Disp: , Rfl:   •  gabapentin (NEURONTIN) 800 mg tablet, Take 800 mg by mouth 3 (three) times a day, Disp: , Rfl:   •  glucagon 1 MG injection, Inject 1 mg under the skin once as needed (PRN blood glucose less than 70 if unconscious or uncorrectable by oral means) for up to 1 dose, Disp: 1 kit, Rfl: 0  •  insulin NPH-insulin regular (HumuLIN 70/30) 100 units/mL subcutaneous injection, inject 68 units before breakfast then 36 units BEFORE LUNCH, THEN 68 UNITS BEFORE DINNER, Disp: 50 mL, Rfl: 5  •  Insulin Pen Needle (Pen Needles) 32G X 4 MM MISC, Use 3 (three) times a day, Disp: 100 each, Rfl: 2  •  Insulin Syringe-Needle U-100 (BD Insulin Syringe U/F) 31G X 5/16" 1 ML MISC, Use three times daily for insulin injection  , Disp: 100 each, Rfl: 3  •  Lancets (OneTouch Delica Plus [id number]) MISC, USE TO TEST BLOOD SUGAR THREE TIMES DAILY, Disp: 100 each, Rfl: 3  •  lisinopril (ZESTRIL) 10 mg tablet, Take 10 mg by mouth daily, Disp: , Rfl:   •  lisinopril (ZESTRIL) 5 mg tablet, Take 2 tablets (10 mg total) by mouth daily, Disp: , Rfl:   •  meclizine (ANTIVERT) 25 mg tablet, Take 25 mg by mouth Three times daily as needed, Disp: , Rfl:   •  metFORMIN (GLUCOPHAGE-XR) 500 mg 24 hr tablet, Take 2 tablets (1,000 mg total) by mouth 2 (two) times a day with meals, Disp: 360 tablet, Rfl: 1  •  mometasone (ELOCON) 0 1 % cream, Apply topically daily, Disp: , Rfl:   •  mupirocin (BACTROBAN) 2 % ointment, apply topically three times a day to affected area, Disp: , Rfl:   •  nitrofurantoin (MACROBID) 100 mg capsule, take 1 tablet by mouth twice a day for 7 days, Disp: , Rfl:   •  nystatin (MYCOSTATIN) cream, Apply topically 2 (two) times a day, Disp: 30 g, Rfl: 0  •  ofloxacin (OCUFLOX) 0 3 % ophthalmic solution, instill 1 drop into right eye four times a day STARTING 3 DAYS MN     (REFER TO PRESCRIPTION NOTES)  , Disp: , Rfl:   •  ondansetron (ZOFRAN) 8 mg tablet, Take 8 mg by mouth every 8 (eight) hours as needed, Disp: , Rfl:   •  ondansetron (ZOFRAN-ODT) 4 mg disintegrating tablet, Take 1 tablet (4 mg total) by mouth every 6 (six) hours as needed for nausea or vomiting, Disp: 12 tablet, Rfl: 0  •  Pain Relief Extra Strength 500 MG tablet, take 1 tablet by mouth every 6 hours if needed for mild pain, Disp: , Rfl:   •  prednisoLONE acetate (PRED FORTE) 1 % ophthalmic suspension, instill 1 drop into right eye every 2 hours STARTING AFTER SURGERY, Disp: , Rfl:   •  rosuvastatin (CRESTOR) 20 MG tablet, Take 20 mg by mouth every evening, Disp: , Rfl:   •  tiZANidine (ZANAFLEX) 4 mg tablet, , Disp: , Rfl:   •  triamcinolone (KENALOG) 0 1 % cream, apply topically to affected area twice a day, Disp: , Rfl:   •  doxycycline hyclate (VIBRAMYCIN) 100 mg capsule, Take 100 mg by mouth 2 (two) times a day with meals (Patient not taking: Reported on 12/6/2022), Disp: , Rfl:   •  glucose blood (OneTouch Verio) test strip, Use to test blood sugar 3x daily  , Disp: 200 each, Rfl: 2  •  Lancets (OneTouch Delica Plus [id number]) MISC, Use to test blood sugar 3x daily  , Disp: 100 each, Rfl: 3    Review of Systems   Constitutional: Positive for fatigue  Negative for activity change, appetite change and unexpected weight change  HENT: Negative for dental problem, sore throat, trouble swallowing and voice change  Eyes: Negative for visual disturbance  Respiratory: Negative for cough, chest tightness and shortness of breath  Cardiovascular: Negative for chest pain, palpitations and leg swelling  Gastrointestinal: Positive for diarrhea  Negative for constipation, nausea and vomiting  Endocrine: Positive for polyuria  Negative for polydipsia and polyphagia  Genitourinary: Positive for frequency  Musculoskeletal: Positive for arthralgias, back pain and gait problem  Negative for myalgias  Skin: Negative for wound  Allergic/Immunologic: Positive for environmental allergies and food allergies  Neurological: Positive for numbness  Negative for dizziness, weakness, light-headedness and headaches  Hematological: Does not bruise/bleed easily  Psychiatric/Behavioral: Positive for dysphoric mood  Negative for decreased concentration and sleep disturbance  The patient is not nervous/anxious  Physical Exam:  Body mass index is 49 97 kg/m²  /62 (BP Location: Right arm, Patient Position: Sitting, Cuff Size: Large)   Pulse 83   Temp 98 °F (36 7 °C) (Temporal)   Resp 16   Ht 5' 2" (1 575 m)   Wt 124 kg (273 lb 3 2 oz)   SpO2 97%   BMI 49 97 kg/m²    Wt Readings from Last 3 Encounters:   12/06/22 124 kg (273 lb 3 2 oz)   11/07/22 127 kg (280 lb)   07/06/22 131 kg (288 lb 3 2 oz)       Physical Exam  Vitals reviewed     Constitutional: General: She is not in acute distress  Appearance: She is well-developed  She is obese  She is not ill-appearing  HENT:      Head: Normocephalic and atraumatic  Eyes:      Pupils: Pupils are equal, round, and reactive to light  Neck:      Thyroid: No thyromegaly  Cardiovascular:      Rate and Rhythm: Normal rate and regular rhythm  Pulses: Normal pulses  Heart sounds: Normal heart sounds  Pulmonary:      Effort: Pulmonary effort is normal       Breath sounds: Normal breath sounds  Abdominal:      General: Bowel sounds are normal  There is no distension  Palpations: Abdomen is soft  Tenderness: There is no abdominal tenderness  Musculoskeletal:      Cervical back: Normal range of motion and neck supple  Right lower leg: No edema  Left lower leg: No edema  Lymphadenopathy:      Cervical: No cervical adenopathy  Skin:     General: Skin is warm and dry  Capillary Refill: Capillary refill takes less than 2 seconds  Neurological:      Mental Status: She is alert and oriented to person, place, and time        Gait: Gait normal    Psychiatric:         Mood and Affect: Mood normal          Behavior: Behavior normal            Labs:   Lab Results   Component Value Date    HGBA1C 9 4 (H) 07/01/2022    HGBA1C 9 7 (H) 01/31/2022    HGBA1C 11 1 (A) 10/22/2021     Lab Results   Component Value Date    CREATININE 1 13 07/01/2022    CREATININE 1 09 01/31/2022    CREATININE 0 99 12/24/2021    BUN 21 07/01/2022    K 4 5 07/01/2022     07/01/2022    CO2 25 07/01/2022     eGFR   Date Value Ref Range Status   07/01/2022 51 ml/min/1 73sq m Final     Lab Results   Component Value Date    HDL 46 (L) 01/31/2022    TRIG 259 (H) 01/31/2022     Lab Results   Component Value Date    ALT 61 07/01/2022    AST 46 (H) 07/01/2022    ALKPHOS 74 07/01/2022     Lab Results   Component Value Date    XLD6XNFXBXWZ 2 120 11/10/2020    [id number] 2 530 05/06/2020    FJX7JWILEXAX 2 610 12/05/2019     Lab Results   Component Value Date    FREET4 1 00 08/19/2019       Impression & Plan:    Problem List Items Addressed This Visit        Endocrine    Type 2 diabetes mellitus with hyperglycemia, with long-term current use of insulin ([facility] - Primary     Patient remains uncontrolled  Counseled on pathophysiology of diabetes  Counseled on negative, long-term effects associated with uncontrolled diabetes including neuropathy, nephropathy, retinopathy, heart attack, and stroke  Will switch metformin to extended release in the hopes of medicating her GI side effects  Increase Trulicity to 4 5 mg once weekly  Patient should have a continuous glucose monitor  Recommend freestyle [facility]  The patient is a candidate for CGM use: Indications: Diabetes Type 2  The patient is treated with 3 or more injections of insulin daily  SMBG data is being used to make self adjustments to the insulin regimen  Continue to focus on healthy diet  Continue with physical activity as tolerated  Patient is to notify me with persistent hyperglycemia episodes of hypoglycemia  Lab Results   Component Value Date    HGBA1C 9 4 (H) 07/01/2022            Relevant Medications    Dulaglutide (Trulicity) 4 5 QG/4 7TX SOPN    metFORMIN (GLUCOPHAGE-XR) 500 mg 24 hr tablet    Other Relevant Orders    Hemoglobin A1C    Comprehensive metabolic panel    Lipid Panel with Direct LDL reflex    Diabetic neuropathy (HCC)     Continue gabapentin  Lab Results   Component Value Date    HGBA1C 9 4 (H) 07/01/2022            Relevant Medications    Dulaglutide (Trulicity) 4 5 GT/3 0SU SOPN    metFORMIN (GLUCOPHAGE-XR) 500 mg 24 hr tablet    Hyperlipidemia associated with type 2 diabetes mellitus ([facility] [address] )     Continue statin    Lab Results   Component Value Date    HGBA1C 9 4 (H) 07/01/2022            Relevant Medications    rosuvastatin (CRESTOR) 20 MG tablet    Dulaglutide (Trulicity) 4 5 IF/2 9XG SOPN    metFORMIN (GLUCOPHAGE-XR) 500 mg 24 hr tablet Other Relevant Orders    Lipid Panel with Direct LDL reflex       Respiratory    Obstructive sleep apnea syndrome     Continue CPAP  Cardiovascular and Mediastinum    Essential hypertension (Chronic)     BP slightly elevated at today's appointment  Historically, SBP is less than 130  Continue current regimen  Other    Vitamin D deficiency (Chronic)     Continue vitamin-D supplements  Orders Placed This Encounter   Procedures   • Hemoglobin A1C     Standing Status:   Future     Standing Expiration Date:   12/6/2023   • Comprehensive metabolic panel     This is a patient instruction: Patient fasting for 8 hours or longer recommended  Standing Status:   Future     Standing Expiration Date:   12/6/2023   • Lipid Panel with Direct LDL reflex     This is a patient instruction: This test requires patient fasting for 10-12 hours or longer  Drinking of black coffee or black tea is acceptable  Standing Status:   Future     Standing Expiration Date:   12/6/2023       There are no Patient Instructions on file for this visit  Discussed with the patient and all questioned fully answered  She will call me if any problems arise  Follow-up appointment in 4 months       Counseled patient on diagnostic results, prognosis, risk and benefit of treatment options, instruction for management, importance of treatment compliance, Risk  factor reduction and impressions    ISABELLE Ly

## 2022-12-06 ENCOUNTER — OFFICE VISIT (OUTPATIENT)
Dept: ENDOCRINOLOGY | Facility: CLINIC | Age: 62
End: 2022-12-06

## 2022-12-06 VITALS
TEMPERATURE: 98 F | WEIGHT: 273.2 LBS | SYSTOLIC BLOOD PRESSURE: 140 MMHG | RESPIRATION RATE: 16 BRPM | HEIGHT: 62 IN | HEART RATE: 83 BPM | OXYGEN SATURATION: 97 % | DIASTOLIC BLOOD PRESSURE: 62 MMHG | BODY MASS INDEX: 50.27 KG/M2

## 2022-12-06 DIAGNOSIS — E55.9 VITAMIN D DEFICIENCY: Chronic | ICD-10-CM

## 2022-12-06 DIAGNOSIS — E11.69 HYPERLIPIDEMIA ASSOCIATED WITH TYPE 2 DIABETES MELLITUS (HCC): ICD-10-CM

## 2022-12-06 DIAGNOSIS — E78.5 HYPERLIPIDEMIA ASSOCIATED WITH TYPE 2 DIABETES MELLITUS (HCC): ICD-10-CM

## 2022-12-06 DIAGNOSIS — E11.65 TYPE 2 DIABETES MELLITUS WITH HYPERGLYCEMIA, WITH LONG-TERM CURRENT USE OF INSULIN (HCC): ICD-10-CM

## 2022-12-06 DIAGNOSIS — G47.33 OBSTRUCTIVE SLEEP APNEA SYNDROME: ICD-10-CM

## 2022-12-06 DIAGNOSIS — E11.65 TYPE 2 DIABETES MELLITUS WITH HYPERGLYCEMIA, WITH LONG-TERM CURRENT USE OF INSULIN (HCC): Primary | ICD-10-CM

## 2022-12-06 DIAGNOSIS — E08.42 DIABETIC POLYNEUROPATHY ASSOCIATED WITH DIABETES MELLITUS DUE TO UNDERLYING CONDITION (HCC): ICD-10-CM

## 2022-12-06 DIAGNOSIS — I10 ESSENTIAL HYPERTENSION: Chronic | ICD-10-CM

## 2022-12-06 DIAGNOSIS — Z79.4 TYPE 2 DIABETES MELLITUS WITH HYPERGLYCEMIA, WITH LONG-TERM CURRENT USE OF INSULIN (HCC): ICD-10-CM

## 2022-12-06 DIAGNOSIS — Z79.4 TYPE 2 DIABETES MELLITUS WITH HYPERGLYCEMIA, WITH LONG-TERM CURRENT USE OF INSULIN (HCC): Primary | ICD-10-CM

## 2022-12-06 RX ORDER — NITROFURANTOIN 25; 75 MG/1; MG/1
CAPSULE ORAL
COMMUNITY
Start: 2022-09-26

## 2022-12-06 RX ORDER — ACETAMINOPHEN 500 MG/1
TABLET, FILM COATED ORAL
COMMUNITY
Start: 2022-09-26

## 2022-12-06 RX ORDER — TIZANIDINE 4 MG/1
TABLET ORAL
COMMUNITY
Start: 2022-10-31

## 2022-12-06 RX ORDER — CLINDAMYCIN PHOSPHATE 10 UG/ML
LOTION TOPICAL
COMMUNITY
Start: 2022-09-16

## 2022-12-06 RX ORDER — DOXYCYCLINE HYCLATE 100 MG/1
100 CAPSULE ORAL 2 TIMES DAILY WITH MEALS
COMMUNITY
Start: 2022-09-16

## 2022-12-06 RX ORDER — OFLOXACIN 3 MG/ML
SOLUTION/ DROPS OPHTHALMIC
COMMUNITY
Start: 2022-09-14

## 2022-12-06 RX ORDER — PREDNISOLONE ACETATE 10 MG/ML
SUSPENSION/ DROPS OPHTHALMIC
COMMUNITY
Start: 2022-09-14

## 2022-12-06 RX ORDER — DULAGLUTIDE 4.5 MG/.5ML
4.5 INJECTION, SOLUTION SUBCUTANEOUS WEEKLY
Qty: 6 ML | Refills: 1 | Status: SHIPPED | OUTPATIENT
Start: 2022-12-06

## 2022-12-06 RX ORDER — PIMECROLIMUS 1 %
CREAM (GRAM) TOPICAL
COMMUNITY
Start: 2022-09-19

## 2022-12-06 RX ORDER — MOMETASONE FUROATE 1 MG/G
CREAM TOPICAL DAILY
COMMUNITY
Start: 2022-07-28 | End: 2023-07-28

## 2022-12-06 RX ORDER — BLOOD SUGAR DIAGNOSTIC
STRIP MISCELLANEOUS
Qty: 200 EACH | Refills: 2 | Status: SHIPPED | OUTPATIENT
Start: 2022-12-06

## 2022-12-06 RX ORDER — ONDANSETRON HYDROCHLORIDE 8 MG/1
8 TABLET, FILM COATED ORAL EVERY 8 HOURS PRN
COMMUNITY
Start: 2022-09-26

## 2022-12-06 RX ORDER — LANCETS 30 GAUGE
EACH MISCELLANEOUS
Qty: 100 EACH | Refills: 3 | Status: SHIPPED | OUTPATIENT
Start: 2022-12-06

## 2022-12-06 RX ORDER — ROSUVASTATIN CALCIUM 20 MG/1
20 TABLET, COATED ORAL EVERY EVENING
COMMUNITY
Start: 2022-10-19

## 2022-12-06 RX ORDER — METFORMIN HYDROCHLORIDE 500 MG/1
1000 TABLET, EXTENDED RELEASE ORAL 2 TIMES DAILY WITH MEALS
Qty: 360 TABLET | Refills: 1 | Status: SHIPPED | OUTPATIENT
Start: 2022-12-06 | End: 2023-03-06

## 2022-12-06 NOTE — ASSESSMENT & PLAN NOTE
Patient remains uncontrolled  Counseled on pathophysiology of diabetes  Counseled on negative, long-term effects associated with uncontrolled diabetes including neuropathy, nephropathy, retinopathy, heart attack, and stroke  Will switch metformin to extended release in the hopes of medicating her GI side effects  Increase Trulicity to 4 5 mg once weekly  Patient should have a continuous glucose monitor  Recommend freestyle [facility]  The patient is a candidate for CGM use: Indications: Diabetes Type 2  The patient is treated with 3 or more injections of insulin daily  SMBG data is being used to make self adjustments to the insulin regimen  Continue to focus on healthy diet  Continue with physical activity as tolerated  Patient is to notify me with persistent hyperglycemia episodes of hypoglycemia    Lab Results   Component Value Date    HGBA1C 9 4 (H) 07/01/2022

## 2022-12-06 NOTE — ASSESSMENT & PLAN NOTE
BP slightly elevated at today's appointment  Historically, SBP is less than 130  Continue current regimen

## 2022-12-12 ENCOUNTER — TELEPHONE (OUTPATIENT)
Dept: ENDOCRINOLOGY | Facility: CLINIC | Age: 62
End: 2022-12-12

## 2022-12-12 DIAGNOSIS — E11.65 TYPE 2 DIABETES MELLITUS WITH HYPERGLYCEMIA, UNSPECIFIED WHETHER LONG TERM INSULIN USE (HCC): ICD-10-CM

## 2022-12-12 RX ORDER — BLOOD SUGAR DIAGNOSTIC
STRIP MISCELLANEOUS
Qty: 100 EACH | Refills: 3 | Status: SHIPPED | OUTPATIENT
Start: 2022-12-12

## 2023-02-08 NOTE — PROGRESS NOTES
Submitted to [facility] via [employer], Patient will most likely need to provide 30 days of sugar logs to be approved

## 2023-02-09 ENCOUNTER — TELEPHONE (OUTPATIENT)
Dept: ENDOCRINOLOGY | Facility: CLINIC | Age: 63
End: 2023-02-09

## 2023-02-09 DIAGNOSIS — E11.65 TYPE 2 DIABETES MELLITUS WITH HYPERGLYCEMIA, WITH LONG-TERM CURRENT USE OF INSULIN (HCC): Primary | ICD-10-CM

## 2023-02-09 DIAGNOSIS — Z79.4 TYPE 2 DIABETES MELLITUS WITH HYPERGLYCEMIA, WITH LONG-TERM CURRENT USE OF INSULIN (HCC): Primary | ICD-10-CM

## 2023-02-09 RX ORDER — FLASH GLUCOSE SCANNING READER
EACH MISCELLANEOUS
Qty: 1 EACH | Refills: 0 | Status: SHIPPED | OUTPATIENT
Start: 2023-02-09

## 2023-02-09 RX ORDER — FLASH GLUCOSE SENSOR
KIT MISCELLANEOUS
Qty: 1 EACH | Refills: 3 | Status: SHIPPED | OUTPATIENT
Start: 2023-02-09

## 2023-02-09 NOTE — TELEPHONE ENCOUNTER
Pt called and [facility] called her and said they couldn't supply the freestyle jair because they don't accept her insurance

## 2023-02-09 NOTE — TELEPHONE ENCOUNTER
Spoke to patient we sent the [place] 2 to her pharmacy while on the phone, patient was told to call the office with any further issues  We can try a different DME company if the pharmacy doesn't work out

## 2023-02-21 ENCOUNTER — CLINICAL SUPPORT (OUTPATIENT)
Dept: ENDOCRINOLOGY | Facility: CLINIC | Age: 63
End: 2023-02-21

## 2023-02-21 VITALS
HEART RATE: 88 BPM | OXYGEN SATURATION: 96 % | DIASTOLIC BLOOD PRESSURE: 68 MMHG | WEIGHT: 266 LBS | SYSTOLIC BLOOD PRESSURE: 138 MMHG | HEIGHT: 62 IN | BODY MASS INDEX: 48.95 KG/M2

## 2023-02-21 DIAGNOSIS — Z79.4 TYPE 2 DIABETES MELLITUS WITH HYPERGLYCEMIA, WITH LONG-TERM CURRENT USE OF INSULIN (HCC): ICD-10-CM

## 2023-02-21 DIAGNOSIS — E11.65 TYPE 2 DIABETES MELLITUS WITH HYPERGLYCEMIA, WITH LONG-TERM CURRENT USE OF INSULIN (HCC): ICD-10-CM

## 2023-02-27 DIAGNOSIS — Z79.4 TYPE 2 DIABETES MELLITUS WITHOUT COMPLICATION, WITH LONG-TERM CURRENT USE OF INSULIN (HCC): ICD-10-CM

## 2023-02-27 DIAGNOSIS — E11.9 TYPE 2 DIABETES MELLITUS WITHOUT COMPLICATION, WITH LONG-TERM CURRENT USE OF INSULIN (HCC): ICD-10-CM

## 2023-02-28 DIAGNOSIS — Z79.4 TYPE 2 DIABETES MELLITUS WITH HYPERGLYCEMIA, WITH LONG-TERM CURRENT USE OF INSULIN (HCC): ICD-10-CM

## 2023-02-28 DIAGNOSIS — E11.65 TYPE 2 DIABETES MELLITUS WITH HYPERGLYCEMIA, WITH LONG-TERM CURRENT USE OF INSULIN (HCC): ICD-10-CM

## 2023-02-28 RX ORDER — INSULIN NPH HUM/REG INSULIN HM 70-30/ML
VIAL (ML) SUBCUTANEOUS
Qty: 50 ML | Refills: 5 | Status: SHIPPED | OUTPATIENT
Start: 2023-02-28

## 2023-02-28 RX ORDER — DULAGLUTIDE 4.5 MG/.5ML
4.5 INJECTION, SOLUTION SUBCUTANEOUS WEEKLY
Qty: 2 ML | Refills: 1 | Status: SHIPPED | OUTPATIENT
Start: 2023-02-28

## 2023-03-28 ENCOUNTER — OFFICE VISIT (OUTPATIENT)
Dept: PODIATRY | Facility: CLINIC | Age: 63
End: 2023-03-28

## 2023-03-28 VITALS
SYSTOLIC BLOOD PRESSURE: 132 MMHG | WEIGHT: 269 LBS | DIASTOLIC BLOOD PRESSURE: 64 MMHG | HEART RATE: 82 BPM | HEIGHT: 62 IN | BODY MASS INDEX: 49.5 KG/M2

## 2023-03-28 DIAGNOSIS — M76.821 POSTERIOR TIBIAL TENDINITIS OF RIGHT LOWER EXTREMITY: ICD-10-CM

## 2023-03-28 DIAGNOSIS — S86.011A RUPTURE OF RIGHT ACHILLES TENDON, INITIAL ENCOUNTER: ICD-10-CM

## 2023-03-28 DIAGNOSIS — E11.49 OTHER DIABETIC NEUROLOGICAL COMPLICATION ASSOCIATED WITH TYPE 2 DIABETES MELLITUS (HCC): Primary | ICD-10-CM

## 2023-03-28 DIAGNOSIS — B35.1 ONYCHOMYCOSIS: ICD-10-CM

## 2023-03-28 NOTE — LETTER
March 29, 2023     Emir Li,   [id number]  [facility]    Patient: Cisco oCol   YOB: 1960   Date of Visit: 3/28/2023       Dear Dr Serjio Jeffrey: Thank you for referring Carlton Evangelista to me for evaluation  Below are my notes for this consultation  If you have questions, please do not hesitate to call me  I look forward to following your patient along with you  Sincerely,        Armani Garcia DPM        CC: No Recipients  GEOFFREY Singh [facility]  3/28/2023  2:01 PM  Signed  Assessment/Plan:    No problem-specific Assessment & Plan notes found for this encounter  Diagnoses and all orders for this visit:    Other diabetic neurological complication associated with type 2 diabetes mellitus ([facility] )    Onychomycosis    Rupture of right Achilles tendon, initial encounter    Posterior tibial tendinitis of right lower extremity     Plan:     1  A thorough neurovascular exam was performed  Patient presents for at-risk foot care  Patient has no acute concerns today  Patient has significant lower extremity risk due to neuropathy, parasthesia, edema, and trophic skin changes to the lower extremity  Patient has Q9  findings and is recommended for at risk foot care every 3 months  2  All 10 toenails were debridement as follow: using nail nipper, wilmer, and curette, nails were sharply debrided, reduced in thickness and length  Devitalized nail tissue and fungal debris excised and removed  Patient tolerated well  3  Patient was educated on importance of glycemic control, daily foot assessment and proper shoe gear  Educational materials were provided  Patient will follow up annually for diabetic foot risk assessment  4   Patient has complete rupture of Achilles tendon right lower extremity along with adjacent tendon pathology from compensation  I recommended to wear supportive shoe gear ankle brace weight-bear as tolerated    We also discussed factors that could contribute to her right lower extremity pain along with Achilles tendon rupture includes overweight and lower back pain/lumbar radiculopathy  I suggested patient to follow-up with her pain management for further treatment and review  5  Return in 3 months      Lab Results   Component Value Date    HGBA1C 9 4 (H) 07/01/2022       Subjective:     Patient ID: Janeth Vigil is a 57 y o  female  HPI:  Janeth Vigil is a 57 y o  female who presents with painful, elongated toenails and history of right achilles tendon supture  They have difficulty applying their socks and shoes due to the elongation of the nails  The pressure within their shoe gear is painful and they have been unable to cut their nails adequately  Patient states pain is 1/10 in shoe gear  Pain with pressure  Requires at risk foot care  Reports no other complaints at this time  The following portions of the patient's history were reviewed and updated as appropriate:   She  has a past medical history of Anesthesia related hyperthermia, Anxiety, Arthritis, Asthma, Chronic pain, Depression, Diabetes mellitus ([facility] ), GERD (gastroesophageal reflux disease), Hyperlipidemia, Malignant hyperthermia due to anesthesia, 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, Neuropathy, Obesity, and PCOS (polycystic ovarian syndrome)    She   Patient Active Problem List    Diagnosis Date Noted   • Vertigo 11/07/2022   • Acute hypoxemic respiratory failure due to COVID-19 [facility]) 12/18/2021   • Hyperlipidemia associated with type 2 diabetes mellitus ([facility] ) 07/14/2021   • Obstructive sleep apnea syndrome    • Proteinuria 03/16/2020   • Neuropathy 01/30/2020   • Edema 11/18/2019   • Diabetic ulcer of left midfoot associated with type 2 diabetes mellitus, limited to breakdown of skin ([facility] ) 11/09/2019   • Right hip pain 07/08/2019   • Morbid obesity with BMI of 50 0-59 9, adult ([facility] ) 07/08/2019   • Myofascial pain syndrome 02/11/2019   • Lumbar spondylosis 02/11/2019   • Spinal stenosis of lumbar region with neurogenic claudication 2019   • Thoracic spinal stenosis 2019   • Chronic back pain 2019   • Chronic bilateral low back pain with bilateral sciatica 2018   • Lumbar radiculopathy 2018   • Diabetic neuropathy ([facility] ) 2018   • Skin callus 2018   • Lipoma of back 2018   • Leg swelling 10/18/2018   • Osteoarthritis of spine 10/08/2018   • Chronic pain syndrome 10/02/2018   • History of MRSA infection 2018   • Screening for colon cancer 2018   • Screening for breast cancer 2018   • Screening for cervical cancer 2018   • Eye exam, routine 2018   • Stage 3 chronic kidney disease ([facility] ) 2017   • Vitamin D deficiency 2017   • Type 2 diabetes mellitus with hyperglycemia, with long-term current use of insulin ([place] [address] ) 2017   • Insomnia 2017   • Essential hypertension 2017   • GERD (gastroesophageal reflux disease) 2017   • Gait disturbance 2017   • Benign mole 2017   • Anxiety 2017   • History of Salmonella infection 2016   • Mixed hyperlipidemia 2016   • History of pulmonary embolism 2016   • Achilles tendinitis of left lower extremity 2016   • Diabetic ulcer of left heel ([facility] ) 2016   • Ulcer of toe of left foot ([place] [address] ) 2016   • Melanocytic nevus of trunk 2015   • Menopausal and perimenopausal disorder 07/15/2015     She  has a past surgical history that includes Induced ; Endometrial ablation; Endometrial biopsy; Dilation and curettage of uterus; Cervical conization w/ laser; Tonsillectomy; Rotator cuff repair (Right); Knee Arthroplasty (Right); pr excision tumor soft tis back/flank subq 3 cm/> (N/A, 2018); pr debridement open wound 20 sq cm/< (Right, 2018); Hernia repair;  section; Foot surgery (Left);  Joint replacement; Epidural block injection (Bilateral, 2019); FL guided needle plac "bx/asp/inj (2019); Epidural block injection (Bilateral, 2019); FL guided needle plac bx/asp/inj (2019); and IR PICC line (2019)       Review of Systems   All other systems reviewed and are negative  Objective:      /64 (BP Location: Left arm, Patient Position: Sitting, Cuff Size: Large)   Pulse 82   Ht 5' 2\" (1 575 m)   Wt 122 kg (269 lb)   BMI 49 20 kg/m²         Physical Exam  Vitals reviewed  Constitutional:       Appearance: She is obese  Cardiovascular:      Rate and Rhythm: Normal rate  Pulses:           Dorsalis pedis pulses are 1+ on the right side and 1+ on the left side  Posterior tibial pulses are 1+ on the right side and 1+ on the left side  Musculoskeletal:         General: Swelling and tenderness present  Right lower le+ Edema present  Left lower le+ Edema present  Feet:      Right foot:      Protective Sensation: 10 sites tested  0 sites sensed  Skin integrity: Dry skin present  Toenail Condition: Right toenails are abnormally thick and long  Fungal disease present  Left foot:      Protective Sensation: 10 sites tested  0 sites sensed  Skin integrity: Dry skin present  Toenail Condition: Left toenails are abnormally thick and long  Fungal disease present  Comments: On exam patient has thickened, hypertrophic, discolored, brittle toenails with subungual debris and tenderness x10  Patient has lower extremity edema  PAtients skin is atrophic, thickened nails, and decreased pedal hair  Patient has decreased pinprick and vibratory sensation to his feet and parasthesia    Skin:     General: Skin is warm  Capillary Refill: Capillary refill takes less than 2 seconds  Neurological:      General: No focal deficit present  Mental Status: She is alert  Psychiatric:         Mood and Affect: Mood normal          Thought Content:  Thought content normal           "

## 2023-03-28 NOTE — PROGRESS NOTES
Assessment/Plan:    No problem-specific Assessment & Plan notes found for this encounter  Diagnoses and all orders for this visit:    Other diabetic neurological complication associated with type 2 diabetes mellitus ([facility] [address] )    Onychomycosis    Rupture of right Achilles tendon, initial encounter    Posterior tibial tendinitis of right lower extremity      Plan:     1  A thorough neurovascular exam was performed  Patient presents for at-risk foot care  Patient has no acute concerns today  Patient has significant lower extremity risk due to neuropathy, parasthesia, edema, and trophic skin changes to the lower extremity  Patient has Q9  findings and is recommended for at risk foot care every 3 months  2  All 10 toenails were debridement as follow: using nail nipper, wilmer, and curette, nails were sharply debrided, reduced in thickness and length  Devitalized nail tissue and fungal debris excised and removed  Patient tolerated well  3  Patient was educated on importance of glycemic control, daily foot assessment and proper shoe gear  Educational materials were provided  Patient will follow up annually for diabetic foot risk assessment  4   Patient has complete rupture of Achilles tendon right lower extremity along with adjacent tendon pathology from compensation  I recommended to wear supportive shoe gear ankle brace weight-bear as tolerated  We also discussed factors that could contribute to her right lower extremity pain along with Achilles tendon rupture includes overweight and lower back pain/lumbar radiculopathy  I suggested patient to follow-up with her pain management for further treatment and review  5  Return in 3 months      Lab Results   Component Value Date    HGBA1C 9 4 (H) 07/01/2022       Subjective:      Patient ID: Klarissa Dunlap is a 57 y o  female      HPI:  Klarissa Dunlap is a 57 y o  female who presents with painful, elongated toenails and history of right achilles tendon supture  They have difficulty applying their socks and shoes due to the elongation of the nails  The pressure within their shoe gear is painful and they have been unable to cut their nails adequately  Patient states pain is 1/10 in shoe gear  Pain with pressure  Requires at risk foot care  Reports no other complaints at this time  The following portions of the patient's history were reviewed and updated as appropriate:   She  has a past medical history of Anesthesia related hyperthermia, Anxiety, Arthritis, Asthma, Chronic pain, Depression, Diabetes mellitus ([address] ), GERD (gastroesophageal reflux disease), Hyperlipidemia, Malignant hyperthermia due to anesthesia, 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, Neuropathy, Obesity, and PCOS (polycystic ovarian syndrome)    She   Patient Active Problem List    Diagnosis Date Noted   • Vertigo 11/07/2022   • Acute hypoxemic respiratory failure due to COVID-19 ([facility] ) 12/18/2021   • Hyperlipidemia associated with type 2 diabetes mellitus ([facility] ) 07/14/2021   • Obstructive sleep apnea syndrome    • Proteinuria 03/16/2020   • Neuropathy 01/30/2020   • Edema 11/18/2019   • Diabetic ulcer of left midfoot associated with type 2 diabetes mellitus, limited to breakdown of skin ([facility] ) 11/09/2019   • Right hip pain 07/08/2019   • Morbid obesity with BMI of 50 0-59 9, adult ([facility] ) 07/08/2019   • Myofascial pain syndrome 02/11/2019   • Lumbar spondylosis 02/11/2019   • Spinal stenosis of lumbar region with neurogenic claudication 02/11/2019   • Thoracic spinal stenosis 02/11/2019   • Chronic back pain 02/11/2019   • Chronic bilateral low back pain with bilateral sciatica 12/03/2018   • Lumbar radiculopathy 12/03/2018   • Diabetic neuropathy ([facility] [address] ) 12/03/2018   • Skin callus 11/12/2018   • Lipoma of back 11/12/2018   • Leg swelling 10/18/2018   • Osteoarthritis of spine 10/08/2018   • Chronic pain syndrome 10/02/2018   • History of MRSA infection 09/19/2018   • Screening for colon "cancer 2018   • Screening for breast cancer 2018   • Screening for cervical cancer 2018   • Eye exam, routine 2018   • Stage 3 chronic kidney disease ([facility] ) 2017   • Vitamin D deficiency 2017   • Type 2 diabetes mellitus with hyperglycemia, with long-term current use of insulin ([facility] ) 2017   • Insomnia 2017   • Essential hypertension 2017   • GERD (gastroesophageal reflux disease) 2017   • Gait disturbance 2017   • Benign mole 2017   • Anxiety 2017   • History of Salmonella infection 2016   • Mixed hyperlipidemia 2016   • History of pulmonary embolism 2016   • Achilles tendinitis of left lower extremity 2016   • Diabetic ulcer of left heel ([facility] ) 2016   • Ulcer of toe of left foot ([place] [id number] ) 2016   • Melanocytic nevus of trunk 2015   • Menopausal and perimenopausal disorder 07/15/2015     She  has a past surgical history that includes Induced ; Endometrial ablation; Endometrial biopsy; Dilation and curettage of uterus; Cervical conization w/ laser; Tonsillectomy; Rotator cuff repair (Right); Knee Arthroplasty (Right); pr excision tumor soft tis back/flank subq 3 cm/> (N/A, 2018); pr debridement open wound 20 sq cm/< (Right, 2018); Hernia repair;  section; Foot surgery (Left); Joint replacement; Epidural block injection (Bilateral, 2019); FL guided needle plac bx/asp/inj (2019); Epidural block injection (Bilateral, 2019); FL guided needle plac bx/asp/inj (2019); and IR PICC line (2019)       Review of Systems   All other systems reviewed and are negative  Objective:      /64 (BP Location: Left arm, Patient Position: Sitting, Cuff Size: Large)   Pulse 82   Ht 5' 2\" (1 575 m)   Wt 122 kg (269 lb)   BMI 49 20 kg/m²          Physical Exam  Vitals reviewed  Constitutional:       Appearance: She is obese     Cardiovascular:      Rate and Rhythm: " Normal rate  Pulses:           Dorsalis pedis pulses are 1+ on the right side and 1+ on the left side  Posterior tibial pulses are 1+ on the right side and 1+ on the left side  Musculoskeletal:         General: Swelling and tenderness present  Right lower le+ Edema present  Left lower le+ Edema present  Feet:      Right foot:      Protective Sensation: 10 sites tested  0 sites sensed  Skin integrity: Dry skin present  Toenail Condition: Right toenails are abnormally thick and long  Fungal disease present  Left foot:      Protective Sensation: 10 sites tested  0 sites sensed  Skin integrity: Dry skin present  Toenail Condition: Left toenails are abnormally thick and long  Fungal disease present  Comments: On exam patient has thickened, hypertrophic, discolored, brittle toenails with subungual debris and tenderness x10  Patient has lower extremity edema  PAtients skin is atrophic, thickened nails, and decreased pedal hair  Patient has decreased pinprick and vibratory sensation to his feet and parasthesia    Skin:     General: Skin is warm  Capillary Refill: Capillary refill takes less than 2 seconds  Neurological:      General: No focal deficit present  Mental Status: She is alert  Psychiatric:         Mood and Affect: Mood normal          Thought Content:  Thought content normal

## 2023-04-04 ENCOUNTER — APPOINTMENT (OUTPATIENT)
Dept: LAB | Facility: CLINIC | Age: 63
End: 2023-04-04

## 2023-04-04 DIAGNOSIS — Z79.4 TYPE 2 DIABETES MELLITUS WITH HYPERGLYCEMIA, WITH LONG-TERM CURRENT USE OF INSULIN (HCC): ICD-10-CM

## 2023-04-04 DIAGNOSIS — E11.69 HYPERLIPIDEMIA ASSOCIATED WITH TYPE 2 DIABETES MELLITUS (HCC): ICD-10-CM

## 2023-04-04 DIAGNOSIS — N18.31 STAGE 3A CHRONIC KIDNEY DISEASE (HCC): Chronic | ICD-10-CM

## 2023-04-04 DIAGNOSIS — E61.2 MAGNESIUM DEFICIENCY: ICD-10-CM

## 2023-04-04 DIAGNOSIS — Z79.4 CONTROLLED TYPE 2 DIABETES MELLITUS WITH DIABETIC NEUROPATHY, WITH LONG-TERM CURRENT USE OF INSULIN (HCC): ICD-10-CM

## 2023-04-04 DIAGNOSIS — E78.5 HYPERLIPIDEMIA ASSOCIATED WITH TYPE 2 DIABETES MELLITUS (HCC): ICD-10-CM

## 2023-04-04 DIAGNOSIS — E11.65 TYPE 2 DIABETES MELLITUS WITH HYPERGLYCEMIA, WITH LONG-TERM CURRENT USE OF INSULIN (HCC): ICD-10-CM

## 2023-04-04 DIAGNOSIS — E11.40 CONTROLLED TYPE 2 DIABETES MELLITUS WITH DIABETIC NEUROPATHY, WITH LONG-TERM CURRENT USE OF INSULIN (HCC): ICD-10-CM

## 2023-04-04 DIAGNOSIS — E11.59 HYPERTENSION ASSOCIATED WITH DIABETES (HCC): ICD-10-CM

## 2023-04-04 DIAGNOSIS — Z13.29 SCREENING FOR THYROID DISORDER: ICD-10-CM

## 2023-04-04 DIAGNOSIS — I15.2 HYPERTENSION ASSOCIATED WITH DIABETES (HCC): ICD-10-CM

## 2023-04-04 LAB
ALBUMIN SERPL BCP-MCNC: 3.9 G/DL (ref 3.5–5)
ALP SERPL-CCNC: 64 U/L (ref 46–116)
ALT SERPL W P-5'-P-CCNC: 42 U/L (ref 12–78)
ANION GAP SERPL CALCULATED.3IONS-SCNC: 3 MMOL/L (ref 4–13)
AST SERPL W P-5'-P-CCNC: 44 U/L (ref 5–45)
BASOPHILS # BLD AUTO: 0.04 THOUSANDS/ÂΜL (ref 0–0.1)
BASOPHILS NFR BLD AUTO: 1 % (ref 0–1)
BILIRUB SERPL-MCNC: 0.54 MG/DL (ref 0.2–1)
BUN SERPL-MCNC: 22 MG/DL (ref 5–25)
CALCIUM SERPL-MCNC: 10 MG/DL (ref 8.3–10.1)
CHLORIDE SERPL-SCNC: 106 MMOL/L (ref 96–108)
CHOLEST SERPL-MCNC: 133 MG/DL
CO2 SERPL-SCNC: 27 MMOL/L (ref 21–32)
CREAT SERPL-MCNC: 1.24 MG/DL (ref 0.6–1.3)
CREAT UR-MCNC: 152 MG/DL
EOSINOPHIL # BLD AUTO: 0.09 THOUSAND/ÂΜL (ref 0–0.61)
EOSINOPHIL NFR BLD AUTO: 1 % (ref 0–6)
ERYTHROCYTE [DISTWIDTH] IN BLOOD BY AUTOMATED COUNT: 14 % (ref 11.6–15.1)
GFR SERPL CREATININE-BSD FRML MDRD: 46 ML/MIN/1.73SQ M
GLUCOSE P FAST SERPL-MCNC: 130 MG/DL (ref 65–99)
HCT VFR BLD AUTO: 44.4 % (ref 34.8–46.1)
HDLC SERPL-MCNC: 48 MG/DL
HGB BLD-MCNC: 14.5 G/DL (ref 11.5–15.4)
IMM GRANULOCYTES # BLD AUTO: 0.02 THOUSAND/UL (ref 0–0.2)
IMM GRANULOCYTES NFR BLD AUTO: 0 % (ref 0–2)
LDLC SERPL CALC-MCNC: 40 MG/DL (ref 0–100)
LYMPHOCYTES # BLD AUTO: 2.76 THOUSANDS/ÂΜL (ref 0.6–4.47)
LYMPHOCYTES NFR BLD AUTO: 36 % (ref 14–44)
MAGNESIUM SERPL-MCNC: 2.2 MG/DL (ref 1.6–2.6)
MCH RBC QN AUTO: 28.5 PG (ref 26.8–34.3)
MCHC RBC AUTO-ENTMCNC: 32.7 G/DL (ref 31.4–37.4)
MCV RBC AUTO: 87 FL (ref 82–98)
MICROALBUMIN UR-MCNC: 33 MG/L (ref 0–20)
MICROALBUMIN/CREAT 24H UR: 22 MG/G CREATININE (ref 0–30)
MONOCYTES # BLD AUTO: 0.53 THOUSAND/ÂΜL (ref 0.17–1.22)
MONOCYTES NFR BLD AUTO: 7 % (ref 4–12)
NEUTROPHILS # BLD AUTO: 4.22 THOUSANDS/ÂΜL (ref 1.85–7.62)
NEUTS SEG NFR BLD AUTO: 55 % (ref 43–75)
NRBC BLD AUTO-RTO: 0 /100 WBCS
PLATELET # BLD AUTO: 280 THOUSANDS/UL (ref 149–390)
PMV BLD AUTO: 10.6 FL (ref 8.9–12.7)
POTASSIUM SERPL-SCNC: 4.8 MMOL/L (ref 3.5–5.3)
PROT SERPL-MCNC: 7.7 G/DL (ref 6.4–8.4)
RBC # BLD AUTO: 5.09 MILLION/UL (ref 3.81–5.12)
SODIUM SERPL-SCNC: 136 MMOL/L (ref 135–147)
TRIGL SERPL-MCNC: 224 MG/DL
TSH SERPL DL<=0.05 MIU/L-ACNC: 3.35 UIU/ML (ref 0.45–4.5)
WBC # BLD AUTO: 7.66 THOUSAND/UL (ref 4.31–10.16)

## 2023-04-06 LAB
EST. AVERAGE GLUCOSE BLD GHB EST-MCNC: 203 MG/DL
HBA1C MFR BLD: 8.7 %

## 2023-04-17 NOTE — PATIENT INSTRUCTIONS
1  Increase Trulicity to 3 mg once weekly  2  Continue current insulin regimen  Continue metformin  3  Continue to test blood sugar 3x daily  Be mindful of sugars starting to go lower  As they do, we will want to reduce your insulin to avoid hypoglycemia  PAST MEDICAL HISTORY:  No pertinent past medical history

## 2023-05-01 ENCOUNTER — APPOINTMENT (OUTPATIENT)
Dept: LAB | Facility: CLINIC | Age: 63
End: 2023-05-01

## 2023-05-01 DIAGNOSIS — N18.31 STAGE 3A CHRONIC KIDNEY DISEASE (HCC): Chronic | ICD-10-CM

## 2023-05-01 LAB
25(OH)D3 SERPL-MCNC: 31.9 NG/ML (ref 30–100)
ALBUMIN SERPL BCP-MCNC: 3.5 G/DL (ref 3.5–5)
ALP SERPL-CCNC: 90 U/L (ref 46–116)
ALT SERPL W P-5'-P-CCNC: 42 U/L (ref 12–78)
ANION GAP SERPL CALCULATED.3IONS-SCNC: 2 MMOL/L (ref 4–13)
AST SERPL W P-5'-P-CCNC: 26 U/L (ref 5–45)
BACTERIA UR QL AUTO: ABNORMAL /HPF
BASOPHILS # BLD AUTO: 0.04 THOUSANDS/ΜL (ref 0–0.1)
BASOPHILS NFR BLD AUTO: 1 % (ref 0–1)
BILIRUB SERPL-MCNC: 0.39 MG/DL (ref 0.2–1)
BILIRUB UR QL STRIP: NEGATIVE
BUN SERPL-MCNC: 18 MG/DL (ref 5–25)
CALCIUM SERPL-MCNC: 9.3 MG/DL (ref 8.3–10.1)
CHLORIDE SERPL-SCNC: 110 MMOL/L (ref 96–108)
CLARITY UR: ABNORMAL
CO2 SERPL-SCNC: 27 MMOL/L (ref 21–32)
COLOR UR: ABNORMAL
CREAT SERPL-MCNC: 1.12 MG/DL (ref 0.6–1.3)
EOSINOPHIL # BLD AUTO: 0.22 THOUSAND/ΜL (ref 0–0.61)
EOSINOPHIL NFR BLD AUTO: 3 % (ref 0–6)
ERYTHROCYTE [DISTWIDTH] IN BLOOD BY AUTOMATED COUNT: 13.7 % (ref 11.6–15.1)
GFR SERPL CREATININE-BSD FRML MDRD: 52 ML/MIN/1.73SQ M
GLUCOSE P FAST SERPL-MCNC: 144 MG/DL (ref 65–99)
GLUCOSE UR STRIP-MCNC: NEGATIVE MG/DL
HCT VFR BLD AUTO: 43.6 % (ref 34.8–46.1)
HGB BLD-MCNC: 14 G/DL (ref 11.5–15.4)
HGB UR QL STRIP.AUTO: NEGATIVE
HYALINE CASTS #/AREA URNS LPF: ABNORMAL /LPF
IMM GRANULOCYTES # BLD AUTO: 0.03 THOUSAND/UL (ref 0–0.2)
IMM GRANULOCYTES NFR BLD AUTO: 0 % (ref 0–2)
KETONES UR STRIP-MCNC: NEGATIVE MG/DL
LEUKOCYTE ESTERASE UR QL STRIP: ABNORMAL
LYMPHOCYTES # BLD AUTO: 3.46 THOUSANDS/ΜL (ref 0.6–4.47)
LYMPHOCYTES NFR BLD AUTO: 42 % (ref 14–44)
MAGNESIUM SERPL-MCNC: 1.9 MG/DL (ref 1.6–2.6)
MCH RBC QN AUTO: 28.2 PG (ref 26.8–34.3)
MCHC RBC AUTO-ENTMCNC: 32.1 G/DL (ref 31.4–37.4)
MCV RBC AUTO: 88 FL (ref 82–98)
MONOCYTES # BLD AUTO: 0.5 THOUSAND/ΜL (ref 0.17–1.22)
MONOCYTES NFR BLD AUTO: 6 % (ref 4–12)
NEUTROPHILS # BLD AUTO: 3.93 THOUSANDS/ΜL (ref 1.85–7.62)
NEUTS SEG NFR BLD AUTO: 48 % (ref 43–75)
NITRITE UR QL STRIP: NEGATIVE
NON-SQ EPI CELLS URNS QL MICRO: ABNORMAL /HPF
NRBC BLD AUTO-RTO: 0 /100 WBCS
PH UR STRIP.AUTO: 5.5 [PH]
PHOSPHATE SERPL-MCNC: 3.5 MG/DL (ref 2.3–4.1)
PLATELET # BLD AUTO: 204 THOUSANDS/UL (ref 149–390)
PMV BLD AUTO: 11.4 FL (ref 8.9–12.7)
POTASSIUM SERPL-SCNC: 4.4 MMOL/L (ref 3.5–5.3)
PROT SERPL-MCNC: 7.2 G/DL (ref 6.4–8.4)
PROT UR STRIP-MCNC: ABNORMAL MG/DL
PTH-INTACT SERPL-MCNC: 49.4 PG/ML (ref 18.4–80.1)
RBC # BLD AUTO: 4.97 MILLION/UL (ref 3.81–5.12)
RBC #/AREA URNS AUTO: ABNORMAL /HPF
SODIUM SERPL-SCNC: 139 MMOL/L (ref 135–147)
SP GR UR STRIP.AUTO: 1.02 (ref 1–1.03)
UROBILINOGEN UR STRIP-ACNC: <2 MG/DL
WBC # BLD AUTO: 8.18 THOUSAND/UL (ref 4.31–10.16)
WBC #/AREA URNS AUTO: ABNORMAL /HPF

## 2023-05-05 DIAGNOSIS — E11.65 TYPE 2 DIABETES MELLITUS WITH HYPERGLYCEMIA, UNSPECIFIED WHETHER LONG TERM INSULIN USE (HCC): ICD-10-CM

## 2023-05-05 DIAGNOSIS — Z79.4 TYPE 2 DIABETES MELLITUS WITH HYPERGLYCEMIA, WITH LONG-TERM CURRENT USE OF INSULIN (HCC): ICD-10-CM

## 2023-05-05 DIAGNOSIS — E11.65 TYPE 2 DIABETES MELLITUS WITH HYPERGLYCEMIA, WITH LONG-TERM CURRENT USE OF INSULIN (HCC): ICD-10-CM

## 2023-05-05 RX ORDER — PEN NEEDLE, DIABETIC 29 G X1/2"
NEEDLE, DISPOSABLE MISCELLANEOUS
Qty: 100 EACH | Refills: 3 | Status: SHIPPED | OUTPATIENT
Start: 2023-05-05

## 2023-05-05 RX ORDER — LANCETS 30 GAUGE
EACH MISCELLANEOUS
Qty: 100 EACH | Refills: 0 | Status: SHIPPED | OUTPATIENT
Start: 2023-05-05

## 2023-05-10 ENCOUNTER — OFFICE VISIT (OUTPATIENT)
Dept: NEPHROLOGY | Facility: CLINIC | Age: 63
End: 2023-05-10

## 2023-05-10 VITALS
HEIGHT: 62 IN | HEART RATE: 72 BPM | WEIGHT: 264.4 LBS | SYSTOLIC BLOOD PRESSURE: 110 MMHG | DIASTOLIC BLOOD PRESSURE: 58 MMHG | BODY MASS INDEX: 48.66 KG/M2 | OXYGEN SATURATION: 97 %

## 2023-05-10 DIAGNOSIS — N18.31 STAGE 3A CHRONIC KIDNEY DISEASE (HCC): Primary | Chronic | ICD-10-CM

## 2023-05-10 DIAGNOSIS — Z79.4 TYPE 2 DIABETES MELLITUS WITH HYPERGLYCEMIA, WITH LONG-TERM CURRENT USE OF INSULIN (HCC): ICD-10-CM

## 2023-05-10 DIAGNOSIS — E11.65 TYPE 2 DIABETES MELLITUS WITH HYPERGLYCEMIA, WITH LONG-TERM CURRENT USE OF INSULIN (HCC): ICD-10-CM

## 2023-05-10 DIAGNOSIS — I10 ESSENTIAL HYPERTENSION: Chronic | ICD-10-CM

## 2023-05-10 DIAGNOSIS — E55.9 VITAMIN D INSUFFICIENCY: ICD-10-CM

## 2023-05-10 RX ORDER — CARISOPRODOL 350 MG/1
350 TABLET ORAL 3 TIMES DAILY
COMMUNITY

## 2023-05-10 NOTE — PROGRESS NOTES
[facility] [place], [place]    Name: Mildred Mares  YOB: 1960      Assessment/Plan:    Stage 3 chronic kidney disease [facility])  Lab Results   Component Value Date    EGFR 52 05/01/2023    EGFR 46 04/04/2023    EGFR 51 07/01/2022    CREATININE 1 12 05/01/2023    CREATININE 1 24 04/04/2023    CREATININE 1 13 07/01/2022     Kidney function remained stable, baseline creatinine between 1 1-1 2 mg/dL  Continue to optimize medical   Avoid potential nephrotoxins  Essential hypertension  Blood pressure stable, continue current medications  Type 2 diabetes mellitus with hyperglycemia, with long-term current use of insulin (HCC)  Continue care according to endocrinology, continue lisinopril for nephro protection, patient currently does not have proteinuria, although initiation of an SGLT-2 inhibitor can be initiated, will likely not add benefit from the kidney protection standpoint although there may be benefit for diabetic management  Will defer this to our endocrinologist colleague  Lab Results   Component Value Date    HGBA1C 8 7 (H) 04/04/2023       Vitamin D insufficiency  Patient requested to initiate vitamin D3 5000 units once daily         Problem List Items Addressed This Visit        Endocrine    Type 2 diabetes mellitus with hyperglycemia, with long-term current use of insulin ([facility] [address] )     Continue care according to endocrinology, continue lisinopril for nephro protection, patient currently does not have proteinuria, although initiation of an SGLT-2 inhibitor can be initiated, will likely not add benefit from the kidney protection standpoint although there may be benefit for diabetic management  Will defer this to our endocrinologist colleague      Lab Results   Component Value Date    HGBA1C 8 7 (H) 04/04/2023               Cardiovascular and Mediastinum    Essential hypertension (Chronic)     Blood pressure stable, continue current medications  Genitourinary    Stage 3 chronic kidney disease ([facility] [address] ) - Primary (Chronic)     Lab Results   Component Value Date    EGFR 52 05/01/2023    EGFR 46 04/04/2023    EGFR 51 07/01/2022    CREATININE 1 12 05/01/2023    CREATININE 1 24 04/04/2023    CREATININE 1 13 07/01/2022     Kidney function remained stable, baseline creatinine between 1 1-1 2 mg/dL  Continue to optimize medical   Avoid potential nephrotoxins  Relevant Orders    Comprehensive metabolic panel    Albumin / creatinine urine ratio    Urinalysis with microscopic    Phosphorus    PTH, intact       Other    Vitamin D insufficiency     Patient requested to initiate vitamin D3 5000 units once daily            Patient is stable from a renal standpoint, we will continue to monitor blood work every 6 months, see her back in the office at that time  Continue to optimize blood pressure and blood sugar control  Subjective:      Patient ID: Lico Block is a 57 y o  female  Patient presents for follow up  We reviewed the patient's labs in detail, creatinine is stable at about 1 12 mg/dL, electrolytes stable, estimated GFR 52 mL/min  Patient denies use of nonsteroidal inflammatory medications  Patient taking all medications as prescribed with no specific side effects at this time  Patient's main concern at this time is a skin lesion which requires further excision by dermatology  Hypertension  This is a chronic problem  The current episode started more than 1 year ago  The problem is unchanged  The problem is controlled  Pertinent negatives include no chest pain, orthopnea or peripheral edema  There are no associated agents to hypertension  Risk factors for coronary artery disease include post-menopausal state and diabetes mellitus  Past treatments include lifestyle changes, diuretics and ACE inhibitors  There are no compliance problems  Hypertensive end-organ damage includes kidney disease   Identifiable causes of hypertension include chronic renal disease  The following portions of the patient's history were reviewed and updated as appropriate: allergies, current medications, past family history, past medical history, past social history, past surgical history and problem list     Review of Systems   Cardiovascular: Negative for chest pain and orthopnea  All other systems reviewed and are negative  Social History     Socioeconomic History   • Marital status:       Spouse name: None   • Number of children: None   • Years of education: None   • Highest education level: None   Occupational History   • None   Tobacco Use   • Smoking status: Never   • Smokeless tobacco: Never   Vaping Use   • Vaping Use: Never used   Substance and Sexual Activity   • Alcohol use: Not Currently     Comment: OCCASIONALLY   • Drug use: Yes     Types: Marijuana     Comment: CBD, medical marijuana   • Sexual activity: Never   Other Topics Concern   • None   Social History Narrative   • None     Social Determinants of Health     Financial Resource Strain: Not on file   Food Insecurity: Not on file   Transportation Needs: Not on file   Physical Activity: Not on file   Stress: Not on file   Social Connections: Not on file   Intimate Partner Violence: Not on file   Housing Stability: Not on file     Past Medical History:   Diagnosis Date   • Anesthesia related hyperthermia     pt states she had high fevers after her lipoma surgery in 2001 at our hospital and was shipped to [facility] where they said t was from an anesthesia med   • Anxiety    • Arthritis    • Asthma    • Chronic pain    • Depression    • Diabetes mellitus ([address] )    • GERD (gastroesophageal reflux disease)    • Hyperlipidemia    • Malignant hyperthermia due to anesthesia    • 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    • Neuropathy    • Obesity    • PCOS (polycystic ovarian syndrome)      Past Surgical History:   Procedure Laterality Date   • "CATARACT EXTRACTION, BILATERAL     • CERVICAL CONIZATION   W/ LASER     •  SECTION     • DILATION AND CURETTAGE OF UTERUS     • ENDOMETRIAL ABLATION     • ENDOMETRIAL BIOPSY     • EPIDURAL BLOCK INJECTION Bilateral 2019    Procedure: L5-S1 transforaminal epidural steroid injection;  Surgeon: Mor Ellis MD;  Location: [facility] MAIN OR;  Service: Pain Management    • EPIDURAL BLOCK INJECTION Bilateral 2019    Procedure: L5-S1 transforaminal epidural steroid injection;  Surgeon: Mor Ellis MD;  Location: [facility] MAIN OR;  Service: Pain Management    • FL GUIDED NEEDLE PLAC BX/ASP/INJ  2019   • FL GUIDED NEEDLE PLAC BX/ASP/INJ  2019   • FOOT SURGERY Left    • HERNIA REPAIR     • INDUCED      • IR PICC LINE  2019   • JOINT REPLACEMENT     • KNEE ARTHROPLASTY Right    • KS DEBRIDEMENT OPEN WOUND 20 SQ CM/< Right 2018    Procedure: DEBRIDEMENT HAND/FINGER [facility] OUT); Surgeon: Gloria Coleman MD;  Location: [address] [place] [address] MAIN OR;  Service: General   • KS EXCISION TUMOR SOFT TIS BACK/FLANK SUBQ 3 CM/> N/A 2018    Procedure: BACK LIPOMA EXCISION;  Surgeon: Gloria Coleman MD;  Location: [address] [place] [address] MAIN OR;  Service: General   • ROTATOR CUFF REPAIR Right    • TONSILLECTOMY         Current Outpatient Medications:   •  aspirin (ECOTRIN LOW STRENGTH) 81 mg EC tablet, Take 81 mg by mouth daily, Disp: , Rfl:   •  BD INSULIN SYRINGE U/F 31G X 5/16\" 1 ML MISC, , Disp: , Rfl: 0  •  BD Insulin Syringe U/F 31G X 5/16\" 1 ML MISC, use three times a day to inject INSULIN, Disp: 100 each, Rfl: 3  •  Blood Glucose Monitoring Suppl (OneTouch Verio) w/Device KIT, Use to test blood sugar 3x daily  , Disp: 1 kit, Rfl: 0  •  carisoprodol (SOMA) 350 mg tablet, Take 350 mg by mouth 3 (three) times a day, Disp: , Rfl:   •  clindamycin (CLEOCIN T) 1 % lotion, As needed, Disp: , Rfl:   •  Continuous Blood Gluc  (FreeStyle Duke 2 [place]) LORRIE, Use to check blood sugar continually, Disp: 1 " each, Rfl: 0  •  Continuous Blood Gluc Sensor (FreeStyle Duke 2 Sensor) MISC, USE TO CHECK BLOOD SUGAR CONTINOUSLY FOR 14 DAYS , Disp: 1 each, Rfl: 0  •  ergocalciferol (ERGOCALCIFEROL) 1 25 MG (34209 UT) capsule, Take 1 capsule (50,000 Units total) by mouth once a week, Disp: 4 capsule, Rfl: 5  •  furosemide (LASIX) 20 mg tablet, Take 20 mg by mouth daily as needed, Disp: , Rfl:   •  gabapentin (NEURONTIN) 800 mg tablet, Take 800 mg by mouth 3 (three) times a day, Disp: , Rfl:   •  glucagon 1 MG injection, Inject 1 mg under the skin once as needed (PRN blood glucose less than 70 if unconscious or uncorrectable by oral means) for up to 1 dose, Disp: 1 kit, Rfl: 0  •  glucose blood (OneTouch Verio) test strip, Use to test blood sugar 3x daily  , Disp: 200 each, Rfl: 2  •  HumuLIN 70/30 (70-30) 100 UNIT/ML subcutaneous injection, inject 68 units before breakfast then 36 units BEFORE LUNCH, THEN 68 UNITS BEFORE DINNER, Disp: 50 mL, Rfl: 5  •  Insulin Pen Needle (Pen Needles) 32G X 4 MM MISC, Use 3 (three) times a day, Disp: 100 each, Rfl: 2  •  Lancets (OneTouch Delica Plus [id number]) MISC, use 1 LANCET to TEST BLOOD SUGAR three times a day, Disp: 100 each, Rfl: 0  •  Lancets (OneTouch Delica Plus [id number]) MISC, USE TO TEST BLOOD SUGAR THREE TIMES DAILY, Disp: 100 each, Rfl: 3  •  lisinopril (ZESTRIL) 10 mg tablet, Take 10 mg by mouth daily, Disp: , Rfl:   •  meclizine (ANTIVERT) 25 mg tablet, Take 25 mg by mouth Three times daily as needed, Disp: , Rfl:   •  ondansetron (ZOFRAN) 8 mg tablet, Take 8 mg by mouth every 8 (eight) hours as needed, Disp: , Rfl:   •  ondansetron (ZOFRAN-ODT) 4 mg disintegrating tablet, Take 1 tablet (4 mg total) by mouth every 6 (six) hours as needed for nausea or vomiting, Disp: 12 tablet, Rfl: 0  •  Pain Relief Extra Strength 500 MG tablet, take 1 tablet by mouth every 6 hours if needed for mild pain, Disp: , Rfl:   •  prednisoLONE acetate (PRED FORTE) 1 % ophthalmic suspension, instill 1 drop into right eye every 2 hours STARTING AFTER SURGERY, Disp: , Rfl:   •  rosuvastatin (CRESTOR) 20 MG tablet, Take 20 mg by mouth every evening, Disp: , Rfl:   •  triamcinolone (KENALOG) 0 1 % cream, apply topically to affected area twice a day, Disp: , Rfl:   •  Trulicity 4 5 FT/1 3IG injection, INJECT 0 5 ML (4 5 MG TOTAL) UNDER THE SKIN ONCE A WEEK, Disp: 2 mL, Rfl: 1  •  Elidel 1 % cream, apply twice a day for 6 WEEKS to affected area ON face, Disp: , Rfl:   •  metFORMIN (GLUCOPHAGE-XR) 500 mg 24 hr tablet, Take 2 tablets (1,000 mg total) by mouth 2 (two) times a day with meals, Disp: 360 tablet, Rfl: 1    Lab Results   Component Value Date    SODIUM 139 05/01/2023    K 4 4 05/01/2023     (H) 05/01/2023    CO2 27 05/01/2023    AGAP 2 (L) 05/01/2023    BUN 18 05/01/2023    CREATININE 1 12 05/01/2023    GLUC 163 (H) 07/01/2022    GLUF 144 (H) 05/01/2023    CALCIUM 9 3 05/01/2023    AST 26 05/01/2023    ALT 42 05/01/2023    ALKPHOS 90 05/01/2023    TP 7 2 05/01/2023    TBILI 0 39 05/01/2023    EGFR 52 05/01/2023     Lab Results   Component Value Date    WBC 8 18 05/01/2023    HGB 14 0 05/01/2023    HCT 43 6 05/01/2023    MCV 88 05/01/2023     05/01/2023     Lab Results   Component Value Date    CHOLESTEROL 133 04/04/2023    CHOLESTEROL 143 01/31/2022    CHOLESTEROL 118 05/06/2020     Lab Results   Component Value Date    HDL 48 (L) 04/04/2023    HDL 46 (L) 01/31/2022    HDL 43 05/06/2020     Lab Results   Component Value Date    LDLCALC 40 04/04/2023    LDLCALC 45 01/31/2022    LDLCALC 34 05/06/2020     Lab Results   Component Value Date    TRIG 224 (H) 04/04/2023    TRIG 259 (H) 01/31/2022    TRIG 205 (H) 05/06/2020     No results found for: [place], [place]  Lab Results   Component Value Date    ZWA8RIETSQMR 3 350 04/04/2023     Lab Results   Component Value Date    PTH 49 4 05/01/2023    CALCIUM 9 3 05/01/2023    PHOS 3 5 05/01/2023     Lab Results   Component Value Date    SPEP See Comment 02/17/2021 "    No results found for: AIME LEY4HUR        Objective:      /58 (BP Location: Left arm, Patient Position: Sitting, Cuff Size: Large)   Pulse 72   Ht 5' 2\" (1 575 m)   Wt 120 kg (264 lb 6 4 oz)   SpO2 97%   BMI 48 36 kg/m²          Physical Exam  Vitals reviewed  Constitutional:       General: She is not in acute distress  Appearance: She is well-developed  HENT:      Head: Normocephalic and atraumatic  Eyes:      Conjunctiva/sclera: Conjunctivae normal    Cardiovascular:      Rate and Rhythm: Normal rate and regular rhythm  Pulmonary:      Effort: Pulmonary effort is normal       Breath sounds: Normal breath sounds  Abdominal:      Palpations: Abdomen is soft  Musculoskeletal:      Cervical back: Neck supple  Skin:     General: Skin is warm  Findings: No rash  Neurological:      Mental Status: She is alert and oriented to person, place, and time  Cranial Nerves: No cranial nerve deficit     Psychiatric:         Behavior: Behavior normal          "

## 2023-05-13 PROBLEM — E55.9 VITAMIN D INSUFFICIENCY: Status: ACTIVE | Noted: 2023-05-13

## 2023-05-13 NOTE — ASSESSMENT & PLAN NOTE
Lab Results   Component Value Date    EGFR 52 05/01/2023    EGFR 46 04/04/2023    EGFR 51 07/01/2022    CREATININE 1 12 05/01/2023    CREATININE 1 24 04/04/2023    CREATININE 1 13 07/01/2022     Kidney function remained stable, baseline creatinine between 1 1-1 2 mg/dL  Continue to optimize medical   Avoid potential nephrotoxins

## 2023-05-13 NOTE — ASSESSMENT & PLAN NOTE
Continue care according to endocrinology, continue lisinopril for nephro protection, patient currently does not have proteinuria, although initiation of an SGLT-2 inhibitor can be initiated, will likely not add benefit from the kidney protection standpoint although there may be benefit for diabetic management  Will defer this to our endocrinologist colleague      Lab Results   Component Value Date    HGBA1C 8 7 (H) 04/04/2023

## 2023-05-18 DIAGNOSIS — Z79.4 TYPE 2 DIABETES MELLITUS WITH HYPERGLYCEMIA, WITH LONG-TERM CURRENT USE OF INSULIN (HCC): ICD-10-CM

## 2023-05-18 DIAGNOSIS — E11.65 TYPE 2 DIABETES MELLITUS WITH HYPERGLYCEMIA, WITH LONG-TERM CURRENT USE OF INSULIN (HCC): ICD-10-CM

## 2023-05-29 DIAGNOSIS — Z79.4 TYPE 2 DIABETES MELLITUS WITH HYPERGLYCEMIA, WITH LONG-TERM CURRENT USE OF INSULIN (HCC): ICD-10-CM

## 2023-05-29 DIAGNOSIS — E11.65 TYPE 2 DIABETES MELLITUS WITH HYPERGLYCEMIA, WITH LONG-TERM CURRENT USE OF INSULIN (HCC): ICD-10-CM

## 2023-05-30 DIAGNOSIS — Z79.4 TYPE 2 DIABETES MELLITUS WITH HYPERGLYCEMIA, WITH LONG-TERM CURRENT USE OF INSULIN (HCC): ICD-10-CM

## 2023-05-30 DIAGNOSIS — E11.65 TYPE 2 DIABETES MELLITUS WITH HYPERGLYCEMIA, WITH LONG-TERM CURRENT USE OF INSULIN (HCC): ICD-10-CM

## 2023-05-30 RX ORDER — METFORMIN HYDROCHLORIDE 500 MG/1
1000 TABLET, EXTENDED RELEASE ORAL 2 TIMES DAILY WITH MEALS
Qty: 360 TABLET | Refills: 0 | Status: SHIPPED | OUTPATIENT
Start: 2023-05-30 | End: 2023-08-28

## 2023-05-30 RX ORDER — DULAGLUTIDE 4.5 MG/.5ML
4.5 INJECTION, SOLUTION SUBCUTANEOUS WEEKLY
Qty: 2 ML | Refills: 1 | Status: SHIPPED | OUTPATIENT
Start: 2023-05-30

## 2023-07-04 DIAGNOSIS — E11.65 TYPE 2 DIABETES MELLITUS WITH HYPERGLYCEMIA, WITH LONG-TERM CURRENT USE OF INSULIN (HCC): ICD-10-CM

## 2023-07-04 DIAGNOSIS — Z79.4 TYPE 2 DIABETES MELLITUS WITH HYPERGLYCEMIA, WITH LONG-TERM CURRENT USE OF INSULIN (HCC): ICD-10-CM

## 2023-07-05 RX ORDER — BLOOD SUGAR DIAGNOSTIC
STRIP MISCELLANEOUS
Qty: 200 STRIP | Refills: 1 | Status: SHIPPED | OUTPATIENT
Start: 2023-07-05

## 2023-07-25 DIAGNOSIS — E11.65 TYPE 2 DIABETES MELLITUS WITH HYPERGLYCEMIA, WITH LONG-TERM CURRENT USE OF INSULIN (HCC): ICD-10-CM

## 2023-07-25 DIAGNOSIS — Z79.4 TYPE 2 DIABETES MELLITUS WITH HYPERGLYCEMIA, WITH LONG-TERM CURRENT USE OF INSULIN (HCC): ICD-10-CM

## 2023-07-26 DIAGNOSIS — Z79.4 TYPE 2 DIABETES MELLITUS WITH HYPERGLYCEMIA, WITH LONG-TERM CURRENT USE OF INSULIN (HCC): ICD-10-CM

## 2023-07-26 DIAGNOSIS — E11.65 TYPE 2 DIABETES MELLITUS WITH HYPERGLYCEMIA, WITH LONG-TERM CURRENT USE OF INSULIN (HCC): ICD-10-CM

## 2023-07-26 RX ORDER — DULAGLUTIDE 4.5 MG/.5ML
4.5 INJECTION, SOLUTION SUBCUTANEOUS WEEKLY
Qty: 2 ML | Refills: 1 | Status: SHIPPED | OUTPATIENT
Start: 2023-07-26

## 2023-08-09 DIAGNOSIS — Z79.4 TYPE 2 DIABETES MELLITUS WITH HYPERGLYCEMIA, WITH LONG-TERM CURRENT USE OF INSULIN (HCC): ICD-10-CM

## 2023-08-09 DIAGNOSIS — E11.65 TYPE 2 DIABETES MELLITUS WITH HYPERGLYCEMIA, WITH LONG-TERM CURRENT USE OF INSULIN (HCC): ICD-10-CM

## 2023-08-17 ENCOUNTER — TELEPHONE (OUTPATIENT)
Dept: OTHER | Facility: OTHER | Age: 63
End: 2023-08-17

## 2023-08-25 DIAGNOSIS — E11.65 TYPE 2 DIABETES MELLITUS WITH HYPERGLYCEMIA, WITH LONG-TERM CURRENT USE OF INSULIN (HCC): ICD-10-CM

## 2023-08-25 DIAGNOSIS — Z79.4 TYPE 2 DIABETES MELLITUS WITH HYPERGLYCEMIA, WITH LONG-TERM CURRENT USE OF INSULIN (HCC): ICD-10-CM

## 2023-08-25 RX ORDER — METFORMIN HYDROCHLORIDE 500 MG/1
1000 TABLET, EXTENDED RELEASE ORAL 2 TIMES DAILY WITH MEALS
Qty: 360 TABLET | Refills: 0 | Status: SHIPPED | OUTPATIENT
Start: 2023-08-25 | End: 2023-11-23

## 2023-08-30 DIAGNOSIS — E11.65 TYPE 2 DIABETES MELLITUS WITH HYPERGLYCEMIA, UNSPECIFIED WHETHER LONG TERM INSULIN USE (HCC): ICD-10-CM

## 2023-08-30 RX ORDER — PEN NEEDLE, DIABETIC 29 G X1/2"
NEEDLE, DISPOSABLE MISCELLANEOUS
Qty: 100 EACH | Refills: 3 | Status: SHIPPED | OUTPATIENT
Start: 2023-08-30

## 2023-09-03 DIAGNOSIS — Z79.4 TYPE 2 DIABETES MELLITUS WITHOUT COMPLICATION, WITH LONG-TERM CURRENT USE OF INSULIN (HCC): ICD-10-CM

## 2023-09-03 DIAGNOSIS — E11.9 TYPE 2 DIABETES MELLITUS WITHOUT COMPLICATION, WITH LONG-TERM CURRENT USE OF INSULIN (HCC): ICD-10-CM

## 2023-09-05 RX ORDER — INSULIN HUMAN 100 [IU]/ML
INJECTION, SUSPENSION SUBCUTANEOUS
Qty: 50 ML | Refills: 5 | Status: SHIPPED | OUTPATIENT
Start: 2023-09-05 | End: 2023-09-14 | Stop reason: CLARIF

## 2023-09-14 ENCOUNTER — OFFICE VISIT (OUTPATIENT)
Dept: ENDOCRINOLOGY | Facility: CLINIC | Age: 63
End: 2023-09-14
Payer: COMMERCIAL

## 2023-09-14 VITALS
BODY MASS INDEX: 48.58 KG/M2 | HEIGHT: 62 IN | HEART RATE: 76 BPM | OXYGEN SATURATION: 97 % | SYSTOLIC BLOOD PRESSURE: 128 MMHG | RESPIRATION RATE: 18 BRPM | WEIGHT: 264 LBS | DIASTOLIC BLOOD PRESSURE: 78 MMHG

## 2023-09-14 DIAGNOSIS — E11.9 TYPE 2 DIABETES MELLITUS WITHOUT COMPLICATION, WITH LONG-TERM CURRENT USE OF INSULIN (HCC): ICD-10-CM

## 2023-09-14 DIAGNOSIS — E78.5 HYPERLIPIDEMIA ASSOCIATED WITH TYPE 2 DIABETES MELLITUS: ICD-10-CM

## 2023-09-14 DIAGNOSIS — Z79.4 TYPE 2 DIABETES MELLITUS WITH HYPERGLYCEMIA, WITH LONG-TERM CURRENT USE OF INSULIN (HCC): ICD-10-CM

## 2023-09-14 DIAGNOSIS — E11.65 TYPE 2 DIABETES MELLITUS WITH HYPERGLYCEMIA, WITH LONG-TERM CURRENT USE OF INSULIN (HCC): Primary | ICD-10-CM

## 2023-09-14 DIAGNOSIS — E11.69 HYPERLIPIDEMIA ASSOCIATED WITH TYPE 2 DIABETES MELLITUS: ICD-10-CM

## 2023-09-14 DIAGNOSIS — I10 ESSENTIAL HYPERTENSION: Chronic | ICD-10-CM

## 2023-09-14 DIAGNOSIS — E11.65 TYPE 2 DIABETES MELLITUS WITH HYPERGLYCEMIA, WITH LONG-TERM CURRENT USE OF INSULIN (HCC): ICD-10-CM

## 2023-09-14 DIAGNOSIS — G47.33 OBSTRUCTIVE SLEEP APNEA SYNDROME: ICD-10-CM

## 2023-09-14 DIAGNOSIS — Z79.4 TYPE 2 DIABETES MELLITUS WITH HYPERGLYCEMIA, WITH LONG-TERM CURRENT USE OF INSULIN (HCC): Primary | ICD-10-CM

## 2023-09-14 DIAGNOSIS — Z79.4 TYPE 2 DIABETES MELLITUS WITHOUT COMPLICATION, WITH LONG-TERM CURRENT USE OF INSULIN (HCC): ICD-10-CM

## 2023-09-14 PROBLEM — E11.621 DIABETIC ULCER OF LEFT MIDFOOT ASSOCIATED WITH TYPE 2 DIABETES MELLITUS, LIMITED TO BREAKDOWN OF SKIN (HCC): Status: RESOLVED | Noted: 2019-11-09 | Resolved: 2023-09-14

## 2023-09-14 PROBLEM — L97.421 DIABETIC ULCER OF LEFT MIDFOOT ASSOCIATED WITH TYPE 2 DIABETES MELLITUS, LIMITED TO BREAKDOWN OF SKIN (HCC): Status: RESOLVED | Noted: 2019-11-09 | Resolved: 2023-09-14

## 2023-09-14 LAB — SL AMB POCT HEMOGLOBIN AIC: 8 (ref ?–6.5)

## 2023-09-14 PROCEDURE — 99214 OFFICE O/P EST MOD 30 MIN: CPT | Performed by: NURSE PRACTITIONER

## 2023-09-14 PROCEDURE — 95251 CONT GLUC MNTR ANALYSIS I&R: CPT | Performed by: NURSE PRACTITIONER

## 2023-09-14 PROCEDURE — 83036 HEMOGLOBIN GLYCOSYLATED A1C: CPT | Performed by: NURSE PRACTITIONER

## 2023-09-14 RX ORDER — DULAGLUTIDE 4.5 MG/.5ML
4.5 INJECTION, SOLUTION SUBCUTANEOUS WEEKLY
Qty: 2 ML | Refills: 1 | Status: SHIPPED | OUTPATIENT
Start: 2023-09-14

## 2023-09-14 RX ORDER — INSULIN GLARGINE 100 [IU]/ML
INJECTION, SOLUTION SUBCUTANEOUS
Qty: 15 ML | Refills: 2 | Status: SHIPPED | OUTPATIENT
Start: 2023-09-14 | End: 2023-09-21

## 2023-09-14 RX ORDER — PEN NEEDLE, DIABETIC 30 GX3/16"
NEEDLE, DISPOSABLE MISCELLANEOUS
Qty: 150 EACH | Refills: 2 | Status: SHIPPED | OUTPATIENT
Start: 2023-09-14

## 2023-09-14 RX ORDER — DIPHENOXYLATE HYDROCHLORIDE AND ATROPINE SULFATE 2.5; .025 MG/1; MG/1
60 TABLET ORAL 2 TIMES DAILY
COMMUNITY

## 2023-09-14 RX ORDER — DIPHENOXYLATE HYDROCHLORIDE AND ATROPINE SULFATE 2.5; .025 MG/1; MG/1
TABLET ORAL
COMMUNITY

## 2023-09-14 RX ORDER — INSULIN LISPRO 100 [IU]/ML
INJECTION, SOLUTION INTRAVENOUS; SUBCUTANEOUS
Qty: 15 ML | Refills: 2 | Status: SHIPPED | OUTPATIENT
Start: 2023-09-14 | End: 2023-09-21

## 2023-09-14 NOTE — PATIENT INSTRUCTIONS
Reduce Humulin 70/30 to 68 units twice daily. Do your level best to inject 10-12 hours. 2. We are going to transition you to what we call basal/bolus insulin. We will order Lantus which is long acting insulin which should be injected once daily at the same time every day. We will order you Novolog/Humalog which is fast acting insulin. This needs to be injected 15 minutes prior to eating a meal. If you skip a meal, do not take. Continue to use your [facility] as you have. If you lose a sensor, call [employer]. You can google the 1800 number and they will send you a replacement. Once you switch to basal bolus insulin, let me know if you are having blood sugars consistently over 200 mg/dL or blood sugars less than 70 mg/dL. Lantus: Inject 50 units at 10 pm nightly  Humalog: Inject 15 units three times daily prior to meals. Inject 15 minutes prior to eating. Stop humulin 70/30 mixed insulin. 3. I have placed a referral for pre-pump education so you can learn more about insulin pumps to see if you are interested.

## 2023-09-14 NOTE — PROGRESS NOTES
Established Patient Progress Note      Chief Complaint   Patient presents with   • Diabetes Type 2        Impression & Plan:    Problem List Items Addressed This Visit        Endocrine    Type 2 diabetes mellitus with hyperglycemia, with long-term current use of insulin ([address]) - Primary     While A1c is improving, patient's blood sugars remain very erratic due to high variability of her schedule/diet. Now willing to transition to basal bolus insulin. Start Lantus 50 units nightly. Reviewed mechanism of action and how to inject. Start Humalog. Inject 15 units 3 times daily prior to meals. Reviewed mechanism of action and when to inject. I anticipate patient will need more insulin than this however, I am not convinced that she has been injecting the reported 84 units of 70/30 insulin twice daily. Therefore, I want her to follow this regimen for 2 weeks and then notify the office of how her blood sugars are looking. Continue metformin and Trulicity. Extensive education and counseling provided. Patient has expressed interest in an insulin pump. I have referred her to diabetes education for prepump education. If she is able to complete all the classes including carbohydrate counting and flexible insulin therapy, this could potentially be a helpful therapy. Counseled on appropriate hypoglycemia surveillance and treatment. Follow-up in 3 months. Lab Results   Component Value Date    HGBA1C 8.0 (A) 09/14/2023            Relevant Medications    Insulin Glargine Solostar (Lantus SoloStar) 100 UNIT/ML SOPN    insulin lispro (HumaLOG KwikPen) 100 units/mL injection pen    Other Relevant Orders    Hemoglobin A1C    Ambulatory referral to Diabetic Education    POCT hemoglobin A1c (Completed)    Hyperlipidemia associated with type 2 diabetes mellitus ([address])     Stable. Continue statin.   Lab Results   Component Value Date    HGBA1C 8.0 (A) 09/14/2023            Relevant Medications    Insulin Glargine Solostar (Lantus SoloStar) 100 UNIT/ML SOPN    insulin lispro (HumaLOG KwikPen) 100 units/mL injection pen       Respiratory    Obstructive sleep apnea syndrome     Continue CPAP. Cardiovascular and Mediastinum    Essential hypertension (Chronic)     /70. Continue current regimen. Other Visit Diagnoses     Type 2 diabetes mellitus without complication, with long-term current use of insulin (HCC)        Relevant Medications    Insulin Glargine Solostar (Lantus SoloStar) 100 UNIT/ML SOPN    insulin lispro (HumaLOG KwikPen) 100 units/mL injection pen    Insulin Pen Needle (Pen Needles) 32G X 4 MM MISC          Orders Placed This Encounter   Procedures   • Hemoglobin A1C     Standing Status:   Future     Standing Expiration Date:   9/14/2024   • Ambulatory referral to Diabetic Education     Standing Status:   Future     Standing Expiration Date:   9/14/2024     Referral Priority:   Routine     Referral Type:   Consult - AMB     Referral Reason:   Specialty Services Required     Requested Specialty:   Diabetes Services     Number of Visits Requested:   1     Expiration Date:   9/14/2024   • POCT hemoglobin A1c       History of Present Illness:   Anu Keys is a 57 y.o. female with HTN, HLD, and type 2 diabetes with long term use of insulin since 1997. Reports complications of proteinuria, peripheral neuropathy, and diabetic ulcer of left midfoot. Denies recent severe hypoglycemic or severe hyperglycemic episodes. Denies any issues with her current regimen. home glucose monitoring: are performed regularly via CGM. At patient's last appointment on 4/12/2023, patient was advised to inject her mixed insulin only twice daily as she is often skipping lunch. Also counseled on the mechanism of action of her mixed insulin. Discussed transitioning to basal bolus regimen with Humalog and Basaglar however, patient wished to continue with current mixed insulin regimen.     Patient did not complete ordered labs prior to today's appointment. Current regimen:   Humulin 70/30 71-29-52  Metformin 1000 mg twice daily  Trulicity 4.5 mg once weekly    Keesha Ferguson   Device used [employer]  Home use     Indication   Type 2 Diabetes    More than 72 hours of data was reviewed. Report to be scanned to chart. Date Range: September 1, 2023-September 14, 2023    Analysis of data:   Average Glucose: 147 mg/dL  Coefficient of Variation: 36.5%  SD : Not available  Time in Target Range: 73%  Time Above Range: 20% 181 to 250 mg/dL; 5% greater than 250 mg/dL  Time Below Range: 2% 54 to 69 mg/dL; 0% less than 54 mg/dL    Interpretation of data: Patient is having high variability in her blood sugars. Postprandially, she is frequently greater than 200 mg/dL. However, in the early morning and late afternoon, she is frequently in the mid to high 60s. She continues to eat at various times and is not using her mixed insulin appropriately. Last Eye Exam: UTD  Last Foot Exam: UTD    For hypertension, she is taking 10 mg of lisinopril daily. She denies headache and cough. For hyperlipidemia, she is taking 20 mg of rosuvastatin nightly. She denies myalgias.     Patient Active Problem List   Diagnosis   • Vitamin D deficiency   • Type 2 diabetes mellitus with hyperglycemia, with long-term current use of insulin ([address])   • History of Salmonella infection   • Melanocytic nevus of trunk   • Menopausal and perimenopausal disorder   • Insomnia   • Essential hypertension   • Mixed hyperlipidemia   • History of pulmonary embolism   • GERD (gastroesophageal reflux disease)   • Gait disturbance   • Stage 3 chronic kidney disease (HCC)   • Benign mole   • Anxiety   • Achilles tendinitis of left lower extremity   • Ulcer of toe of left foot (HCC)   • History of MRSA infection   • Screening for colon cancer   • Screening for breast cancer   • Screening for cervical cancer   • Eye exam, routine   • Chronic pain syndrome   • Osteoarthritis of spine   • Leg swelling   • Skin callus   • Lipoma of back   • Chronic bilateral low back pain with bilateral sciatica   • Lumbar radiculopathy   • Myofascial pain syndrome   • Lumbar spondylosis   • Spinal stenosis of lumbar region with neurogenic claudication   • Thoracic spinal stenosis   • Chronic back pain   • Right hip pain   • Morbid obesity with BMI of 50.0-59.9, adult (HCC)   • Edema   • Proteinuria   • Obstructive sleep apnea syndrome   • Hyperlipidemia associated with type 2 diabetes mellitus ([address])   • Neuropathy   • Acute hypoxemic respiratory failure due to COVID-19 [facility])   • Vertigo   • Vitamin D insufficiency      Past Medical History:   Diagnosis Date   • Anesthesia related hyperthermia     pt states she had high fevers after her lipoma surgery in  at our hospital and was shipped to [facility] where they said t was from an anesthesia med   • Anxiety    • Arthritis    • Asthma    • Chronic pain    • Depression    • Diabetes mellitus (HCC)    • GERD (gastroesophageal reflux disease)    • Hyperlipidemia    • Malignant hyperthermia due to anesthesia    • 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    • Neuropathy    • Obesity    • PCOS (polycystic ovarian syndrome)       Past Surgical History:   Procedure Laterality Date   • CATARACT EXTRACTION, BILATERAL     • CERVICAL CONIZATION   W/ LASER     •  SECTION     • DILATION AND CURETTAGE OF UTERUS     • ENDOMETRIAL ABLATION     • ENDOMETRIAL BIOPSY     • EPIDURAL BLOCK INJECTION Bilateral 2019    Procedure: L5-S1 transforaminal epidural steroid injection;  Surgeon: Dennis Reynolds MD;  Location: [facility];  Service: Pain Management    • EPIDURAL BLOCK INJECTION Bilateral 2019    Procedure: L5-S1 transforaminal epidural steroid injection;  Surgeon: Dennis Reynolds MD;  Location: [facility];  Service: Pain Management    • FL GUIDED NEEDLE PLAC BX/ASP/INJ  2019   • FL GUIDED NEEDLE PLAC BX/ASP/INJ  2019 • FOOT SURGERY Left    • HERNIA REPAIR     • INDUCED      • IR PICC LINE  2019   • JOINT REPLACEMENT     • KNEE ARTHROPLASTY Right    • LA DEBRIDEMENT OPEN WOUND 20 SQ CM/< Right 2018    Procedure: DEBRIDEMENT HAND/FINGER [facility] OUT); Surgeon: Leopold Lesch, MD;  Location: [facility] MAIN OR;  Service: General   • LA EXCISION TUMOR SOFT TIS BACK/FLANK SUBQ 3 CM/> N/A 2018    Procedure: BACK LIPOMA EXCISION;  Surgeon: Leopold Lesch, MD;  Location: [facility] MAIN OR;  Service: General   • ROTATOR CUFF REPAIR Right    • TONSILLECTOMY        Family History   Problem Relation Age of Onset   • Polycystic ovary syndrome Mother    • Mental illness Father    • Diabetes Father      Social History     Tobacco Use   • Smoking status: Never   • Smokeless tobacco: Never   Substance Use Topics   • Alcohol use: Not Currently     Comment: OCCASIONALLY     Allergies   Allergen Reactions   • Cauliflower [Brassica Oleracea - Food Allergy] Other (See Comments)     Other reaction(s): GI upset  Abdominal pain   • Citrus Bioflavonoid - Food Allergy Other (See Comments)     Skin burns   • Compro [Prochlorperazine] Shortness Of Breath and Wheezing   • Latex Other (See Comments)     burning   • Mobic [Meloxicam] Swelling   • Morphine Hives and Itching   • Albumen, Egg - Food Allergy Vomiting     Other reaction(s): Nausea and/or vomiting   • Metronidazole Itching   • Sevoflurane Other (See Comments)   • Succinylcholine Other (See Comments)   • Sulfa Antibiotics Other (See Comments)     Other reaction(s): Unknown Reaction         Current Outpatient Medications:   •  aspirin (ECOTRIN LOW STRENGTH) 81 mg EC tablet, Take 81 mg by mouth daily, Disp: , Rfl:   •  BD Insulin Syringe U/F 31G X /16" 1 ML MISC, use three times a day to inject INSULIN, Disp: 100 each, Rfl: 3  •  Blood Glucose Monitoring Suppl (OneTouch Verio) w/Device KIT, Use to test blood sugar 3x daily. , Disp: 1 kit, Rfl: 0  •  carisoprodol (SOMA) 350 mg tablet, Take 350 mg by mouth 3 (three) times a day, Disp: , Rfl:   •  clindamycin (CLEOCIN T) 1 % lotion, As needed, Disp: , Rfl:   •  Continuous Blood Gluc  (FreeStyle [place] 2 [place]) [facility], Use to check blood sugar continually, Disp: 1 each, Rfl: 0  •  Continuous Blood Gluc Sensor (FreeStyle Duke 2 Sensor) MISC, USE TO CHECK BLOOD SUGAR CONTINOUSLY FOR 14 DAYS., Disp: 6 each, Rfl: 1  •  Elidel 1 % cream, apply twice a day for 6 WEEKS to affected area ON face, Disp: , Rfl:   •  ergocalciferol (ERGOCALCIFEROL) 1.25 MG (90928 UT) capsule, Take 1 capsule (50,000 Units total) by mouth once a week, Disp: 4 capsule, Rfl: 5  •  furosemide (LASIX) 20 mg tablet, Take 20 mg by mouth daily as needed, Disp: , Rfl:   •  gabapentin (NEURONTIN) 800 mg tablet, Take 800 mg by mouth 3 (three) times a day, Disp: , Rfl:   •  glucagon 1 MG injection, Inject 1 mg under the skin once as needed (PRN blood glucose less than 70 if unconscious or uncorrectable by oral means) for up to 1 dose, Disp: 1 kit, Rfl: 0  •  glucose blood (OneTouch Verio) test strip, use 1 TEST STRIP to TEST BLOOD SUGAR three times a day, Disp: 200 strip, Rfl: 1  •  Insulin Glargine Solostar (Lantus SoloStar) 100 UNIT/ML SOPN, Inject 50 units nightly., Disp: 15 mL, Rfl: 2  •  insulin lispro (HumaLOG KwikPen) 100 units/mL injection pen, Inject 15 units three times daily before meals. , Disp: 15 mL, Rfl: 2  •  Insulin Pen Needle (Pen Needles) 32G X 4 MM MISC, Use to inject insulin 4x daily. , Disp: 150 each, Rfl: 2  •  Lancets (OneTouch Delica Plus [id number]) MISC, use 1 LANCET to TEST BLOOD SUGAR three times a day, Disp: 100 each, Rfl: 0  •  Lancets (OneTouch Delica Plus [id number]) MISC, USE TO TEST BLOOD SUGAR THREE TIMES DAILY, Disp: 100 each, Rfl: 3  •  lisinopril (ZESTRIL) 10 mg tablet, Take 10 mg by mouth daily, Disp: , Rfl:   •  meclizine (ANTIVERT) 25 mg tablet, Take 25 mg by mouth Three times daily as needed, Disp: , Rfl:   •  metFORMIN (GLUCOPHAGE-XR) 500 mg 24 hr tablet, TAKE 2 TABLETS (1,000 MG TOTAL) BY MOUTH 2 (TWO) TIMES A DAY WITH MEALS, Disp: 360 tablet, Rfl: 0  •  multivitamin (THERAGRAN) TABS, inject 0.5 milliliters intramuscularly, Disp: , Rfl:   •  multivitamin (THERAGRAN) TABS, Inject 60 Units under the skin 2 (two) times a day, Disp: , Rfl:   •  multivitamin (THERAGRAN) TABS, inject 0.5 milliliters intramuscularly, Disp: , Rfl:   •  ondansetron (ZOFRAN) 8 mg tablet, Take 8 mg by mouth every 8 (eight) hours as needed, Disp: , Rfl:   •  ondansetron (ZOFRAN-ODT) 4 mg disintegrating tablet, Take 1 tablet (4 mg total) by mouth every 6 (six) hours as needed for nausea or vomiting, Disp: 12 tablet, Rfl: 0  •  Pain Relief Extra Strength 500 MG tablet, take 1 tablet by mouth every 6 hours if needed for mild pain, Disp: , Rfl:   •  prednisoLONE acetate (PRED FORTE) 1 % ophthalmic suspension, instill 1 drop into right eye every 2 hours STARTING AFTER SURGERY, Disp: , Rfl:   •  rosuvastatin (CRESTOR) 20 MG tablet, Take 20 mg by mouth every evening, Disp: , Rfl:   •  triamcinolone (KENALOG) 0.1 % cream, apply topically to affected area twice a day, Disp: , Rfl:   •  Trulicity 4.5 SD/4.7BN injection, INJECT 0.5 ML (4.5 MG TOTAL) UNDER THE SKIN ONCE A WEEK, Disp: 2 mL, Rfl: 1    Review of Systems   Constitutional: Positive for fatigue. Negative for activity change, appetite change and unexpected weight change. HENT: Negative for dental problem, sore throat, trouble swallowing and voice change. Eyes: Negative for visual disturbance. Respiratory: Negative for cough, chest tightness and shortness of breath. Cardiovascular: Negative for chest pain, palpitations and leg swelling. Gastrointestinal: Negative for constipation, diarrhea, nausea and vomiting. Endocrine: Negative for heat intolerance, polydipsia, polyphagia and polyuria. Genitourinary: Negative for frequency. Musculoskeletal: Positive for arthralgias and back pain.  Negative for gait problem and myalgias. Skin: Negative for wound. Allergic/Immunologic: Positive for environmental allergies and food allergies. Neurological: Positive for numbness. Negative for dizziness, weakness, light-headedness and headaches. Psychiatric/Behavioral: Negative for decreased concentration, dysphoric mood and sleep disturbance. The patient is not nervous/anxious. Physical Exam:  Body mass index is 48.29 kg/m². /78   Pulse 76   Resp 18   Ht 5' 2" (1.575 m)   Wt 120 kg (264 lb)   SpO2 97%   BMI 48.29 kg/m²    Wt Readings from Last 3 Encounters:   09/14/23 120 kg (264 lb)   05/10/23 120 kg (264 lb 6.4 oz)   04/12/23 122 kg (269 lb)       Physical Exam  Vitals reviewed. Constitutional:       General: She is not in acute distress. Appearance: She is well-developed. She is obese. She is not ill-appearing. HENT:      Head: Normocephalic and atraumatic. Eyes:      Pupils: Pupils are equal, round, and reactive to light. Neck:      Thyroid: No thyromegaly. Cardiovascular:      Rate and Rhythm: Normal rate and regular rhythm. Pulses: Normal pulses. Heart sounds: Normal heart sounds. Pulmonary:      Effort: Pulmonary effort is normal.      Breath sounds: Normal breath sounds. Abdominal:      General: Bowel sounds are normal. There is no distension. Palpations: Abdomen is soft. Tenderness: There is no abdominal tenderness. Musculoskeletal:      Cervical back: Normal range of motion and neck supple. Right lower leg: No edema. Left lower leg: No edema. Lymphadenopathy:      Cervical: No cervical adenopathy. Skin:     General: Skin is warm and dry. Capillary Refill: Capillary refill takes less than 2 seconds. Neurological:      Mental Status: She is alert and oriented to person, place, and time.       Gait: Gait normal.   Psychiatric:         Mood and Affect: Mood normal.         Behavior: Behavior normal.           Labs:   Lab Results   Component Value Date    HGBA1C 8.0 (A) 09/14/2023    HGBA1C 8.7 (H) 04/04/2023    HGBA1C 9.4 (H) 07/01/2022     Lab Results   Component Value Date    CREATININE 1.12 05/01/2023    CREATININE 1.24 04/04/2023    CREATININE 1.13 07/01/2022    BUN 18 05/01/2023    K 4.4 05/01/2023     (H) 05/01/2023    CO2 27 05/01/2023     eGFR   Date Value Ref Range Status   05/01/2023 52 ml/min/1.73sq m Final     Lab Results   Component Value Date    HDL 48 (L) 04/04/2023    TRIG 224 (H) 04/04/2023     Lab Results   Component Value Date    ALT 42 05/01/2023    AST 26 05/01/2023    ALKPHOS 90 05/01/2023     Lab Results   Component Value Date    MNT1JMMKRQQJ 3.350 04/04/2023    ZUI3FBXDFUJD 2.120 11/10/2020    MSO3JQENQNJH 2.530 05/06/2020     Lab Results   Component Value Date    FREET4 1.00 08/19/2019             Discussed with the patient and all questioned fully answered. She will call me if any problems arise. Follow-up appointment in 3 months. Counseled patient on diagnostic results, prognosis, risk and benefit of treatment options, instruction for management, importance of treatment compliance, Risk  factor reduction and impressions    Patient Instructions   1. Reduce Humulin 70/30 to 68 units twice daily. Do your level best to inject 10-12 hours. 2. We are going to transition you to what we call basal/bolus insulin. We will order Lantus which is long acting insulin which should be injected once daily at the same time every day. We will order you Novolog/Humalog which is fast acting insulin. This needs to be injected 15 minutes prior to eating a meal. If you skip a meal, do not take. Continue to use your [facility] as you have. If you lose a sensor, call [employer]. You can google the 1800 number and they will send you a replacement. Once you switch to basal bolus insulin, let me know if you are having blood sugars consistently over 200 mg/dL or blood sugars less than 70 mg/dL.        Lantus: Inject 50 units at 10 pm nightly  Humalog: Inject 15 units three times daily prior to meals. Inject 15 minutes prior to eating. Stop humulin 70/30 mixed insulin. 3. I have placed a referral for pre-pump education so you can learn more about insulin pumps to see if you are interested.        [address] [place] [address],Rolf. [id number] Bri[address]rero

## 2023-09-14 NOTE — ASSESSMENT & PLAN NOTE
While A1c is improving, patient's blood sugars remain very erratic due to high variability of her schedule/diet. Now willing to transition to basal bolus insulin. Start Lantus 50 units nightly. Reviewed mechanism of action and how to inject. Start Humalog. Inject 15 units 3 times daily prior to meals. Reviewed mechanism of action and when to inject. I anticipate patient will need more insulin than this however, I am not convinced that she has been injecting the reported 84 units of 70/30 insulin twice daily. Therefore, I want her to follow this regimen for 2 weeks and then notify the office of how her blood sugars are looking. Continue metformin and Trulicity. Extensive education and counseling provided. Patient has expressed interest in an insulin pump. I have referred her to diabetes education for prepump education. If she is able to complete all the classes including carbohydrate counting and flexible insulin therapy, this could potentially be a helpful therapy. Counseled on appropriate hypoglycemia surveillance and treatment. Follow-up in 3 months.   Lab Results   Component Value Date    HGBA1C 8.0 (A) 09/14/2023

## 2023-09-18 NOTE — TELEPHONE ENCOUNTER
Patient called and stated she started a new insulin and her sugars have been high.  Today this morning at 5:15 her sugar was 277  9:00 am 237   11:37 203  1:02 197

## 2023-09-20 NOTE — TELEPHONE ENCOUNTER
Spoke to jim, her bg had gone down to 255. We spoke about about making sure we are eating the right things to best help our medication work, be activity and drink lots of water, stay hydrated. Patient is wondering if maybe a change in medication would be appropriate.

## 2023-09-20 NOTE — TELEPHONE ENCOUNTER
Patient called and stated that since starting her new insulin, her sugars have been high. She is just waking up and the were in the 300's.   5:28 am- 328  11:20 am- 317   12:00 pm- 257    Please call pt back to discuss. [phone].  She will have to  her kids at 1:30pm and will not be available until 3pm.

## 2023-09-21 RX ORDER — INSULIN GLARGINE 100 [IU]/ML
INJECTION, SOLUTION SUBCUTANEOUS
Qty: 15 ML | Refills: 2 | Status: SHIPPED | OUTPATIENT
Start: 2023-09-21

## 2023-09-21 RX ORDER — INSULIN LISPRO 100 [IU]/ML
INJECTION, SOLUTION INTRAVENOUS; SUBCUTANEOUS
Qty: 15 ML | Refills: 2 | Status: SHIPPED | OUTPATIENT
Start: 2023-09-21

## 2023-09-21 NOTE — TELEPHONE ENCOUNTER
Please instruct her to increase Lantus to 60 units nightly. Increase Humalog to 22 units three times daily before meals. Please remind patient that she needs to inject Humalog 15 minutes before she starts to eat. Please let us know how sugars respond. She may need more increases as she is quite insulin resistant. Thank you.

## 2023-10-10 DIAGNOSIS — N18.31 STAGE 3A CHRONIC KIDNEY DISEASE (HCC): Primary | ICD-10-CM

## 2023-10-10 DIAGNOSIS — I10 ESSENTIAL HYPERTENSION: ICD-10-CM

## 2023-10-10 LAB
CREAT ?TM UR-SCNC: 119.5 UMOL/L
EXT ALBUMIN URINE RANDOM: 4.3
MICROALBUMIN/CREAT UR: 36 MG/G{CREAT}

## 2023-10-18 ENCOUNTER — TELEPHONE (OUTPATIENT)
Dept: FAMILY MEDICINE CLINIC | Facility: CLINIC | Age: 63
End: 2023-10-18

## 2023-10-18 NOTE — TELEPHONE ENCOUNTER
Patient called stating her Medicare will not cover her Insulin Glargine Solostar or her Insulin Lispro. They will cover NovoLog Flex Pen instead of insulin lispro. They will cover levemir flex touch instead of the Insulin glargine. Or a prior authorization could be started.

## 2023-10-20 ENCOUNTER — OFFICE VISIT (OUTPATIENT)
Dept: NEPHROLOGY | Facility: CLINIC | Age: 63
End: 2023-10-20
Payer: COMMERCIAL

## 2023-10-20 VITALS
WEIGHT: 275 LBS | DIASTOLIC BLOOD PRESSURE: 60 MMHG | BODY MASS INDEX: 50.61 KG/M2 | HEIGHT: 62 IN | OXYGEN SATURATION: 95 % | SYSTOLIC BLOOD PRESSURE: 120 MMHG | HEART RATE: 76 BPM

## 2023-10-20 DIAGNOSIS — I10 ESSENTIAL HYPERTENSION: Chronic | ICD-10-CM

## 2023-10-20 DIAGNOSIS — R60.0 BILATERAL LOWER EXTREMITY EDEMA: ICD-10-CM

## 2023-10-20 DIAGNOSIS — N18.31 STAGE 3A CHRONIC KIDNEY DISEASE (HCC): Primary | Chronic | ICD-10-CM

## 2023-10-20 DIAGNOSIS — E83.42 HYPOMAGNESEMIA: ICD-10-CM

## 2023-10-20 PROBLEM — E55.9 VITAMIN D DEFICIENCY: Chronic | Status: RESOLVED | Noted: 2017-01-17 | Resolved: 2023-10-20

## 2023-10-20 PROCEDURE — 99214 OFFICE O/P EST MOD 30 MIN: CPT | Performed by: INTERNAL MEDICINE

## 2023-10-20 RX ORDER — METHOCARBAMOL 750 MG/1
750 TABLET, FILM COATED ORAL 4 TIMES DAILY PRN
COMMUNITY
Start: 2023-10-16

## 2023-10-20 RX ORDER — FLUCONAZOLE 100 MG/1
100 TABLET ORAL DAILY
COMMUNITY
Start: 2023-08-16

## 2023-10-20 RX ORDER — CEPHALEXIN 500 MG/1
CAPSULE ORAL
COMMUNITY
Start: 2023-10-18

## 2023-10-20 NOTE — PATIENT INSTRUCTIONS
Try magnesium oxide 400 mg (OTC) up to once daily as needed. I recommend that you take this at least 3 times a week.

## 2023-10-20 NOTE — PROGRESS NOTES
[facility] [address] [place] [address]    Name: Cherisse Kussmaul  YOB: 1960      Assessment/Plan:    Stage 3 chronic kidney disease [facility])  Lab Results   Component Value Date    EGFR 52 05/01/2023    EGFR 46 04/04/2023    EGFR 51 07/01/2022    CREATININE 1.12 05/01/2023    CREATININE 1.24 04/04/2023    CREATININE 1.13 07/01/2022     Patient's kidney function stable at this time, monitor labs every 6 months. Avoid nephrotoxins and optimize medical management. Essential hypertension  Blood pressure well controlled at this time, continue encourage low sodium diet, continue lisinopril. Bilateral lower extremity edema  Continue to encourage low-sodium diet, patient is on furosemide 20 mg daily to help control volume status. Hypomagnesemia  Monitor magnesium levels, will add supplementation to be taken 3 times weekly to assist with supporting appropriate blood levels. Problem List Items Addressed This Visit          Cardiovascular and Mediastinum    Essential hypertension (Chronic)     Blood pressure well controlled at this time, continue encourage low sodium diet, continue lisinopril. Genitourinary    Stage 3 chronic kidney disease ([address]) - Primary (Chronic)     Lab Results   Component Value Date    EGFR 52 05/01/2023    EGFR 46 04/04/2023    EGFR 51 07/01/2022    CREATININE 1.12 05/01/2023    CREATININE 1.24 04/04/2023    CREATININE 1.13 07/01/2022   Patient's kidney function stable at this time, monitor labs every 6 months. Avoid nephrotoxins and optimize medical management. Relevant Orders    Albumin / creatinine urine ratio    Urinalysis with microscopic    Comprehensive metabolic panel    Magnesium    Phosphorus    PTH, intact    Vitamin D 25 hydroxy       Other    Bilateral lower extremity edema     Continue to encourage low-sodium diet, patient is on furosemide 20 mg daily to help control volume status.          Hypomagnesemia Monitor magnesium levels, will add supplementation to be taken 3 times weekly to assist with supporting appropriate blood levels. Patient is currently stable. Standpoint, we will see her back for regular appointment in approximately 6 months, sooner if clinically indicated. Subjective:      Patient ID: Cherisse Kussmaul is a 70 y.o. female. Patient presents for follow up. We reviewed the patient's labs in detail, most recent creatinine is about 1.2 mg/dL, baseline creatinine is about 1.1 mg/dL. She is taking all medications as prescribed with no specific side effects at this time. She denies use of nonsteroid anti-inflammatory medications. She is trying to focus on low carbohydrate diet regarding diabetic management and care. The following portions of the patient's history were reviewed and updated as appropriate: allergies, current medications, past family history, past medical history, past social history, past surgical history and problem list.    Review of Systems   All other systems reviewed and are negative. Social History     Socioeconomic History    Marital status:       Spouse name: None    Number of children: None    Years of education: None    Highest education level: None   Occupational History    None   Tobacco Use    Smoking status: Never    Smokeless tobacco: Never   Vaping Use    Vaping Use: Never used   Substance and Sexual Activity    Alcohol use: Not Currently     Comment: OCCASIONALLY    Drug use: Yes     Types: Marijuana     Comment: CBD, medical marijuana    Sexual activity: Never   Other Topics Concern    None   Social History Narrative    None     Social Determinants of Health     Financial Resource Strain: Not on file   Food Insecurity: No Food Insecurity (12/20/2021)    Hunger Vital Sign     Worried About Running Out of Food in the Last Year: Never true     Ran Out of Food in the Last Year: Never true   Transportation Needs: No Transportation Needs (2021)    PRAPARE - Transportation     Lack of Transportation (Medical): No     Lack of Transportation (Non-Medical):  No   Physical Activity: Not on file   Stress: Not on file   Social Connections: Not on file   Intimate Partner Violence: Not on file   Housing Stability: Low Risk  (2021)    Housing Stability Vital Sign     Unable to Pay for Housing in the Last Year: No     Number of Places Lived in the Last Year: 1     Unstable Housing in the Last Year: No     Past Medical History:   Diagnosis Date    Anesthesia related hyperthermia     pt states she had high fevers after her lipoma surgery in  at our hospital and was shipped to [facility] where they said t was from an anesthesia med    Anxiety     Arthritis     Asthma     Chronic pain     Depression     Diabetes mellitus ([address])     GERD (gastroesophageal reflux disease)     Hyperlipidemia     Malignant hyperthermia due to anesthesia     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     Neuropathy     Obesity     PCOS (polycystic ovarian syndrome)     Vitamin D deficiency 2017     Past Surgical History:   Procedure Laterality Date    CATARACT EXTRACTION, BILATERAL      CERVICAL CONIZATION   W/ LASER       SECTION      DILATION AND CURETTAGE OF UTERUS      ENDOMETRIAL ABLATION      ENDOMETRIAL BIOPSY      EPIDURAL BLOCK INJECTION Bilateral 2019    Procedure: L5-S1 transforaminal epidural steroid injection;  Surgeon: Jose Gomez MD;  Location: [facility];  Service: Pain Management     EPIDURAL BLOCK INJECTION Bilateral 2019    Procedure: L5-S1 transforaminal epidural steroid injection;  Surgeon: Jose Gomez MD;  Location: [facility];  Service: Pain Management     FL GUIDED NEEDLE PLAC BX/ASP/INJ  2019    FL GUIDED NEEDLE PLAC BX/ASP/INJ  2019    FOOT SURGERY Left     HERNIA REPAIR      INDUCED       IR PICC LINE  2019    JOINT REPLACEMENT      KNEE ARTHROPLASTY Right     MI DEBRIDEMENT OPEN WOUND 20 SQ CM/< Right 12/04/2018    Procedure: DEBRIDEMENT HAND/FINGER [facility] OUT); Surgeon: Kira Morrow MD;  Location: [address] [facility] OR;  Service: General    MI EXCISION TUMOR SOFT TIS BACK/FLANK SUBQ 3 CM/> N/A 12/04/2018    Procedure: BACK LIPOMA EXCISION;  Surgeon: Kira Morrow MD;  Location: [address] [facility] OR;  Service: General    ROTATOR CUFF REPAIR Right     SKIN CANCER EXCISION Left     2023    TONSILLECTOMY         Current Outpatient Medications:     aspirin (ECOTRIN LOW STRENGTH) 81 mg EC tablet, Take 81 mg by mouth daily, Disp: , Rfl:     BD Insulin Syringe U/F 31G X 5/16" 1 ML MISC, use three times a day to inject INSULIN, Disp: 100 each, Rfl: 3    Blood Glucose Monitoring Suppl (OneTouch Verio) w/Device KIT, Use to test blood sugar 3x daily. , Disp: 1 kit, Rfl: 0    cephalexin (KEFLEX) 500 mg capsule, take 1 capsule by mouth four times a day for 7 days, Disp: , Rfl:     Continuous Blood Gluc  (FreeStyle [place] 2 [place]) LORRIE, Use to check blood sugar continually, Disp: 1 each, Rfl: 0    Continuous Blood Gluc Sensor (FreeStyle [place] 2 Sensor) MISC, USE TO CHECK BLOOD SUGAR CONTINOUSLY FOR 14 DAYS., Disp: 6 each, Rfl: 1    ergocalciferol (ERGOCALCIFEROL) 1.25 MG (04771 UT) capsule, Take 1 capsule (50,000 Units total) by mouth once a week, Disp: 4 capsule, Rfl: 5    fluconazole (DIFLUCAN) 100 mg tablet, Take 100 mg by mouth daily, Disp: , Rfl:     furosemide (LASIX) 20 mg tablet, Take 20 mg by mouth daily as needed, Disp: , Rfl:     gabapentin (NEURONTIN) 800 mg tablet, Take 800 mg by mouth 3 (three) times a day, Disp: , Rfl:     glucagon 1 MG injection, Inject 1 mg under the skin once as needed (PRN blood glucose less than 70 if unconscious or uncorrectable by oral means) for up to 1 dose, Disp: 1 kit, Rfl: 0    glucose blood (OneTouch Verio) test strip, use 1 TEST STRIP to TEST BLOOD SUGAR three times a day, Disp: 200 strip, Rfl: 1    Insulin Pen Needle (Pen Needles) 32G X 4 MM MISC, Use to inject insulin 4x daily. , Disp: 150 each, Rfl: 2    Lancets (OneTouch Delica Plus [id number]) MISC, use 1 LANCET to TEST BLOOD SUGAR three times a day, Disp: 100 each, Rfl: 0    Lancets (OneTouch Delica Plus [id number]) MISC, USE TO TEST BLOOD SUGAR THREE TIMES DAILY, Disp: 100 each, Rfl: 3    lisinopril (ZESTRIL) 10 mg tablet, Take 10 mg by mouth daily, Disp: , Rfl:     meclizine (ANTIVERT) 25 mg tablet, Take 25 mg by mouth Three times daily as needed, Disp: , Rfl:     metFORMIN (GLUCOPHAGE-XR) 500 mg 24 hr tablet, TAKE 2 TABLETS (1,000 MG TOTAL) BY MOUTH 2 (TWO) TIMES A DAY WITH MEALS, Disp: 360 tablet, Rfl: 0    methocarbamol (ROBAXIN) 750 mg tablet, Take 750 mg by mouth 4 (four) times a day as needed, Disp: , Rfl:     ondansetron (ZOFRAN) 8 mg tablet, Take 8 mg by mouth every 8 (eight) hours as needed, Disp: , Rfl:     ondansetron (ZOFRAN-ODT) 4 mg disintegrating tablet, Take 1 tablet (4 mg total) by mouth every 6 (six) hours as needed for nausea or vomiting, Disp: 12 tablet, Rfl: 0    Pain Relief Extra Strength 500 MG tablet, take 1 tablet by mouth every 6 hours if needed for mild pain, Disp: , Rfl:     rosuvastatin (CRESTOR) 20 MG tablet, Take 20 mg by mouth every evening, Disp: , Rfl:     triamcinolone (KENALOG) 0.1 % cream, apply topically to affected area twice a day, Disp: , Rfl:     Trulicity 4.5 UP/1.7LO injection, INJECT 0.5 ML (4.5 MG TOTAL) UNDER THE SKIN ONCE A WEEK, Disp: 2 mL, Rfl: 1    insulin aspart (NovoLOG FlexPen) 100 UNIT/ML injection pen, Inject 22 Units under the skin 3 (three) times a day with meals, Disp: 45 mL, Rfl: 0    insulin detemir (Levemir FlexPen) 100 Units/mL injection pen, Inject 60 Units under the skin daily at bedtime, Disp: 45 mL, Rfl: 0    Lab Results   Component Value Date    SODIUM 139 05/01/2023    K 4.4 05/01/2023     (H) 05/01/2023    CO2 27 05/01/2023    AGAP 2 (L) 05/01/2023    BUN 18 05/01/2023    CREATININE 1.12 05/01/2023 GLUC 163 (H) 07/01/2022    GLUF 144 (H) 05/01/2023    CALCIUM 9.3 05/01/2023    AST 26 05/01/2023    ALT 42 05/01/2023    ALKPHOS 90 05/01/2023    TP 7.2 05/01/2023    TBILI 0.39 05/01/2023    EGFR 52 05/01/2023     Lab Results   Component Value Date    WBC 8.18 05/01/2023    HGB 14.0 05/01/2023    HCT 43.6 05/01/2023    MCV 88 05/01/2023     05/01/2023     Lab Results   Component Value Date    CHOLESTEROL 133 04/04/2023    CHOLESTEROL 143 01/31/2022    CHOLESTEROL 118 05/06/2020     Lab Results   Component Value Date    HDL 48 (L) 04/04/2023    HDL 46 (L) 01/31/2022    HDL 43 05/06/2020     Lab Results   Component Value Date    LDLCALC 40 04/04/2023    LDLCALC 45 01/31/2022    LDLCALC 34 05/06/2020     Lab Results   Component Value Date    TRIG 224 (H) 04/04/2023    TRIG 259 (H) 01/31/2022    TRIG 205 (H) 05/06/2020     No results found for: "CHOLHDL"  Lab Results   Component Value Date    OZM5QNFOUNBY 3.350 04/04/2023     Lab Results   Component Value Date    PTH 49.4 05/01/2023    CALCIUM 9.3 05/01/2023    PHOS 3.5 05/01/2023     Lab Results   Component Value Date    SPEP See Comment 02/17/2021     No results found for: "Bernardtie Zully", "[id number]"        Objective:      /60 (BP Location: Left arm, Patient Position: Sitting, Cuff Size: Large)   Pulse 76   Ht 5' 2" (1.575 m)   Wt 125 kg (275 lb)   SpO2 95%   BMI 50.30 kg/m²          Physical Exam  Vitals reviewed. Constitutional:       General: She is not in acute distress. Appearance: She is well-developed. HENT:      Head: Normocephalic and atraumatic. Eyes:      Conjunctiva/sclera: Conjunctivae normal.   Cardiovascular:      Rate and Rhythm: Normal rate and regular rhythm. Pulmonary:      Effort: Pulmonary effort is normal.      Breath sounds: Normal breath sounds. Abdominal:      Palpations: Abdomen is soft. Musculoskeletal:      Cervical back: Neck supple. Right lower leg: Edema present.       Left lower leg: Edema present. Skin:     General: Skin is warm. Findings: No rash. Neurological:      Mental Status: She is alert and oriented to person, place, and time. Cranial Nerves: No cranial nerve deficit.    Psychiatric:         Behavior: Behavior normal.

## 2023-10-30 ENCOUNTER — TELEPHONE (OUTPATIENT)
Dept: GASTROENTEROLOGY | Facility: CLINIC | Age: 63
End: 2023-10-30

## 2023-10-30 NOTE — TELEPHONE ENCOUNTER
Received call from [address] [facility] and patient on line. They are calling to check on the status of Freestyle [place] 2 supplies. They had advised that a message was sent via [employer] that they are waiting on a [employer] form and a face to face summary to be uploaded to [employer].  Please call patient back at earliest convenience [phone]

## 2023-10-31 NOTE — ASSESSMENT & PLAN NOTE
Continue to encourage low-sodium diet, patient is on furosemide 20 mg daily to help control volume status.

## 2023-10-31 NOTE — ASSESSMENT & PLAN NOTE
Blood pressure well controlled at this time, continue encourage low sodium diet, continue lisinopril.

## 2023-10-31 NOTE — TELEPHONE ENCOUNTER
Went on to [employer]acte and completed the process the forms where not yet finished,       [facility] API Client <Supplier Custom>   [facility]  just now  accepted order successfully. #[email or web address]    May need to put new order in as the provider that started that application is tarun Solorio.

## 2023-10-31 NOTE — ASSESSMENT & PLAN NOTE
Monitor magnesium levels, will add supplementation to be taken 3 times weekly to assist with supporting appropriate blood levels.

## 2023-10-31 NOTE — ASSESSMENT & PLAN NOTE
Lab Results   Component Value Date    EGFR 52 05/01/2023    EGFR 46 04/04/2023    EGFR 51 07/01/2022    CREATININE 1.12 05/01/2023    CREATININE 1.24 04/04/2023    CREATININE 1.13 07/01/2022     Patient's kidney function stable at this time, monitor labs every 6 months. Avoid nephrotoxins and optimize medical management.

## 2023-11-01 DIAGNOSIS — Z79.4 TYPE 2 DIABETES MELLITUS WITH HYPERGLYCEMIA, WITH LONG-TERM CURRENT USE OF INSULIN (HCC): ICD-10-CM

## 2023-11-01 DIAGNOSIS — E11.65 TYPE 2 DIABETES MELLITUS WITH HYPERGLYCEMIA, WITH LONG-TERM CURRENT USE OF INSULIN (HCC): ICD-10-CM

## 2023-11-02 DIAGNOSIS — Z79.4 TYPE 2 DIABETES MELLITUS WITH HYPERGLYCEMIA, WITH LONG-TERM CURRENT USE OF INSULIN (HCC): ICD-10-CM

## 2023-11-02 DIAGNOSIS — E11.65 TYPE 2 DIABETES MELLITUS WITH HYPERGLYCEMIA, WITH LONG-TERM CURRENT USE OF INSULIN (HCC): ICD-10-CM

## 2023-11-02 RX ORDER — DULAGLUTIDE 4.5 MG/.5ML
4.5 INJECTION, SOLUTION SUBCUTANEOUS WEEKLY
Qty: 2 ML | Refills: 1 | Status: SHIPPED | OUTPATIENT
Start: 2023-11-02

## 2023-11-02 RX ORDER — METFORMIN HYDROCHLORIDE 500 MG/1
1000 TABLET, EXTENDED RELEASE ORAL 2 TIMES DAILY WITH MEALS
Qty: 360 TABLET | Refills: 0 | Status: SHIPPED | OUTPATIENT
Start: 2023-11-02 | End: 2024-01-31

## 2023-11-20 DIAGNOSIS — Z79.4 TYPE 2 DIABETES MELLITUS WITH HYPERGLYCEMIA, WITH LONG-TERM CURRENT USE OF INSULIN (HCC): ICD-10-CM

## 2023-11-20 DIAGNOSIS — E11.65 TYPE 2 DIABETES MELLITUS WITH HYPERGLYCEMIA, WITH LONG-TERM CURRENT USE OF INSULIN (HCC): ICD-10-CM

## 2023-11-22 RX ORDER — METFORMIN HYDROCHLORIDE 500 MG/1
1000 TABLET, EXTENDED RELEASE ORAL 2 TIMES DAILY WITH MEALS
Qty: 360 TABLET | Refills: 0 | Status: SHIPPED | OUTPATIENT
Start: 2023-11-22 | End: 2024-02-20

## 2023-12-22 DIAGNOSIS — Z79.4 TYPE 2 DIABETES MELLITUS WITH HYPERGLYCEMIA, WITH LONG-TERM CURRENT USE OF INSULIN (HCC): ICD-10-CM

## 2023-12-22 DIAGNOSIS — E11.65 TYPE 2 DIABETES MELLITUS WITH HYPERGLYCEMIA, WITH LONG-TERM CURRENT USE OF INSULIN (HCC): ICD-10-CM

## 2023-12-22 RX ORDER — DULAGLUTIDE 4.5 MG/.5ML
4.5 INJECTION, SOLUTION SUBCUTANEOUS WEEKLY
Qty: 2 ML | Refills: 1 | Status: SHIPPED | OUTPATIENT
Start: 2023-12-22

## 2023-12-28 NOTE — PROGRESS NOTES
[facility]        NAME: Billy Ramirez is a 62 y o  female  : 1960    MRN: [id number]  DATE: 2020  TIME: 2:23 PM    Assessment and Plan   Acute pharyngitis, unspecified etiology [J02 9]  1  Acute pharyngitis, unspecified etiology           Patient Instructions     Patient Instructions   1  Stop the benedryl  2  Humidify air in bedroom  3  throat lozenges  4  Your rapid strep was negative today  A throat culture is pending in 48 hours  Pharyngitis   WHAT YOU NEED TO KNOW:   Pharyngitis, or sore throat, is inflammation of the tissues and structures in your pharynx (throat)  Pharyngitis is most often caused by bacteria  It may also be caused by a cold or flu virus  Other causes include smoking, allergies, or acid reflux  DISCHARGE INSTRUCTIONS:   Call 911 for any of the following:   · You have trouble breathing or swallowing because your throat is swollen or sore  Return to the emergency department if:   · You are drooling because it hurts too much to swallow  · Your fever is higher than 102? F (39?C) or lasts longer than 3 days  · You are confused  · You taste blood in your throat  Contact your healthcare provider if:   · Your throat pain gets worse  · You have a painful lump in your throat that does not go away after 5 days  · Your symptoms do not improve after 5 days  · You have questions or concerns about your condition or care  Medicines:  Viral pharyngitis will go away on its own without treatment  Your sore throat should start to feel better in 3 to 5 days for both viral and bacterial infections  You may need any of the following:  · Antibiotics  treat a bacterial infection  · NSAIDs , such as ibuprofen, help decrease swelling, pain, and fever  NSAIDs can cause stomach bleeding or kidney problems in certain people  If you take blood thinner medicine, always ask your healthcare provider if NSAIDs are safe for you   Always read the medicine label and follow directions  · Acetaminophen  decreases pain and fever  It is available without a doctor's order  Ask how much to take and how often to take it  Follow directions  Acetaminophen can cause liver damage if not taken correctly  · Take your medicine as directed  Contact your healthcare provider if you think your medicine is not helping or if you have side effects  Tell him or her if you are allergic to any medicine  Keep a list of the medicines, vitamins, and herbs you take  Include the amounts, and when and why you take them  Bring the list or the pill bottles to follow-up visits  Carry your medicine list with you in case of an emergency  Manage your symptoms:   · Gargle salt water  Mix ¼ teaspoon salt in an 8 ounce glass of warm water and gargle  This may help decrease swelling in your throat  · Drink liquids as directed  You may need to drink more liquids than usual  Liquids may help soothe your throat and prevent dehydration  Ask how much liquid to drink each day and which liquids are best for you  · Use a cool-steam humidifier  to help moisten the air in your room and calm your cough  · Soothe your throat  with cough drops, ice, soft foods, or popsicles  Prevent the spread of pharyngitis:  Cover your mouth and nose when you cough or sneeze  Do not share food or drinks  Wash your hands often  Use soap and water  If soap and water are unavailable, use an alcohol based hand   Follow up with your healthcare provider as directed:  Write down your questions so you remember to ask them during your visits  © 2017 [address] [employer] Information is for End User's use only and may not be sold, redistributed or otherwise used for commercial purposes  All illustrations and images included in CareNotes® are the copyrighted property of [employer] , [employer]  or Ryan Gar[employer]  The above information is an  only   It is not intended as medical advice for individual conditions or treatments  Talk to your doctor, nurse or pharmacist before following any medical regimen to see if it is safe and effective for you  Follow up with PCP in 3-5 days  Proceed to  ER if symptoms worsen  Chief Complaint     Chief Complaint   Patient presents with    Sore Throat     Pt c/o a sore throat and a cough for four days  History of Present Illness       This is a 68-year-old female with complaint of sore throat for the last 3-4 days  She has been using Benadryl  She has a minimal cough  She has had no fever with this  Review of Systems   Review of Systems   Constitutional: Negative for fever  HENT: Positive for sore throat  Negative for congestion, postnasal drip and rhinorrhea  Eyes: Negative for discharge and redness  Respiratory: Negative for cough and shortness of breath  Gastrointestinal: Negative for diarrhea, nausea and vomiting  Musculoskeletal: Negative for arthralgias and myalgias  Skin: Negative for rash           Current Medications       Current Outpatient Medications:     albuterol (VENTOLIN HFA) 90 mcg/act inhaler, Inhale 2 puffs every 6 (six) hours as needed for wheezing, Disp: 1 Inhaler, Rfl: 0    ammonium lactate (LAC-HYDRIN) 12 % lotion, , Disp: , Rfl: 0    aspirin 81 mg chewable tablet, Chew 1 tablet (81 mg total) daily, Disp: 30 tablet, Rfl: 0    atorvastatin (LIPITOR) 40 mg tablet, Take 1 tablet (40 mg total) by mouth daily with dinner, Disp: 30 tablet, Rfl: 0    baclofen 20 mg tablet, Take 10 mg by mouth as needed for muscle spasms, Disp: , Rfl:     BD INSULIN SYRINGE U/F 31G X 5/16" 1 ML MISC, , Disp: , Rfl: 0    Blood Glucose Monitoring Suppl (ONETOUCH VERIO FLEX SYSTEM) w/Device KIT, Use as directed, Disp: 1 kit, Rfl: 0    Calcium Carb-Cholecalciferol (CALCIUM 600+D3 PO), Take by mouth daily  , Disp: , Rfl:     cholecalciferol (VITAMIN D3) 400 units tablet, Take 400 Units by mouth daily, Disp: , Rfl:    clotrimazole-betamethasone (LOTRISONE) 1-0 05 % cream, Apply topically 2 (two) times a day, Disp: 90 g, Rfl: 1    ergocalciferol (ERGOCALCIFEROL) 1 25 MG (39203 UT) capsule, Take 1 capsule (50,000 Units total) by mouth once a week, Disp: 4 capsule, Rfl: 5    furosemide (LASIX) 20 mg tablet, Take 20 mg by mouth daily, Disp: , Rfl:     gabapentin (NEURONTIN) 600 MG tablet, Take 1 tablet (600 mg total) by mouth 3 (three) times a day for 30 days, Disp: 90 tablet, Rfl: 1    glucagon (GLUCAGON EMERGENCY) 1 MG injection, Inject 1 mg under the skin once as needed (PRN blood glucose less than 70 if unconscious or uncorrectable by oral means) for up to 1 dose, Disp: 1 kit, Rfl: 0    insulin aspart protamine-insulin aspart (NOVOLOG MIX 70/30 FLEXPEN) 100 Units/mL injection pen, Inject 60 Units under the skin 2 (two) times a day before meals, Disp: 12 pen, Rfl: 5    Insulin Syringes, Disposable, U-100 1 ML MISC, Use three times daily  , Disp: 100 each, Rfl: 5    Lancets (FREESTYLE) lancets, Check glucose three times daily  , Disp: 100 each, Rfl: 3    Lido-Menthol-Methyl Sal-Camph (CBD KINGS EX), Apply topically, Disp: , Rfl:     lidocaine (LMX) 4 % cream, LMX, [employer], etc  - look for * LIDOCAINE * on the label, Disp: 30 g, Rfl: 0    lisinopril (ZESTRIL) 5 mg tablet, Take 1 tablet (5 mg total) by mouth daily, Disp: 30 tablet, Rfl: 0    meclizine (ANTIVERT) 25 mg tablet, Take 25 mg by mouth Three times daily as needed, Disp: , Rfl:     methocarbamol (ROBAXIN) 750 mg tablet, Take 1 tablet (750 mg total) by mouth every 8 (eight) hours as needed for muscle spasms, Disp: 15 tablet, Rfl: 0    multivitamin (THERAGRAN) TABS, Take 1 tablet by mouth daily, Disp: , Rfl: 0    Omega-3 Fatty Acids (FISH OIL ADULT GUMMIES PO), Take by mouth daily  , Disp: , Rfl:     ondansetron (ZOFRAN) 4 mg tablet, swallow 1 tablet every 8 hours if needed for nausea and vomiting, Disp: 45 tablet, Rfl: 1    [START ON 1/8/2020] oxyCODONE-acetaminophen (PERCOCET) 7 5-325 MG per tablet, Take 1 tablet by mouth every 6 (six) hours as needed for moderate painMax Daily Amount: 4 tablets, Disp: 120 tablet, Rfl: 0    Semaglutide,0 25 or 0 5MG/DOS, (OZEMPIC, 0 25 OR 0 5 MG/DOSE,) 2 MG/1 5ML SOPN, Inject 0 25 mg under the skin once a week, Disp: 1 pen, Rfl: 1    Current Allergies     Allergies as of 2020 - Reviewed 2020   Allergen Reaction Noted    Cauliflower Yajaira Lipschutz oleracea italica] Other (See Comments) 2016    Citrus bioflavonoid Other (See Comments) 2018    Compro [prochlorperazine] Shortness Of Breath and Wheezing 07/15/2015    Latex Other (See Comments) 2018    Mobic [meloxicam] Swelling 2018    Morphine Hives and Itching 2017    Albumen, egg Vomiting 07/15/2015    Metronidazole Itching 2016    Sulfa antibiotics Other (See Comments) 2015            The following portions of the patient's history were reviewed and updated as appropriate: allergies, current medications, past family history, past medical history, past social history, past surgical history and problem list      Past Medical History:   Diagnosis Date    Anesthesia related hyperthermia     pt states she had high fevers after her lipoma surgery in  at our hospital and was shipped to [facility] where they said t was from an anesthesia med    Anxiety     Arthritis     Asthma     Chronic pain     Depression     Diabetes mellitus (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     Neuropathy     Obesity     PCOS (polycystic ovarian syndrome)        Past Surgical History:   Procedure Laterality Date    CERVICAL CONIZATION   W/ LASER       SECTION      DILATION AND CURETTAGE OF UTERUS      ENDOMETRIAL ABLATION      ENDOMETRIAL BIOPSY      EPIDURAL BLOCK INJECTION Bilateral 2019    Procedure: L5-S1 transforaminal epidural steroid injection;  Surgeon: Norberto Kuo MD;  Location: [facility] MA[facility];  Service: Pain Management     EPIDURAL BLOCK INJECTION Bilateral 2019    Procedure: L5-S1 transforaminal epidural steroid injection;  Surgeon: Norberto Kuo MD;  Location: [facility];  Service: Pain Management     FL GUIDED NEEDLE PLAC BX/ASP/INJ  2019    FL GUIDED NEEDLE PLAC BX/ASP/INJ  2019    FOOT SURGERY Left     HERNIA REPAIR      INDUCED       IR PICC LINE  2019    JOINT REPLACEMENT      KNEE ARTHROPLASTY Right     ME DEBRIDEMENT OPEN WOUND 20 SQ CM< Right 2018    Procedure: DEBRIDEMENT HAND/FINGER [facility] OUT); Surgeon: Shannan Rahman MD;  Location: [facility] MAIN OR;  Service: General    ME EXCISION TUMOR SOFT TISSUE BACK/FLANK SUBQ 3+CM N/A 2018    Procedure: BACK LIPOMA EXCISION;  Surgeon: Shannan Rahman MD;  Location: [facility] MAIN OR;  Service: General    ROTATOR CUFF REPAIR Right     TONSILLECTOMY         Family History   Problem Relation Age of Onset    Polycystic ovary syndrome Mother     Mental illness Father     Diabetes Father          Medications have been verified  Objective   /70   Pulse 78   Temp 98 6 °F (37 °C)   Resp 16        Physical Exam     Physical Exam   Constitutional: She is oriented to person, place, and time  She appears well-developed and well-nourished  Over weight for height  HENT:   Head: Normocephalic and atraumatic  Right Ear: Tympanic membrane, external ear and ear canal normal    Left Ear: Tympanic membrane, external ear and ear canal normal    Nose: Nose normal    Mouth/Throat: Oropharynx is clear and moist and mucous membranes are normal  No oral lesions  No uvula swelling  No oropharyngeal exudate, posterior oropharyngeal edema, posterior oropharyngeal erythema or tonsillar abscesses  Eyes: Pupils are equal, round, and reactive to light   Conjunctivae and EOM are normal    Neck: Normal range of motion  Neck supple  Cardiovascular: Normal rate, regular rhythm and normal heart sounds  No murmur heard  Pulmonary/Chest: Effort normal and breath sounds normal  She has no wheezes  She has no rhonchi  She has no rales  Abdominal: Soft  Bowel sounds are normal  She exhibits no distension and no mass  There is no tenderness  Musculoskeletal: Normal range of motion  Lymphadenopathy:     She has no cervical adenopathy  Neurological: She is alert and oriented to person, place, and time  Skin: Skin is warm and dry  No rash noted  No erythema  Psychiatric: She has a normal mood and affect  Her behavior is normal    Nursing note and vitals reviewed  No

## 2024-01-02 DIAGNOSIS — E11.65 TYPE 2 DIABETES MELLITUS WITH HYPERGLYCEMIA, UNSPECIFIED WHETHER LONG TERM INSULIN USE (HCC): ICD-10-CM

## 2024-01-02 DIAGNOSIS — Z79.4 TYPE 2 DIABETES MELLITUS WITH HYPERGLYCEMIA, WITH LONG-TERM CURRENT USE OF INSULIN (HCC): ICD-10-CM

## 2024-01-02 DIAGNOSIS — E11.65 TYPE 2 DIABETES MELLITUS WITH HYPERGLYCEMIA, WITH LONG-TERM CURRENT USE OF INSULIN (HCC): ICD-10-CM

## 2024-01-02 RX ORDER — INSULIN DETEMIR 100 [IU]/ML
60 INJECTION, SOLUTION SUBCUTANEOUS
Qty: 45 ML | Refills: 0 | Status: SHIPPED | OUTPATIENT
Start: 2024-01-02

## 2024-01-02 RX ORDER — PEN NEEDLE, DIABETIC 29 G X1/2"
NEEDLE, DISPOSABLE MISCELLANEOUS
Qty: 100 EACH | Refills: 3 | Status: SHIPPED | OUTPATIENT
Start: 2024-01-02

## 2024-01-04 ENCOUNTER — VBI (OUTPATIENT)
Dept: ADMINISTRATIVE | Facility: OTHER | Age: 64
End: 2024-01-04

## 2024-01-28 DIAGNOSIS — Z79.4 TYPE 2 DIABETES MELLITUS WITH HYPERGLYCEMIA, WITH LONG-TERM CURRENT USE OF INSULIN (HCC): ICD-10-CM

## 2024-01-28 DIAGNOSIS — E11.65 TYPE 2 DIABETES MELLITUS WITH HYPERGLYCEMIA, WITH LONG-TERM CURRENT USE OF INSULIN (HCC): ICD-10-CM

## 2024-01-30 RX ORDER — METFORMIN HYDROCHLORIDE 500 MG/1
1000 TABLET, EXTENDED RELEASE ORAL 2 TIMES DAILY WITH MEALS
Qty: 360 TABLET | Refills: 0 | Status: SHIPPED | OUTPATIENT
Start: 2024-01-30 | End: 2024-04-29

## 2024-02-21 PROBLEM — Z12.4 SCREENING FOR CERVICAL CANCER: Status: RESOLVED | Noted: 2018-09-19 | Resolved: 2024-02-21

## 2024-02-21 PROBLEM — Z12.39 SCREENING FOR BREAST CANCER: Status: RESOLVED | Noted: 2018-09-19 | Resolved: 2024-02-21

## 2024-02-21 PROBLEM — Z12.11 SCREENING FOR COLON CANCER: Status: RESOLVED | Noted: 2018-09-19 | Resolved: 2024-02-21

## 2024-03-01 DIAGNOSIS — E11.65 TYPE 2 DIABETES MELLITUS WITH HYPERGLYCEMIA, WITH LONG-TERM CURRENT USE OF INSULIN (HCC): ICD-10-CM

## 2024-03-01 DIAGNOSIS — Z79.4 TYPE 2 DIABETES MELLITUS WITH HYPERGLYCEMIA, WITH LONG-TERM CURRENT USE OF INSULIN (HCC): ICD-10-CM

## 2024-03-01 RX ORDER — DULAGLUTIDE 4.5 MG/.5ML
4.5 INJECTION, SOLUTION SUBCUTANEOUS WEEKLY
Qty: 2 ML | Refills: 1 | Status: SHIPPED | OUTPATIENT
Start: 2024-03-01

## 2024-04-12 DIAGNOSIS — Z79.4 TYPE 2 DIABETES MELLITUS WITH HYPERGLYCEMIA, WITH LONG-TERM CURRENT USE OF INSULIN (HCC): ICD-10-CM

## 2024-04-12 DIAGNOSIS — E11.65 TYPE 2 DIABETES MELLITUS WITH HYPERGLYCEMIA, WITH LONG-TERM CURRENT USE OF INSULIN (HCC): ICD-10-CM

## 2024-04-12 RX ORDER — METFORMIN HYDROCHLORIDE 500 MG/1
1000 TABLET, EXTENDED RELEASE ORAL 2 TIMES DAILY WITH MEALS
Qty: 360 TABLET | Refills: 0 | Status: SHIPPED | OUTPATIENT
Start: 2024-04-12 | End: 2024-07-11

## 2024-04-15 ENCOUNTER — TELEPHONE (OUTPATIENT)
Dept: NEPHROLOGY | Facility: CLINIC | Age: 64
End: 2024-04-15

## 2024-04-18 LAB
25(OH)D3+25(OH)D2 SERPL-MCNC: 38 NG/ML (ref 30–100)
ALBUMIN SERPL-MCNC: 4.3 G/DL (ref 3.5–5.7)
ALBUMIN/CREAT UR: 4.1
ALP SERPL-CCNC: 81 U/L (ref 35–120)
ALT SERPL-CCNC: 21 U/L
ANION GAP SERPL CALCULATED.3IONS-SCNC: 11 MMOL/L (ref 3–11)
AST SERPL-CCNC: 22 U/L
BACTERIA URNS QL MICRO: ABNORMAL
BILIRUB SERPL-MCNC: 0.4 MG/DL (ref 0.2–1)
BUN SERPL-MCNC: 30 MG/DL (ref 7–25)
CALCIUM SERPL-MCNC: 10.1 MG/DL (ref 8.5–10.1)
CHLORIDE SERPL-SCNC: 100 MMOL/L (ref 100–109)
CO2 SERPL-SCNC: 27 MMOL/L (ref 21–31)
CREAT SERPL-MCNC: 1.15 MG/DL (ref 0.4–1.1)
CREAT UR-MCNC: 168.8 MG/DL (ref 50–200)
CYTOLOGY CMNT CVX/VAG CYTO-IMP: ABNORMAL
GFR/BSA.PRED SERPLBLD CYS-BASED-ARV: 53 ML/MIN/{1.73_M2}
GLUCOSE SERPL-MCNC: 209 MG/DL (ref 65–99)
GLUCOSE UR QL STRIP: NEGATIVE MG/DL
HGB UR QL STRIP: NEGATIVE MG/DL
HYALINE CASTS URNS QL MICRO: ABNORMAL /LPF (ref 0–2)
KETONES UR QL STRIP: NEGATIVE MG/DL
LEUKOCYTE ESTERASE UR QL STRIP: 500 /UL
MAGNESIUM SERPL-MCNC: 1.7 MG/DL (ref 1.4–2.2)
MICROALBUMIN UR-MCNC: 0.7 MG/DL
MUCOUS THREADS URNS QL MICRO: ABNORMAL
NITRITE UR QL STRIP: NEGATIVE
PH UR: 5 [PH] (ref 4.5–8)
PHOSPHATE SERPL-MCNC: 2.9 MG/DL (ref 2.3–4.6)
POTASSIUM SERPL-SCNC: 4.4 MMOL/L (ref 3.5–5.2)
PROT 24H UR-MRATE: NEGATIVE MG/DL
PROT SERPL-MCNC: 7.3 G/DL (ref 6.3–8.3)
PTH-INTACT SERPL-MCNC: 36.3 PG/ML (ref 12–88)
RBC #/AREA URNS HPF: ABNORMAL /HPF (ref 0–2)
SL AMB POCT URINE COMMENT: ABNORMAL
SODIUM SERPL-SCNC: 138 MMOL/L (ref 135–145)
SP GR UR: 1.02 (ref 1–1.03)
SQUAMOUS #/AREA URNS HPF: >10 /LPF (ref 0–5)
WBC #/AREA URNS HPF: ABNORMAL /HPF (ref 0–5)

## 2024-04-23 ENCOUNTER — OFFICE VISIT (OUTPATIENT)
Dept: NEPHROLOGY | Facility: CLINIC | Age: 64
End: 2024-04-23
Payer: COMMERCIAL

## 2024-04-23 VITALS
BODY MASS INDEX: 46.01 KG/M2 | OXYGEN SATURATION: 97 % | DIASTOLIC BLOOD PRESSURE: 68 MMHG | WEIGHT: 250 LBS | HEART RATE: 85 BPM | HEIGHT: 62 IN | SYSTOLIC BLOOD PRESSURE: 112 MMHG

## 2024-04-23 DIAGNOSIS — N18.31 STAGE 3A CHRONIC KIDNEY DISEASE (HCC): Primary | Chronic | ICD-10-CM

## 2024-04-23 DIAGNOSIS — E55.9 VITAMIN D INSUFFICIENCY: ICD-10-CM

## 2024-04-23 DIAGNOSIS — I10 ESSENTIAL HYPERTENSION: Chronic | ICD-10-CM

## 2024-04-23 DIAGNOSIS — R80.9 PROTEINURIA, UNSPECIFIED TYPE: ICD-10-CM

## 2024-04-23 DIAGNOSIS — E08.649 DIABETES MELLITUS DUE TO UNDERLYING CONDITION WITH HYPOGLYCEMIA WITHOUT COMA, WITHOUT LONG-TERM CURRENT USE OF INSULIN (HCC): ICD-10-CM

## 2024-04-23 DIAGNOSIS — E11.65 TYPE 2 DIABETES MELLITUS WITH HYPERGLYCEMIA, UNSPECIFIED WHETHER LONG TERM INSULIN USE (HCC): ICD-10-CM

## 2024-04-23 PROCEDURE — 99214 OFFICE O/P EST MOD 30 MIN: CPT | Performed by: INTERNAL MEDICINE

## 2024-04-23 RX ORDER — SYRINGE AND NEEDLE,INSULIN,1ML 31 GX5/16"
SYRINGE, EMPTY DISPOSABLE MISCELLANEOUS
Qty: 100 EACH | Refills: 5 | Status: SHIPPED | OUTPATIENT
Start: 2024-04-23

## 2024-04-23 NOTE — PROGRESS NOTES
[facility]  Fabiano Evans DO    Name: Maryan Huang  YOB: 1960      Assessment/Plan:    Stage 3 chronic kidney disease (HCC)  Lab Results   Component Value Date    EGFR 53 (L) 04/18/2024    EGFR 53 (L) 04/18/2024    EGFR 48 (L) 10/16/2023    CREATININE 1.15 (H) 04/18/2024    CREATININE 1.15 (H) 04/18/2024    CREATININE 1.25 (H) 10/16/2023     Kidney function stable at this time, continue to monitor blood work on a regular basis, avoid nephrotoxins and optimize medical management in general.    Diabetes mellitus due to underlying condition with hypoglycemia without coma, without long-term current use of insulin (HCC)  Continue to focus on low carbohydrate diet, continue to take current medications as prescribed.  She is not complaining of hypoglycemic events.    Lab Results   Component Value Date    HGBA1C 8.0 (A) 09/14/2023       Essential hypertension  Continue with current medications, continue to encourage low-sodium diet.  Blood pressure is well-controlled.    Vitamin D insufficiency  Continue with vitamin D supplement, but will increase dose of vitamin D3 up to 5000 units once daily.         Problem List Items Addressed This Visit          Cardiovascular and Mediastinum    Essential hypertension (Chronic)     Continue with current medications, continue to encourage low-sodium diet.  Blood pressure is well-controlled.            Endocrine    Diabetes mellitus due to underlying condition with hypoglycemia without coma, without long-term current use of insulin (HCC)     Continue to focus on low carbohydrate diet, continue to take current medications as prescribed.  She is not complaining of hypoglycemic events.    Lab Results   Component Value Date    HGBA1C 8.0 (A) 09/14/2023               Genitourinary    Stage 3 chronic kidney disease (HCC) - Primary (Chronic)     Lab Results   Component Value Date    EGFR 53 (L) 04/18/2024    EGFR 53 (L)  04/18/2024    EGFR 48 (L) 10/16/2023    CREATININE 1.15 (H) 04/18/2024    CREATININE 1.15 (H) 04/18/2024    CREATININE 1.25 (H) 10/16/2023     Kidney function stable at this time, continue to monitor blood work on a regular basis, avoid nephrotoxins and optimize medical management in general.         Relevant Orders    Albumin / creatinine urine ratio    Urinalysis with microscopic    Comprehensive metabolic panel    CBC and differential    Phosphorus    Magnesium    PTH, intact       Other    BMI 45.0-49.9, adult (HCC)    Proteinuria    Vitamin D insufficiency     Continue with vitamin D supplement, but will increase dose of vitamin D3 up to 5000 units once daily.            Patient stable for the renal standpoint, we will see her back for regular appointment in 6 months.     Subjective:      Patient ID: Maryan Huang is a 63 y.o. female.    Patient presents for follow up.    We reviewed labs in detail, most recent creatinine noted to be 1.15 mg/dL, with an eGFR 53 ml/min.    There were no significant electrolyte abnormalities noted.  Patient is taking all medications as prescribed with no specific side effects and denies use of nonsteroid anti-inflammatory medications.          Hypertension  This is a chronic problem. The current episode started more than 1 year ago. The problem is unchanged. The problem is controlled. Pertinent negatives include no chest pain, orthopnea or peripheral edema. There are no associated agents to hypertension. Risk factors for coronary artery disease include post-menopausal state and sedentary lifestyle. Past treatments include lifestyle changes and ACE inhibitors. There are no compliance problems.  Hypertensive end-organ damage includes kidney disease. Identifiable causes of hypertension include chronic renal disease.       The following portions of the patient's history were reviewed and updated as appropriate: allergies, current medications, past family history, past medical  history, past social history, past surgical history and problem list.    Review of Systems   Cardiovascular:  Negative for chest pain and orthopnea.   All other systems reviewed and are negative.        Social History     Socioeconomic History    Marital status:      Spouse name: None    Number of children: None    Years of education: None    Highest education level: None   Occupational History    None   Tobacco Use    Smoking status: Never    Smokeless tobacco: Never   Vaping Use    Vaping status: Never Used   Substance and Sexual Activity    Alcohol use: Not Currently     Comment: OCCASIONALLY    Drug use: Yes     Types: Marijuana     Comment: CBD, medical marijuana    Sexual activity: Never   Other Topics Concern    None   Social History Narrative    None     Social Determinants of Health     Financial Resource Strain: Low Risk  (8/2/2023)    Received from [facility]    Overall Financial Resource Strain (CARDIA)     Difficulty of Paying Living Expenses: Not very hard   Food Insecurity: Food Insecurity Present (8/2/2023)    Received from [facility]    Hunger Vital Sign     Worried About Running Out of Food in the Last Year: Sometimes true     Ran Out of Food in the Last Year: Never true   Transportation Needs: No Transportation Needs (8/2/2023)    Received from [facility]    PRAPARE - Transportation     Lack of Transportation (Medical): No     Lack of Transportation (Non-Medical): No   Physical Activity: Not on file   Stress: No Stress Concern Present (8/2/2023)    Received from [facility]    [place] [employer] - Occupational Stress Questionnaire     Feeling of Stress : Only a little   Social Connections: Moderately Isolated (8/2/2023)    Received from [facility]    Social Connection and Isolation Panel [NHANES]     Frequency of Communication with Friends and Family: More than three times a week      Frequency of Social Gatherings with Friends and Family: More than three times a week     Attends Sikhism Services: More than 4 times per year     Active Member of Clubs or Organizations: No     Attends Club or Organization Meetings: Patient declined     Marital Status:    Intimate Partner Violence: Not At Risk (2023)    Received from [facility]    Humiliation, Afraid, Rape, and Kick questionnaire     Fear of Current or Ex-Partner: No     Emotionally Abused: No     Physically Abused: No     Sexually Abused: No   Housing Stability: Low Risk  (2023)    Received from [facility]    Housing Stability Vital Sign     Unable to Pay for Housing in the Last Year: No     Number of Places Lived in the Last Year: 1     Unstable Housing in the Last Year: No     Past Medical History:   Diagnosis Date    Anesthesia related hyperthermia     pt states she had high fevers after her lipoma surgery in  at our hospital and was shipped to [facility] where they said t was from an anesthesia med    Anxiety     Arthritis     Asthma     Chronic pain     Depression     Diabetes mellitus (HCC)     GERD (gastroesophageal reflux disease)     Hyperlipidemia     Malignant hyperthermia due to anesthesia     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     Neuropathy     Obesity     PCOS (polycystic ovarian syndrome)     Vitamin D deficiency 2017     Past Surgical History:   Procedure Laterality Date    CATARACT EXTRACTION, BILATERAL      CERVICAL CONIZATION   W/ LASER       SECTION      DILATION AND CURETTAGE OF UTERUS      ENDOMETRIAL ABLATION      ENDOMETRIAL BIOPSY      EPIDURAL BLOCK INJECTION Bilateral 2019    Procedure: L5-S1 transforaminal epidural steroid injection;  Surgeon: Matt Hill MD;  Location: [facility];  Service: Pain Management     EPIDURAL BLOCK INJECTION Bilateral 2019    Procedure: L5-S1 transforaminal epidural steroid  "injection;  Surgeon: Matt Hill MD;  Location: [facility];  Service: Pain Management     FL GUIDED NEEDLE PLAC BX/ASP/INJ  2019    FL GUIDED NEEDLE PLAC BX/ASP/INJ  2019    FOOT SURGERY Left     HERNIA REPAIR      INDUCED       IR PICC LINE  2019    JOINT REPLACEMENT      KNEE ARTHROPLASTY Right     IN DEBRIDEMENT OPEN WOUND FIRST 20 SQ CM/< Right 2018    Procedure: DEBRIDEMENT HAND/FINGER (WASH OUT);  Surgeon: Jonathan Brady MD;  Location:  MAIN OR;  Service: General    IN EXCISION TUMOR SOFT TIS BACK/FLANK SUBQ 3 CM/> N/A 2018    Procedure: BACK LIPOMA EXCISION;  Surgeon: Jonathan Brady MD;  Location:  MAIN OR;  Service: General    ROTATOR CUFF REPAIR Right     SKIN CANCER EXCISION Left         TONSILLECTOMY         Current Outpatient Medications:     aspirin (ECOTRIN LOW STRENGTH) 81 mg EC tablet, Take 81 mg by mouth daily, Disp: , Rfl:     Blood Glucose Monitoring Suppl (OneTouch Verio) w/Device KIT, Use to test blood sugar 3x daily., Disp: 1 kit, Rfl: 0    Continuous Blood Gluc  (FreeStyle Duke 2 [place]) LORRIE, Use to check blood sugar continually, Disp: 1 each, Rfl: 0    Continuous Blood Gluc Sensor (FreeStyle Duke 2 Sensor) MISC, USE TO CHECK BLOOD SUGAR CONTINOUSLY FOR 14 DAYS., Disp: 6 each, Rfl: 1    furosemide (LASIX) 20 mg tablet, Take 20 mg by mouth daily as needed, Disp: , Rfl:     gabapentin (NEURONTIN) 800 mg tablet, Take 800 mg by mouth 3 (three) times a day, Disp: , Rfl:     glucose blood (OneTouch Verio) test strip, use 1 TEST STRIP to TEST BLOOD SUGAR three times a day, Disp: 200 strip, Rfl: 1    insulin aspart (NovoLOG FlexPen) 100 UNIT/ML injection pen, Inject 22 Units under the skin 3 (three) times a day with meals, Disp: 45 mL, Rfl: 0    Insulin Pen Needle (Pen Needles) 32G X 4 MM MISC, Use to inject insulin 4x daily., Disp: 150 each, Rfl: 2    Insulin Syringe-Needle U-100 (Droplet Insulin Syringe) 31G X 5/16\" 1 ML MISC, " USE 1 SYRINGE TO INJECT INSULIN three times a day, Disp: 100 each, Rfl: 5    Lancets (OneTouch Delica Plus [id number]) MISC, use 1 LANCET to TEST BLOOD SUGAR three times a day, Disp: 100 each, Rfl: 0    Lancets (OneTouch Delica Plus [id number]) MISC, USE TO TEST BLOOD SUGAR THREE TIMES DAILY, Disp: 100 each, Rfl: 3    lisinopril (ZESTRIL) 10 mg tablet, Take 10 mg by mouth daily, Disp: , Rfl:     metFORMIN (GLUCOPHAGE-XR) 500 mg 24 hr tablet, TAKE 2 TABLETS (1,000 MG TOTAL) BY MOUTH 2 (TWO) TIMES A DAY WITH MEALS, Disp: 360 tablet, Rfl: 0    methocarbamol (ROBAXIN) 750 mg tablet, Take 750 mg by mouth 4 (four) times a day as needed, Disp: , Rfl:     rosuvastatin (CRESTOR) 20 MG tablet, Take 20 mg by mouth every evening, Disp: , Rfl:     triamcinolone (KENALOG) 0.1 % cream, apply topically to affected area twice a day, Disp: , Rfl:     Trulicity 4.5 MG/0.5ML injection, INJECT 0.5 ML (4.5 MG TOTAL) UNDER THE SKIN ONCE A WEEK, Disp: 2 mL, Rfl: 1    Lab Results   Component Value Date    SODIUM 138 04/18/2024    SODIUM 138 04/18/2024    K 4.4 04/18/2024    K 4.4 04/18/2024     04/18/2024     04/18/2024    CO2 27 04/18/2024    CO2 27 04/18/2024    AGAP 11 04/18/2024    AGAP 11 04/18/2024    BUN 30 (H) 04/18/2024    BUN 30 (H) 04/18/2024    CREATININE 1.15 (H) 04/18/2024    CREATININE 1.15 (H) 04/18/2024    GLUC 209 (H) 04/18/2024    GLUC 209 (H) 04/18/2024    GLUF 144 (H) 05/01/2023    CALCIUM 10.1 04/18/2024    CALCIUM 10.1 04/18/2024    AST 22 04/18/2024    AST 22 04/18/2024    ALT 21 04/18/2024    ALT 21 04/18/2024    ALKPHOS 81 04/18/2024    ALKPHOS 81 04/18/2024    TP 7.3 04/18/2024    TP 7.3 04/18/2024    TBILI 0.4 04/18/2024    TBILI 0.4 04/18/2024    EGFR 53 (L) 04/18/2024    EGFR 53 (L) 04/18/2024     Lab Results   Component Value Date    WBC 8.18 05/01/2023    HGB 14.0 05/01/2023    HCT 43.6 05/01/2023    MCV 88 05/01/2023     05/01/2023     Lab Results   Component Value Date    CHOLESTEROL 133  "04/04/2023    CHOLESTEROL 143 01/31/2022    CHOLESTEROL 118 05/06/2020     Lab Results   Component Value Date    HDL 48 (L) 04/04/2023    HDL 46 (L) 01/31/2022    HDL 43 05/06/2020     Lab Results   Component Value Date    LDLCALC 40 04/04/2023    LDLCALC 45 01/31/2022    LDLCALC 34 05/06/2020     Lab Results   Component Value Date    TRIG 224 (H) 04/04/2023    TRIG 259 (H) 01/31/2022    TRIG 205 (H) 05/06/2020     No results found for: \"CHOLHDL\"  Lab Results   Component Value Date    KAA7XQNMMKZW 3.350 04/04/2023    TSH 2.22 12/19/2022     Lab Results   Component Value Date    PTH 49.4 05/01/2023    CALCIUM 10.1 04/18/2024    CALCIUM 10.1 04/18/2024    PHOS 2.9 04/18/2024     Lab Results   Component Value Date    SPEP See Comment 02/17/2021     No results found for: \"MICROALBUR\", \"[id number]\"        Objective:      /68 (BP Location: Left arm, Patient Position: Sitting, Cuff Size: Large)   Pulse 85   Ht 5' 2\" (1.575 m)   Wt 113 kg (250 lb)   SpO2 97%   BMI 45.73 kg/m²          Physical Exam  Vitals reviewed.   Constitutional:       General: She is not in acute distress.     Appearance: Normal appearance.   HENT:      Head: Normocephalic and atraumatic.      Right Ear: External ear normal.      Left Ear: External ear normal.   Eyes:      Conjunctiva/sclera: Conjunctivae normal.   Cardiovascular:      Rate and Rhythm: Normal rate and regular rhythm.      Heart sounds: Normal heart sounds.   Pulmonary:      Effort: No respiratory distress.      Breath sounds: No wheezing.   Abdominal:      Palpations: Abdomen is soft.   Skin:     General: Skin is warm and dry.   Neurological:      General: No focal deficit present.      Mental Status: She is alert and oriented to person, place, and time.   Psychiatric:         Mood and Affect: Mood normal.         Behavior: Behavior normal.         "

## 2024-05-02 NOTE — ASSESSMENT & PLAN NOTE
Continue to focus on low carbohydrate diet, continue to take current medications as prescribed.  She is not complaining of hypoglycemic events.    Lab Results   Component Value Date    HGBA1C 8.0 (A) 09/14/2023

## 2024-05-02 NOTE — ASSESSMENT & PLAN NOTE
Continue with vitamin D supplement, but will increase dose of vitamin D3 up to 5000 units once daily.

## 2024-05-02 NOTE — ASSESSMENT & PLAN NOTE
Continue with current medications, continue to encourage low-sodium diet.  Blood pressure is well-controlled.

## 2024-05-02 NOTE — ASSESSMENT & PLAN NOTE
Lab Results   Component Value Date    EGFR 53 (L) 04/18/2024    EGFR 53 (L) 04/18/2024    EGFR 48 (L) 10/16/2023    CREATININE 1.15 (H) 04/18/2024    CREATININE 1.15 (H) 04/18/2024    CREATININE 1.25 (H) 10/16/2023     Kidney function stable at this time, continue to monitor blood work on a regular basis, avoid nephrotoxins and optimize medical management in general.

## 2024-06-26 DIAGNOSIS — Z79.4 TYPE 2 DIABETES MELLITUS WITH HYPERGLYCEMIA, WITH LONG-TERM CURRENT USE OF INSULIN (HCC): ICD-10-CM

## 2024-06-26 DIAGNOSIS — E11.65 TYPE 2 DIABETES MELLITUS WITH HYPERGLYCEMIA, WITH LONG-TERM CURRENT USE OF INSULIN (HCC): ICD-10-CM

## 2024-06-26 RX ORDER — METFORMIN HYDROCHLORIDE 500 MG/1
1000 TABLET, EXTENDED RELEASE ORAL 2 TIMES DAILY WITH MEALS
Qty: 120 TABLET | Refills: 0 | Status: SHIPPED | OUTPATIENT
Start: 2024-06-26 | End: 2024-07-26

## 2024-08-16 DIAGNOSIS — E08.649 DIABETES MELLITUS DUE TO UNDERLYING CONDITION WITH HYPOGLYCEMIA WITHOUT COMA, WITHOUT LONG-TERM CURRENT USE OF INSULIN (HCC): Primary | ICD-10-CM

## 2024-08-16 RX ORDER — UBIQUINOL 100 MG
CAPSULE ORAL
Qty: 100 EACH | Refills: 5 | Status: SHIPPED | OUTPATIENT
Start: 2024-08-16

## 2024-09-17 DIAGNOSIS — Z79.4 TYPE 2 DIABETES MELLITUS WITH HYPERGLYCEMIA, WITH LONG-TERM CURRENT USE OF INSULIN (HCC): ICD-10-CM

## 2024-09-17 DIAGNOSIS — E11.65 TYPE 2 DIABETES MELLITUS WITH HYPERGLYCEMIA, WITH LONG-TERM CURRENT USE OF INSULIN (HCC): ICD-10-CM

## 2024-09-18 RX ORDER — METFORMIN HCL 500 MG
1000 TABLET, EXTENDED RELEASE 24 HR ORAL 2 TIMES DAILY WITH MEALS
Qty: 120 TABLET | Refills: 0 | OUTPATIENT
Start: 2024-09-18 | End: 2024-10-18

## 2024-10-18 ENCOUNTER — TELEPHONE (OUTPATIENT)
Age: 64
End: 2024-10-18

## 2024-10-18 NOTE — TELEPHONE ENCOUNTER
Please mail lab order to patient for her appointment on 10/31/24 with Dr. Dietz.  I did advise patient that the office moved to [place].

## 2024-10-31 ENCOUNTER — OFFICE VISIT (OUTPATIENT)
Age: 64
End: 2024-10-31
Payer: COMMERCIAL

## 2024-10-31 VITALS
TEMPERATURE: 98.2 F | HEART RATE: 79 BPM | SYSTOLIC BLOOD PRESSURE: 132 MMHG | HEIGHT: 62 IN | OXYGEN SATURATION: 96 % | WEIGHT: 263 LBS | DIASTOLIC BLOOD PRESSURE: 78 MMHG | BODY MASS INDEX: 48.4 KG/M2

## 2024-10-31 DIAGNOSIS — I10 ESSENTIAL HYPERTENSION: Chronic | ICD-10-CM

## 2024-10-31 DIAGNOSIS — E11.65 TYPE 2 DIABETES MELLITUS WITH HYPERGLYCEMIA, WITH LONG-TERM CURRENT USE OF INSULIN (HCC): ICD-10-CM

## 2024-10-31 DIAGNOSIS — E11.69 HYPERLIPIDEMIA ASSOCIATED WITH TYPE 2 DIABETES MELLITUS  (HCC): ICD-10-CM

## 2024-10-31 DIAGNOSIS — E55.9 VITAMIN D INSUFFICIENCY: ICD-10-CM

## 2024-10-31 DIAGNOSIS — E78.5 HYPERLIPIDEMIA ASSOCIATED WITH TYPE 2 DIABETES MELLITUS  (HCC): ICD-10-CM

## 2024-10-31 DIAGNOSIS — R60.0 BILATERAL LOWER EXTREMITY EDEMA: ICD-10-CM

## 2024-10-31 DIAGNOSIS — N18.31 STAGE 3A CHRONIC KIDNEY DISEASE (HCC): Primary | Chronic | ICD-10-CM

## 2024-10-31 DIAGNOSIS — Z79.4 TYPE 2 DIABETES MELLITUS WITH HYPERGLYCEMIA, WITH LONG-TERM CURRENT USE OF INSULIN (HCC): ICD-10-CM

## 2024-10-31 DIAGNOSIS — J96.01 ACUTE RESPIRATORY FAILURE WITH HYPOXIA (HCC): ICD-10-CM

## 2024-10-31 DIAGNOSIS — E83.42 HYPOMAGNESEMIA: ICD-10-CM

## 2024-10-31 PROCEDURE — 99214 OFFICE O/P EST MOD 30 MIN: CPT | Performed by: INTERNAL MEDICINE

## 2024-10-31 RX ORDER — LIDOCAINE 4 G/G
PATCH TOPICAL
COMMUNITY
Start: 2024-10-23

## 2024-10-31 RX ORDER — DOXYCYCLINE 100 MG/1
100 CAPSULE ORAL 2 TIMES DAILY
COMMUNITY
Start: 2024-10-26 | End: 2024-11-01

## 2024-10-31 RX ORDER — INSULIN GLARGINE 100 [IU]/ML
30 INJECTION, SOLUTION SUBCUTANEOUS 2 TIMES DAILY
COMMUNITY
Start: 2024-10-29 | End: 2025-10-24

## 2024-10-31 RX ORDER — BACITRACIN ZINC AND POLYMYXIN B SULFATE 500; 1000 [USP'U]/G; [USP'U]/G
OINTMENT TOPICAL 2 TIMES DAILY
COMMUNITY
Start: 2024-10-22 | End: 2024-11-01

## 2024-10-31 RX ORDER — HYDROXYZINE HYDROCHLORIDE 25 MG/1
25 TABLET, FILM COATED ORAL DAILY PRN
COMMUNITY
Start: 2024-10-29

## 2024-10-31 RX ORDER — ALBUTEROL SULFATE 90 UG/1
INHALANT RESPIRATORY (INHALATION)
COMMUNITY
Start: 2024-10-17

## 2024-10-31 NOTE — ASSESSMENT & PLAN NOTE
Lab Results   Component Value Date    EGFR 51 (L) 10/25/2024    EGFR 44 (L) 10/22/2024    EGFR 42 (L) 10/21/2024    CREATININE 1.17 (H) 10/25/2024    CREATININE 1.33 (H) 10/22/2024    CREATININE 1.39 (H) 10/21/2024     Kidney function back up to typical baseline, between a creatinine of 1.1-1.2 mg/dL.  Continue to optimize care and avoid nephrotoxins.

## 2024-10-31 NOTE — PROGRESS NOTES
[facility] [place]  Fabiano Evans DO    Name: Maryan Huang  YOB: 1960      Assessment/Plan:    Stage 3 chronic kidney disease (HCC)  Lab Results   Component Value Date    EGFR 51 (L) 10/25/2024    EGFR 44 (L) 10/22/2024    EGFR 42 (L) 10/21/2024    CREATININE 1.17 (H) 10/25/2024    CREATININE 1.33 (H) 10/22/2024    CREATININE 1.39 (H) 10/21/2024     Kidney function back up to typical baseline, between a creatinine of 1.1-1.2 mg/dL.  Continue to optimize care and avoid nephrotoxins.    Type 2 diabetes mellitus with hyperglycemia, with long-term current use of insulin ([facility])  Patient's blood sugars have significantly worsened, in the meantime she has sustained a foot injury with an infection and remains on antibiotics in form of doxycycline.    Lab Results   Component Value Date    HGBA1C 11.6 (H) 10/21/2024       Essential hypertension  Blood pressure is well-controlled at this time, continue to encourage low-sodium diet, continue with lisinopril 10 mg daily.    Bilateral lower extremity edema  Continue to encourage a low-sodium diet.  Patient is taking furosemide on an as-needed basis.  She has noticed an increase in bilateral lower extremity edema.  Will benefit from taking this medication a little more frequently at this moment in time.    Vitamin D insufficiency  Continue with vitamin D supplementation, goal vitamin D level is greater than 50.    Hypomagnesemia  Continue with magnesium supplementation, follow magnesium level with next of labs.         Problem List Items Addressed This Visit          Cardiovascular and Mediastinum    Essential hypertension (Chronic)     Blood pressure is well-controlled at this time, continue to encourage low-sodium diet, continue with lisinopril 10 mg daily.            Endocrine    Type 2 diabetes mellitus with hyperglycemia, with long-term current use of insulin ([facility])     Patient's blood sugars have significantly  worsened, in the meantime she has sustained a foot injury with an infection and remains on antibiotics in form of doxycycline.    Lab Results   Component Value Date    HGBA1C 11.6 (H) 10/21/2024            Relevant Medications    Lantus SoloStar 100 units/mL SOPN    semaglutide, 0.25 or 0.5 mg/dose, (Ozempic, 0.25 or 0.5 MG/DOSE,) 2 mg/3 mL injection pen    Hyperlipidemia associated with type 2 diabetes mellitus  (HCC)    Relevant Medications    Lantus SoloStar 100 units/mL SOPN    semaglutide, 0.25 or 0.5 mg/dose, (Ozempic, 0.25 or 0.5 MG/DOSE,) 2 mg/3 mL injection pen       Genitourinary    Stage 3 chronic kidney disease (HCC) - Primary (Chronic)     Lab Results   Component Value Date    EGFR 51 (L) 10/25/2024    EGFR 44 (L) 10/22/2024    EGFR 42 (L) 10/21/2024    CREATININE 1.17 (H) 10/25/2024    CREATININE 1.33 (H) 10/22/2024    CREATININE 1.39 (H) 10/21/2024     Kidney function back up to typical baseline, between a creatinine of 1.1-1.2 mg/dL.  Continue to optimize care and avoid nephrotoxins.         Relevant Orders    Albumin / creatinine urine ratio    Urinalysis with microscopic    CBC and differential    Comprehensive metabolic panel    Phosphorus    PTH, intact       Surgery/Wound/Pain    Bilateral lower extremity edema     Continue to encourage a low-sodium diet.  Patient is taking furosemide on an as-needed basis.  She has noticed an increase in bilateral lower extremity edema.  Will benefit from taking this medication a little more frequently at this moment in time.            Other    Vitamin D insufficiency     Continue with vitamin D supplementation, goal vitamin D level is greater than 50.         Relevant Orders    Vitamin D 25 hydroxy    Hypomagnesemia     Continue with magnesium supplementation, follow magnesium level with next of labs.         Relevant Orders    Magnesium     Other Visit Diagnoses       Acute respiratory failure with hypoxia (HCC)                  Patient is stable for the  renal standpoint, we will see her back for regular appointment in about 6 months.  Blood work to be done prior that appoint provided during this visit.    Subjective:      Patient ID: Maryan Huang is a 64 y.o. female.    Patient presents for follow up.    We reviewed labs in detail, most recent creatinine noted to be 1.17 mg/dL with an eGFR 51 ml/min.    There were no significant electrolyte abnormalities noted.  Patient is taking all medications as prescribed with no specific side effects and denies use of nonsteroid anti-inflammatory medications.    Patient suffered a burn on her foot which became infected after that part of her foot was also exfoliated in the warm water.  She remains on doxycycline, she is seeing podiatry for wound care.          The following portions of the patient's history were reviewed and updated as appropriate: allergies, current medications, past family history, past medical history, past social history, past surgical history and problem list.    Review of Systems   All other systems reviewed and are negative.        Social History     Socioeconomic History    Marital status:      Spouse name: None    Number of children: None    Years of education: None    Highest education level: None   Occupational History    None   Tobacco Use    Smoking status: Never    Smokeless tobacco: Never   Vaping Use    Vaping status: Never Used   Substance and Sexual Activity    Alcohol use: Not Currently     Comment: OCCASIONALLY    Drug use: Yes     Types: Marijuana     Comment: CBD, medical marijuana    Sexual activity: Never   Other Topics Concern    None   Social History Narrative    None     Social Determinants of Health     Financial Resource Strain: Medium Risk (10/25/2024)    Received from [facility]    Overall Financial Resource Strain (CARDIA)     Difficulty of Paying Living Expenses: Somewhat hard   Food Insecurity: Food Insecurity Present (10/25/2024)    Received  from [facility]    Hunger Vital Sign     Worried About Running Out of Food in the Last Year: Sometimes true     Ran Out of Food in the Last Year: Sometimes true   Transportation Needs: No Transportation Needs (10/25/2024)    Received from [facility]    PRAPARE - Transportation     Lack of Transportation (Medical): No     Lack of Transportation (Non-Medical): No   Physical Activity: Not on file   Stress: No Stress Concern Present (8/2/2023)    Received from [facility], [facility]    [place] [employer] - Occupational Stress Questionnaire     Feeling of Stress : Only a little   Social Connections: Unknown (6/18/2024)    Received from [facility]     How often do you feel lonely or isolated from those around you? (Adult - for ages 18 years and over): Not on file   Intimate Partner Violence: Not At Risk (10/25/2024)    Received from [facility]    Humiliation, Afraid, Rape, and Kick questionnaire     Fear of Current or Ex-Partner: No     Emotionally Abused: No     Physically Abused: No     Sexually Abused: No   Housing Stability: Low Risk  (10/25/2024)    Received from [facility]    Housing Stability Vital Sign     Unable to Pay for Housing in the Last Year: No     Number of Times Moved in the Last Year: 0     Homeless in the Last Year: No     Past Medical History:   Diagnosis Date    Anesthesia related hyperthermia     pt states she had high fevers after her lipoma surgery in 2001 at our hospital and was shipped to [facility] where they said t was from an anesthesia med    Anxiety     Arthritis     Asthma     Chronic pain     Depression     Diabetes mellitus (HCC)     GERD (gastroesophageal reflux disease)     Hyperlipidemia     Malignant hyperthermia due to anesthesia     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     Neuropathy     Obesity     PCOS (polycystic  ovarian syndrome)     Vitamin D deficiency 2017     Past Surgical History:   Procedure Laterality Date    CATARACT EXTRACTION, BILATERAL      CERVICAL CONIZATION   W/ LASER       SECTION      DILATION AND CURETTAGE OF UTERUS      ENDOMETRIAL ABLATION      ENDOMETRIAL BIOPSY      EPIDURAL BLOCK INJECTION Bilateral 2019    Procedure: L5-S1 transforaminal epidural steroid injection;  Surgeon: Matt Hill MD;  Location: [facility];  Service: Pain Management     EPIDURAL BLOCK INJECTION Bilateral 2019    Procedure: L5-S1 transforaminal epidural steroid injection;  Surgeon: Matt Hill MD;  Location: [facility];  Service: Pain Management     FL GUIDED NEEDLE PLAC BX/ASP/INJ  2019    FL GUIDED NEEDLE PLAC BX/ASP/INJ  2019    FOOT SURGERY Left     HERNIA REPAIR      INDUCED       IR PICC LINE  2019    JOINT REPLACEMENT      KNEE ARTHROPLASTY Right     NC DEBRIDEMENT OPEN WOUND FIRST 20 SQ CM/< Right 2018    Procedure: DEBRIDEMENT HAND/FINGER (WASH OUT);  Surgeon: Jonathan Brady MD;  Location:  MAIN OR;  Service: General    NC EXCISION TUMOR SOFT TIS BACK/FLANK SUBQ 3 CM/> N/A 2018    Procedure: BACK LIPOMA EXCISION;  Surgeon: Jonathan Brady MD;  Location:  MAIN OR;  Service: General    ROTATOR CUFF REPAIR Right     SKIN CANCER EXCISION Left         TONSILLECTOMY         Current Outpatient Medications:     albuterol (PROVENTIL HFA,VENTOLIN HFA) 90 mcg/act inhaler, inhale 2 puffs by mouth every 6 hours if needed for wheezing, Disp: , Rfl:     Alcohol Swabs (Alcohol Prep) 70 % PADS, AS NEEDED, Disp: 100 each, Rfl: 5    aspirin (ECOTRIN LOW STRENGTH) 81 mg EC tablet, Take 81 mg by mouth daily, Disp: , Rfl:     bacitracin-polymyxin b (POLYSPORIN) ointment, Apply topically 2 (two) times a day, Disp: , Rfl:     Blood Glucose Monitoring Suppl (OneTouch Verio) w/Device KIT, Use to test blood sugar 3x daily., Disp: 1 kit, Rfl: 0     "Continuous Blood Gluc  (FreeStyle Duke 2 [place]) LORRIE, Use to check blood sugar continually, Disp: 1 each, Rfl: 0    Continuous Blood Gluc Sensor (FreeStyle Duke 2 Sensor) MISC, USE TO CHECK BLOOD SUGAR CONTINOUSLY FOR 14 DAYS., Disp: 6 each, Rfl: 1    doxycycline hyclate (VIBRAMYCIN) 100 mg capsule, Take 100 mg by mouth 2 (two) times a day, Disp: , Rfl:     furosemide (LASIX) 20 mg tablet, Take 20 mg by mouth daily as needed, Disp: , Rfl:     gabapentin (NEURONTIN) 800 mg tablet, Take 800 mg by mouth 3 (three) times a day, Disp: , Rfl:     glucose blood (OneTouch Verio) test strip, use 1 TEST STRIP to TEST BLOOD SUGAR three times a day, Disp: 200 strip, Rfl: 1    hydrOXYzine HCL (ATARAX) 25 mg tablet, Take 25 mg by mouth daily as needed, Disp: , Rfl:     insulin aspart (NovoLOG FlexPen) 100 UNIT/ML injection pen, Inject 22 Units under the skin 3 (three) times a day with meals, Disp: 45 mL, Rfl: 0    Insulin Pen Needle (Pen Needles) 32G X 4 MM MISC, Use to inject insulin 4x daily., Disp: 150 each, Rfl: 2    Insulin Syringe-Needle U-100 (Droplet Insulin Syringe) 31G X 5/16\" 1 ML MISC, USE 1 SYRINGE TO INJECT INSULIN three times a day, Disp: 100 each, Rfl: 5    Lancets (OneTouch Delica Plus [id number]) MISC, use 1 LANCET to TEST BLOOD SUGAR three times a day, Disp: 100 each, Rfl: 0    Lancets (OneTouch Delica Plus Cqrtox71R) MISC, USE TO TEST BLOOD SUGAR THREE TIMES DAILY, Disp: 100 each, Rfl: 3    Lantus SoloStar 100 units/mL SOPN, Inject 30 Units under the skin 2 (two) times a day, Disp: , Rfl:     Lidocaine 4 % PTCH, place 2 patches topically once daily for 7 days, Disp: , Rfl:     lisinopril (ZESTRIL) 10 mg tablet, Take 10 mg by mouth daily, Disp: , Rfl:     methocarbamol (ROBAXIN) 750 mg tablet, Take 750 mg by mouth 4 (four) times a day as needed, Disp: , Rfl:     rosuvastatin (CRESTOR) 20 MG tablet, Take 20 mg by mouth every evening, Disp: , Rfl:     semaglutide, 0.25 or 0.5 mg/dose, (Ozempic, 0.25 or " "0.5 MG/DOSE,) 2 mg/3 mL injection pen, Inject 0.75 mL (0.5 mg total) under the skin every 7 days, Disp: , Rfl:     triamcinolone (KENALOG) 0.1 % cream, apply topically to affected area twice a day, Disp: , Rfl:     metFORMIN (GLUCOPHAGE-XR) 500 mg 24 hr tablet, TAKE 2 TABLETS (1,000 MG TOTAL) BY MOUTH 2 (TWO) TIMES A DAY WITH MEALS, Disp: 120 tablet, Rfl: 0    Lab Results   Component Value Date    SODIUM 135 10/25/2024    K 5.2 10/25/2024     10/25/2024    CO2 24 10/25/2024    AGAP 9 10/25/2024    BUN 29 (H) 10/25/2024    CREATININE 1.17 (H) 10/25/2024    GLUC 338 (H) 10/25/2024    GLUF 144 (H) 05/01/2023    CALCIUM 9.0 10/25/2024    AST 28 10/25/2024    ALT 31 10/25/2024    ALKPHOS 116 10/25/2024    TP 6.5 10/25/2024    TBILI 0.3 10/25/2024    EGFR 51 (L) 10/25/2024     Lab Results   Component Value Date    WBC 8.18 05/01/2023    HGB 14.0 05/01/2023    HCT 43.6 05/01/2023    MCV 88 05/01/2023     05/01/2023     Lab Results   Component Value Date    CHOLESTEROL 133 04/04/2023    CHOLESTEROL 143 01/31/2022    CHOLESTEROL 118 05/06/2020     Lab Results   Component Value Date    HDL 48 (L) 04/04/2023    HDL 46 (L) 01/31/2022    HDL 43 05/06/2020     Lab Results   Component Value Date    LDLCALC 40 04/04/2023    LDLCALC 45 01/31/2022    LDLCALC 34 05/06/2020     Lab Results   Component Value Date    TRIG 224 (H) 04/04/2023    TRIG 259 (H) 01/31/2022    TRIG 205 (H) 05/06/2020     No results found for: \"CHOLHDL\"  Lab Results   Component Value Date    DWU8KHWPESAV 3.350 04/04/2023    TSH 2.22 12/19/2022     Lab Results   Component Value Date    PTH 49.4 05/01/2023    CALCIUM 9.0 10/25/2024    PHOS 3.5 10/21/2024     Lab Results   Component Value Date    SPEP See Comment 02/17/2021     No results found for: \"MICROALBUR\", \"[id number]\"        Objective:      /78 (BP Location: Right arm, Patient Position: Sitting, Cuff Size: Standard)   Pulse 79   Temp 98.2 °F (36.8 °C)   Ht 5' 2\" (1.575 m)   Wt 119 kg " (263 lb)   SpO2 96%   BMI 48.10 kg/m²          Physical Exam  Vitals reviewed.   Constitutional:       General: She is not in acute distress.     Appearance: Normal appearance.   HENT:      Head: Normocephalic and atraumatic.      Right Ear: External ear normal.      Left Ear: External ear normal.   Eyes:      Conjunctiva/sclera: Conjunctivae normal.   Cardiovascular:      Rate and Rhythm: Normal rate and regular rhythm.      Heart sounds: Normal heart sounds.   Pulmonary:      Effort: No respiratory distress.      Breath sounds: No wheezing.   Abdominal:      Palpations: Abdomen is soft.   Musculoskeletal:      Right lower leg: Edema present.      Left lower leg: Edema present.   Skin:     General: Skin is warm and dry.   Neurological:      General: No focal deficit present.      Mental Status: She is alert and oriented to person, place, and time.   Psychiatric:         Mood and Affect: Mood normal.         Behavior: Behavior normal.

## 2024-10-31 NOTE — PATIENT INSTRUCTIONS
Please make sure that you are taking 5,000 units of VitD3 daily.    Please also start magnesium chloride 64 mg once daily 4 times a week.

## 2024-10-31 NOTE — ASSESSMENT & PLAN NOTE
Patient's blood sugars have significantly worsened, in the meantime she has sustained a foot injury with an infection and remains on antibiotics in form of doxycycline.    Lab Results   Component Value Date    HGBA1C 11.6 (H) 10/21/2024

## 2024-10-31 NOTE — ASSESSMENT & PLAN NOTE
Continue to encourage a low-sodium diet.  Patient is taking furosemide on an as-needed basis.  She has noticed an increase in bilateral lower extremity edema.  Will benefit from taking this medication a little more frequently at this moment in time.

## 2025-04-10 ENCOUNTER — TELEPHONE (OUTPATIENT)
Age: 65
End: 2025-04-10

## 2025-04-10 NOTE — TELEPHONE ENCOUNTER
Patient called to see if we can mail lab orders to her so she can get them completed prior to her upcoming appointment on 04/29.

## 2025-04-25 ENCOUNTER — TELEPHONE (OUTPATIENT)
Age: 65
End: 2025-04-25

## 2025-04-25 LAB
25(OH)D3+25(OH)D2 SERPL-MCNC: 39 NG/ML (ref 30–100)
ALBUMIN SERPL-MCNC: 4.2 G/DL (ref 3.5–5.7)
ALBUMIN/CREAT UR: 11.1
ALP SERPL-CCNC: 81 U/L (ref 35–120)
ALT SERPL-CCNC: 33 U/L
ANION GAP SERPL CALCULATED.3IONS-SCNC: 11 MMOL/L (ref 3–11)
AST SERPL-CCNC: 32 U/L
BACTERIA URNS QL MICRO: ABNORMAL
BASOPHILS # BLD AUTO: 0.1 THOU/CMM (ref 0–0.1)
BASOPHILS NFR BLD AUTO: 1 %
BILIRUB SERPL-MCNC: 0.4 MG/DL (ref 0.2–1)
BUN SERPL-MCNC: 28 MG/DL (ref 7–25)
CALCIUM SERPL-MCNC: 9.6 MG/DL (ref 8.5–10.5)
CHLORIDE SERPL-SCNC: 100 MMOL/L (ref 100–109)
CO2 SERPL-SCNC: 25 MMOL/L (ref 21–31)
CREAT SERPL-MCNC: 1.34 MG/DL (ref 0.4–1.1)
CREAT UR-MCNC: 89.9 MG/DL (ref 50–200)
CYTOLOGY CMNT CVX/VAG CYTO-IMP: ABNORMAL
DIFFERENTIAL METHOD BLD: ABNORMAL
EOSINOPHIL # BLD AUTO: 0.3 THOU/CMM (ref 0–0.5)
EOSINOPHIL NFR BLD AUTO: 3 %
ERYTHROCYTE [DISTWIDTH] IN BLOOD BY AUTOMATED COUNT: 14.5 % (ref 12–16)
GFR/BSA.PRED SERPLBLD CYS-BASED-ARV: 44 ML/MIN/{1.73_M2}
GLUCOSE SERPL-MCNC: 187 MG/DL (ref 65–99)
GLUCOSE UR QL STRIP: NEGATIVE MG/DL
HCT VFR BLD AUTO: 43 % (ref 35–43)
HGB BLD-MCNC: 14.5 G/DL (ref 11.5–14.5)
HGB UR QL STRIP: NEGATIVE MG/DL
KETONES UR QL STRIP: NEGATIVE MG/DL
LEUKOCYTE ESTERASE UR QL STRIP: 500 /UL
LYMPHOCYTES # BLD AUTO: 3.7 THOU/CMM (ref 1–3)
LYMPHOCYTES NFR BLD AUTO: 45 %
MAGNESIUM SERPL-MCNC: 2 MG/DL (ref 1.4–2.2)
MCH RBC QN AUTO: 28.6 PG (ref 26–34)
MCHC RBC AUTO-ENTMCNC: 33.7 G/DL (ref 32–37)
MCV RBC AUTO: 85 FL (ref 80–100)
MICROALBUMIN UR-MCNC: 1 MG/DL
MONOCYTES # BLD AUTO: 0.5 THOU/CMM (ref 0.3–1)
MONOCYTES NFR BLD AUTO: 7 %
MUCOUS THREADS URNS QL MICRO: ABNORMAL
NEUTROPHILS # BLD AUTO: 3.7 THOU/CMM (ref 1.8–7.8)
NEUTROPHILS NFR BLD AUTO: 44 %
NITRITE UR QL STRIP: NEGATIVE
PH UR: 5 [PH] (ref 4.5–8)
PHOSPHATE SERPL-MCNC: 3.6 MG/DL (ref 2.3–4.6)
PLATELET # BLD AUTO: 243 THOU/CMM (ref 140–350)
PMV BLD REES-ECKER: 8.7 FL (ref 7.5–11.3)
POTASSIUM SERPL-SCNC: 4.8 MMOL/L (ref 3.5–5.2)
PROT 24H UR-MRATE: NEGATIVE MG/DL
PROT SERPL-MCNC: 7 G/DL (ref 6.3–8.3)
PTH-INTACT SERPL-MCNC: 71.2 PG/ML (ref 12–88)
RBC # BLD AUTO: 5.05 MILL/CMM (ref 3.7–4.7)
RBC #/AREA URNS HPF: ABNORMAL /HPF (ref 0–2)
SL AMB POCT URINE COMMENT: ABNORMAL
SODIUM SERPL-SCNC: 136 MMOL/L (ref 135–145)
SP GR UR: 1.01 (ref 1–1.03)
SQUAMOUS #/AREA URNS HPF: >10 /LPF (ref 0–5)
WBC # BLD AUTO: 8.3 THOU/CMM (ref 4–10)
WBC #/AREA URNS HPF: ABNORMAL /HPF (ref 0–5)

## 2025-04-28 NOTE — ASSESSMENT & PLAN NOTE
Magnesium 2.0 mg/dL, 4/25.  No current indication for magnesium supplement.  Will continue to monitor.Orders:    Magnesium; Future

## 2025-04-28 NOTE — ASSESSMENT & PLAN NOTE
UACR 11.1 mg/g, 4/25.  Continue lisinopril 10 mg daily.  No current indication for SGLT2.Orders:    Albumin / creatinine urine ratio; Future    Urinalysis with microscopic; Future

## 2025-04-28 NOTE — ASSESSMENT & PLAN NOTE
Bilateral lower extremities examine with 2+ BLE edema  Continue 2 g sodium restriction  Continue Lasix 20 mg daily as needed

## 2025-04-28 NOTE — ASSESSMENT & PLAN NOTE
Vitamin D 39  ng/mL, 4/25/25. No current indication for vitamin D supplement.  Will continue to monitor.Orders:    Vitamin D 25 hydroxy; Future

## 2025-04-28 NOTE — ASSESSMENT & PLAN NOTE
Lab Results   Component Value Date    HGBA1C 11.6 (H) 10/21/2024   A1C 13 % on 4/10/25 at [facility].  Continue to encourage appropriate glycemic control with management per endocrinology/PCP.

## 2025-04-28 NOTE — PROGRESS NOTES
Name: Maryan Huang      : 1960      MRN: [id number]  Encounter Provider: ISABELLE Magallon  Encounter Date: 2025   Encounter department: [facility]  :  Assessment & Plan  Stage 3a chronic kidney disease (HCC)  Lab Results   Component Value Date    EGFR 44 (L) 2025    EGFR 46 (L) 2025    EGFR 51 (L) 2025    CREATININE 1.34 (H) 2025    CREATININE 1.27 (H) 2025    CREATININE 1.17 (H) 2025   Baseline creatinine 1.1-1.2 mg/dL  Follows Dr. Evans  Etiology hypertensive nephrosclerosis, diabetic nephropathy  Creatinine 1.34 mg/dL, GFR 44 mL/min, .  /UL leukocytes, UACR 11.1 mg/g, .  CT CAP with no evidence of hydronephrosis, renal calculus or contour deforming mass, 10/21/2024  Volume status examines euvolemic  Continue to avoid NSAIDs and nephrotoxins.  Stay well-hydrated.  Continue lisinopril 10 mg dailyOrders:    Basic metabolic panel; Future    CBC and differential; Future    Essential hypertension  Blood pressure 128/70 mmHg  Home blood pressure does not check at home, has wrist cuff but does not check BP.   Continue 2 g sodium restriction  Continue lisinopril 10 mg daily, furosemide 20 mg daily as needed     Bilateral lower extremity edema  Bilateral lower extremities examine with 2+ BLE edema  Continue 2 g sodium restriction  Continue Lasix 20 mg daily as needed     Proteinuria, unspecified type  UACR 11.1 mg/g, .  Continue lisinopril 10 mg daily.  No current indication for SGLT2.Orders:    Albumin / creatinine urine ratio; Future    Urinalysis with microscopic; Future    Vitamin D insufficiency  Vitamin D 39  ng/mL, 25. No current indication for vitamin D supplement.  Will continue to monitor.Orders:    Vitamin D 25 hydroxy; Future    Hypomagnesemia  Magnesium 2.0 mg/dL, .  No current indication for magnesium supplement.  Will continue to monitor.Orders:    Magnesium;  Future    BMI 45.0-49.9, adult ([facility])  Continue to encourage healthy lifestyle choices including diet and exercise.     Type 2 diabetes mellitus with hyperglycemia, with long-term current use of insulin ([facility])    Lab Results   Component Value Date    HGBA1C 11.6 (H) 10/21/2024   A1C 13 % on 4/10/25 at [facility].  Continue to encourage appropriate glycemic control with management per endocrinology/PCP.         History of Present Illness   HPI  Maryan Huang is a 64 y.o. female who presents to [facility] for follow-up of stage IIIa chronic kidney disease, essential hypertension, BLE edema, proteinuria, vitamin D deficiency, and hypomagnesemia.  PMH includes T2DM, obesity, LA, GERD, HLD, and myofascial pain.  Recently had pneumonia in March with 2 ED visits to [facility]. Denies recent emergency department visits, hospitalizations or illness.  States overall has been feeling well.  Most recent labs were from 4/25 which we reviewed together.        Review of Systems   Constitutional:  Negative for activity change, appetite change, chills, fatigue and fever.   HENT:  Negative for ear pain and sore throat.    Eyes:  Negative for pain and visual disturbance.   Respiratory:  Negative for cough and shortness of breath.    Cardiovascular:  Positive for leg swelling. Negative for chest pain and palpitations.   Gastrointestinal:  Negative for abdominal pain, constipation, diarrhea, nausea and vomiting.   Genitourinary: Negative.  Negative for decreased urine volume, difficulty urinating, dysuria, flank pain, frequency and hematuria.   Musculoskeletal:  Negative for arthralgias and back pain.   Skin:  Negative for color change and rash.   Allergic/Immunologic: Negative.    Neurological: Negative.  Negative for dizziness, seizures, syncope, light-headedness and headaches.        Balance issues due to spinal arthritis   Hematological: Negative.    Psychiatric/Behavioral: Negative.     All other systems reviewed and are  negative.         Objective   There were no vitals taken for this visit.     Physical Exam  Vitals and nursing note reviewed.   Constitutional:       General: She is not in acute distress.     Appearance: Normal appearance. She is well-developed. She is obese.   HENT:      Head: Normocephalic and atraumatic.      Right Ear: External ear normal.      Left Ear: External ear normal.      Nose: Nose normal.      Mouth/Throat:      Mouth: Mucous membranes are moist.      Pharynx: Oropharynx is clear.   Eyes:      Extraocular Movements: Extraocular movements intact.      Conjunctiva/sclera: Conjunctivae normal.      Pupils: Pupils are equal, round, and reactive to light.   Cardiovascular:      Rate and Rhythm: Normal rate and regular rhythm.      Heart sounds: Normal heart sounds. No murmur heard.  Pulmonary:      Effort: Pulmonary effort is normal. No respiratory distress.      Breath sounds: Normal breath sounds.   Abdominal:      Palpations: Abdomen is soft.      Tenderness: There is no abdominal tenderness.   Musculoskeletal:         General: No swelling.      Cervical back: Neck supple.      Right lower leg: Edema present.      Left lower leg: Edema present.   Skin:     General: Skin is warm and dry.      Capillary Refill: Capillary refill takes less than 2 seconds.   Neurological:      General: No focal deficit present.      Mental Status: She is alert and oriented to person, place, and time.   Psychiatric:         Mood and Affect: Mood normal.         Behavior: Behavior normal.

## 2025-04-28 NOTE — ASSESSMENT & PLAN NOTE
Blood pressure 128/70 mmHg  Home blood pressure does not check at home, has wrist cuff but does not check BP.   Continue 2 g sodium restriction  Continue lisinopril 10 mg daily, furosemide 20 mg daily as needed

## 2025-04-28 NOTE — ASSESSMENT & PLAN NOTE
Lab Results   Component Value Date    EGFR 44 (L) 04/25/2025    EGFR 46 (L) 03/27/2025    EGFR 51 (L) 03/05/2025    CREATININE 1.34 (H) 04/25/2025    CREATININE 1.27 (H) 03/27/2025    CREATININE 1.17 (H) 03/05/2025   Baseline creatinine 1.1-1.2 mg/dL  Follows Dr. Evans  Etiology hypertensive nephrosclerosis, diabetic nephropathy  Creatinine 1.34 mg/dL, GFR 44 mL/min, 4/25.  /UL leukocytes, UACR 11.1 mg/g, 4/25.  CT CAP with no evidence of hydronephrosis, renal calculus or contour deforming mass, 10/21/2024  Volume status examines euvolemic  Continue to avoid NSAIDs and nephrotoxins.  Stay well-hydrated.  Continue lisinopril 10 mg dailyOrders:    Basic metabolic panel; Future    CBC and differential; Future

## 2025-04-29 ENCOUNTER — OFFICE VISIT (OUTPATIENT)
Age: 65
End: 2025-04-29
Payer: COMMERCIAL

## 2025-04-29 VITALS
HEIGHT: 62 IN | SYSTOLIC BLOOD PRESSURE: 128 MMHG | WEIGHT: 279 LBS | BODY MASS INDEX: 51.34 KG/M2 | DIASTOLIC BLOOD PRESSURE: 70 MMHG | TEMPERATURE: 97.3 F | OXYGEN SATURATION: 95 % | HEART RATE: 69 BPM

## 2025-04-29 DIAGNOSIS — I10 ESSENTIAL HYPERTENSION: Chronic | ICD-10-CM

## 2025-04-29 DIAGNOSIS — R60.0 BILATERAL LOWER EXTREMITY EDEMA: ICD-10-CM

## 2025-04-29 DIAGNOSIS — N18.31 STAGE 3A CHRONIC KIDNEY DISEASE (HCC): Primary | Chronic | ICD-10-CM

## 2025-04-29 DIAGNOSIS — E83.42 HYPOMAGNESEMIA: ICD-10-CM

## 2025-04-29 DIAGNOSIS — N25.81 SECONDARY HYPERPARATHYROIDISM OF RENAL ORIGIN (HCC): ICD-10-CM

## 2025-04-29 DIAGNOSIS — E11.65 TYPE 2 DIABETES MELLITUS WITH HYPERGLYCEMIA, WITH LONG-TERM CURRENT USE OF INSULIN (HCC): ICD-10-CM

## 2025-04-29 DIAGNOSIS — E55.9 VITAMIN D INSUFFICIENCY: ICD-10-CM

## 2025-04-29 DIAGNOSIS — Z79.4 TYPE 2 DIABETES MELLITUS WITH HYPERGLYCEMIA, WITH LONG-TERM CURRENT USE OF INSULIN (HCC): ICD-10-CM

## 2025-04-29 DIAGNOSIS — R80.9 PROTEINURIA, UNSPECIFIED TYPE: ICD-10-CM

## 2025-04-29 PROCEDURE — 99214 OFFICE O/P EST MOD 30 MIN: CPT

## 2025-04-29 NOTE — PATIENT INSTRUCTIONS
Pleasure seeing you today.     Your kidney function is stable with a creatinine of 1.34 mg/dL and a GFR of 44 mL/min.  Please continue to avoid NSAIDs such as Advil, Motrin, Aleve, ibuprofen and naproxen.  Please continue to follow 2 g sodium restriction.    Please continue checking blood pressure at home 3-4 times per week and keep a record of readings.  If blood pressure upper number is consistently less than 100 or greater than 150 please contact the office.  Please also call the office if you are experiencing increased headaches, dizziness, lightheadedness, chest pain or blurred vision.  Please continue to maintain a 2 g sodium restriction.    Please continue taking all medications as previously ordered.    Please return for follow-up appointment in 6 months with lab work completed prior to that visit.

## 2025-05-09 ENCOUNTER — RESULTS FOLLOW-UP (OUTPATIENT)
Age: 65
End: 2025-05-09

## (undated) DEVICE — ADHESIVE SKIN CLSR DERMABOND NX

## (undated) DEVICE — ALL PURPOSE SPONGES,NON-WOVEN, 4 PLY: Brand: CURITY

## (undated) DEVICE — IV EXTENSION TUBING 33 IN

## (undated) DEVICE — FLEXIBLE ADHESIVE BANDAGE,X-LARGE: Brand: CURITY

## (undated) DEVICE — GLOVE INDICATOR PI UNDERGLOVE SZ 8 BLUE

## (undated) DEVICE — SUT MONOCRYL 4-0 PS-2 27 IN Y426H

## (undated) DEVICE — TELFA NON-ADHERENT ABSORBENT DRESSING: Brand: TELFA

## (undated) DEVICE — GLOVE PI ULTRA TOUCH SZ.7.0

## (undated) DEVICE — SYRINGE 20ML LL

## (undated) DEVICE — NEEDLE SPINAL 22G X 3.5IN  QUINCKE

## (undated) DEVICE — BETHLEHEM UNIVERSAL MINOR GEN: Brand: CARDINAL HEALTH

## (undated) DEVICE — CAUTERY TIP POLISHER: Brand: DEVON

## (undated) DEVICE — CHLORAPREP HI-LITE 10.5ML ORANGE

## (undated) DEVICE — NEEDLE SPINAL 22G X 5IN QUINCKE

## (undated) DEVICE — SYRINGE 10ML LL

## (undated) DEVICE — 3M™ TEGADERM™ TRANSPARENT FILM DRESSING FRAME STYLE, 1626W, 4 IN X 4-3/4 IN (10 CM X 12 CM), 50/CT 4CT/CASE: Brand: 3M™ TEGADERM™

## (undated) DEVICE — CUP MEDICINE 1OZ 5000/CS 50/PLT: Brand: MEDEGEN MEDICAL PRODUCTS, LLC

## (undated) DEVICE — INTENDED FOR TISSUE SEPARATION, AND OTHER PROCEDURES THAT REQUIRE A SHARP SURGICAL BLADE TO PUNCTURE OR CUT.: Brand: BARD-PARKER ® CARBON RIB-BACK BLADES

## (undated) DEVICE — SUT ETHILON 4-0 PS-2 18 IN 1667H

## (undated) DEVICE — STRETCH BANDAGE: Brand: CURITY

## (undated) DEVICE — GROUNDING PAD UNIVERSAL SLW

## (undated) DEVICE — TRAY EPIDURAL PERIFIX 20GA X 3.5IN TUOHY 8ML

## (undated) DEVICE — GLOVE PI ULTRA TOUCH SZ.7.5

## (undated) DEVICE — NEEDLE 25G X 1 1/2

## (undated) DEVICE — SYRINGE 5ML LL

## (undated) DEVICE — POOLE SUCTION HANDLE W/TUBING: Brand: CARDINAL HEALTH

## (undated) DEVICE — SUT VICRYL 3-0 SH 27 IN J416H

## (undated) DEVICE — PENCIL ROCKER SWITCH CAUTERY HAND CONTROL

## (undated) DEVICE — 10FR FRAZIER SUCTION HANDLE: Brand: CARDINAL HEALTH

## (undated) DEVICE — GLOVE INDICATOR PI UNDERGLOVE SZ 7.5 BLUE

## (undated) DEVICE — BETHLEHEM UNIVERSAL  MIONR EXT: Brand: CARDINAL HEALTH

## (undated) DEVICE — ASTOUND IMPERVIOUS SURGICAL GOWN: Brand: CONVERTORS

## (undated) DEVICE — URETERAL CATHETER ADAPTOR TIP

## (undated) DEVICE — CHLORAPREP HI-LITE 26ML ORANGE